# Patient Record
Sex: FEMALE | Race: WHITE | NOT HISPANIC OR LATINO | Employment: UNEMPLOYED | URBAN - NONMETROPOLITAN AREA
[De-identification: names, ages, dates, MRNs, and addresses within clinical notes are randomized per-mention and may not be internally consistent; named-entity substitution may affect disease eponyms.]

---

## 2023-03-22 RX ORDER — ROPINIROLE 1 MG/1
TABLET, FILM COATED ORAL
Qty: 180 TABLET | OUTPATIENT
Start: 2023-03-22

## 2023-03-27 ENCOUNTER — TELEPHONE (OUTPATIENT)
Dept: PRIMARY CARE | Facility: CLINIC | Age: 63
End: 2023-03-27
Payer: COMMERCIAL

## 2023-03-27 NOTE — TELEPHONE ENCOUNTER
Beth called and wanted to know if you would take her mom. Beth isn't happy with her PCP he gave her meds that reacted against each other and she started hallucinating. I told her that I would let you.

## 2023-04-03 PROBLEM — M72.2 BILATERAL PLANTAR FASCIITIS: Status: ACTIVE | Noted: 2023-04-03

## 2023-04-03 PROBLEM — E03.9 HYPOTHYROIDISM: Status: ACTIVE | Noted: 2023-04-03

## 2023-04-03 PROBLEM — I89.0 LYMPHEDEMA: Status: ACTIVE | Noted: 2023-04-03

## 2023-04-03 PROBLEM — R09.81 NASAL CONGESTION: Status: ACTIVE | Noted: 2023-04-03

## 2023-04-03 PROBLEM — M54.12 CERVICAL RADICULOPATHY: Status: ACTIVE | Noted: 2023-04-03

## 2023-04-03 PROBLEM — R47.89 OTHER SPEECH DISTURBANCES: Status: ACTIVE | Noted: 2023-04-03

## 2023-04-03 PROBLEM — R49.9 VOICE DISTURBANCE: Status: ACTIVE | Noted: 2023-04-03

## 2023-04-03 PROBLEM — B37.9 YEAST INFECTION: Status: ACTIVE | Noted: 2023-04-03

## 2023-04-03 PROBLEM — C09.9 TONSIL CANCER (MULTI): Status: ACTIVE | Noted: 2023-04-03

## 2023-04-03 PROBLEM — J34.2 DEVIATED NASAL SEPTUM: Status: ACTIVE | Noted: 2023-04-03

## 2023-04-03 PROBLEM — M19.90 ARTHRITIS: Status: ACTIVE | Noted: 2023-04-03

## 2023-04-03 PROBLEM — R35.0 URINARY FREQUENCY: Status: ACTIVE | Noted: 2023-04-03

## 2023-04-03 PROBLEM — T66.XXXA ADVERSE EFFECT OF RADIATION: Status: ACTIVE | Noted: 2023-04-03

## 2023-04-03 PROBLEM — N39.3 FEMALE GENUINE STRESS INCONTINENCE: Status: ACTIVE | Noted: 2023-04-03

## 2023-04-03 PROBLEM — J18.9 PNEUMONIA: Status: ACTIVE | Noted: 2023-04-03

## 2023-04-03 PROBLEM — R11.2 NAUSEA AND/OR VOMITING: Status: ACTIVE | Noted: 2023-04-03

## 2023-04-03 PROBLEM — H66.90 OTITIS MEDIA, CHRONIC: Status: ACTIVE | Noted: 2023-04-03

## 2023-04-03 PROBLEM — J02.9 SORE THROAT: Status: ACTIVE | Noted: 2023-04-03

## 2023-04-03 PROBLEM — J38.7 LESION OF LARYNX: Status: ACTIVE | Noted: 2023-04-03

## 2023-04-03 PROBLEM — E78.00 PURE HYPERCHOLESTEROLEMIA: Status: ACTIVE | Noted: 2023-04-03

## 2023-04-03 PROBLEM — F17.210 NICOTINE DEPENDENCE, CIGARETTES, UNCOMPLICATED: Status: ACTIVE | Noted: 2023-04-03

## 2023-04-03 PROBLEM — R22.1 LUMP IN NECK: Status: ACTIVE | Noted: 2023-04-03

## 2023-04-03 PROBLEM — R29.818 SUSPECTED SLEEP APNEA: Status: ACTIVE | Noted: 2023-04-03

## 2023-04-03 PROBLEM — R09.82 POSTNASAL DRIP: Status: ACTIVE | Noted: 2023-04-03

## 2023-04-03 PROBLEM — R06.83 SNORES: Status: ACTIVE | Noted: 2023-04-03

## 2023-04-03 PROBLEM — R53.83 FATIGUE: Status: ACTIVE | Noted: 2023-04-03

## 2023-04-03 PROBLEM — E66.01 MORBID OBESITY (MULTI): Status: ACTIVE | Noted: 2023-04-03

## 2023-04-03 PROBLEM — F32.1 CURRENT MODERATE EPISODE OF MAJOR DEPRESSIVE DISORDER WITHOUT PRIOR EPISODE (MULTI): Status: ACTIVE | Noted: 2023-04-03

## 2023-04-03 PROBLEM — B37.0 ORAL THRUSH: Status: ACTIVE | Noted: 2023-04-03

## 2023-04-03 PROBLEM — J32.9 SINUSITIS: Status: ACTIVE | Noted: 2023-04-03

## 2023-04-03 PROBLEM — G25.81 RESTLESS LEGS SYNDROME: Status: ACTIVE | Noted: 2023-04-03

## 2023-04-03 PROBLEM — J30.9 ALLERGIC RHINITIS: Status: ACTIVE | Noted: 2023-04-03

## 2023-04-03 PROBLEM — C77.0 METASTASIS TO HEAD AND NECK LYMPH NODE (MULTI): Status: ACTIVE | Noted: 2023-04-03

## 2023-04-03 PROBLEM — J44.9 COPD (CHRONIC OBSTRUCTIVE PULMONARY DISEASE) (MULTI): Status: ACTIVE | Noted: 2023-04-03

## 2023-04-03 PROBLEM — K11.20 SALIVARY GLAND INFLAMMATION: Status: ACTIVE | Noted: 2023-04-03

## 2023-04-03 PROBLEM — K21.9 GERD (GASTROESOPHAGEAL REFLUX DISEASE): Status: ACTIVE | Noted: 2023-04-03

## 2023-04-03 PROBLEM — M20.10 ACQUIRED HALLUX VALGUS: Status: ACTIVE | Noted: 2023-04-03

## 2023-04-03 PROBLEM — J02.9 PHARYNGITIS: Status: ACTIVE | Noted: 2023-04-03

## 2023-04-03 PROBLEM — M79.2: Status: ACTIVE | Noted: 2023-04-03

## 2023-04-03 RX ORDER — CEPHALEXIN 250 MG/1
1 CAPSULE ORAL EVERY 12 HOURS
COMMUNITY
Start: 2023-02-28 | End: 2023-04-10 | Stop reason: ALTCHOICE

## 2023-04-03 RX ORDER — LORAZEPAM 0.5 MG/1
1 TABLET ORAL EVERY 6 HOURS PRN
COMMUNITY
Start: 2022-06-29 | End: 2023-05-08 | Stop reason: SDUPTHER

## 2023-04-03 RX ORDER — SILVER SULFADIAZINE 10 G/1000G
CREAM TOPICAL
COMMUNITY
Start: 2022-06-29 | End: 2023-05-08 | Stop reason: ALTCHOICE

## 2023-04-03 RX ORDER — NITROFURANTOIN 25; 75 MG/1; MG/1
1 CAPSULE ORAL 2 TIMES DAILY
COMMUNITY
Start: 2023-03-31 | End: 2023-04-10 | Stop reason: ALTCHOICE

## 2023-04-03 RX ORDER — UMECLIDINIUM BROMIDE AND VILANTEROL TRIFENATATE 62.5; 25 UG/1; UG/1
1 POWDER RESPIRATORY (INHALATION) DAILY
COMMUNITY
Start: 2022-08-01 | End: 2023-10-29

## 2023-04-03 RX ORDER — LEVOTHYROXINE SODIUM 50 UG/1
50 TABLET ORAL DAILY
COMMUNITY
Start: 2022-12-31 | End: 2023-04-10 | Stop reason: ALTCHOICE

## 2023-04-03 RX ORDER — FESOTERODINE FUMARATE 8 MG/1
1 TABLET, FILM COATED, EXTENDED RELEASE ORAL DAILY
COMMUNITY
Start: 2022-11-29 | End: 2023-04-10 | Stop reason: ALTCHOICE

## 2023-04-03 RX ORDER — LEVOTHYROXINE SODIUM 75 UG/1
1 TABLET ORAL DAILY
COMMUNITY
Start: 2022-12-05 | End: 2024-03-07

## 2023-04-03 RX ORDER — OMEPRAZOLE 40 MG/1
1 CAPSULE, DELAYED RELEASE ORAL 2 TIMES DAILY
COMMUNITY
Start: 2022-09-26 | End: 2023-04-10 | Stop reason: ALTCHOICE

## 2023-04-03 RX ORDER — DOCUSATE SODIUM 100 MG/1
CAPSULE ORAL 2 TIMES DAILY
COMMUNITY
End: 2023-05-08 | Stop reason: ALTCHOICE

## 2023-04-03 RX ORDER — FLUTICASONE PROPIONATE 50 MCG
2 SPRAY, SUSPENSION (ML) NASAL DAILY
COMMUNITY
Start: 2022-10-18 | End: 2024-01-02 | Stop reason: ALTCHOICE

## 2023-04-03 RX ORDER — PROCHLORPERAZINE MALEATE 10 MG
TABLET ORAL
COMMUNITY
Start: 2022-06-28 | End: 2023-04-10 | Stop reason: ALTCHOICE

## 2023-04-03 RX ORDER — METHYLPREDNISOLONE 4 MG/1
TABLET ORAL
COMMUNITY
Start: 2022-10-18 | End: 2023-04-10 | Stop reason: ALTCHOICE

## 2023-04-03 RX ORDER — SUCRALFATE 1 G/10ML
SUSPENSION ORAL
COMMUNITY
Start: 2022-06-28 | End: 2023-04-10 | Stop reason: ALTCHOICE

## 2023-04-03 RX ORDER — KETOROLAC TROMETHAMINE 10 MG/1
TABLET, FILM COATED ORAL
COMMUNITY
Start: 2022-11-11 | End: 2023-04-10 | Stop reason: ALTCHOICE

## 2023-04-03 RX ORDER — POLYETHYLENE GLYCOL 3350 17 G/17G
17 POWDER, FOR SOLUTION ORAL
COMMUNITY
Start: 2022-11-11 | End: 2023-04-10 | Stop reason: ALTCHOICE

## 2023-04-03 RX ORDER — ROPINIROLE 1 MG/1
2 TABLET, FILM COATED ORAL NIGHTLY
COMMUNITY
Start: 2017-10-25 | End: 2023-04-10 | Stop reason: SDUPTHER

## 2023-04-03 RX ORDER — ADHESIVE BANDAGE
15 BANDAGE TOPICAL
COMMUNITY
Start: 2022-11-11 | End: 2023-04-10 | Stop reason: ALTCHOICE

## 2023-04-04 LAB — THYROTROPIN (MIU/L) IN SER/PLAS BY DETECTION LIMIT <= 0.05 MIU/L: 2.37 MIU/L (ref 0.44–3.98)

## 2023-04-10 ENCOUNTER — OFFICE VISIT (OUTPATIENT)
Dept: PRIMARY CARE | Facility: CLINIC | Age: 63
End: 2023-04-10
Payer: COMMERCIAL

## 2023-04-10 VITALS
SYSTOLIC BLOOD PRESSURE: 124 MMHG | WEIGHT: 172 LBS | OXYGEN SATURATION: 98 % | BODY MASS INDEX: 30.48 KG/M2 | HEIGHT: 63 IN | DIASTOLIC BLOOD PRESSURE: 60 MMHG | HEART RATE: 78 BPM

## 2023-04-10 DIAGNOSIS — F32.1 CURRENT MODERATE EPISODE OF MAJOR DEPRESSIVE DISORDER WITHOUT PRIOR EPISODE (MULTI): Primary | ICD-10-CM

## 2023-04-10 DIAGNOSIS — G25.81 RESTLESS LEGS SYNDROME: ICD-10-CM

## 2023-04-10 DIAGNOSIS — C09.9 TONSIL CANCER (MULTI): ICD-10-CM

## 2023-04-10 PROBLEM — B37.9 YEAST INFECTION: Status: RESOLVED | Noted: 2023-04-03 | Resolved: 2023-04-10

## 2023-04-10 PROBLEM — E66.01 MORBID OBESITY (MULTI): Status: RESOLVED | Noted: 2023-04-03 | Resolved: 2023-04-10

## 2023-04-10 PROBLEM — B37.0 ORAL THRUSH: Status: RESOLVED | Noted: 2023-04-03 | Resolved: 2023-04-10

## 2023-04-10 PROBLEM — J18.9 PNEUMONIA: Status: RESOLVED | Noted: 2023-04-03 | Resolved: 2023-04-10

## 2023-04-10 PROBLEM — R53.83 FATIGUE: Status: RESOLVED | Noted: 2023-04-03 | Resolved: 2023-04-10

## 2023-04-10 PROCEDURE — 99214 OFFICE O/P EST MOD 30 MIN: CPT | Performed by: FAMILY MEDICINE

## 2023-04-10 PROCEDURE — 4004F PT TOBACCO SCREEN RCVD TLK: CPT | Performed by: FAMILY MEDICINE

## 2023-04-10 PROCEDURE — 3008F BODY MASS INDEX DOCD: CPT | Performed by: FAMILY MEDICINE

## 2023-04-10 RX ORDER — OXYCODONE AND ACETAMINOPHEN 5; 325 MG/1; MG/1
1 TABLET ORAL EVERY 4 HOURS PRN
COMMUNITY
Start: 2023-04-01 | End: 2023-04-10 | Stop reason: ALTCHOICE

## 2023-04-10 RX ORDER — GABAPENTIN 300 MG/1
300 CAPSULE ORAL 3 TIMES DAILY
Qty: 90 CAPSULE | Refills: 5 | Status: SHIPPED | OUTPATIENT
Start: 2023-04-10 | End: 2023-09-22 | Stop reason: SDUPTHER

## 2023-04-10 RX ORDER — ROPINIROLE 1 MG/1
2 TABLET, FILM COATED ORAL NIGHTLY
Qty: 60 TABLET | Refills: 3 | Status: SHIPPED | OUTPATIENT
Start: 2023-04-10 | End: 2023-07-06 | Stop reason: SDUPTHER

## 2023-04-10 RX ORDER — FLUOXETINE 10 MG/1
10 CAPSULE ORAL DAILY
Qty: 30 CAPSULE | Refills: 5 | Status: SHIPPED | OUTPATIENT
Start: 2023-04-10 | End: 2023-05-08

## 2023-04-10 NOTE — PROGRESS NOTES
Subjective   Patient ID: Gretchen Sanchez is a 62 y.o. female who presents for Depression (Depression and anxiety ).  HPI  Having anxiety and depression  Currently taking oxycodone and lorazepam  No SI/HI  Poor energy  Some apathy  Does not like talking on the phone  No hopeless, worthless  Cries a bunch  Appetite- poor  Sleep- interrupted due to having to get up to use bathroom 3-4 times  Concentration- not the best    Thought Process- gets distracted and forgets things if do not interrupt others  Berta- No    Takes oxycodone for the throat pain- constant soreness- starts coughing when pain meds starts wearing off  Sees ENT  Sees Oncology    Current Outpatient Medications:     Anoro Ellipta 62.5-25 mcg/actuation blister with device, Inhale 1 puff once daily., Disp: , Rfl:     Colace 100 mg capsule, Take by mouth twice a day., Disp: , Rfl:     FLUoxetine (PROzac) 10 mg capsule, Take 1 capsule (10 mg) by mouth once daily., Disp: 30 capsule, Rfl: 5    fluticasone (Flonase) 50 mcg/actuation nasal spray, Administer 2 sprays into affected nostril(s) once daily., Disp: , Rfl:     gabapentin (Neurontin) 300 mg capsule, Take 1 capsule (300 mg) by mouth in the morning and 1 capsule (300 mg) in the evening and 1 capsule (300 mg) before bedtime., Disp: 90 capsule, Rfl: 5    levothyroxine (Synthroid, Levoxyl) 75 mcg tablet, Take 1 tablet (75 mcg) by mouth once daily., Disp: , Rfl:     LORazepam (Ativan) 0.5 mg tablet, Take 1 tablet (0.5 mg) by mouth every 6 hours if needed., Disp: , Rfl:     rOPINIRole (Requip) 1 mg tablet, Take 2 tablets (2 mg) by mouth once daily at bedtime., Disp: 60 tablet, Rfl: 3    SSD 1 % cream, APPLY TOPICALLY TO THE AFFECTED AREA TWICE DAILY, Disp: , Rfl:    Past Surgical History:   Procedure Laterality Date     SECTION, CLASSIC  2014     Section    CHOLECYSTECTOMY  2014    Cholecystectomy    HYSTERECTOMY  2014    Hysterectomy    MOUTH SURGERY  2014    Oral  "Surgery Tooth Extraction      Past Medical History:   Diagnosis Date    Acute maxillary sinusitis, unspecified 09/25/2018    Acute maxillary sinusitis    Essential (primary) hypertension 12/28/2018    HTN (hypertension), benign    Morbid obesity (CMS/HCC) 04/03/2023    Other conditions influencing health status 07/17/2018    History of cough    Personal history of other diseases of urinary system     History of bladder problems    Pneumonia 04/03/2023    Urinary tract infection, site not specified 07/13/2018    Acute urinary tract infection    Wheezing 06/05/2013    Wheezing     Social History     Tobacco Use    Smoking status: Every Day     Packs/day: 1.50     Types: Cigarettes    Smokeless tobacco: Never   Substance Use Topics    Alcohol use: Never    Drug use: Never      Family History   Problem Relation Name Age of Onset    Emphysema Mother      Hyperlipidemia Sister        Review of Systems    Objective   /60   Pulse 78   Ht 1.6 m (5' 3\")   Wt 78 kg (172 lb)   SpO2 98%   BMI 30.47 kg/m²    Physical Exam  Vitals and nursing note reviewed.   Constitutional:       Appearance: Normal appearance.   HENT:      Head: Normocephalic and atraumatic.      Right Ear: Tympanic membrane, ear canal and external ear normal.      Left Ear: Tympanic membrane, ear canal and external ear normal.      Nose: Nose normal.      Mouth/Throat:      Mouth: Mucous membranes are moist.      Pharynx: Oropharynx is clear. Posterior oropharyngeal erythema present.   Eyes:      Extraocular Movements: Extraocular movements intact.      Conjunctiva/sclera: Conjunctivae normal.      Pupils: Pupils are equal, round, and reactive to light.   Cardiovascular:      Rate and Rhythm: Normal rate and regular rhythm.      Pulses: Normal pulses.      Heart sounds: Normal heart sounds.   Pulmonary:      Effort: Pulmonary effort is normal.      Breath sounds: Normal breath sounds.   Abdominal:      General: Abdomen is flat. Bowel sounds are " normal.      Palpations: Abdomen is soft.   Musculoskeletal:      Cervical back: Normal range of motion and neck supple.   Lymphadenopathy:      Cervical: No cervical adenopathy.   Skin:     Capillary Refill: Capillary refill takes less than 2 seconds.   Neurological:      Mental Status: She is alert.   Psychiatric:         Mood and Affect: Mood is anxious and depressed.         Behavior: Behavior normal.         Assessment/Plan   Problem List Items Addressed This Visit       Current moderate episode of major depressive disorder without prior episode (CMS/HCC) - Primary    Relevant Medications    FLUoxetine (PROzac) 10 mg capsule    Restless legs syndrome    Relevant Medications    rOPINIRole (Requip) 1 mg tablet    Tonsil cancer (CMS/HCC)    Relevant Medications    gabapentin (Neurontin) 300 mg capsule   MDD- start on prozac 10 mg, CV exercise    RLS- controlled- renewed Requip    Tonsil CA- F/U with oncology, Wean off oxycodone (may not be able to go straight off), gabapentin 30 mg tid    Patient understands and agrees with treatment plan    Alton King, DO

## 2023-05-08 ENCOUNTER — OFFICE VISIT (OUTPATIENT)
Dept: PRIMARY CARE | Facility: CLINIC | Age: 63
End: 2023-05-08
Payer: COMMERCIAL

## 2023-05-08 VITALS
DIASTOLIC BLOOD PRESSURE: 72 MMHG | BODY MASS INDEX: 30.12 KG/M2 | SYSTOLIC BLOOD PRESSURE: 126 MMHG | HEART RATE: 74 BPM | WEIGHT: 170 LBS | OXYGEN SATURATION: 97 % | HEIGHT: 63 IN

## 2023-05-08 DIAGNOSIS — F41.1 GENERALIZED ANXIETY DISORDER: ICD-10-CM

## 2023-05-08 DIAGNOSIS — Z79.899 CHRONIC PRESCRIPTION BENZODIAZEPINE USE: ICD-10-CM

## 2023-05-08 DIAGNOSIS — G25.81 RESTLESS LEGS SYNDROME: ICD-10-CM

## 2023-05-08 DIAGNOSIS — C77.0 METASTASIS TO HEAD AND NECK LYMPH NODE (MULTI): ICD-10-CM

## 2023-05-08 DIAGNOSIS — F32.1 CURRENT MODERATE EPISODE OF MAJOR DEPRESSIVE DISORDER WITHOUT PRIOR EPISODE (MULTI): Primary | ICD-10-CM

## 2023-05-08 DIAGNOSIS — J43.2 CENTRILOBULAR EMPHYSEMA (MULTI): ICD-10-CM

## 2023-05-08 PROBLEM — J02.9 PHARYNGITIS: Status: RESOLVED | Noted: 2023-04-03 | Resolved: 2023-05-08

## 2023-05-08 PROBLEM — R11.2 NAUSEA AND/OR VOMITING: Status: RESOLVED | Noted: 2023-04-03 | Resolved: 2023-05-08

## 2023-05-08 PROBLEM — J02.9 SORE THROAT: Status: RESOLVED | Noted: 2023-04-03 | Resolved: 2023-05-08

## 2023-05-08 PROBLEM — J32.9 SINUSITIS: Status: RESOLVED | Noted: 2023-04-03 | Resolved: 2023-05-08

## 2023-05-08 LAB
AMPHETAMINE (PRESENCE) IN URINE BY SCREEN METHOD: NORMAL
BARBITURATES PRESENCE IN URINE BY SCREEN METHOD: NORMAL
BENZODIAZEPINE (PRESENCE) IN URINE BY SCREEN METHOD: NORMAL
CANNABINOIDS IN URINE BY SCREEN METHOD: NORMAL
COCAINE (PRESENCE) IN URINE BY SCREEN METHOD: NORMAL
DRUG SCREEN COMMENT URINE: NORMAL
FENTANYL URINE: NORMAL
METHADONE (PRESENCE) IN URINE BY SCREEN METHOD: NORMAL
OPIATES (PRESENCE) IN URINE BY SCREEN METHOD: NORMAL
OXYCODONE (PRESENCE) IN URINE BY SCREEN METHOD: NORMAL
PHENCYCLIDINE (PRESENCE) IN URINE BY SCREEN METHOD: NORMAL

## 2023-05-08 PROCEDURE — 3008F BODY MASS INDEX DOCD: CPT | Performed by: FAMILY MEDICINE

## 2023-05-08 PROCEDURE — 4004F PT TOBACCO SCREEN RCVD TLK: CPT | Performed by: FAMILY MEDICINE

## 2023-05-08 PROCEDURE — 80307 DRUG TEST PRSMV CHEM ANLYZR: CPT

## 2023-05-08 PROCEDURE — 99214 OFFICE O/P EST MOD 30 MIN: CPT | Performed by: FAMILY MEDICINE

## 2023-05-08 RX ORDER — ALBUTEROL SULFATE 90 UG/1
2 AEROSOL, METERED RESPIRATORY (INHALATION) EVERY 6 HOURS PRN
COMMUNITY
End: 2024-01-02 | Stop reason: ALTCHOICE

## 2023-05-08 RX ORDER — PAROXETINE HYDROCHLORIDE 20 MG/1
20 TABLET, FILM COATED ORAL EVERY MORNING
Qty: 30 TABLET | Refills: 5 | Status: SHIPPED | OUTPATIENT
Start: 2023-05-08 | End: 2023-06-05

## 2023-05-08 RX ORDER — LORAZEPAM 0.5 MG/1
0.5 TABLET ORAL EVERY 6 HOURS PRN
Qty: 60 TABLET | Refills: 0 | Status: SHIPPED | OUTPATIENT
Start: 2023-05-08 | End: 2023-06-05 | Stop reason: SDUPTHER

## 2023-05-08 ASSESSMENT — ANXIETY QUESTIONNAIRES
6. BECOMING EASILY ANNOYED OR IRRITABLE: NOT AT ALL
GAD7 TOTAL SCORE: 6
5. BEING SO RESTLESS THAT IT IS HARD TO SIT STILL: SEVERAL DAYS
1. FEELING NERVOUS, ANXIOUS, OR ON EDGE: SEVERAL DAYS
2. NOT BEING ABLE TO STOP OR CONTROL WORRYING: SEVERAL DAYS
7. FEELING AFRAID AS IF SOMETHING AWFUL MIGHT HAPPEN: SEVERAL DAYS
4. TROUBLE RELAXING: SEVERAL DAYS
3. WORRYING TOO MUCH ABOUT DIFFERENT THINGS: SEVERAL DAYS
IF YOU CHECKED OFF ANY PROBLEMS ON THIS QUESTIONNAIRE, HOW DIFFICULT HAVE THESE PROBLEMS MADE IT FOR YOU TO DO YOUR WORK, TAKE CARE OF THINGS AT HOME, OR GET ALONG WITH OTHER PEOPLE: SOMEWHAT DIFFICULT

## 2023-05-08 NOTE — PROGRESS NOTES
Subjective   Patient ID: Gretchen Sanchez is a 62 y.o. female who presents for Follow-up (Medication follow up, nothing different ).  HPI  Off the oxycodone now  Does feel better since off the oxycodone- not sleeping as much  Still having anxiety and depression    No SI/HI  Energy a little better  Does not have any desire to do anything  Does not want to do things alone- just wants to stay home  No hopeless  Kind of feel worthless  Not crying as much  Appetite- fair- thinks it is improving  Sleep- okay if takes ativan and benadryl  Concentration- not the best still  Thought Process- gets distracted and forgets things  Berta- No    Breathing is doing decent with the Anoro  Seeing Pulmonologist today  Rarely needs the albuterol    RLS good when taking the Requip    OARRS:  Alton King, DO on 5/8/2023 11:46 AM  I have personally reviewed the OARRS report for Melvi Sanchez. I have considered the risks of abuse, dependence, addiction and diversion    Is the patient prescribed a combination of a benzodiazepine and opioid?  No    Last Urine Drug Screen / ordered today: Yes  Recent Results (from the past 43247 hour(s))   Drug Screen, Urine With Reflex to Confirmation    Collection Time: 05/08/23 12:02 PM   Result Value Ref Range    DRUG SCREEN COMMENT URINE SEE BELOW     Amphetamine Screen, Urine PRESUMPTIVE NEGATIVE NEGATIVE    Barbiturate Screen, Urine PRESUMPTIVE NEGATIVE NEGATIVE    BENZODIAZEPINE (PRESENCE) IN URINE BY SCREEN METHOD PRESUMPTIVE NEGATIVE NEGATIVE    Cannabinoid Screen, Urine PRESUMPTIVE NEGATIVE NEGATIVE    Cocaine Screen, Urine PRESUMPTIVE NEGATIVE NEGATIVE    Fentanyl, Ur PRESUMPTIVE NEGATIVE NEGATIVE    Methadone Screen, Urine PRESUMPTIVE NEGATIVE NEGATIVE    Opiate Screen, Urine PRESUMPTIVE NEGATIVE NEGATIVE    Oxycodone Screen, Ur PRESUMPTIVE NEGATIVE NEGATIVE    PCP Screen, Urine PRESUMPTIVE NEGATIVE NEGATIVE     N/A    Controlled Substance Agreement:  Date of the Last Agreement:  2023  Reviewed Controlled Substance Agreement including but not limited to the benefits, risks, and alternatives to treatment with a Controlled Substance medication(s).    Benzodiazepines:  What is the patient's goal of therapy? Better sleep  Is this being achieved with current treatment? yes    BRUNO-7:  Over the last 2 weeks, how often have you been bothered by any of the following problems?  Feeling nervous, anxious, or on edge: 1  Not being able to stop or control worryin  Worrying too much about different things: 1  Trouble relaxin  Being so restless that it is hard to sit still: 1  Becoming easily annoyed or irritable: 0  Feeling afraid as if something awful might happen: 1  BRUNO-7 Total Score: 6        Activities of Daily Living:   Is your overall impression that this patient is benefiting (symptom reduction outweighs side effects) from benzodiazepine therapy? Yes     1. Physical Functioning: Same  2. Family Relationship: Better  3. Social Relationship: Better  4. Mood: Better  5. Sleep Patterns: Better  6. Overall Function: Better      Current Outpatient Medications:     albuterol 90 mcg/actuation inhaler, Inhale 2 puffs every 6 hours if needed for wheezing., Disp: , Rfl:     Anoro Ellipta 62.5-25 mcg/actuation blister with device, Inhale 1 puff once daily., Disp: , Rfl:     fluticasone (Flonase) 50 mcg/actuation nasal spray, Administer 2 sprays into affected nostril(s) once daily., Disp: , Rfl:     gabapentin (Neurontin) 300 mg capsule, Take 1 capsule (300 mg) by mouth in the morning and 1 capsule (300 mg) in the evening and 1 capsule (300 mg) before bedtime., Disp: 90 capsule, Rfl: 5    levothyroxine (Synthroid, Levoxyl) 75 mcg tablet, Take 1 tablet (75 mcg) by mouth once daily., Disp: , Rfl:     LORazepam (Ativan) 0.5 mg tablet, Take 1 tablet (0.5 mg) by mouth every 6 hours if needed for anxiety., Disp: 60 tablet, Rfl: 0    PARoxetine (Paxil) 20 mg tablet, Take 1 tablet (20 mg) by mouth once  "daily in the morning., Disp: 30 tablet, Rfl: 5    rOPINIRole (Requip) 1 mg tablet, Take 2 tablets (2 mg) by mouth once daily at bedtime., Disp: 60 tablet, Rfl: 3   Past Surgical History:   Procedure Laterality Date     SECTION, CLASSIC  2014     Section    CHOLECYSTECTOMY  2014    Cholecystectomy    HYSTERECTOMY  2014    Hysterectomy    MOUTH SURGERY  2014    Oral Surgery Tooth Extraction      Past Medical History:   Diagnosis Date    Acute maxillary sinusitis, unspecified 2018    Acute maxillary sinusitis    Essential (primary) hypertension 2018    HTN (hypertension), benign    Morbid obesity (CMS/HCC) 2023    Nausea and/or vomiting 2023    Other conditions influencing health status 2018    History of cough    Personal history of other diseases of urinary system     History of bladder problems    Pharyngitis 2023    Pneumonia 2023    Sinusitis 2023    Sore throat 2023    Urinary tract infection, site not specified 2018    Acute urinary tract infection    Wheezing 2013    Wheezing     Social History     Tobacco Use    Smoking status: Every Day     Packs/day: 1.50     Types: Cigarettes    Smokeless tobacco: Never   Substance Use Topics    Alcohol use: Never    Drug use: Never      Family History   Problem Relation Name Age of Onset    Emphysema Mother      Hyperlipidemia Sister        Review of Systems    Objective   /72   Pulse 74   Ht 1.6 m (5' 3\")   Wt 77.1 kg (170 lb)   SpO2 97%   BMI 30.11 kg/m²    Physical Exam  Vitals and nursing note reviewed.   Constitutional:       General: She is not in acute distress.     Appearance: Normal appearance.   HENT:      Head: Normocephalic and atraumatic.      Right Ear: Tympanic membrane, ear canal and external ear normal.      Left Ear: Tympanic membrane, ear canal and external ear normal.      Nose: Nose normal.      Mouth/Throat:      Mouth: Mucous membranes " are moist.      Pharynx: Oropharynx is clear.   Eyes:      Extraocular Movements: Extraocular movements intact.      Pupils: Pupils are equal, round, and reactive to light.   Neck:      Vascular: No carotid bruit.   Cardiovascular:      Rate and Rhythm: Normal rate and regular rhythm.      Pulses: Normal pulses.      Heart sounds: Normal heart sounds. No murmur heard.  Pulmonary:      Effort: Pulmonary effort is normal.      Breath sounds: Normal breath sounds.   Abdominal:      Palpations: There is no mass.   Musculoskeletal:      Cervical back: Normal range of motion and neck supple.   Lymphadenopathy:      Cervical: No cervical adenopathy.   Skin:     Capillary Refill: Capillary refill takes less than 2 seconds.   Neurological:      General: No focal deficit present.      Mental Status: She is alert and oriented to person, place, and time.   Psychiatric:         Mood and Affect: Mood is anxious. Affect is flat.         Behavior: Behavior normal.         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

## 2023-05-17 ENCOUNTER — HOSPITAL ENCOUNTER (OUTPATIENT)
Dept: DATA CONVERSION | Facility: HOSPITAL | Age: 63
End: 2023-05-17
Attending: STUDENT IN AN ORGANIZED HEALTH CARE EDUCATION/TRAINING PROGRAM | Admitting: STUDENT IN AN ORGANIZED HEALTH CARE EDUCATION/TRAINING PROGRAM
Payer: COMMERCIAL

## 2023-05-17 DIAGNOSIS — Z90.49 ACQUIRED ABSENCE OF OTHER SPECIFIED PARTS OF DIGESTIVE TRACT: ICD-10-CM

## 2023-05-17 DIAGNOSIS — G25.81 RESTLESS LEGS SYNDROME: ICD-10-CM

## 2023-05-17 DIAGNOSIS — I10 ESSENTIAL (PRIMARY) HYPERTENSION: ICD-10-CM

## 2023-05-17 DIAGNOSIS — M19.90 UNSPECIFIED OSTEOARTHRITIS, UNSPECIFIED SITE: ICD-10-CM

## 2023-05-17 DIAGNOSIS — F32.A DEPRESSION, UNSPECIFIED: ICD-10-CM

## 2023-05-17 DIAGNOSIS — G47.33 OBSTRUCTIVE SLEEP APNEA (ADULT) (PEDIATRIC): ICD-10-CM

## 2023-05-17 DIAGNOSIS — N32.81 OVERACTIVE BLADDER: ICD-10-CM

## 2023-05-17 DIAGNOSIS — J44.9 CHRONIC OBSTRUCTIVE PULMONARY DISEASE, UNSPECIFIED (MULTI): ICD-10-CM

## 2023-05-17 DIAGNOSIS — E78.5 HYPERLIPIDEMIA, UNSPECIFIED: ICD-10-CM

## 2023-05-17 DIAGNOSIS — F17.200 NICOTINE DEPENDENCE, UNSPECIFIED, UNCOMPLICATED: ICD-10-CM

## 2023-05-17 DIAGNOSIS — N39.41 URGE INCONTINENCE: ICD-10-CM

## 2023-05-17 DIAGNOSIS — Z85.818 PERSONAL HISTORY OF MALIGNANT NEOPLASM OF OTHER SITES OF LIP, ORAL CAVITY, AND PHARYNX: ICD-10-CM

## 2023-05-17 DIAGNOSIS — F41.9 ANXIETY DISORDER, UNSPECIFIED: ICD-10-CM

## 2023-05-17 DIAGNOSIS — Z92.21 PERSONAL HISTORY OF ANTINEOPLASTIC CHEMOTHERAPY: ICD-10-CM

## 2023-06-05 ENCOUNTER — OFFICE VISIT (OUTPATIENT)
Dept: PRIMARY CARE | Facility: CLINIC | Age: 63
End: 2023-06-05
Payer: COMMERCIAL

## 2023-06-05 VITALS
DIASTOLIC BLOOD PRESSURE: 76 MMHG | WEIGHT: 167.6 LBS | OXYGEN SATURATION: 97 % | HEART RATE: 63 BPM | SYSTOLIC BLOOD PRESSURE: 128 MMHG | BODY MASS INDEX: 29.69 KG/M2

## 2023-06-05 DIAGNOSIS — J43.2 CENTRILOBULAR EMPHYSEMA (MULTI): ICD-10-CM

## 2023-06-05 DIAGNOSIS — Z12.31 ENCOUNTER FOR SCREENING MAMMOGRAM FOR BREAST CANCER: ICD-10-CM

## 2023-06-05 DIAGNOSIS — Z12.12 SCREENING FOR COLORECTAL CANCER: ICD-10-CM

## 2023-06-05 DIAGNOSIS — F32.1 CURRENT MODERATE EPISODE OF MAJOR DEPRESSIVE DISORDER WITHOUT PRIOR EPISODE (MULTI): Primary | ICD-10-CM

## 2023-06-05 DIAGNOSIS — Z12.11 SCREENING FOR COLORECTAL CANCER: ICD-10-CM

## 2023-06-05 DIAGNOSIS — G25.81 RESTLESS LEGS SYNDROME: ICD-10-CM

## 2023-06-05 DIAGNOSIS — F41.1 GENERALIZED ANXIETY DISORDER: ICD-10-CM

## 2023-06-05 PROCEDURE — 99214 OFFICE O/P EST MOD 30 MIN: CPT | Performed by: FAMILY MEDICINE

## 2023-06-05 PROCEDURE — 3008F BODY MASS INDEX DOCD: CPT | Performed by: FAMILY MEDICINE

## 2023-06-05 PROCEDURE — 4004F PT TOBACCO SCREEN RCVD TLK: CPT | Performed by: FAMILY MEDICINE

## 2023-06-05 RX ORDER — PAROXETINE 30 MG/1
30 TABLET, FILM COATED ORAL EVERY MORNING
Qty: 30 TABLET | Refills: 5 | Status: SHIPPED | OUTPATIENT
Start: 2023-06-05 | End: 2023-12-01 | Stop reason: SDUPTHER

## 2023-06-05 RX ORDER — LORAZEPAM 0.5 MG/1
0.5 TABLET ORAL EVERY 6 HOURS PRN
Qty: 60 TABLET | Refills: 1 | Status: SHIPPED | OUTPATIENT
Start: 2023-06-05 | End: 2023-09-29 | Stop reason: ALTCHOICE

## 2023-06-05 RX ORDER — BUPROPION HYDROCHLORIDE 150 MG/1
TABLET, EXTENDED RELEASE ORAL
COMMUNITY
Start: 2023-05-08 | End: 2024-01-02 | Stop reason: ALTCHOICE

## 2023-06-05 RX ORDER — DOCUSATE SODIUM 100 MG/1
CAPSULE, LIQUID FILLED ORAL
COMMUNITY
Start: 2023-05-17 | End: 2024-01-02 | Stop reason: ALTCHOICE

## 2023-06-05 NOTE — PROGRESS NOTES
Subjective   Patient ID: Gretchen Sanchez is a 63 y.o. female who presents for Follow-up (Follow up on new meds).  HPI    Review of Systems    Objective   Physical Exam    Assessment/Plan   {Assess/PlanSmartLinks:65563}

## 2023-06-05 NOTE — PROGRESS NOTES
Subjective   Patient ID: Gretchen Sanchez is a 63 y.o. female who presents for Follow-up (Follow up on new meds).  HPI  MDD- paxil has slightly improved mood and energy, increase in appetite, sleep has improved, denies SE  BRUNO- ativan at bedtime  COPD- Anoro daily, albuterol prn- doing well- seeing pulmonology  Tonsil CA- Has been off oxycodone for 2 mo.  RLS- doing well with requip    Denies fever, chills, chest pain, SOB, N/V, diarrhea, dizziness, weakness         Review of Systems   All other systems reviewed and are negative.      Objective   /76 (BP Location: Left arm, Patient Position: Sitting, BP Cuff Size: Large adult)   Pulse 63   Wt 76 kg (167 lb 9.6 oz)   SpO2 97%   BMI 29.69 kg/m²     Physical Exam  Constitutional:       Appearance: Normal appearance.   HENT:      Head: Normocephalic and atraumatic.   Eyes:      Extraocular Movements: Extraocular movements intact.      Conjunctiva/sclera: Conjunctivae normal.      Pupils: Pupils are equal, round, and reactive to light.   Cardiovascular:      Rate and Rhythm: Normal rate and regular rhythm.      Pulses: Normal pulses.      Heart sounds: Normal heart sounds.   Pulmonary:      Effort: Pulmonary effort is normal.      Breath sounds: Normal breath sounds.   Musculoskeletal:      Cervical back: Normal range of motion and neck supple.   Skin:     General: Skin is warm and dry.      Capillary Refill: Capillary refill takes less than 2 seconds.   Neurological:      General: No focal deficit present.      Mental Status: She is alert and oriented to person, place, and time.   Psychiatric:         Mood and Affect: Mood is anxious.         Behavior: Behavior normal.         Assessment/Plan   Problem List Items Addressed This Visit       COPD (chronic obstructive pulmonary disease) (CMS/HCC)    Current moderate episode of major depressive disorder without prior episode (CMS/HCC) - Primary    Relevant Medications    PARoxetine (Paxil) 30 mg tablet     Restless legs syndrome     Other Visit Diagnoses       Encounter for screening mammogram for breast cancer        Relevant Orders    BI mammo bilateral screening tomosynthesis    Screening for colorectal cancer        Relevant Orders    Cologuard® colon cancer screening    Generalized anxiety disorder        Relevant Medications    LORazepam (Ativan) 0.5 mg tablet        MDD/BRUNO- increase Paxil to 30 mg, CV exercise    COPD- continue inhalers, stop smoking    RLS- contiue requip, fluids    F/U 1 month

## 2023-06-15 DIAGNOSIS — R92.1 CALCIFICATION OF LEFT BREAST ON MAMMOGRAPHY: Primary | ICD-10-CM

## 2023-06-15 NOTE — PROGRESS NOTES
Subjective   Patient ID: Gretchen Sanchez is a 63 y.o. female who presents for No chief complaint on file..  HPI    Review of Systems    Objective   Physical Exam    Assessment/Plan

## 2023-06-22 LAB — NONINV COLON CA DNA+OCC BLD SCRN STL QL: NORMAL

## 2023-06-30 ENCOUNTER — HOSPITAL ENCOUNTER (OUTPATIENT)
Dept: DATA CONVERSION | Facility: HOSPITAL | Age: 63
End: 2023-06-30
Attending: RADIOLOGY
Payer: COMMERCIAL

## 2023-06-30 DIAGNOSIS — D24.2 BENIGN NEOPLASM OF LEFT BREAST: ICD-10-CM

## 2023-06-30 DIAGNOSIS — R92.1 MAMMOGRAPHIC CALCIFICATION FOUND ON DIAGNOSTIC IMAGING OF BREAST: ICD-10-CM

## 2023-07-06 ENCOUNTER — OFFICE VISIT (OUTPATIENT)
Dept: PRIMARY CARE | Facility: CLINIC | Age: 63
End: 2023-07-06
Payer: COMMERCIAL

## 2023-07-06 VITALS
HEART RATE: 71 BPM | WEIGHT: 166.8 LBS | SYSTOLIC BLOOD PRESSURE: 126 MMHG | DIASTOLIC BLOOD PRESSURE: 76 MMHG | OXYGEN SATURATION: 98 % | BODY MASS INDEX: 29.55 KG/M2

## 2023-07-06 DIAGNOSIS — Z12.12 SCREENING FOR COLORECTAL CANCER: ICD-10-CM

## 2023-07-06 DIAGNOSIS — G25.81 RESTLESS LEGS SYNDROME: ICD-10-CM

## 2023-07-06 DIAGNOSIS — Z12.11 SCREENING FOR COLORECTAL CANCER: ICD-10-CM

## 2023-07-06 DIAGNOSIS — F32.1 CURRENT MODERATE EPISODE OF MAJOR DEPRESSIVE DISORDER WITHOUT PRIOR EPISODE (MULTI): ICD-10-CM

## 2023-07-06 DIAGNOSIS — J43.2 CENTRILOBULAR EMPHYSEMA (MULTI): Primary | ICD-10-CM

## 2023-07-06 LAB
COMPLETE PATHOLOGY REPORT: NORMAL
CONVERTED CLINICAL DIAGNOSIS-HISTORY: NORMAL
CONVERTED FINAL DIAGNOSIS: NORMAL
CONVERTED FINAL REPORT PDF LINK TO COPY AND PASTE: NORMAL
CONVERTED GROSS DESCRIPTION: NORMAL

## 2023-07-06 PROCEDURE — 99214 OFFICE O/P EST MOD 30 MIN: CPT | Performed by: FAMILY MEDICINE

## 2023-07-06 PROCEDURE — 3008F BODY MASS INDEX DOCD: CPT | Performed by: FAMILY MEDICINE

## 2023-07-06 PROCEDURE — 4004F PT TOBACCO SCREEN RCVD TLK: CPT | Performed by: FAMILY MEDICINE

## 2023-07-06 RX ORDER — ROPINIROLE 1 MG/1
2 TABLET, FILM COATED ORAL NIGHTLY
Qty: 180 TABLET | Refills: 3 | Status: SHIPPED | OUTPATIENT
Start: 2023-07-06 | End: 2024-07-05

## 2023-07-06 NOTE — PROGRESS NOTES
Subjective   Patient ID: Gretchen Sanchez is a 63 y.o. female who presents for Follow-up and Med Refill.  HPI  Mood has been good  Keeping busy  Energy level could be better  Appetite is good  No SI/HI  Sleeping- good  Concentration- okay  Anxiety doing well    Waiting on breast biopsy results  Wants to do a colonoscopy now    Breathing is doing well  Doing Anoro every day  Has not needed the Albuterol for some time now    Requip doing well for legs    Current Outpatient Medications:     albuterol 90 mcg/actuation inhaler, Inhale 2 puffs every 6 hours if needed for wheezing., Disp: , Rfl:     Anoro Ellipta 62.5-25 mcg/actuation blister with device, Inhale 1 puff once daily., Disp: , Rfl:     docusate sodium (Colace) 100 mg capsule, , Disp: , Rfl:     gabapentin (Neurontin) 300 mg capsule, Take 1 capsule (300 mg) by mouth in the morning and 1 capsule (300 mg) in the evening and 1 capsule (300 mg) before bedtime., Disp: 90 capsule, Rfl: 5    levothyroxine (Synthroid, Levoxyl) 75 mcg tablet, Take 1 tablet (75 mcg) by mouth once daily., Disp: , Rfl:     PARoxetine (Paxil) 30 mg tablet, Take 1 tablet (30 mg) by mouth once daily in the morning., Disp: 30 tablet, Rfl: 5    buPROPion SR (Wellbutrin SR) 150 mg 12 hr tablet, Take by mouth., Disp: , Rfl:     fluticasone (Flonase) 50 mcg/actuation nasal spray, Administer 2 sprays into affected nostril(s) once daily., Disp: , Rfl:     LORazepam (Ativan) 0.5 mg tablet, Take 1 tablet (0.5 mg) by mouth every 6 hours if needed for anxiety. (Patient not taking: Reported on 2023), Disp: 60 tablet, Rfl: 1    rOPINIRole (Requip) 1 mg tablet, Take 2 tablets (2 mg) by mouth once daily at bedtime., Disp: 180 tablet, Rfl: 3   Past Surgical History:   Procedure Laterality Date     SECTION, CLASSIC  2014     Section    CHOLECYSTECTOMY  2014    Cholecystectomy    HYSTERECTOMY  2014    Hysterectomy    MOUTH SURGERY  2014    Oral Surgery Tooth  Extraction      Past Medical History:   Diagnosis Date    Acute maxillary sinusitis, unspecified 09/25/2018    Acute maxillary sinusitis    Essential (primary) hypertension 12/28/2018    HTN (hypertension), benign    Morbid obesity (CMS/HCC) 04/03/2023    Nausea and/or vomiting 04/03/2023    Other conditions influencing health status 07/17/2018    History of cough    Personal history of other diseases of urinary system     History of bladder problems    Pharyngitis 04/03/2023    Pneumonia 04/03/2023    Sinusitis 04/03/2023    Sore throat 04/03/2023    Urinary tract infection, site not specified 07/13/2018    Acute urinary tract infection    Wheezing 06/05/2013    Wheezing     Social History     Tobacco Use    Smoking status: Every Day     Packs/day: 1.50     Types: Cigarettes    Smokeless tobacco: Never   Substance Use Topics    Alcohol use: Never    Drug use: Never      Family History   Problem Relation Name Age of Onset    Emphysema Mother      Hyperlipidemia Sister        Review of Systems    Objective   /76   Pulse 71   Wt 75.7 kg (166 lb 12.8 oz)   SpO2 98%   BMI 29.55 kg/m²    Physical Exam  Constitutional:       Appearance: Normal appearance.   HENT:      Head: Normocephalic and atraumatic.   Eyes:      Extraocular Movements: Extraocular movements intact.      Conjunctiva/sclera: Conjunctivae normal.      Pupils: Pupils are equal, round, and reactive to light.   Cardiovascular:      Rate and Rhythm: Normal rate and regular rhythm.      Pulses: Normal pulses.      Heart sounds: Normal heart sounds.   Pulmonary:      Effort: Pulmonary effort is normal.      Breath sounds: Wheezing present. No decreased breath sounds, rhonchi or rales.   Musculoskeletal:      Cervical back: Normal range of motion and neck supple.   Skin:     General: Skin is warm and dry.      Capillary Refill: Capillary refill takes less than 2 seconds.   Neurological:      General: No focal deficit present.      Mental Status: She is  alert and oriented to person, place, and time.   Psychiatric:         Mood and Affect: Mood normal.         Behavior: Behavior normal.         Assessment/Plan   Problem List Items Addressed This Visit       COPD (chronic obstructive pulmonary disease) (CMS/[facility]) - Primary    Current moderate episode of major depressive disorder without prior episode (CMS/[facility])    Restless legs syndrome    Relevant Medications    rOPINIRole (Requip) 1 mg tablet    Other Relevant Orders    Colonoscopy   COPD- Anoro, albuterol prn, advised to quit smoking    RLS- requip, fluids    MDD- controlled- continue paroxetine, ativan prn, stay active    Patient understands and agrees with treatment plan    Alton King, DO

## 2023-09-07 VITALS — HEIGHT: 63 IN | WEIGHT: 167.55 LBS | BODY MASS INDEX: 29.69 KG/M2

## 2023-09-11 LAB — URINE CULTURE: ABNORMAL

## 2023-09-20 ENCOUNTER — PATIENT OUTREACH (OUTPATIENT)
Dept: PRIMARY CARE | Facility: CLINIC | Age: 63
End: 2023-09-20
Payer: COMMERCIAL

## 2023-09-20 ENCOUNTER — DOCUMENTATION (OUTPATIENT)
Dept: PRIMARY CARE | Facility: CLINIC | Age: 63
End: 2023-09-20
Payer: COMMERCIAL

## 2023-09-20 DIAGNOSIS — J18.9 COMMUNITY ACQUIRED PNEUMONIA, UNSPECIFIED LATERALITY: ICD-10-CM

## 2023-09-20 PROBLEM — N32.81 OAB (OVERACTIVE BLADDER): Status: ACTIVE | Noted: 2023-09-20

## 2023-09-20 PROBLEM — R10.9 ABDOMINAL PAIN: Status: ACTIVE | Noted: 2023-09-20

## 2023-09-20 PROBLEM — J96.01 ACUTE HYPOXEMIC RESPIRATORY FAILURE (MULTI): Status: ACTIVE | Noted: 2023-09-20

## 2023-09-20 PROBLEM — R49.0 HOARSENESS: Status: ACTIVE | Noted: 2023-09-20

## 2023-09-20 PROBLEM — R92.1 BREAST CALCIFICATION, LEFT: Status: ACTIVE | Noted: 2023-09-20

## 2023-09-20 PROBLEM — D24.2 FIBROADENOMA OF LEFT BREAST: Status: ACTIVE | Noted: 2023-09-20

## 2023-09-20 PROBLEM — F17.200 TOBACCO USE DISORDER: Status: ACTIVE | Noted: 2023-09-20

## 2023-09-20 PROBLEM — F41.9 ANXIETY: Status: ACTIVE | Noted: 2023-09-20

## 2023-09-20 PROBLEM — R22.1 MASS OF RIGHT SIDE OF NECK: Status: ACTIVE | Noted: 2023-09-20

## 2023-09-20 PROBLEM — E87.6 HYPOKALEMIA: Status: ACTIVE | Noted: 2023-09-20

## 2023-09-20 PROBLEM — E83.42 HYPOMAGNESEMIA: Status: ACTIVE | Noted: 2023-09-20

## 2023-09-20 PROBLEM — G89.3 NEOPLASM RELATED PAIN: Status: ACTIVE | Noted: 2023-09-20

## 2023-09-20 PROBLEM — R22.0 LOCALIZED SWELLING, MASS, AND LUMP OF HEAD: Status: ACTIVE | Noted: 2023-09-20

## 2023-09-20 RX ORDER — NITROFURANTOIN 25; 75 MG/1; MG/1
100 CAPSULE ORAL 2 TIMES DAILY
COMMUNITY
Start: 2023-09-09 | End: 2024-01-02 | Stop reason: ALTCHOICE

## 2023-09-20 RX ORDER — POLYETHYLENE GLYCOL 3350 17 G/17G
17 POWDER, FOR SOLUTION ORAL DAILY
COMMUNITY
Start: 2023-09-19 | End: 2024-01-02 | Stop reason: ALTCHOICE

## 2023-09-20 RX ORDER — VIBEGRON 75 MG/1
75 TABLET, FILM COATED ORAL
COMMUNITY
Start: 2023-07-27 | End: 2024-01-02 | Stop reason: ALTCHOICE

## 2023-09-20 RX ORDER — CIPROFLOXACIN 500 MG/1
500 TABLET ORAL 2 TIMES DAILY
COMMUNITY
Start: 2023-09-14 | End: 2024-01-02 | Stop reason: ALTCHOICE

## 2023-09-20 RX ORDER — OXYCODONE HYDROCHLORIDE 5 MG/1
5 TABLET ORAL EVERY 6 HOURS PRN
COMMUNITY
Start: 2023-09-19 | End: 2023-09-29 | Stop reason: ALTCHOICE

## 2023-09-20 RX ORDER — CEFDINIR 300 MG/1
300 CAPSULE ORAL 2 TIMES DAILY
COMMUNITY
Start: 2023-09-19 | End: 2023-09-29 | Stop reason: ALTCHOICE

## 2023-09-20 RX ORDER — IBUPROFEN 200 MG
1 TABLET ORAL EVERY 24 HOURS
COMMUNITY
Start: 2023-09-19 | End: 2023-11-30 | Stop reason: SDUPTHER

## 2023-09-20 RX ORDER — DIPHENHYDRAMINE HCL 25 MG
TABLET ORAL
COMMUNITY
End: 2023-10-24 | Stop reason: ALTCHOICE

## 2023-09-20 RX ORDER — VARENICLINE TARTRATE 1 MG/1
1 TABLET, FILM COATED ORAL DAILY
COMMUNITY
Start: 2023-09-19 | End: 2023-09-29 | Stop reason: SDUPTHER

## 2023-09-20 NOTE — PROGRESS NOTES
Discharge Facility: [facility]  Discharge Diagnosis: CAP  Admission Date: 9/17/23  Discharge Date: 9/19/23    PCP Appointment Date: 9/29/23  Specialist Appointment Date: n/a  Hospital Encounter and Summary: Linked   See discharge assessment below for further details   Engagement  Call Start Time: 1301 (9/20/2023  3:00 PM)    Medications  Medications reviewed with patient/caregiver?: Yes (9/20/2023  3:00 PM)  Is the patient having any side effects they believe may be caused by any medication additions or changes?: No (9/20/2023  3:00 PM)  Does the patient have all medications ordered at discharge?: Yes (9/20/2023  3:00 PM)  Care Management Interventions: No intervention needed (9/20/2023  3:00 PM)  Prescription Comments: New Chantix and Cefdinir (9/20/2023  3:00 PM)  Is the patient taking all medications as directed (includes completed medication regime)?: Yes (9/20/2023  3:00 PM)  Care Management Interventions: Provided patient education (9/20/2023  3:00 PM)  Medication Comments: See medication list (9/20/2023  3:00 PM)    Appointments  Does the patient have a primary care provider?: Yes (9/20/2023  3:00 PM)  Care Management Interventions: Verified appointment date/time/provider (9/20/2023  3:00 PM)  Has the patient kept scheduled appointments due by today?: Not applicable (9/20/2023  3:00 PM)  Care Management Interventions: Advised patient to keep appointment (9/20/2023  3:00 PM)    Self Management  What is the home health agency?: n/a (9/20/2023  3:00 PM)  Has home health visited the patient within 72 hours of discharge?: Not applicable (9/20/2023  3:00 PM)  What Durable Medical Equipment (DME) was ordered?: n/a (9/20/2023  3:00 PM)    Patient Teaching  Does the patient have access to their discharge instructions?: Yes (9/20/2023  3:00 PM)  Care Management Interventions: Reviewed instructions with patient (9/20/2023  3:00 PM)  What is the patient's perception of their health status since discharge?: Same (9/20/2023   3:00 PM)  Is the patient/caregiver able to teach back the hierarchy of who to call/visit for symptoms/problems? PCP, Specialist, Home Health nurse, Urgent Care, ED, 911: Yes (9/20/2023  3:00 PM)    Wrap Up  Wrap Up Additional Comments: Called the patient for initial outreach post discharge from the hospital. Patient states she is home and recovering well, but was under the impression at discharge that home health would be ordered for her to administer her IV antibiotics. Discharge summary makes no mention of home health and lists oral antibiotics and not IV. Patient notified of this and states she received the oral antibiotics and have been taking them, but was sure the nurse told  her she also had IV antibiotics. Patient was given the phone number for ER follow up calls and was instructed to call and inquire about medications and home health services. Patient was instructed to call TCM back if she needs further assistance with this. Patient denied any new or worsening symptoms at this time. Patient denied the need for DME or assistance obtaining transportation. Patient confirmed they had their discharge summary and all medications needed in the home. Patient denied any other questions or concerns regarding their hospitalization. TCM phone number provided with the patient encouraged to call with any needs or questions that may arise to help prevent another hospitalization. Patient verbalized her understanding and stated she had no further questions or concerns at this time, but would call back if needed. PCP follow up already scheduled for 9/29/23. (9/20/2023  3:00 PM)  Call End Time: 1315 (9/20/2023  3:00 PM)

## 2023-09-22 DIAGNOSIS — C09.9 TONSIL CANCER (MULTI): ICD-10-CM

## 2023-09-22 RX ORDER — GABAPENTIN 300 MG/1
300 CAPSULE ORAL 3 TIMES DAILY
Qty: 90 CAPSULE | Refills: 5 | Status: SHIPPED | OUTPATIENT
Start: 2023-09-22 | End: 2024-05-31

## 2023-09-29 ENCOUNTER — OFFICE VISIT (OUTPATIENT)
Dept: PRIMARY CARE | Facility: CLINIC | Age: 63
End: 2023-09-29
Payer: COMMERCIAL

## 2023-09-29 VITALS
WEIGHT: 155.8 LBS | OXYGEN SATURATION: 97 % | HEART RATE: 79 BPM | SYSTOLIC BLOOD PRESSURE: 124 MMHG | BODY MASS INDEX: 27.61 KG/M2 | HEIGHT: 63 IN | DIASTOLIC BLOOD PRESSURE: 68 MMHG

## 2023-09-29 DIAGNOSIS — J43.2 CENTRILOBULAR EMPHYSEMA (MULTI): ICD-10-CM

## 2023-09-29 DIAGNOSIS — J18.9 PNEUMONIA OF LEFT LOWER LOBE DUE TO INFECTIOUS ORGANISM: Primary | ICD-10-CM

## 2023-09-29 DIAGNOSIS — E03.9 ACQUIRED HYPOTHYROIDISM: ICD-10-CM

## 2023-09-29 DIAGNOSIS — J96.01 ACUTE HYPOXEMIC RESPIRATORY FAILURE (MULTI): ICD-10-CM

## 2023-09-29 DIAGNOSIS — F17.210 TOBACCO DEPENDENCE DUE TO CIGARETTES: ICD-10-CM

## 2023-09-29 LAB — THYROTROPIN (MIU/L) IN SER/PLAS BY DETECTION LIMIT <= 0.05 MIU/L: 3.59 MIU/L (ref 0.44–3.98)

## 2023-09-29 PROCEDURE — 99214 OFFICE O/P EST MOD 30 MIN: CPT | Performed by: FAMILY MEDICINE

## 2023-09-29 PROCEDURE — 84443 ASSAY THYROID STIM HORMONE: CPT

## 2023-09-29 PROCEDURE — 36415 COLL VENOUS BLD VENIPUNCTURE: CPT

## 2023-09-29 PROCEDURE — 4004F PT TOBACCO SCREEN RCVD TLK: CPT | Performed by: FAMILY MEDICINE

## 2023-09-29 PROCEDURE — 3008F BODY MASS INDEX DOCD: CPT | Performed by: FAMILY MEDICINE

## 2023-09-29 PROCEDURE — 99406 BEHAV CHNG SMOKING 3-10 MIN: CPT | Performed by: FAMILY MEDICINE

## 2023-09-29 RX ORDER — NICOTINE 7MG/24HR
1 PATCH, TRANSDERMAL 24 HOURS TRANSDERMAL EVERY 24 HOURS
Qty: 28 PATCH | Refills: 0 | Status: SHIPPED | OUTPATIENT
Start: 2023-09-29 | End: 2024-01-02 | Stop reason: WASHOUT

## 2023-09-29 RX ORDER — IBUPROFEN 200 MG
1 TABLET ORAL EVERY 24 HOURS
Qty: 28 PATCH | Refills: 0 | Status: SHIPPED | OUTPATIENT
Start: 2023-09-29 | End: 2023-11-30 | Stop reason: WASHOUT

## 2023-09-29 RX ORDER — VARENICLINE TARTRATE 1 MG/1
1 TABLET, FILM COATED ORAL DAILY
Qty: 30 TABLET | Refills: 5 | Status: SHIPPED | OUTPATIENT
Start: 2023-09-29 | End: 2024-01-02 | Stop reason: WASHOUT

## 2023-09-29 NOTE — PROGRESS NOTES
"Patient: Gretchen Sanchez  : 1960  PCP: Alton King DO  MRN: [id number]  Program: No linked episodes     Gretchen Sanchez is a 63 y.o. female presenting today for follow-up after being discharged from the hospital 10 days ago. The main problem requiring admission was CAP. The discharge summary and/or Transitional Care Management documentation was reviewed. Medication reconciliation was performed as indicated via the \"Ritesh as Reviewed\" timestamp.     Gretchen Sanchez was contacted by Transitional Care Management services two days after her discharge. This encounter and supporting documentation was reviewed.    The complexity of medical decision making for this patient's transitional care is moderate.    Was in for CAP  Has swallow eval today to r/o aspiration    On home oxygen, 3 liters at rest and 6 liters when exert- NC  SOB better  Some NP cough  No CP, palpitations  No fever, chills, ST, issues swallowing  Slight runny nose  No fever, chills, ear pain, HA  No n/v, diarrhea    Physical Exam    Assessment/Plan   Problem List Items Addressed This Visit             ICD-10-CM    COPD (chronic obstructive pulmonary disease) (CMS/[facility]) J44.9    Relevant Orders    Disability Placard    Hypothyroidism E03.9    Relevant Orders    TSH with reflex to Free T4 if abnormal (Completed)    RESOLVED: Acute hypoxemic respiratory failure (CMS/[facility]) J96.01    Pneumonia of left lower lobe due to infectious organism - Primary J18.9     Other Visit Diagnoses         Codes    Tobacco dependence due to cigarettes     F17.210    Relevant Medications    varenicline (Chantix) 1 mg tablet    nicotine (Nicoderm CQ) 14 mg/24 hr patch    nicotine (Nicoderm CQ) 7 mg/24 hr patch        COPD- continue oxygen, inhalers, stop smoking    CAP- finish cefdinir, fluids    Hypothyroidism- follow TSH, continue medicine    Respiratory Failure- resolved- continue oxygen    I spent more than 3 minutes (but less than 10 minutes) counseling patient " about need for smoking/tobacco cessation and how I can support efforts when patient is ready to quit.  Discussed nicotine replacement therapy, Varenicline, Bupropion, hypnosis, support groups, and acupuncture as potential options.  Patient currently has no signs or symptoms of tobacco related disease.        Review of Systems   Constitutional:  Negative for chills, fatigue and fever.   HENT:  Positive for congestion. Negative for ear discharge, ear pain, hearing loss, nosebleeds, sore throat, tinnitus and trouble swallowing.    Eyes:  Negative for pain, redness and visual disturbance.   Respiratory:  Positive for cough and shortness of breath. Negative for chest tightness and wheezing.    Cardiovascular:  Negative for chest pain, palpitations and leg swelling.   Gastrointestinal:  Negative for abdominal pain, blood in stool, constipation, diarrhea, nausea and vomiting.   Endocrine: Negative for cold intolerance, heat intolerance, polydipsia, polyphagia and polyuria.   Genitourinary:  Negative for dysuria, frequency, hematuria and urgency.   Musculoskeletal:  Positive for arthralgias.   Skin:  Negative for color change and rash.   Neurological:  Negative for dizziness, tremors, syncope, weakness, numbness and headaches.   Hematological:  Negative for adenopathy. Does not bruise/bleed easily.   Psychiatric/Behavioral:  Negative for dysphoric mood. The patient is not nervous/anxious.        Family History   Problem Relation Name Age of Onset    Emphysema Mother      COPD Mother      Hyperlipidemia Sister         Engagement  Call Start Time: 1301 (9/20/2023  3:00 PM)    Medications  Medications reviewed with patient/caregiver?: Yes (9/20/2023  3:00 PM)  Is the patient having any side effects they believe may be caused by any medication additions or changes?: No (9/20/2023  3:00 PM)  Does the patient have all medications ordered at discharge?: Yes (9/20/2023  3:00 PM)  Care Management Interventions: No intervention  needed (9/20/2023  3:00 PM)  Prescription Comments: New Chantix and Cefdinir (9/20/2023  3:00 PM)  Is the patient taking all medications as directed (includes completed medication regime)?: Yes (9/20/2023  3:00 PM)  Care Management Interventions: Provided patient education (9/20/2023  3:00 PM)  Medication Comments: See medication list (9/20/2023  3:00 PM)    Appointments  Does the patient have a primary care provider?: Yes (9/20/2023  3:00 PM)  Care Management Interventions: Verified appointment date/time/provider (9/20/2023  3:00 PM)  Has the patient kept scheduled appointments due by today?: Not applicable (9/20/2023  3:00 PM)  Care Management Interventions: Advised patient to keep appointment (9/20/2023  3:00 PM)    Self Management  What is the home health agency?: n/a (9/20/2023  3:00 PM)  Has home health visited the patient within 72 hours of discharge?: Not applicable (9/20/2023  3:00 PM)  What Durable Medical Equipment (DME) was ordered?: n/a (9/20/2023  3:00 PM)    Patient Teaching  Does the patient have access to their discharge instructions?: Yes (9/20/2023  3:00 PM)  Care Management Interventions: Reviewed instructions with patient (9/20/2023  3:00 PM)  What is the patient's perception of their health status since discharge?: Same (9/20/2023  3:00 PM)  Is the patient/caregiver able to teach back the hierarchy of who to call/visit for symptoms/problems? PCP, Specialist, Home Health nurse, Urgent Care, ED, 911: Yes (9/20/2023  3:00 PM)    Wrap Up  Wrap Up Additional Comments: Called the patient for initial outreach post discharge from the hospital. Patient states she is home and recovering well, but was under the impression at discharge that home health would be ordered for her to administer her IV antibiotics. Discharge summary makes no mention of home health and lists oral antibiotics and not IV. Patient notified of this and states she received the oral antibiotics and have been taking them, but was sure  the nurse told  her she also had IV antibiotics. Patient was given the phone number for ER follow up calls and was instructed to call and inquire about medications and home health services. Patient was instructed to call TCM back if she needs further assistance with this. Patient denied any new or worsening symptoms at this time. Patient denied the need for DME or assistance obtaining transportation. Patient confirmed they had their discharge summary and all medications needed in the home. Patient denied any other questions or concerns regarding their hospitalization. TCM phone number provided with the patient encouraged to call with any needs or questions that may arise to help prevent another hospitalization. Patient verbalized her understanding and stated she had no further questions or concerns at this time, but would call back if needed. PCP follow up already scheduled for 9/29/23. (9/20/2023  3:00 PM)  Call End Time: 1315 (9/20/2023  3:00 PM)        No follow-ups on file.

## 2023-09-30 ASSESSMENT — ENCOUNTER SYMPTOMS
WEAKNESS: 0
POLYDIPSIA: 0
DIARRHEA: 0
DYSPHORIC MOOD: 0
EYE PAIN: 0
DIZZINESS: 0
HEADACHES: 0
TROUBLE SWALLOWING: 0
EYE REDNESS: 0
ADENOPATHY: 0
NERVOUS/ANXIOUS: 0
BLOOD IN STOOL: 0
ABDOMINAL PAIN: 0
CONSTIPATION: 0
PALPITATIONS: 0
FATIGUE: 0
NAUSEA: 0
SORE THROAT: 0
VOMITING: 0
FEVER: 0
POLYPHAGIA: 0
SHORTNESS OF BREATH: 1
FREQUENCY: 0
ARTHRALGIAS: 1
HEMATURIA: 0
CHEST TIGHTNESS: 0
BRUISES/BLEEDS EASILY: 0
NUMBNESS: 0
WHEEZING: 0
TREMORS: 0
DYSURIA: 0
COUGH: 1
CHILLS: 0
COLOR CHANGE: 0

## 2023-09-30 NOTE — H&P
History & Physical Reviewed:   I have reviewed the History and Physical dated:  17-May-2023   History and Physical reviewed and relevant findings noted. Patient examined to review pertinent physical  findings.: No significant changes   Home Medications Reviewed: no changes noted   Allergies Reviewed: no changes noted       ERAS (Enhanced Recovery After Surgery):  ·  ERAS Patient: no     Consent:   COVID-19 Consent:  ·  COVID-19 Risk Consent Surgeon has reviewed key risks related to the risk of mirela COVID-19 and if they contract COVID-19 what the risks are.       Electronic Signatures:  Elvin Loera)  (Signed 17-May-2023 12:15)   Authored: History & Physical Reviewed, ERAS, Consent,  Note Completion      Last Updated: 17-May-2023 12:15 by Elvin Loera)

## 2023-09-30 NOTE — H&P
History & Physical Reviewed:   I have reviewed the History and Physical dated:  17-May-2023   History and Physical reviewed and relevant findings noted. Patient examined to review pertinent physical  findings.: No significant changes   Home Medications Reviewed: no changes noted   Allergies Reviewed: no changes noted       ERAS (Enhanced Recovery After Surgery):  ·  ERAS Patient: no     Consent:   COVID-19 Consent:  ·  COVID-19 Risk Consent Surgeon has reviewed key risks related to the risk of mirela COVID-19 and if they contract COVID-19 what the risks are.       Electronic Signatures:  Elvin Loera)  (Signed 17-May-2023 12:13)   Authored: History & Physical Reviewed, ERAS, Consent,  Note Completion      Last Updated: 17-May-2023 12:13 by Elvin Loera)

## 2023-10-02 NOTE — OP NOTE
Post Operative Note:     PreOp Diagnosis: oab   Post-Procedure Diagnosis: same   Procedure: 1. stage 1 and 2 sacral neuromodulation   Surgeon: mouna   Resident/Fellow/Other Assistant: none   Estimated Blood Loss (mL): none   Specimen: no   Findings: no impedences     Operative Report Dictated:  Dictation: not applicable - note contains Operative  Report   Operative Report:    Under satisfactory intravenous sedation, in the prone position, the patient was prepped and draped in the usual sterile manner. Bony landmarks were used to identify  the approximate location of the S3 foramen on the right side. A spinal needle was used to find the correct spot for the S3 nerve after injecting local anesthetic over the site. Once the site was confirmed using sensory and motor response, as well as with  intraoperative fluoroscopy, the  lead was placed into the S3 foramen using the kit designed for this purpose. The lead was then tunneled over to the left side where a small pocket was created.  Taking care to keep the lead contact points dry, the lead  was inserted into internal neurostimulator (internal pulse generator (IPG)) until the point the blue end was confirmed to be seen extending all the way inside the internal pulse generator. The screws were secured in place. The IPG was then inserted into  the tissue pocket in proper orientation. Irrigation was again performed with the Gentamycin solution. The subcutaneous tissue was reapproximated with 2-0 Vicryl. The skin was closed with a 4-0 subcuticular stitch. A simple stitch was placed over the midline  site at the lead insertion. Sponge and instrument counts were correct.   At The patient tolerated the procedure well, and was transferred to the recovery room in excellent condition. In the PACU the neurostimulator was programmed to the hand-held device.        Electronic Signatures:  Elvin Loera)  (Signed 17-May-2023 14:57)   Authored: Post Operative Note, Note  Completion      Last Updated: 17-May-2023 14:57 by Elvin Loera)

## 2023-10-04 ENCOUNTER — PATIENT OUTREACH (OUTPATIENT)
Dept: PRIMARY CARE | Facility: CLINIC | Age: 63
End: 2023-10-04
Payer: COMMERCIAL

## 2023-10-04 DIAGNOSIS — J18.9 PNEUMONIA OF LEFT LOWER LOBE DUE TO INFECTIOUS ORGANISM: ICD-10-CM

## 2023-10-04 NOTE — PROGRESS NOTES
Unable to reach patient for call back after patient's follow up appointment with PCP.   BONITAM with call back number for patient to call if needed   If no voicemail available call attempts x 2 were made to contact the patient to assist with any questions or concerns patient may have.

## 2023-10-06 ENCOUNTER — APPOINTMENT (OUTPATIENT)
Dept: PRIMARY CARE | Facility: CLINIC | Age: 63
End: 2023-10-06
Payer: COMMERCIAL

## 2023-10-20 ENCOUNTER — HOSPITAL ENCOUNTER (OUTPATIENT)
Dept: RADIATION ONCOLOGY | Facility: CLINIC | Age: 63
Setting detail: RADIATION/ONCOLOGY SERIES
Discharge: STILL ADMITTED OR ADMINISTRATIVE | End: 2023-10-20
Payer: COMMERCIAL

## 2023-10-20 VITALS
DIASTOLIC BLOOD PRESSURE: 78 MMHG | TEMPERATURE: 97 F | OXYGEN SATURATION: 98 % | SYSTOLIC BLOOD PRESSURE: 126 MMHG | WEIGHT: 158.2 LBS | HEART RATE: 69 BPM | RESPIRATION RATE: 20 BRPM | BODY MASS INDEX: 28.02 KG/M2

## 2023-10-20 DIAGNOSIS — T66.XXXS ADVERSE EFFECT OF RADIATION, SEQUELA: ICD-10-CM

## 2023-10-20 DIAGNOSIS — J18.9 PNEUMONIA OF LEFT LOWER LOBE DUE TO INFECTIOUS ORGANISM: ICD-10-CM

## 2023-10-20 DIAGNOSIS — C09.9 TONSIL CANCER (MULTI): Primary | ICD-10-CM

## 2023-10-20 SDOH — ECONOMIC STABILITY: FOOD INSECURITY: WITHIN THE PAST 12 MONTHS, YOU WORRIED THAT YOUR FOOD WOULD RUN OUT BEFORE YOU GOT MONEY TO BUY MORE.: NEVER TRUE

## 2023-10-20 SDOH — ECONOMIC STABILITY: FOOD INSECURITY: WITHIN THE PAST 12 MONTHS, THE FOOD YOU BOUGHT JUST DIDN'T LAST AND YOU DIDN'T HAVE MONEY TO GET MORE.: NEVER TRUE

## 2023-10-20 SDOH — ECONOMIC STABILITY: TRANSPORTATION INSECURITY
IN THE PAST 12 MONTHS, HAS THE LACK OF TRANSPORTATION KEPT YOU FROM MEDICAL APPOINTMENTS OR FROM GETTING MEDICATIONS?: NO

## 2023-10-20 SDOH — ECONOMIC STABILITY: TRANSPORTATION INSECURITY
IN THE PAST 12 MONTHS, HAS LACK OF TRANSPORTATION KEPT YOU FROM MEETINGS, WORK, OR FROM GETTING THINGS NEEDED FOR DAILY LIVING?: NO

## 2023-10-20 ASSESSMENT — ENCOUNTER SYMPTOMS
LOSS OF SENSATION IN FEET: 0
OCCASIONAL FEELINGS OF UNSTEADINESS: 0
DEPRESSION: 0

## 2023-10-20 ASSESSMENT — COLUMBIA-SUICIDE SEVERITY RATING SCALE - C-SSRS
6. HAVE YOU EVER DONE ANYTHING, STARTED TO DO ANYTHING, OR PREPARED TO DO ANYTHING TO END YOUR LIFE?: NO
1. IN THE PAST MONTH, HAVE YOU WISHED YOU WERE DEAD OR WISHED YOU COULD GO TO SLEEP AND NOT WAKE UP?: NO
2. HAVE YOU ACTUALLY HAD ANY THOUGHTS OF KILLING YOURSELF?: NO

## 2023-10-20 ASSESSMENT — LIFESTYLE VARIABLES
SKIP TO QUESTIONS 9-10: 1
HOW OFTEN DO YOU HAVE SIX OR MORE DRINKS ON ONE OCCASION: NEVER
AUDIT-C TOTAL SCORE: 0
HOW OFTEN DO YOU HAVE A DRINK CONTAINING ALCOHOL: NEVER
HOW MANY STANDARD DRINKS CONTAINING ALCOHOL DO YOU HAVE ON A TYPICAL DAY: PATIENT DOES NOT DRINK

## 2023-10-20 NOTE — PROGRESS NOTES
Radiation Oncology Follow-Up    Patient Name:  Melvi Sanchez  MRN:  [id number]  :  1960    Referring Provider: No ref. provider found  Primary Care Provider: Alton King DO  Care Team: Patient Care Team:  Alton King DO as PCP - General (Family Medicine)  Alton King DO as PCP - [facility] PCP  Jimmie LEYVA MD as Consulting Physician (Radiation Oncology)  Marlon Patel MD as Surgeon (Pulmonary Disease)    Date of Service: 10/20/2023     SUBJECTIVE  History of Present Illness:   Gretchen Sanchez is a 63 y.o. female who was previously seen at the 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    Her most recent PET/CT on 23 showed a focus of mild uptake (SUV 4.0) at the left posterior floor of mouth, but there was no corresponding mass/lesion seen on the CT neck with contrast. There was no evidence of cervical adenopathy or distant metastases.    She was recently hospitalized  to 23 for pneumonia, suspected to be related to aspiration. She completed antibiotics and has been off supplemental oxygen for a week. She thinks her breathing is close to her baseline now. She did have MBS with swallow evaluation on 23 and she was recommended to continue following with speech therapy.    She followed up with Dr. Brownlee on 23, and he did not note any evidence of recurrent disease on his scope exam.    Today, she complains of chronic dry mouth and decreased taste that has not changed recently. She does endorse coughing sometimes when she eats. She has some swelling around her chin/neck that is stable. She denies loss of appetite, though her weight is down about 10 pounds compared to 6 months ago.     Treatment Rendered:   Head and neck chemoradiation 70 Gy/35 fractions completed  7/7/22      Review of Systems:   Review of Systems   All other systems reviewed and are negative.      Performance Status:   The Karnofsky performance scale today is 90, Able to carry on normal activity; minor signs or symptoms of disease (ECOG equivalent 0).        OBJECTIVE  Vital Signs:  /78 (BP Location: Left arm, Patient Position: Sitting, BP Cuff Size: Adult)   Pulse 69   Temp 36.1 °C (97 °F) (Temporal)   Resp 20   Wt 71.8 kg (158 lb 3.2 oz)   SpO2 98%   BMI 28.02 kg/m²    Physical Exam:  Physical Exam  Constitutional:       General: She is not in acute distress.     Appearance: Normal appearance.   HENT:      Head: Normocephalic and atraumatic.      Mouth/Throat:      Mouth: Mucous membranes are moist.      Pharynx: Oropharynx is clear. No oropharyngeal exudate or posterior oropharyngeal erythema.      Comments: No oropharyngeal/oral lesions or thrush. Tongue mobile. Edentulous.  Eyes:      General: No scleral icterus.     Extraocular Movements: Extraocular movements intact.      Conjunctiva/sclera: Conjunctivae normal.      Pupils: Pupils are equal, round, and reactive to light.   Neck:      Comments: Mild lymphedema around chin  Pulmonary:      Effort: Pulmonary effort is normal. No respiratory distress.   Musculoskeletal:         General: Normal range of motion.      Cervical back: Normal range of motion and neck supple.      Right lower leg: No edema.      Left lower leg: No edema.   Lymphadenopathy:      Cervical: No cervical adenopathy.   Skin:     General: Skin is warm and dry.      Coloration: Skin is not jaundiced.      Findings: No lesion or rash.   Neurological:      General: No focal deficit present.      Mental Status: She is alert and oriented to person, place, and time.      Cranial Nerves: No cranial nerve deficit.      Motor: No weakness.      Gait: Gait normal.   Psychiatric:         Mood and Affect: Mood normal.         Behavior: Behavior normal.              ASSESSMENT:  Melvi Sanchez is a 63 y.o. female with stage III [id number] p16+ left tonsil squamous cell carcinoma s/p definitive chemoradiation to 70 Gy/35 fractions with weekly cisplatin, completed 7/7/22. She has no evidence of recurrent disease on my exam today or on Dr. Brownlee's scope exam last month, and her last PET/CT on 7/14/23 did not show clear evidence of disease.    She was recently hospitalized for pneumonia suspected to be related to aspiration, and has completed her antibiotics and is off supplemental oxygen. Her MBS on 9/29/23 led to a recommendation to continue speech therapy. She otherwise is doing well with good performance status (ECOG 0).    She will see Dr. Brownlee for follow up in 1/2024. I will see her for follow up in about 6 months. I will order referral to speech therapy since she has not been contacted about those appointments yet.    NCCN Guidelines were applicable to guide this patients treatment plan.    Jimmie Morley MD

## 2023-10-24 ENCOUNTER — OFFICE VISIT (OUTPATIENT)
Dept: PULMONOLOGY | Facility: HOSPITAL | Age: 63
End: 2023-10-24
Payer: COMMERCIAL

## 2023-10-24 VITALS
TEMPERATURE: 97 F | WEIGHT: 161 LBS | RESPIRATION RATE: 16 BRPM | DIASTOLIC BLOOD PRESSURE: 81 MMHG | BODY MASS INDEX: 28.53 KG/M2 | HEART RATE: 64 BPM | HEIGHT: 63 IN | OXYGEN SATURATION: 99 % | SYSTOLIC BLOOD PRESSURE: 130 MMHG

## 2023-10-24 DIAGNOSIS — J96.11 CHRONIC RESPIRATORY FAILURE WITH HYPOXIA (MULTI): Primary | ICD-10-CM

## 2023-10-24 DIAGNOSIS — J18.9 PNEUMONIA DUE TO INFECTIOUS ORGANISM, UNSPECIFIED LATERALITY, UNSPECIFIED PART OF LUNG: ICD-10-CM

## 2023-10-24 DIAGNOSIS — C09.9 TONSIL CANCER (MULTI): ICD-10-CM

## 2023-10-24 DIAGNOSIS — F17.210 NICOTINE DEPENDENCE, CIGARETTES, UNCOMPLICATED: ICD-10-CM

## 2023-10-24 DIAGNOSIS — J43.9 PULMONARY EMPHYSEMA, UNSPECIFIED EMPHYSEMA TYPE (MULTI): ICD-10-CM

## 2023-10-24 PROCEDURE — 3008F BODY MASS INDEX DOCD: CPT | Performed by: NURSE PRACTITIONER

## 2023-10-24 PROCEDURE — 99213 OFFICE O/P EST LOW 20 MIN: CPT | Mod: ZK | Performed by: NURSE PRACTITIONER

## 2023-10-24 PROCEDURE — 99213 OFFICE O/P EST LOW 20 MIN: CPT | Performed by: NURSE PRACTITIONER

## 2023-10-24 PROCEDURE — 4004F PT TOBACCO SCREEN RCVD TLK: CPT | Performed by: NURSE PRACTITIONER

## 2023-10-24 ASSESSMENT — PATIENT HEALTH QUESTIONNAIRE - PHQ9
SUM OF ALL RESPONSES TO PHQ9 QUESTIONS 1 AND 2: 0
2. FEELING DOWN, DEPRESSED OR HOPELESS: NOT AT ALL
1. LITTLE INTEREST OR PLEASURE IN DOING THINGS: NOT AT ALL

## 2023-10-24 ASSESSMENT — ENCOUNTER SYMPTOMS
DEPRESSION: 0
WHEEZING: 0
SHORTNESS OF BREATH: 1
FATIGUE: 0
CHILLS: 0
OCCASIONAL FEELINGS OF UNSTEADINESS: 0
FEVER: 0
UNEXPECTED WEIGHT CHANGE: 0
RHINORRHEA: 0
LOSS OF SENSATION IN FEET: 0
COUGH: 0

## 2023-10-24 ASSESSMENT — COLUMBIA-SUICIDE SEVERITY RATING SCALE - C-SSRS
2. HAVE YOU ACTUALLY HAD ANY THOUGHTS OF KILLING YOURSELF?: NO
1. IN THE PAST MONTH, HAVE YOU WISHED YOU WERE DEAD OR WISHED YOU COULD GO TO SLEEP AND NOT WAKE UP?: NO
6. HAVE YOU EVER DONE ANYTHING, STARTED TO DO ANYTHING, OR PREPARED TO DO ANYTHING TO END YOUR LIFE?: NO

## 2023-10-24 NOTE — PROGRESS NOTES
Subjective   Patient ID: Gretchen Sanchez is a 63 y.o. here for follow up COPD.     HPI: She was diagnosed with tonsillar CA due to left neck lump in March 2022. She just finished XRT and chemotherapy on July 8, 22. She is currently on Anoro for COPD. She is currently on Chantix and using nicotine patches but mostly has smoked about 1 ppd and has smoked for several years. She is here for hospital follow up for pneumonia in September. She was discharged home on 3L at rest and 6L on exertion. She was able to stop wearing it last week and is 99% on RA today. She has no other concerns.     Review of Systems   Constitutional:  Negative for chills, fatigue, fever and unexpected weight change.   HENT:  Negative for congestion, postnasal drip and rhinorrhea.    Respiratory:  Positive for shortness of breath. Negative for cough (denies hemoptysis.) and wheezing.    Cardiovascular:  Negative for chest pain and leg swelling.   All other systems reviewed and are negative.      Objective   Physical Exam  Vitals reviewed.   Constitutional:       Appearance: Normal appearance.   HENT:      Head: Normocephalic.   Cardiovascular:      Rate and Rhythm: Normal rate and regular rhythm.   Pulmonary:      Effort: Pulmonary effort is normal.      Breath sounds: Decreased breath sounds present.   Skin:     General: Skin is warm and dry.   Neurological:      Mental Status: She is alert.         Assessment/Plan     1. COPD  2. Tobacco dependence  3. Hypoxia, resolved  4. Fatigue  5. Seasonal allergic rhinitis  6. Tonsillar CA Squamous Cell, s/p XRT and chemotherapy completed July 8, 22.      Plan:     Pt's PFTS show FEV1 81%, RV and TLC normal, DLCO 54%.   -She was discharged home on 2L at rest and 6L on exertion she is 99% on RA today I will reorder 6MWT for her and discontinue oxygen if indicated.  -Continue Anoro.  -continue prn albuterol.   -She is currently on Chantix and nicotine patches and doing well with cravings I encouraged her  to continue with this.   -Follow up with Sleep Specialist for suspected sleep apnea. pt states that she lost weight due to cancer and does not have symptoms of EDS or fatigue or snoring.   -She gets PET scans with oncology.   -She was hospitalized in September for pneumonia I will order a follow up CXR for her to have in December.     Overall we will continue current regimen. I will get CXR for follow up on pneumonia and 6MWT. I will bring her back with me in 6 months. I instructed patient to call sooner if needed.

## 2023-10-24 NOTE — PATIENT INSTRUCTIONS
Continue on Anoro.   Continue albuterol as needed.   Please get oxygen test done.   Please get Chest X-ray done in December for follow up on pneumonia.   Continue to work on quitting smoking.   Call with any questions or concerns.   Follow up with me in 6 months.

## 2023-10-25 DIAGNOSIS — Z12.11 COLON CANCER SCREENING: ICD-10-CM

## 2023-10-25 RX ORDER — POLYETHYLENE GLYCOL 3350, SODIUM SULFATE ANHYDROUS, SODIUM BICARBONATE, SODIUM CHLORIDE, POTASSIUM CHLORIDE 236; 22.74; 6.74; 5.86; 2.97 G/4L; G/4L; G/4L; G/4L; G/4L
4000 POWDER, FOR SOLUTION ORAL ONCE
Qty: 4000 ML | Refills: 0 | Status: SHIPPED | OUTPATIENT
Start: 2023-10-25 | End: 2023-10-25

## 2023-10-26 ENCOUNTER — PATIENT OUTREACH (OUTPATIENT)
Dept: PRIMARY CARE | Facility: CLINIC | Age: 63
End: 2023-10-26
Payer: COMMERCIAL

## 2023-10-26 DIAGNOSIS — J18.9 PNEUMONIA OF LEFT LOWER LOBE DUE TO INFECTIOUS ORGANISM: ICD-10-CM

## 2023-10-27 DIAGNOSIS — J43.9 PULMONARY EMPHYSEMA, UNSPECIFIED EMPHYSEMA TYPE (MULTI): Primary | ICD-10-CM

## 2023-10-29 RX ORDER — UMECLIDINIUM BROMIDE AND VILANTEROL TRIFENATATE 62.5; 25 UG/1; UG/1
1 POWDER RESPIRATORY (INHALATION) DAILY
Qty: 60 EACH | Refills: 1 | Status: SHIPPED | OUTPATIENT
Start: 2023-10-29 | End: 2024-01-02 | Stop reason: SDUPTHER

## 2023-10-31 ENCOUNTER — APPOINTMENT (OUTPATIENT)
Dept: SPEECH THERAPY | Facility: HOSPITAL | Age: 63
End: 2023-10-31
Payer: COMMERCIAL

## 2023-11-06 ENCOUNTER — EVALUATION (OUTPATIENT)
Dept: SPEECH THERAPY | Facility: CLINIC | Age: 63
End: 2023-11-06
Payer: COMMERCIAL

## 2023-11-06 DIAGNOSIS — T66.XXXS ADVERSE EFFECT OF RADIATION, SEQUELA: ICD-10-CM

## 2023-11-06 DIAGNOSIS — J18.9 PNEUMONIA OF LEFT LOWER LOBE DUE TO INFECTIOUS ORGANISM: ICD-10-CM

## 2023-11-06 DIAGNOSIS — C09.9 TONSIL CANCER (MULTI): ICD-10-CM

## 2023-11-06 PROBLEM — R13.19 DYSPHAGIA CAUSING PULMONARY ASPIRATION WITH SWALLOWING: Status: ACTIVE | Noted: 2023-11-06

## 2023-11-06 PROCEDURE — 92610 EVALUATE SWALLOWING FUNCTION: CPT | Mod: GN

## 2023-11-06 PROCEDURE — 92526 ORAL FUNCTION THERAPY: CPT | Mod: GN

## 2023-11-06 NOTE — PROGRESS NOTES
"Speech-Language Pathology    Inpatient Speech-Language Pathology Clinical Swallow Evaluation and Treatment    Patient Name: Gretchen Sanchez  MRN: [id number]  Today's Date: 11/6/2023      Time Calculation  Start Time: 1130  Stop Time: 1200  Time Calculation (min): 30 min      Current Problem:   1. Pneumonia of left lower lobe due to infectious organism  Referral to Speech Therapy      2. Adverse effect of radiation, sequela  Referral to Speech Therapy      3. Tonsil cancer (CMS/HCC)  Referral to Speech Therapy           Modified Barium Swallow completed  9/29/23 with the following results:   \"Mechanics of the swallow summary:  *Oral phase: Good lip closure with no labial escape. Cohesive bolus  during oral hold. Slow but adequate mastication of solids. Brisk  tongue motion for oral transit. Residue coated oral structures after  swallowing. Initiation of pharyngeal swallow was with bolus head in  the pyriform sinuses.  *Pharyngeal phase: Delayed swallow onset and incomplete laryngeal  closure resulted in laryngeal penetration of thin and nectar  thickened liquids before and during the swallow. Trace laryngeal  residue. No aspiration. Chin tuck did not eliminate laryngeal  penetration. Complete soft palate elevation. Incomplete laryngeal  elevation and anterior hyoid excursion. Complete epiglottic  inversion. Incomplete laryngeal vestibular closure. Present and  complete pharyngeal stripping wave, pharyngeal contraction,  pharyngoesophageal segment opening, tongue base retraction and  pharyngeal clearance.  *Esophageal phase: Complete clearance after the study- residue was  present in the esophagus on first view of the patient AP, a minute or  more after the last presentation given in the lateral projection.     SLP impressions with severity rating: Mild to moderate pharyngeal  phase dysphagia with risk for aspiration. Delayed swallow onset and  incomplete laryngeal closure resulted in laryngeal penetration of  thin " "and nectar thickened liquids before and during the swallow.  Further, the patient may be at risk for aspiration due to reflux. No  aspiration was appreciated during this study.\"    Further, the patient underwent treatment for cancer around her tonsil. Subsequent to that, she developed pneumonia and was hospitalized. She was referred for swallowing evaluation to determine whether aspiration contributed as a cause for pneumonia.        Recommendations:     Diet recommendations/feeding strategies: Regular consistency foods  with thin liquids. Upright posture for oral intake. Single sip at a  time. Reflux precautions. Moisten foods/ sip liquids between  swallows. Regular oral care: Before first intake and after each meal/  snack.  Education, safe swallowing training and Respiratory muscle training.     Short term goals:   Pt will report safest swallowing strategies independently. GOAL ACHIEVED by the end of the therapy/ evaluation session.   2.   Pt will demonstrate knowledge of correct stretching and strengthening exercises for maximizing pharyngeal strength and mobility to facilitate safest swallowing.  GOAL ACHIEVED by the end of the therapy/ evaluation session.   3.   Pt will demonstrate comprehension of reflux precautions and recommendation for same. GOAL ACHIEVED by the end of the therapy/ evaluation session today.        Long term goals: Safe oral intake of least restrictive diet level.     Assessment:  The patient demonstrated risk for aspiration on recent MBS with laryngeal penetration of thin and nectar thickened liquids. Additionally, history of radiation to neck may have contributed to the laryngeal penetration by reducing hyoid and laryngeal excursion during the swallow.     The patient admits to history of feeling esophageal fullness when eating at times which may signify delayed esophageal emptying with risk for reflux with aspiration as the result of same.     Instruction in oral pharyngeal strengthening " and stretching provided. The patient was able to report plan to purchase a respiratory muscle  (the Breather). She demonstrated comprehension of technique and dose as well as location on line to pursue purchase. She was able to demonstrate good accurate implementation of pharyngeal stretching exercises independently after instruction.     The patient was independent with teach back of reflux precautions as well after instruction provided.       Plan:     One session of speech therapy services for dysphagia therapy. Session completed at time of evaluation.     Subjective   Current Problem:  Recent  pneumonia, recent MBS with risk for aspiration.     Objective     Baseline Assessment:     Alert, pleasant, oriented x3. Speech and language were within normal limits.     Oral/Motor Assessment:     Within  normal limits.     Outpatient Education:       Education:  Learner patient;    Barriers to Learning none; acuteness of illness barrier; cognitive limitations barrier; communication limitations barrier   Method demonstration; verbal   Education - Topic ST provided patient education regarding role of ST, purpose of assessment, clinical impressions, goals of treatment, and plan of care. Patient verbalized full comprehension, consistent with cognitive status.   Outcome    Verbalized understanding and agreement

## 2023-11-06 NOTE — LETTER
November 6, 2023     Patient: Melvi Sanchez   YOB: 1960   Date of Visit: 11/6/2023       To Whom It May Concern:    It is my medical opinion that Melvi Sanchez {Work release (duty restriction):90295}.    If you have any questions or concerns, please don't hesitate to call.         Sincerely,        Ro Bass, SLP    CC: No Recipients

## 2023-11-06 NOTE — LETTER
November 6, 2023     Patient: Melvi Sanchez   YOB: 1960   Date of Visit: 11/6/2023       To Whom it May Concern:    Melvi Sanchez was seen in my clinic on 11/6/2023. She {Return to school/sport:91740}.    If you have any questions or concerns, please don't hesitate to call.         Sincerely,          Ro Bass, SLP        CC: No Recipients

## 2023-11-06 NOTE — LETTER
November 6, 2023    Ro Bass, CIRILO  [address] [facility] [address]    Patient: Gretchen Sanchez   YOB: 1960   Date of Visit: 11/6/2023       Dear Jimmie LEYVA Md  [address] [address]    The attached plan of care is being sent to you because your patient’s medical reimbursement requires that you certify the plan of care. Your signature is required to allow uninterrupted insurance coverage.      You may indicate your approval by signing below and faxing this form back to us at Dept Fax: [phone].    Please call Dept: [phone] with any questions or concerns.    Thank you for this referral,        CIRILO Perez  [facility] [address] [facility]  [address] [facility] [address]    Payer: Payor: [employer] / Plan: [employer] / Product Type: *No Product type* /                                                                         Date:     Dear CIRILO Perez,     Re: Ms. Melvi Sanchez, MRN:[id number]    I certify that I have reviewed the attached plan of care and it is medically necessary for Ms. Melvi Sanchez (1960) who is under my care.          ______________________________________                    _________________  Provider name and credentials                                           Date and time                                                                                           Plan of Care 11/6/23   Effective from: 11/6/2023  Effective to: 11/13/2023    Plan ID: [id number]            Participants as of Finalize on 11/6/2023    Name Type Comments Contact Info    Jimmie LEYVA MD Consulting Physician  [phone]    CIRILO Perez Speech Language Pathologist  [phone]       Last Plan Note     Author: CIRILO Perez Status: Sign when Signing Visit Last edited: 11/6/2023 11:30 AM       Speech-Language Pathology    Inpatient Speech-Language Pathology Clinical Swallow  "Evaluation and Treatment    Patient Name: Gretchen Sanchez  MRN: [id number]  Today's Date: 11/6/2023      Time Calculation  Start Time: 1130  Stop Time: 1200  Time Calculation (min): 30 min      Current Problem:   1. Pneumonia of left lower lobe due to infectious organism  Referral to Speech Therapy      2. Adverse effect of radiation, sequela  Referral to Speech Therapy      3. Tonsil cancer (CMS/HCC)  Referral to Speech Therapy           Modified Barium Swallow completed  9/29/23 with the following results:   \"Mechanics of the swallow summary:  *Oral phase: Good lip closure with no labial escape. Cohesive bolus  during oral hold. Slow but adequate mastication of solids. Brisk  tongue motion for oral transit. Residue coated oral structures after  swallowing. Initiation of pharyngeal swallow was with bolus head in  the pyriform sinuses.  *Pharyngeal phase: Delayed swallow onset and incomplete laryngeal  closure resulted in laryngeal penetration of thin and nectar  thickened liquids before and during the swallow. Trace laryngeal  residue. No aspiration. Chin tuck did not eliminate laryngeal  penetration. Complete soft palate elevation. Incomplete laryngeal  elevation and anterior hyoid excursion. Complete epiglottic  inversion. Incomplete laryngeal vestibular closure. Present and  complete pharyngeal stripping wave, pharyngeal contraction,  pharyngoesophageal segment opening, tongue base retraction and  pharyngeal clearance.  *Esophageal phase: Complete clearance after the study- residue was  present in the esophagus on first view of the patient AP, a minute or  more after the last presentation given in the lateral projection.     SLP impressions with severity rating: Mild to moderate pharyngeal  phase dysphagia with risk for aspiration. Delayed swallow onset and  incomplete laryngeal closure resulted in laryngeal penetration of  thin and nectar thickened liquids before and during the swallow.  Further, the patient " "may be at risk for aspiration due to reflux. No  aspiration was appreciated during this study.\"    Further, the patient underwent treatment for cancer around her tonsil. Subsequent to that, she developed pneumonia and was hospitalized. She was referred for swallowing evaluation to determine whether aspiration contributed as a cause for pneumonia.        Recommendations:     Diet recommendations/feeding strategies: Regular consistency foods  with thin liquids. Upright posture for oral intake. Single sip at a  time. Reflux precautions. Moisten foods/ sip liquids between  swallows. Regular oral care: Before first intake and after each meal/  snack.  Education, safe swallowing training and Respiratory muscle training.     Short term goals:   Pt will report safest swallowing strategies independently. GOAL ACHIEVED by the end of the therapy/ evaluation session.   2.   Pt will demonstrate knowledge of correct stretching and strengthening exercises for maximizing pharyngeal strength and mobility to facilitate safest swallowing.  GOAL ACHIEVED by the end of the therapy/ evaluation session.   3.   Pt will demonstrate comprehension of reflux precautions and recommendation for same. GOAL ACHIEVED by the end of the therapy/ evaluation session today.        Long term goals: Safe oral intake of least restrictive diet level.     Assessment:  The patient demonstrated risk for aspiration on recent MBS with laryngeal penetration of thin and nectar thickened liquids. Additionally, history of radiation to neck may have contributed to the laryngeal penetration by reducing hyoid and laryngeal excursion during the swallow.     The patient admits to history of feeling esophageal fullness when eating at times which may signify delayed esophageal emptying with risk for reflux with aspiration as the result of same.     Instruction in oral pharyngeal strengthening and stretching provided. The patient was able to report plan to purchase a " respiratory muscle  (the Breather). She demonstrated comprehension of technique and dose as well as location on line to pursue purchase. She was able to demonstrate good accurate implementation of pharyngeal stretching exercises independently after instruction.     The patient was independent with teach back of reflux precautions as well after instruction provided.       Plan:     One session of speech therapy services for dysphagia therapy. Session completed at time of evaluation.     Subjective   Current Problem:  Recent  pneumonia, recent MBS with risk for aspiration.     Objective     Baseline Assessment:     Alert, pleasant, oriented x3. Speech and language were within normal limits.     Oral/Motor Assessment:     Within  normal limits.     Outpatient Education:       Education:  Learner patient;    Barriers to Learning none; acuteness of illness barrier; cognitive limitations barrier; communication limitations barrier   Method demonstration; verbal   Education - Topic ST provided patient education regarding role of ST, purpose of assessment, clinical impressions, goals of treatment, and plan of care. Patient verbalized full comprehension, consistent with cognitive status.   Outcome    Verbalized understanding and agreement                    Current Participants as of 11/6/2023    Name Type Comments Contact Info    Jimmie LEYVA MD Consulting Physician  [phone]    Signature pending    Ro Bass, SLP Speech Language Pathologist  [phone]    Signature pending

## 2023-11-08 ENCOUNTER — APPOINTMENT (OUTPATIENT)
Dept: GASTROENTEROLOGY | Facility: EXTERNAL LOCATION | Age: 63
End: 2023-11-08
Payer: COMMERCIAL

## 2023-11-10 ENCOUNTER — HOSPITAL ENCOUNTER (OUTPATIENT)
Dept: RESPIRATORY THERAPY | Facility: HOSPITAL | Age: 63
Discharge: HOME | End: 2023-11-10
Payer: COMMERCIAL

## 2023-11-10 ENCOUNTER — APPOINTMENT (OUTPATIENT)
Dept: RESPIRATORY THERAPY | Facility: HOSPITAL | Age: 63
End: 2023-11-10
Payer: COMMERCIAL

## 2023-11-10 DIAGNOSIS — J96.11 CHRONIC RESPIRATORY FAILURE WITH HYPOXIA (MULTI): ICD-10-CM

## 2023-11-10 PROCEDURE — 94618 PULMONARY STRESS TESTING: CPT

## 2023-11-10 NOTE — PROGRESS NOTES
Gretchen Sanchez is a 63 y.o. female on day 0 of admission presenting with No Principal Problem: There is no principal problem currently on the Problem List. Please update the Problem List and refresh..    Subjective   ***       Objective     Physical Exam    Last Recorded Vitals  There were no vitals taken for this visit.  Intake/Output last 3 Shifts:  No intake/output data recorded.    Relevant Results  {If you would like to pull in Medications, type .meds     If you would like to pull in Lab results for the last 24 hours, type .[id number]    If you would like to pull in Imaging results, type .imgrslt :99}    {Link to Stroke Scoring tools - Link :99}                       Assessment/Plan   Active Problems:  There are no active Hospital Problems.    ***     {This patient does not have an ACP note on file for this encounter, please fill one out - Advance Care Planning Activity :99}      Martha Marks, RRT

## 2023-11-10 NOTE — PROGRESS NOTES
Home O2 Evaluation:    SpO2 on room air at rest:   98  %    SpO2 on room air with exertion:   96  %    SpO2 on oxygen at rest:     %    SpO2 on oxygen with exertion:     %    SpO2 on 4L/min O2 with exertion:    %    Ambulation distance:   715   ft    Recommended O2 device: RA    Recommended O2 L/min:    Recommended FiO2 %:   No SOB was noted. Pre/Post HR: 88/96      Mike Kennedy RRT

## 2023-11-13 DIAGNOSIS — J96.11 CHRONIC RESPIRATORY FAILURE WITH HYPOXIA (MULTI): Primary | ICD-10-CM

## 2023-11-30 DIAGNOSIS — F17.210 NICOTINE DEPENDENCE, CIGARETTES, UNCOMPLICATED: ICD-10-CM

## 2023-11-30 DIAGNOSIS — J43.9 PULMONARY EMPHYSEMA, UNSPECIFIED EMPHYSEMA TYPE (MULTI): Primary | ICD-10-CM

## 2023-11-30 RX ORDER — IBUPROFEN 200 MG
1 TABLET ORAL EVERY 24 HOURS
Qty: 30 PATCH | Refills: 0 | Status: SHIPPED | OUTPATIENT
Start: 2023-11-30 | End: 2024-01-02 | Stop reason: WASHOUT

## 2023-12-01 DIAGNOSIS — F32.1 CURRENT MODERATE EPISODE OF MAJOR DEPRESSIVE DISORDER WITHOUT PRIOR EPISODE (MULTI): ICD-10-CM

## 2023-12-01 RX ORDER — PAROXETINE 30 MG/1
30 TABLET, FILM COATED ORAL EVERY MORNING
Qty: 90 TABLET | Refills: 1 | Status: SHIPPED | OUTPATIENT
Start: 2023-12-01 | End: 2024-05-29

## 2023-12-11 ENCOUNTER — TELEMEDICINE (OUTPATIENT)
Dept: UROLOGY | Facility: CLINIC | Age: 63
End: 2023-12-11
Payer: COMMERCIAL

## 2023-12-11 DIAGNOSIS — N95.8 GENITOURINARY SYNDROME OF MENOPAUSE: ICD-10-CM

## 2023-12-11 DIAGNOSIS — N32.81 OAB (OVERACTIVE BLADDER): Primary | ICD-10-CM

## 2023-12-11 PROCEDURE — 99214 OFFICE O/P EST MOD 30 MIN: CPT | Performed by: STUDENT IN AN ORGANIZED HEALTH CARE EDUCATION/TRAINING PROGRAM

## 2023-12-11 RX ORDER — ESTRADIOL 0.1 MG/G
CREAM VAGINAL
Qty: 42.5 G | Refills: 12 | Status: SHIPPED | OUTPATIENT
Start: 2023-12-11 | End: 2024-01-02 | Stop reason: WASHOUT

## 2023-12-11 NOTE — PROGRESS NOTES
CHIEF COMPLAINT: Virtual FUV             HISTORY OF PRESENT ILLNESS:  This is a 63 y.o. female,  who presents with a virtual follow up visit. Patient rates her improvement at 85% but she is experiencing some urinary urgency behaviorally. She states she gets urgency when she is at the door or pulling into the driveway. She admits she is not doing the bladder training exercises yet. Patient has questions about a product called “Scream Cream”. Patient is not using any estrogen cream. She had not been sexually active previously but has a new partner now. She is having some difficulty with vasomotor symptoms including sexual arousal, desire, and reaching orgasm too.             HISTORY OF PRESENT ILLNESS:           Past Medical History  She has a past medical history of Acute hypoxemic respiratory failure (CMS/[facility]) (2023), Acute maxillary sinusitis, unspecified (2018), Essential (primary) hypertension (2018), Morbid obesity (CMS/[facility]) (2023), Nausea and/or vomiting (2023), Other conditions influencing health status (2018), Personal history of other diseases of urinary system, Pharyngitis (2023), Pneumonia (2023), Sinusitis (2023), Sore throat (2023), Urinary tract infection, site not specified (2018), and Wheezing (2013).    Surgical History  She has a past surgical history that includes Cholecystectomy (2014); Hysterectomy (2014); Mouth surgery (2014); and  section, classic (2014).     Social History  She reports that she quit smoking about 2 months ago. Her smoking use included cigarettes. She has a 60.00 pack-year smoking history. She has never used smokeless tobacco. She reports that she does not drink alcohol and does not use drugs.    Family History  Family History   Problem Relation Name Age of Onset    Emphysema Mother      COPD Mother      Hyperlipidemia Sister          Allergies  Duloxetine      A  comprehensive 10+ review of systems was negative except for: see hpi                   Assessment:    62 yo with SIVA, stress dominant     SIVA:     -failed oxybutynin, myrbetriq and fesoterodine   -s/p sling 11/11/22 , doing well no RICHELLE   -UDS demostrates DO  -s/p PNE, 50-60% improvement, wants to try botox, and if no improvement, will try stage 1 trial instead of full implant   S/p Botox, 100 units, 3/31/23, not much improvement  SNM working well now, still having residual UUI, may be behavioral     Begin bladder training exercises    GUSM and sexual arousal  Will prescribe estradiol cream  If no improvement, discussed sildenafil   Follow up in 6 - 8 weeks by phone / virtual visit        Elvin Loera MD

## 2023-12-18 ENCOUNTER — APPOINTMENT (OUTPATIENT)
Dept: PULMONOLOGY | Facility: HOSPITAL | Age: 63
End: 2023-12-18
Payer: COMMERCIAL

## 2023-12-28 ENCOUNTER — HOSPITAL ENCOUNTER (OUTPATIENT)
Dept: RADIOLOGY | Facility: HOSPITAL | Age: 63
Discharge: HOME | End: 2023-12-28
Payer: COMMERCIAL

## 2023-12-28 DIAGNOSIS — D24.2 BENIGN NEOPLASM OF LEFT BREAST: ICD-10-CM

## 2023-12-28 PROCEDURE — 77061 BREAST TOMOSYNTHESIS UNI: CPT | Mod: LEFT SIDE

## 2023-12-28 PROCEDURE — 77065 DX MAMMO INCL CAD UNI: CPT | Mod: LEFT SIDE

## 2023-12-28 PROCEDURE — 76982 USE 1ST TARGET LESION: CPT | Mod: LT

## 2023-12-28 PROCEDURE — 76642 ULTRASOUND BREAST LIMITED: CPT | Mod: LEFT SIDE

## 2023-12-28 PROCEDURE — 76642 ULTRASOUND BREAST LIMITED: CPT | Mod: LT

## 2023-12-28 PROCEDURE — 77061 BREAST TOMOSYNTHESIS UNI: CPT | Mod: LT

## 2023-12-29 ENCOUNTER — ANESTHESIA (OUTPATIENT)
Dept: GASTROENTEROLOGY | Facility: HOSPITAL | Age: 63
End: 2023-12-29
Payer: COMMERCIAL

## 2023-12-29 ENCOUNTER — ANESTHESIA EVENT (OUTPATIENT)
Dept: GASTROENTEROLOGY | Facility: HOSPITAL | Age: 63
End: 2023-12-29
Payer: COMMERCIAL

## 2023-12-29 ENCOUNTER — HOSPITAL ENCOUNTER (OUTPATIENT)
Dept: GASTROENTEROLOGY | Facility: HOSPITAL | Age: 63
Discharge: HOME | End: 2023-12-29
Payer: COMMERCIAL

## 2023-12-29 VITALS
HEIGHT: 63 IN | BODY MASS INDEX: 28.35 KG/M2 | HEART RATE: 79 BPM | OXYGEN SATURATION: 98 % | DIASTOLIC BLOOD PRESSURE: 75 MMHG | WEIGHT: 160 LBS | SYSTOLIC BLOOD PRESSURE: 133 MMHG | RESPIRATION RATE: 22 BRPM | TEMPERATURE: 97.2 F

## 2023-12-29 DIAGNOSIS — G25.81 RESTLESS LEGS SYNDROME: ICD-10-CM

## 2023-12-29 DIAGNOSIS — J43.9 PULMONARY EMPHYSEMA, UNSPECIFIED EMPHYSEMA TYPE (MULTI): ICD-10-CM

## 2023-12-29 DIAGNOSIS — Z12.11 SCREENING FOR COLORECTAL CANCER: ICD-10-CM

## 2023-12-29 DIAGNOSIS — K57.90 DIVERTICULOSIS: ICD-10-CM

## 2023-12-29 DIAGNOSIS — Z12.12 SCREENING FOR COLORECTAL CANCER: ICD-10-CM

## 2023-12-29 DIAGNOSIS — K62.1 RECTAL POLYP: ICD-10-CM

## 2023-12-29 DIAGNOSIS — R29.818 SUSPECTED SLEEP APNEA: ICD-10-CM

## 2023-12-29 DIAGNOSIS — F17.210 NICOTINE DEPENDENCE, CIGARETTES, UNCOMPLICATED: ICD-10-CM

## 2023-12-29 DIAGNOSIS — Z12.11 SCREEN FOR COLON CANCER: Primary | ICD-10-CM

## 2023-12-29 PROBLEM — G47.30 SLEEP APNEA: Status: ACTIVE | Noted: 2023-12-29

## 2023-12-29 PROBLEM — D12.5 ADENOMATOUS POLYP OF SIGMOID COLON: Status: ACTIVE | Noted: 2023-12-29

## 2023-12-29 PROCEDURE — A45380 PR COLONOSCOPY,BIOPSY: Performed by: ANESTHESIOLOGY

## 2023-12-29 PROCEDURE — 7100000010 HC PHASE TWO TIME - EACH INCREMENTAL 1 MINUTE

## 2023-12-29 PROCEDURE — 3700000002 HC GENERAL ANESTHESIA TIME - EACH INCREMENTAL 1 MINUTE

## 2023-12-29 PROCEDURE — 7100000009 HC PHASE TWO TIME - INITIAL BASE CHARGE

## 2023-12-29 PROCEDURE — 88305 TISSUE EXAM BY PATHOLOGIST: CPT | Performed by: PATHOLOGY

## 2023-12-29 PROCEDURE — 88305 TISSUE EXAM BY PATHOLOGIST: CPT | Mod: TC,SUR,AHULAB | Performed by: INTERNAL MEDICINE

## 2023-12-29 PROCEDURE — 3700000001 HC GENERAL ANESTHESIA TIME - INITIAL BASE CHARGE

## 2023-12-29 PROCEDURE — 2500000004 HC RX 250 GENERAL PHARMACY W/ HCPCS (ALT 636 FOR OP/ED): Performed by: REGISTERED NURSE

## 2023-12-29 PROCEDURE — A45380 PR COLONOSCOPY,BIOPSY: Performed by: REGISTERED NURSE

## 2023-12-29 PROCEDURE — 45385 COLONOSCOPY W/LESION REMOVAL: CPT | Performed by: INTERNAL MEDICINE

## 2023-12-29 RX ORDER — FLUMAZENIL 0.1 MG/ML
0.2 INJECTION INTRAVENOUS ONCE AS NEEDED
Status: CANCELLED | OUTPATIENT
Start: 2023-12-29

## 2023-12-29 RX ORDER — ONDANSETRON HYDROCHLORIDE 2 MG/ML
4 INJECTION, SOLUTION INTRAVENOUS ONCE AS NEEDED
Status: CANCELLED | OUTPATIENT
Start: 2023-12-29

## 2023-12-29 RX ORDER — MIDAZOLAM HYDROCHLORIDE 1 MG/ML
INJECTION INTRAMUSCULAR; INTRAVENOUS AS NEEDED
Status: DISCONTINUED | OUTPATIENT
Start: 2023-12-29 | End: 2023-12-29

## 2023-12-29 RX ORDER — NALOXONE HYDROCHLORIDE 1 MG/ML
0.2 INJECTION INTRAMUSCULAR; INTRAVENOUS; SUBCUTANEOUS EVERY 5 MIN PRN
Status: CANCELLED | OUTPATIENT
Start: 2023-12-29

## 2023-12-29 RX ORDER — PROPOFOL 10 MG/ML
INJECTION, EMULSION INTRAVENOUS CONTINUOUS PRN
Status: DISCONTINUED | OUTPATIENT
Start: 2023-12-29 | End: 2023-12-29

## 2023-12-29 RX ORDER — PROPOFOL 10 MG/ML
INJECTION, EMULSION INTRAVENOUS AS NEEDED
Status: DISCONTINUED | OUTPATIENT
Start: 2023-12-29 | End: 2023-12-29

## 2023-12-29 RX ORDER — SODIUM CHLORIDE, SODIUM LACTATE, POTASSIUM CHLORIDE, CALCIUM CHLORIDE 600; 310; 30; 20 MG/100ML; MG/100ML; MG/100ML; MG/100ML
20 INJECTION, SOLUTION INTRAVENOUS CONTINUOUS
Status: CANCELLED | OUTPATIENT
Start: 2023-12-29

## 2023-12-29 RX ADMIN — MIDAZOLAM HYDROCHLORIDE 2 MG: 1 INJECTION INTRAMUSCULAR; INTRAVENOUS at 10:25

## 2023-12-29 RX ADMIN — PROPOFOL 50 MG: 10 INJECTION, EMULSION INTRAVENOUS at 10:29

## 2023-12-29 RX ADMIN — SODIUM CHLORIDE, SODIUM LACTATE, POTASSIUM CHLORIDE, AND CALCIUM CHLORIDE: 600; 310; 30; 20 INJECTION, SOLUTION INTRAVENOUS at 10:14

## 2023-12-29 RX ADMIN — PROPOFOL 200 MCG/KG/MIN: 10 INJECTION, EMULSION INTRAVENOUS at 10:29

## 2023-12-29 RX ADMIN — PROPOFOL 30 MG: 10 INJECTION, EMULSION INTRAVENOUS at 10:34

## 2023-12-29 ASSESSMENT — PAIN SCALES - GENERAL
PAINLEVEL_OUTOF10: 0 - NO PAIN
PAIN_LEVEL: 0
PAINLEVEL_OUTOF10: 0 - NO PAIN

## 2023-12-29 ASSESSMENT — COLUMBIA-SUICIDE SEVERITY RATING SCALE - C-SSRS
1. IN THE PAST MONTH, HAVE YOU WISHED YOU WERE DEAD OR WISHED YOU COULD GO TO SLEEP AND NOT WAKE UP?: NO
2. HAVE YOU ACTUALLY HAD ANY THOUGHTS OF KILLING YOURSELF?: NO
6. HAVE YOU EVER DONE ANYTHING, STARTED TO DO ANYTHING, OR PREPARED TO DO ANYTHING TO END YOUR LIFE?: NO

## 2023-12-29 ASSESSMENT — PAIN - FUNCTIONAL ASSESSMENT: PAIN_FUNCTIONAL_ASSESSMENT: 0-10

## 2023-12-29 NOTE — ANESTHESIA PREPROCEDURE EVALUATION
"Patient: Melvi Sanchez \"Gretchen\"    Procedure Information       Date/Time: 12/29/23 1015    Scheduled providers: Ad López MD    Procedure: COLONOSCOPY    Location: [facility]            Relevant Problems   Anesthesia (within normal limits)      Cardiovascular   (+) Pure hypercholesterolemia      Endocrine   (+) Hypothyroidism      GI  Pain on swallowing   (+) GERD (gastroesophageal reflux disease)      Neuro/Psych   (+) Anxiety   (+) Cervical radiculopathy   (+) Current moderate episode of major depressive disorder without prior episode (CMS/HCC)   (+) Inflammation of nerve of lower limb      Pulmonary  Chronic nasal congestion  L tonsillar ca   (+) COPD (chronic obstructive pulmonary disease) (CMS/HCC)   (+) Pneumonia of left lower lobe due to infectious organism (9/23 hosp x 3d)      GI/Hepatic   (+) Tonsil cancer (CMS/HCC)      Infectious Disease   (+) Pneumonia of left lower lobe due to infectious organism (9/23 hosp x 3d)      Other   (+) Arthritis   (+) 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      "

## 2023-12-29 NOTE — ANESTHESIA POSTPROCEDURE EVALUATION
"Patient: Melvi Sanchez \"Gretchen\"    Procedure Summary       Date: 12/29/23 Room / Location: [facility]    Anesthesia Start: 1025 Anesthesia Stop: 1103    Procedure: COLONOSCOPY Diagnosis:       Screening for colorectal cancer      Rectal polyp      Diverticulosis    Scheduled Providers: Ad López MD Responsible Provider: Wilder Castellano MD    Anesthesia Type: MAC ASA Status: 3            Anesthesia Type: MAC    Vitals Value Taken Time   /62 12/29/23 1058   Temp 36.2 °C (97.2 °F) 12/29/23 1058   Pulse 78 12/29/23 1058   Resp 16 12/29/23 1058   SpO2 96 % 12/29/23 1058       Anesthesia Post Evaluation    Patient location during evaluation: bedside  Patient participation: complete - patient cannot participate  Level of consciousness: awake  Pain score: 0  Pain management: adequate  Airway patency: patent  Cardiovascular status: acceptable  Respiratory status: acceptable  Hydration status: acceptable  Postoperative Nausea and Vomiting: none        There were no known notable events for this encounter.    "

## 2023-12-29 NOTE — H&P
History Of Present Illness  Gretchen Sanchez is a 63 y.o. female presenting for first screening colonoscopy.   H/o COPD, throat cancer, recent pneumonia .     Past Medical History  Past Medical History:   Diagnosis Date    Acute hypoxemic respiratory failure (CMS/[facility]) 2023    Acute maxillary sinusitis, unspecified 2018    Acute maxillary sinusitis    Essential (primary) hypertension 2018    HTN (hypertension), benign    Hypothyroidism     Morbid obesity (CMS/[facility]) 2023    Nausea and/or vomiting 2023    Other conditions influencing health status 2018    History of cough    Personal history of other diseases of urinary system     History of bladder problems    Pharyngitis 2023    Pneumonia 2023    Sinusitis 2023    Sore throat 2023    Urinary tract infection, site not specified 2018    Acute urinary tract infection    Wheezing 2013    Wheezing       Surgical History  Past Surgical History:   Procedure Laterality Date    BREAST BIOPSY Left      SECTION, CLASSIC  2014     Section    CHOLECYSTECTOMY  2014    Cholecystectomy    HYSTERECTOMY  2014    Hysterectomy    MOUTH SURGERY  2014    Oral Surgery Tooth Extraction        Social History  She reports that she has been smoking cigarettes. She started smoking about 4 weeks ago. She has a 40.00 pack-year smoking history. She has never used smokeless tobacco. She reports current alcohol use of about 1.0 standard drink of alcohol per week. She reports current drug use. Frequency: 1.00 time per week. Drug: Marijuana.    Family History  Family History   Problem Relation Name Age of Onset    Emphysema Mother      COPD Mother      Hyperlipidemia Sister          Allergies  Duloxetine    Review of Systems     Physical Exam     Last Recorded Vitals  Blood pressure 138/60, pulse 66, temperature 36.4 °C (97.5 °F), temperature source Temporal, resp. rate 14, height 1.6 m (5'  "3\"), weight 72.6 kg (160 lb), SpO2 96 %.    Relevant Results             Assessment/Plan   Active Problems:  There are no active Hospital Problems.      Here for screening colonoscopy - discussed procedure, risks with patient and daughter        I spent 15 minutes in the professional and overall preopertive care of this patient.      Ad López MD    "

## 2024-01-02 ENCOUNTER — PATIENT OUTREACH (OUTPATIENT)
Dept: PRIMARY CARE | Facility: CLINIC | Age: 64
End: 2024-01-02

## 2024-01-02 ENCOUNTER — OFFICE VISIT (OUTPATIENT)
Dept: SURGERY | Facility: CLINIC | Age: 64
End: 2024-01-02
Payer: COMMERCIAL

## 2024-01-02 VITALS
HEIGHT: 63 IN | SYSTOLIC BLOOD PRESSURE: 143 MMHG | BODY MASS INDEX: 29.23 KG/M2 | OXYGEN SATURATION: 93 % | DIASTOLIC BLOOD PRESSURE: 82 MMHG | WEIGHT: 165 LBS | HEART RATE: 64 BPM

## 2024-01-02 DIAGNOSIS — J18.9 COMMUNITY ACQUIRED PNEUMONIA, UNSPECIFIED LATERALITY: ICD-10-CM

## 2024-01-02 DIAGNOSIS — R92.1 BREAST CALCIFICATION, LEFT: ICD-10-CM

## 2024-01-02 DIAGNOSIS — N60.02 BENIGN BREAST CYST IN FEMALE, LEFT: ICD-10-CM

## 2024-01-02 DIAGNOSIS — D24.2 FIBROADENOMA OF LEFT BREAST: Primary | ICD-10-CM

## 2024-01-02 DIAGNOSIS — J43.9 PULMONARY EMPHYSEMA, UNSPECIFIED EMPHYSEMA TYPE (MULTI): ICD-10-CM

## 2024-01-02 PROCEDURE — 99213 OFFICE O/P EST LOW 20 MIN: CPT | Performed by: SURGERY

## 2024-01-02 RX ORDER — UMECLIDINIUM BROMIDE AND VILANTEROL TRIFENATATE 62.5; 25 UG/1; UG/1
1 POWDER RESPIRATORY (INHALATION) DAILY
Qty: 30 EACH | Refills: 11 | Status: SHIPPED | OUTPATIENT
Start: 2024-01-02 | End: 2024-12-27

## 2024-01-02 NOTE — ADDENDUM NOTE
Encounter addended by: Larisa Dale RN on: 1/2/2024 10:39 AM   Actions taken: Flowsheet accepted, Contacts section saved

## 2024-01-02 NOTE — PATIENT INSTRUCTIONS
1.  The previously biopsied fibroadenoma of your left breast is unchanged by mammogram.  However, there is a new area of cluster of cysts at the 12 o'clock position of the left breast.  You will need to have both a mammogram and ultrasound done of your left breast in 6 months.  You will be due for a right screening mammogram at that time.  Therefore, you will be scheduled for a bilateral (both sides) diagnostic mammogram in 6 months.  2.  Follow-up in Dr. De Los Santos's office after this mammogram.  3.  You should do a monthly self breast exam.  If you identify any abnormalities, please call Dr. De Los Santos's office immediately.  [phone]

## 2024-01-02 NOTE — PROGRESS NOTES
"    GENERAL SURGERY OFFICE NOTE    Patient: Melvi Sanchez    Age: 63 y.o.   Gender: female    MRN: [id number]    PCP: Alton King, DO        SUBJECTIVE     Chief Complaint  Follow-up (Patient is here for a 6 month left breast follow up. Patient states that she is not currently having any problems with the left breast.)       HPI  Melvi \"Gretchen returns to the office for a 6-month follow-up of the biopsy-proven left breast fibroadenoma.  In the last 6 months, she has not had any significant problems with her breast.  She denies any palpable masses, pain, skin changes or nipple discharge.  She recently underwent her left diagnostic mammogram and ultrasound which showed resolution of the previously biopsied calcifications (area of the fibroadenoma) and a new area at the 12 o'clock position which was found to be a cluster of cyst by ultrasound.  6-month follow-up was recommended.  She also did have her colonoscopy last week.  She is currently getting over pneumonia with a chronic cough.    Risk factors for breast cancer: 63-year-old white female; menarche at age 15; first live birth at age 28; no previous biopsy; no family history of breast cancer. She does have a paternal uncle who had leukemia. Paternal cousin had brain cancer. She has a personal history of tonsillar cancer. She went through menopause around age 47 but never had hormone replacement therapy. This gives her a 5-year Leilani score of 1.6% and a lifetime risk of 7.9% which overall puts her in a low risk category.     ROS  Review of Systems   Constitutional: no fever and no chills.   Eyes: no loss of vision, no discharge from the eyes, no itching of the eyes and no eye pain.   ENT: neck pain, hoarseness and sore throat, but no hearing loss.   Cardiovascular: no chest pain, no palpitations and no lower extremity edema.   Respiratory: no dyspnea, no dyspnea during exertion and no cough.   Breast: no nipple discharge, no breast mass, no pain in " breast, no erythema, no change in breast skin and no axillary adenopathy.   Gastrointestinal: no abdominal pain, no vomiting, no nausea.   Genitourinary: no dysuria and no hematuria.   Musculoskeletal: no arthralgias, no myalgias and no hernia.   Integumentary: a rash, but no skin lesions.   Neurological: no headache, no dizziness and no numbness.   Psychiatric: no anxiety, no depression and no emotional problems.   Endocrine: no heat or cold intolerance and no increased thirst.   Hematologic/Lymphatic: a tendency for easy bleeding and a tendency for easy bruising.   All other systems have been reviewed and are negative for complaint.     HISTORY     Past Medical History:   Diagnosis Date    Acute hypoxemic respiratory failure (CMS/[facility]) 2023    Acute maxillary sinusitis, unspecified 2018    Acute maxillary sinusitis    Essential (primary) hypertension 2018    HTN (hypertension), benign    Hypothyroidism     Morbid obesity (CMS/[facility]) 2023    Nausea and/or vomiting 2023    Other conditions influencing health status 2018    History of cough    Personal history of other diseases of urinary system     History of bladder problems    Pharyngitis 2023    Pneumonia 2023    Sinusitis 2023    Sore throat 2023    Urinary tract infection, site not specified 2018    Acute urinary tract infection    Wheezing 2013    Wheezing        Past Surgical History:   Procedure Laterality Date    BREAST BIOPSY Left      SECTION, CLASSIC  2014     Section    CHOLECYSTECTOMY  2014    Cholecystectomy    COLONOSCOPY      HYSTERECTOMY  2014    Hysterectomy    MOUTH SURGERY  2014    Oral Surgery Tooth Extraction        Allergies   Allergen Reactions    Duloxetine Hallucinations     Pt states she doesn't think she has an allergy just a bad reaction when mixed with morphine        Social History     Tobacco Use   Smoking Status Every Day     "Packs/day: 1.00    Years: 40.00    Additional pack years: 0.00    Total pack years: 40.00    Types: Cigarettes    Start date: 11/30/2023   Smokeless Tobacco Never        Social History     Substance and Sexual Activity   Alcohol Use Yes    Alcohol/week: 1.0 standard drink of alcohol    Types: 1 Glasses of wine per week        HOME MEDICATIONS  Current Outpatient Medications   Medication Instructions    cefdinir (Omnicef) 300 mg capsule TAKE 1 CAPSULE BY MOUTH TWO TIMES A DAY    estradiol (Estrace) 0.01 % (0.1 mg/gram) vaginal cream Insert pea size amount into vagina every night for 2 weeks, then 2x/week after    gabapentin (NEURONTIN) 300 mg, oral, 3 times daily    levothyroxine (Synthroid, Levoxyl) 75 mcg tablet 1 tablet, oral, Daily    nicotine (Nicoderm CQ) 21 mg/24 hr patch 1 patch, transdermal, Every 24 hours    nicotine (Nicoderm CQ) 7 mg/24 hr patch 1 patch, transdermal, Every 24 hours    oxyCODONE (Roxicodone) 5 mg immediate release tablet TAKE 1 TABLET BY MOUTH EVERY 6 HOURS AS NEEDED FOR PAIN    PARoxetine (PAXIL) 30 mg, oral, Every morning    polyethylene glycol (Glycolax, Miralax) 17 gram/dose powder TAKE 17 GRAMS (1 CAPFUL) BY MOUTH ONCE DAILY AS NEEDED FOR CONSTIPATION    rOPINIRole (REQUIP) 2 mg, oral, Nightly    sennosides-docusate sodium (Farida-Colace) 8.6-50 mg tablet TAKE 2 TABLETS BY MOUTH TWO TIMES A DAY    umeclidinium-vilanteroL (Anoro Ellipta) 62.5-25 mcg/actuation blister with device 1 puff, inhalation, Daily    varenicline (Chantix) 1 mg tablet TAKE 1/2 TABLET BY MOUTH ONCE DAILY FOR 3 DAYS THEN TAKE 1/2 TABLET TWO TIMES A DAY FOR 4 DAYS THEN TAKE 1 TABLET TWO TIMES A DAY THEREAFTER    varenicline (CHANTIX) 1 mg, oral, Daily          OBJECTIVE   Last Recorded Vitals.  Blood pressure 143/82, pulse 64, height 1.6 m (5' 3\"), weight 74.8 kg (165 lb), SpO2 93 %.     PHYSICAL EXAM  Physical Exam   General: Well-developed, well-nourished and in no acute distress.  Head: Normocephalic. " Atraumatic.  Neck/thyroid: Neck is supple. Significant skin thickening and chronic changes to the anterior neck bilaterally. Skin peeling/sloughing noted at the base of the left neck.  Eyes: Pupils equal round and reactive to light. Conjunctiva normal.  ENMT: No masses or deformity of external nose. External ears without masses.  Respiratory/Chest: Normal respiratory effort.  Breast: Moderate sized breasts. Symmetrical. No palpable abnormalities. No expressible nipple discharge. Minimal bruise of the lateral aspect of the left breast. No erythema.  Lymphatics: No palpable lymphadenopathy of the bilateral axillary regions. Palpation of lymph nodes is difficult in the neck and supraclavicular region due to the skin changes from XRT.  Cardiovascular: Regular rate and rhythm.   Abdomen: Soft, nontender, nondistended. No hernias.   Musculoskeletal: Joints and limbs are grossly normal. Normal gait. Normal range of motion of major joints.  Neuro: Oriented to person, place and time. No obvious neurological deficit. Motor strength grossly normal.  Psych: Normal mood and affect.     RESULTS   Labs  No results found for this or any previous visit (from the past 24 hour(s)).    Radiology Resutls  MAMMO LEFT DIAGNOSTIC TOMOSYNTHESIS; BI US BREAST LIMITED LEFT;  12/28/2023 11:10 am; 12/28/2023 11:35 am      ACCESSION NUMBER(S):  [id number]; [id number]      ORDERING CLINICIAN:  MONSTER FLORES      INDICATION:  Signs/Symptoms:6 month follow up; Signs/Symptoms: 6 month follow up  of left fibroadenoma  D24.2: Fibroadenoma of left breast.          COMPARISON:  06/30/2023, 06/27/2023, 06/14/2023 and 08/02/2018      FINDINGS:  2D and tomosynthesis images were reviewed at 1 mm slice thickness.      Density:  There are areas of scattered fibroglandular tissue.      Biopsy marker is present in the posterior upper outer quadrant of the  left breast with no residual calcifications noted in the area.      There is a new mass noted at  "the 12 o'clock location middle depth of  the left breast.      Targeted ultrasound of the left breast demonstrates a cyst cluster at  12 o'clock location 4 cm from the nipple corresponding to the new  mammographic finding. This cyst cluster measures 0.7 x 0.6 x 0.3 cm.  No internal vascularity is detected. Elastography is soft. There is  no adenopathy noted in the axilla.      IMPRESSION:  No residual calcifications at the biopsy site with biopsy marker  noted posterior upper-outer quadrant.      New mass 12 o'clock location correlates with cyst cluster on  ultrasound. Surveillance ultrasound is recommended in 6 months. At  that time the patient will also be due for bilateral mammography.      BI-RADS CATEGORY:      BI-RADS Category:  3 Probably Benign.  Recommendation:  Diagnostic Mammogram and left breast ultrasound in 6  Months. Recommended Date:  6 Months.  Laterality:  Bilateral.      For any future breast imaging appointments, please call [phone]  ([phone]).          MACRO:  None      Signed by: Shobha Gonzalez 12/28/2023 11:56 AM  Dictation workstation:   [id number]      ASSESSMENT / PLAN   Diagnoses and all orders for this visit:  Fibroadenoma of left breast  Breast calcification, left  Benign breast cyst in female, left  Other orders  -     BI mammo bilateral diagnostic tomosynthesis; Future      Plan  June 2023: LEFT; tight cluster of calc in UOQ; stereotactic biopsy showed fibroadenoma; second area of cluster of cysts at the 12 o'clock position    1. The stereotactic biopsy shows a fibroadenoma with associated calcifications. We discussed that this is a benign diagnosis, but does need continued monitoring to make sure that it remains \"stable\".  6-month follow-up mammogram shows resolution of all the previous calcifications (no new concerns about previously biopsied fibroadenoma).  2.  However, on her most recent mammogram, new area of abnormality was identified at the 12 o'clock position.  Ultrasound " suggest that this is a cluster of cysts.  Will plan for a diagnostic left mammogram in 6 months with follow-up ultrasound to demonstrate stability.  Since she will be due for her right-sided screening mammogram at that time, we will schedule this as a bilateral diagnostic mammogram.  3.  Return to the office after her next mammogram.  4.  She was encouraged to do self breast exams, and she will contact the office if she identifies any abnormalities.      Tri De Los Santos MD, FACS  [facility] General Surgery  [address] [place] [address];   [address]  [place]  [address] [phone]

## 2024-01-03 LAB
LABORATORY COMMENT REPORT: NORMAL
PATH REPORT.FINAL DX SPEC: NORMAL
PATH REPORT.GROSS SPEC: NORMAL
PATH REPORT.TOTAL CANCER: NORMAL

## 2024-01-05 DIAGNOSIS — F17.210 TOBACCO DEPENDENCE DUE TO CIGARETTES: ICD-10-CM

## 2024-01-05 RX ORDER — VARENICLINE TARTRATE 1 MG/1
1 TABLET, FILM COATED ORAL DAILY
Qty: 30 TABLET | Refills: 5 | Status: SHIPPED | OUTPATIENT
Start: 2024-01-05 | End: 2024-07-03

## 2024-01-26 ENCOUNTER — OFFICE VISIT (OUTPATIENT)
Dept: OTOLARYNGOLOGY | Facility: CLINIC | Age: 64
End: 2024-01-26
Payer: COMMERCIAL

## 2024-01-26 VITALS — TEMPERATURE: 97.4 F | HEIGHT: 63 IN | WEIGHT: 160.3 LBS | BODY MASS INDEX: 28.4 KG/M2

## 2024-01-26 DIAGNOSIS — R13.12 OROPHARYNGEAL DYSPHAGIA: ICD-10-CM

## 2024-01-26 DIAGNOSIS — J34.89 NASAL SORE: ICD-10-CM

## 2024-01-26 DIAGNOSIS — C09.9 TONSIL CANCER (MULTI): Primary | ICD-10-CM

## 2024-01-26 PROCEDURE — 99214 OFFICE O/P EST MOD 30 MIN: CPT | Performed by: OTOLARYNGOLOGY

## 2024-01-26 PROCEDURE — 31575 DIAGNOSTIC LARYNGOSCOPY: CPT | Performed by: OTOLARYNGOLOGY

## 2024-01-26 RX ORDER — MUPIROCIN 20 MG/G
OINTMENT TOPICAL 2 TIMES DAILY
Qty: 30 G | Refills: 0 | Status: SHIPPED | OUTPATIENT
Start: 2024-01-26 | End: 2024-02-05

## 2024-01-31 PROBLEM — R13.12 OROPHARYNGEAL DYSPHAGIA: Status: ACTIVE | Noted: 2023-11-06

## 2024-01-31 PROBLEM — J34.89 NASAL SORE: Status: ACTIVE | Noted: 2024-01-31

## 2024-01-31 NOTE — PROGRESS NOTES
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 +  Finsihed chemo xrt 10/22        Doing ok    Still with some swallowing issues    Able to take po    Having some left nasal soreness, picked it and it drained    Physical Exam:  CONSTITUTIONAL:  No acute distress  VOICE:  No hoarseness or other abnormality  RESPIRATION:  Breathing comfortably, no stridor  CV:  No clubbing/cyanosis/edema in hands  EYES:  EOM intact, sclera normal  NEURO:  Alert and oriented times 3, Cranial nerves II-XII grossly intact and symmetric bilaterally  HEAD AND FACE:  Symmetric facial features, no masses or lesions, sinuses non-tender to palpation  SALIVARY GLANDS:  Parotid and submandibular glands normal bilaterally  EARS:  Normal external ears, external auditory canals, and TMs to otoscopy, normal hearing to whispered voice.  NOSE:  External nose midline, anterior rhinoscopy is normal with limited visualization to the anterior aspect of the interior turbinates, no bleeding or drainage, left nasal vestibulitis present  ORAL CAVITY/OROPHARYNX/LIPS:  Normal mucous membranes, normal floor of mouth/tongue/OP, no masses or lesions  PHARYNGEAL WALLS:  No masses or lesions  NECK/LYMPH:  XRT changes, no thyroid masses, trachea midline  SKIN:  Neck skin is without scar or injury  PSYCH:  Alert and oriented with appropriate mood and affect        Flexible fiberoptic laryngopharyngoscopy was performed via the nares. After topical anesthesia and decongestant was instilled:    Nasopharynx:Eustachian tube orifice normal, fossa of Rosenmueller clear, mucosa clean, no masses appreciated  Palate: Nasopharyngeal surface of palate clear  Tongue base vallecula clear, lingual surface of epiglottis normal, oropharynx clear  Larynx laryngeal surface of epiglottis, clear, area epiglottic folds, normal, false cords clear, arytenoids, clear, vocal cords. Normal mobility, no lesions, mild reflux changes,  Hypopharynx pyriform sinuses, clear, post cricoid area clear    Scope  was removed, patient tolerated the procedure well        STEWART    Smoking and alcohol cessation/avoidance reviewed    Follow up 4 months    Recent TSH 3.59 followed by primary    Left  nasal vestibulitis---> will use mupirocin

## 2024-02-22 ENCOUNTER — TELEMEDICINE (OUTPATIENT)
Dept: UROLOGY | Facility: CLINIC | Age: 64
End: 2024-02-22
Payer: COMMERCIAL

## 2024-02-22 DIAGNOSIS — F52.8 INHIBITED SEXUAL AROUSAL: ICD-10-CM

## 2024-02-22 DIAGNOSIS — N32.81 OAB (OVERACTIVE BLADDER): Primary | ICD-10-CM

## 2024-02-22 DIAGNOSIS — N95.8 GENITOURINARY SYNDROME OF MENOPAUSE: ICD-10-CM

## 2024-02-22 PROCEDURE — 99214 OFFICE O/P EST MOD 30 MIN: CPT | Performed by: STUDENT IN AN ORGANIZED HEALTH CARE EDUCATION/TRAINING PROGRAM

## 2024-02-22 RX ORDER — SOLIFENACIN SUCCINATE 5 MG/1
5 TABLET, FILM COATED ORAL DAILY
Qty: 30 TABLET | Refills: 11 | Status: SHIPPED | OUTPATIENT
Start: 2024-02-22 | End: 2025-02-21

## 2024-02-22 NOTE — PROGRESS NOTES
HISTORY OF PRESENT ILLNESS:  Gretchen is a 63 year old female who presents for a virtual follow up visit. Patient reports she is at 90 percent improvement. She has leakage when she has urgency. She reports the cream with libido is not helping and finds it inconvenient to use.          Past Medical History  She has a past medical history of Acute hypoxemic respiratory failure (CMS/[facility]) (2023), Acute maxillary sinusitis, unspecified (2018), Essential (primary) hypertension (2018), Hypothyroidism, Morbid obesity (CMS/[facility]) (2023), Nausea and/or vomiting (2023), Other conditions influencing health status (2018), Personal history of other diseases of urinary system, Pharyngitis (2023), Pneumonia (2023), Sinusitis (2023), Sore throat (2023), Urinary tract infection, site not specified (2018), and Wheezing (2013).    Surgical History  She has a past surgical history that includes Cholecystectomy (2014); Hysterectomy (2014); Mouth surgery (2014);  section, classic (2014); Breast biopsy (Left); and Colonoscopy.     Social History  She reports that she has been smoking cigarettes. She started smoking about 2 months ago. She has a 40.00 pack-year smoking history. She has never used smokeless tobacco. She reports current alcohol use of about 1.0 standard drink of alcohol per week. She reports current drug use. Frequency: 1.00 time per week. Drug: Marijuana.    Family History  Family History   Problem Relation Name Age of Onset    Emphysema Mother      COPD Mother      Hyperlipidemia Sister          Allergies  Duloxetine      A comprehensive 10+ review of systems was negative except for: see hpi                    Assessment:  64 yo with SIVA, stress dominant     SIVA:   -failed oxybutynin, myrbetriq and fesoterodine   -s/p sling 22 , doing well no RICHELLE   -UDS demostrates DO    S/p Botox, 100 units, 3/31/23, not much  improvement  Status post sacral neuromodulation 5/17/2023  Over 90% improvement, will add bladder training exercise will also add Vesicare          GUSM and sexual arousal  Continue estradiol for gusm  Will add topical sildenafil for libido      Follow up in 3 months    Elvin Loera MD  Scribe Attestation  By signing my name below, I, Kasia Osorio   attest that this documentation has been prepared under the direction and in the presence of Elvin Loera MD.

## 2024-03-06 DIAGNOSIS — E03.9 HYPOTHYROIDISM, UNSPECIFIED TYPE: ICD-10-CM

## 2024-03-07 RX ORDER — LEVOTHYROXINE SODIUM 75 UG/1
75 TABLET ORAL DAILY
Qty: 30 TABLET | Refills: 6 | Status: SHIPPED | OUTPATIENT
Start: 2024-03-07

## 2024-04-02 ENCOUNTER — HOSPITAL ENCOUNTER (OUTPATIENT)
Dept: RADIATION ONCOLOGY | Facility: CLINIC | Age: 64
Setting detail: RADIATION/ONCOLOGY SERIES
Discharge: HOME | End: 2024-04-02
Payer: COMMERCIAL

## 2024-04-02 VITALS
DIASTOLIC BLOOD PRESSURE: 75 MMHG | WEIGHT: 160 LBS | SYSTOLIC BLOOD PRESSURE: 117 MMHG | OXYGEN SATURATION: 97 % | HEART RATE: 74 BPM | BODY MASS INDEX: 28.34 KG/M2 | RESPIRATION RATE: 18 BRPM | TEMPERATURE: 98.4 F

## 2024-04-02 DIAGNOSIS — C09.9 TONSIL CANCER (MULTI): Primary | ICD-10-CM

## 2024-04-02 DIAGNOSIS — C77.0 METASTASIS TO HEAD AND NECK LYMPH NODE (MULTI): ICD-10-CM

## 2024-04-02 PROCEDURE — 99214 OFFICE O/P EST MOD 30 MIN: CPT | Performed by: STUDENT IN AN ORGANIZED HEALTH CARE EDUCATION/TRAINING PROGRAM

## 2024-04-02 ASSESSMENT — PATIENT HEALTH QUESTIONNAIRE - PHQ9
SUM OF ALL RESPONSES TO PHQ9 QUESTIONS 1 & 2: 0
1. LITTLE INTEREST OR PLEASURE IN DOING THINGS: NOT AT ALL
2. FEELING DOWN, DEPRESSED OR HOPELESS: NOT AT ALL

## 2024-04-02 ASSESSMENT — ENCOUNTER SYMPTOMS
DEPRESSION: 0
LOSS OF SENSATION IN FEET: 0
OCCASIONAL FEELINGS OF UNSTEADINESS: 0

## 2024-04-02 ASSESSMENT — COLUMBIA-SUICIDE SEVERITY RATING SCALE - C-SSRS
2. HAVE YOU ACTUALLY HAD ANY THOUGHTS OF KILLING YOURSELF?: NO
6. HAVE YOU EVER DONE ANYTHING, STARTED TO DO ANYTHING, OR PREPARED TO DO ANYTHING TO END YOUR LIFE?: NO
1. IN THE PAST MONTH, HAVE YOU WISHED YOU WERE DEAD OR WISHED YOU COULD GO TO SLEEP AND NOT WAKE UP?: NO

## 2024-04-02 ASSESSMENT — PAIN SCALES - GENERAL: PAINLEVEL: 0-NO PAIN

## 2024-04-02 NOTE — PROGRESS NOTES
Radiation Oncology Follow-Up    Patient Name:  Melvi Sanchez  MRN:  [id number]  :  1960    Referring Provider: No ref. provider found  Primary Care Provider: Alton King DO  Care Team: Patient Care Team:  Alton King DO as PCP - General (Family Medicine)  Alton King DO as PCP - [facility] PCP  Alton King DO as PCP - [facility] PCP  Jimmie LEYVA MD as Consulting Physician (Radiation Oncology)  Marlon Patel MD as Surgeon (Pulmonary Disease)    Date of Service: 2024     SUBJECTIVE  History of Present Illness:   Gretchen Sanchez is a 63 y.o. female who was previously seen at the [facility] Department of Radiation Oncology for stage III xO1P1G6 p16+ left tonsil squamous cell carcinoma s/p definitive chemoradiation to 70 Gy/35 fractions with weekly cisplatin, completed 22.     Her most recent PET/CT on 23 showed a focus of mild uptake (SUV 4.0) at the left posterior floor of mouth, but there was no corresponding mass/lesion seen on the CT neck with contrast. There was no evidence of cervical adenopathy or distant metastases.    She recently followed up with Dr. Brownlee on 24, and he noted no evidence of disease on scope exam.    Since her last visit, she did follow up with speech therapy and was advised to do exercises. She feels her swallowing is improved as long as she eats slowly and drinks water with meals. She still has stable dry mouth. Her taste is still altered but she feels it is slowly improving. She denies new pain her mouth or throat, ear pain, trouble opening her mouth, or loss of appetite. She does continue to smoke cigarettes. She did quit for a couple months with Chantix, but went back to smoking.    Treatment Rendered:   Head and neck chemoradiation 70 Gy/35 fractions completed 22       Review of Systems:   Review of Systems   All other systems reviewed and are negative.      Performance Status:   The  Karnofsky performance scale today is 90, Able to carry on normal activity; minor signs or symptoms of disease (ECOG equivalent 0).        OBJECTIVE  Vital Signs:  /75 (BP Location: Left arm, Patient Position: Sitting, BP Cuff Size: Large adult long)   Pulse 74   Temp 36.9 °C (98.4 °F) (Temporal)   Resp 18   Wt 72.6 kg (160 lb) Comment: stated  SpO2 97%   BMI 28.34 kg/m²    Physical Exam:  Physical Exam  Constitutional:       General: She is not in acute distress.     Appearance: Normal appearance.   HENT:      Head: Normocephalic and atraumatic.      Mouth/Throat:      Mouth: Mucous membranes are moist.      Pharynx: Oropharynx is clear. No oropharyngeal exudate or posterior oropharyngeal erythema.      Comments: Edentulous, tongue mobile, no trismus. No oral or oropharyngeal lesions appreciated.  Eyes:      General: No scleral icterus.     Extraocular Movements: Extraocular movements intact.      Conjunctiva/sclera: Conjunctivae normal.      Pupils: Pupils are equal, round, and reactive to light.   Neck:      Comments: Fibrosis over neck, left greater than right. Telangiectasias with mild tenderness at left neck and supraclavicular area. No cervical adenopathy.  Pulmonary:      Effort: Pulmonary effort is normal. No respiratory distress.   Musculoskeletal:         General: Normal range of motion.      Cervical back: Normal range of motion. Tenderness present.      Right lower leg: No edema.      Left lower leg: No edema.   Lymphadenopathy:      Cervical: No cervical adenopathy.   Skin:     General: Skin is warm and dry.      Coloration: Skin is not jaundiced.      Findings: No lesion or rash.   Neurological:      General: No focal deficit present.      Mental Status: She is alert and oriented to person, place, and time.      Cranial Nerves: No cranial nerve deficit.      Sensory: No sensory deficit.      Motor: No weakness.      Coordination: Coordination normal.      Gait: Gait normal.   Psychiatric:          Mood and Affect: Mood normal.         Behavior: Behavior normal.             ASSESSMENT:  Melvi Sanchez is a 63 y.o. female with stage III [id number] p16+ left tonsil squamous cell carcinoma s/p definitive chemoradiation to 70 Gy/35 fractions with weekly cisplatin, completed 7/7/22. She has no evidence of disease on exam today or on scope exam with Dr. Brownlee in 1/2024.    She maintains good performance status (ECOG 0) and she is swallowing better after seeing speech therapy.    She will follow up with Dr. Brownlee on 5/31/24. I will order repeat PET/CT and CT neck for 7/2024, and I will see her for follow up in about 6 months. I strongly encouraged her to quit smoking.    NCCN Guidelines were applicable to guide this patients treatment plan.    Jimmie Morley MD

## 2024-04-23 ENCOUNTER — APPOINTMENT (OUTPATIENT)
Dept: PULMONOLOGY | Facility: HOSPITAL | Age: 64
End: 2024-04-23
Payer: COMMERCIAL

## 2024-05-09 ENCOUNTER — TELEMEDICINE (OUTPATIENT)
Dept: UROLOGY | Facility: CLINIC | Age: 64
End: 2024-05-09
Payer: COMMERCIAL

## 2024-05-09 DIAGNOSIS — N32.81 OAB (OVERACTIVE BLADDER): Primary | ICD-10-CM

## 2024-05-09 DIAGNOSIS — R15.9 INCONTINENCE OF FECES, UNSPECIFIED FECAL INCONTINENCE TYPE: ICD-10-CM

## 2024-05-09 PROCEDURE — 99442 PR PHYS/QHP TELEPHONE EVALUATION 11-20 MIN: CPT | Performed by: STUDENT IN AN ORGANIZED HEALTH CARE EDUCATION/TRAINING PROGRAM

## 2024-05-09 NOTE — PROGRESS NOTES
HISTORY OF PRESENT ILLNESS:  Melvi Sanchez is a 64 y.o. female who presents today for a virtual follow up visit. She reports she is doing well. She is taking the medications. She is still at about 90 percent improvement.            Past Medical History  She has a past medical history of Acute hypoxemic respiratory failure ([facility]) (2023), Acute maxillary sinusitis, unspecified (2018), Essential (primary) hypertension (2018), Hypothyroidism, Morbid obesity ([facility]) (2023), Nausea and/or vomiting (2023), Other conditions influencing health status (2018), Personal history of other diseases of urinary system, Pharyngitis (2023), Pneumonia (2023), Sinusitis (2023), Sore throat (2023), Urinary tract infection, site not specified (2018), and Wheezing (2013).    Surgical History  She has a past surgical history that includes Cholecystectomy (2014); Hysterectomy (2014); Mouth surgery (2014);  section, classic (2014); Breast biopsy (Left); and Colonoscopy.     Social History  She reports that she has been smoking cigarettes. She started smoking about 5 months ago. She has a 40 pack-year smoking history. She has never used smokeless tobacco. She reports current alcohol use of about 1.0 standard drink of alcohol per week. She reports current drug use. Frequency: 1.00 time per week. Drug: Marijuana.    Family History  Family History   Problem Relation Name Age of Onset    Emphysema Mother      COPD Mother      Hyperlipidemia Sister          Allergies  Duloxetine      A comprehensive 10+ review of systems was negative except for: see hpi                          PHYSICAL EXAMINATION:  BP Readings from Last 3 Encounters:   24 117/75   24 143/82   23 133/75      Wt Readings from Last 3 Encounters:   24 72.6 kg (160 lb)   24 72.7 kg (160 lb 4.8 oz)   24 74.8 kg (165 lb)      BMI: Estimated  "body mass index is 28.34 kg/m² as calculated from the following:    Height as of 1/26/24: 1.6 m (5' 3\").    Weight as of 4/2/24: 72.6 kg (160 lb).  BSA: Estimated body surface area is 1.8 meters squared as calculated from the following:    Height as of 1/26/24: 1.6 m (5' 3\").    Weight as of 4/2/24: 72.6 kg (160 lb).  HEENT: Normocephalic, atraumatic, PER EOMI, nonicteric, trachea normal, thyroid normal, oropharynx normal.  CARDIAC: regular rate & rhythm, S1 & S2 normal.  No heaves, thrills, gallops or murmurs.  LUNGS: Clear to auscultation, no spinal or CV tenderness.  EXTREMITIES: No evidence of cyanosis, clubbing or edema.               Assessment:  63 yo with SIVA, stress dominant     SIVA:   -failed oxybutynin, myrbetriq and fesoterodine   -s/p sling 11/11/22 , doing well no RICHELLE   -UDS demostrates DO     S/p Botox, 100 units, 3/31/23, not much improvement  Status post sacral neuromodulation 5/17/2023  Over 90% improvement, will add bladder training exercise  No changes with Vesicare, patient to stop taking this       GUSM and sexual arousal  Continue estradiol for gusm  Will add topical sildenafil for libido        Follow up in 1 year       All questions and concerns were answered and addressed.  The patient expressed understanding and agrees with the plan.     Elvin Loera MD    Scribe Attestation  By signing my name below, I, Adripage Jeff, [employer]   attest that this documentation has been prepared under the direction and in the presence of Elvin Loera MD.  "

## 2024-05-31 ENCOUNTER — OFFICE VISIT (OUTPATIENT)
Dept: OTOLARYNGOLOGY | Facility: CLINIC | Age: 64
End: 2024-05-31
Payer: COMMERCIAL

## 2024-05-31 ENCOUNTER — LAB (OUTPATIENT)
Dept: LAB | Facility: LAB | Age: 64
End: 2024-05-31
Payer: COMMERCIAL

## 2024-05-31 VITALS — WEIGHT: 158.6 LBS | HEIGHT: 63 IN | TEMPERATURE: 97.5 F | BODY MASS INDEX: 28.1 KG/M2

## 2024-05-31 DIAGNOSIS — K21.9 GASTROESOPHAGEAL REFLUX DISEASE WITHOUT ESOPHAGITIS: ICD-10-CM

## 2024-05-31 DIAGNOSIS — R22.1 NECK SWELLING: ICD-10-CM

## 2024-05-31 DIAGNOSIS — R91.1 LUNG NODULE: ICD-10-CM

## 2024-05-31 DIAGNOSIS — C09.9 TONSIL CANCER (MULTI): Primary | ICD-10-CM

## 2024-05-31 DIAGNOSIS — C09.9 TONSIL CANCER (MULTI): ICD-10-CM

## 2024-05-31 LAB
CREAT SERPL-MCNC: 0.64 MG/DL (ref 0.5–1.05)
EGFRCR SERPLBLD CKD-EPI 2021: >90 ML/MIN/1.73M*2

## 2024-05-31 PROCEDURE — 31575 DIAGNOSTIC LARYNGOSCOPY: CPT | Performed by: OTOLARYNGOLOGY

## 2024-05-31 PROCEDURE — 99214 OFFICE O/P EST MOD 30 MIN: CPT | Performed by: OTOLARYNGOLOGY

## 2024-05-31 PROCEDURE — 82565 ASSAY OF CREATININE: CPT

## 2024-05-31 PROCEDURE — 36415 COLL VENOUS BLD VENIPUNCTURE: CPT

## 2024-05-31 ASSESSMENT — PATIENT HEALTH QUESTIONNAIRE - PHQ9
1. LITTLE INTEREST OR PLEASURE IN DOING THINGS: NOT AT ALL
SUM OF ALL RESPONSES TO PHQ9 QUESTIONS 1 AND 2: 0
2. FEELING DOWN, DEPRESSED OR HOPELESS: NOT AT ALL

## 2024-05-31 NOTE — PROGRESS NOTES
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 +  Finsihed chemo xrt 10/22          He is here for follow-up visit, left neck is swollen and is tender over the last few days        She continues to smoke about a pack per day    Having some mild dysphagia      No otalgia    Physical Exam:  CONSTITUTIONAL:  No acute distress  VOICE:  No hoarseness or other abnormality  RESPIRATION:  Breathing comfortably, no stridor  CV:  No clubbing/cyanosis/edema in hands  EYES:  EOM intact, sclera normal  NEURO:  Alert and oriented times 3, Cranial nerves II-XII grossly intact and symmetric bilaterally  HEAD AND FACE:  Symmetric facial features, no masses or lesions, sinuses non-tender to palpation  SALIVARY GLANDS:  Parotid and submandibular glands normal bilaterally  EARS:  Normal external ears, external auditory canals, and TMs to otoscopy, normal hearing to whispered voice.  NOSE:  External nose midline, anterior rhinoscopy is normal with limited visualization to the anterior aspect of the interior turbinates, no bleeding or drainage, no lesions  ORAL CAVITY/OROPHARYNX/LIPS:  Normal mucous membranes, normal floor of mouth/tongue/OP, no masses or lesions  PHARYNGEAL WALLS:  No masses or lesions  NECK/LYMPH: Left neck fullness along the sternocleidomastoid muscle within indistinct area in the left superior aspect difficult to know whether this is fibrosis versus underlying swelling  SKIN:  Neck skin is radiotherapy changes  PSYCH:  Alert and oriented with appropriate mood and affect      Flexible fiberoptic laryngopharyngoscopy was performed via the nares. After topical anesthesia and decongestant was instilled:    Nasopharynx:Eustachian tube orifice normal, fossa of Rosenmueller clear, mucosa clean, no masses appreciated  Palate: Nasopharyngeal surface of palate clear  Tongue base vallecula clear, lingual surface of epiglottis normal, oropharynx clear  Larynx laryngeal surface of epiglottis, clear, area epiglottic folds, normal, false  cords clear, arytenoids, clear, vocal cords. Normal mobility, no lesions, mild reflux changes,  Hypopharynx pyriform sinuses, clear, post cricoid area clear    Scope was removed, patient tolerated the procedure well        Imp STEWART  But with left neck swelling and fullness for which we will get her staging scans early    CT neck and chest will be ordered    Smoking cessation reviewed  Neck range of motion stretching exercises have been discussed

## 2024-06-20 ENCOUNTER — HOSPITAL ENCOUNTER (OUTPATIENT)
Dept: RADIOLOGY | Facility: HOSPITAL | Age: 64
Discharge: HOME | End: 2024-06-20
Payer: COMMERCIAL

## 2024-06-20 DIAGNOSIS — R22.1 NECK SWELLING: ICD-10-CM

## 2024-06-20 DIAGNOSIS — C09.9 TONSIL CANCER (MULTI): ICD-10-CM

## 2024-06-20 DIAGNOSIS — R91.1 LUNG NODULE: ICD-10-CM

## 2024-06-20 PROCEDURE — 2550000001 HC RX 255 CONTRASTS: Performed by: OTOLARYNGOLOGY

## 2024-06-20 PROCEDURE — 71260 CT THORAX DX C+: CPT

## 2024-06-20 PROCEDURE — 70491 CT SOFT TISSUE NECK W/DYE: CPT

## 2024-06-25 ENCOUNTER — TELEPHONE (OUTPATIENT)
Dept: OTOLARYNGOLOGY | Facility: CLINIC | Age: 64
End: 2024-06-25
Payer: COMMERCIAL

## 2024-06-25 ENCOUNTER — APPOINTMENT (OUTPATIENT)
Dept: RADIOLOGY | Facility: HOSPITAL | Age: 64
End: 2024-06-25
Payer: COMMERCIAL

## 2024-06-25 DIAGNOSIS — R22.1 MASS OF LEFT SIDE OF NECK: ICD-10-CM

## 2024-06-25 NOTE — TELEPHONE ENCOUNTER
----- Message from Robin Brownlee MD sent at 6/24/2024  6:48 PM EDT -----  Regarding: FW:  Spoke with her, please arrange for IR guided ultrasound, biopsy left neck mass seen on CT, she’s aware you’ll be calling. Thank you.  ----- Message -----  From: Interface, Radiology Results In  Sent: 6/20/2024   1:00 PM EDT  To: Robin Brownlee MD

## 2024-07-02 ENCOUNTER — HOSPITAL ENCOUNTER (OUTPATIENT)
Dept: RADIOLOGY | Facility: HOSPITAL | Age: 64
Discharge: HOME | End: 2024-07-02
Payer: COMMERCIAL

## 2024-07-02 DIAGNOSIS — R92.8 ABNORMAL MAMMOGRAM: ICD-10-CM

## 2024-07-02 PROCEDURE — 76642 ULTRASOUND BREAST LIMITED: CPT | Performed by: RADIOLOGY

## 2024-07-02 PROCEDURE — 77066 DX MAMMO INCL CAD BI: CPT | Performed by: RADIOLOGY

## 2024-07-02 PROCEDURE — 77062 BREAST TOMOSYNTHESIS BI: CPT | Performed by: RADIOLOGY

## 2024-07-02 PROCEDURE — 76642 ULTRASOUND BREAST LIMITED: CPT | Mod: LT

## 2024-07-02 PROCEDURE — 76982 USE 1ST TARGET LESION: CPT | Mod: LT

## 2024-07-02 PROCEDURE — 77062 BREAST TOMOSYNTHESIS BI: CPT

## 2024-07-05 ENCOUNTER — HOSPITAL ENCOUNTER (OUTPATIENT)
Dept: RADIOLOGY | Facility: HOSPITAL | Age: 64
Discharge: HOME | End: 2024-07-05
Payer: COMMERCIAL

## 2024-07-05 DIAGNOSIS — R22.1 MASS OF LEFT SIDE OF NECK: ICD-10-CM

## 2024-07-05 PROCEDURE — 20206 BIOPSY MUSCLE PERQ NEEDLE: CPT

## 2024-07-05 PROCEDURE — 2720000007 HC OR 272 NO HCPCS

## 2024-07-05 NOTE — POST-PROCEDURE NOTE
Interventional Radiology Brief Postprocedure Note    Attending: Bhaskar Herr MD      Assistant: none    Diagnosis: SCC, left tonsil    Description of procedure: left cervical mass biopsy     Anesthesia:  Local    Complications: None    Estimated Blood Loss: none      specimens collected      See detailed result report with images in PACS.    The patient tolerated the procedure well without incident or complication.

## 2024-07-09 ENCOUNTER — APPOINTMENT (OUTPATIENT)
Dept: SURGERY | Facility: CLINIC | Age: 64
End: 2024-07-09
Payer: COMMERCIAL

## 2024-07-09 VITALS
HEART RATE: 65 BPM | WEIGHT: 156.2 LBS | DIASTOLIC BLOOD PRESSURE: 66 MMHG | OXYGEN SATURATION: 93 % | SYSTOLIC BLOOD PRESSURE: 108 MMHG | BODY MASS INDEX: 27.68 KG/M2 | HEIGHT: 63 IN

## 2024-07-09 DIAGNOSIS — D24.2 FIBROADENOMA OF LEFT BREAST: ICD-10-CM

## 2024-07-09 DIAGNOSIS — N60.02 BENIGN BREAST CYST IN FEMALE, LEFT: Primary | ICD-10-CM

## 2024-07-09 DIAGNOSIS — R92.1 BREAST CALCIFICATION, LEFT: ICD-10-CM

## 2024-07-09 PROCEDURE — 99213 OFFICE O/P EST LOW 20 MIN: CPT | Performed by: SURGERY

## 2024-07-09 NOTE — PATIENT INSTRUCTIONS
1.  The previously biopsied fibroadenoma of your left breast is unchanged by mammogram over the last year.  The area at the 12 o'clock position of your left breast is most consistent with a cluster of cyst.  This area has not changed in the last 6 months.  Will continue to monitor these abnormalities with a left diagnostic mammogram in 6 months.  Follow-up in Dr. De Los Santos's office after this mammogram.  2.  Continue doing monthly self breast exams.  If you identify any abnormalities, please call Dr. De Los Santos's office immediately.  [phone]

## 2024-07-09 NOTE — PROGRESS NOTES
"    GENERAL SURGERY OFFICE NOTE    Patient: Melvi Sanchez    Age: 64 y.o.   Gender: female    MRN: [id number]    PCP: Alton King, DO        SUBJECTIVE     Chief Complaint  Follow-up (Patient is here for a 6 month left breast follow up. Patient states that she is not currently having any problems with her left breast.)       HPI  Melvi \"Gretchen returns to the office for a 6-month follow-up of multiple abnormalities of the left breast.  She had a biopsy-proven fibroadenoma associated with breast calcifications.  6 months ago, she was found to have a new area of abnormality at the 12 o'clock position of the left breast consistent with a cluster of calcifications.  Over the last 6 months, she has not had any new breast issues.  She denies any breast pain, palpable masses or nipple discharge.  She had a bilateral screening mammogram which showed both breast to have stable findings.  No new areas of concern.  She does note that she recently underwent an ultrasound-guided biopsy of the soft tissue of the left neck (especially given her history of left tonsillar cancer status posttreatment), but does not know these results yet.  She admits that her weight is down approximately 50 pounds, but she states it is very normal for her to fluctuate between 150 and 160 pounds.    Risk factors for breast cancer: 63-year-old white female; menarche at age 15; first live birth at age 28; no previous biopsy; no family history of breast cancer. She does have a paternal uncle who had leukemia. Paternal cousin had brain cancer. She has a personal history of tonsillar cancer. She went through menopause around age 47 but never had hormone replacement therapy. This gives her a 5-year Leilani score of 1.6% and a lifetime risk of 7.9% which overall puts her in a low risk category.     ROS  Review of Systems   Constitutional: no fever and no chills.   Eyes: no loss of vision, no discharge from the eyes, no itching of the eyes and no " eye pain.   ENT: neck pain, hoarseness and sore throat, but no hearing loss.   Cardiovascular: no chest pain, no palpitations and no lower extremity edema.   Respiratory: no dyspnea, no dyspnea during exertion and no cough.   Breast: no nipple discharge, no breast mass, no pain in breast, no erythema, no change in breast skin and no axillary adenopathy.   Gastrointestinal: no abdominal pain, no vomiting, no nausea.   Genitourinary: no dysuria and no hematuria.   Musculoskeletal: no arthralgias, no myalgias and no hernia.   Integumentary: a rash, but no skin lesions.   Neurological: no headache, no dizziness and no numbness.   Psychiatric: no anxiety, no depression and no emotional problems.   Endocrine: no heat or cold intolerance and no increased thirst.   Hematologic/Lymphatic: a tendency for easy bleeding and a tendency for easy bruising.   All other systems have been reviewed and are negative for complaint.     HISTORY     Past Medical History:   Diagnosis Date    Acute hypoxemic respiratory failure (Multi) 2023    Acute maxillary sinusitis, unspecified 2018    Acute maxillary sinusitis    Essential (primary) hypertension 2018    HTN (hypertension), benign    Hypothyroidism     Morbid obesity (Multi) 2023    Nausea and/or vomiting 2023    Other conditions influencing health status 2018    History of cough    Personal history of other diseases of urinary system     History of bladder problems    Pharyngitis 2023    Pneumonia 2023    Sinusitis 2023    Sore throat 2023    Urinary tract infection, site not specified 2018    Acute urinary tract infection    Wheezing 2013    Wheezing        Past Surgical History:   Procedure Laterality Date    BREAST BIOPSY Left     benign FA     SECTION, CLASSIC  2014     Section    CHOLECYSTECTOMY  2014    Cholecystectomy    COLONOSCOPY      HYSTERECTOMY  2014    Hysterectomy     "MOUTH SURGERY  04/17/2014    Oral Surgery Tooth Extraction        Allergies   Allergen Reactions    Duloxetine Hallucinations     Pt states she doesn't think she has an allergy just a bad reaction when mixed with morphine        Social History     Tobacco Use   Smoking Status Every Day    Current packs/day: 1.00    Average packs/day: 1 pack/day for 40.1 years (40.1 ttl pk-yrs)    Types: Cigarettes    Start date: 11/30/2023   Smokeless Tobacco Never        Social History     Substance and Sexual Activity   Alcohol Use Yes    Alcohol/week: 1.0 standard drink of alcohol    Types: 1 Glasses of wine per week        HOME MEDICATIONS  Current Outpatient Medications   Medication Instructions    levothyroxine (SYNTHROID, LEVOXYL) 75 mcg, oral, Daily    PARoxetine (PAXIL) 30 mg, oral, Every morning    rOPINIRole (REQUIP) 2 mg, oral, Nightly    solifenacin (VESICARE) 5 mg, oral, Daily, Swallow tablet whole; do not crush, chew, or split.    umeclidinium-vilanteroL (Anoro Ellipta) 62.5-25 mcg/actuation blister with device 1 puff, inhalation, Daily    varenicline (CHANTIX) 1 mg, oral, Daily          OBJECTIVE   Last Recorded Vitals.  Blood pressure 108/66, pulse 65, height 1.6 m (5' 3\"), weight 70.9 kg (156 lb 3.2 oz), SpO2 93%.     PHYSICAL EXAM  Physical Exam   General: Well-developed, well-nourished and in no acute distress.  Head: Normocephalic. Atraumatic.  Neck/thyroid: Neck is supple. Significant skin thickening and chronic changes to the anterior neck bilaterally. Skin scarring noted at the base of the left neck.  Eyes: Pupils equal round and reactive to light. Conjunctiva normal.  ENMT: No masses or deformity of external nose. External ears without masses.  Respiratory/Chest: Normal respiratory effort.  Breast: Moderate sized breasts with loose tissue consistent with weight loss. Symmetrical. No palpable abnormalities. No expressible nipple discharge. No erythema.  Lymphatics: No palpable lymphadenopathy of the bilateral " axillary regions. Palpation of lymph nodes is difficult in the neck and supraclavicular region due to the skin changes from XRT.  Cardiovascular: Regular rate and rhythm.   Abdomen: Soft, nontender, nondistended. No hernias.   Musculoskeletal: Joints and limbs are grossly normal. Normal gait. Normal range of motion of major joints.  Neuro: Oriented to person, place and time. No obvious neurological deficit. Motor strength grossly normal.  Psych: Normal mood and affect.     RESULTS   Labs  No results found for this or any previous visit (from the past 24 hour(s)).    Radiology Resutls  MAMMO BILATERAL DIAGNOSTIC TOMOSYNTHESIS; BI US BREAST LIMITED  LEFT;  7/2/2024 10:31 am; 7/2/2024 11:15 am      ACCESSION NUMBER(S):  [id number]; [id number]      ORDERING CLINICIAN:  MONSTER FLORES      INDICATION:  Asymmetry left breast      COMPARISON:  06/14/2023      FINDINGS:  MAMMOGRAPHY: 2D and tomosynthesis images were reviewed at 1 mm slice  thickness.      Density:  There are areas of scattered fibroglandular tissue.      Right breast demonstrates normal fibroglandular tissue and a few  punctate benign calcifications, stable.      Left breast demonstrates a 5 mm asymmetry of the posterior depth and  a 13 mm asymmetry of the anterior depth.      Calcifications lateral aspect left breast best seen on CC projection  are no longer apparent. Biopsy clip is present within this vicinity.          ULTRASOUND: Targeted ultrasound was performed of the left breast by a  registered sonographer with elastography. Comparison made to prior  examination 12/28/2023.      Left breast 12 o'clock position 4 cm from nipple again demonstrates a  horizontally oriented mixed echogenic asymmetry measuring up to 7 mm  in size similar in size and morphology to the prior examination,  probably benign. Elastography soft.      Left axilla demonstrates a normal lymph node          IMPRESSION:  Stable bilateral mammogram. Continued surveillance left  "breast  recommended starting date 06/14/2023      BI-RADS CATEGORY:  BI-RADS Category:  3 Probably Benign.  Recommendation:  Short-term Interval Follow-up Imaging.  Recommended Date:  6 Months.  Laterality:  Left.      ASSESSMENT / PLAN   Diagnoses and all orders for this visit:  Benign breast cyst in female, left  Fibroadenoma of left breast  Breast calcification, left  -     BI mammo left diagnostic tomosynthesis; Future        Plan  June 2023: LEFT; tight cluster of calc in UOQ; stereotactic biopsy showed fibroadenoma; second area of cluster of cysts at the 12 o'clock position    1. The stereotactic biopsy of the left breast calcifications demonstrated a fibroadenoma with associated calcifications. We discussed that this is a benign diagnosis, but does need continued monitoring to make sure that it remains \"stable\".  It has been stable for 1 year.  Plan left diagnostic mammogram in 6 months.  2.  On the mammogram from 6 months ago, new area of abnormality was identified at the 12 o'clock position.  Ultrasound suggest that this is a cluster of cysts.  Most recent mammogram shows this area to be stable.  Continue to monitor for stability.  Plan left diagnostic mammogram in 6 months.  3.  Return to the office after her next mammogram.  4.  She was encouraged to do self breast exams, and she will contact the office if she identifies any abnormalities.  5.  She will follow-up with her other providers regarding the soft tissue biopsy of her left neck (history of left tonsillar carcinoma).      Tri De Los Santos MD, FACS  [facility] General Surgery  [address] [place] [address];   [address]  [place] [id number] [phone]  "

## 2024-07-10 LAB
LAB AP ASR DISCLAIMER: NORMAL
LABORATORY COMMENT REPORT: NORMAL
PATH REPORT.FINAL DX SPEC: NORMAL
PATH REPORT.GROSS SPEC: NORMAL
PATH REPORT.RELEVANT HX SPEC: NORMAL
PATH REPORT.TOTAL CANCER: NORMAL

## 2024-07-11 ENCOUNTER — HOSPITAL ENCOUNTER (OUTPATIENT)
Dept: RADIOLOGY | Facility: CLINIC | Age: 64
Discharge: HOME | End: 2024-07-11
Payer: COMMERCIAL

## 2024-07-11 ENCOUNTER — OFFICE VISIT (OUTPATIENT)
Dept: PRIMARY CARE | Facility: CLINIC | Age: 64
End: 2024-07-11
Payer: COMMERCIAL

## 2024-07-11 VITALS
TEMPERATURE: 98.7 F | DIASTOLIC BLOOD PRESSURE: 75 MMHG | WEIGHT: 157 LBS | SYSTOLIC BLOOD PRESSURE: 127 MMHG | HEART RATE: 72 BPM | BODY MASS INDEX: 27.81 KG/M2 | OXYGEN SATURATION: 98 %

## 2024-07-11 DIAGNOSIS — R05.1 ACUTE COUGH: ICD-10-CM

## 2024-07-11 DIAGNOSIS — R09.1 PLEURISY: Primary | ICD-10-CM

## 2024-07-11 PROCEDURE — 71046 X-RAY EXAM CHEST 2 VIEWS: CPT | Performed by: RADIOLOGY

## 2024-07-11 PROCEDURE — 71046 X-RAY EXAM CHEST 2 VIEWS: CPT

## 2024-07-11 PROCEDURE — 99213 OFFICE O/P EST LOW 20 MIN: CPT

## 2024-07-11 RX ORDER — IBUPROFEN 800 MG/1
800 TABLET ORAL 3 TIMES DAILY PRN
Qty: 21 TABLET | Refills: 0 | Status: SHIPPED | OUTPATIENT
Start: 2024-07-11 | End: 2024-07-18

## 2024-07-11 RX ORDER — PREDNISONE 20 MG/1
40 TABLET ORAL DAILY
Qty: 10 TABLET | Refills: 0 | Status: SHIPPED | OUTPATIENT
Start: 2024-07-11 | End: 2024-07-16

## 2024-07-11 ASSESSMENT — PATIENT HEALTH QUESTIONNAIRE - PHQ9: 1. LITTLE INTEREST OR PLEASURE IN DOING THINGS: NOT AT ALL

## 2024-07-11 NOTE — PROGRESS NOTES
"Subjective   Patient ID: Melvi Sanchez \"Gretchen\" is a 64 y.o. female who presents for Cough (Cough for 3 wks, Rt side of ribs hurt).  HPI  Gretchen presents for a cough  Has a cough all the time   But this cough is more annoying  Had a cold a month ago   No runny nose, sore throat, or sinus pressure  Super dry mouth all the time but from chemo/radiation   Not coughing anyting up   A little SOB just started today  When she breathes it hurts her ribs  No chills, fever  Ribs hurt when she breathes or coughs   No diarrhea   Feels fine otherwise   Did not start huring until she went to bed      Past Surgical History:   Procedure Laterality Date    BREAST BIOPSY Left     benign FA     SECTION, CLASSIC  2014     Section    CHOLECYSTECTOMY  2014    Cholecystectomy    COLONOSCOPY      HYSTERECTOMY  2014    Hysterectomy    MOUTH SURGERY  2014    Oral Surgery Tooth Extraction      Past Medical History:   Diagnosis Date    Acute hypoxemic respiratory failure (Multi) 2023    Acute maxillary sinusitis, unspecified 2018    Acute maxillary sinusitis    Essential (primary) hypertension 2018    HTN (hypertension), benign    Hypothyroidism     Morbid obesity (Multi) 2023    Nausea and/or vomiting 2023    Other conditions influencing health status 2018    History of cough    Personal history of other diseases of urinary system     History of bladder problems    Pharyngitis 2023    Pneumonia 2023    Sinusitis 2023    Sore throat 2023    Urinary tract infection, site not specified 2018    Acute urinary tract infection    Wheezing 2013    Wheezing     Social History     Tobacco Use    Smoking status: Every Day     Current packs/day: 1.00     Average packs/day: 1 pack/day for 40.1 years (40.1 ttl pk-yrs)     Types: Cigarettes     Start date: 2023    Smokeless tobacco: Never   Substance Use Topics    Alcohol use: Yes     " Alcohol/week: 1.0 standard drink of alcohol     Types: 1 Glasses of wine per week    Drug use: Yes     Frequency: 1.0 times per week     Types: Marijuana     Comment: use within past week        Review of Systems  10 point review of systems performed and is negative except as noted in the HPI.      Current Outpatient Medications:     levothyroxine (Synthroid, Levoxyl) 75 mcg tablet, TAKE 1 TABLET BY MOUTH EVERY DAY, Disp: 30 tablet, Rfl: 6    solifenacin (VESIcare) 5 mg tablet, Take 1 tablet (5 mg) by mouth once daily. Swallow tablet whole; do not crush, chew, or split., Disp: 30 tablet, Rfl: 11    umeclidinium-vilanteroL (Anoro Ellipta) 62.5-25 mcg/actuation blister with device, Inhale 1 puff once daily., Disp: 30 each, Rfl: 11    ibuprofen 800 mg tablet, Take 1 tablet (800 mg) by mouth 3 times a day as needed for mild pain (1 - 3) (pain) for up to 7 days., Disp: 21 tablet, Rfl: 0    PARoxetine (Paxil) 30 mg tablet, Take 1 tablet (30 mg) by mouth once daily in the morning., Disp: 90 tablet, Rfl: 1    predniSONE (Deltasone) 20 mg tablet, Take 2 tablets (40 mg) by mouth once daily for 5 days., Disp: 10 tablet, Rfl: 0    rOPINIRole (Requip) 1 mg tablet, Take 2 tablets (2 mg) by mouth once daily at bedtime., Disp: 180 tablet, Rfl: 3    varenicline (Chantix) 1 mg tablet, Take 1 tablet (1 mg) by mouth once daily., Disp: 30 tablet, Rfl: 5     Objective   /75   Pulse 72   Temp 37.1 °C (98.7 °F)   Wt 71.2 kg (157 lb)   SpO2 98%   BMI 27.81 kg/m²     Physical Exam  Constitutional:       General: She is not in acute distress.     Appearance: Normal appearance. She is not ill-appearing.   HENT:      Head: Normocephalic and atraumatic.      Right Ear: Tympanic membrane, ear canal and external ear normal.      Left Ear: Tympanic membrane, ear canal and external ear normal.      Nose: Nose normal.      Mouth/Throat:      Mouth: Mucous membranes are moist.      Pharynx: Oropharynx is clear.   Eyes:       Conjunctiva/sclera: Conjunctivae normal.      Pupils: Pupils are equal, round, and reactive to light.   Cardiovascular:      Rate and Rhythm: Normal rate and regular rhythm.      Heart sounds: Normal heart sounds.   Pulmonary:      Effort: Pulmonary effort is normal.      Breath sounds: Normal breath sounds. No wheezing, rhonchi or rales.   Chest:      Chest wall: Tenderness present.   Abdominal:      General: Bowel sounds are normal.      Palpations: Abdomen is soft.   Musculoskeletal:      Right lower leg: No edema.      Left lower leg: No edema.   Skin:     General: Skin is warm and dry.   Neurological:      Mental Status: She is alert and oriented to person, place, and time.   Psychiatric:         Mood and Affect: Mood normal.         Behavior: Behavior normal.         Assessment/Plan   Problem List Items Addressed This Visit    None  Visit Diagnoses       Pleurisy    -  Primary    Relevant Medications    predniSONE (Deltasone) 20 mg tablet    ibuprofen 800 mg tablet    Acute cough        Relevant Orders    XR chest 2 views (Completed)          Discussed likely pleurisy  Will get chest xray to r/o pneumonia due to cold 3 weeks ago but suspicion is low   Prednisone   Also can take ibuprofen   Discussed following up if sx do not improve   Discussed with Dr. Reece     Discussed at visit any disease processes that were of concern as well as the risks, benefits and instructions on any new medication provided. Patient (and/or caretaker of patient if present) stated all questions were answered, and they voiced understanding of instructions.

## 2024-07-11 NOTE — PATIENT INSTRUCTIONS
Prednisone for 5 days   You will see results on MyChart and we will call with the results   Can do 800mg ibuprofen as needed for pain - up to 3 times a day

## 2024-07-12 ENCOUNTER — APPOINTMENT (OUTPATIENT)
Dept: RADIOLOGY | Facility: HOSPITAL | Age: 64
End: 2024-07-12
Payer: COMMERCIAL

## 2024-07-17 ENCOUNTER — TELEPHONE (OUTPATIENT)
Dept: OTOLARYNGOLOGY | Facility: HOSPITAL | Age: 64
End: 2024-07-17
Payer: COMMERCIAL

## 2024-07-17 DIAGNOSIS — C77.0 METASTASIS TO HEAD AND NECK LYMPH NODE (MULTI): ICD-10-CM

## 2024-07-17 NOTE — TELEPHONE ENCOUNTER
Spoke with patient, informed of the results and the need for PET scan and likely surgical resection reconstruction of the area    Will review her at tumor board once the PET scan is available

## 2024-07-18 ENCOUNTER — HOSPITAL ENCOUNTER (OUTPATIENT)
Dept: RADIOLOGY | Facility: HOSPITAL | Age: 64
Discharge: HOME | End: 2024-07-18
Payer: COMMERCIAL

## 2024-07-18 DIAGNOSIS — C09.9 TONSIL CANCER (MULTI): ICD-10-CM

## 2024-07-18 DIAGNOSIS — C77.0 METASTASIS TO HEAD AND NECK LYMPH NODE (MULTI): ICD-10-CM

## 2024-07-18 PROCEDURE — 3430000001 HC RX 343 DIAGNOSTIC RADIOPHARMACEUTICALS: Performed by: STUDENT IN AN ORGANIZED HEALTH CARE EDUCATION/TRAINING PROGRAM

## 2024-07-18 PROCEDURE — A9552 F18 FDG: HCPCS | Performed by: STUDENT IN AN ORGANIZED HEALTH CARE EDUCATION/TRAINING PROGRAM

## 2024-07-18 PROCEDURE — 78815 PET IMAGE W/CT SKULL-THIGH: CPT | Mod: PS

## 2024-07-18 RX ORDER — FLUDEOXYGLUCOSE F 18 200 MCI/ML
12.8 INJECTION, SOLUTION INTRAVENOUS
Status: COMPLETED | OUTPATIENT
Start: 2024-07-18 | End: 2024-07-18

## 2024-07-23 ENCOUNTER — APPOINTMENT (OUTPATIENT)
Dept: OTOLARYNGOLOGY | Facility: CLINIC | Age: 64
End: 2024-07-23
Payer: COMMERCIAL

## 2024-07-23 VITALS — TEMPERATURE: 97.6 F | WEIGHT: 155.4 LBS | HEIGHT: 63 IN | BODY MASS INDEX: 27.54 KG/M2

## 2024-07-23 DIAGNOSIS — C77.0 METASTASIS TO HEAD AND NECK LYMPH NODE (MULTI): ICD-10-CM

## 2024-07-23 DIAGNOSIS — R13.10 DYSPHAGIA, UNSPECIFIED TYPE: ICD-10-CM

## 2024-07-23 PROCEDURE — 3008F BODY MASS INDEX DOCD: CPT | Performed by: OTOLARYNGOLOGY

## 2024-07-23 PROCEDURE — 99215 OFFICE O/P EST HI 40 MIN: CPT | Performed by: OTOLARYNGOLOGY

## 2024-07-23 ASSESSMENT — PATIENT HEALTH QUESTIONNAIRE - PHQ9
SUM OF ALL RESPONSES TO PHQ9 QUESTIONS 1 AND 2: 0
1. LITTLE INTEREST OR PLEASURE IN DOING THINGS: NOT AT ALL
2. FEELING DOWN, DEPRESSED OR HOPELESS: NOT AT ALL

## 2024-07-24 NOTE — TUMOR BOARD NOTE
Former[facility] HEAD AND NECK TUMOR BOARD NOTE:    Melvi Sanchez Is a 64 y.o. female who was presented by Dr. Brownlee at [facility] Head & Neck Tumor Board on 7/26/24 which included representatives from all Head & Neck disciplines (Medical oncology/Radiation oncology/Otolaryngology/Radiology/Pathology).     History and Physical in Brief:  Ms. Snachez is a 64 y.o. female with a history of stage III [id number] p16+ left tonsil SCC s/p definitive CXRT completed 7/2022. 1-year follow up PET/CT on 7/14/23 showed a focus of mild uptake at left posterior FOM, but no evidence of disease on CT neck at that time. Follow up with Dr. Brownlee on 1/26/24 showed no evidence of disease on scope. Recently, she presented to Dr. Brownlee on 5/31 with several days of swollen, tender left neck and mild dysphagia, with no lesions on scope exam; this prompted further workup.    Continues to smoke 1 pack/day.    Imaging:  CT Neck with Contrast (6/20/24):   IMPRESSION:  1. Compared to the CT neck from 07/14/2023, interval increase in  edema within the aerodigestive tract, most pronounced within the  supraglottic larynx and hypopharynx and there is resulting moderate  narrowing of the supraglottic laryngeal lumen. There is also mild  prevertebral edema. There is no focal fluid collection to suggest an  abscess. Cause of this edema is uncertain, could be infectious in  etiology rather than treatment related given that patient completed  radiation therapy 2 years ago.  2. No striking new mass is seen within the visualized aerodigestive  tract, noting that evaluation is somewhat limited due to presence of  diffuse edema and therefore subtle lesions can not be excluded.  3. Interval enlargement of peripherally enhancing likely necrotic  lymph node in the left level 2 heide station concerning for  progression of disease. Alternatively, enlargement of the lymph node  could reflect an infectious process. Otherwise, no  cervical  lymphadenopathy by size criteria.  4. Edema within the left facial and neck deep and superficial soft  tissues as well as in the left supraclavicular fossa is favored to be  treatment related and was also present on the prior CT. No focal  fluid collection is seen within these regions to suggest an abscess.  Correlate clinically.    Chest CT with Contrast (6/20/24):   IMPRESSION:  1. Emphysema.  2. Inferior lingular dense consolidation and a smaller semi-solid  posterolateral right upper lobe abnormality may be infectious, but  should at least be followed in 3 months to ensure resolution and rule  out a neoplastic etiology.  3. A 1.5 cm right hilar lymph node should also be followed.  4. Coronary artery calcification.    PET/CT Head and Neck (7/18/24):   IMPRESSION:  1. New hypermetabolic activity seen in the left level 2 cervical  lymph node, with central necrosis, consistent with heide metastasis.  2. New FDG avid clustered nodules are seen in the right upper lobe,  which is nonspecific and may be infectious/inflammatory, however  metastasis can not be excluded. Short-term follow-up chest CT is  recommended.  3. Mild FDG uptake is seen throughout the esophagus, likely relating  to esophagitis.  4. There is partial opacification of the left maxillary sinus, with  mild FDG avidity, likely sinusitis.    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  +P40     The [facility] Head and Neck Tumor Board considered available treatment options and made the following staging and recommendations:    Staging and Recommendations:    Site: left Oropharyngeal  Tonsil p16+ SCC  Stage: r cT1 cN1 M0 (locoregional recurrence - will need to investigate lung lesions to rule out distant metastases)  Recommendation:  For neck recurrence: Surgery vs RT vs  chemoRT for neck recurrence   To address lung lesion: follow up chest CT in 3 months, with possible biopsy vs. resolution    Clinical Trial Status:   N/A      National site-specific guidelines were discussed with respect to the case.

## 2024-07-25 PROBLEM — R13.10 DYSPHAGIA: Status: ACTIVE | Noted: 2023-11-06

## 2024-07-25 NOTE — PROGRESS NOTES
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 +  Finsihed chemo xrt 10/22        Patient here for follow-up visit    We have spoken on the phone that the mass in the left neck had positive biopsy for recurrent squamous cell cancer      She continues to smoke    She also had a hypermetabolic area in the right superior lobe at the time of the PET scan she reports that she was feeling ill with bronchitis          We discussed this could either be a second primary, metastatic disease, or resolving inflammatory changes      As far as the left neck discussed surgical intervention which would include a neck dissection likely a modified radical versus radical depending on how adherent the mass is to the local structures    We discussed the potential for resection of the 11th nerve in addition to the 10th and 11th nerve and the implications of speaking eating and swallowing on a temporary or permanent basis    She may require Dobbhoff placement versus PEG tube placement based on how much is impacted    Given the rigidity of the tissues and the changes from radiation flap reconstruction of the region has been discussed with either a myelogenous pectoral flap versus a anterolateral thigh or potentially a serratus or from the subscapular system on the left back    Risk benefits and alternatives success and failure rates and expectations have been reviewed      We will present her at tumor board to discuss management of the lung nodule which could include repeat CT scan in 3 months to assess for resolution versus consideration of biopsy      She needs to coordinate care with her daughter who has a young child and also is undergoing hip surgery in the near future    She will meet with Dr. Rice and understands the concept of team surgery    Also understand the concept of reirradiation potentially immunotherapy and/or chemotherapy based on final pathology but understands that surgical options are standard of care in this situation for  salvage

## 2024-07-26 ENCOUNTER — APPOINTMENT (OUTPATIENT)
Dept: HEMATOLOGY/ONCOLOGY | Facility: HOSPITAL | Age: 64
End: 2024-07-26
Payer: COMMERCIAL

## 2024-08-02 ENCOUNTER — CLINICAL SUPPORT (OUTPATIENT)
Dept: PREADMISSION TESTING | Facility: HOSPITAL | Age: 64
End: 2024-08-02
Payer: COMMERCIAL

## 2024-08-02 NOTE — CPM/PAT NURSE NOTE
"CPM/PAT Nurse Note      Name: Melvi Sanchez (Melvi Sanchez \"Gretchen\")  /Age: 1960/64 y.o.     Melvi Sanchez is scheduled for Creation Free Flap Head/Neck  Dissection Neck - Left  Lymphadenectomy Head/Neck - Left  on 24    **Data Input  Past Medical History:   Diagnosis Date    Acute hypoxemic respiratory failure (Multi) 2023    Acute maxillary sinusitis, unspecified 2018    Acute maxillary sinusitis    Essential (primary) hypertension 2018    HTN (hypertension), benign    Hypothyroidism     Morbid obesity (Multi) 2023    Nausea and/or vomiting 2023    Personal history of other diseases of urinary system     History of bladder problems    Pharyngitis 2023    Pneumonia 2023    Sinusitis 2023    Wheezing 2013    Wheezing       Past Surgical History:   Procedure Laterality Date    BREAST BIOPSY Left     benign FA     SECTION, CLASSIC  2014     Section    CHOLECYSTECTOMY  2014    Cholecystectomy    COLONOSCOPY      HYSTERECTOMY  2014    Hysterectomy    MOUTH SURGERY  2014    Oral Surgery Tooth Extraction       Patient  has no history on file for sexual activity.    Family History   Problem Relation Name Age of Onset    Emphysema Mother      COPD Mother      Hyperlipidemia Sister      Breast cancer Paternal Grandmother         Allergies   Allergen Reactions    Duloxetine Hallucinations     Pt states she doesn't think she has an allergy just a bad reaction when mixed with morphine       Prior to Admission medications    Medication Sig Start Date End Date Taking? Authorizing Provider   levothyroxine (Synthroid, Levoxyl) 75 mcg tablet TAKE 1 TABLET BY MOUTH EVERY DAY 3/7/24   Robin Brownlee MD   PARoxetine (Paxil) 30 mg tablet Take 1 tablet (30 mg) by mouth once daily in the morning. 23  Alton King DO   rOPINIRole (Requip) 1 mg tablet Take 2 tablets (2 mg) by mouth once daily at " bedtime. 7/6/23 7/5/24  Alton King DO   solifenacin (VESIcare) 5 mg tablet Take 1 tablet (5 mg) by mouth once daily. Swallow tablet whole; do not crush, chew, or split. 2/22/24 2/21/25  Elvin Loera MD   umeclidinium-vilanteroL (Anoro Ellipta) 62.5-25 mcg/actuation blister with device Inhale 1 puff once daily. 1/2/24 12/27/24  Antoinette Matthews, APRN-CNP   varenicline (Chantix) 1 mg tablet Take 1 tablet (1 mg) by mouth once daily. 1/5/24 7/3/24  Alton King DO        PAT ROS     DASI Risk Score    No data to display       Caprini DVT Assessment    No data to display       Modified Frailty Index    No data to display       CHADS2 Stroke Risk  Current as of 6 hours ago        N/A 3 to 100%: High Risk   2 to < 3%: Medium Risk   0 to < 2%: Low Risk     Last Change: N/A          This score determines the patient's risk of having a stroke if the patient has atrial fibrillation.        This score is not applicable to this patient. Components are not calculated.          Revised Cardiac Risk Index    No data to display       Apfel Simplified Score    No data to display       Risk Analysis Index Results This Encounter    No data found in the last 1 encounters.     Providers:                                                                                                           PCP: Alton King, 9/29/23 follow-up after being discharged from the hospital 10 days ago. The main problem requiring admission was CAP     Pulmonology: Antoinette Matthews CNP, 10/24 Follow up of pneumonia in September. She was discharged home on 3L at rest and 6L on exertion. She was able to stop wearing it last week and is 99% on RA today. Hx of COPD currently on Anoro     Onc: Jimmie Morley 04/02/24 Follow up, hx of left tonsil squamous cell carcinoma s/p definitive chemoradiation to 70 Gy/35 fractions with weekly cisplatin, completed 7/7/22.   PET/CT on 7/14/23 showed a focus of mild uptake (SUV 4.0) at the left posterior floor of mouth, but  there was no corresponding mass/lesion seen on the CT neck with contrast.     ENT: Robin Brownlee, 24 follow-up visit, mass in the left neck had positive biopsy for recurrent squamous cell cancer. PET/CT on 23 showed a focus of mild uptake (SUV 4.0) at the left posterior floor of mouth, but there was no corresponding mass/lesion seen on the CT neck with contrast.      Urology: Elvin Loera, 24 Follow up of OAB, SIVA, stress dominant. S/p Botox, 100 units, 3/31/23, not much improvement  Status post sacral neuromodulation 2023    GenSx: Tri Cifuentesleesa 24, Follow-up of multiple abnormalities of the left breast.  She had a biopsy-proven fibroadenoma associated with breast calcifications       --TESTING:    - EK22  Sinus rhythm  Baseline wander in lead(s) V6    - PFTs: 11/10/23  Impression   The patient underwent a 6 minute walk.  The patient.  If her 750 feet.  The resting room air saturation is 98%.  At the end of the test, the lowest recorded saturation was 96%.  The patient had no dyspnea.     - CT Chest: 24  IMPRESSION:  1. Emphysema.  2. Inferior lingular dense consolidation and a smaller semi-solid  posterolateral right upper lobe abnormality may be infectious, but  should at least be followed in 3 months to ensure resolution and rule  out a neoplastic etiology.  3. A 1.5 cm right hilar lymph node should also be followed.  4. Coronary artery calcification.      ___________________________________________  **Data input only. No medications, history or providers verified       Heidi Noble LPN  Preadmission Testing

## 2024-08-08 NOTE — H&P (VIEW-ONLY)
History of Present Illness    Melvi Sanchez is a 64 y.o. female who is seen at the request of one of my partners.  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  Personally reviewed the scans that were done including the PET scan and the CT scan.  It does show this lesion in the level 3 on the left side.      Physical Exam    Examination the oral cavity and oropharynx is within normal limits.  There is good mobility of the tongue and palate.  There is good mandibular excursion.  Palpation of the parotid, neck, and thyroid field shows this ill-defined mass in the midportion of the left neck.    Assessment and Plan    Recurrent neck disease from a previously treated head neck cancer.  The patient is scheduled for surgery in the near future.  I discussed with her the details of the surgery and the possible complications especially in relation to the proximity of the cranial nerves.  I answered all their questions.    I will see her at the time of the surgery.

## 2024-08-09 ENCOUNTER — PRE-ADMISSION TESTING (OUTPATIENT)
Dept: PREADMISSION TESTING | Facility: HOSPITAL | Age: 64
End: 2024-08-09
Payer: COMMERCIAL

## 2024-08-09 ENCOUNTER — OFFICE VISIT (OUTPATIENT)
Dept: OTOLARYNGOLOGY | Facility: HOSPITAL | Age: 64
End: 2024-08-09
Payer: COMMERCIAL

## 2024-08-09 ENCOUNTER — ANESTHESIA EVENT (OUTPATIENT)
Dept: OPERATING ROOM | Facility: HOSPITAL | Age: 64
End: 2024-08-09
Payer: COMMERCIAL

## 2024-08-09 VITALS
WEIGHT: 159.3 LBS | OXYGEN SATURATION: 97 % | SYSTOLIC BLOOD PRESSURE: 119 MMHG | HEART RATE: 62 BPM | DIASTOLIC BLOOD PRESSURE: 78 MMHG | HEIGHT: 63 IN | TEMPERATURE: 97.7 F | BODY MASS INDEX: 28.23 KG/M2

## 2024-08-09 VITALS — HEIGHT: 63 IN | WEIGHT: 159 LBS | BODY MASS INDEX: 28.17 KG/M2 | TEMPERATURE: 97.8 F

## 2024-08-09 DIAGNOSIS — D68.9 COAGULATION DEFECT (MULTI): ICD-10-CM

## 2024-08-09 DIAGNOSIS — C09.9 TONSIL CANCER (MULTI): Primary | ICD-10-CM

## 2024-08-09 DIAGNOSIS — Z41.9 ELECTIVE SURGERY: Primary | ICD-10-CM

## 2024-08-09 DIAGNOSIS — C77.0 METASTASIS TO HEAD AND NECK LYMPH NODE (MULTI): ICD-10-CM

## 2024-08-09 LAB
ABO GROUP (TYPE) IN BLOOD: NORMAL
ANION GAP SERPL CALC-SCNC: 14 MMOL/L (ref 10–20)
ANTIBODY SCREEN: NORMAL
APPEARANCE UR: CLEAR
BACTERIA #/AREA URNS AUTO: ABNORMAL /HPF
BILIRUB UR STRIP.AUTO-MCNC: NEGATIVE MG/DL
BUN SERPL-MCNC: 12 MG/DL (ref 6–23)
CALCIUM SERPL-MCNC: 9.3 MG/DL (ref 8.6–10.6)
CHLORIDE SERPL-SCNC: 101 MMOL/L (ref 98–107)
CO2 SERPL-SCNC: 27 MMOL/L (ref 21–32)
COLOR UR: COLORLESS
CREAT SERPL-MCNC: 0.59 MG/DL (ref 0.5–1.05)
EGFRCR SERPLBLD CKD-EPI 2021: >90 ML/MIN/1.73M*2
ERYTHROCYTE [DISTWIDTH] IN BLOOD BY AUTOMATED COUNT: 14.2 % (ref 11.5–14.5)
GLUCOSE SERPL-MCNC: 76 MG/DL (ref 74–99)
GLUCOSE UR STRIP.AUTO-MCNC: NORMAL MG/DL
HCT VFR BLD AUTO: 43.7 % (ref 36–46)
HGB BLD-MCNC: 14.2 G/DL (ref 12–16)
KETONES UR STRIP.AUTO-MCNC: NEGATIVE MG/DL
LEUKOCYTE ESTERASE UR QL STRIP.AUTO: NEGATIVE
MCH RBC QN AUTO: 30.5 PG (ref 26–34)
MCHC RBC AUTO-ENTMCNC: 32.5 G/DL (ref 32–36)
MCV RBC AUTO: 94 FL (ref 80–100)
MUCOUS THREADS #/AREA URNS AUTO: ABNORMAL /LPF
NITRITE UR QL STRIP.AUTO: ABNORMAL
NRBC BLD-RTO: 0 /100 WBCS (ref 0–0)
PH UR STRIP.AUTO: 5 [PH]
PLATELET # BLD AUTO: 254 X10*3/UL (ref 150–450)
POTASSIUM SERPL-SCNC: 4.5 MMOL/L (ref 3.5–5.3)
PROT UR STRIP.AUTO-MCNC: NEGATIVE MG/DL
RBC # BLD AUTO: 4.65 X10*6/UL (ref 4–5.2)
RBC # UR STRIP.AUTO: NEGATIVE /UL
RBC #/AREA URNS AUTO: ABNORMAL /HPF
RH FACTOR (ANTIGEN D): NORMAL
SODIUM SERPL-SCNC: 137 MMOL/L (ref 136–145)
SP GR UR STRIP.AUTO: 1.01
UROBILINOGEN UR STRIP.AUTO-MCNC: NORMAL MG/DL
WBC # BLD AUTO: 6 X10*3/UL (ref 4.4–11.3)
WBC #/AREA URNS AUTO: ABNORMAL /HPF

## 2024-08-09 PROCEDURE — 36415 COLL VENOUS BLD VENIPUNCTURE: CPT

## 2024-08-09 PROCEDURE — 85027 COMPLETE CBC AUTOMATED: CPT

## 2024-08-09 PROCEDURE — 86901 BLOOD TYPING SEROLOGIC RH(D): CPT

## 2024-08-09 PROCEDURE — 99244 OFF/OP CNSLTJ NEW/EST MOD 40: CPT | Performed by: ANESTHESIOLOGY

## 2024-08-09 PROCEDURE — 87186 SC STD MICRODIL/AGAR DIL: CPT

## 2024-08-09 PROCEDURE — 81001 URINALYSIS AUTO W/SCOPE: CPT

## 2024-08-09 PROCEDURE — 99214 OFFICE O/P EST MOD 30 MIN: CPT | Performed by: OTOLARYNGOLOGY

## 2024-08-09 PROCEDURE — 3008F BODY MASS INDEX DOCD: CPT | Performed by: OTOLARYNGOLOGY

## 2024-08-09 PROCEDURE — 87086 URINE CULTURE/COLONY COUNT: CPT

## 2024-08-09 PROCEDURE — 87081 CULTURE SCREEN ONLY: CPT

## 2024-08-09 PROCEDURE — 82374 ASSAY BLOOD CARBON DIOXIDE: CPT

## 2024-08-09 RX ORDER — CHLORHEXIDINE GLUCONATE 40 MG/ML
1 SOLUTION TOPICAL DAILY
Qty: 473 ML | Refills: 0 | Status: SHIPPED | OUTPATIENT
Start: 2024-08-09 | End: 2024-08-14

## 2024-08-09 RX ORDER — CHLORHEXIDINE GLUCONATE ORAL RINSE 1.2 MG/ML
15 SOLUTION DENTAL DAILY
Qty: 473 ML | Refills: 0 | Status: SHIPPED | OUTPATIENT
Start: 2024-08-09 | End: 2024-08-11

## 2024-08-09 ASSESSMENT — ENCOUNTER SYMPTOMS
GASTROINTESTINAL NEGATIVE: 1
CONSTITUTIONAL NEGATIVE: 1
NECK STIFFNESS: 1
EYES NEGATIVE: 1
ENDOCRINE NEGATIVE: 1
COUGH: 1
CARDIOVASCULAR NEGATIVE: 1
NECK MASS: 1
NEUROLOGICAL NEGATIVE: 1
TROUBLE SWALLOWING: 1

## 2024-08-09 ASSESSMENT — DUKE ACTIVITY SCORE INDEX (DASI)
CAN YOU DO HEAVY WORK AROUND THE HOUSE LIKE SCRUBBING FLOORS OR LIFTING AND MOVING HEAVY FURNITURE: NO
CAN YOU TAKE CARE OF YOURSELF (EAT, DRESS, BATHE, OR USE TOILET): YES
CAN YOU WALK A BLOCK OR TWO ON LEVEL GROUND: YES
CAN YOU DO MODERATE WORK AROUND THE HOUSE LIKE VACUUMING, SWEEPING FLOORS OR CARRYING GROCERIES: YES
CAN YOU PARTICIPATE IN STRENOUS SPORTS LIKE SWIMMING, SINGLES TENNIS, FOOTBALL, BASKETBALL, OR SKIING: NO
CAN YOU DO LIGHT WORK AROUND THE HOUSE LIKE DUSTING OR WASHING DISHES: YES
CAN YOU HAVE SEXUAL RELATIONS: YES
CAN YOU DO YARD WORK LIKE RAKING LEAVES, WEEDING OR PUSHING A MOWER: YES
CAN YOU PARTICIPATE IN MODERATE RECREATIONAL ACTIVITIES LIKE GOLF, BOWLING, DANCING, DOUBLES TENNIS OR THROWING A BASEBALL OR FOOTBALL: YES
CAN YOU CLIMB A FLIGHT OF STAIRS OR WALK UP A HILL: YES
CAN YOU RUN A SHORT DISTANCE: NO

## 2024-08-09 ASSESSMENT — PATIENT HEALTH QUESTIONNAIRE - PHQ9
SUM OF ALL RESPONSES TO PHQ9 QUESTIONS 1 & 2: 0
2. FEELING DOWN, DEPRESSED OR HOPELESS: NOT AT ALL
1. LITTLE INTEREST OR PLEASURE IN DOING THINGS: NOT AT ALL

## 2024-08-09 NOTE — PREPROCEDURE INSTRUCTIONS
Thank you for visiting [facility] ([facility]) today for your pre-procedure evaluation, you were seen by Dr. Casimiro Reyes [phone]     This summary includes instructions and information to aid you during your perioperative period.  Please read carefully. If you have any questions about your visit today, please call the number listed above.  If you become ill or have any changes to your health before your surgery, please contact your primary care provider and alert your surgeon.    Preparing for your Surgery       Exercises  Preoperative Deep Breathing Exercises  Why it is important to do deep breathing exercises before my surgery?  Deep breathing exercises strengthen your breathing muscles.  This helps you to recover after your surgery and decreases the chance of breathing complications.  How are the deep breathing exercises done?  Sit straight with your back supported.  Breathe in deeply and slowly through your nose. Your lower rib cage should expand and your abdomen may move forward.  Hold that breath for 3 to 5 seconds.  Breathe out through pursed lips, slowly and completely.  Rest and repeat 10 times every hour while awake.  Rest longer if you become dizzy or lightheaded.       Incentive Spirometer   You were provided with an incentive spirometer in [facility], please follow the below instructions.   You were not provided an incentive spirometer in [facility], please disregard the incentive spirometer instructions  What is an incentive spirometer?  An incentive spirometer is a device used before and after surgery to “exercise” your lungs.  It helps you to take deeper breaths to expand your lungs.  Below is an example of a basic incentive spirometer.  The device you receive may differ slightly but they all function the same.    Why do I need to use an incentive spirometer?  Using your incentive spirometer prepares your lungs for surgery and helps prevent lung problems after surgery.  How do I  use my incentive spirometer?  When you're using your incentive spirometer, make sure to breathe through your mouth. If you breathe through your nose, the incentive spirometer won't work properly. You can hold your nose if you have trouble.  If you feel dizzy at any time, stop and rest. Try again at a later time.  Follow the steps below:  Set up your incentive spirometer, expand the flexible tubing and connect to the outlet.  Sit upright in a chair or bed. Hold the incentive spirometer at eye level.   Put the mouthpiece in your mouth and close your lips tightly around it. Slowly breathe out (exhale) completely.  Breathe in (inhale) slowly through your mouth as deeply as you can. As you take a breath, you will see the piston rise inside the large column. While the piston rises, the indicator should move upwards. It should stay in between the 2 arrows (see Figure).  Try to get the piston as high as you can, while keeping the indicator between the arrows.   If the indicator doesn't stay between the arrows, you're breathing either too fast or too slow.  When you get it as high as you can, hold your breath for 10 seconds, or as long as possible. While you're holding your breath, the piston will slowly fall to the base of the spirometer.  Once the piston reaches the bottom of the spirometer, breathe out slowly through your mouth. Rest for a few seconds.  Repeat 10 times. Try to get the piston to the same level with each breath.  Repeat every hour while awake  You can carefully clean the outside of the mouthpiece with an alcohol wipe or soap and water.      Preoperative Brain Exercises    What are brain exercises?  A brain exercise is any activity that engages your thinking (cognitive) skills.    What types of activities are considered brain exercises?  Jigsaw puzzles, crossword puzzles, word jumble, memory games, word search, and many more.  Many can be found free online or on your phone via a mobile jael.    Why should I  do brain exercises before my surgery?  More recent research has shown brain exercise before surgery can lower the risk of postoperative delirium (confusion) which can be especially important for older adults.  Patients who did brain exercises for 5 to 10 hours the days before surgery, cut their risk of postoperative delirium in half up to 1 week after surgery.    Sit-to-Stand Exercise    What is the sit-to-stand exercise?  The sit-to-stand exercise strengthens the muscles of your lower body and muscles in the center of your body (core muscles for stability) helping to maintain and improve your strength and mobility.  How do I do the sit-to-stand exercise?  The goal is to do this exercise without using your arms or hands.  If this is too difficult, use your arms and hands or a chair with armrests to help slowly push yourself to the standing position and lower yourself back to the sitting position. As the movement becomes easier use your arms and hands less.    Steps to the sit-to-stand exercise  Sit up tall in a sturdy chair, knees bent, feet flat on the floor shoulder-width apart.  Shift your hips/pelvis forward in the chair to correctly position yourself for the next movement.  Lean forward at your hips.  Stand up straight putting equal weight on both feet.  Check to be sure you are properly aligned with the chair, in a slow controlled movement sit back down.  Repeat this exercise 10-15 times.  If needed you can do it fewer times until your strength improves.  Rest for 1 minute.  Do another 10-15 sit-to-stand exercises.  Try to do this in the morning and evening.        Instructions    Preoperative Fasting Guidelines    Why must I stop eating and drinking near surgery time?  With sedation, food or liquid in your stomach can enter your lungs causing serious complications  Food can increase nausea and vomiting  When do I need to stop eating and drinking before my surgery?      Do not eat any food or drink any liquids  after midnight the night before your surgery/procedure.  You may have sips of water to take medications.            Simple things you can do to help prevent blood clots     Blood clots are blockages that can form in the body's veins. When a blood clot forms in your deep veins, it may be called a deep vein thrombosis, or DVT for short. Blood clots can happen in any part of the body where blood flows, but they are most common in the arms and legs. If a piece of a blood clot breaks free and travels to the lungs, it is called a pulmonary embolus (PE). A PE can be a very serious problem.         Being in the hospital or having surgery can raise your chances of getting a blood clot because you may not be well enough to move around as much as you normally do.         Ways you can help prevent blood clots in the hospital       Wearing SCDs  SCDs stands for Sequential Compression Devices.   SCDs are special sleeves that wrap around your legs. They attach to a pump that fills them with air to gently squeeze your legs every few minutes.  This helps return the blood in your legs to your heart.   SCDs should only be taken off when walking or bathing. SCDs may not be comfortable, but they can help save your life.              Pump SCD leg sleeves  Wearing compression stockings - if your doctor orders them. These special snug-fitting stockings gently squeeze your legs to help blood flow.       Walking. Walking helps move the blood in your legs.   If your doctor says it is ok, try walking the halls at least   5 times a day. Ask us to help you get up, so you don't fall.      Taking any blood-thinning medicines your doctor orders.              Ways you can help prevent blood clots at home         Wearing compression stockings - if your doctor orders them.   Walking - to help move the blood in your legs.    Taking any blood-thinning medicines your doctor orders.      Signs of a blood clot or PE    Tell your doctor or nurse right away  if you have any of the problems listed below.         If you are at home, seek medical care right away. Call 911 for chest pain or problems breathing.            Signs of a blood clot (DVT) - such as pain, swelling, redness, or warmth in your arm or legs.  Signs of a pulmonary embolism (PE) - such as chest pain or feeling short of breath      Tobacco and Alcohol;  Do not drink alcohol or smoke within 24 hours of surgery.  It is best to quit smoking for as long as possible before any surgery or procedure.    Quitting Smoking; Recognizing Dangerous Situations:  1-Alcohol use during the first month after quitting, 2-Being around smoke or someone who smokes 3-Times situation routinely smoked  4-Triggers-car, breaks, coffee, when awakening, social events  Coping Skills: 1-Learning new ways to manage stress 2-Exercising 3-Relaxation breathing   4-Change routines 5-Distraction techniques    Websites:  Smoke-Free - offers free text messages and an jael to help you quit. Info includes eating and mood issues that may come with quitting. There is a Live Helpline to talk to an expert. Go to smokefree.gov; Become an Ex-Smoker - the free EX Plan is based on scientific research and useful advice from ex-smokers. It isn't just about quitting smoking.  It's about re-learning life without cigarettes using a 3-step program.  Go to becomeanex.org   Centers for Disease Control offer many suggestions for helping you quit. Includes a Quit Guide and real-life stories. There are sections for specific groups such as LGBT, , different ethnic groups, and pregnant women.  Go to cdc.gov/tobacco/campaign/tips    Other Resources:  [place] Tobacco Quit Line - call 1-800-QUIT-NOW or 1-609.204.2450.  [address] [facility] 2-1-1 - to find local programs and resources. Call 211 or go to 211.org.  [facility] Tobacco Cessation Program - call 182-211-8559.  American Lung Association offers classes for quitting smoking. Some places may charge a fee. For  a list of classes, go to lung.org or call 0-465-LUNGSazneoDr. [facility].     Some things to think about:; The health benefits of quitting smoking can help most of the major parts of your body. There is no safe amount of cigarette smoke. Quitting smoking can add years to your life. When you quit, you'll also protect your loved ones from dangerous secondhand smoke. Make a plan, join a support group, and talk to your physician to assist in quitting smoking.                                                                                                              , Quitting Alcohol; Things to think about: Alcohol use disorder is a health condition that can improve with treatment. Each person is unique. A treatment that is good for one person may not be a good fit for someone else. Simply knowing the different options can be an important first step. Treatment can be inpatient, where you stay at a facility, or outpatient, where you stay at home. Your insurance plan may cover some treatment costs.   Resources:    NIAAA Alcohol Treatment Navigator - from the National Alvin on Alcohol Abuse and  Alcoholism. Offers an online tool to help you find the right treatment for you - near you. Go to alcoholtreatment.niaaa.nih.gov.  [facility] National Hotline  - from the Substance Abuse and Mental Health Services Administration. A free, confidential 24-hour treatment referral and information service for anyone facing mental and/or substance use disorders. Go to findtreatment.gov or call 1-689-413SazneoHELP (2969).    Alcoholics Anonymous (AA) - offers group meetings and a 12-step program to anyone who has a drinking problem. Go to aa.org or call 737-264-1126    [facility] 2-1-1 - to find local programs and resources. Call 211 or go to 211.org    Other people you can talk to about treatment options include your primary care doctor, health insurance plan, local health department, or employee assistance program. You can also ask to talk to a hospital  ".          Other Instructions  Why did I have my nose, under my arms, and groin swabbed? The purpose of the swab is to identify Staphylococcus aureus inside your nose or on your skin.  The swab was sent to the laboratory for culture.  A positive swab/culture for Staphylococcus aureus is called colonization or carriage.   What is Staphylococcus aureus? Staphylococcus aureus, also known as \"staph\", is a germ found on the skin or in the nose of healthy people.  Sometimes Staphylococcus aureus can get into the body and cause an infection.  This can be minor (such as pimples, boils, or other skin problems).  It might also be serious (such as a blood infection, pneumonia, or a surgical site infection). What is Staphylococcus aureus colonization or carriage? Colonization or carriage means that a person has the germ but is not sick from it.  These bacteria can be spread on the hands or when breathing or sneezing. How is Staphylococcus aureus spread? It is most often spread by close contact with a person or item that carries it. What happens if my culture is positive for Staphylococcus aureus? Your doctor/medical team will use this information to guide any antibiotic treatment which may be necessary.  Regardless of the culture results, we will clean the inside of your nose with a betadine swab just before you have your surgery. Will I get an infection if I have Staphylococcus aureus in my nose or on my skin? Anyone can get an infection with Staphylococcus aureus.  However, the best way to reduce your risk of infection is to follow the instructions provided to you for the use of your CHG soap and dental rinse.  , Body Wash; What is a home preoperative antibacterial shower? This shower is a way of cleaning the skin with a germ-killing solution before surgery.  The solution contains chlorhexidine, commonly known as CHG.  CHG is a skin cleanser with germ-killing ability.  Let your doctor know if you are allergic to " chlorhexidine. Why do I need to take a preoperative antibacterial shower? Skin is not sterile.  It is best to try to make your skin as free of germs as possible before surgery.  Proper cleansing with a germ-killing soap before surgery can lower the number of germs on your skin.  This helps to reduce the risk of infection at the surgical site.  Following the instructions listed below will help you prepare your skin for surgery.   How do I use the solution? Steps:  Begin using your CHG soap 5 days before your scheduled surgery on ___________.   First, wash and rinse your hair using the CHG soap. Keep CHG soap away from ear canals and eyes.  Rinse completely, do not condition.  Hair extensions should be removed. , Oral/Dental Rinse: What is oral/dental rinse?  It is a mouthwash. It is a way of cleaning the mouth with a germ-killing solution before your surgery.  The solution contains chlorhexidine, commonly known as CHG. It is used inside the mouth to kill a bacteria known as Staphylococcus aureus.  Let your doctor know if you are allergic to Chlorhexidine. Why do I need to use CHG oral/dental rinse? The CHG oral/dental rinse helps to kill a bacteria in your mouth known as Staphylococcus aureus.  This reduces the risk of infection at the surgical site.  Using your CHG oral/dental rinse STEPS: Use your CHG oral/dental rinse after you brush your teeth the night before (at bedtime) and the morning of your surgery.  Follow all directions on your prescription label.  Use the cap on the container to measure 15 ml.  Swish (gargle if you can) the mouthwash in your mouth for at least 30 seconds, (do not swallow) and spit out.  After you use your CHG rinse, do not rinse your mouth with water, drink or eat.  Please refer to the prescription label for the appropriate time to resume oral intake What side effects might I have using the CHG oral/dental rinse? CHG rinse will stick to plaque on the teeth.  Brush and floss just before  use.  Teeth brushing will help avoid staining of plaque during use.     The Week before Surgery        Seven days before Surgery  Check your [facility] medication instructions  Do the exercises provided to you by [facility]   Arrange for a responsible, adult licensed  to take you home after surgery and stay with you for 24 hours.  You will not be permitted to drive yourself home if you have received any anesthetic/sedation  Six days before surgery  Check your [facility] medication instructions  Do the exercises provided to you by [facility]   Start using Chlorhexidene (CHG) body wash if prescribed  Five days before surgery  Check your [facility] medication instructions  Do the exercises provided to you by [facility]   Continue to use CHG body wash if prescribed  Three days before surgery  Check your [facility] medication instructions  Do the exercises provided to you by [facility]   Continue to use CHG body wash if prescribed  Two days before surgery  Check your [facility] medication instructions  Do the exercises provided to you by [facility]   Continue to use CHG body wash if prescribed    The Day before Surgery       Check your [facility] medication and all other [facility] instructions including when to stop eating and drinking  You will be called with your arrival time for surgery in the late afternoon.  If you do not receive a call please reach out to your surgeon's office.  Do not smoke or drink 24 hours before surgery  Prepare items to bring with you to the hospital  Shower with your chlorhexidine wash if prescribed  Brush your teeth and use your chlorhexidine dental rinse if prescribed    The Day of Surgery       Check your [facility] medication instructions  Ensure you follow the instructions for when to stop eating and drinking  Shower, if prescribed use CHG.  Do not apply any lotions, creams, moisturizers, perfume or deodorant  Brush your teeth and use your CHG dental rinse if prescribed  Wear loose comfortable clothing  Avoid make-up  Remove  jewelry and piercings, consider professional  piercing removal with a plastic spacer if needed  Bring photo ID and Insurance card  Bring an accurate medication list that includes medication dose, frequency and allergies  Bring a copy of your advanced directives (will, health care power of )  Bring any devices and controllers as well as medical devices you have been provided with for surgery (CPAP, slings, braces, etc.)  Dentures, eyeglasses, and contacts will be removed before surgery, please bring cases for contacts or glasses

## 2024-08-09 NOTE — CPM/PAT H&P
"CPM/PAT Evaluation       Name: Melvi Sanchez (Melvi Sanchez \"Gretchen\")  /Age: 1960/64 y.o.     In-Person       64 y.o.  female  scheduled for left neck dissection and flap on  with Dr. Brownlee for  SCC H/N.  PMHX includes COPD, hypothyroidism, bladder incontinence s/p stim placement.  Presents to CPM today for perioperative risk stratification and optimization    Past Medical History:   Diagnosis Date    Acute hypoxemic respiratory failure (Multi) 2023    Breast calcification, left     Dysphagia     Essential (primary) hypertension 2018    HTN (hypertension), benign    Fibroadenoma of left breast     H/O malignant neoplasm of tonsil     s/p XRT and chemotherapy completed .    Hypothyroidism     Morbid obesity (Multi) 2023    Nausea and/or vomiting 2023    OAB (overactive bladder)     Pharyngitis 2023    Pneumonia 2023    Restless legs syndrome     Sinusitis 2023       Past Surgical History:   Procedure Laterality Date    BREAST BIOPSY Left     benign FA     SECTION, LOW TRANSVERSE      CHOLECYSTECTOMY  2014    Cholecystectomy    COLONOSCOPY      HYSTERECTOMY  2014    Hysterectomy    MOUTH SURGERY  2014    Oral Surgery Tooth Extraction       Patient  has no history on file for sexual activity.    Family History   Problem Relation Name Age of Onset    Emphysema Mother      COPD Mother      Hyperlipidemia Sister      Breast cancer Paternal Grandmother         Allergies   Allergen Reactions    Duloxetine Hallucinations     Pt states she doesn't think she has an allergy just a bad reaction when mixed with morphine       Prior to Admission medications    Medication Sig Start Date End Date Taking? Authorizing Provider   levothyroxine (Synthroid, Levoxyl) 75 mcg tablet TAKE 1 TABLET BY MOUTH EVERY DAY 3/7/24  Yes Robin Brownlee MD   umeclidinium-vilanteroL (Anoro Ellipta) 62.5-25 mcg/actuation blister with device Inhale 1 " puff once daily. 1/2/24 12/27/24 Yes Antoinette Matthews, APRN-CNP   PARoxetine (Paxil) 30 mg tablet Take 1 tablet (30 mg) by mouth once daily in the morning. 12/1/23 5/29/24  Alton King DO   rOPINIRole (Requip) 1 mg tablet Take 2 tablets (2 mg) by mouth once daily at bedtime. 7/6/23 7/5/24  Alton King DO   solifenacin (VESIcare) 5 mg tablet Take 1 tablet (5 mg) by mouth once daily. Swallow tablet whole; do not crush, chew, or split.  Patient not taking: Reported on 8/9/2024 2/22/24 2/21/25  Elvin Loera MD   varenicline (Chantix) 1 mg tablet Take 1 tablet (1 mg) by mouth once daily. 1/5/24 7/3/24  Alton King DO        PAT ROS:   Constitutional:   neg    Neuro/Psych:   neg    Eyes:   neg    Ears:   Nose:   Mouth:   Throat:    dysphagia  Neck:    neck stiffness   neck masses  Cardio:   neg    Respiratory:    cough  Endocrine:   neg    GI:   neg    :   neg    Musculoskeletal:   Hematologic:   neg    Skin:  neg        Physical Exam  Vitals and nursing note reviewed.   Constitutional:       Appearance: Normal appearance.   HENT:      Head: Normocephalic and atraumatic.      Mouth/Throat:      Mouth: Mucous membranes are moist.      Pharynx: Oropharynx is clear.   Eyes:      Extraocular Movements: Extraocular movements intact.      Conjunctiva/sclera: Conjunctivae normal.      Pupils: Pupils are equal, round, and reactive to light.   Cardiovascular:      Rate and Rhythm: Normal rate and regular rhythm.      Pulses: Normal pulses.      Heart sounds: Normal heart sounds.   Pulmonary:      Effort: Pulmonary effort is normal.      Breath sounds: Normal breath sounds.   Abdominal:      General: Abdomen is flat. Bowel sounds are normal.      Palpations: Abdomen is soft.   Musculoskeletal:         General: Normal range of motion.      Cervical back: Normal range of motion and neck supple.   Skin:     General: Skin is warm and dry.   Neurological:      General: No focal deficit present.      Mental Status: She  is alert and oriented to person, place, and time.   Psychiatric:         Mood and Affect: Mood normal.         Behavior: Behavior normal.          PAT AIRWAY:   Airway:     Mallampati::  III    TM distance::  >3 FB    Neck ROM::  Limited      Visit Vitals  /78   Pulse 62   Temp 36.5 °C (97.7 °F)       DASI Risk Score    No data to display       Caprini DVT Assessment    No data to display       Modified Frailty Index    No data to display       CHADS2 Stroke Risk  Current as of today        N/A 3 to 100%: High Risk   2 to < 3%: Medium Risk   0 to < 2%: Low Risk     Last Change: N/A          This score determines the patient's risk of having a stroke if the patient has atrial fibrillation.        This score is not applicable to this patient. Components are not calculated.          Revised Cardiac Risk Index    No data to display       Apfel Simplified Score    No data to display       Risk Analysis Index Results This Encounter    No data found in the last 1 encounters.       Stop Bang Score      Flowsheet Row Most Recent Value   Do you snore loudly? 0   Do you often feel tired or fatigued after your sleep? 0   Has anyone ever observed you stop breathing in your sleep? 0   Do you have or are you being treated for high blood pressure? 0   Recent BMI (Calculated) 27.5   Is BMI greater than 35 kg/m2? 0=No   Age older than 50 years old? 1=Yes   Is your neck circumference greater than 17 inches (Male) or 16 inches (Female)? 1   Gender - Male 0=No   STOP-BANG Total Score 2          - EK22  Sinus rhythm  Baseline wander in lead(s) V6     - PFTs: 11/10/23  Impression   The patient underwent a 6 minute walk.  The patient.  If her 750 feet.  The resting room air saturation is 98%.  At the end of the test, the lowest recorded saturation was 96%.  The patient had no dyspnea.      - CT Chest: 24  IMPRESSION:  1. Emphysema.  2. Inferior lingular dense consolidation and a smaller semi-solid  posterolateral right  upper lobe abnormality may be infectious, but  should at least be followed in 3 months to ensure resolution and rule  out a neoplastic etiology.  3. A 1.5 cm right hilar lymph node should also be followed.  4. Coronary artery calcification.    No results found for this or any previous visit (from the past 24 hour(s)).     Assessment & Plan:    64 y.o.  female  scheduled for left neck dissection and flap on 8/26 with Dr. Brownlee for  SCC H/N.  PMHX includes COPD, hypothyroidism, bladder incontinence s/p stim placement.  Presents to [facility] today for perioperative risk stratification and optimization    Neuro:  RLS -  stable, managed with ropinirole    Patient is at increased risk for perioperative CVA secondary to  increased age, operative time > 2.5 hours    HEENT:  H/N SCC s/p chemo/rads    No further preoperative testing/intervention indicated at this time.    Cardiovascular:  No CV diagnosis or significant findings on chart review or clinical presentation and evaluation. No further preoperative testing or intervention is indicated at this time.  METS: 5.7  RCRI: 0 points, 3.9%  risk for postoperative MACE   MASOOD: 0.5% risk for 30 day postoperative MACE  EKG - 11/2022 - NSR    Pulmonary:  COPD - stable, managed with Anoro ellipta inhaler. >35 pack year smoking history. Currently smoking ~1ppd. Discussed smoking cessation, education provided. Encouraged utilizing nicotine replacement products that she already has at home.     Stop Bang score is 2 placing patient at low risk for BAUTISTA  ARISCAT: 26-44 points, 13.3% risk of in-hospital postoperative pulmonary complication  PRODIGY: Moderate risk for opioid induced respiratory depression  Smoking cessation discussed with patient, patient also provided cessation education/hotline handout, [facility] toilet education discussed, patient also provided deep breathing exercises and incentive spirometry educational handout    Renal:  Overactive bladder s/p stimulator placement -  stable. Instructed patient to bring remote to deactivate the stimulator prior to surgery.     No renal diagnosis, however patient is at increase risk for perioperative renal complications secondary to  Age equal to or greater than 56, COPD    Endocrine:  Hypothyroidism - stable, managed with synthroid.     No further testing or intervention is indicated at this time.    Hematologic:  No hematologic diagnosis or significant findings on chart review or clinical presentation and evaluation. No further testing or intervention is indicated at this time.   Caprini Score 6, patient at High risk for perioperative DVT.  Patient provided with VTE education/handout.    Gastrointestinal:   No GI diagnosis or significant findings on chart review or clinical presentation and evaluation.   Eat-10 score 19  Apfel 2    Infectious disease:   No infectious diagnosis or significant findings on chart review or clinical presentation and evaluation.   Prescription provided for CHG body wash and dental rinse. CHG use instructions reviewed and provided to patient.  Staph screen: MRSA    Musculoskeletal:   No diagnosis or significant findings on chart review or clinical presentation and evaluation.     Anesthesia/Airway:  No anesthesia complications      Medication instructions and NPO guidelines reviewed with the patient.  All questions or concerns discussed and addressed.      Patient seen and staffed with Dr. Ornelas

## 2024-08-10 LAB
HOLD SPECIMEN: NORMAL
STAPHYLOCOCCUS SPEC CULT: NORMAL

## 2024-08-11 LAB — BACTERIA UR CULT: ABNORMAL

## 2024-08-12 DIAGNOSIS — N39.0 URINARY TRACT INFECTION WITHOUT HEMATURIA, SITE UNSPECIFIED: ICD-10-CM

## 2024-08-12 RX ORDER — CIPROFLOXACIN 500 MG/1
500 TABLET ORAL 2 TIMES DAILY
Qty: 10 TABLET | Refills: 0 | Status: SHIPPED | OUTPATIENT
Start: 2024-08-12 | End: 2024-08-17

## 2024-08-12 NOTE — PROGRESS NOTES
Spoke to patient.She is aware her urinalysis showed UTI.  Antibiotic submitted as well as order for repeat urinalysis.

## 2024-08-15 ENCOUNTER — HOSPITAL ENCOUNTER (OUTPATIENT)
Dept: RADIOLOGY | Facility: HOSPITAL | Age: 64
Discharge: HOME | End: 2024-08-15
Payer: COMMERCIAL

## 2024-08-15 DIAGNOSIS — R13.10 DYSPHAGIA, UNSPECIFIED TYPE: ICD-10-CM

## 2024-08-15 PROCEDURE — 2500000005 HC RX 250 GENERAL PHARMACY W/O HCPCS: Performed by: OTOLARYNGOLOGY

## 2024-08-15 PROCEDURE — 74230 X-RAY XM SWLNG FUNCJ C+: CPT

## 2024-08-15 PROCEDURE — 92611 MOTION FLUOROSCOPY/SWALLOW: CPT | Mod: GN

## 2024-08-15 NOTE — PROGRESS NOTES
"Speech-Language Pathology    Outpatient Modified Barium Swallow Study    Patient Name: Melvi Sanchez \"Gretchen\"  MRN: [id number]  : 1960  Today's Date: 08/15/24  Time Calculation  Start Time: 1345  Stop Time: 1410  Time Calculation (min): 25 min          Modified Barium Swallow Study completed. Informed verbal consent obtained prior to completion of exam. Trials of thin, nectar/mildly thick liquid, honey/moderately thick liquid, puree, regular solids and barium tablet with thin liquid were given.     SLP: Ann Dalton SLP   Contact info: [employer] secure chat  Fluoroscopy Time: 2:11      Reason for Referral: Concern for aspiration d/t hx of head and neck CA  Patient Hx: COPD, post nasal drip, BAUTISTA, lymphedema, GERD, depression, Tonsil CA, new lesion on L neck and R chest; recent L neck dissection; lymphadenectomy  Respiratory Status: Room air  Current diet: IDDSI 7/regular solids and IDDSI 0/thin liquids     Pain:  Pain Scale: 0-10  Ratin    FINAL SPEECH RECOMMENDATIONS    DIET:   Per the results of today's MBSS, patient to continue baseline diet of regular consistencies and thin liquids following swallow strategies listed below:    STRATEGIES:  - Small, single sips  - Alternate consistencies  - Add moisture to dry foods  - Upright for all PO intake  - Complete oral care frequently throughout the day  - Began daily oropharyngeal exercises regiment      Plan:  Treatment/Interventions: Pharyngeal exercises, Patient/family education, Bolus trials, Compensatory strategy training, Diet tolerance/advancement  SLP Plan: Skilled SLP warranted  SLP Frequency: To be determined by treating SLP in outpatient setting  Duration: To be determined by treating SLP in outpatient setting    Discussed POC: Patient  Discussed Risks/Benefits: Yes  Patient/Caregiver Agreeable: Yes    Short term goals established 08/15/24:   - Pt will complete a series of jaw exercises/stretches independent of cues 3x a day, 7 days a week; to " reduced the effects of radiation on the jaw muscles necessary to consume PO safely and efficiently.   - Pt will complete a series of neck stretches independent of cues, 5 times a day for 7 days a week; to reduce the effects of radiation on the muscles necessary for optimal head position for safe and efficient PO intake.  - Patient  will complete respiratory muscle strength training (RMST) in order to improve cough strength and airway clearance in the event of penetration/aspiration occurrence.       Long term goals 08/15/24:   Patient will tolerate the least restrictive diet without overt difficulty or further pulmonary compromise by time of discharge from OP ST.    Education Provided: Results and recommendations per MBSS, with video review; recommendations and POC at this time. Verbal understanding and agreement given on all accounts.     Treatment Provided Today: N/A    Additional consult suggested: N/A    Repeat study/ dc plan: prn with change in status       Mechanics of the Swallow Summary:  ORAL PHASE:  Lip Closure - No labial escape/anterior loss of bolus   Tongue Control During Bolus Hold - Cohesive bolus between tongue to palatal seal   Bolus prep/mastication - Timely and efficient mastication skills   Bolus transport/lingual motion - Brisk tongue motion for A-P movement of the bolus   Oral residue - Residue collection on oral structure     PHARYNGEAL PHASE:  Initiation of pharyngeal swallow - Bolus head at pit of pyriforms   Soft palate elevation - No bolus between soft palate/pharyngeal wall   Laryngeal elevation - Partial superior movement of thyroid cartilage and/or partial approximation of arytenoids to epiglottic petiole   Anterior hyoid excursion - Partial anterior movement   Epiglottic movement - Partial inversion  Laryngeal vestibule closure - Incomplete - narrow column of air/contrast in laryngeal vestibule   Pharyngeal stripping wave - Complete  Pharyngeal contraction (A/P view) -  Complete  Pharyngoesophageal segment opening - Partial distension/partial duration with partial obstruction of flow of bolus   Tongue base retraction - Narrow column of contrast or air between tongue base and pharyngeal wall   Pharyngeal residue - Collection of residue within or on the pharyngeal structures     ESOPHAGEAL PHASE:  Esophageal clearance - Esophageal retention with retrograde flow below the pharyngoesophageal segment       SLP Impressions with Severity Rating:   Pt presents with mild oropharyngeal dysphagia upon completion of modified barium swallow study this date. Swallowing physiology is detailed above. Impairments most impacting swallowing safety and efficiency include diminished airway protection. Patient demonstrated trace laryngeal penetration of thin liquids with laryngeal residue. No further penetration was observed for any other consistency, and no aspiration was visualized during study. Patient demonstrated mild oral and pharyngeal residue that was reduced with double swallow and liquid rinse.    Strategies attempted- Attempted the following strategies without improvement in swallow safety/efficiency: effortful swallow, chin tuck. Airway protection improved with verbal cue for small, single sips.       OUTCOME MEASURES:  Functional Oral Intake Scale  Functional Oral Intake Scale: Level 7        total oral diet with no restrictions       Eating Assessment Tool (EAT-10)   0=No problem, 1=Mild problem, 2=Mild to moderate problem, 3=Moderate problem, 4=Severe problem    EAT 10  My swallowing problem has caused me to lose weight.: 0  My swallowing problem interferes with my ability to go out for meals.: 2  Swallowing liquids takes extra effort.: 2  Swallowing solids takes extra effort.: 4  Swallowing pills takes extra effort.: 2  Swallowing is painful: 3  The pleasure of eating is affected by my swallowing.: 4  When I swallow food sticks in my throat.: 4  I cough when I eat.: 4  Swallowing is  stressful: 3  EAT-10 TOTAL SCORE:: 28    A total score of 3 or above may indicate difficulty with swallowing safely and/or efficiently      Rosenbek's Penetration Aspiration Scale  Thin Liquids: 3. PENETRATION with LOW ASPIRATION risk - contrast remains above vocal cords, visible residue]   Nectar Thick Liquids: 1. NO ASPIRATION & NO PENETRATION - no aspiration, contrast does not enter airway  Honey Thick Liquids: 1. NO ASPIRATION & NO PENETRATION - no aspiration, contrast does not enter airway  Puree: 1. NO ASPIRATION & NO PENETRATION - no aspiration, contrast does not enter airway  Solids: 1. NO ASPIRATION & NO PENETRATION - no aspiration, contrast does not enter airway

## 2024-08-20 ENCOUNTER — LAB (OUTPATIENT)
Dept: LAB | Facility: LAB | Age: 64
End: 2024-08-20
Payer: COMMERCIAL

## 2024-08-20 DIAGNOSIS — N39.0 URINARY TRACT INFECTION WITHOUT HEMATURIA, SITE UNSPECIFIED: ICD-10-CM

## 2024-08-20 LAB
APPEARANCE UR: CLEAR
BILIRUB UR STRIP.AUTO-MCNC: NEGATIVE MG/DL
COLOR UR: COLORLESS
GLUCOSE UR STRIP.AUTO-MCNC: NORMAL MG/DL
KETONES UR STRIP.AUTO-MCNC: NEGATIVE MG/DL
LEUKOCYTE ESTERASE UR QL STRIP.AUTO: NEGATIVE
NITRITE UR QL STRIP.AUTO: NEGATIVE
PH UR STRIP.AUTO: 5.5 [PH]
PROT UR STRIP.AUTO-MCNC: NEGATIVE MG/DL
RBC # UR STRIP.AUTO: NEGATIVE /UL
SP GR UR STRIP.AUTO: 1.01
UROBILINOGEN UR STRIP.AUTO-MCNC: NORMAL MG/DL

## 2024-08-20 PROCEDURE — 81003 URINALYSIS AUTO W/O SCOPE: CPT

## 2024-08-21 LAB — HOLD SPECIMEN: NORMAL

## 2024-08-22 ENCOUNTER — APPOINTMENT (OUTPATIENT)
Dept: PRIMARY CARE | Facility: CLINIC | Age: 64
End: 2024-08-22
Payer: COMMERCIAL

## 2024-08-22 VITALS
HEART RATE: 70 BPM | WEIGHT: 159 LBS | SYSTOLIC BLOOD PRESSURE: 120 MMHG | OXYGEN SATURATION: 95 % | DIASTOLIC BLOOD PRESSURE: 79 MMHG | BODY MASS INDEX: 28.17 KG/M2

## 2024-08-22 DIAGNOSIS — F17.210 TOBACCO DEPENDENCE DUE TO CIGARETTES: ICD-10-CM

## 2024-08-22 DIAGNOSIS — G25.81 RESTLESS LEGS SYNDROME: ICD-10-CM

## 2024-08-22 DIAGNOSIS — Z01.818 PRE-OP EXAMINATION: Primary | ICD-10-CM

## 2024-08-22 DIAGNOSIS — E03.8 OTHER SPECIFIED HYPOTHYROIDISM: ICD-10-CM

## 2024-08-22 DIAGNOSIS — E78.00 PURE HYPERCHOLESTEROLEMIA: ICD-10-CM

## 2024-08-22 LAB
CHOLEST SERPL-MCNC: 146 MG/DL (ref 0–199)
CHOLESTEROL/HDL RATIO: 3.5
HDLC SERPL-MCNC: 42.1 MG/DL
LDLC SERPL CALC-MCNC: 88 MG/DL
NON HDL CHOLESTEROL: 104 MG/DL (ref 0–149)
T4 FREE SERPL-MCNC: 0.91 NG/DL (ref 0.61–1.12)
TRIGL SERPL-MCNC: 81 MG/DL (ref 0–149)
TSH SERPL-ACNC: 5.22 MIU/L (ref 0.44–3.98)
VLDL: 16 MG/DL (ref 0–40)

## 2024-08-22 PROCEDURE — 80061 LIPID PANEL: CPT

## 2024-08-22 PROCEDURE — 84443 ASSAY THYROID STIM HORMONE: CPT

## 2024-08-22 PROCEDURE — 99242 OFF/OP CONSLTJ NEW/EST SF 20: CPT

## 2024-08-22 PROCEDURE — 84439 ASSAY OF FREE THYROXINE: CPT

## 2024-08-22 RX ORDER — VARENICLINE TARTRATE 1 MG/1
1 TABLET, FILM COATED ORAL DAILY
Qty: 30 TABLET | Refills: 5 | Status: SHIPPED | OUTPATIENT
Start: 2024-08-22 | End: 2025-02-18

## 2024-08-22 RX ORDER — ROPINIROLE 1 MG/1
2 TABLET, FILM COATED ORAL NIGHTLY
Qty: 180 TABLET | Refills: 3 | Status: SHIPPED | OUTPATIENT
Start: 2024-08-22 | End: 2025-08-22

## 2024-08-22 RX ORDER — IBUPROFEN 200 MG
1 TABLET ORAL EVERY 24 HOURS
Qty: 30 PATCH | Refills: 0 | Status: SHIPPED | OUTPATIENT
Start: 2024-08-22 | End: 2024-08-23 | Stop reason: DRUGHIGH

## 2024-08-22 NOTE — PROGRESS NOTES
"Subjective   Patient ID: Melvi Sanchez \"Gretchen\" is a 64 y.o. female who presents for Pre-op Exam (Cancer of Lymph node Lt side of neck surgery Aug 26, Dr Rice (fax to [phone]) and wants to quit smoking).  KERLINE Ogden presents for pre-op exam for lymphadenectomy and dissection of the L side of her neck that she is having done on 8/26/24.    She states that she has had a cold the last week and a half, it is getting better.    She also wants to stop smoking, wants to try anything.  She quit smoking a year ago, was doing medication and the patch, she did have success with this. However, she stopped the medication and patch to do laser therapy for smoking cessation and ended up re-starting smoking 4 days later.    She had pre-admission testing labs done already. She was told she had a UTI and was put on antibiotics for this. She denies symptoms.    Revised Cardiac Index:   How did you do previously with anesthesia? Did fine, no concerns   Can you walk up 2 flights of stairs without having chest pain? Yes  MI hx: NO  CVA hx: NO  CKD/Cr >2.0: NO  CHF: NO  DM using insulin: NO  High risk surgery: NO    Class I Risk: 0 Points - 3.9% 30-day risk of death, MI, or cardiac arrest     Surgical Risk Assessment:   Prior Anesthesia: she had prior anesthesia, no prior adverse reaction.   Lifestyle Factors: rare alcohol use, tobacco use a little over a pack a day has smoked for 49 years, and denies illegal drug use.   Symptoms: no easy bleeding, no easy bruising, no chest pain, cough is improving from the cold, no dyspnea, no edema, no palpitations and no wheezing.     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)    Nasal congestion    Nicotine dependence, cigarettes, uncomplicated    Other speech disturbances    Otitis media, chronic    Postnasal drip    Pure hypercholesterolemia    Restless legs syndrome    Snores    Salivary gland inflammation    Suspected sleep apnea    Tonsil cancer (Multi)    Urinary frequency    Voice disturbance    Abdominal pain    Anxiety    Breast calcification, left    Fibroadenoma of left breast    Hoarseness    Hypokalemia    Hypomagnesemia    Localized swelling, mass, and lump of head    Neoplasm related pain    OAB (overactive bladder)    Tobacco use disorder    Neck swelling    Pneumonia of left lower lobe due to infectious organism    Dysphagia    Sleep apnea    Adenomatous polyp of sigmoid colon    Benign breast cyst in female, left    Nasal sore     Past Surgical History:   Procedure Laterality Date    BREAST BIOPSY Left     benign FA     SECTION, LOW TRANSVERSE      CHOLECYSTECTOMY  2014    Cholecystectomy    COLONOSCOPY      HYSTERECTOMY  2014    Hysterectomy    MOUTH SURGERY  2014    Oral Surgery Tooth Extraction      Past Medical History:   Diagnosis Date    Acute hypoxemic respiratory failure (Multi) 2023    Breast calcification, left     Dysphagia     Essential (primary) hypertension 2018    HTN (hypertension), benign    Fibroadenoma of left breast     H/O malignant neoplasm of tonsil     s/p XRT and chemotherapy completed .    Hypothyroidism     Morbid obesity (Multi) 2023    Nausea and/or vomiting 2023    OAB (overactive bladder)     Pharyngitis 2023    Pneumonia 2023    Restless legs syndrome     Sinusitis 2023     Social History     Tobacco Use    Smoking status:  Every Day     Current packs/day: 1.00     Average packs/day: 1 pack/day for 40.2 years (40.2 ttl pk-yrs)     Types: Cigarettes     Start date: 11/30/2023    Smokeless tobacco: Never   Substance Use Topics    Alcohol use: Yes     Alcohol/week: 1.0 standard drink of alcohol     Types: 1 Glasses of wine per week    Drug use: Yes     Frequency: 1.0 times per week     Types: Marijuana     Comment: use within past week        Review of Systems  10 point review of systems performed and is negative except as noted in the HPI.    Allergies   Allergen Reactions    Duloxetine Hallucinations     Pt states she doesn't think she has an allergy just a bad reaction when mixed with morphine       Current Outpatient Medications:     levothyroxine (Synthroid, Levoxyl) 75 mcg tablet, TAKE 1 TABLET BY MOUTH EVERY DAY, Disp: 30 tablet, Rfl: 6    umeclidinium-vilanteroL (Anoro Ellipta) 62.5-25 mcg/actuation blister with device, Inhale 1 puff once daily., Disp: 30 each, Rfl: 11    latanoprost (Xalatan) 0.005 % ophthalmic solution, Administer 1 drop into the left eye once daily at bedtime., Disp: , Rfl:     melatonin 10 mg tablet, Take 1 tablet (10 mg) by mouth once daily at bedtime., Disp: , Rfl:     nicotine (Nicoderm CQ) 21 mg/24 hr patch, Place 1 patch over 24 hours on the skin once every 24 hours., Disp: 30 patch, Rfl: 0    rOPINIRole (Requip) 1 mg tablet, Take 2 tablets (2 mg) by mouth once daily at bedtime., Disp: 180 tablet, Rfl: 3    varenicline (Chantix) 1 mg tablet, Take 1 tablet (1 mg) by mouth once daily., Disp: 30 tablet, Rfl: 5     Objective   /79   Pulse 70   Wt 72.1 kg (159 lb)   SpO2 95%   BMI 28.17 kg/m²     Physical Exam    Assessment/Plan   Problem List Items Addressed This Visit       Hypothyroidism    Relevant Orders    TSH with reflex to Free T4 if abnormal (Completed)    Thyroxine, Free (Completed)    Pure hypercholesterolemia    Relevant Orders    Lipid Panel (Completed)    Restless legs syndrome     Relevant Medications    rOPINIRole (Requip) 1 mg tablet     Other Visit Diagnoses       Pre-op examination    -  Primary    Tobacco dependence due to cigarettes        Relevant Medications    varenicline (Chantix) 1 mg tablet    nicotine (Nicoderm CQ) 21 mg/24 hr patch          Gretchen is cleared for surgery   Reviewed labs done by pre-admission testing - wnl     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 Patient (and/or caretaker of patient if present) stated all questions were answered, and they voiced understanding of instructions.

## 2024-08-23 ENCOUNTER — TELEPHONE (OUTPATIENT)
Dept: PRIMARY CARE | Facility: CLINIC | Age: 64
End: 2024-08-23
Payer: COMMERCIAL

## 2024-08-23 RX ORDER — LATANOPROST 50 UG/ML
1 SOLUTION/ DROPS OPHTHALMIC NIGHTLY
Status: ON HOLD | COMMUNITY

## 2024-08-23 RX ORDER — IBUPROFEN 200 MG
1 TABLET ORAL EVERY 24 HOURS
Qty: 30 PATCH | Refills: 0 | Status: SHIPPED | OUTPATIENT
Start: 2024-08-23 | End: 2024-09-22

## 2024-08-23 RX ORDER — ACETAMINOPHEN, DIPHENHYDRAMINE HCL, PHENYLEPHRINE HCL 325; 25; 5 MG/1; MG/1; MG/1
10 TABLET ORAL NIGHTLY
Status: ON HOLD | COMMUNITY

## 2024-08-23 NOTE — PROGRESS NOTES
"Pharmacy Medication History Review    Melvi Sanchez \"Gretchen\" is a 64 y.o. 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 Pharmacy called the patient prior to their scheduled procedure and reviewed the patient's [id number] medications for accuracy.    Medications ADDED:  Melatonin 10 mg  Latanoprost   Medications CHANGED:  none  Medications REMOVED:   none    Please review updated prior to admission medication list and comments regarding how patient may be taking medications differently by going to Admission tab --> Admission Orders --> Admit Orders / Review prior to admission medications.     Preferred pharmacy and allergies to be confirmed with patient by nursing the day of procedure.     Sources used to complete the med history include out patient fill history, OARRS, and patient interview who was a great historian along with 8/9/24 pre-admission testing note.      Below are additional concerns with the patient's PTA list.  Patient says she is going to start chantix and nicotine patches soon.     Babatunde Lassiter, [facility]  Please reach out via Secure Chat for questions   "

## 2024-08-26 ENCOUNTER — HOSPITAL ENCOUNTER (INPATIENT)
Facility: HOSPITAL | Age: 64
End: 2024-08-26
Attending: OTOLARYNGOLOGY | Admitting: OTOLARYNGOLOGY
Payer: COMMERCIAL

## 2024-08-26 ENCOUNTER — ANESTHESIA (OUTPATIENT)
Dept: OPERATING ROOM | Facility: HOSPITAL | Age: 64
End: 2024-08-26
Payer: COMMERCIAL

## 2024-08-26 DIAGNOSIS — R52 PAIN: ICD-10-CM

## 2024-08-26 DIAGNOSIS — E83.42 HYPOMAGNESEMIA: ICD-10-CM

## 2024-08-26 DIAGNOSIS — C09.9 TONSIL CANCER (MULTI): ICD-10-CM

## 2024-08-26 DIAGNOSIS — K21.9 GASTROESOPHAGEAL REFLUX DISEASE, UNSPECIFIED WHETHER ESOPHAGITIS PRESENT: ICD-10-CM

## 2024-08-26 DIAGNOSIS — G89.18 POST-OP PAIN: ICD-10-CM

## 2024-08-26 DIAGNOSIS — J44.9 CHRONIC OBSTRUCTIVE PULMONARY DISEASE, UNSPECIFIED COPD TYPE (MULTI): ICD-10-CM

## 2024-08-26 DIAGNOSIS — R26.81 UNSTEADY GAIT: ICD-10-CM

## 2024-08-26 DIAGNOSIS — C77.0 METASTASIS TO HEAD AND NECK LYMPH NODE (MULTI): Primary | ICD-10-CM

## 2024-08-26 DIAGNOSIS — J39.8 INCREASED TRACHEAL SECRETIONS: ICD-10-CM

## 2024-08-26 DIAGNOSIS — L73.9 FOLLICULITIS: ICD-10-CM

## 2024-08-26 DIAGNOSIS — M79.2: ICD-10-CM

## 2024-08-26 DIAGNOSIS — G89.3 CANCER ASSOCIATED PAIN: ICD-10-CM

## 2024-08-26 PROCEDURE — 2500000004 HC RX 250 GENERAL PHARMACY W/ HCPCS (ALT 636 FOR OP/ED)

## 2024-08-26 PROCEDURE — 64856 REPAIR/TRANSPOSE NERVE: CPT | Performed by: OTOLARYNGOLOGY

## 2024-08-26 PROCEDURE — 7100000001 HC RECOVERY ROOM TIME - INITIAL BASE CHARGE: Performed by: OTOLARYNGOLOGY

## 2024-08-26 PROCEDURE — 3600000009 HC OR TIME - EACH INCREMENTAL 1 MINUTE - PROCEDURE LEVEL FOUR: Performed by: OTOLARYNGOLOGY

## 2024-08-26 PROCEDURE — 35701 EXPL N/FLWD SURG NECK ART: CPT | Performed by: OTOLARYNGOLOGY

## 2024-08-26 PROCEDURE — 2500000005 HC RX 250 GENERAL PHARMACY W/O HCPCS: Performed by: ANESTHESIOLOGY

## 2024-08-26 PROCEDURE — 0KR307Z REPLACEMENT OF LEFT NECK MUSCLE WITH AUTOLOGOUS TISSUE SUBSTITUTE, OPEN APPROACH: ICD-10-PCS | Performed by: OTOLARYNGOLOGY

## 2024-08-26 PROCEDURE — 13132 CMPLX RPR F/C/C/M/N/AX/G/H/F: CPT | Performed by: OTOLARYNGOLOGY

## 2024-08-26 PROCEDURE — 1170000001 HC PRIVATE ONCOLOGY ROOM DAILY

## 2024-08-26 PROCEDURE — P9045 ALBUMIN (HUMAN), 5%, 250 ML: HCPCS | Mod: JZ

## 2024-08-26 PROCEDURE — 3600000004 HC OR TIME - INITIAL BASE CHARGE - PROCEDURE LEVEL FOUR: Performed by: OTOLARYNGOLOGY

## 2024-08-26 PROCEDURE — 0KB30ZZ EXCISION OF LEFT NECK MUSCLE, OPEN APPROACH: ICD-10-PCS | Performed by: OTOLARYNGOLOGY

## 2024-08-26 PROCEDURE — 13122 CMPLX RPR S/A/L ADDL 5 CM/>: CPT | Performed by: OTOLARYNGOLOGY

## 2024-08-26 PROCEDURE — 2500000002 HC RX 250 W HCPCS SELF ADMINISTERED DRUGS (ALT 637 FOR MEDICARE OP, ALT 636 FOR OP/ED)

## 2024-08-26 PROCEDURE — 2500000005 HC RX 250 GENERAL PHARMACY W/O HCPCS: Mod: SE | Performed by: OTOLARYNGOLOGY

## 2024-08-26 PROCEDURE — 7100000002 HC RECOVERY ROOM TIME - EACH INCREMENTAL 1 MINUTE: Performed by: OTOLARYNGOLOGY

## 2024-08-26 PROCEDURE — 3700000002 HC GENERAL ANESTHESIA TIME - EACH INCREMENTAL 1 MINUTE: Performed by: OTOLARYNGOLOGY

## 2024-08-26 PROCEDURE — 2780000003 HC OR 278 NO HCPCS: Performed by: OTOLARYNGOLOGY

## 2024-08-26 PROCEDURE — 2500000001 HC RX 250 WO HCPCS SELF ADMINISTERED DRUGS (ALT 637 FOR MEDICARE OP)

## 2024-08-26 PROCEDURE — 15756 FREE MYO/SKIN FLAP MICROVASC: CPT | Performed by: OTOLARYNGOLOGY

## 2024-08-26 PROCEDURE — 2720000007 HC OR 272 NO HCPCS: Performed by: OTOLARYNGOLOGY

## 2024-08-26 PROCEDURE — 13121 CMPLX RPR S/A/L 2.6-7.5 CM: CPT | Performed by: OTOLARYNGOLOGY

## 2024-08-26 PROCEDURE — 2060000001 HC INTERMEDIATE ICU ROOM DAILY

## 2024-08-26 PROCEDURE — 2500000004 HC RX 250 GENERAL PHARMACY W/ HCPCS (ALT 636 FOR OP/ED): Mod: SE | Performed by: OTOLARYNGOLOGY

## 2024-08-26 PROCEDURE — 94640 AIRWAY INHALATION TREATMENT: CPT

## 2024-08-26 PROCEDURE — 2500000004 HC RX 250 GENERAL PHARMACY W/ HCPCS (ALT 636 FOR OP/ED): Performed by: ANESTHESIOLOGY

## 2024-08-26 PROCEDURE — 38724 REMOVAL OF LYMPH NODES NECK: CPT | Performed by: OTOLARYNGOLOGY

## 2024-08-26 PROCEDURE — 82947 ASSAY GLUCOSE BLOOD QUANT: CPT

## 2024-08-26 PROCEDURE — 2500000001 HC RX 250 WO HCPCS SELF ADMINISTERED DRUGS (ALT 637 FOR MEDICARE OP): Mod: SE | Performed by: OTOLARYNGOLOGY

## 2024-08-26 PROCEDURE — 13133 CMPLX RPR F/C/C/M/N/AX/G/H/F: CPT | Performed by: OTOLARYNGOLOGY

## 2024-08-26 PROCEDURE — A4312 CATH W/O BAG 2-WAY SILICONE: HCPCS | Performed by: OTOLARYNGOLOGY

## 2024-08-26 PROCEDURE — 2500000004 HC RX 250 GENERAL PHARMACY W/ HCPCS (ALT 636 FOR OP/ED): Mod: JZ | Performed by: STUDENT IN AN ORGANIZED HEALTH CARE EDUCATION/TRAINING PROGRAM

## 2024-08-26 PROCEDURE — 15756 FREE MYO/SKIN FLAP MICROVASC: CPT | Performed by: STUDENT IN AN ORGANIZED HEALTH CARE EDUCATION/TRAINING PROGRAM

## 2024-08-26 PROCEDURE — 0KBT0ZZ EXCISION OF LEFT LOWER LEG MUSCLE, OPEN APPROACH: ICD-10-PCS | Performed by: OTOLARYNGOLOGY

## 2024-08-26 PROCEDURE — 2500000005 HC RX 250 GENERAL PHARMACY W/O HCPCS

## 2024-08-26 PROCEDURE — 3700000001 HC GENERAL ANESTHESIA TIME - INITIAL BASE CHARGE: Performed by: OTOLARYNGOLOGY

## 2024-08-26 DEVICE — THE GEM MICROVASCULAR ANASTOMOTIC COUPLER DEVICE RINGS ARE MADE OF POLYETHYLENE AND SURGICAL GRADE STAINLESS STEEL PINS. A PROTECTIVE COVER AND JAW ASSEMBLY PROTECT THE RINGS AND ALLOW FOR EASY LOADING ONTO THE ANASTOMOTIC INSTRUMENT. BOTH THE PROTECTIVE COVER AND JAW ASSEMBLY ARE DISPOSABLE. THE GEM MICROVASCULAR ANASTOMOTIC COUPLER DEVICE IS SINGLE-USE AND AVAILABLE IN VARIOUS SIZES
Type: IMPLANTABLE DEVICE | Site: NECK | Status: FUNCTIONAL
Brand: GEM MICROVASCULAR ANASTOMOTIC COUPLER

## 2024-08-26 RX ORDER — HYDROMORPHONE HYDROCHLORIDE 1 MG/ML
0.5 INJECTION, SOLUTION INTRAMUSCULAR; INTRAVENOUS; SUBCUTANEOUS EVERY 5 MIN PRN
Status: DISCONTINUED | OUTPATIENT
Start: 2024-08-26 | End: 2024-08-26 | Stop reason: HOSPADM

## 2024-08-26 RX ORDER — LIDOCAINE HCL/PF 100 MG/5ML
SYRINGE (ML) INTRAVENOUS AS NEEDED
Status: DISCONTINUED | OUTPATIENT
Start: 2024-08-26 | End: 2024-08-26

## 2024-08-26 RX ORDER — DEXTROSE 50 % IN WATER (D50W) INTRAVENOUS SYRINGE
25
Status: ACTIVE | OUTPATIENT
Start: 2024-08-26

## 2024-08-26 RX ORDER — NALOXONE HYDROCHLORIDE 0.4 MG/ML
0.2 INJECTION, SOLUTION INTRAMUSCULAR; INTRAVENOUS; SUBCUTANEOUS AS NEEDED
Status: ACTIVE | OUTPATIENT
Start: 2024-08-26

## 2024-08-26 RX ORDER — ACETAMINOPHEN 160 MG/5ML
1000 SOLUTION ORAL EVERY 8 HOURS
Status: DISPENSED | OUTPATIENT
Start: 2024-08-26

## 2024-08-26 RX ORDER — SODIUM CHLORIDE, SODIUM LACTATE, POTASSIUM CHLORIDE, CALCIUM CHLORIDE 600; 310; 30; 20 MG/100ML; MG/100ML; MG/100ML; MG/100ML
INJECTION, SOLUTION INTRAVENOUS CONTINUOUS PRN
Status: DISCONTINUED | OUTPATIENT
Start: 2024-08-26 | End: 2024-08-26

## 2024-08-26 RX ORDER — BACITRACIN 500 [USP'U]/G
OINTMENT TOPICAL AS NEEDED
Status: DISCONTINUED | OUTPATIENT
Start: 2024-08-26 | End: 2024-08-26 | Stop reason: HOSPADM

## 2024-08-26 RX ORDER — OXYCODONE HCL 5 MG/5 ML
5 SOLUTION, ORAL ORAL EVERY 4 HOURS PRN
Status: DISCONTINUED | OUTPATIENT
Start: 2024-08-26 | End: 2024-08-26

## 2024-08-26 RX ORDER — ENOXAPARIN SODIUM 100 MG/ML
40 INJECTION SUBCUTANEOUS EVERY 24 HOURS
Status: DISPENSED | OUTPATIENT
Start: 2024-08-26

## 2024-08-26 RX ORDER — ASPIRIN 300 MG/1
SUPPOSITORY RECTAL AS NEEDED
Status: DISCONTINUED | OUTPATIENT
Start: 2024-08-26 | End: 2024-08-26 | Stop reason: HOSPADM

## 2024-08-26 RX ORDER — HYDROMORPHONE HCL/0.9% NACL/PF 15 MG/30ML
PATIENT CONTROLLED ANALGESIA SYRINGE INTRAVENOUS CONTINUOUS
Status: DISCONTINUED | OUTPATIENT
Start: 2024-08-26 | End: 2024-08-28

## 2024-08-26 RX ORDER — REMIFENTANIL HYDROCHLORIDE 1 MG/ML
INJECTION, POWDER, LYOPHILIZED, FOR SOLUTION INTRAVENOUS AS NEEDED
Status: DISCONTINUED | OUTPATIENT
Start: 2024-08-26 | End: 2024-08-26

## 2024-08-26 RX ORDER — BACITRACIN ZINC 500 UNIT/G
OINTMENT IN PACKET (EA) TOPICAL 3 TIMES DAILY
Status: COMPLETED | OUTPATIENT
Start: 2024-08-26 | End: 2024-08-28

## 2024-08-26 RX ORDER — ROCURONIUM BROMIDE 10 MG/ML
INJECTION, SOLUTION INTRAVENOUS AS NEEDED
Status: DISCONTINUED | OUTPATIENT
Start: 2024-08-26 | End: 2024-08-26

## 2024-08-26 RX ORDER — ROPINIROLE 2 MG/1
2 TABLET, FILM COATED ORAL NIGHTLY
Status: DISPENSED | OUTPATIENT
Start: 2024-08-26

## 2024-08-26 RX ORDER — PROPOFOL 10 MG/ML
INJECTION, EMULSION INTRAVENOUS AS NEEDED
Status: DISCONTINUED | OUTPATIENT
Start: 2024-08-26 | End: 2024-08-26

## 2024-08-26 RX ORDER — MIDAZOLAM HYDROCHLORIDE 1 MG/ML
INJECTION INTRAMUSCULAR; INTRAVENOUS AS NEEDED
Status: DISCONTINUED | OUTPATIENT
Start: 2024-08-26 | End: 2024-08-26

## 2024-08-26 RX ORDER — DEXTROSE 50 % IN WATER (D50W) INTRAVENOUS SYRINGE
12.5
Status: ACTIVE | OUTPATIENT
Start: 2024-08-26

## 2024-08-26 RX ORDER — AMPICILLIN AND SULBACTAM 2; 1 G/1; G/1
INJECTION, POWDER, FOR SOLUTION INTRAMUSCULAR; INTRAVENOUS AS NEEDED
Status: DISCONTINUED | OUTPATIENT
Start: 2024-08-26 | End: 2024-08-26

## 2024-08-26 RX ORDER — FORMOTEROL FUMARATE DIHYDRATE 20 UG/2ML
20 SOLUTION RESPIRATORY (INHALATION)
Status: DISCONTINUED | OUTPATIENT
Start: 2024-08-26 | End: 2024-08-28

## 2024-08-26 RX ORDER — HYDROMORPHONE HYDROCHLORIDE 1 MG/ML
INJECTION, SOLUTION INTRAMUSCULAR; INTRAVENOUS; SUBCUTANEOUS AS NEEDED
Status: DISCONTINUED | OUTPATIENT
Start: 2024-08-26 | End: 2024-08-26

## 2024-08-26 RX ORDER — SODIUM CHLORIDE, SODIUM LACTATE, POTASSIUM CHLORIDE, CALCIUM CHLORIDE 600; 310; 30; 20 MG/100ML; MG/100ML; MG/100ML; MG/100ML
50 INJECTION, SOLUTION INTRAVENOUS CONTINUOUS
Status: DISCONTINUED | OUTPATIENT
Start: 2024-08-26 | End: 2024-08-26 | Stop reason: HOSPADM

## 2024-08-26 RX ORDER — ALBUMIN HUMAN 50 G/1000ML
SOLUTION INTRAVENOUS AS NEEDED
Status: DISCONTINUED | OUTPATIENT
Start: 2024-08-26 | End: 2024-08-26

## 2024-08-26 RX ORDER — OXYCODONE HYDROCHLORIDE 5 MG/1
5 TABLET ORAL EVERY 4 HOURS PRN
Status: DISCONTINUED | OUTPATIENT
Start: 2024-08-26 | End: 2024-08-26

## 2024-08-26 RX ORDER — SODIUM CHLORIDE 9 MG/ML
50 INJECTION, SOLUTION INTRAVENOUS CONTINUOUS
Status: DISCONTINUED | OUTPATIENT
Start: 2024-08-26 | End: 2024-08-26

## 2024-08-26 RX ORDER — IPRATROPIUM BROMIDE AND ALBUTEROL SULFATE 2.5; .5 MG/3ML; MG/3ML
3 SOLUTION RESPIRATORY (INHALATION)
Status: DISCONTINUED | OUTPATIENT
Start: 2024-08-26 | End: 2024-08-27

## 2024-08-26 RX ORDER — OXYCODONE HYDROCHLORIDE 5 MG/1
10 TABLET ORAL EVERY 4 HOURS PRN
Status: DISCONTINUED | OUTPATIENT
Start: 2024-08-26 | End: 2024-08-26

## 2024-08-26 RX ORDER — HYDROMORPHONE HYDROCHLORIDE 1 MG/ML
0.2 INJECTION, SOLUTION INTRAMUSCULAR; INTRAVENOUS; SUBCUTANEOUS EVERY 5 MIN PRN
Status: DISCONTINUED | OUTPATIENT
Start: 2024-08-26 | End: 2024-08-26 | Stop reason: HOSPADM

## 2024-08-26 RX ORDER — NALOXONE HYDROCHLORIDE 0.4 MG/ML
0.2 INJECTION, SOLUTION INTRAMUSCULAR; INTRAVENOUS; SUBCUTANEOUS EVERY 5 MIN PRN
Status: ACTIVE | OUTPATIENT
Start: 2024-08-26

## 2024-08-26 RX ORDER — SODIUM CHLORIDE 9 MG/ML
70 INJECTION, SOLUTION INTRAVENOUS CONTINUOUS
Status: DISCONTINUED | OUTPATIENT
Start: 2024-08-26 | End: 2024-08-29

## 2024-08-26 RX ORDER — ACETAMINOPHEN 10 MG/ML
1000 INJECTION, SOLUTION INTRAVENOUS ONCE
Status: COMPLETED | OUTPATIENT
Start: 2024-08-26 | End: 2024-08-26

## 2024-08-26 RX ORDER — PHENYLEPHRINE 10 MG/250 ML(40 MCG/ML)IN 0.9 % SOD.CHLORIDE INTRAVENOUS
CONTINUOUS PRN
Status: DISCONTINUED | OUTPATIENT
Start: 2024-08-26 | End: 2024-08-26

## 2024-08-26 RX ORDER — ONDANSETRON HYDROCHLORIDE 2 MG/ML
4 INJECTION, SOLUTION INTRAVENOUS ONCE AS NEEDED
Status: DISCONTINUED | OUTPATIENT
Start: 2024-08-26 | End: 2024-08-26 | Stop reason: HOSPADM

## 2024-08-26 RX ORDER — ACETAMINOPHEN 500 MG
10 TABLET ORAL NIGHTLY
Status: DISPENSED | OUTPATIENT
Start: 2024-08-26

## 2024-08-26 RX ORDER — ACETAMINOPHEN 325 MG/1
975 TABLET ORAL EVERY 8 HOURS
Status: DISPENSED | OUTPATIENT
Start: 2024-08-26

## 2024-08-26 RX ORDER — ONDANSETRON HYDROCHLORIDE 2 MG/ML
4 INJECTION, SOLUTION INTRAVENOUS EVERY 8 HOURS PRN
Status: DISCONTINUED | OUTPATIENT
Start: 2024-08-26 | End: 2024-08-27

## 2024-08-26 RX ORDER — SYRING-NEEDL,DISP,INSUL,0.3 ML 29 G X1/2"
297 SYRINGE, EMPTY DISPOSABLE MISCELLANEOUS ONCE AS NEEDED
Status: DISPENSED | OUTPATIENT
Start: 2024-08-26

## 2024-08-26 RX ORDER — PETROLATUM 420 MG/G
1 OINTMENT TOPICAL 3 TIMES DAILY
Status: DISPENSED | OUTPATIENT
Start: 2024-08-28

## 2024-08-26 RX ORDER — LIDOCAINE HYDROCHLORIDE 10 MG/ML
0.1 INJECTION INFILTRATION; PERINEURAL ONCE
Status: DISCONTINUED | OUTPATIENT
Start: 2024-08-26 | End: 2024-08-26 | Stop reason: HOSPADM

## 2024-08-26 RX ORDER — NORETHINDRONE AND ETHINYL ESTRADIOL 0.5-0.035
KIT ORAL AS NEEDED
Status: DISCONTINUED | OUTPATIENT
Start: 2024-08-26 | End: 2024-08-26

## 2024-08-26 RX ORDER — ESMOLOL HYDROCHLORIDE 10 MG/ML
INJECTION INTRAVENOUS AS NEEDED
Status: DISCONTINUED | OUTPATIENT
Start: 2024-08-26 | End: 2024-08-26

## 2024-08-26 RX ORDER — LIDOCAINE HYDROCHLORIDE 20 MG/ML
INJECTION, SOLUTION INFILTRATION; PERINEURAL AS NEEDED
Status: DISCONTINUED | OUTPATIENT
Start: 2024-08-26 | End: 2024-08-26 | Stop reason: HOSPADM

## 2024-08-26 RX ORDER — ASPIRIN 325 MG
325 TABLET ORAL DAILY
Status: DISPENSED | OUTPATIENT
Start: 2024-08-26 | End: 2024-09-25

## 2024-08-26 RX ORDER — VARENICLINE TARTRATE 1 MG/1
1 TABLET, FILM COATED ORAL DAILY
Status: DISPENSED | OUTPATIENT
Start: 2024-08-26

## 2024-08-26 RX ORDER — PANTOPRAZOLE SODIUM 40 MG/1
40 TABLET, DELAYED RELEASE ORAL
Status: DISPENSED | OUTPATIENT
Start: 2024-08-27

## 2024-08-26 RX ORDER — SODIUM CHLORIDE 0.9 G/100ML
IRRIGANT IRRIGATION AS NEEDED
Status: DISCONTINUED | OUTPATIENT
Start: 2024-08-26 | End: 2024-08-26 | Stop reason: HOSPADM

## 2024-08-26 RX ORDER — PHENYLEPHRINE HCL IN 0.9% NACL 1 MG/10 ML
SYRINGE (ML) INTRAVENOUS AS NEEDED
Status: DISCONTINUED | OUTPATIENT
Start: 2024-08-26 | End: 2024-08-26

## 2024-08-26 RX ORDER — INSULIN LISPRO 100 [IU]/ML
0-5 INJECTION, SOLUTION INTRAVENOUS; SUBCUTANEOUS
Status: DISPENSED | OUTPATIENT
Start: 2024-08-26

## 2024-08-26 RX ORDER — LEVOTHYROXINE SODIUM 75 UG/1
75 TABLET ORAL EVERY MORNING
Status: DISPENSED | OUTPATIENT
Start: 2024-08-27

## 2024-08-26 RX ORDER — POLYETHYLENE GLYCOL 3350 17 G/17G
17 POWDER, FOR SOLUTION ORAL DAILY
Status: DISPENSED | OUTPATIENT
Start: 2024-08-26

## 2024-08-26 RX ORDER — HYDROGEN PEROXIDE 3 %
1 SOLUTION, NON-ORAL MISCELLANEOUS 3 TIMES DAILY
Status: DISPENSED | OUTPATIENT
Start: 2024-08-26

## 2024-08-26 RX ORDER — ONDANSETRON 4 MG/1
4 TABLET, FILM COATED ORAL EVERY 8 HOURS PRN
Status: DISCONTINUED | OUTPATIENT
Start: 2024-08-26 | End: 2024-08-27

## 2024-08-26 SDOH — ECONOMIC STABILITY: INCOME INSECURITY: IN THE LAST 12 MONTHS, WAS THERE A TIME WHEN YOU WERE NOT ABLE TO PAY THE MORTGAGE OR RENT ON TIME?: PATIENT DECLINED

## 2024-08-26 SDOH — HEALTH STABILITY: MENTAL HEALTH: HOW OFTEN DO YOU HAVE 6 OR MORE DRINKS ON ONE OCCASION?: NEVER

## 2024-08-26 SDOH — SOCIAL STABILITY: SOCIAL NETWORK: HOW OFTEN DO YOU GET TOGETHER WITH FRIENDS OR RELATIVES?: THREE TIMES A WEEK

## 2024-08-26 SDOH — SOCIAL STABILITY: SOCIAL NETWORK
DO YOU BELONG TO ANY CLUBS OR ORGANIZATIONS SUCH AS CHURCH GROUPS UNIONS, FRATERNAL OR ATHLETIC GROUPS, OR SCHOOL GROUPS?: NO

## 2024-08-26 SDOH — SOCIAL STABILITY: SOCIAL INSECURITY: WITHIN THE LAST YEAR, HAVE YOU BEEN AFRAID OF YOUR PARTNER OR EX-PARTNER?: NO

## 2024-08-26 SDOH — HEALTH STABILITY: MENTAL HEALTH
STRESS IS WHEN SOMEONE FEELS TENSE, NERVOUS, ANXIOUS, OR CAN'T SLEEP AT NIGHT BECAUSE THEIR MIND IS TROUBLED. HOW STRESSED ARE YOU?: NOT AT ALL

## 2024-08-26 SDOH — ECONOMIC STABILITY: INCOME INSECURITY: HOW HARD IS IT FOR YOU TO PAY FOR THE VERY BASICS LIKE FOOD, HOUSING, MEDICAL CARE, AND HEATING?: PATIENT DECLINED

## 2024-08-26 SDOH — SOCIAL STABILITY: SOCIAL NETWORK: HOW OFTEN DO YOU ATTEND CHURCH OR RELIGIOUS SERVICES?: MORE THAN 4 TIMES PER YEAR

## 2024-08-26 SDOH — ECONOMIC STABILITY: INCOME INSECURITY: IN THE PAST 12 MONTHS, HAS THE ELECTRIC, GAS, OIL, OR WATER COMPANY THREATENED TO SHUT OFF SERVICE IN YOUR HOME?: NO

## 2024-08-26 SDOH — ECONOMIC STABILITY: FOOD INSECURITY: WITHIN THE PAST 12 MONTHS, THE FOOD YOU BOUGHT JUST DIDN'T LAST AND YOU DIDN'T HAVE MONEY TO GET MORE.: NEVER TRUE

## 2024-08-26 SDOH — SOCIAL STABILITY: SOCIAL NETWORK
IN A TYPICAL WEEK, HOW MANY TIMES DO YOU TALK ON THE PHONE WITH FAMILY, FRIENDS, OR NEIGHBORS?: MORE THAN THREE TIMES A WEEK

## 2024-08-26 SDOH — SOCIAL STABILITY: SOCIAL INSECURITY: HAVE YOU HAD THOUGHTS OF HARMING ANYONE ELSE?: NO

## 2024-08-26 SDOH — HEALTH STABILITY: MENTAL HEALTH: HOW MANY STANDARD DRINKS CONTAINING ALCOHOL DO YOU HAVE ON A TYPICAL DAY?: 1 OR 2

## 2024-08-26 SDOH — SOCIAL STABILITY: SOCIAL NETWORK: ARE YOU MARRIED, WIDOWED, DIVORCED, SEPARATED, NEVER MARRIED, OR LIVING WITH A PARTNER?: WIDOWED

## 2024-08-26 SDOH — HEALTH STABILITY: MENTAL HEALTH: HOW OFTEN DO YOU HAVE A DRINK CONTAINING ALCOHOL?: MONTHLY OR LESS

## 2024-08-26 SDOH — SOCIAL STABILITY: SOCIAL NETWORK: HOW OFTEN DO YOU ATTENT MEETINGS OF THE CLUB OR ORGANIZATION YOU BELONG TO?: NEVER

## 2024-08-26 SDOH — SOCIAL STABILITY: SOCIAL INSECURITY
WITHIN THE LAST YEAR, HAVE TO BEEN RAPED OR FORCED TO HAVE ANY KIND OF SEXUAL ACTIVITY BY YOUR PARTNER OR EX-PARTNER?: NO

## 2024-08-26 SDOH — ECONOMIC STABILITY: FOOD INSECURITY: WITHIN THE PAST 12 MONTHS, YOU WORRIED THAT YOUR FOOD WOULD RUN OUT BEFORE YOU GOT MONEY TO BUY MORE.: NEVER TRUE

## 2024-08-26 SDOH — ECONOMIC STABILITY: HOUSING INSECURITY: AT ANY TIME IN THE PAST 12 MONTHS, WERE YOU HOMELESS OR LIVING IN A SHELTER (INCLUDING NOW)?: PATIENT DECLINED

## 2024-08-26 SDOH — SOCIAL STABILITY: SOCIAL INSECURITY: ARE YOU OR HAVE YOU BEEN THREATENED OR ABUSED PHYSICALLY, EMOTIONALLY, OR SEXUALLY BY ANYONE?: NO

## 2024-08-26 SDOH — SOCIAL STABILITY: SOCIAL INSECURITY: ARE THERE ANY APPARENT SIGNS OF INJURIES/BEHAVIORS THAT COULD BE RELATED TO ABUSE/NEGLECT?: NO

## 2024-08-26 SDOH — SOCIAL STABILITY: SOCIAL INSECURITY: WITHIN THE LAST YEAR, HAVE YOU BEEN HUMILIATED OR EMOTIONALLY ABUSED IN OTHER WAYS BY YOUR PARTNER OR EX-PARTNER?: NO

## 2024-08-26 SDOH — SOCIAL STABILITY: SOCIAL INSECURITY: DO YOU FEEL UNSAFE GOING BACK TO THE PLACE WHERE YOU ARE LIVING?: NO

## 2024-08-26 SDOH — SOCIAL STABILITY: SOCIAL INSECURITY
WITHIN THE LAST YEAR, HAVE YOU BEEN KICKED, HIT, SLAPPED, OR OTHERWISE PHYSICALLY HURT BY YOUR PARTNER OR EX-PARTNER?: NO

## 2024-08-26 SDOH — HEALTH STABILITY: PHYSICAL HEALTH: ON AVERAGE, HOW MANY MINUTES DO YOU ENGAGE IN EXERCISE AT THIS LEVEL?: 0 MIN

## 2024-08-26 SDOH — HEALTH STABILITY: PHYSICAL HEALTH: ON AVERAGE, HOW MANY DAYS PER WEEK DO YOU ENGAGE IN MODERATE TO STRENUOUS EXERCISE (LIKE A BRISK WALK)?: 0 DAYS

## 2024-08-26 SDOH — HEALTH STABILITY: MENTAL HEALTH
HOW OFTEN DO YOU NEED TO HAVE SOMEONE HELP YOU WHEN YOU READ INSTRUCTIONS, PAMPHLETS, OR OTHER WRITTEN MATERIAL FROM YOUR DOCTOR OR PHARMACY?: NEVER

## 2024-08-26 SDOH — SOCIAL STABILITY: SOCIAL INSECURITY: WERE YOU ABLE TO COMPLETE ALL THE BEHAVIORAL HEALTH SCREENINGS?: YES

## 2024-08-26 SDOH — ECONOMIC STABILITY: TRANSPORTATION INSECURITY
IN THE PAST 12 MONTHS, HAS LACK OF TRANSPORTATION KEPT YOU FROM MEETINGS, WORK, OR FROM GETTING THINGS NEEDED FOR DAILY LIVING?: PATIENT DECLINED

## 2024-08-26 SDOH — SOCIAL STABILITY: SOCIAL INSECURITY: DO YOU FEEL ANYONE HAS EXPLOITED OR TAKEN ADVANTAGE OF YOU FINANCIALLY OR OF YOUR PERSONAL PROPERTY?: NO

## 2024-08-26 SDOH — ECONOMIC STABILITY: TRANSPORTATION INSECURITY
IN THE PAST 12 MONTHS, HAS THE LACK OF TRANSPORTATION KEPT YOU FROM MEDICAL APPOINTMENTS OR FROM GETTING MEDICATIONS?: PATIENT DECLINED

## 2024-08-26 SDOH — SOCIAL STABILITY: SOCIAL INSECURITY: DOES ANYONE TRY TO KEEP YOU FROM HAVING/CONTACTING OTHER FRIENDS OR DOING THINGS OUTSIDE YOUR HOME?: NO

## 2024-08-26 SDOH — SOCIAL STABILITY: SOCIAL NETWORK: HOW OFTEN DO YOU GET TOGETHER WITH FRIENDS OR RELATIVES?: MORE THAN THREE TIMES A WEEK

## 2024-08-26 SDOH — SOCIAL STABILITY: SOCIAL INSECURITY: HAVE YOU HAD ANY THOUGHTS OF HARMING ANYONE ELSE?: NO

## 2024-08-26 SDOH — HEALTH STABILITY: MENTAL HEALTH: CURRENT SMOKER: 1

## 2024-08-26 SDOH — SOCIAL STABILITY: SOCIAL INSECURITY: HAS ANYONE EVER THREATENED TO HURT YOUR FAMILY OR YOUR PETS?: NO

## 2024-08-26 SDOH — SOCIAL STABILITY: SOCIAL INSECURITY: ABUSE: ADULT

## 2024-08-26 SDOH — SOCIAL STABILITY: SOCIAL NETWORK: IN A TYPICAL WEEK, HOW MANY TIMES DO YOU TALK ON THE PHONE WITH FAMILY, FRIENDS, OR NEIGHBORS?: THREE TIMES A WEEK

## 2024-08-26 ASSESSMENT — COGNITIVE AND FUNCTIONAL STATUS - GENERAL
TURNING FROM BACK TO SIDE WHILE IN FLAT BAD: A LITTLE
PERSONAL GROOMING: A LITTLE
TOILETING: A LITTLE
MOBILITY SCORE: 18
DRESSING REGULAR LOWER BODY CLOTHING: A LITTLE
MOVING TO AND FROM BED TO CHAIR: A LITTLE
MOVING FROM LYING ON BACK TO SITTING ON SIDE OF FLAT BED WITH BEDRAILS: A LITTLE
CLIMB 3 TO 5 STEPS WITH RAILING: A LITTLE
DAILY ACTIVITIY SCORE: 19
STANDING UP FROM CHAIR USING ARMS: A LITTLE
WALKING IN HOSPITAL ROOM: A LITTLE
HELP NEEDED FOR BATHING: A LITTLE
DRESSING REGULAR UPPER BODY CLOTHING: A LITTLE

## 2024-08-26 ASSESSMENT — PATIENT HEALTH QUESTIONNAIRE - PHQ9
1. LITTLE INTEREST OR PLEASURE IN DOING THINGS: NOT AT ALL
SUM OF ALL RESPONSES TO PHQ9 QUESTIONS 1 & 2: 0
2. FEELING DOWN, DEPRESSED OR HOPELESS: NOT AT ALL

## 2024-08-26 ASSESSMENT — COLUMBIA-SUICIDE SEVERITY RATING SCALE - C-SSRS
6. HAVE YOU EVER DONE ANYTHING, STARTED TO DO ANYTHING, OR PREPARED TO DO ANYTHING TO END YOUR LIFE?: NO
2. HAVE YOU ACTUALLY HAD ANY THOUGHTS OF KILLING YOURSELF?: NO
1. IN THE PAST MONTH, HAVE YOU WISHED YOU WERE DEAD OR WISHED YOU COULD GO TO SLEEP AND NOT WAKE UP?: NO
2. HAVE YOU ACTUALLY HAD ANY THOUGHTS OF KILLING YOURSELF?: NO
6. HAVE YOU EVER DONE ANYTHING, STARTED TO DO ANYTHING, OR PREPARED TO DO ANYTHING TO END YOUR LIFE?: NO
1. IN THE PAST MONTH, HAVE YOU WISHED YOU WERE DEAD OR WISHED YOU COULD GO TO SLEEP AND NOT WAKE UP?: NO

## 2024-08-26 ASSESSMENT — LIFESTYLE VARIABLES
AUDIT-C TOTAL SCORE: 1
SKIP TO QUESTIONS 9-10: 1
SKIP TO QUESTIONS 9-10: 1
AUDIT-C TOTAL SCORE: 1
HOW OFTEN DO YOU HAVE A DRINK CONTAINING ALCOHOL: MONTHLY OR LESS
SKIP TO QUESTIONS 9-10: 1
AUDIT-C TOTAL SCORE: 1
AUDIT-C TOTAL SCORE: 1
HOW MANY STANDARD DRINKS CONTAINING ALCOHOL DO YOU HAVE ON A TYPICAL DAY: 1 OR 2
HOW OFTEN DO YOU HAVE 6 OR MORE DRINKS ON ONE OCCASION: NEVER

## 2024-08-26 ASSESSMENT — PAIN - FUNCTIONAL ASSESSMENT
PAIN_FUNCTIONAL_ASSESSMENT: UNABLE TO SELF-REPORT
PAIN_FUNCTIONAL_ASSESSMENT: 0-10
PAIN_FUNCTIONAL_ASSESSMENT: UNABLE TO SELF-REPORT
PAIN_FUNCTIONAL_ASSESSMENT: 0-10
PAIN_FUNCTIONAL_ASSESSMENT: 0-10
PAIN_FUNCTIONAL_ASSESSMENT: UNABLE TO SELF-REPORT
PAIN_FUNCTIONAL_ASSESSMENT: UNABLE TO SELF-REPORT
PAIN_FUNCTIONAL_ASSESSMENT: 0-10

## 2024-08-26 ASSESSMENT — ACTIVITIES OF DAILY LIVING (ADL)
PATIENT'S MEMORY ADEQUATE TO SAFELY COMPLETE DAILY ACTIVITIES?: YES
FEEDING YOURSELF: UNABLE TO ASSESS
ADEQUATE_TO_COMPLETE_ADL: YES
TOILETING: UNABLE TO ASSESS
HEARING - RIGHT EAR: FUNCTIONAL
JUDGMENT_ADEQUATE_SAFELY_COMPLETE_DAILY_ACTIVITIES: YES
HEARING - LEFT EAR: FUNCTIONAL
BATHING: UNABLE TO ASSESS
DRESSING YOURSELF: UNABLE TO ASSESS
LACK_OF_TRANSPORTATION: NO
GROOMING: UNABLE TO ASSESS
WALKS IN HOME: INDEPENDENT

## 2024-08-26 ASSESSMENT — PAIN SCALES - GENERAL
PAINLEVEL_OUTOF10: 10 - WORST POSSIBLE PAIN
PAINLEVEL_OUTOF10: 0 - NO PAIN
PAINLEVEL_OUTOF10: 10 - WORST POSSIBLE PAIN
PAINLEVEL_OUTOF10: 0 - NO PAIN
PAINLEVEL_OUTOF10: 10 - WORST POSSIBLE PAIN
PAINLEVEL_OUTOF10: 10 - WORST POSSIBLE PAIN
PAINLEVEL_OUTOF10: 0 - NO PAIN
PAINLEVEL_OUTOF10: 6
PAINLEVEL_OUTOF10: 0 - NO PAIN
PAINLEVEL_OUTOF10: 10 - WORST POSSIBLE PAIN

## 2024-08-26 NOTE — SIGNIFICANT EVENT
Subjective:  Resting comfortably in bed. Endorsing post op pain, no new concerns at this time.     Objective:  Vitals reviewed in EMR  Gen: NAD, AOx3, resting comfortably in bed  Face/Neck: All incisions c/d/i, neck soft and flat without evidence of hematoma or fluid collection  -Fat window mild ooze  -Internal doppler with strong arterial signal  Oral Cavity: No intra oral incisions   Extremities: left leg warm with intact movement and sensation  Drains: All drains in place and holding suction with serosanguinous drainage (stripped)    Assessment/Plan:  65 yo female with history of T1N3a SCCa of left tonsil s/p CXRT with recurrent left neck disease now s/p left radical ND 2-4 with 11 repair, recon with left ALT    -Continue q4hr flap checks    Zulema Blue MD  ENT [id number]

## 2024-08-26 NOTE — OP NOTE
[facility] - Operative Report  Name: Melvi Sanchez   MRN: [id number]  Date of Procedure: 08/26/24  Location: [facility]    Attending Surgeon: Alex Rice    Resident/Fellow: Kishan Lafleur    Preoperative Diagnosis:  Left neck metastasis    Postoperative Diagnosis:  Same    Operative indications:  This patient is status post chemoradiation therapy for the management of a head neck cancer.  She was found to have a level 2-3 node on the left side which was consistent with squamous cell carcinoma on the biopsy.  She is here today to have this addressed surgically.  She understands the surgery and the possible complications especially in relation to the proximately the cranial nerves X, XI and XII.  She wishes to proceed.    Procedure:  Left modified radical neck dissection (levels 2 through 3), (modifier 22)  Operative procedure:  With the patient under general anesthesia the neck, chest, and left leg were prepped and draped in usual sterile fashion.  A neck incision was outlined from the mastoid tip to the supraclavicular area.  The incision was made and carried through the lying soft tissue through the platysma.  A superior flap was elevated to the posterior belly of the digastric superiorly and the submandibular gland further medially.  Of note is that there was extensive scarring and fibrosis.  The posterior flap was also elevated.  The 11th cranial nerve was identified posterior to the sternocleidomastoid muscle.  Dissection superiorly also identified the 11 cranial nerve as well as the jugular vein.  The 11th cranial nerve was then dissected but unfortunately was found to be severed.  The nerve was dissected in both directions and the nerve preserved.  Working in the lower neck the sternocleidomastoid muscle was divided.  The jugular vein was identified and dissected superiorly.  Further dissection superiorly revealed the 12th cranial nerve.  It was elected to go ahead and  sacrifice the internal jugular vein which was doubly ligated with 2-0 silk and divided.  The internal carotid artery was identified.  The artery was then followed inferiorly.  The 10th cranial nerve was posterior to that area and also preserved.  The prevertebral fascia was identified and dissection was carried posteriorly to the junction between the cervical plexus and the posterior aspect of the sternocleidomastoid muscle.  The specimen was then removed and oriented and sent for final pathology.  The wound was irrigated.  Hemostasis was verified.  It was felt that a 22 modifier should be used because of the significant amount of scarring.  Of note is that the tumor was briefly entered superiorly but further resection superior to that area allowed a negative margin.  It was not felt anything further needed to be done from a resection point of view.  The patient was then transferred to the care of my colleague for reconstruction.  This will be dictated separately.  At the time of the transfer the estimated blood loss was approximately 20 cc.  The patient was in stable condition.  This is dictated on 8/26/2024.    I was present for the entire procedure    Alex Rice MD

## 2024-08-26 NOTE — ANESTHESIA POSTPROCEDURE EVALUATION
"Patient: Melvi Sanchez \"Gretchen\"    Procedure Summary       Date: 08/26/24 Room / Location: [facility] OR 03 / Virtual [facility] OR    Anesthesia Start: 0725 Anesthesia Stop: 1444    Procedures:       Creation Free Flap Head/Neck      Dissection Neck (Left) Diagnosis:       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)      (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) [C77.0])    Surgeons: Robin Brownlee MD; Alex Rice MD Responsible Provider: Melissa Napoles MD    Anesthesia Type: general ASA Status: 3            Anesthesia Type: general    Vitals Value Taken Time   /79 08/26/24 1459   Temp 36.4 08/26/24 1459   Pulse 72 08/26/24 1459   Resp 16 08/26/24 1459   SpO2 95 08/26/24 1459       Anesthesia Post Evaluation    Patient location during evaluation: PACU  Patient participation: complete - patient participated  Level of consciousness: sleepy but conscious  Pain management: inadequate  Airway patency: patent  Cardiovascular status: acceptable  Respiratory status: acceptable  Hydration status: acceptable  Postoperative Nausea and Vomiting: none        No notable events documented.    "

## 2024-08-26 NOTE — ANESTHESIA PROCEDURE NOTES
Airway  Date/Time: 8/26/2024 7:49 AM  Urgency: elective    Airway not difficult    Staffing  Performed: resident   Authorized by: Melissa Napoles MD    Performed by: Leighann Baez MD  Patient location during procedure: OR    Indications and Patient Condition  Indications for airway management: anesthesia  Spontaneous Ventilation: absent  Sedation level: deep  Preoxygenated: yes  Patient position: sniffing  Mask difficulty assessment: 1 - vent by mask  Planned trial extubation    Final Airway Details  Final airway type: endotracheal airway      Successful airway: ETT  Cuffed: yes   Successful intubation technique: video laryngoscopy  Facilitating devices/methods: intubating stylet  Endotracheal tube insertion site: oral  Blade: Jarek  Blade size: #3  ETT size (mm): 6.0  Cormack-Lehane Classification: grade I - full view of glottis  Placement verified by: chest auscultation and capnometry   Measured from: teeth  ETT to teeth (cm): 21  Number of attempts at approach: 1

## 2024-08-26 NOTE — ANESTHESIA PROCEDURE NOTES
Arterial Line:    Date/Time: 8/26/2024 8:05 AM    Staffing  Performed: attending   Authorized by: Melissa Napoles MD    Performed by: Leighann Baez MD    An arterial line was placed. Procedure performed using surface landmarks.in the OR for the following indication(s): continuous blood pressure monitoring.    A 20 gauge (size) (length), Angiocath (type) catheter was placed into the Right radial artery, secured by tapeEvents:  patient tolerated procedure well with no complications.

## 2024-08-26 NOTE — ANESTHESIA PREPROCEDURE EVALUATION
"Patient: Melvi Sanchez \"Gretchen\"    Procedure Information       Date/Time: 08/26/24 0715    Procedures:       Creation Fasciocutaneous Flap Torso      Creation Free Flap Head/Neck      Dissection Neck (Left)      Lymphadenectomy Head/Neck (Left)    Location: [place] OR 03 / Virtual [facility] OR    Surgeons: Robin Brownlee MD; Alex Rice MD            Relevant Problems   Anesthesia (within normal limits)      Pulmonary   (+) COPD (chronic obstructive pulmonary disease) (Multi)   (+) Pneumonia of left lower lobe due to infectious organism      Neuro   (+) Anxiety   (+) Cervical radiculopathy   (+) Current moderate episode of major depressive disorder without prior episode (Multi)   (+) Inflammation of nerve of lower limb      GI   (+) Dysphagia   (+) GERD (gastroesophageal reflux disease)      Liver   (+) Tonsil cancer (Multi)      Endocrine   (+) Hypothyroidism      ID   (+) Pneumonia of left lower lobe due to infectious organism       Clinical information reviewed:   Tobacco  Allergies  Meds   Med Hx  Surg Hx   Fam Hx  Soc Hx        NPO Detail:  NPO/Void Status  Date of Last Liquid: 08/26/24  Time of Last Liquid: 0300  Date of Last Solid: 08/25/24  Time of Last Solid: 2200  Last Intake Type: Clear fluids         Physical Exam    Airway  Mallampati: III  TM distance: >3 FB  Neck ROM: limited     Cardiovascular   Rhythm: regular  Rate: normal     Dental   (+) upper dentures, lower dentures     Pulmonary   Breath sounds clear to auscultation     Abdominal            Anesthesia Plan    History of general anesthesia?: yes  History of complications of general anesthesia?: no    ASA 3     general     The patient is a current smoker.  Patient was previously instructed to abstain from smoking on day of procedure.  Patient did not smoke on day of procedure.    intravenous induction   Postoperative administration of opioids is intended.  Trial extubation is planned.  Anesthetic plan and risks discussed with " patient.  Use of blood products discussed with patient who consented to blood products.    Plan discussed with resident and attending.

## 2024-08-27 LAB
ALBUMIN SERPL BCP-MCNC: 3.6 G/DL (ref 3.4–5)
ANION GAP SERPL CALC-SCNC: 11 MMOL/L (ref 10–20)
BUN SERPL-MCNC: 7 MG/DL (ref 6–23)
CALCIUM SERPL-MCNC: 8.5 MG/DL (ref 8.6–10.6)
CHLORIDE SERPL-SCNC: 104 MMOL/L (ref 98–107)
CO2 SERPL-SCNC: 26 MMOL/L (ref 21–32)
CREAT SERPL-MCNC: 0.4 MG/DL (ref 0.5–1.05)
EGFRCR SERPLBLD CKD-EPI 2021: >90 ML/MIN/1.73M*2
ERYTHROCYTE [DISTWIDTH] IN BLOOD BY AUTOMATED COUNT: 14.5 % (ref 11.5–14.5)
GLUCOSE BLD MANUAL STRIP-MCNC: 100 MG/DL (ref 74–99)
GLUCOSE BLD MANUAL STRIP-MCNC: 106 MG/DL (ref 74–99)
GLUCOSE BLD MANUAL STRIP-MCNC: 108 MG/DL (ref 74–99)
GLUCOSE BLD MANUAL STRIP-MCNC: 116 MG/DL (ref 74–99)
GLUCOSE SERPL-MCNC: 104 MG/DL (ref 74–99)
HCT VFR BLD AUTO: 37.3 % (ref 36–46)
HGB BLD-MCNC: 11.9 G/DL (ref 12–16)
MAGNESIUM SERPL-MCNC: 1.87 MG/DL (ref 1.6–2.4)
MCH RBC QN AUTO: 30.6 PG (ref 26–34)
MCHC RBC AUTO-ENTMCNC: 31.9 G/DL (ref 32–36)
MCV RBC AUTO: 96 FL (ref 80–100)
NRBC BLD-RTO: 0 /100 WBCS (ref 0–0)
PHOSPHATE SERPL-MCNC: 3.6 MG/DL (ref 2.5–4.9)
PLATELET # BLD AUTO: 228 X10*3/UL (ref 150–450)
POTASSIUM SERPL-SCNC: 4.1 MMOL/L (ref 3.5–5.3)
RBC # BLD AUTO: 3.89 X10*6/UL (ref 4–5.2)
SODIUM SERPL-SCNC: 137 MMOL/L (ref 136–145)
WBC # BLD AUTO: 9.3 X10*3/UL (ref 4.4–11.3)

## 2024-08-27 PROCEDURE — 83735 ASSAY OF MAGNESIUM: CPT

## 2024-08-27 PROCEDURE — 2500000004 HC RX 250 GENERAL PHARMACY W/ HCPCS (ALT 636 FOR OP/ED)

## 2024-08-27 PROCEDURE — 2500000001 HC RX 250 WO HCPCS SELF ADMINISTERED DRUGS (ALT 637 FOR MEDICARE OP)

## 2024-08-27 PROCEDURE — 99223 1ST HOSP IP/OBS HIGH 75: CPT | Performed by: NURSE PRACTITIONER

## 2024-08-27 PROCEDURE — 1170000001 HC PRIVATE ONCOLOGY ROOM DAILY

## 2024-08-27 PROCEDURE — 99024 POSTOP FOLLOW-UP VISIT: CPT | Performed by: STUDENT IN AN ORGANIZED HEALTH CARE EDUCATION/TRAINING PROGRAM

## 2024-08-27 PROCEDURE — 2060000001 HC INTERMEDIATE ICU ROOM DAILY

## 2024-08-27 PROCEDURE — 36415 COLL VENOUS BLD VENIPUNCTURE: CPT

## 2024-08-27 PROCEDURE — 2500000002 HC RX 250 W HCPCS SELF ADMINISTERED DRUGS (ALT 637 FOR MEDICARE OP, ALT 636 FOR OP/ED)

## 2024-08-27 PROCEDURE — 82947 ASSAY GLUCOSE BLOOD QUANT: CPT

## 2024-08-27 PROCEDURE — 2500000002 HC RX 250 W HCPCS SELF ADMINISTERED DRUGS (ALT 637 FOR MEDICARE OP, ALT 636 FOR OP/ED): Performed by: STUDENT IN AN ORGANIZED HEALTH CARE EDUCATION/TRAINING PROGRAM

## 2024-08-27 PROCEDURE — 94640 AIRWAY INHALATION TREATMENT: CPT

## 2024-08-27 PROCEDURE — 85027 COMPLETE CBC AUTOMATED: CPT

## 2024-08-27 PROCEDURE — 84100 ASSAY OF PHOSPHORUS: CPT

## 2024-08-27 RX ORDER — ONDANSETRON HYDROCHLORIDE 2 MG/ML
8 INJECTION, SOLUTION INTRAVENOUS EVERY 8 HOURS PRN
Status: DISPENSED | OUTPATIENT
Start: 2024-08-27

## 2024-08-27 RX ORDER — GABAPENTIN 300 MG/1
300 CAPSULE ORAL NIGHTLY
Status: DISCONTINUED | OUTPATIENT
Start: 2024-08-27 | End: 2024-08-29

## 2024-08-27 RX ORDER — ONDANSETRON HYDROCHLORIDE 8 MG/1
8 TABLET, FILM COATED ORAL EVERY 8 HOURS PRN
Status: DISPENSED | OUTPATIENT
Start: 2024-08-27

## 2024-08-27 RX ORDER — PROCHLORPERAZINE MALEATE 10 MG
10 TABLET ORAL EVERY 6 HOURS PRN
Status: ACTIVE | OUTPATIENT
Start: 2024-08-27

## 2024-08-27 RX ORDER — PROCHLORPERAZINE EDISYLATE 5 MG/ML
10 INJECTION INTRAMUSCULAR; INTRAVENOUS EVERY 6 HOURS PRN
Status: ACTIVE | OUTPATIENT
Start: 2024-08-27

## 2024-08-27 RX ORDER — PROCHLORPERAZINE MALEATE 10 MG
10 TABLET ORAL EVERY 6 HOURS PRN
Status: DISPENSED | OUTPATIENT
Start: 2024-08-27

## 2024-08-27 RX ORDER — IPRATROPIUM BROMIDE AND ALBUTEROL SULFATE 2.5; .5 MG/3ML; MG/3ML
3 SOLUTION RESPIRATORY (INHALATION)
Status: DISCONTINUED | OUTPATIENT
Start: 2024-08-27 | End: 2024-08-28

## 2024-08-27 RX ORDER — IPRATROPIUM BROMIDE AND ALBUTEROL SULFATE 2.5; .5 MG/3ML; MG/3ML
3 SOLUTION RESPIRATORY (INHALATION) 2 TIMES DAILY
Status: DISCONTINUED | OUTPATIENT
Start: 2024-08-27 | End: 2024-08-27

## 2024-08-27 RX ORDER — ONDANSETRON HYDROCHLORIDE 2 MG/ML
4 INJECTION, SOLUTION INTRAVENOUS ONCE
Status: COMPLETED | OUTPATIENT
Start: 2024-08-27 | End: 2024-08-27

## 2024-08-27 ASSESSMENT — COGNITIVE AND FUNCTIONAL STATUS - GENERAL
TOILETING: A LITTLE
WALKING IN HOSPITAL ROOM: A LITTLE
TURNING FROM BACK TO SIDE WHILE IN FLAT BAD: A LITTLE
CLIMB 3 TO 5 STEPS WITH RAILING: A LOT
TURNING FROM BACK TO SIDE WHILE IN FLAT BAD: A LITTLE
DRESSING REGULAR LOWER BODY CLOTHING: A LITTLE
HELP NEEDED FOR BATHING: A LITTLE
PERSONAL GROOMING: A LITTLE
DRESSING REGULAR UPPER BODY CLOTHING: A LITTLE
DRESSING REGULAR UPPER BODY CLOTHING: A LITTLE
MOVING TO AND FROM BED TO CHAIR: A LITTLE
MOBILITY SCORE: 17
PERSONAL GROOMING: A LITTLE
DAILY ACTIVITIY SCORE: 19
STANDING UP FROM CHAIR USING ARMS: A LITTLE
STANDING UP FROM CHAIR USING ARMS: A LITTLE
MOVING TO AND FROM BED TO CHAIR: A LITTLE
DAILY ACTIVITIY SCORE: 19
MOVING FROM LYING ON BACK TO SITTING ON SIDE OF FLAT BED WITH BEDRAILS: A LITTLE
CLIMB 3 TO 5 STEPS WITH RAILING: A LITTLE
TOILETING: A LITTLE
WALKING IN HOSPITAL ROOM: A LITTLE
HELP NEEDED FOR BATHING: A LITTLE
DRESSING REGULAR LOWER BODY CLOTHING: A LITTLE
MOBILITY SCORE: 18
MOVING FROM LYING ON BACK TO SITTING ON SIDE OF FLAT BED WITH BEDRAILS: A LITTLE

## 2024-08-27 ASSESSMENT — PAIN SCALES - GENERAL
PAINLEVEL_OUTOF10: 8
PAINLEVEL_OUTOF10: 8
PAINLEVEL_OUTOF10: 9
PAINLEVEL_OUTOF10: 7

## 2024-08-27 ASSESSMENT — PAIN - FUNCTIONAL ASSESSMENT
PAIN_FUNCTIONAL_ASSESSMENT: 0-10

## 2024-08-27 NOTE — CONSULTS
"SUPPORTIVE AND PALLIATIVE ONCOLOGY CONSULT    Inpatient consult to [facility] Adult Supportive Oncology  Consult performed by: Ktahy Moreno, APRN-CNP  Consult ordered by: Robin Brownlee MD        SERVICE DATE: 8/27/2024      PALLIATIVE MEDICINE OUTPATIENT PROVIDER:  None  CURRENT ATTENDING PROVIDER: Robin Brownlee MD     Medical Oncologist: No care team member to display   Radiation Oncologist: Jimmie LEYVA MD  Primary Physician: Alton King  [phone]    REASON FOR CONSULT/CHIEF CONSULT COMPLAINT: pain management    Subjective   HISTORY OF PRESENT ILLNESS: Melvi Sanchez is a 64 y.o. female diagnosed with recurrent oropharyngeal cancer s/p definitive chemoradiation to 70 Gy/35 fractions with weekly cisplatin, completed 7/7/22. PMH significant for HTN, hypothyroidism, morbid obesity, UTIs. Admitted 8/26/2024 for Left modified radical neck dissection. Supportive and Palliative Oncology is consulted for pain management.     Pt endorses above.Reports pain is in L cheek/ jaw and in her L leg at graft sites.  \"Interestingly my neck really doesn't hurt right now.\"  Also reports HA from nicotine withdrawal.  Pt did not realize how often she could use the PCA so has been underutilizing it.    Having nausea and 1 episode of emesis this AM.    Last BM Sun AM.    Pain Assessment:  Onset: 1 Days  Location: L cheek/ jaw and in her L leg at graft sites.  Also reports HA from nicotine withdrawal.  Duration: Constant  Characteristics:   Rating: Severe   Descriptors: aching and throbbing in L cheek/ jaw.  Describes L leg as uncomfortable- unable to walk.   Aggravating: movement    Relieving: Analgesics, Positioning, and Modifying activity   Intolerances:Melvi Sanchez is allergic to duloxetine.   Personal Pain Goal: 4    Interference with Function: Very Much   Coping Strategies: distraction and relaxation   Emotional Response: Psychological distress   Barriers to Pain Management: None    Opioid Use  Past 24 h " opioid use:   dilaudid PCA:  0 basal rate, 20 attempts, 20 demand doses of 0.2 mg received = 4 mg = 64 OME  Total 24h OME use:  64  OARRS/PDMP reviewed - no aberrant behavior noted.    Symptom Assessment:  Pain:very much   Headache: very much   Dizziness:none  Lack of energy: somewhat  Difficulty sleeping: none  Worrying: none  Anxiety: a little  Depression: a little  Pain in mouth/swallowing: somewhat  Dry mouth: none  Taste changes: none  Shortness of breath: a little  Lack of appetite: none   Nausea: very much  Vomiting: a little  Constipation: none  Diarrhea: none  Sore muscles: none  Numbness or tingling in hands/feet/other: somewhat    Information obtained from: chart review, interview of patient, discussion with RN, and discussion with primary team  ______________________________________________________________________     Oncology History    No history exists.       Past Medical History:   Diagnosis Date    Acute hypoxemic respiratory failure (Multi) 2023    Breast calcification, left     Dysphagia     Essential (primary) hypertension 2018    HTN (hypertension), benign    Fibroadenoma of left breast     H/O malignant neoplasm of tonsil     s/p XRT and chemotherapy completed .    Hypothyroidism     Morbid obesity (Multi) 2023    Nausea and/or vomiting 2023    OAB (overactive bladder)     Pharyngitis 2023    Pneumonia 2023    Restless legs syndrome     Sinusitis 2023     Past Surgical History:   Procedure Laterality Date    BREAST BIOPSY Left     benign FA     SECTION, LOW TRANSVERSE      CHOLECYSTECTOMY  2014    Cholecystectomy    COLONOSCOPY      HYSTERECTOMY  2014    Hysterectomy    MOUTH SURGERY  2014    Oral Surgery Tooth Extraction     Family History   Problem Relation Name Age of Onset    Emphysema Mother      COPD Mother      Hyperlipidemia Sister      Breast cancer Paternal Grandmother          SOCIAL HISTORY:  Marital  Status - lives alone with puppy Snickers, Children 2 daughters, 1 son, and Employment retired from [employer]   Social History:  reports that she has been smoking cigarettes. She started smoking about 8 months ago. She has a 40.2 pack-year smoking history. She has never used smokeless tobacco. She reports current alcohol use of about 1.0 standard drink of alcohol per week. She reports current drug use. Frequency: 1.00 time per week. Drug: Marijuana.      REVIEW OF SYSTEMS:  Review of systems negative unless noted in HPI.       Objective       Lab Results   Component Value Date    WBC 6.0 08/09/2024    HGB 14.2 08/09/2024    HCT 43.7 08/09/2024    MCV 94 08/09/2024     08/09/2024      Lab Results   Component Value Date    GLUCOSE 76 08/09/2024    CALCIUM 9.3 08/09/2024     08/09/2024    K 4.5 08/09/2024    CO2 27 08/09/2024     08/09/2024    BUN 12 08/09/2024    CREATININE 0.59 08/09/2024     Lab Results   Component Value Date    ALT 33 09/17/2023    AST 18 09/17/2023    ALKPHOS 135 09/17/2023    BILITOT 0.9 09/17/2023     CrCl cannot be calculated (Patient's most recent lab result is older than the maximum 3 days allowed.).     No results found for this or any previous visit (from the past 4464 hour(s)).  Wt Readings from Last 5 Encounters:   08/26/24 71.8 kg (158 lb 4.6 oz)   08/22/24 72.1 kg (159 lb)   08/09/24 72.1 kg (159 lb)   08/09/24 72.3 kg (159 lb 4.8 oz)   07/23/24 70.5 kg (155 lb 6.4 oz)       Current Outpatient Medications   Medication Instructions    latanoprost (Xalatan) 0.005 % ophthalmic solution 1 drop, Left Eye, Nightly    levothyroxine (SYNTHROID, LEVOXYL) 75 mcg, oral, Daily    melatonin 10 mg, oral, Nightly    nicotine (Nicoderm CQ) 21 mg/24 hr patch 1 patch, transdermal, Every 24 hours    rOPINIRole (REQUIP) 2 mg, oral, Nightly    umeclidinium-vilanteroL (Anoro Ellipta) 62.5-25 mcg/actuation blister with device 1 puff, inhalation, Daily    varenicline  (CHANTIX) 1 mg, oral, Daily     Scheduled medications   acetaminophen, 975 mg, oral, q8h   Or  acetaminophen, 1,000 mg, oral, q8h   Or  acetaminophen, 1,000 mg, oral, q8h  ampicillin-sulbactam, 3 g, intravenous, q6h  aspirin, 325 mg, oral, Daily  bacitracin, , Topical, TID  enoxaparin, 40 mg, subcutaneous, q24h  tiotropium, 2 puff, inhalation, Daily   And  formoterol, 20 mcg, nebulization, q12h  hydrogen peroxide, 1 Application, Topical, TID  insulin lispro, 0-5 Units, subcutaneous, TID  ipratropium-albuteroL, 3 mL, nebulization, q6h  levothyroxine, 75 mcg, oral, q AM  melatonin, 10 mg, oral, Nightly  pantoprazole, 40 mg, oral, Daily before breakfast  polyethylene glycol, 17 g, oral, Daily  rOPINIRole, 2 mg, oral, Nightly  varenicline, 1 mg, oral, Daily  [START ON 8/28/2024] white petrolatum, 1 Application, Topical, TID      Continuous medications  HYDROmorphone,   sodium chloride 0.9%, 70 mL/hr      PRN medications  dextrose, 12.5 g, q15 min PRN  dextrose, 25 g, q15 min PRN  glucagon, 1 mg, q15 min PRN  glucagon, 1 mg, q15 min PRN  magnesium citrate, 297 mL, Once PRN  naloxone, 0.2 mg, q5 min PRN  naloxone, 0.2 mg, PRN  ondansetron, 4 mg, q8h PRN   Or  ondansetron, 4 mg, q8h PRN  oxygen, , Continuous PRN - O2/gases         Allergies:   Allergies   Allergen Reactions    Duloxetine Hallucinations     Pt states she doesn't think she has an allergy just a bad reaction when mixed with morphine                PHYSICAL EXAMINATION:  Vital Signs:   Vital signs reviewed  Vitals:    08/27/24 0818   BP: 124/56   Pulse: 64   Resp: 16   Temp: 35.8 °C (96.5 °F)   SpO2: 92%     Pain Score: 8     Physical Exam  Well-developed  F sitting in bedside chair, NAD  A&O x 3, pleasant and cooperative with interview & exam  Breathing comfortably on 2L O2 via NC  Surgical incision L neck c/d/I  JARRETT drains x 2 in L neck, 2 in LLE  Abd soft, NTND, BS +  No edema in ext      ASSESSMENT/PLAN:  Melvi Sanchez is a 64 y.o. female  diagnosed with recurrent oropharyngeal cancer s/p definitive chemoradiation to 70 Gy/35 fractions with weekly cisplatin, completed 7/7/22. PMH significant for HTN, hypothyroidism, morbid obesity, UTIs. Admitted 8/26/2024 for Left modified radical neck dissection. Supportive and Palliative Oncology is consulted for pain management.     Pain:  L cheek, jaw and LLE pain related to post-op  Pain is: cancer related pain and post-operative  Type: visceral and neuropathic  Pain control: sub-optimally controlled  Home regimen:  Acetaminophen 650mg and Ibuprofen 400mg  Intolerances/previously tried: received oxycodone and gabapentin while undergoing TX the first time  Personalized pain goal: 3  Total OME usage for the past 24 hours:  64  Continue PCA:  Demand dose 0.2mg IV dilaudid q10min.  No basal rate.  Encouraged her to use more frequently.  START 300mg gabapentin at bedtime  Consider nicotine patch for HA.  (Pt has been 1.5 PPD for >40 yrs and is now cold turkey)  Continue ropinirole 2mg at bedtime  Continue to monitor pain scores and administer PRN medications as appropriate  Continue/initiate nonpharmacologic pain management strategies including ice/heat therapy, distraction techniques, deep breathing/relaxation techniques, calming music, and repositioning  Continue to monitor for signs of opioid efficacy (pain scores, improved functionality) and toxicity (pinpoint pupils, excess sedation/drowsiness/confusion, respiratory depression, etc.)    Nausea:  Intermittent nausea with vomiting related to opioids   Home regimen:  Ondansetron  and Prochlorperazine   Suboptimally controlled  INCREASE to 8mg zofran q8h PRN  START 10mg compazine q6h PRN    Constipation  At risk for constipation related to opioids, currently not constipated  Usual bowel pattern: every day  Home regimen: Senna 2 tablets PRN and Miralax 17 gm daily PRN  LBM 2 days ago  Monitor BM frequency, adjust regimen as needed  Goal to have BM without straining  q48-72h  Continue miralax daily     Medical Decision Making/Goals of Care/Advance Care Planning:  Patient's current clinical condition, including diagnosis, prognosis, and management plan, and goals of care were discussed.   Life limiting disease:  recurrent oropharyngeal CA  Family: Supportive   Performance status: Moderate limitations due to pain  Joys/meaning/strength: Family and [place]  Understanding of health: Demonstrates good understanding of disease process, understands plan for pain control, PT/OT when able.  Likely treatment as outpt.  Information:Wants full disclosure  Goals: symptom control and cancer directed therapy  Worries and fears now and future: ongoing symptoms     Advance Directives  Existence of Advance Directives:No - has interest  Decision maker: Surrogate decision maker is 3 children (2 daughters, 1 son)  Code Status: Full code    Introduction to Supportive and Palliative Oncology:  Spoke with pt at bedside  Introduced the role and philosophy of Supportive and Palliative oncology in the evaluation and management of symptoms during cancer treatment  Palliative care was introduced as a service for patients with serious illness to help with symptoms, assist with goals of care conversations, navigate complex decision making, improve quality of life for patients, and provide support both patients and families.  Patient seemed to appreciate the extra layer of support.     Supportive Interventions: Interventions: Music Therapy: referral placed, SPO Spiritual Care: referral placed, Social work referral for: Advance Directives    Disposition:  Please  start the process of having prior authorization with meds to beds deliver medications to patient prior to discharge via [facility] pharmacy. Prescriptions will need to be sent 48-72 hours prior to discharge so that a prior authorization can be completed.     Discharge date: unknown pending acute issues  Will assess if patient needs an appointment  with Outpatient Supportive Oncology       Signature and billing:  Thank you for allowing us to participate in the care of this patient. Recommendations will be communicated back to the consulting service by way of shared electronic medical record or face-to-face.    Medical complexity was high level due to due to complexity of problems, extensive data review, and high risk of management/treatment.    I spent 60 minutes in the care of this patient which included chart review, interviewing patient/family, discussion with primary team, coordination of care, and documentation.      DATA   Diagnostic tests and information reviewed for today's visit:  Conversation with primary team, Most recent labs and imaging results, Medications       Some elements copied from H&P note on 8/9/24, the elements have been updated and all reflect current decision making from today, 8/27/2024.    Plan of Care discussed with: Provider, RN, Patient    Thank you for asking Supportive and Palliative Oncology to assist with care of this patient.  Recommendations will be communicated back to the consulting service by way of shared electronic medical record/secure chat/email or face-to-face.   We will continue to follow.  Please contact us for additional questions or concerns.      SIGNATURE: PIYUSH Herrera-CNP  PAGER/CONTACT:  Contact information:  Supportive and Palliative Oncology  Monday-Friday 8 AM-5 PM  Epic Secure chat or pager [phone].  After hours and weekends:  pager [phone]

## 2024-08-27 NOTE — CARE PLAN
The patient's goals for the shift include safety    The clinical goals for the shift include patient will sit in chair for 1 hour by 1800 on 8/27/2024.    Patient was safe and injury free throughout shift. VS WNL. Patient up to chair this morning for 1 hour.      Problem: Skin  Goal: Decreased wound size/increased tissue granulation at next dressing change  Outcome: Progressing  Flowsheets (Taken 8/27/2024 1630)  Decreased wound size/increased tissue granulation at next dressing change: Promote sleep for wound healing  Goal: Participates in plan/prevention/treatment measures  Outcome: Progressing  Flowsheets (Taken 8/27/2024 1630)  Participates in plan/prevention/treatment measures: Elevate heels  Goal: Prevent/manage excess moisture  Outcome: Progressing  Flowsheets (Taken 8/27/2024 1630)  Prevent/manage excess moisture: Moisturize dry skin  Goal: Prevent/minimize sheer/friction injuries  Outcome: Progressing  Flowsheets (Taken 8/27/2024 1630)  Prevent/minimize sheer/friction injuries: Use pull sheet  Goal: Promote/optimize nutrition  Outcome: Progressing  Flowsheets (Taken 8/27/2024 1630)  Promote/optimize nutrition: Monitor/record intake including meals  Goal: Promote skin healing  Outcome: Progressing  Flowsheets (Taken 8/27/2024 1630)  Promote skin healing: Turn/reposition every 2 hours/use positioning/transfer devices

## 2024-08-27 NOTE — PROGRESS NOTES
Otolaryngology-[facility] Daily Progress Note  --------------------------------------------------------------------------  Subjective:  No acute events overnight. This morning patient stated that she felt sore, and patient was somnolent. A PCA pump was then put in the patient's room, and around approximately 10:00 am, patient had one episode of emesis that was 50cc and bilious in nature, she stated she felt less nauseous after the episode of emesis, but was then changed from her regular diet to a full liquid diet.  Additionally, supportive oncology recommendations were to double the dose of scheduled zofran from 4mg to 8mg q6 pRN, with compazine q6prn as a second line agent. Supportive oncology additionally recommended adding 300 TID of gabapentin at night.    --------------------------------------------------------------------------  Objective:  Vitals reviewed in EMR  Gen: NAD, AOx3,  Face/Neck: All incisions c/d/i, neck soft and flat without evidence of hematoma or fluid collection  -Fat window mild ooze  -Internal doppler with strong arterial signal  -CNXI intact  Oral Cavity: No intra oral incisions   Extremities: left leg warm with intact movement and sensation  Drains: All drains in place and holding suction with serosanguinous drainage (stripped)  --------------------------------------------------------------------------    Scheduled medications  acetaminophen, 975 mg, oral, q8h   Or  acetaminophen, 1,000 mg, oral, q8h   Or  acetaminophen, 1,000 mg, oral, q8h  ampicillin-sulbactam, 3 g, intravenous, q6h  aspirin, 325 mg, oral, Daily  bacitracin, , Topical, TID  enoxaparin, 40 mg, subcutaneous, q24h  tiotropium, 2 puff, inhalation, Daily   And  formoterol, 20 mcg, nebulization, q12h  gabapentin, 300 mg, oral, Nightly  hydrogen peroxide, 1 Application, Topical, TID  insulin lispro, 0-5 Units, subcutaneous, TID  ipratropium-albuteroL, 3 mL, nebulization, q6h  levothyroxine, 75 mcg, oral, q AM  melatonin, 10 mg, oral,  Nightly  pantoprazole, 40 mg, oral, Daily before breakfast  polyethylene glycol, 17 g, oral, Daily  rOPINIRole, 2 mg, oral, Nightly  varenicline, 1 mg, oral, Daily  [START ON 8/28/2024] white petrolatum, 1 Application, Topical, TID      Continuous medications  HYDROmorphone,   sodium chloride 0.9%, 70 mL/hr      PRN medications  PRN medications: dextrose, dextrose, glucagon, glucagon, magnesium citrate, naloxone, naloxone, ondansetron **OR** ondansetron, oxygen, prochlorperazine **OR** prochlorperazine   --------------------------------------------------------------------------  Assessment/ Plan:  65 yo female with history of T1N3a SCCa of left tonsil s/p CXRT with recurrent left neck disease now s/p left radical ND 2-4 with CN 11 repair, recon with left ALT.    - Neuro: tylenol 975 q8, gabapentin 300 at bedtime added per supportive oncology recs, requip 2mg whs for restless leg syndrome   > PCA on board, 0.5mg/mL in NS, lockout interval 10 minutes  - Resp: wean O2 as able (4L -> 2L NC currently), q6 duonebs with RT ordered, spiriva and perforomist inhalers on board SpO2 goal >88% (hx COPD)  - CV: RRR  - GI: bowel regimen  - FEN: Monitor and replete electrolytes as needed, zofran 8mg q6 prn, compazine 10mg q6 prn (second line), mIVF due to patient emesis and poor PO intake (70cc/hr NS)  - : voiding spontaneously  - Heme: 11.9/37/3 H/H.  - ID: On day 2 of 4 of Unasyn for surgical prophylaxis, WBC 9.3, patient is afebrile (also on tylenol).  - Drains: monitor output  - Endo: BG low 100s, on SSI#1  - Embolic PPx: Lovenox 40 daily, SCDs while in bed  - Dispo: Pending PT/OT recs, possibly home with [facility].       Guillermina Muller MD  Otolaryngology- Head & Neck Surgery, PGY-1  ENT pager [phone] ([phone])

## 2024-08-27 NOTE — PROGRESS NOTES
08/27/24 1200   Discharge Planning   Living Arrangements Alone   Support Systems Children   Type of Residence Private residence   Who is requesting discharge planning? Provider   Home or Post Acute Services In home services   Type of Home Care Services Home nursing visits;Home PT;Home OT   Expected Discharge Disposition  Services   Does the patient need discharge transport arranged? No     TCC Note    Plan per Medical/Surgical Team:l s/p CXRT with recurrent left neck disease now s/p left radical ND 2-4 with CN 11 repair, recon with left ALT   Status: inpatient   Payor Source: [facility]  Discharge disposition: home  Expected date of discharge: 9/1  Barriers: none at this time  Preferred home care agency: [facility]    TCC met with patient at bedside to discuss anticipated discharge needs. Demographics and insurance verifed with patient. Patient lives alone with support from daughter. Patient was independent with ADLs prior to admission. Patient is agreeable to [facility] upon discharge if needed. Will continue to follow patient for any discharge needs.   Deneen Crandall RN TCC

## 2024-08-27 NOTE — SIGNIFICANT EVENT
Subjective:  Resting comfortably in bed. Endorsing post op pain, PCA started.      Objective:  Vitals reviewed in EMR  Gen: NAD, AOx3, resting comfortably in bed  Face/Neck: All incisions c/d/i, neck soft and flat without evidence of hematoma or fluid collection  -Fat window mild ooze  -Internal doppler with strong arterial signal  Oral Cavity: No intra oral incisions   Extremities: left leg warm with intact movement and sensation  Drains: All drains in place and holding suction with serosanguinous drainage (stripped)    Assessment/Plan:  65 yo female with history of T1N3a SCCa of left tonsil s/p CXRT with recurrent left neck disease now s/p left radical ND 2-4 with CN 11 repair, recon with left ALT    -Continue q4hr flap checks    Zulema Blue MD  ENT [id number]

## 2024-08-27 NOTE — SIGNIFICANT EVENT
Subjective:  Resting comfortably in bed. Endorsing fatigue.    Objective:  Vitals reviewed in EMR  Gen: NAD, AOx3, resting comfortably in bed  Face/Neck: All incisions c/d/i, neck soft and flat without evidence of hematoma or fluid collection  -Fat window mild ooze  -Internal doppler with strong arterial signal  Oral Cavity: No intra oral incisions   Extremities: left leg warm with intact movement and sensation. CN11 intact.  Drains: All drains in place and holding suction with serosanguinous drainage (stripped)    Assessment/Plan:  63 yo female with history of T1N3a SCCa of left tonsil s/p CXRT with recurrent left neck disease now s/p left radical ND 2-4 with CN 11 repair, recon with left ALT.     -Continue q4hr flap checks    Guillermina Muller MD  ENT [id number]

## 2024-08-27 NOTE — SIGNIFICANT EVENT
Subjective:  Resting comfortably in bed. Endorsing nausea immediately after pressing PCA, received PRN zofran     Objective:  Vitals reviewed in EMR  Gen: NAD, AOx3, resting comfortably in bed  Face/Neck: All incisions c/d/i, neck soft and flat without evidence of hematoma or fluid collection  -Fat window mild ooze  -Internal doppler with strong arterial signal  Oral Cavity: No intra oral incisions   Extremities: left leg warm with intact movement and sensation  Drains: All drains in place and holding suction with serosanguinous drainage (stripped)    Assessment/Plan:  65 yo female with history of T1N3a SCCa of left tonsil s/p CXRT with recurrent left neck disease now s/p left radical ND 2-4 with CN 11 repair, recon with left ALT    -Continue q4hr flap checks    Zulema Blue MD  ENT [id number]

## 2024-08-27 NOTE — OP NOTE
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]     Procedures    Left myogenous ALT free flap - 06284    Neck Exploration for vessels    Neurorrhaphy CN 11    Layered closure neck 20cm    Layered closure thigh 30cm    Surgeons   Panel 1:     * Robin Brownlee - Primary  Panel 2:     * Alex Rice - Primary    Resident/Fellow/Other Assistant:  Surgeons and Role:  Panel 1:     * Kishan Rojas MD - Assisting  Panel 2:     * Wilmer Horton DO - Resident - Assisting    Procedure Summary  Anesthesia: General  ASA: III  Anesthesia Staff: Anesthesiologist: Melissa Napoles MD  CRNA: PIYUSH Kat-CRNA  Anesthesia Resident: Leighann Baez MD  Estimated Blood Loss: 20mL  Intra-op Medications:   Administrations occurring from 0715 to 1430 on 24:   Medication Name Total Dose   sodium chloride 0.9 % irrigation solution 2,000 mL   bacitracin ointment 1 Application   gelatin absorbable (Gelfoam) 100 sponge 1 each   aspirin suppository 300 mg   lidocaine (Xylocaine) 20 mg/mL (2 %) injection 30 mL   heparin 25,000 Units in sodium chloride 0.9 % 250 mL irrigation 250 mL              Anesthesia Record               Intraprocedure I/O Totals          Intake    Remifentanil Drip 0.00 mL    The total shown is the total volume documented since Anesthesia Start was filed.    Phenylephrine Drip 0.00 mL    The total shown is the total volume documented since Anesthesia Start was filed.    lactated Ringer's 2200.00 mL    Total Intake 2200 mL       Output    Urine 525 mL    Est. Blood Loss 25 mL    Total Output 550 mL       Net    Net Volume 1650 mL          Specimen:   ID Type Source Tests Collected by Time   1 : LEFT NECK " DISSECTION LEVELS 2-4 STITCH ON LEVEL 2 Tissue NECK DISSECTION LEFT (SPECIFY LEVELS) SURGICAL PATHOLOGY EXAM Robin Brownlee MD 8/26/2024 0934        Staff:   Circulator: Jo-Ann  Scrub Person: Tasha  Scrub Person: Genoveva  Scrub Person: Ashley  Circulator: Ashley    Drains and/or Catheters:   Closed/Suction Drain 3 Left;Anterior Thigh Bulb 10 Fr. (Active)   Site Description Other (Comment) 08/26/24 2100   Dressing Status Dry 08/26/24 2100   Drainage Appearance Serosanguineous 08/26/24 1730   Status To bulb suction 08/26/24 1730   Output (mL) 10 mL 08/27/24 0505       Closed/Suction Drain 4 Left;Anterior Thigh Bulb 10 Fr. (Active)   Site Description Other (Comment) 08/26/24 2100   Dressing Status Dry 08/26/24 2100   Drainage Appearance Serosanguineous 08/26/24 1730   Status To bulb suction 08/26/24 1730   Output (mL) 20 mL 08/27/24 0505       Closed/Suction Drain 1 Anterior;Left Neck Bulb 10 Fr. (Active)   Site Description Other (Comment) 08/26/24 2100   Dressing Status Dry 08/26/24 2100   Drainage Appearance Serosanguineous 08/26/24 1730   Status To bulb suction 08/26/24 1730   Output (mL) 2 mL 08/27/24 0505       Closed/Suction Drain 2 Left;Anterior Neck Bulb 10 Fr. (Active)   Site Description Other (Comment) 08/26/24 2100   Dressing Status Dry 08/26/24 2100   Drainage Appearance Serosanguineous 08/26/24 1730   Status To bulb suction 08/26/24 1730   Output (mL) 0 mL 08/27/24 0505       Urethral Catheter Straight-tip 14 Fr. (Active)   Site Assessment Skin intact 08/27/24 0240   Collection Container Standard drainage bag 08/26/24 1730   Securement Method Securing device (Describe) 08/26/24 1730   Reason for Continuing Urinary Catheterization surgical procedures: urological/gynecological, pelvic oncology, anal, prolonged surgical procedure 08/26/24 1445   Output (mL) 200 mL 08/27/24 0505       Implants:  Implants       Type Name Action Serial No.      Implant DEVICE, ANASTOMATIC 3.5MM PURPLE - [id number]  Implanted               Findings:   -  Defect of L neck including resected SCM and internal jugular vein posterior and lateral pharynx, buccal mucosa and lateral tongue  - CN 11 repaired using 10-0 nylon  -  Vastus lateralis only ALT harvest from L leg with small plug of subQ fat on perfoator as monitor paddle  -  Flap inset into neck to fill dead space and cover carotid artery, fat brought out to left neck skin  -  Feft facial artery artery and facial vein submental branch used for microvascular anastomosis  - JARRETT x2 neck, doppler on artery    Indications: Gretchen Sanchez is an 64 y.o. 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    The patient was seen in the preoperative area. The risks, benefits, complications, treatment options, non-operative alternatives, expected recovery and outcomes were discussed with the patient. The possibilities of reaction to medication, pulmonary aspiration, injury to surrounding structures, bleeding, recurrent infection, the need for additional procedures, failure to diagnose a condition, and creating a complication requiring transfusion or operation were discussed with the patient. The patient concurred with the proposed plan, giving informed consent.  The site of surgery was properly noted/marked if necessary per policy. The patient has been actively warmed in preoperative area. Preoperative antibiotics have been ordered and given within 1 hours of incision. Venous thrombosis prophylaxis have been ordered including bilateral sequential compression devices    Procedure Details:   Upon entering the case Dr. Rice had completed the ablative portion of the case and neck dissection. I confirmed the size of the defect as described above.     I began by marking out the ASIS and the lateral patella. A line was drawn between these two landmarks to approximate the lateral septum. At the senior living point, a 2.5 cm [place] was created. The doppler was  used to find 1 distinct  just posterior to the line. We planned a curvilinear incision just anterior to this line.      The incision was then made with a 15 blade. The dissection was carried down through the fat to identify the rectus fascia. Subfascially dissection was performed over the rectus femoris until the lateral septum was encountered. The rectus was bluntly elevated away from the lateralis and intermedius to expose the descending branch of the circumflex femoral artery. Then we carefully dissected over the lateralis muscle and identified a single, large septocutaneous . However, as our intention was to harvest muscle only, this was not traced out completely.      The pedicle was first freed from the underlying intermedius fascia. The distal pedicle was identified and clipped. A transverse circumflex branch was also identified and clipped. The TFL was elevated off of the vastus lateralis. The vastus muscle was then harvested from inferior to superior in a wide swath, being sure not to damage the main pedicle. We did identify a number of small perforators during the course of the dissection, 2 of which were adjacent to each other and were traced into the subQ fat, and a 4cm fat plug left pedicled on this with a margin of TFL, to use as a monitor paddle. We did not take skin as the pt had tattoos on the thigh she wished not to be violated. The dissection continued onto and up the main pedicle until the flap was perfused only by the pedicle. The flap was harvested with 3 clips on the artery and veins.      We then examined the neck where there were not suitable vessels exposed during the ablation. We dissected deep to the SMG to identify the facial artery which was traced along its entire length, and preserved. There was a very large facial vein which had been preserved but was too large for coupling and was a poor size match to the ALT veins. We therefore traced this superiorly until  identifying a smaller caliber submental branch which was skeletonized flipped down into the neck .    We then brought in the microscope and performed a neurorrhaphy of CN 11 after trimming down the nerve stumps. This was performed with 10-0 nylon.     The facial artery was isolated and cleaned. Some small venous contributions were found and clipped. The adventitia was cleaned off carefully and the lumen was irrigated with heparin saline. A HDS clamp was applied and the artery was noted to have excellent pulsatile flow. A HDS clamp was applied to the base of the facial vein branch as well, there was good back flow.      The microscope was brought into position. The adventitia over the artery was then carefully cleaned off. The vein was  from the artery to allow for some freedom in positioning. Heparin saline was used to flush the artery and there was good flow through the vein.      The arteries were brought together using a double V3 clamp. 9-0 nylon sutures were used to perform microvascular anastomosis in an interrupted fashion. At this point, the venous coupling was performed. A 3.5  was used, and each vein was carefully pinned to their respective side. The  was then closed and released. After this was complete, the vessel clamps were released. The artery had excellent arterial flow. A strip test was performed on the vein and it showed good flow. An implantable doppler was placed on the artery.     We then inset the flap into the neck defect, suturing the muscle circumferentially to the platysma away from the skin edge.      The neck was copiously irrigated and hemostasis was achieved.  Two separate 10 flat drains were then placed and secured.  Gelfoam was used to help with geometry of the vascular pedicle.  The incision was then closed in layers using 3-0 Vicryl for the deep platysmal layer as well as dermis.  The skin was reapproximated with 4-0 plain gut. The internal Doppler was secured  to the skin.      The leg was prepped for closure. Hemostasis was achieved and 2 10 flat drains were placed. A few tacking sutures were placed and then the closure was performed in a layered fashion using 3-0 vicryl  deep and 5-0 fast for the skin.      This marked the completion of the procedure.  Patient was weaned off sedation and taken to PACU in stable condition     The fellow was involved in the critical parts of the advanced portions of this procedure which include the harvest of the free flap, critical portions working under and using the operating microscope for the microvascular anastomosis, insetting of the flap including for the mouth reconstruction as there was  no qualified resident of suitable training available for these portions of the procedure.  Resident involvement in other portions of the procedure going on simultaneously include closure of the donor site as well as obtaining skin graft for closure.    Complications:  None; patient tolerated the procedure well.    Disposition: PACU - hemodynamically stable.  Condition: stable       Additional Details: None    Attending Attestation: I was present and scrubbed for the key portions of the procedure.    Robin Brownlee  Phone Number: [phone]

## 2024-08-27 NOTE — NURSING NOTE
ENT Note:    RN verbalized all care throughout shift. RN demonstrated incision care to patient's daughter and her sister. RN explained that they will need to  baby shampoo or a mild soap.

## 2024-08-27 NOTE — SIGNIFICANT EVENT
Subjective:  Resting comfortably in bed. No new concerns at this time.     Objective:  Vitals reviewed in EMR  Gen: NAD, AOx3, resting comfortably in bed  Face/Neck: All incisions c/d/i, neck soft and flat without evidence of hematoma or fluid collection  -Fat window mild ooze  -Internal doppler with strong arterial signal  -Mild swelling at surgical site, stable   Oral Cavity: No intra oral incisions   Extremities: left leg warm with intact movement and sensation. CN11 intact.  Drains: All drains in place and holding suction with serosanguinous drainage (stripped)    Assessment/Plan:  65 yo female with history of T1N3a SCCa of left tonsil s/p CXRT with recurrent left neck disease now s/p left radical ND 2-4 with CN 11 repair, recon with left ALT.     -Continue q4hr flap checks    Zulema Blue MD  ENT [id number]

## 2024-08-28 ENCOUNTER — APPOINTMENT (OUTPATIENT)
Dept: RADIOLOGY | Facility: HOSPITAL | Age: 64
End: 2024-08-28
Payer: COMMERCIAL

## 2024-08-28 LAB
ALBUMIN SERPL BCP-MCNC: 3.5 G/DL (ref 3.4–5)
ANION GAP SERPL CALC-SCNC: 10 MMOL/L (ref 10–20)
BASOPHILS # BLD AUTO: 0.02 X10*3/UL (ref 0–0.1)
BASOPHILS NFR BLD AUTO: 0.2 %
BUN SERPL-MCNC: 5 MG/DL (ref 6–23)
CALCIUM SERPL-MCNC: 8.1 MG/DL (ref 8.6–10.6)
CHLORIDE SERPL-SCNC: 101 MMOL/L (ref 98–107)
CO2 SERPL-SCNC: 28 MMOL/L (ref 21–32)
CREAT SERPL-MCNC: 0.35 MG/DL (ref 0.5–1.05)
EGFRCR SERPLBLD CKD-EPI 2021: >90 ML/MIN/1.73M*2
EOSINOPHIL # BLD AUTO: 0.01 X10*3/UL (ref 0–0.7)
EOSINOPHIL NFR BLD AUTO: 0.1 %
ERYTHROCYTE [DISTWIDTH] IN BLOOD BY AUTOMATED COUNT: 14 % (ref 11.5–14.5)
GLUCOSE BLD MANUAL STRIP-MCNC: 101 MG/DL (ref 74–99)
GLUCOSE BLD MANUAL STRIP-MCNC: 153 MG/DL (ref 74–99)
GLUCOSE BLD MANUAL STRIP-MCNC: 190 MG/DL (ref 74–99)
GLUCOSE SERPL-MCNC: 112 MG/DL (ref 74–99)
HCT VFR BLD AUTO: 35.6 % (ref 36–46)
HGB BLD-MCNC: 11.1 G/DL (ref 12–16)
IMM GRANULOCYTES # BLD AUTO: 0.1 X10*3/UL (ref 0–0.7)
IMM GRANULOCYTES NFR BLD AUTO: 0.9 % (ref 0–0.9)
LYMPHOCYTES # BLD AUTO: 0.38 X10*3/UL (ref 1.2–4.8)
LYMPHOCYTES NFR BLD AUTO: 3.5 %
MAGNESIUM SERPL-MCNC: 1.69 MG/DL (ref 1.6–2.4)
MCH RBC QN AUTO: 29.9 PG (ref 26–34)
MCHC RBC AUTO-ENTMCNC: 31.2 G/DL (ref 32–36)
MCV RBC AUTO: 96 FL (ref 80–100)
MONOCYTES # BLD AUTO: 0.79 X10*3/UL (ref 0.1–1)
MONOCYTES NFR BLD AUTO: 7.2 %
NEUTROPHILS # BLD AUTO: 9.64 X10*3/UL (ref 1.2–7.7)
NEUTROPHILS NFR BLD AUTO: 88.1 %
NRBC BLD-RTO: 0 /100 WBCS (ref 0–0)
PHOSPHATE SERPL-MCNC: 2.4 MG/DL (ref 2.5–4.9)
PLATELET # BLD AUTO: 199 X10*3/UL (ref 150–450)
POTASSIUM SERPL-SCNC: 4.1 MMOL/L (ref 3.5–5.3)
RBC # BLD AUTO: 3.71 X10*6/UL (ref 4–5.2)
SODIUM SERPL-SCNC: 135 MMOL/L (ref 136–145)
WBC # BLD AUTO: 10.9 X10*3/UL (ref 4.4–11.3)

## 2024-08-28 PROCEDURE — 2500000002 HC RX 250 W HCPCS SELF ADMINISTERED DRUGS (ALT 637 FOR MEDICARE OP, ALT 636 FOR OP/ED): Performed by: STUDENT IN AN ORGANIZED HEALTH CARE EDUCATION/TRAINING PROGRAM

## 2024-08-28 PROCEDURE — 1170000001 HC PRIVATE ONCOLOGY ROOM DAILY

## 2024-08-28 PROCEDURE — 97161 PT EVAL LOW COMPLEX 20 MIN: CPT | Mod: GP

## 2024-08-28 PROCEDURE — 2500000001 HC RX 250 WO HCPCS SELF ADMINISTERED DRUGS (ALT 637 FOR MEDICARE OP)

## 2024-08-28 PROCEDURE — 2500000005 HC RX 250 GENERAL PHARMACY W/O HCPCS

## 2024-08-28 PROCEDURE — S4991 NICOTINE PATCH NONLEGEND: HCPCS

## 2024-08-28 PROCEDURE — 71045 X-RAY EXAM CHEST 1 VIEW: CPT

## 2024-08-28 PROCEDURE — 36415 COLL VENOUS BLD VENIPUNCTURE: CPT

## 2024-08-28 PROCEDURE — 82947 ASSAY GLUCOSE BLOOD QUANT: CPT

## 2024-08-28 PROCEDURE — 2500000004 HC RX 250 GENERAL PHARMACY W/ HCPCS (ALT 636 FOR OP/ED)

## 2024-08-28 PROCEDURE — 80069 RENAL FUNCTION PANEL: CPT

## 2024-08-28 PROCEDURE — 2500000002 HC RX 250 W HCPCS SELF ADMINISTERED DRUGS (ALT 637 FOR MEDICARE OP, ALT 636 FOR OP/ED)

## 2024-08-28 PROCEDURE — 2060000001 HC INTERMEDIATE ICU ROOM DAILY

## 2024-08-28 PROCEDURE — 83735 ASSAY OF MAGNESIUM: CPT

## 2024-08-28 PROCEDURE — 99233 SBSQ HOSP IP/OBS HIGH 50: CPT | Performed by: NURSE PRACTITIONER

## 2024-08-28 PROCEDURE — 85025 COMPLETE CBC W/AUTO DIFF WBC: CPT

## 2024-08-28 PROCEDURE — 97530 THERAPEUTIC ACTIVITIES: CPT | Mod: GP

## 2024-08-28 PROCEDURE — 71045 X-RAY EXAM CHEST 1 VIEW: CPT | Performed by: RADIOLOGY

## 2024-08-28 PROCEDURE — 99024 POSTOP FOLLOW-UP VISIT: CPT | Performed by: OTOLARYNGOLOGY

## 2024-08-28 RX ORDER — NICOTINE 7MG/24HR
1 PATCH, TRANSDERMAL 24 HOURS TRANSDERMAL DAILY PRN
Status: DISCONTINUED | OUTPATIENT
Start: 2024-08-28 | End: 2024-08-28

## 2024-08-28 RX ORDER — IBUPROFEN 200 MG
1 TABLET ORAL DAILY
Status: DISPENSED | OUTPATIENT
Start: 2024-08-28

## 2024-08-28 RX ORDER — OXYCODONE HCL 5 MG/5 ML
5 SOLUTION, ORAL ORAL EVERY 4 HOURS PRN
Status: DISCONTINUED | OUTPATIENT
Start: 2024-08-28 | End: 2024-08-29

## 2024-08-28 RX ORDER — FLUTICASONE FUROATE AND VILANTEROL 200; 25 UG/1; UG/1
1 POWDER RESPIRATORY (INHALATION) DAILY
Status: DISPENSED | OUTPATIENT
Start: 2024-08-28

## 2024-08-28 RX ORDER — HYDROMORPHONE HYDROCHLORIDE 1 MG/ML
0.2 INJECTION, SOLUTION INTRAMUSCULAR; INTRAVENOUS; SUBCUTANEOUS EVERY 2 HOUR PRN
Status: DISPENSED | OUTPATIENT
Start: 2024-08-28

## 2024-08-28 RX ORDER — OXYCODONE HCL 5 MG/5 ML
10 SOLUTION, ORAL ORAL EVERY 4 HOURS PRN
Status: DISCONTINUED | OUTPATIENT
Start: 2024-08-28 | End: 2024-08-29

## 2024-08-28 RX ORDER — GUAIFENESIN 600 MG/1
600 TABLET, EXTENDED RELEASE ORAL 2 TIMES DAILY
Status: DISPENSED | OUTPATIENT
Start: 2024-08-28

## 2024-08-28 RX ORDER — MAGNESIUM SULFATE HEPTAHYDRATE 40 MG/ML
2 INJECTION, SOLUTION INTRAVENOUS ONCE
Status: COMPLETED | OUTPATIENT
Start: 2024-08-28 | End: 2024-08-28

## 2024-08-28 RX ORDER — IPRATROPIUM BROMIDE AND ALBUTEROL SULFATE 2.5; .5 MG/3ML; MG/3ML
3 SOLUTION RESPIRATORY (INHALATION) 4 TIMES DAILY PRN
Status: ACTIVE | OUTPATIENT
Start: 2024-08-28

## 2024-08-28 ASSESSMENT — COGNITIVE AND FUNCTIONAL STATUS - GENERAL
MOVING TO AND FROM BED TO CHAIR: A LITTLE
TURNING FROM BACK TO SIDE WHILE IN FLAT BAD: A LITTLE
WALKING IN HOSPITAL ROOM: A LOT
STANDING UP FROM CHAIR USING ARMS: A LOT
DRESSING REGULAR UPPER BODY CLOTHING: A LITTLE
DAILY ACTIVITIY SCORE: 19
TURNING FROM BACK TO SIDE WHILE IN FLAT BAD: A LITTLE
MOVING FROM LYING ON BACK TO SITTING ON SIDE OF FLAT BED WITH BEDRAILS: A LITTLE
MOVING FROM LYING ON BACK TO SITTING ON SIDE OF FLAT BED WITH BEDRAILS: A LITTLE
WALKING IN HOSPITAL ROOM: A LITTLE
CLIMB 3 TO 5 STEPS WITH RAILING: TOTAL
MOVING TO AND FROM BED TO CHAIR: A LOT
PERSONAL GROOMING: A LITTLE
TOILETING: A LITTLE
MOBILITY SCORE: 18
STANDING UP FROM CHAIR USING ARMS: A LITTLE
HELP NEEDED FOR BATHING: A LITTLE
DRESSING REGULAR LOWER BODY CLOTHING: A LITTLE
MOBILITY SCORE: 13
CLIMB 3 TO 5 STEPS WITH RAILING: A LITTLE

## 2024-08-28 ASSESSMENT — PAIN SCALES - GENERAL
PAINLEVEL_OUTOF10: 4
PAINLEVEL_OUTOF10: 5 - MODERATE PAIN
PAINLEVEL_OUTOF10: 10 - WORST POSSIBLE PAIN
PAINLEVEL_OUTOF10: 9

## 2024-08-28 ASSESSMENT — PAIN - FUNCTIONAL ASSESSMENT: PAIN_FUNCTIONAL_ASSESSMENT: 0-10

## 2024-08-28 NOTE — SIGNIFICANT EVENT
Subjective:  Resting comfortably in bed. No new concerns at this time.     Objective:  Vitals reviewed in EMR  Gen: NAD, AOx3, resting comfortably in bed  Face/Neck: All incisions c/d/i, neck soft and flat without evidence of hematoma or fluid collection  -Fat window mild ooze  -Internal doppler with strong arterial signal  -Mild swelling at surgical site, stable   Oral Cavity: No intra oral incisions   Extremities: left leg warm with intact movement and sensation.   Drains: All drains in place and holding suction with serosanguinous drainage (stripped)    Assessment/Plan:  63 yo female with history of T1N3a SCCa of left tonsil s/p CXRT with recurrent left neck disease now s/p left radical ND 2-4 with CN 11 repair, recon with left ALT.     -Continue q4hr flap checks    Zulema Blue MD  ENT [id number]

## 2024-08-28 NOTE — SIGNIFICANT EVENT
Subjective:  Resting comfortably in bed. No new concerns at this time.     Objective:  Vitals reviewed in EMR  Gen: NAD, AOx3, resting comfortably in bed  Face/Neck: All incisions c/d/i, neck soft and flat without evidence of hematoma or fluid collection  -Fat window mild ooze  -Internal doppler with strong arterial signal  -Mild swelling at surgical site, stable   Oral Cavity: No intra oral incisions   Extremities: left leg warm with intact movement and sensation.   Drains: All drains in place and holding suction with serosanguinous drainage (stripped)    Assessment/Plan:  63 yo female with history of T1N3a SCCa of left tonsil s/p CXRT with recurrent left neck disease now s/p left radical ND 2-4 with CN 11 repair, recon with left ALT.     -Continue q4hr flap checks    Zulema Blue MD  ENT [id number]

## 2024-08-28 NOTE — SIGNIFICANT EVENT
Subjective:  Resting comfortably in bed. Still with nausea, 1x emesis this AM. Pain 9/10.     Objective:  Vitals reviewed in EMR  Gen: NAD, AOx3, resting comfortably in bed  Face/Neck: All incisions c/d/i, neck soft and flat without evidence of hematoma or fluid collection  -Fat window mild ooze  -Internal doppler with strong arterial signal  -Mild swelling at surgical site, stable   Oral Cavity: No intra oral incisions   Extremities: left leg warm with intact movement and sensation. Dressing removed this AM. Bleeding from a proximal site of thigh incision that soaked through gown, with 4 cm area of firmness centered around bleeding.   Drains: All drains in place and holding suction with serosanguinous drainage (stripped)    Assessment/Plan:  65 yo female with history of T1N3a SCCa of left tonsil s/p CXRT with recurrent left neck disease now s/p left radical ND 2-4 with CN 11 repair, recon with left ALT.     -Continue q6hr flap checks  - monitor thigh bleeding  - monitor L neck swelling    Tanmay Sanders MD  ENT [id number]

## 2024-08-28 NOTE — PROGRESS NOTES
Spiritual Care Visit      attempted to visit, however the patient was sleeping and the  let them continue to rest.  will attempt to follow-up later.    Rev. Alejandro Erazo, Supportive Oncology

## 2024-08-28 NOTE — SIGNIFICANT EVENT
Subjective:  Resting comfortably in bed. Reporting continued sensation of swelling at surgical site, overall stable.     Objective:  Vitals reviewed in EMR  Gen: NAD, AOx3, resting comfortably in bed  Face/Neck: All incisions c/d/i, neck soft and flat without evidence of hematoma or fluid collection  -Fat window mild ooze  -Internal doppler with strong arterial signal  -Mild swelling at surgical site, stable   Oral Cavity: No intra oral incisions   Extremities: left leg warm with intact movement and sensation.   Drains: All drains in place and holding suction with serosanguinous drainage (stripped)    Assessment/Plan:  63 yo female with history of T1N3a SCCa of left tonsil s/p CXRT with recurrent left neck disease now s/p left radical ND 2-4 with CN 11 repair, recon with left ALT.     -Continue q6hr flap checks  - monitor thigh bleeding  - monitor L neck swelling    Zulema Blue MD  ENT [id number]

## 2024-08-28 NOTE — PROGRESS NOTES
"Melvi Sanchez \"Gretchen\" is a 64 y.o. female on day 2 of admission presenting with 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).    SUPPORTIVE AND PALLIATIVE ONCOLOGY INPATIENT FOLLOW-UP      SERVICE DATE: 08/28/24     SUBJECTIVE:  Interval Events:  Pt sitting up in bed.  Daughter and granddaughter at bedside.  Pt reports pain is worse today- pain is in her head mainly.  Also LLE.  She continues to have nausea- 1 episode of vomiting this AM.  Has hiccups.  Also reports she is very congested.    PCA was discontinued prior to my visit.  24 hr total 8.4mg IV dilaudid.    Pain Assessment:  Location: L cheek/ jaw and in her L leg at graft sites.  Also reports HA from nicotine withdrawal.  Duration: Constant  Characteristics:              Rating: Severe              Descriptors: aching and throbbing in L cheek/ jaw.  Describes L leg as uncomfortable- unable to walk.              Aggravating: movement               Relieving: Analgesics, Positioning, and Modifying activity              Intolerances:Melvi Sanchez is allergic to duloxetine.              Personal Pain Goal: 4               Interference with Function: Very Much              Coping Strategies: distraction and relaxation              Emotional Response: Psychological distress              Barriers to Pain Management: None    Opioid Use  Past 24 h prn opioid use:  Received 8.4mg IV dilaudid through PCA.  Total 24h OME use:  135  Symptom Assessment:  Nausea very much   Vomiting somewhat  Shortness of breath a little  Anxiety a little  Depression a little    Information obtained from: chart review, interview of patient, interview of family, discussion with RN, and discussion with primary team  ______________________________________________________________________        OBJECTIVE:    Lab Results   Component Value Date    WBC 10.9 08/28/2024    HGB 11.1 (L) 08/28/2024    HCT 35.6 (L) 08/28/2024    MCV 96 08/28/2024     08/28/2024      Lab " Results   Component Value Date    GLUCOSE 112 (H) 08/28/2024    CALCIUM 8.1 (L) 08/28/2024     (L) 08/28/2024    K 4.1 08/28/2024    CO2 28 08/28/2024     08/28/2024    BUN 5 (L) 08/28/2024    CREATININE 0.35 (L) 08/28/2024     Lab Results   Component Value Date    ALT 33 09/17/2023    AST 18 09/17/2023    ALKPHOS 135 09/17/2023    BILITOT 0.9 09/17/2023     Estimated Creatinine Clearance: 125 mL/min (A) (by C-G formula based on SCr of 0.35 mg/dL (L)).     Scheduled medications  acetaminophen, 975 mg, oral, q8h   Or  acetaminophen, 1,000 mg, oral, q8h   Or  acetaminophen, 1,000 mg, oral, q8h  ampicillin-sulbactam, 3 g, intravenous, q6h  aspirin, 325 mg, oral, Daily  enoxaparin, 40 mg, subcutaneous, q24h  fluticasone furoate-vilanteroL, 1 puff, inhalation, Daily  tiotropium, 2 puff, inhalation, Daily   And  formoterol, 20 mcg, nebulization, q12h  gabapentin, 300 mg, oral, Nightly  guaiFENesin, 600 mg, oral, BID  hydrogen peroxide, 1 Application, Topical, TID  insulin lispro, 0-5 Units, subcutaneous, TID  levothyroxine, 75 mcg, oral, q AM  melatonin, 10 mg, oral, Nightly  nicotine, 1 patch, transdermal, Daily  pantoprazole, 40 mg, oral, Daily before breakfast  polyethylene glycol, 17 g, oral, Daily  rOPINIRole, 2 mg, oral, Nightly  sodium phosphate, 15 mmol, intravenous, Once  varenicline, 1 mg, oral, Daily  white petrolatum, 1 Application, Topical, TID      Continuous medications  sodium chloride 0.9%, 70 mL/hr, Last Rate: 70 mL/hr (08/28/24 0006)      PRN medications  dextrose, 12.5 g, q15 min PRN  dextrose, 25 g, q15 min PRN  glucagon, 1 mg, q15 min PRN  glucagon, 1 mg, q15 min PRN  ipratropium-albuteroL, 3 mL, 4x daily PRN  magnesium citrate, 297 mL, Once PRN  naloxone, 0.2 mg, q5 min PRN  naloxone, 0.2 mg, PRN  ondansetron, 8 mg, q8h PRN   Or  ondansetron, 8 mg, q8h PRN  oxyCODONE, 10 mg, q4h PRN  oxyCODONE, 5 mg, q4h PRN  oxygen, , Continuous PRN - O2/gases  prochlorperazine, 10 mg, q6h PRN    Or  prochlorperazine, 10 mg, q6h PRN  prochlorperazine, 10 mg, q6h PRN   Or  prochlorperazine, 10 mg, q6h PRN      }     PHYSICAL EXAMINATION:    Vital Signs:   Vital signs reviewed  Visit Vitals  /61 (BP Location: Right arm, Patient Position: Lying)   Pulse 59   Temp 36.3 °C (97.3 °F) (Temporal)   Resp 16        0-10 (Numeric) Pain Score: 9       Physical Exam   Well-developed  F sitting up in bed, NAD  A&O x 3, pleasant and cooperative with interview & exam  Breathing comfortably on 2L O2 via NC  Surgical incision L neck c/d/I  JARRETT drains x 2 in L neck, 2 in LLE  Abd soft, NTND, BS +  No edema in ext    ASSESSMENT/PLAN:  Melvi Sanchez is a 64 y.o. female diagnosed with recurrent oropharyngeal cancer s/p definitive chemoradiation to 70 Gy/35 fractions with weekly cisplatin, completed 7/7/22. PMH significant for HTN, hypothyroidism, morbid obesity, UTIs. Admitted 8/26/2024 for Left modified radical neck dissection. Supportive and Palliative Oncology is consulted for pain management.      Pain:  L cheek, jaw and LLE pain related to post-op  Pain is: cancer related pain and post-operative  Type: visceral and neuropathic  Pain control: sub-optimally controlled  Home regimen:  Acetaminophen 650mg and Ibuprofen 400mg  Intolerances/previously tried: received oxycodone and gabapentin while undergoing TX the first time  Personalized pain goal: 3  Total OME usage for the past 24 hours:  64  Continue 5-10mg oxycodone q4h PRN  Continue 300mg gabapentin at bedtime  Consider 0.2mg IV dilaudid q2h PRN  for BT pain only  START 21 mg nicotine patch daily  START mucinex for congestion  Encouraged hydration  Continue ropinirole 2mg at bedtime  Continue to monitor pain scores and administer PRN medications as appropriate  Continue/initiate nonpharmacologic pain management strategies including ice/heat therapy, distraction techniques, deep breathing/relaxation techniques, calming music, and repositioning  Continue  to monitor for signs of opioid efficacy (pain scores, improved functionality) and toxicity (pinpoint pupils, excess sedation/drowsiness/confusion, respiratory depression, etc.)     Nausea:  Intermittent nausea with vomiting related to opioids   Home regimen:  Ondansetron  and Prochlorperazine   Suboptimally controlled  Continue 8mg zofran q8h PRN  Continue 10mg compazine q6h PRN     Constipation  At risk for constipation related to opioids, currently not constipated  Usual bowel pattern: every day  Home regimen: Senna 2 tablets PRN and Miralax 17 gm daily PRN  LBM 2 days ago  Monitor BM frequency, adjust regimen as needed  Goal to have BM without straining q48-72h  Continue miralax daily      Medical Decision Making/Goals of Care/Advance Care Planning:  Patient's current clinical condition, including diagnosis, prognosis, and management plan, and goals of care were discussed.   Life limiting disease:  recurrent oropharyngeal CA  Family: Supportive   Performance status: Moderate limitations due to pain  Joys/meaning/strength: Family and Bertie  Understanding of health: Demonstrates good understanding of disease process, understands plan for pain control, PT/OT when able.  Likely treatment as outpt.  Information:Wants full disclosure  Goals: symptom control and cancer directed therapy  Worries and fears now and future: ongoing symptoms      Advance Directives  Existence of Advance Directives:No - has interest  Decision maker: Surrogate decision maker is 3 children (2 daughters, 1 son)  Code Status: Full code     Supportive Interventions: Interventions: Music Therapy: referral placed, SPO Spiritual Care: referral placed, Social work referral for: Advance Directives     Disposition:  Please  start the process of having prior authorization with meds to beds deliver medications to patient prior to discharge via [facility] pharmacy. Prescriptions will need to be sent 48-72 hours prior to discharge so that a prior  authorization can be completed.      Discharge date: unknown pending acute issues  Will assess if patient needs an appointment with Outpatient Supportive Oncology     Supportive and Palliative Oncology encounter:  Spoke with patient at bedside  Emotional support provided  Coordination of care     Signature and billing:  Thank you for allowing us to participate in the care of this patient. Recommendations will be communicated back to the consulting service by way of shared electronic medical record or face-to-face.    Medical complexity was high level due to due to complexity of problems, extensive data review, and high risk of management/treatment.    I spent 45 minutes in the care of this patient which included chart review, interviewing patient/family, discussion with primary team, coordination of care, and documentation.    Data:   Diagnostic tests and information reviewed for today's visit:  Conversation with primary team, Most recent labs and imaging results, Medications       Some elements copied from my note on 8/28/24, the elements have been updated and all reflect current decision making from today, 08/28/24       Plan of Care discussed with: Provider, RN, Patient    Thank you for asking Supportive and Palliative Oncology to assist with care of this patient.  Recommendations will be communicated back to the consulting service by way of shared electronic medical record/secure chat/email or face-to-face.   We will continue to follow  Please contact us for additional questions or concerns.      SIGNATURE: SHARIFA Herrera   PAGER/CONTACT:  Contact information:  Supportive and Palliative Oncology  Monday-Friday 8 AM-5 PM  Epic Secure chat or pager [phone].  After hours and weekends:  pager [phone]

## 2024-08-28 NOTE — PROGRESS NOTES
Otolaryngology: Head & Neck Progress Note    ID statement: Melvi Sanchez is a 64 year old female who presented for recurrent left neck SCC.     Subjective:  POD 2  No acute events overnight. Pain is 9/10. Still having nausea, 1x emesis this AM, nausea especially with movement, and hasn't been out of bed.     Objective:  Vitals reviewed in EMR  Gen: NAD, AOx3, resting comfortably in bed  Eyes: EOMI, sclera clear, PERRL  Ears: Normal external ears bilaterally  Nose: No rhinorrhea; anterior nares clear  Oral Cavity:   Head: normocephalic, atraumatic  Neck: All incisions c/d/i, neck soft and flat without evidence of hematoma or fluid collection  -Fat window mild ooze  -Internal doppler with strong arterial signal  -CNXI intact  -mild stable swelling of left neck  Resp: 3.5L O2 nasal cannula  Cards: RRR  Gastro: Soft, non-distended,   : munoz catheter in place  MSK: all extremities moving appropriately  - Left leg warm with intact movement and sensation  - Left thigh dressing removed this AM. Bleeding from a proximal site of thigh incision on skin edge that soaked through gown, with 4 cm area of firmness centered around bleeding.   Psych: Appropriate mood and affect  Drains: All drains in place and holding suction with serosanguinous drainage (stripped)    Assessment:  63 yo female with history of T1N3a SCCa of left tonsil s/p CXRT with recurrent left neck disease now s/p left radical ND 2-4 with CN 11 repair, recon with left ALT.     Active Issues:  - COPD (no O2 requirement at home)  - Status post neck dissection, ALT free flap  - nausea w/ vomiting  - post operative pain  - hypothyroidism  - nicotine dependence  - restless leg syndrome      Plan:  -Drains: Monitor output  -Analgesia:  Dilaudid PCA, Scheduled Acetaminophen, zofran, compazine for nausea, appreciate supportive onc recs  -FEN: mIVFs, FLD --> will advance when nausea improves; monitor and replete electrolytes as needed  -Pulm: wean oxygen to room  air, scheduled Breo Ellipta, Spiriva, PRN duonebs, encourage incentive spirometry, mucinex for congestion, c/s perioperative medicine for O2 requirement recommendations  -ID: Unasyn x 4days, stop date 8/30  -Cardiac: Vitals Q4hr   -Endo: synthroid 75 mcg  -GI: Bowel regimen, PPI,  and PRN anti-emetic  -: munoz removed 8/28, follow up trial void  -Steroids/Special: Thigh skin edge bleeding --> resolved w/ silver nitrate. Nicotine patch started 8/28, ropinorole  -Embolic PPx: SQH, SCDs while in bed  -Dispo: PT/OT ordered; Discharge planning for POD 5 vs 6 home with self care vs home care    Patient seen and discussed with attending, Dr. Torrie Sanders MD - PGY1  Otolaryngology - Head & Neck Surgery  [facility]    ENT Head & Neck Surgery Phone: [phone]  ENT Consult pager: [phone]  ENT Peds pager: [phone]  ENT subspecialty team: Luli individual resident who wrote today's note  ENT Outpatient scheduling number: [phone]  Please call [phone] or Page [phone] if Urgent

## 2024-08-28 NOTE — PROGRESS NOTES
Physical Therapy    Physical Therapy Evaluation & Treatment    Patient Name: Gretchen Sanchez  MRN: [id number]  Today's Date: 8/28/2024   Time Calculation  Start Time: 1127  Stop Time: 1210  Time Calculation (min): 43 min    Assessment/Plan   PT Assessment  PT Assessment Results: Decreased strength, Decreased endurance, Decreased mobility, Pain  Rehab Prognosis: Excellent  Barriers to Discharge: Current functional mobility  Evaluation/Treatment Tolerance: Patient tolerated treatment well (Mobility limited by nausea)  Medical Staff Made Aware: Yes  Strengths: Premorbid level of function  End of Session Communication: Bedside nurse  Assessment Comment: Previously independent 64 y.o. female presents s/p left radical ND 2-4 with CN 11 repair, recon with left ALT. Pt reports that she was able to sit in chair yesterday with help of nursing staff. However today, pt unable to transfer 2/2 increased nausea. BP taken and no drop indicated. Based on evaluation, pt is appropriate for Moderate Intenisty Rehab after D/C. However, anticipate pt to progress once she becomes less nauseous with mobility.  End of Session Patient Position: Bed, 3 rail up, Alarm on   IP OR SWING BED PT PLAN  Inpatient or Swing Bed: Inpatient  PT Plan  Treatment/Interventions: Bed mobility, Transfer training, Gait training, Stair training, Strengthening, Therapeutic exercise, Therapeutic activity  PT Plan: Ongoing PT  PT Frequency: 4 times per week  PT Discharge Recommendations: Moderate intensity level of continued care (Potential to progress)  Equipment Recommended upon Discharge:  (TBD)  PT - OK to Discharge: Yes      Subjective     General Visit Information:  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  Family/Caregiver Present: No  Prior to Session Communication: Bedside nurse  Patient  Position Received: Bed, 3 rail up, Alarm on  Preferred Learning Style: auditory, verbal, visual  General Comment: Pt pleasant and agreeable to PT session  Home Living:  Home Living  Type of Home: Apartment  Lives With: Alone  Home Adaptive Equipment: None  Home Layout: One level  Home Access: Level entry  Bathroom Shower/Tub: Tub/shower unit  Bathroom Equipment: Grab bars in shower  Prior Level of Function:  Prior Function Per Pt/Caregiver Report  Level of [place]: Independent with ADLs and functional transfers, Independent with homemaking with ambulation  Ambulatory Assistance: Independent  Precautions:  Precautions  Medical Precautions: Fall precautions  Precautions Comment: HOB > 30 degrees, head and neck in neutral positions    Vital Signs (Past 2hrs)        Date/Time Vitals Session Patient Position Pulse Resp SpO2 BP MAP (mmHg)    08/28/24 1135 --  --  65  16  98 %  137/60  --     08/28/24 1140 During PT  --  --  --  --  151/70  --                        Objective   Pain:  Pain Assessment  Pain Assessment: 0-10  0-10 (Numeric) Pain Score: 9  Pain Type: Surgical pain  Pain Location: Leg  Pain Orientation: Left  Cognition:  Cognition  Orientation Level: Oriented X4    General Assessments:    Activity Tolerance  Endurance: Tolerates 30 min exercise with multiple rests  Early Mobility/Exercise Safety Screen: Proceed with mobilization - No exclusion criteria met    Sensation  Light Touch: No apparent deficits    Coordination  Movements are Fluid and Coordinated: Yes    Postural Control  Postural Control: Within Functional Limits    Static Sitting Balance  Static Sitting-Balance Support: Feet supported  Static Sitting-Level of Assistance: Close supervision  Static Sitting-Comment/Number of Minutes: 15mins    Functional Assessments:    Bed Mobility  Bed Mobility: Yes  Bed Mobility 1  Bed Mobility 1: Supine to sitting  Level of Assistance 1: Close supervision, Minimal verbal cues  Bed Mobility Comments 1: HOB  elevated  Bed Mobility 2  Bed Mobility  2: Sitting to supine  Level of Assistance 2: Close supervision, Minimal verbal cues  Bed Mobility Comments 2: HOB elevated    Transfers  Transfer: No (Pt deferring this session 2/2 feeling nauseous)    Extremity/Trunk Assessments:    RLE   RLE : Within Functional Limits  LLE   LLE : Exceptions to WFL  Strength LLE  LLE Overall Strength: Greater than or equal to 3/5 as evidenced by functional mobility, Due to pain  Treatments:  Treatment for increased time with seated balance at EOB and monitoring vital signs with symptoms.    Outcome Measures:  [facility] Basic Mobility  Turning from your back to your side while in a flat bed without using bedrails: A little  Moving from lying on your back to sitting on the side of a flat bed without using bedrails: A little  Moving to and from bed to chair (including a wheelchair): A lot  Standing up from a chair using your arms (e.g. wheelchair or bedside chair): A lot  To walk in hospital room: A lot  Climbing 3-5 steps with railing: Total  Basic Mobility - Total Score: 13    Encounter Problems       Encounter Problems (Active)       Balance       STG - Maintains dynamic standing balance without upper extremity support (Progressing)       Start:  08/28/24    Expected End:  09/11/24       INTERVENTIONS:  1. Practice standing with minimal support.  2. Educate patient about standing tolerance.  3. Educate patient about independence with gait, transfers, and ADL's.  4. Educate patient about use of assistive device.  5. Educate patient about self-directed care.            Mobility       LTG - Patient will ambulate community distance indep with LRD (Progressing)       Start:  08/28/24    Expected End:  09/11/24               PT Transfers       STG - Transfer from bed to chair indep with LRD (Progressing)       Start:  08/28/24    Expected End:  09/11/24            STG - Patient will perform bed mobility indep (Progressing)       Start:  08/28/24     Expected End:  09/11/24                   Education Documentation  Precautions, taught by Sulema Patel, PT at 8/28/2024  1:31 PM.  Learner: Patient  Readiness: Acceptance  Method: Explanation  Response: Verbalizes Understanding    Body Mechanics, taught by Sulema Patel PT at 8/28/2024  1:31 PM.  Learner: Patient  Readiness: Acceptance  Method: Explanation  Response: Verbalizes Understanding    Mobility Training, taught by Sulema Patel PT at 8/28/2024  1:31 PM.  Learner: Patient  Readiness: Acceptance  Method: Explanation  Response: Verbalizes Understanding    Education Comments  No comments found.

## 2024-08-28 NOTE — HOSPITAL COURSE
64 yr old female with T1N3a SCCa Left Tonsil s/p CXRT with recurrent left neck disease s/p left radical ND 2-4 with CN 11 repair with left ALT reconstruction on 8/26/24 with Dr. Rice and Dr. Brownlee. Please see operative report for full details. Patient tolerated the procedure well and recovered briefly in PACU before being transitioned to regular nursing floor. Post-op course was complicated by episodes of emesis and nausea thought to be due to medications used for pain control. Additionally, due to history of COPD patient was having episodes of low oxygen saturation and was placed on supplemental oxygen, patient's prior pulmonologist and respiratory therapy were engaged which breathing treatments (duonebs) as needed, scheduled breo ellipta and spiriva as well as incentive spirometry.  2L NC was then added for nocturnal oxygen supplementation. Home oxygen was then ordered for night time use and patient encouraged to follow up in clinic for BAUTISTA evaluation.    Diet was advanced as tolerated.  IV medication transitioned to oral as diet advanced. The drains were monitored and once the output was less than 30ml in a 24hr time frame they were removed accordingly. On the day of discharge, the pt was tolerating a diet, pain was controlled on PO pain medication, and they were ambulating and voiding spontaneously. They were discharged  in stable condition with instructions to follow up as outpatient.

## 2024-08-29 LAB
ALBUMIN SERPL BCP-MCNC: 3.3 G/DL (ref 3.4–5)
ANION GAP SERPL CALC-SCNC: 10 MMOL/L (ref 10–20)
BASOPHILS # BLD AUTO: 0.02 X10*3/UL (ref 0–0.1)
BASOPHILS NFR BLD AUTO: 0.2 %
BUN SERPL-MCNC: 5 MG/DL (ref 6–23)
CALCIUM SERPL-MCNC: 8 MG/DL (ref 8.6–10.6)
CHLORIDE SERPL-SCNC: 102 MMOL/L (ref 98–107)
CO2 SERPL-SCNC: 29 MMOL/L (ref 21–32)
CREAT SERPL-MCNC: 0.33 MG/DL (ref 0.5–1.05)
EGFRCR SERPLBLD CKD-EPI 2021: >90 ML/MIN/1.73M*2
EOSINOPHIL # BLD AUTO: 0.02 X10*3/UL (ref 0–0.7)
EOSINOPHIL NFR BLD AUTO: 0.2 %
ERYTHROCYTE [DISTWIDTH] IN BLOOD BY AUTOMATED COUNT: 13.5 % (ref 11.5–14.5)
GLUCOSE BLD MANUAL STRIP-MCNC: 106 MG/DL (ref 74–99)
GLUCOSE BLD MANUAL STRIP-MCNC: 113 MG/DL (ref 74–99)
GLUCOSE BLD MANUAL STRIP-MCNC: 113 MG/DL (ref 74–99)
GLUCOSE BLD MANUAL STRIP-MCNC: 116 MG/DL (ref 74–99)
GLUCOSE SERPL-MCNC: 111 MG/DL (ref 74–99)
HCT VFR BLD AUTO: 31.8 % (ref 36–46)
HGB BLD-MCNC: 10.3 G/DL (ref 12–16)
IMM GRANULOCYTES # BLD AUTO: 0.05 X10*3/UL (ref 0–0.7)
IMM GRANULOCYTES NFR BLD AUTO: 0.6 % (ref 0–0.9)
LYMPHOCYTES # BLD AUTO: 0.31 X10*3/UL (ref 1.2–4.8)
LYMPHOCYTES NFR BLD AUTO: 3.4 %
MAGNESIUM SERPL-MCNC: 1.81 MG/DL (ref 1.6–2.4)
MCH RBC QN AUTO: 29.9 PG (ref 26–34)
MCHC RBC AUTO-ENTMCNC: 32.4 G/DL (ref 32–36)
MCV RBC AUTO: 92 FL (ref 80–100)
MONOCYTES # BLD AUTO: 0.77 X10*3/UL (ref 0.1–1)
MONOCYTES NFR BLD AUTO: 8.5 %
NEUTROPHILS # BLD AUTO: 7.86 X10*3/UL (ref 1.2–7.7)
NEUTROPHILS NFR BLD AUTO: 87.1 %
NRBC BLD-RTO: 0 /100 WBCS (ref 0–0)
PHOSPHATE SERPL-MCNC: 2.1 MG/DL (ref 2.5–4.9)
PLATELET # BLD AUTO: 196 X10*3/UL (ref 150–450)
POTASSIUM SERPL-SCNC: 4 MMOL/L (ref 3.5–5.3)
RBC # BLD AUTO: 3.45 X10*6/UL (ref 4–5.2)
SODIUM SERPL-SCNC: 137 MMOL/L (ref 136–145)
WBC # BLD AUTO: 9 X10*3/UL (ref 4.4–11.3)

## 2024-08-29 PROCEDURE — 85025 COMPLETE CBC W/AUTO DIFF WBC: CPT

## 2024-08-29 PROCEDURE — 2500000001 HC RX 250 WO HCPCS SELF ADMINISTERED DRUGS (ALT 637 FOR MEDICARE OP)

## 2024-08-29 PROCEDURE — 1170000001 HC PRIVATE ONCOLOGY ROOM DAILY

## 2024-08-29 PROCEDURE — 2500000004 HC RX 250 GENERAL PHARMACY W/ HCPCS (ALT 636 FOR OP/ED)

## 2024-08-29 PROCEDURE — S4991 NICOTINE PATCH NONLEGEND: HCPCS

## 2024-08-29 PROCEDURE — 36415 COLL VENOUS BLD VENIPUNCTURE: CPT

## 2024-08-29 PROCEDURE — 99233 SBSQ HOSP IP/OBS HIGH 50: CPT | Performed by: NURSE PRACTITIONER

## 2024-08-29 PROCEDURE — 83735 ASSAY OF MAGNESIUM: CPT

## 2024-08-29 PROCEDURE — 80069 RENAL FUNCTION PANEL: CPT

## 2024-08-29 PROCEDURE — 82947 ASSAY GLUCOSE BLOOD QUANT: CPT

## 2024-08-29 PROCEDURE — 97530 THERAPEUTIC ACTIVITIES: CPT | Mod: GP

## 2024-08-29 PROCEDURE — 97165 OT EVAL LOW COMPLEX 30 MIN: CPT | Mod: GO

## 2024-08-29 PROCEDURE — 2060000001 HC INTERMEDIATE ICU ROOM DAILY

## 2024-08-29 PROCEDURE — 97116 GAIT TRAINING THERAPY: CPT | Mod: GP

## 2024-08-29 PROCEDURE — 99024 POSTOP FOLLOW-UP VISIT: CPT | Performed by: OTOLARYNGOLOGY

## 2024-08-29 PROCEDURE — 2500000002 HC RX 250 W HCPCS SELF ADMINISTERED DRUGS (ALT 637 FOR MEDICARE OP, ALT 636 FOR OP/ED)

## 2024-08-29 RX ORDER — GABAPENTIN 100 MG/1
100 CAPSULE ORAL NIGHTLY
Status: DISPENSED | OUTPATIENT
Start: 2024-08-29

## 2024-08-29 RX ORDER — AMOXICILLIN 250 MG
1 CAPSULE ORAL DAILY
Status: DISPENSED | OUTPATIENT
Start: 2024-08-29

## 2024-08-29 RX ORDER — OXYCODONE HCL 5 MG/5 ML
2.5 SOLUTION, ORAL ORAL EVERY 4 HOURS PRN
Status: DISPENSED | OUTPATIENT
Start: 2024-08-29

## 2024-08-29 RX ORDER — OXYCODONE HCL 5 MG/5 ML
7.5 SOLUTION, ORAL ORAL EVERY 4 HOURS PRN
Status: DISPENSED | OUTPATIENT
Start: 2024-08-29

## 2024-08-29 ASSESSMENT — COGNITIVE AND FUNCTIONAL STATUS - GENERAL
MOVING TO AND FROM BED TO CHAIR: A LITTLE
HELP NEEDED FOR BATHING: A LITTLE
CLIMB 3 TO 5 STEPS WITH RAILING: A LOT
STANDING UP FROM CHAIR USING ARMS: A LITTLE
MOVING FROM LYING ON BACK TO SITTING ON SIDE OF FLAT BED WITH BEDRAILS: A LITTLE
DAILY ACTIVITIY SCORE: 23
WALKING IN HOSPITAL ROOM: A LITTLE
TURNING FROM BACK TO SIDE WHILE IN FLAT BAD: A LITTLE
MOBILITY SCORE: 17

## 2024-08-29 ASSESSMENT — PAIN SCALES - GENERAL
PAINLEVEL_OUTOF10: 6
PAINLEVEL_OUTOF10: 7
PAINLEVEL_OUTOF10: 6
PAINLEVEL_OUTOF10: 8
PAINLEVEL_OUTOF10: 7
PAINLEVEL_OUTOF10: 7
PAINLEVEL_OUTOF10: 3
PAINLEVEL_OUTOF10: 6
PAINLEVEL_OUTOF10: 8

## 2024-08-29 ASSESSMENT — PAIN - FUNCTIONAL ASSESSMENT
PAIN_FUNCTIONAL_ASSESSMENT: 0-10

## 2024-08-29 ASSESSMENT — ACTIVITIES OF DAILY LIVING (ADL)
ADL_ASSISTANCE: INDEPENDENT
BATHING_ASSISTANCE: STAND BY

## 2024-08-29 NOTE — PROGRESS NOTES
Music Therapy Note        Therapy Session  Referral Type: New referral this admission  Visit Type: New visit  Session Start Time: 1440  Session End Time: 1442  Intervention Delivery: In-person  Conflict of Service: Declined treatment  Family Present for Session: None               Treatment/Interventions  Areas of Focus: Coping, Emotional support  Music Therapy Interventions: Assessment    Post-assessment  Total Session Time (min): 2 minutes    Narrative  Assessment Detail: Found pt in bed, eating lunch, no family at bedside. Pt pleasant and smiling, said she is feeling better every hour. Says she doesn't think music would help her much more than watching TV would. No other needs at this time.  Plan: Introduce services and assess goals/ preferences in music therapy.  Follow-up: This MT signing off.    Education Documentation  No documentation found.

## 2024-08-29 NOTE — PROGRESS NOTES
"Occupational Therapy    Evaluation    Patient Name: Melvi Sanchez \"Gretchen\"  MRN: [id number]  Today's Date: 8/29/2024  Room: [address] [place], [place] [address]A  Time Calculation  Start Time: 0856  Stop Time: 0922  Time Calculation (min): 26 min    Assessment  IP OT Assessment  OT Assessment: Pt presents close to baseline function demonstrating IND-SBA for functional mobility, ADLs, balance, and endurance. Pt has good home setup and good family support and is mostly IND using compensatory technqiues and increased time and effort. Pt has no skilled OT needs this time and is safe to return home.  Prognosis: Excellent  Barriers to Discharge: None  Evaluation/Treatment Tolerance: Patient tolerated treatment well  Medical Staff Made Aware: Yes  End of Session Communication: Bedside nurse  End of Session Patient Position: Up in chair, Alarm off, not on at start of session  Plan:  Inpatient Plan  No Skilled OT: Safe to return home  OT Frequency: OT eval only  OT Discharge Recommendations: No further acute OT, No OT needed after discharge  OT Recommended Transfer Status: Assist of 1  OT - OK to Discharge: Yes  OT Assessment  Prognosis: Excellent  Barriers to Discharge: None  Evaluation/Treatment Tolerance: Patient tolerated treatment well  Medical Staff Made Aware: Yes  Strengths: Ability to acquire knowledge, Attitude of self, Capable of completing ADLs semi/independent, Coping skills, Insight into problems, Premorbid level of function, Support of Caregivers  Barriers to Participation: Comorbidities    Subjective   Current Problem:  1. 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 Pleasant and agreeable to OT eval. Pt stated it felt good to be up out of bed.   Precautions:  Medical Precautions: Fall precautions  Precautions Comment: HOB > 30 degrees, head and neck in neutral positions  Vital Signs:  Vital Signs (Past 2hrs)        Date/Time Vitals Session Patient Position Pulse Resp SpO2 BP MAP (mmHg)    08/29/24 1013 --  --  68  16  95 %  147/79  --                   Pain:  Pain Assessment  Pain Assessment: 0-10  0-10 (Numeric) Pain Score: 7  Pain Type: Surgical pain  Pain Location: Leg  Pain Orientation: Left  Pain Interventions: Repositioned, Ambulation/increased activity, Rest, Relaxation technique  Response to Interventions: unchanged  Multiple Pain Sites: Two  Pain 2  Pain Score 2: 7  Pain Type 2: Surgical pain  Pain Location 2: Neck  Pain Interventions 2: Repositioned, Ambulation/increased activity, Relaxation technique, Rest  Response to Interventions 2: Improved minimally with change in position  Lines/Tubes/Drains:  Closed/Suction Drain 3 Left;Anterior Thigh Bulb 10 Fr. (Active)   Number of days: 2       Closed/Suction Drain 4 Left;Anterior Thigh Bulb 10 Fr. (Active)   Number of days: 2       Closed/Suction Drain 1 Anterior;Left Neck Bulb 10 Fr. (Active)   Number of days: 2       Closed/Suction Drain 2 Left;Anterior Neck Bulb 10 Fr. (Active)   Number of days: 2         Objective   Cognition:  Overall Cognitive Status: Within Functional Limits  Orientation Level: Oriented X4  Insight: Within function limits  Impulsive: Within functional limits           Home Living:  Type of Home: Apartment  Lives With: Alone (small dog)  Home Adaptive Equipment: None  Home Layout: One level, Laundry main level, Able to live on main level with bedroom/bathroom  Home Access: Level entry  Bathroom Shower/Tub: Tub/shower unit  Bathroom Toilet: Handicapped height  Bathroom Equipment: Grab bars  in shower, Hand-held shower hose  Home Living Comments: First floor apartement at [employer], son lives next apt building over, daughter [place] on weekends and is able to assist   Prior Function:  Level of [place]: Independent with ADLs and functional transfers, Independent with homemaking with ambulation  Receives Help From:  (Son available to assist in PM PRN- works during the day)  ADL Assistance: Independent  Homemaking Assistance: Independent  Ambulatory Assistance: Independent  Hand Dominance: Right  IADL History:  Homemaking Responsibilities: Yes  Homemaking Comments: Primary homemaker as she lives alone  Current License: Yes  Mode of Transportation: Car  Occupation: Retired  Type of Occupation: construction, management  Leisure and Hobbies: Playing with her dog, bingo, camping  ADL:  Eating Assistance: Independent  Grooming Assistance: Independent (demonstrated ability to wash hands at sink after toileting)  Bathing Assistance: Stand by  Bathing Deficit: Supervision/safety  UE Dressing Assistance: Independent  LE Dressing Assistance: Independent (able to doff and don bilateral socks without difficulty)  Toileting Assistance with Device: Independent (as demonstrated on toilet)  ADL Comments: Above levels are as anticipated based on clinical judgement and assessment  Activity Tolerance:  Endurance: Tolerates 30 min exercise with multiple rests  Balance:  Dynamic Sitting Balance  Dynamic Sitting-Comments: SBA  Dynamic Standing Balance  Dynamic Standing-Comments: SBA  Static Sitting Balance  Static Sitting-Comment/Number of Minutes: IND  Static Standing Balance  Static Standing-Comment/Number of Minutes: SBA  Bed Mobility/Transfers: Bed Mobility  Bed Mobility: Yes  Bed Mobility 1  Bed Mobility 1: Supine to sitting  Level of Assistance 1: Close supervision, Minimal verbal cues  Bed Mobility Comments 1: HOB elevated, VCs for strategy  Functional Mobility  Functional Mobility Performed:  Yes  Functional Mobility 1  Comments 1: Pt ambulated MIN household distance in room from bedside to bathroom back to chair using IV pole and SBA   and Transfers  Transfer: Yes  Transfer 1  Transfer From 1: Bed to, Stand to  Transfer to 1: Stand, Bed  Technique 1: Sit to stand, Stand to sit  Transfer Device 1:  (none)  Transfer Level of Assistance 1: Close supervision  Trials/Comments 1: x2 trials  Transfers 2  Transfer From 2: Stand to, Toilet to  Transfer to 2: Toilet, Stand  Technique 2: Stand to sit, Sit to stand  Transfer Level of Assistance 2: Close supervision, Minimal verbal cues  Trials/Comments 2: VCs for positioning and safety  Transfers 3  Transfer From 3: Stand to  Transfer to 3: Chair with arms  Technique 3: Stand to sit  Transfer Level of Assistance 3: Close supervision, Minimal verbal cues  Trials/Comments 3: VCs for positioning and safety  IADL's:   Homemaking Responsibilities: Yes  Homemaking Comments: Primary homemaker as she lives alone  Current License: Yes  Mode of Transportation: Car  Occupation: Retired  Type of Occupation: construction, management  Leisure and Hobbies: Playing with her dog, bingo, camping  Vision: Vision - Basic Assessment  Current Vision: Wears glasses all the time   and Vision - Complex Assessment  Ocular Range of Motion: Within Functional Limits  Sensation:  Light Touch: No apparent deficits (denies N/T in BUE)  Strength:  Strength Comments: JANET WFL- grossly 4/5  Perception:  Inattention/Neglect: Appears intact  Coordination:  Movements are Fluid and Coordinated: Yes   Hand Function:  Hand Function  Gross Grasp: Functional  Coordination: Functional  Extremities: RUE   RUE : Within Functional Limits, LUE   LUE: Within Functional Limits    Outcome Measures: [facility] Daily Activity  Putting on and taking off regular lower body clothing: None  Bathing (including washing, rinsing, drying): A little  Putting on and taking off regular upper body clothing: None  Toileting, which  includes using toilet, bedpan or urinal: None  Taking care of personal grooming such as brushing teeth: None  Eating Meals: None  Daily Activity - Total Score: 23         ,     OT Adult Other Outcome Measures  4AT: -    Education Documentation  Body Mechanics, taught by Bryan Roy OT at 8/29/2024 11:08 AM.  Learner: Patient  Readiness: Acceptance  Method: Explanation, Demonstration  Response: Verbalizes Understanding, Demonstrated Understanding    Precautions, taught by Bryan Roy OT at 8/29/2024 11:08 AM.  Learner: Patient  Readiness: Acceptance  Method: Explanation, Demonstration  Response: Verbalizes Understanding, Demonstrated Understanding    ADL Training, taught by Bryan Roy OT at 8/29/2024 11:08 AM.  Learner: Patient  Readiness: Acceptance  Method: Explanation, Demonstration  Response: Verbalizes Understanding, Demonstrated Understanding    Education Comments  No comments found.      08/29/24 at 11:10 AM   Bryan Roy OT   Rehab Office: [phone]

## 2024-08-29 NOTE — PROGRESS NOTES
"Melvi Sanchez \"Gretchen\" is a 64 y.o. 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    SUPPORTIVE AND PALLIATIVE ONCOLOGY INPATIENT FOLLOW-UP      SERVICE DATE: 24     SUBJECTIVE:  Interval Events:  Pt reports feeling much better today.  Her congestion has improved as well as her pain.  No N/V or HA today either.  She is tired s/p PT and OT.    She is waiting for breakfast to be delivered.    Pain Assessment:  Location: L cheek/ jaw and in her L leg at graft sites.  Duration: Constant  Characteristics:              Ratin              Descriptors: aching and throbbing in L cheek/ jaw.  Describes L leg as uncomfortable- unable to walk.              Aggravating: movement               Relieving: Analgesics, Positioning, and Modifying activity              Intolerances:Melvi Sanchez is allergic to duloxetine.              Personal Pain Goal: 4               Interference with Function: Very Much              Coping Strategies: distraction and relaxation              Emotional Response: Psychological distress              Barriers to Pain Management: None    Opioid Use  Past 24 h prn opioid use: Oxycodone IR 10 mg PO x 5 doses = 50 mg = 75 OME  Total 24h OME use:  75    Symptom Assessment:  Nausea none  Vomiting none  Constipation somewhat  Anxiety a little    Information obtained from: chart review, interview of patient, and discussion with primary team  ______________________________________________________________________        OBJECTIVE:    Lab Results   Component Value Date    WBC 9.0 2024    HGB 10.3 (L) 2024    HCT 31.8 (L) 2024    MCV 92 2024     2024      Lab Results   Component Value Date    GLUCOSE 111 (H) 2024    CALCIUM 8.0 (L) 2024     2024    K 4.0 2024    CO2 29 2024     2024    BUN 5 (L) 2024    CREATININE 0.33 (L) 2024     Lab Results "   Component Value Date    ALT 33 09/17/2023    AST 18 09/17/2023    ALKPHOS 135 09/17/2023    BILITOT 0.9 09/17/2023     Estimated Creatinine Clearance: 125 mL/min (A) (by C-G formula based on SCr of 0.33 mg/dL (L)).     Scheduled medications  acetaminophen, 975 mg, oral, q8h   Or  acetaminophen, 1,000 mg, oral, q8h   Or  acetaminophen, 1,000 mg, oral, q8h  ampicillin-sulbactam, 3 g, intravenous, q6h  aspirin, 325 mg, oral, Daily  enoxaparin, 40 mg, subcutaneous, q24h  fluticasone furoate-vilanteroL, 1 puff, inhalation, Daily  gabapentin, 300 mg, oral, Nightly  guaiFENesin, 600 mg, oral, BID  hydrogen peroxide, 1 Application, Topical, TID  insulin lispro, 0-5 Units, subcutaneous, TID  levothyroxine, 75 mcg, oral, q AM  melatonin, 10 mg, oral, Nightly  nicotine, 1 patch, transdermal, Daily  pantoprazole, 40 mg, oral, Daily before breakfast  polyethylene glycol, 17 g, oral, Daily  rOPINIRole, 2 mg, oral, Nightly  tiotropium, 2 puff, inhalation, Daily  varenicline, 1 mg, oral, Daily  white petrolatum, 1 Application, Topical, TID      Continuous medications     PRN medications  dextrose, 12.5 g, q15 min PRN  dextrose, 25 g, q15 min PRN  glucagon, 1 mg, q15 min PRN  glucagon, 1 mg, q15 min PRN  HYDROmorphone, 0.2 mg, q2h PRN  ipratropium-albuteroL, 3 mL, 4x daily PRN  magnesium citrate, 297 mL, Once PRN  naloxone, 0.2 mg, q5 min PRN  naloxone, 0.2 mg, PRN  ondansetron, 8 mg, q8h PRN   Or  ondansetron, 8 mg, q8h PRN  oxyCODONE, 10 mg, q4h PRN  oxyCODONE, 5 mg, q4h PRN  oxygen, , Continuous PRN - O2/gases  prochlorperazine, 10 mg, q6h PRN   Or  prochlorperazine, 10 mg, q6h PRN  prochlorperazine, 10 mg, q6h PRN   Or  prochlorperazine, 10 mg, q6h PRN      }     PHYSICAL EXAMINATION:    Vital Signs:   Vital signs reviewed  Visit Vitals  /79 (BP Location: Right arm, Patient Position: Lying)   Pulse 68   Temp 36.8 °C (98.2 °F) (Temporal)   Resp 16        0-10 (Numeric) Pain Score: 6       Physical Exam   Well-developed   F sitting up in bed, NAD  A&O x 3, pleasant and cooperative with interview & exam  Breathing comfortably on 2L O2 via NC  Surgical incision L neck c/d/I  JARRETT drains x 2 in L neck, 2 in LLE  Abd soft, NTND, BS +  No edema in ext    ASSESSMENT/PLAN:  Melvi Sanchez is a 64 y.o. female diagnosed with recurrent oropharyngeal cancer s/p definitive chemoradiation to 70 Gy/35 fractions with weekly cisplatin, completed 7/7/22. PMH significant for HTN, hypothyroidism, morbid obesity, UTIs. Admitted 8/26/2024 for Left modified radical neck dissection. Supportive and Palliative Oncology is consulted for pain management.      Pain: Improved.  HA resolved  L cheek, jaw and LLE pain related to post-op  Pain is: cancer related pain and post-operative  Type: visceral and neuropathic  Pain control: sub-optimally controlled  Home regimen:  Acetaminophen 650mg and Ibuprofen 400mg  Intolerances/previously tried: received oxycodone and gabapentin while undergoing TX the first time  Personalized pain goal: 3  Total OME usage for the past 24 hours:  75  Continue 5-10mg oxycodone q4h PRN  Continue 300mg gabapentin at bedtime  Continue 0.2mg IV dilaudid q2h PRN  for BT pain only  Continue 21 mg nicotine patch daily  Continue mucinex for congestion  Encouraged hydration  Continue ropinirole 2mg at bedtime  Continue to monitor pain scores and administer PRN medications as appropriate  Continue/initiate nonpharmacologic pain management strategies including ice/heat therapy, distraction techniques, deep breathing/relaxation techniques, calming music, and repositioning  Continue to monitor for signs of opioid efficacy (pain scores, improved functionality) and toxicity (pinpoint pupils, excess sedation/drowsiness/confusion, respiratory depression, etc.)     Nausea:  Intermittent nausea with vomiting related to opioids   Home regimen:  Ondansetron  and Prochlorperazine   Suboptimally controlled  Continue 8mg zofran q8h PRN  Continue  10mg compazine q6h PRN     Constipation  At risk for constipation related to opioids, currently not constipated  Usual bowel pattern: every day  Home regimen: Senna 2 tablets PRN and Miralax 17 gm daily PRN  LBM 3 days ago  Monitor BM frequency, adjust regimen as needed  Goal to have BM without straining q48-72h  Continue miralax daily      Medical Decision Making/Goals of Care/Advance Care Planning:  Patient's current clinical condition, including diagnosis, prognosis, and management plan, and goals of care were discussed.   Life limiting disease:  recurrent oropharyngeal CA  Family: Supportive   Performance status: Moderate limitations due to pain  Joys/meaning/strength: Family and [place]  Understanding of health: Demonstrates good understanding of disease process, understands plan for pain control, PT/OT when able.  Likely treatment as outpt.  Information:Wants full disclosure  Goals: symptom control and cancer directed therapy  Worries and fears now and future: ongoing symptoms      Advance Directives  Existence of Advance Directives:No - has interest  Decision maker: Surrogate decision maker is 3 children (2 daughters, 1 son)  Code Status: Full code     Supportive Interventions: Interventions: Music Therapy: referral placed, SPO Spiritual Care: referral placed, Social work referral for: Advance Directives     Disposition:  Please  start the process of having prior authorization with meds to beds deliver medications to patient prior to discharge via [facility] pharmacy. Prescriptions will need to be sent 48-72 hours prior to discharge so that a prior authorization can be completed.      Discharge date: unknown pending acute issues  Will assess if patient needs an appointment with Outpatient Supportive Oncology    Supportive and Palliative Oncology encounter:  Spoke with patient at bedside  Emotional support provided  Coordination of care     Signature and billing:  Thank you for allowing us to participate in  the care of this patient. Recommendations will be communicated back to the consulting service by way of shared electronic medical record or face-to-face.    Medical complexity was high level due to due to complexity of problems, extensive data review, and high risk of management/treatment.    I spent 45 minutes in the care of this patient which included chart review, interviewing patient/family, discussion with primary team, coordination of care, and documentation.    Data:   Diagnostic tests and information reviewed for today's visit:  Conversation with primary team, Most recent labs and imaging results, Medications       Some elements copied from my note on 8/28/24, the elements have been updated and all reflect current decision making from today, 08/29/24       Plan of Care discussed with: Provider and Patient    Thank you for asking Supportive and Palliative Oncology to assist with care of this patient.  Recommendations will be communicated back to the consulting service by way of shared electronic medical record/secure chat/email or face-to-face.   We will continue to follow  Please contact us for additional questions or concerns.      SIGNATURE: SHARIFA Herrera   PAGER/CONTACT:  Contact information:  Supportive and Palliative Oncology  Monday-Friday 8 AM-5 PM  Epic Secure chat or pager [phone].  After hours and weekends:  pager [phone]

## 2024-08-29 NOTE — SIGNIFICANT EVENT
Subjective:  Resting comfortably in bed. No new concerns at this time.     Objective:  Vitals reviewed in EMR  Gen: NAD, AOx3, resting comfortably in bed  Face/Neck: All incisions c/d/i, neck soft and flat without evidence of hematoma or fluid collection  -Fat window mild ooze  -Internal doppler with strong arterial signal  -Mild swelling at surgical site, stable   Oral Cavity: No intra oral incisions   Extremities: left leg warm with intact movement and sensation. 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     -Continue q6hr flap checks  - monitor thigh bleeding  - monitor L neck swelling    Zulema Blue MD  ENT [id number]

## 2024-08-29 NOTE — PROGRESS NOTES
08/27/24 1200   Discharge Planning   Living Arrangements Alone   Support Systems Children   Type of Residence Private residence   Who is requesting discharge planning? Provider   Home or Post Acute Services In home services   Type of Home Care Services Home nursing visits;Home PT;Home OT   Expected Discharge Disposition  Services   Does the patient need discharge transport arranged? No     TCC Note    Plan per Medical/Surgical Team:l s/p CXRT with recurrent left neck disease now s/p left radical ND 2-4 with CN 11 repair, recon with left ALT   Status: inpatient   Payor Source: [facility]  Discharge disposition: home  Expected date of discharge: 9/1  Barriers: none at this time  Preferred home care agency: [facility]    TCC met with patient at bedside to discuss anticipated discharge needs. Demographics and insurance verifed with patient. Patient lives alone with support from daughter. Patient was independent with ADLs prior to admission. Patient is agreeable to [facility] upon discharge if needed. Will continue to follow patient for any discharge needs.   Deneen JUAREZ    8/23/24- TCC received updates from patients care team. Patient will discharge home 8/31 vs 9/1 with [facility] PT OT. TCC sent Rx for FWW to [facility]. TCC met with patient at bedside to keep her updated on discharge planning. TCC will continue to follow patient for discharge needs.  Deneen JUAREZ

## 2024-08-29 NOTE — SIGNIFICANT EVENT
Subjective:  Resting comfortably in bed. No new concerns at this time. Now on RA for several hours satting 100%. Has been out of bed 2x today.    Objective:  Vitals reviewed in EMR  Gen: NAD, AOx3, resting comfortably in bed  Face/Neck: All incisions c/d/i, neck soft and flat without evidence of hematoma or fluid collection  -Fat window crusted over, applied petroleum jelly  -Internal doppler with strong arterial signal  -Mild swelling at surgical site, stable   Oral Cavity: No intra oral incisions   Extremities: left leg warm with intact movement and sensation. 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     -Continue q6hr flap checks  - encourage keeping fat window moist with vaseline or xeroform  - monitor L neck swelling    Tanmay Sanders MD  ENT [id number]

## 2024-08-29 NOTE — PROGRESS NOTES
"Physical Therapy    Physical Therapy Treatment    Patient Name: Melvi Sanchez \"Gretchen\"  MRN: [id number]  Today's Date: 8/29/2024  Time Calculation  Start Time: 1019  Stop Time: 1046  Time Calculation (min): 27 min         Assessment/Plan   PT Assessment  PT Assessment Results: Decreased strength, Decreased endurance, Decreased mobility, Pain  Rehab Prognosis: Excellent  Barriers to Discharge: Medical acuity  Evaluation/Treatment Tolerance: Patient tolerated treatment well (Mobility limited by nausea)  Medical Staff Made Aware: Yes  Strengths: Premorbid level of function  End of Session Communication: Bedside nurse  Assessment Comment: Patient able to demonstrate improved function this session with ambulation into hallway. Pt's nausea from yesterday has improved significantly. Discussed with pt that with improved mobility this session PT is now comfortable changing rec to Low Intensity Rehab.  End of Session Patient Position: Bed, 3 rail up, Alarm on     PT Plan  Treatment/Interventions: Bed mobility, Transfer training, Gait training, Stair training, Strengthening, Therapeutic exercise, Therapeutic activity  PT Plan: Ongoing PT  PT Frequency: 4 times per week  PT Discharge Recommendations: Low intensity level of continued care  Equipment Recommended upon Discharge: Wheeled walker  PT - OK to Discharge: Yes      General Visit Information:   PT  Visit  PT Received On: 08/29/24  Response to Previous Treatment: Patient with no complaints from previous session.  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  Family/Caregiver Present: No  Prior to Session Communication: Bedside nurse  Patient Position Received: Bed, 3 rail up, Alarm off, not on at start of session  Preferred Learning Style: auditory, verbal, visual  General Comment: Pt pleasant and agreeable to " PT session    Subjective   Precautions:  Precautions  Medical Precautions: Fall precautions  Precautions Comment: HOB > 30 degrees, head and neck in neutral positions    Vital Signs (Past 2hrs)        Date/Time Vitals Session Patient Position Pulse Resp SpO2 BP MAP (mmHg)    08/29/24 1013 --  --  68  16  95 %  147/79  --                         Objective   Pain:  Pain Assessment  Pain Assessment: 0-10  0-10 (Numeric) Pain Score: 6  Pain Type: Surgical pain  Pain Location: Leg  Pain Orientation: Left  Pain Interventions: Ambulation/increased activity  Pain 2  Pain Score 2: 6  Pain Type 2: Surgical pain  Pain Location 2: Neck  Pain Interventions 2: Rest  Cognition:  Cognition  Orientation Level: Oriented X4  Coordination:  Movements are Fluid and Coordinated: Yes  Postural Control:  Postural Control  Postural Control: Within Functional Limits  Static Sitting Balance  Static Sitting-Balance Support: Feet supported  Static Sitting-Level of Assistance: Close supervision  Static Sitting-Comment/Number of Minutes: 5 mins  Static Standing Balance  Static Standing-Balance Support: Bilateral upper extremity supported  Static Standing-Level of Assistance: Close supervision  Static Standing-Comment/Number of Minutes: 10 mins  Extremity/Trunk Assessments:    RLE   RLE : Within Functional Limits  LLE   LLE : Exceptions to WFL  Strength LLE  LLE Overall Strength: Greater than or equal to 3/5 as evidenced by functional mobility, Due to pain  Activity Tolerance:  Activity Tolerance  Endurance: Tolerates 30+ min exercise without fatigue  Early Mobility/Exercise Safety Screen: Proceed with mobilization - No exclusion criteria met  Treatments:    Therapeutic Activity  Therapeutic Activity Performed: Yes  Therapeutic Activity 1: Seated balance at EOB  Therapeutic Activity 2: Static and dynamic standing balance    Bed Mobility  Bed Mobility: Yes  Bed Mobility 1  Bed Mobility 1: Supine to sitting  Level of Assistance 1: Close supervision,  Minimal verbal cues  Bed Mobility Comments 1: HOB elevated  Bed Mobility 2  Bed Mobility  2: Sitting to supine  Level of Assistance 2: Close supervision, Minimal verbal cues  Bed Mobility Comments 2: HOB elevated    Ambulation/Gait Training  Ambulation/Gait Training Performed: Yes  Ambulation/Gait Training 1  Surface 1: Level tile  Device 1: Rolling walker  Assistance 1: Contact guard, Minimal verbal cues  Quality of Gait 1: Antalgic, Decreased step length  Comments/Distance (ft) 1: 30ft  Transfers  Transfer: Yes  Transfer 1  Transfer From 1: Bed to  Transfer to 1: Stand  Technique 1: Sit to stand  Transfer Device 1: Walker  Transfer Level of Assistance 1: Contact guard, Minimal verbal cues  Transfers 2  Transfer From 2: Stand to  Transfer to 2: Bed  Technique 2: Stand to sit  Transfer Device 2: Walker  Transfer Level of Assistance 2: Contact guard, Minimal verbal cues    Outcome Measures:  [facility] Basic Mobility  Turning from your back to your side while in a flat bed without using bedrails: A little  Moving from lying on your back to sitting on the side of a flat bed without using bedrails: A little  Moving to and from bed to chair (including a wheelchair): A little  Standing up from a chair using your arms (e.g. wheelchair or bedside chair): A little  To walk in hospital room: A little  Climbing 3-5 steps with railing: A lot  Basic Mobility - Total Score: 17    Education Documentation  Precautions, taught by Sulema Patel PT at 8/29/2024 11:06 AM.  Learner: Patient  Readiness: Acceptance  Method: Explanation  Response: Verbalizes Understanding    Body Mechanics, taught by Sulema Patel PT at 8/29/2024 11:06 AM.  Learner: Patient  Readiness: Acceptance  Method: Explanation  Response: Verbalizes Understanding    Mobility Training, taught by Sulema Patel PT at 8/29/2024 11:06 AM.  Learner: Patient  Readiness: Acceptance  Method: Explanation  Response: Verbalizes Understanding    Education Comments  No  comments found.      Encounter Problems       Encounter Problems (Active)       Balance       STG - Maintains dynamic standing balance without upper extremity support (Progressing)       Start:  08/28/24    Expected End:  09/11/24       INTERVENTIONS:  1. Practice standing with minimal support.  2. Educate patient about standing tolerance.  3. Educate patient about independence with gait, transfers, and ADL's.  4. Educate patient about use of assistive device.  5. Educate patient about self-directed care.            Mobility       LTG - Patient will ambulate community distance indep with LRD (Progressing)       Start:  08/28/24    Expected End:  09/11/24               PT Transfers       STG - Transfer from bed to chair indep with LRD (Progressing)       Start:  08/28/24    Expected End:  09/11/24            STG - Patient will perform bed mobility indep (Progressing)       Start:  08/28/24    Expected End:  09/11/24

## 2024-08-29 NOTE — PROGRESS NOTES
Spiritual Care Visit    Clinical Encounter Type  Visited With: Patient  Routine Visit: Introduction  Surgical Visit: Post-op  Referral From: Nurse      introduced self and spiritual care services to patient, explaining services available and checking in to see how patient is doing today.    Patient spoke about her strong nadya support system and her Holiness and loved ones who are praying for her. She spoke about her immense gratitude that her tongue was working after surgery and how nervous she was before surgery about it. We spoke about her 3 children and 6 grandkids and they support for her.     Patient was grateful for her [employer] visit. She has a dog of her own and she misses her very much.     Patient expressed no other needs at this time. Please reach out with any needs/concerns.     Rev. Alejandro Erazo, Supportive Oncology

## 2024-08-29 NOTE — PROGRESS NOTES
Music Therapy Note      Therapy Session  Referral Type: New referral this admission  Visit Type: New visit  Session Start Time: 1410  Intervention Delivery: In-person  Conflict of Service: Working with other staff               Treatment/Interventions            Narrative  Assessment Detail: Found pt working with other staff at time of attempted visit.  Plan: Introduce services and assess goals/ preferences in music therapy.  Follow-up: Will continue to follow.    Education Documentation  No documentation found.

## 2024-08-29 NOTE — PROGRESS NOTES
Otolaryngology: Head & Neck Progress Note    ID statement: Melvi Sanchez is a 64 year old female who presented for recurrent left neck SCC.     Subjective:  POD 3  No acute events overnight. Pain and nausea improved, still having intermittent nausea, PO intake yesterday 1000+ mLs.     Objective:  Vitals reviewed in EMR  Gen: NAD, AOx3, resting comfortably in bed  Eyes: EOMI, sclera clear, PERRL  Ears: Normal external ears bilaterally  Nose: No rhinorrhea; anterior nares clear  Oral Cavity:   Head: normocephalic, atraumatic  Neck: All incisions c/d/i, neck soft and flat without evidence of hematoma or fluid collection  -Fat window mild ooze  -Internal doppler with strong arterial signal  -CNXI intact  -mild swelling of left neck, slightly worse this AM  Resp: 3.5L O2 nasal cannula  Cards: RRR  Gastro: Soft, non-distended,   : voiding  MSK: all extremities moving appropriately  - Left leg warm with intact movement and sensation  - Left thigh incision well approximated, CDI, no bleeding. 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     Active Issues:  - COPD (no O2 requirement at home)  - Status post neck dissection, ALT free flap  - nausea w/ vomiting  - post operative pain  - hypothyroidism  - nicotine dependence  - restless leg syndrome  - acute blood loss anemia  - hypomagnesia  - hypophosphatemia    Plan:  -Drains: Monitor output  -Analgesia:  Dilaudid PCA, Scheduled Acetaminophen, zofran, compazine for nausea, appreciate supportive onc recs  -FEN: HLIV, adv to regular diet; monitor and replete electrolytes as needed  -Pulm: wean oxygen to room air, scheduled Breo Ellipta, Spiriva, PRN duonebs, encourage incentive  spirometry, mucinex for congestion, c/s perioperative medicine for O2 requirement recommendations  -ID: Unasyn x 4days, stop date 8/30  -Cardiac: Vitals Q4hr   -Endo: synthroid 75 mcg  -GI: Bowel regimen, PPI,  and PRN anti-emetic, +pericolace  -: voiding  -Steroids/Special: Thigh skin edge bleeding 8/28 --> resolved w/ silver nitrate. Nicotine patch started 8/28, ropinorole  -Embolic PPx: SQH, SCDs while in bed  -Dispo: PT/OT ordered; Discharge planning for POD 5 vs 6 home with self care vs home care    Patient seen and discussed with attending, Dr. Luu.      Tanmay Sanders MD - PGY1  Otolaryngology - Head & Neck Surgery  [facility]    ENT Head & Neck Surgery Phone: [phone]  ENT Consult pager: [phone]  ENT Peds pager: [phone]  ENT subspecialty team: Luli individual resident who wrote today's note  ENT Outpatient scheduling number: [phone]  Please call [phone] or [phone] if Urgent

## 2024-08-30 LAB
ALBUMIN SERPL BCP-MCNC: 3.1 G/DL (ref 3.4–5)
ANION GAP SERPL CALC-SCNC: 10 MMOL/L (ref 10–20)
BASOPHILS # BLD AUTO: 0.04 X10*3/UL (ref 0–0.1)
BASOPHILS NFR BLD AUTO: 0.7 %
BUN SERPL-MCNC: 5 MG/DL (ref 6–23)
CALCIUM SERPL-MCNC: 8.3 MG/DL (ref 8.6–10.6)
CHLORIDE SERPL-SCNC: 102 MMOL/L (ref 98–107)
CO2 SERPL-SCNC: 30 MMOL/L (ref 21–32)
CREAT SERPL-MCNC: 0.42 MG/DL (ref 0.5–1.05)
EGFRCR SERPLBLD CKD-EPI 2021: >90 ML/MIN/1.73M*2
EOSINOPHIL # BLD AUTO: 0.09 X10*3/UL (ref 0–0.7)
EOSINOPHIL NFR BLD AUTO: 1.6 %
ERYTHROCYTE [DISTWIDTH] IN BLOOD BY AUTOMATED COUNT: 13.4 % (ref 11.5–14.5)
GLUCOSE BLD MANUAL STRIP-MCNC: 112 MG/DL (ref 74–99)
GLUCOSE SERPL-MCNC: 94 MG/DL (ref 74–99)
HCT VFR BLD AUTO: 32.4 % (ref 36–46)
HGB BLD-MCNC: 10.5 G/DL (ref 12–16)
IMM GRANULOCYTES # BLD AUTO: 0.02 X10*3/UL (ref 0–0.7)
IMM GRANULOCYTES NFR BLD AUTO: 0.3 % (ref 0–0.9)
LYMPHOCYTES # BLD AUTO: 0.5 X10*3/UL (ref 1.2–4.8)
LYMPHOCYTES NFR BLD AUTO: 8.6 %
MAGNESIUM SERPL-MCNC: 1.61 MG/DL (ref 1.6–2.4)
MCH RBC QN AUTO: 30.3 PG (ref 26–34)
MCHC RBC AUTO-ENTMCNC: 32.4 G/DL (ref 32–36)
MCV RBC AUTO: 94 FL (ref 80–100)
MONOCYTES # BLD AUTO: 0.71 X10*3/UL (ref 0.1–1)
MONOCYTES NFR BLD AUTO: 12.3 %
NEUTROPHILS # BLD AUTO: 4.43 X10*3/UL (ref 1.2–7.7)
NEUTROPHILS NFR BLD AUTO: 76.5 %
NRBC BLD-RTO: 0 /100 WBCS (ref 0–0)
PHOSPHATE SERPL-MCNC: 2 MG/DL (ref 2.5–4.9)
PLATELET # BLD AUTO: 208 X10*3/UL (ref 150–450)
POTASSIUM SERPL-SCNC: 3.6 MMOL/L (ref 3.5–5.3)
RBC # BLD AUTO: 3.46 X10*6/UL (ref 4–5.2)
SODIUM SERPL-SCNC: 138 MMOL/L (ref 136–145)
WBC # BLD AUTO: 5.8 X10*3/UL (ref 4.4–11.3)

## 2024-08-30 PROCEDURE — 2500000001 HC RX 250 WO HCPCS SELF ADMINISTERED DRUGS (ALT 637 FOR MEDICARE OP)

## 2024-08-30 PROCEDURE — 2500000004 HC RX 250 GENERAL PHARMACY W/ HCPCS (ALT 636 FOR OP/ED)

## 2024-08-30 PROCEDURE — 99211 OFF/OP EST MAY X REQ PHY/QHP: CPT | Performed by: OTOLARYNGOLOGY

## 2024-08-30 PROCEDURE — 36415 COLL VENOUS BLD VENIPUNCTURE: CPT

## 2024-08-30 PROCEDURE — 85025 COMPLETE CBC W/AUTO DIFF WBC: CPT

## 2024-08-30 PROCEDURE — 82947 ASSAY GLUCOSE BLOOD QUANT: CPT

## 2024-08-30 PROCEDURE — 80069 RENAL FUNCTION PANEL: CPT

## 2024-08-30 PROCEDURE — 97110 THERAPEUTIC EXERCISES: CPT | Mod: GP

## 2024-08-30 PROCEDURE — 97116 GAIT TRAINING THERAPY: CPT | Mod: GP

## 2024-08-30 PROCEDURE — 1170000001 HC PRIVATE ONCOLOGY ROOM DAILY

## 2024-08-30 PROCEDURE — 2500000002 HC RX 250 W HCPCS SELF ADMINISTERED DRUGS (ALT 637 FOR MEDICARE OP, ALT 636 FOR OP/ED)

## 2024-08-30 PROCEDURE — 2500000005 HC RX 250 GENERAL PHARMACY W/O HCPCS

## 2024-08-30 PROCEDURE — 83735 ASSAY OF MAGNESIUM: CPT

## 2024-08-30 PROCEDURE — 94640 AIRWAY INHALATION TREATMENT: CPT

## 2024-08-30 PROCEDURE — S4991 NICOTINE PATCH NONLEGEND: HCPCS

## 2024-08-30 PROCEDURE — 99255 IP/OBS CONSLTJ NEW/EST HI 80: CPT | Performed by: ANESTHESIOLOGY

## 2024-08-30 RX ORDER — ACETAMINOPHEN 325 MG/1
975 TABLET ORAL EVERY 8 HOURS
Qty: 45 TABLET | Refills: 0 | Status: SHIPPED | OUTPATIENT
Start: 2024-08-30 | End: 2024-09-06

## 2024-08-30 RX ORDER — ASPIRIN 325 MG
325 TABLET ORAL DAILY
Qty: 26 TABLET | Refills: 0 | Status: SHIPPED | OUTPATIENT
Start: 2024-08-31 | End: 2024-09-27

## 2024-08-30 RX ORDER — PETROLATUM 420 MG/G
1 OINTMENT TOPICAL 3 TIMES DAILY
Qty: 200 G | Refills: 0 | Status: SHIPPED | OUTPATIENT
Start: 2024-08-30

## 2024-08-30 RX ORDER — NALOXONE HYDROCHLORIDE 4 MG/.1ML
4 SPRAY NASAL AS NEEDED
Qty: 2 EACH | Refills: 11 | Status: SHIPPED | OUTPATIENT
Start: 2024-08-30

## 2024-08-30 RX ORDER — PANTOPRAZOLE SODIUM 40 MG/1
40 TABLET, DELAYED RELEASE ORAL
Qty: 14 TABLET | Refills: 0 | Status: SHIPPED | OUTPATIENT
Start: 2024-08-31 | End: 2024-09-15

## 2024-08-30 RX ORDER — POLYETHYLENE GLYCOL 3350 17 G/17G
17 POWDER, FOR SOLUTION ORAL DAILY
Qty: 7 PACKET | Refills: 0 | Status: SHIPPED | OUTPATIENT
Start: 2024-08-31 | End: 2024-09-08

## 2024-08-30 RX ORDER — BISACODYL 10 MG/1
10 SUPPOSITORY RECTAL ONCE
OUTPATIENT
Start: 2024-08-30

## 2024-08-30 RX ORDER — OXYCODONE HYDROCHLORIDE 5 MG/1
5 TABLET ORAL EVERY 6 HOURS PRN
Qty: 12 TABLET | Refills: 0 | Status: SHIPPED | OUTPATIENT
Start: 2024-08-30 | End: 2024-09-01 | Stop reason: HOSPADM

## 2024-08-30 RX ORDER — MAGNESIUM SULFATE HEPTAHYDRATE 40 MG/ML
4 INJECTION, SOLUTION INTRAVENOUS ONCE
Status: COMPLETED | OUTPATIENT
Start: 2024-08-30 | End: 2024-08-30

## 2024-08-30 RX ORDER — ONDANSETRON HYDROCHLORIDE 8 MG/1
8 TABLET, FILM COATED ORAL EVERY 8 HOURS PRN
Qty: 20 TABLET | Refills: 0 | Status: SHIPPED | OUTPATIENT
Start: 2024-08-30 | End: 2024-09-08

## 2024-08-30 ASSESSMENT — COGNITIVE AND FUNCTIONAL STATUS - GENERAL
HELP NEEDED FOR BATHING: A LITTLE
DRESSING REGULAR UPPER BODY CLOTHING: A LITTLE
CLIMB 3 TO 5 STEPS WITH RAILING: A LOT
MOBILITY SCORE: 17
MOBILITY SCORE: 19
WALKING IN HOSPITAL ROOM: A LITTLE
STANDING UP FROM CHAIR USING ARMS: A LITTLE
TOILETING: A LITTLE
DRESSING REGULAR LOWER BODY CLOTHING: A LITTLE
HELP NEEDED FOR BATHING: A LITTLE
MOVING TO AND FROM BED TO CHAIR: A LITTLE
STANDING UP FROM CHAIR USING ARMS: A LITTLE
WALKING IN HOSPITAL ROOM: A LITTLE
TURNING FROM BACK TO SIDE WHILE IN FLAT BAD: A LITTLE
TOILETING: A LITTLE
CLIMB 3 TO 5 STEPS WITH RAILING: A LOT
WALKING IN HOSPITAL ROOM: A LITTLE
STANDING UP FROM CHAIR USING ARMS: A LITTLE
CLIMB 3 TO 5 STEPS WITH RAILING: A LOT
MOVING TO AND FROM BED TO CHAIR: A LITTLE
DRESSING REGULAR LOWER BODY CLOTHING: A LITTLE
MOBILITY SCORE: 19
DRESSING REGULAR UPPER BODY CLOTHING: A LITTLE
DAILY ACTIVITIY SCORE: 20
MOVING TO AND FROM BED TO CHAIR: A LITTLE
MOVING FROM LYING ON BACK TO SITTING ON SIDE OF FLAT BED WITH BEDRAILS: A LITTLE
DAILY ACTIVITIY SCORE: 20

## 2024-08-30 ASSESSMENT — ENCOUNTER SYMPTOMS
EYES NEGATIVE: 1
COUGH: 1
MUSCULOSKELETAL NEGATIVE: 1
FACIAL SWELLING: 1
VOICE CHANGE: 1
SHORTNESS OF BREATH: 1
CARDIOVASCULAR NEGATIVE: 1
SORE THROAT: 1
ENDOCRINE NEGATIVE: 1
GASTROINTESTINAL NEGATIVE: 1
ACTIVITY CHANGE: 1
APPETITE CHANGE: 1
TROUBLE SWALLOWING: 1

## 2024-08-30 ASSESSMENT — PAIN - FUNCTIONAL ASSESSMENT
PAIN_FUNCTIONAL_ASSESSMENT: 0-10

## 2024-08-30 ASSESSMENT — PAIN SCALES - GENERAL
PAINLEVEL_OUTOF10: 0 - NO PAIN
PAINLEVEL_OUTOF10: 4
PAINLEVEL_OUTOF10: 0 - NO PAIN
PAINLEVEL_OUTOF10: 9
PAINLEVEL_OUTOF10: 6
PAINLEVEL_OUTOF10: 8
PAINLEVEL_OUTOF10: 8

## 2024-08-30 NOTE — PROGRESS NOTES
Otolaryngology: Head & Neck Progress Note    ID statement: Melvi Sanchez is a 64 year old female who presented for recurrent left neck SCC.     Subjective:  POD 3  No acute events overnight. Pain and nausea improved,     Objective:  Vitals reviewed in EMR  Gen: NAD, AOx3, resting comfortably in bed  Eyes: EOMI, sclera clear, PERRL  Ears: Normal external ears bilaterally  Nose: No rhinorrhea; anterior nares clear  Oral Cavity:   Head: normocephalic, atraumatic  Neck: All incisions c/d/i, neck soft and flat without evidence of hematoma or fluid collection  -Fat window mild ooze  -Internal doppler with strong arterial signal  -CNXI intact  -mild swelling of left neck, slightly worse this AM  Resp: 3.5L O2 nasal cannula  Cards: RRR  Gastro: Soft, non-distended,   : voiding  MSK: all extremities moving appropriately  - Left leg warm with intact movement and sensation  - Left thigh incision well approximated, CDI, no bleeding. 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     Active Issues:  - COPD (no O2 requirement at home)  - Status post neck dissection, ALT free flap  - nausea w/ vomiting  - post operative pain  - hypothyroidism  - nicotine dependence  - restless leg syndrome  - acute blood loss anemia  - hypomagnesia  - hypophosphatemia    Plan:  -Drains: Monitor output  -Analgesia:  Dilaudid PCA, Scheduled Acetaminophen, zofran, compazine for nausea, decreased gabapentin and oxycodone doses with patient stating her pain is well controlled.   -FEN: HLIV, adv to regular diet; monitor and replete electrolytes as needed  -Pulm: wean oxygen to room air, scheduled Breo Ellipta, Spiriva, PRN duonebs, encourage incentive  spirometry, mucinex for congestion, c/s perioperative medicine for O2 requirement recommendations, Per Dr. Mckeon: decrease sedating analgesics, ambulate as much as possible, aggressive IS, HOB > 40 degrees, if unable to be weaned from O2 for nocturnal desats may need to be discharged on nocturnal O2. 2L NC while sleeping  -ID: Unasyn x 4days, stop date 8/30  -Cardiac: Vitals Q4hr   -Endo: synthroid 75 mcg  -GI: Bowel regimen (had bowel movement after suppository), PPI,  and PRN anti-emetic, +pericolace  -: voiding  -Steroids/Special: Thigh skin edge bleeding 8/28 --> resolved w/ silver nitrate. Nicotine patch started 8/28, ropinorole  -Embolic PPx: SQH, SCDs while in bed  -Dispo: PT/OT ordered recommend low intensity PT/no further needs OT; Discharge planning for POD 5 vs 6 home with self care vs home care    Patient discussed with attendings and seen with Dr. Rice.       Guillermina Muller MD - PGY1  Otolaryngology - Head & Neck Surgery  [facility]    ENT Head & Neck Surgery Phone: [phone]  ENT Consult pager: [phone]  ENT Peds pager: [phone]  ENT subspecialty team: Luli individual resident who wrote today's note  ENT Outpatient scheduling number: [phone]  Please call [phone] or [phone] if Urgent

## 2024-08-30 NOTE — SIGNIFICANT EVENT
Subjective:  Resting comfortably in bed. No new concerns at this time.     Objective:  Vitals reviewed in EMR  Gen: NAD, AOx3, resting comfortably in bed  Face/Neck: All incisions c/d/i, neck soft and flat without evidence of hematoma or fluid collection  -Fat window with xeroform covering   -Internal doppler with strong arterial signal  -Mild swelling at surgical site, stable   Oral Cavity: No intra oral incisions   Extremities: left leg warm with intact movement and sensation. 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     -Continue q12 flap checks    Pablo Gannon MD  ENT [phone]

## 2024-08-30 NOTE — PROGRESS NOTES
"Physical Therapy    Physical Therapy Treatment    Patient Name: Melvi Sanchez \"Gretchen\"  MRN: [id number]  Today's Date: 8/30/2024  Time Calculation  Start Time: 1015  Stop Time: 1040  Time Calculation (min): 25 min    Assessment/Plan   PT Assessment  Evaluation/Treatment Tolerance: Patient tolerated treatment well  Medical Staff Made Aware: Yes  End of Session Communication: Bedside nurse  Assessment Comment: Pt with improved upright tolerance and ambulation to date. Pt was bale to ambulate 175 ft x 2 with WW and CGA. Pt steady and with 0 LOB. Patient continues to benefit from skilled physical therapy to maximize functional mobility and safety. Pt remains appropriate for low intensity therapy when medically appropriate for DC.  End of Session Patient Position: Bed, 3 rail up, Alarm off, not on at start of session     PT Plan  Treatment/Interventions: Bed mobility, Transfer training, Gait training, Stair training, Strengthening, Therapeutic exercise, Therapeutic activity  PT Plan: Ongoing PT  PT Frequency: 4 times per week  PT Discharge Recommendations: Low intensity level of continued care  Equipment Recommended upon Discharge: Wheeled walker  PT - OK to Discharge: Yes  Rn cleared prior to session    General Visit Information:   PT  Visit  PT Received On: 08/30/24  Response to Previous Treatment: Patient with no complaints from previous session.  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  Family/Caregiver Present: No  Prior to Session Communication: Bedside nurse  Patient Position Received: Up in room (with RN from bathroom)  Preferred Learning Style: auditory, verbal, visual  General Comment: Pt pleasant and agreeable to PT session    Subjective   Precautions:  Precautions  Medical Precautions: Fall precautions  Precautions Comment: HOB > 30 degrees, " head and neck in neutral positions    Objective   Pain:  Pain Assessment  Pain Assessment: 0-10 (pt did not state)  0-10 (Numeric) Pain Score: 0 - No pain  Cognition:  Cognition  Overall Cognitive Status: Within Functional Limits  Arousal/Alertness: Appropriate responses to stimuli  Orientation Level: Oriented X4  Following Commands: Follows multistep commands consistently  Coordination:  Movements are Fluid and Coordinated: Yes  Postural Control:  Postural Control  Postural Control: Within Functional Limits    Activity Tolerance:  Activity Tolerance  Endurance: Tolerates 30+ min exercise without fatigue  Treatments:  Therapeutic Exercise  Therapeutic Exercise Performed: Yes  Therapeutic Exercise Activity 1: seated active LAQ, AP, and marches 2 x 10 B; LLE LAQ through half ROM 2/2 tightness/pain    Therapeutic Activity  Therapeutic Activity Performed: Yes  Therapeutic Activity 1: static/dynamic standing with no AD x 3 min in room with close supervision    Bed Mobility  Bed Mobility: Yes  Bed Mobility 1  Bed Mobility 1: Sitting to supine  Level of Assistance 1: Close supervision, Minimal verbal cues  Bed Mobility Comments 1: HOB elevated; min vc for sequencing    Ambulation/Gait Training  Ambulation/Gait Training Performed: Yes  Ambulation/Gait Training 1  Surface 1: Level tile  Device 1: Rolling walker  Assistance 1: Contact guard, Minimal verbal cues  Quality of Gait 1: Decreased step length, Antalgic, Inconsistent stride length (decreased foot clearance)  Comments/Distance (ft) 1: 175 x 2 with extended seated rest break; min vc for safety  Transfers  Transfer: Yes  Transfer 1  Transfer From 1: Sit to, Stand to  Transfer to 1: Stand, Sit  Technique 1: Sit to stand, Stand to sit  Transfer Device 1: Walker  Transfer Level of Assistance 1: Contact guard, Minimal verbal cues  Trials/Comments 1: 2 trials; min vc for hand placement    Outcome Measures:  [facility] Basic Mobility  Turning from your back to your side while in a  flat bed without using bedrails: None  Moving from lying on your back to sitting on the side of a flat bed without using bedrails: None  Moving to and from bed to chair (including a wheelchair): A little  Standing up from a chair using your arms (e.g. wheelchair or bedside chair): A little  To walk in hospital room: A little  Climbing 3-5 steps with railing: A lot  Basic Mobility - Total Score: 19    Education Documentation  Precautions, taught by Linda Eisenberg PT at 8/30/2024 11:12 AM.  Learner: Patient  Readiness: Acceptance  Method: Demonstration, Explanation  Response: Verbalizes Understanding, Needs Reinforcement    Body Mechanics, taught by Linda Eisenberg PT at 8/30/2024 11:12 AM.  Learner: Patient  Readiness: Acceptance  Method: Demonstration, Explanation  Response: Verbalizes Understanding, Needs Reinforcement    Mobility Training, taught by Linda Eisenberg PT at 8/30/2024 11:12 AM.  Learner: Patient  Readiness: Acceptance  Method: Demonstration, Explanation  Response: Verbalizes Understanding, Needs Reinforcement    Education Comments  No comments found.      Encounter Problems       Encounter Problems (Active)       Balance       STG - Maintains dynamic standing balance without upper extremity support (Progressing)       Start:  08/28/24    Expected End:  09/11/24       INTERVENTIONS:  1. Practice standing with minimal support.  2. Educate patient about standing tolerance.  3. Educate patient about independence with gait, transfers, and ADL's.  4. Educate patient about use of assistive device.  5. Educate patient about self-directed care.            Mobility       LTG - Patient will ambulate community distance indep with LRD (Progressing)       Start:  08/28/24    Expected End:  09/11/24               PT Transfers       STG - Transfer from bed to chair indep with LRD (Progressing)       Start:  08/28/24    Expected End:  09/11/24            STG - Patient will perform bed mobility indep (Progressing)        Start:  08/28/24    Expected End:  09/11/24

## 2024-08-30 NOTE — CONSULTS
Consults    Reason For Consult  Nocturnal desats    History Of Present Illness  Gretchen Sanchez is a 64 y.o. female presenting for Left neck dissection and flap recon     She was diagnosed with tonsillar CA due to left neck lump in 2022. She finished XRT and chemotherapy on . She is currently on Anoro for COPD. She is currently on Chantix as inpatient  and using nicotine patches but has  a 70pkyr smoking history In 2023 had pneumonia  She was discharged home on 3L at rest and 6L on exertion.     Subsequent Pulm clinic follow up was successfully weaned off O2 when she did well on the 6 min Walk test.    She is now postop from major Head and Neck cancer surgery and had been unable to wean o2 initially but managed to keep stable O2 of > 92% on RA during the day time but continued to have nocturnal desats.    Then patient's  family in the room mentions she snores loudly daily at night during Sleep and daughter has observed her having apneic spells. Pt had Sleep study 20yrs ago  which was Neg and pt mentions she had significant wt loss     Past Medical History  She has a past medical history of Acute hypoxemic respiratory failure (Multi) (2023), Breast calcification, left, Dysphagia, Essential (primary) hypertension (2018), Fibroadenoma of left breast, H/O malignant neoplasm of tonsil, Hypothyroidism, Morbid obesity (Multi) (2023), Nausea and/or vomiting (2023), OAB (overactive bladder), Pharyngitis (2023), Pneumonia (2023), Restless legs syndrome, and Sinusitis (2023).    Surgical History  She has a past surgical history that includes Cholecystectomy (2014); Hysterectomy (2014); Mouth surgery (2014); Breast biopsy (Left); Colonoscopy; and  section, low transverse.     Social History  She reports that she has been smoking cigarettes. She started smoking about 9 months ago. She has a 40.2 pack-year smoking history. She has never  used smokeless tobacco. She reports current alcohol use of about 1.0 standard drink of alcohol per week. She reports current drug use. Frequency: 1.00 time per week. Drug: Marijuana.    Family History  Family History   Problem Relation Name Age of Onset    Emphysema Mother      COPD Mother      Hyperlipidemia Sister      Breast cancer Paternal Grandmother          Allergies  Duloxetine    Review of Systems  Review of Systems   Constitutional:  Positive for activity change and appetite change.   HENT:  Positive for facial swelling, sore throat, trouble swallowing and voice change.    Eyes: Negative.    Respiratory:  Positive for cough and shortness of breath.    Cardiovascular: Negative.    Gastrointestinal: Negative.    Endocrine: Negative.    Genitourinary: Negative.    Musculoskeletal: Negative.          Physical Exam         Last Recorded Vitals  /82 (BP Location: Left arm, Patient Position: Lying)   Pulse 67   Temp 36.9 °C (98.4 °F) (Temporal)   Resp 16   Wt 71.8 kg (158 lb 4.6 oz)   SpO2 97%     Relevant Results  Results for orders placed or performed during the hospital encounter of 08/26/24 (from the past 96 hour(s))   POCT GLUCOSE   Result Value Ref Range    POCT Glucose 116 (H) 74 - 99 mg/dL   Renal Function Panel   Result Value Ref Range    Glucose 104 (H) 74 - 99 mg/dL    Sodium 137 136 - 145 mmol/L    Potassium 4.1 3.5 - 5.3 mmol/L    Chloride 104 98 - 107 mmol/L    Bicarbonate 26 21 - 32 mmol/L    Anion Gap 11 10 - 20 mmol/L    Urea Nitrogen 7 6 - 23 mg/dL    Creatinine 0.40 (L) 0.50 - 1.05 mg/dL    eGFR >90 >60 mL/min/1.73m*2    Calcium 8.5 (L) 8.6 - 10.6 mg/dL    Phosphorus 3.6 2.5 - 4.9 mg/dL    Albumin 3.6 3.4 - 5.0 g/dL   CBC   Result Value Ref Range    WBC 9.3 4.4 - 11.3 x10*3/uL    nRBC 0.0 0.0 - 0.0 /100 WBCs    RBC 3.89 (L) 4.00 - 5.20 x10*6/uL    Hemoglobin 11.9 (L) 12.0 - 16.0 g/dL    Hematocrit 37.3 36.0 - 46.0 %    MCV 96 80 - 100 fL    MCH 30.6 26.0 - 34.0 pg    MCHC 31.9 (L)  32.0 - 36.0 g/dL    RDW 14.5 11.5 - 14.5 %    Platelets 228 150 - 450 x10*3/uL   Magnesium   Result Value Ref Range    Magnesium 1.87 1.60 - 2.40 mg/dL   POCT GLUCOSE   Result Value Ref Range    POCT Glucose 108 (H) 74 - 99 mg/dL   POCT GLUCOSE   Result Value Ref Range    POCT Glucose 106 (H) 74 - 99 mg/dL   POCT GLUCOSE   Result Value Ref Range    POCT Glucose 100 (H) 74 - 99 mg/dL   CBC and Auto Differential   Result Value Ref Range    WBC 10.9 4.4 - 11.3 x10*3/uL    nRBC 0.0 0.0 - 0.0 /100 WBCs    RBC 3.71 (L) 4.00 - 5.20 x10*6/uL    Hemoglobin 11.1 (L) 12.0 - 16.0 g/dL    Hematocrit 35.6 (L) 36.0 - 46.0 %    MCV 96 80 - 100 fL    MCH 29.9 26.0 - 34.0 pg    MCHC 31.2 (L) 32.0 - 36.0 g/dL    RDW 14.0 11.5 - 14.5 %    Platelets 199 150 - 450 x10*3/uL    Neutrophils % 88.1 40.0 - 80.0 %    Immature Granulocytes %, Automated 0.9 0.0 - 0.9 %    Lymphocytes % 3.5 13.0 - 44.0 %    Monocytes % 7.2 2.0 - 10.0 %    Eosinophils % 0.1 0.0 - 6.0 %    Basophils % 0.2 0.0 - 2.0 %    Neutrophils Absolute 9.64 (H) 1.20 - 7.70 x10*3/uL    Immature Granulocytes Absolute, Automated 0.10 0.00 - 0.70 x10*3/uL    Lymphocytes Absolute 0.38 (L) 1.20 - 4.80 x10*3/uL    Monocytes Absolute 0.79 0.10 - 1.00 x10*3/uL    Eosinophils Absolute 0.01 0.00 - 0.70 x10*3/uL    Basophils Absolute 0.02 0.00 - 0.10 x10*3/uL   Renal function panel   Result Value Ref Range    Glucose 112 (H) 74 - 99 mg/dL    Sodium 135 (L) 136 - 145 mmol/L    Potassium 4.1 3.5 - 5.3 mmol/L    Chloride 101 98 - 107 mmol/L    Bicarbonate 28 21 - 32 mmol/L    Anion Gap 10 10 - 20 mmol/L    Urea Nitrogen 5 (L) 6 - 23 mg/dL    Creatinine 0.35 (L) 0.50 - 1.05 mg/dL    eGFR >90 >60 mL/min/1.73m*2    Calcium 8.1 (L) 8.6 - 10.6 mg/dL    Phosphorus 2.4 (L) 2.5 - 4.9 mg/dL    Albumin 3.5 3.4 - 5.0 g/dL   Magnesium   Result Value Ref Range    Magnesium 1.69 1.60 - 2.40 mg/dL   POCT GLUCOSE   Result Value Ref Range    POCT Glucose 101 (H) 74 - 99 mg/dL   POCT GLUCOSE   Result Value  Ref Range    POCT Glucose 153 (H) 74 - 99 mg/dL   POCT GLUCOSE   Result Value Ref Range    POCT Glucose 190 (H) 74 - 99 mg/dL   POCT GLUCOSE   Result Value Ref Range    POCT Glucose 113 (H) 74 - 99 mg/dL   CBC and Auto Differential   Result Value Ref Range    WBC 9.0 4.4 - 11.3 x10*3/uL    nRBC 0.0 0.0 - 0.0 /100 WBCs    RBC 3.45 (L) 4.00 - 5.20 x10*6/uL    Hemoglobin 10.3 (L) 12.0 - 16.0 g/dL    Hematocrit 31.8 (L) 36.0 - 46.0 %    MCV 92 80 - 100 fL    MCH 29.9 26.0 - 34.0 pg    MCHC 32.4 32.0 - 36.0 g/dL    RDW 13.5 11.5 - 14.5 %    Platelets 196 150 - 450 x10*3/uL    Neutrophils % 87.1 40.0 - 80.0 %    Immature Granulocytes %, Automated 0.6 0.0 - 0.9 %    Lymphocytes % 3.4 13.0 - 44.0 %    Monocytes % 8.5 2.0 - 10.0 %    Eosinophils % 0.2 0.0 - 6.0 %    Basophils % 0.2 0.0 - 2.0 %    Neutrophils Absolute 7.86 (H) 1.20 - 7.70 x10*3/uL    Immature Granulocytes Absolute, Automated 0.05 0.00 - 0.70 x10*3/uL    Lymphocytes Absolute 0.31 (L) 1.20 - 4.80 x10*3/uL    Monocytes Absolute 0.77 0.10 - 1.00 x10*3/uL    Eosinophils Absolute 0.02 0.00 - 0.70 x10*3/uL    Basophils Absolute 0.02 0.00 - 0.10 x10*3/uL   Renal function panel   Result Value Ref Range    Glucose 111 (H) 74 - 99 mg/dL    Sodium 137 136 - 145 mmol/L    Potassium 4.0 3.5 - 5.3 mmol/L    Chloride 102 98 - 107 mmol/L    Bicarbonate 29 21 - 32 mmol/L    Anion Gap 10 10 - 20 mmol/L    Urea Nitrogen 5 (L) 6 - 23 mg/dL    Creatinine 0.33 (L) 0.50 - 1.05 mg/dL    eGFR >90 >60 mL/min/1.73m*2    Calcium 8.0 (L) 8.6 - 10.6 mg/dL    Phosphorus 2.1 (L) 2.5 - 4.9 mg/dL    Albumin 3.3 (L) 3.4 - 5.0 g/dL   Magnesium   Result Value Ref Range    Magnesium 1.81 1.60 - 2.40 mg/dL   POCT GLUCOSE   Result Value Ref Range    POCT Glucose 113 (H) 74 - 99 mg/dL   POCT GLUCOSE   Result Value Ref Range    POCT Glucose 116 (H) 74 - 99 mg/dL   POCT GLUCOSE   Result Value Ref Range    POCT Glucose 106 (H) 74 - 99 mg/dL   CBC and Auto Differential   Result Value Ref Range    WBC 5.8  4.4 - 11.3 x10*3/uL    nRBC 0.0 0.0 - 0.0 /100 WBCs    RBC 3.46 (L) 4.00 - 5.20 x10*6/uL    Hemoglobin 10.5 (L) 12.0 - 16.0 g/dL    Hematocrit 32.4 (L) 36.0 - 46.0 %    MCV 94 80 - 100 fL    MCH 30.3 26.0 - 34.0 pg    MCHC 32.4 32.0 - 36.0 g/dL    RDW 13.4 11.5 - 14.5 %    Platelets 208 150 - 450 x10*3/uL    Neutrophils % 76.5 40.0 - 80.0 %    Immature Granulocytes %, Automated 0.3 0.0 - 0.9 %    Lymphocytes % 8.6 13.0 - 44.0 %    Monocytes % 12.3 2.0 - 10.0 %    Eosinophils % 1.6 0.0 - 6.0 %    Basophils % 0.7 0.0 - 2.0 %    Neutrophils Absolute 4.43 1.20 - 7.70 x10*3/uL    Immature Granulocytes Absolute, Automated 0.02 0.00 - 0.70 x10*3/uL    Lymphocytes Absolute 0.50 (L) 1.20 - 4.80 x10*3/uL    Monocytes Absolute 0.71 0.10 - 1.00 x10*3/uL    Eosinophils Absolute 0.09 0.00 - 0.70 x10*3/uL    Basophils Absolute 0.04 0.00 - 0.10 x10*3/uL   Magnesium   Result Value Ref Range    Magnesium 1.61 1.60 - 2.40 mg/dL   Renal function panel   Result Value Ref Range    Glucose 94 74 - 99 mg/dL    Sodium 138 136 - 145 mmol/L    Potassium 3.6 3.5 - 5.3 mmol/L    Chloride 102 98 - 107 mmol/L    Bicarbonate 30 21 - 32 mmol/L    Anion Gap 10 10 - 20 mmol/L    Urea Nitrogen 5 (L) 6 - 23 mg/dL    Creatinine 0.42 (L) 0.50 - 1.05 mg/dL    eGFR >90 >60 mL/min/1.73m*2    Calcium 8.3 (L) 8.6 - 10.6 mg/dL    Phosphorus 2.0 (L) 2.5 - 4.9 mg/dL    Albumin 3.1 (L) 3.4 - 5.0 g/dL   POCT GLUCOSE   Result Value Ref Range    POCT Glucose 112 (H) 74 - 99 mg/dL         Assessment/Plan     64yr old lady with 70pkyr smoking history now with postop Acute Resp failure and inability to wean from supplemental O2    Pulm issues     Mod probability of BAUTISTA based on STOPBANG screening score of  of 4/8 (1+0+1+1+0+1+0+0)   Serum Bicarb of 27- 30 for past 18 months increase the predictive power of Likely BAUTISTA  In addition neck mass preop and postop surgical neck edema likely adds to this  CXR postop with equalization of diaphragm height domes suggestive of  possible left phrenic dysfunction related retrocardiac atelectasis  Pt also on Gabapentin 300mg at bedtime + Oxycodone 10mg q 4-6hrs  70pkyr h/o smoking on Steroid Inhaler for COPD  H/o Hypoxic resp failure during inpt admission for Pneumonia last yr    Based on above issues  Would suggest use non sedating analgesics as much as possible  Ambulate and aggressive Inc spirometry  Would keep head end up at 40 degrees at all times to avoid sleep related desats to < 92%  Is still desats, place supplemental O2 to keep Sats at 92% and above  Stop smoking permanently    If unable to wean from O2 for  nocturnal desats may need to be discharged on nocturnal O2.  Diagnosis- COPD and BAUTISTA  O2- 2 LNC whenever sleeping  Keep head up a 30-40 at home if possbile during naps  OP Sleep clinic follow up AFTER ENT feels all Upper airways are resolved and pt has minimal concerns for anatomical causes of upper airway Obstruction    Discussed with pt and ENT Team        Fish Mckeon MD

## 2024-08-30 NOTE — SIGNIFICANT EVENT
Subjective:  Resting comfortably in bed. No new concerns at this time.     Objective:  Vitals reviewed in EMR  Gen: NAD, AOx3, resting comfortably in bed  Face/Neck: All incisions c/d/i, neck soft and flat without evidence of hematoma or fluid collection  -Fat window with xeroform covering   -Internal doppler with strong arterial signal  -Mild swelling at surgical site, stable   Oral Cavity: No intra oral incisions   Extremities: left leg warm with intact movement and sensation. 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     -Continue q8hr flap checks  - encourage keeping fat window moist with vaseline or xeroform  - monitor L neck swelling    Zulema Blue MD  ENT [id number]

## 2024-08-31 LAB
ALBUMIN SERPL BCP-MCNC: 3.2 G/DL (ref 3.4–5)
ANION GAP SERPL CALC-SCNC: 10 MMOL/L (ref 10–20)
BASOPHILS # BLD AUTO: 0.04 X10*3/UL (ref 0–0.1)
BASOPHILS NFR BLD AUTO: 0.7 %
BUN SERPL-MCNC: 6 MG/DL (ref 6–23)
CALCIUM SERPL-MCNC: 8.9 MG/DL (ref 8.6–10.6)
CHLORIDE SERPL-SCNC: 104 MMOL/L (ref 98–107)
CO2 SERPL-SCNC: 32 MMOL/L (ref 21–32)
CREAT SERPL-MCNC: 0.51 MG/DL (ref 0.5–1.05)
EGFRCR SERPLBLD CKD-EPI 2021: >90 ML/MIN/1.73M*2
EOSINOPHIL # BLD AUTO: 0.13 X10*3/UL (ref 0–0.7)
EOSINOPHIL NFR BLD AUTO: 2.2 %
ERYTHROCYTE [DISTWIDTH] IN BLOOD BY AUTOMATED COUNT: 13.5 % (ref 11.5–14.5)
GLUCOSE BLD MANUAL STRIP-MCNC: 115 MG/DL (ref 74–99)
GLUCOSE BLD MANUAL STRIP-MCNC: 116 MG/DL (ref 74–99)
GLUCOSE SERPL-MCNC: 78 MG/DL (ref 74–99)
HCT VFR BLD AUTO: 32.4 % (ref 36–46)
HGB BLD-MCNC: 10.5 G/DL (ref 12–16)
IMM GRANULOCYTES # BLD AUTO: 0.03 X10*3/UL (ref 0–0.7)
IMM GRANULOCYTES NFR BLD AUTO: 0.5 % (ref 0–0.9)
LYMPHOCYTES # BLD AUTO: 0.51 X10*3/UL (ref 1.2–4.8)
LYMPHOCYTES NFR BLD AUTO: 8.8 %
MAGNESIUM SERPL-MCNC: 1.99 MG/DL (ref 1.6–2.4)
MCH RBC QN AUTO: 30.3 PG (ref 26–34)
MCHC RBC AUTO-ENTMCNC: 32.4 G/DL (ref 32–36)
MCV RBC AUTO: 93 FL (ref 80–100)
MONOCYTES # BLD AUTO: 0.64 X10*3/UL (ref 0.1–1)
MONOCYTES NFR BLD AUTO: 11.1 %
NEUTROPHILS # BLD AUTO: 4.44 X10*3/UL (ref 1.2–7.7)
NEUTROPHILS NFR BLD AUTO: 76.7 %
NRBC BLD-RTO: 0 /100 WBCS (ref 0–0)
PHOSPHATE SERPL-MCNC: 3.4 MG/DL (ref 2.5–4.9)
PLATELET # BLD AUTO: 234 X10*3/UL (ref 150–450)
POTASSIUM SERPL-SCNC: 4.4 MMOL/L (ref 3.5–5.3)
RBC # BLD AUTO: 3.47 X10*6/UL (ref 4–5.2)
SODIUM SERPL-SCNC: 142 MMOL/L (ref 136–145)
WBC # BLD AUTO: 5.8 X10*3/UL (ref 4.4–11.3)

## 2024-08-31 PROCEDURE — S4991 NICOTINE PATCH NONLEGEND: HCPCS

## 2024-08-31 PROCEDURE — 2500000004 HC RX 250 GENERAL PHARMACY W/ HCPCS (ALT 636 FOR OP/ED)

## 2024-08-31 PROCEDURE — RXMED WILLOW AMBULATORY MEDICATION CHARGE

## 2024-08-31 PROCEDURE — 2500000001 HC RX 250 WO HCPCS SELF ADMINISTERED DRUGS (ALT 637 FOR MEDICARE OP)

## 2024-08-31 PROCEDURE — 83735 ASSAY OF MAGNESIUM: CPT

## 2024-08-31 PROCEDURE — 2500000002 HC RX 250 W HCPCS SELF ADMINISTERED DRUGS (ALT 637 FOR MEDICARE OP, ALT 636 FOR OP/ED)

## 2024-08-31 PROCEDURE — 94640 AIRWAY INHALATION TREATMENT: CPT

## 2024-08-31 PROCEDURE — 85025 COMPLETE CBC W/AUTO DIFF WBC: CPT

## 2024-08-31 PROCEDURE — 84100 ASSAY OF PHOSPHORUS: CPT

## 2024-08-31 PROCEDURE — 99211 OFF/OP EST MAY X REQ PHY/QHP: CPT | Performed by: OTOLARYNGOLOGY

## 2024-08-31 PROCEDURE — 1170000001 HC PRIVATE ONCOLOGY ROOM DAILY

## 2024-08-31 PROCEDURE — 82947 ASSAY GLUCOSE BLOOD QUANT: CPT

## 2024-08-31 ASSESSMENT — COGNITIVE AND FUNCTIONAL STATUS - GENERAL
DAILY ACTIVITIY SCORE: 20
STANDING UP FROM CHAIR USING ARMS: A LITTLE
DRESSING REGULAR UPPER BODY CLOTHING: A LITTLE
MOVING TO AND FROM BED TO CHAIR: A LITTLE
MOBILITY SCORE: 19
CLIMB 3 TO 5 STEPS WITH RAILING: A LOT
TOILETING: A LITTLE
DRESSING REGULAR LOWER BODY CLOTHING: A LITTLE
HELP NEEDED FOR BATHING: A LITTLE
WALKING IN HOSPITAL ROOM: A LITTLE

## 2024-08-31 ASSESSMENT — PAIN SCALES - GENERAL
PAINLEVEL_OUTOF10: 6
PAINLEVEL_OUTOF10: 8
PAINLEVEL_OUTOF10: 8
PAINLEVEL_OUTOF10: 10 - WORST POSSIBLE PAIN
PAINLEVEL_OUTOF10: 6
PAINLEVEL_OUTOF10: 10 - WORST POSSIBLE PAIN

## 2024-08-31 ASSESSMENT — PAIN - FUNCTIONAL ASSESSMENT: PAIN_FUNCTIONAL_ASSESSMENT: 0-10

## 2024-08-31 NOTE — PROGRESS NOTES
Otolaryngology: Head & Neck Progress Note    ID statement: Melvi Sanchez is a 64 year old female who presented for recurrent left neck SCC.     Subjective:  POD 5  No acute events overnight. Supplemental O2 overnight.    Objective:  Vitals reviewed in EMR  Gen: NAD, AOx3, resting comfortably in bed  Eyes: EOMI, sclera clear, PERRL  Ears: Normal external ears bilaterally  Nose: No rhinorrhea; anterior nares clear  Oral Cavity:   Head: normocephalic, atraumatic  Neck: All incisions c/d/i, neck soft and flat without evidence of hematoma or fluid collection  -Fat window mild ooze  -Internal doppler with strong arterial signal  -CNXI intact  -mild swelling of left neck, stable  Resp: 2L O2 nasal cannula  Cards: RRR  Gastro: Soft, non-distended,   : voiding  MSK: all extremities moving appropriately  - Left leg warm with intact movement and sensation  - Left thigh incision well approximated, CDI, no bleeding. 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     Active Issues:  - COPD (no O2 requirement at home)  - Status post neck dissection, ALT free flap  - nausea w/ vomiting  - post operative pain  - hypothyroidism  - nicotine dependence  - restless leg syndrome  - acute blood loss anemia  - hypomagnesia  - hypophosphatemia    Plan:  -Drains: Monitor output  -Analgesia:  Dilaudid PCA, Scheduled Acetaminophen, zofran, compazine for nausea, decreased gabapentin and oxycodone doses with patient stating her pain is well controlled.   -FEN: HLIV, adv to regular diet; monitor and replete electrolytes as needed    -Pulm: wean oxygen to room air, scheduled Breo Ellipta, Spiriva, PRN duonebs, encourage incentive spirometry, mucinex for  congestion, c/s perioperative medicine for O2 requirement recommendations,   - Per Dr. Mckeon: intermittent desats (worse while sleeping) are likely related to a combination of BAUTISTA + COPD + Narcotics + pos surgical changes. Recommend to decrease sedating analgesics, ambulate as much as possible, aggressive IS, HOB > 40 degrees, OK to be discharged on nocturnal O2. 2L NC while sleeping. Arrange for sleep clinic outpatient for BAUTISTA workup. Appreciate recs    -ID: Unasyn x 4days, stop date 8/30  -Cardiac: Vitals Q4hr   -Endo: synthroid 75 mcg  -GI: Bowel regimen (had bowel movement after suppository), PPI,  and PRN anti-emetic, +pericolace  -: voiding  -Steroids/Special: Thigh skin edge bleeding 8/28 --> resolved w/ silver nitrate. Nicotine patch started 8/28, ropinorole  -Embolic PPx: SQH, SCDs while in bed  -Dispo: PT/OT ordered recommend low intensity PT/no further needs OT; Discharge planning for POD 5 vs 6 home with self care vs home care    Patient seen and discussed with Dr. Rice.      Tanmay Sanders MD - PGY1  Otolaryngology - Head & Neck Surgery  [facility]    ENT Consult pager: [phone]  ENT Peds pager: [phone]  ENT Head & Neck Surgery Phone: [phone]  ENT subspecialty team: Luli individual resident who wrote today's note  ENT Outpatient scheduling number: [phone]  Please Page [phone] or call [phone] if Urgent

## 2024-09-01 ENCOUNTER — PHARMACY VISIT (OUTPATIENT)
Dept: PHARMACY | Facility: CLINIC | Age: 64
End: 2024-09-01
Payer: MEDICAID

## 2024-09-01 ENCOUNTER — HOME HEALTH ADMISSION (OUTPATIENT)
Dept: HOME HEALTH SERVICES | Facility: HOME HEALTH | Age: 64
End: 2024-09-01
Payer: COMMERCIAL

## 2024-09-01 VITALS
OXYGEN SATURATION: 100 % | BODY MASS INDEX: 28.05 KG/M2 | WEIGHT: 158.29 LBS | DIASTOLIC BLOOD PRESSURE: 57 MMHG | HEART RATE: 66 BPM | TEMPERATURE: 97.6 F | RESPIRATION RATE: 18 BRPM | SYSTOLIC BLOOD PRESSURE: 124 MMHG | HEIGHT: 63 IN

## 2024-09-01 LAB
ALBUMIN SERPL BCP-MCNC: 3.1 G/DL (ref 3.4–5)
ANION GAP SERPL CALC-SCNC: 10 MMOL/L (ref 10–20)
BASOPHILS # BLD AUTO: 0.05 X10*3/UL (ref 0–0.1)
BASOPHILS NFR BLD AUTO: 1 %
BUN SERPL-MCNC: 12 MG/DL (ref 6–23)
CALCIUM SERPL-MCNC: 8.8 MG/DL (ref 8.6–10.6)
CHLORIDE SERPL-SCNC: 100 MMOL/L (ref 98–107)
CO2 SERPL-SCNC: 32 MMOL/L (ref 21–32)
CREAT SERPL-MCNC: 0.57 MG/DL (ref 0.5–1.05)
EGFRCR SERPLBLD CKD-EPI 2021: >90 ML/MIN/1.73M*2
EOSINOPHIL # BLD AUTO: 0.1 X10*3/UL (ref 0–0.7)
EOSINOPHIL NFR BLD AUTO: 1.9 %
ERYTHROCYTE [DISTWIDTH] IN BLOOD BY AUTOMATED COUNT: 13.8 % (ref 11.5–14.5)
GLUCOSE BLD MANUAL STRIP-MCNC: 106 MG/DL (ref 74–99)
GLUCOSE BLD MANUAL STRIP-MCNC: 134 MG/DL (ref 74–99)
GLUCOSE BLD MANUAL STRIP-MCNC: 83 MG/DL (ref 74–99)
GLUCOSE SERPL-MCNC: 72 MG/DL (ref 74–99)
HCT VFR BLD AUTO: 31.5 % (ref 36–46)
HGB BLD-MCNC: 10 G/DL (ref 12–16)
IMM GRANULOCYTES # BLD AUTO: 0.03 X10*3/UL (ref 0–0.7)
IMM GRANULOCYTES NFR BLD AUTO: 0.6 % (ref 0–0.9)
LYMPHOCYTES # BLD AUTO: 0.67 X10*3/UL (ref 1.2–4.8)
LYMPHOCYTES NFR BLD AUTO: 12.8 %
MAGNESIUM SERPL-MCNC: 1.72 MG/DL (ref 1.6–2.4)
MCH RBC QN AUTO: 30.1 PG (ref 26–34)
MCHC RBC AUTO-ENTMCNC: 31.7 G/DL (ref 32–36)
MCV RBC AUTO: 95 FL (ref 80–100)
MONOCYTES # BLD AUTO: 0.73 X10*3/UL (ref 0.1–1)
MONOCYTES NFR BLD AUTO: 13.9 %
NEUTROPHILS # BLD AUTO: 3.66 X10*3/UL (ref 1.2–7.7)
NEUTROPHILS NFR BLD AUTO: 69.8 %
NRBC BLD-RTO: 0 /100 WBCS (ref 0–0)
PHOSPHATE SERPL-MCNC: 4.4 MG/DL (ref 2.5–4.9)
PLATELET # BLD AUTO: 243 X10*3/UL (ref 150–450)
POTASSIUM SERPL-SCNC: 4 MMOL/L (ref 3.5–5.3)
RBC # BLD AUTO: 3.32 X10*6/UL (ref 4–5.2)
SODIUM SERPL-SCNC: 138 MMOL/L (ref 136–145)
WBC # BLD AUTO: 5.2 X10*3/UL (ref 4.4–11.3)

## 2024-09-01 PROCEDURE — RXMED WILLOW AMBULATORY MEDICATION CHARGE

## 2024-09-01 PROCEDURE — 94640 AIRWAY INHALATION TREATMENT: CPT

## 2024-09-01 PROCEDURE — 85025 COMPLETE CBC W/AUTO DIFF WBC: CPT

## 2024-09-01 PROCEDURE — 82947 ASSAY GLUCOSE BLOOD QUANT: CPT

## 2024-09-01 PROCEDURE — 2500000001 HC RX 250 WO HCPCS SELF ADMINISTERED DRUGS (ALT 637 FOR MEDICARE OP)

## 2024-09-01 PROCEDURE — 2500000004 HC RX 250 GENERAL PHARMACY W/ HCPCS (ALT 636 FOR OP/ED): Performed by: STUDENT IN AN ORGANIZED HEALTH CARE EDUCATION/TRAINING PROGRAM

## 2024-09-01 PROCEDURE — 83735 ASSAY OF MAGNESIUM: CPT

## 2024-09-01 PROCEDURE — 1170000001 HC PRIVATE ONCOLOGY ROOM DAILY

## 2024-09-01 PROCEDURE — 2500000004 HC RX 250 GENERAL PHARMACY W/ HCPCS (ALT 636 FOR OP/ED)

## 2024-09-01 PROCEDURE — 2500000002 HC RX 250 W HCPCS SELF ADMINISTERED DRUGS (ALT 637 FOR MEDICARE OP, ALT 636 FOR OP/ED)

## 2024-09-01 PROCEDURE — 80069 RENAL FUNCTION PANEL: CPT

## 2024-09-01 PROCEDURE — 36415 COLL VENOUS BLD VENIPUNCTURE: CPT

## 2024-09-01 PROCEDURE — S4991 NICOTINE PATCH NONLEGEND: HCPCS

## 2024-09-01 PROCEDURE — 99024 POSTOP FOLLOW-UP VISIT: CPT | Performed by: OTOLARYNGOLOGY

## 2024-09-01 RX ORDER — MUPIROCIN 20 MG/G
OINTMENT TOPICAL 3 TIMES DAILY
Qty: 30 G | Refills: 0 | Status: SHIPPED | OUTPATIENT
Start: 2024-09-01 | End: 2024-09-15

## 2024-09-01 RX ORDER — OXYCODONE HCL 5 MG/5 ML
7.5 SOLUTION, ORAL ORAL EVERY 4 HOURS PRN
Qty: 90 ML | Refills: 0 | Status: SHIPPED | OUTPATIENT
Start: 2024-09-01 | End: 2024-09-08

## 2024-09-01 RX ORDER — GUAIFENESIN 600 MG/1
600 TABLET, EXTENDED RELEASE ORAL 2 TIMES DAILY
Qty: 60 TABLET | Refills: 0 | Status: SHIPPED | OUTPATIENT
Start: 2024-09-01 | End: 2024-10-01

## 2024-09-01 RX ORDER — GABAPENTIN 100 MG/1
100 CAPSULE ORAL NIGHTLY
Qty: 30 CAPSULE | Refills: 0 | Status: SHIPPED | OUTPATIENT
Start: 2024-09-01 | End: 2024-10-01

## 2024-09-01 RX ORDER — MUPIROCIN 20 MG/G
OINTMENT TOPICAL 3 TIMES DAILY
Status: DISPENSED | OUTPATIENT
Start: 2024-09-01

## 2024-09-01 RX ORDER — MAGNESIUM SULFATE HEPTAHYDRATE 40 MG/ML
2 INJECTION, SOLUTION INTRAVENOUS ONCE
Status: COMPLETED | OUTPATIENT
Start: 2024-09-01 | End: 2024-09-01

## 2024-09-01 ASSESSMENT — PAIN SCALES - GENERAL
PAINLEVEL_OUTOF10: 9
PAINLEVEL_OUTOF10: 10 - WORST POSSIBLE PAIN
PAINLEVEL_OUTOF10: 8
PAINLEVEL_OUTOF10: 8
PAINLEVEL_OUTOF10: 9
PAINLEVEL_OUTOF10: 9
PAINLEVEL_OUTOF10: 8
PAINLEVEL_OUTOF10: 8

## 2024-09-01 ASSESSMENT — ACTIVITIES OF DAILY LIVING (ADL)
JUDGMENT_ADEQUATE_SAFELY_COMPLETE_DAILY_ACTIVITIES: YES
ASSISTIVE_DEVICE: WALKER
FEEDING YOURSELF: INDEPENDENT
TOILETING: NEEDS ASSISTANCE
HEARING - RIGHT EAR: FUNCTIONAL
PATIENT'S MEMORY ADEQUATE TO SAFELY COMPLETE DAILY ACTIVITIES?: YES
GROOMING: NEEDS ASSISTANCE
DRESSING YOURSELF: NEEDS ASSISTANCE
ADEQUATE_TO_COMPLETE_ADL: YES
BATHING: NEEDS ASSISTANCE
HEARING - LEFT EAR: FUNCTIONAL

## 2024-09-01 ASSESSMENT — PAIN - FUNCTIONAL ASSESSMENT
PAIN_FUNCTIONAL_ASSESSMENT: 0-10
PAIN_FUNCTIONAL_ASSESSMENT: UNABLE TO SELF-REPORT
PAIN_FUNCTIONAL_ASSESSMENT: 0-10
PAIN_FUNCTIONAL_ASSESSMENT: UNABLE TO SELF-REPORT

## 2024-09-01 ASSESSMENT — COGNITIVE AND FUNCTIONAL STATUS - GENERAL
DRESSING REGULAR UPPER BODY CLOTHING: A LITTLE
DAILY ACTIVITIY SCORE: 20
TOILETING: A LITTLE
DRESSING REGULAR LOWER BODY CLOTHING: A LITTLE
WALKING IN HOSPITAL ROOM: A LITTLE
MOBILITY SCORE: 21
TOILETING: A LITTLE
DRESSING REGULAR LOWER BODY CLOTHING: A LITTLE
DRESSING REGULAR UPPER BODY CLOTHING: A LITTLE
DAILY ACTIVITIY SCORE: 20
CLIMB 3 TO 5 STEPS WITH RAILING: A LOT
PATIENT BASELINE BEDBOUND: NO
HELP NEEDED FOR BATHING: A LITTLE
CLIMB 3 TO 5 STEPS WITH RAILING: A LOT
MOBILITY SCORE: 21
HELP NEEDED FOR BATHING: A LITTLE
WALKING IN HOSPITAL ROOM: A LITTLE

## 2024-09-01 NOTE — PROGRESS NOTES
Otolaryngology: Head & Neck Progress Note    ID statement: Melvi Sanchez is a 64 year old female who presented for recurrent left neck SCC.     Subjective:  Postop day 6.  ENT team called for sacral decubitus ulcer for which wound care was consulted, patient also has left axillary erythema which appears to be folliculitis.    Objective:  Vitals reviewed in EMR  Gen: NAD, AOx3, resting comfortably in bed  Eyes: EOMI, sclera clear, PERRL  Ears: Normal external ears bilaterally  Nose: No rhinorrhea; anterior nares clear  Oral Cavity:   Head: normocephalic, atraumatic  Neck: All incisions c/d/i, neck soft and flat without evidence of hematoma or fluid collection  -Fat window mild ooze  -Internal doppler with strong arterial signal  -CNXI intact  -mild swelling of left neck, stable  Resp: 2L O2 nasal cannula  Cards: RRR  Gastro: Soft, non-distended,   : voiding  MSK: all extremities moving appropriately  - Left leg warm with intact movement and sensation  - Left thigh incision well approximated, CDI, no bleeding. Soft with no evidence of hematoma  -Left armpit with mild erythema suggestive of folliculitis  Psych: Appropriate mood and affect  Drains: All drains in place and holding suction with serosanguinous drainage (stripped)    Assessment:  65 yo female with history of T1N3a SCCa of left tonsil s/p CXRT with recurrent left neck disease now s/p left radical ND 2-4 with CN 11 repair, recon with left ALT.     Active Issues:  - COPD (no O2 requirement at home)  - Status post neck dissection, ALT free flap  - nausea w/ vomiting  - post operative pain  - hypothyroidism  - nicotine dependence  - restless leg syndrome  - acute blood loss anemia  - hypomagnesia  - hypophosphatemia    Plan:  -Drains: Monitor output  -Analgesia:  Dilaudid PCA, Scheduled Acetaminophen, zofran, compazine for nausea, decreased gabapentin and oxycodone doses with patient stating her pain is well controlled.   -FEN: HLIV, adv to regular diet;  monitor and replete electrolytes as needed    -Pulm: wean oxygen to room air, scheduled Breo Ellipta, Spiriva, PRN duonebs, encourage incentive spirometry, mucinex for congestion, c/s perioperative medicine for O2 requirement recommendations,   - Per Dr. Mckeon: intermittent desats (worse while sleeping) are likely related to a combination of BAUTISTA + COPD + Narcotics + pos surgical changes. Recommend to decrease sedating analgesics, ambulate as much as possible, aggressive IS, HOB > 40 degrees, OK to be discharged on nocturnal O2. 2L NC while sleeping. Arrange for sleep clinic outpatient for BAUTISTA workup. Appreciate recs    -ID: Unasyn x 4days, stop date 8/30  -Cardiac: Vitals Q4hr   -Endo: synthroid 75 mcg  -GI: Bowel regimen (had bowel movement after suppository), PPI,  and PRN anti-emetic, +pericolace  -: voiding  -Steroids/Special: Thigh skin edge bleeding 8/28 --> resolved w/ silver nitrate. Nicotine patch started 8/28, ropinorole  -Embolic PPx: SQH, SCDs while in bed; appreciate wound care recs.   -Topical mupirocin for left armpit folliculitis  -Dispo: PT/OT ordered recommend low intensity PT/no further needs OT; Discharge planning for POD 5 vs 6 home with self care vs home care    Patient seen and discussed with Dr. Rice.      Pablo Gannon MD - PGY2  Otolaryngology - Head & Neck Surgery  [facility]    ENT Consult pager: [phone]  ENT Peds pager: [phone]  ENT Head & Neck Surgery Phone: [phone]  ENT subspecialty team: Luli individual resident who wrote today's note  ENT Outpatient scheduling number: [phone]  Please Page if Urgent

## 2024-09-01 NOTE — DISCHARGE INSTRUCTIONS
Drain Instructions    JARRETT   You have a bulb drain in place that you will be sent home with. To empty the drain, open cap and the top and tip into cup to empty. Squeeze drain then replace the cap.   Please empty the drain and record its output  daily and bring these numbers to your follow up appointment.  This drain is sutured into place. Please keep it dry and change the dressing sponge around the drain daily or it get soiled. Once output from the drain is less than 30ml in a 24hr time frame, call Dr. Brownlee's office to set up an appointment to have your drain removed. Please see instructions below.    How to care for your Eliud-Sutton drain:    When you leave the hospital, care for your Eliud-Sutton drain by:    Milking (stripping) your tubing to help move clots.    Emptying your drain 2 times a day. Do this once in the morning and once in the evening. Write down the amount of drainage on your Eliud-Sutton drainage log at the end of this resource. If you have more than 1 drain, measure and write down the drainage of each one separately. Do not add them together.    Caring for your insertion site.    Checking for problems.    Follow these instructions to empty your Eliud-Sutton drain:    Unplug the stopper on top of the bulb. This will make the bulb expand. Do not touch the inside of the stopper or the inner area of the opening on the bulb.     Turn the bulb upside down and gently squeeze it. Pour the drainage into the measuring container (see Figure 2).      Turn the bulb right side up. Squeeze the bulb until your fingers feel the palm of your hand. All the air should come out of the bulb.    Keep squeezing the bulb while you re-plug the stopper. Check to see that the bulb stays fully compressed to ensure a constant gentle suction. The stopper must be closed for the drain to work.    Attach the drainage bulb to your surgical bra or wrap, if you’re wearing one. Use the plastic loop or Velcro® straps at the  bottom. Do not let the drain dangle. It may be helpful to hold your drain in a cali pack or belt bag.    Check the amount and color of drainage in the measuring container. The first couple of days after surgery, the fluid may be a dark red color. This is normal. As you continue to heal, it may look pink or pale yellow.    Write down the amount (in mL) and color of your drainage on your Eliud-Sutton drainage log.    Flush the drainage down the toilet and rinse the measuring container with water.    At the end of each day, add the total amount of drainage you had for the day. Write the amount in the last column of the drainage log. If you have more than 1 drain, measure and record each one separately. Do not add them together.    Please keep a log of your drainage output and bring it to your follow-up visit with your surgeon.

## 2024-09-01 NOTE — CARE PLAN
The patient's goals for the shift include safety    The clinical goals for the shift include Patient's pain will be controlled and rate pain 3 out of 10 or less throughout shift        Problem: Skin  Goal: Decreased wound size/increased tissue granulation at next dressing change  Outcome: Progressing  Flowsheets (Taken 9/1/2024 1526)  Decreased wound size/increased tissue granulation at next dressing change: Promote sleep for wound healing  Goal: Participates in plan/prevention/treatment measures  Outcome: Progressing  Flowsheets (Taken 9/1/2024 1526)  Participates in plan/prevention/treatment measures:   Elevate heels   Increase activity/out of bed for meals  Goal: Prevent/manage excess moisture  Outcome: Progressing  Flowsheets (Taken 9/1/2024 1526)  Prevent/manage excess moisture: Monitor for/manage infection if present  Goal: Prevent/minimize sheer/friction injuries  Outcome: Progressing  Flowsheets (Taken 9/1/2024 1526)  Prevent/minimize sheer/friction injuries:   Use pull sheet   HOB 30 degrees or less  Goal: Promote/optimize nutrition  Outcome: Progressing  Flowsheets (Taken 9/1/2024 1526)  Promote/optimize nutrition:   Monitor/record intake including meals   Offer water/supplements/favorite foods  Goal: Promote skin healing  Outcome: Progressing  Flowsheets (Taken 9/1/2024 1526)  Promote skin healing: Assess skin/pad under line(s)/device(s)

## 2024-09-01 NOTE — CARE PLAN
The patient's goals for the shift include safety    The clinical goals for the shift include pain management

## 2024-09-01 NOTE — PROGRESS NOTES
09/01/24 1535   Discharge Planning   Living Arrangements Alone   Support Systems Children;Family members   Type of Residence Private residence     Patient will require home oxygen for discharge planning.  Patient requires bedside testing.  We need saturations for patient at rest on room air, patient ambulating on room air-if patient desats will be to be placed on oxygen and those results are required.  If patient just requires oxygen for sleep due to desaturating will need over night pulse ox testing.

## 2024-09-01 NOTE — NURSING NOTE
BEDSIDE WALKING PULSE OX    Patient was resting, sitting at the edge of the bed on room air and had a oxygen saturation of 95-96% and did not require oxygen. Patient ambulated along RN with walker on room air and maintained oxygen saturation of 93-96% and did not require oxygen. Day shift RN passed this information to night shift RN and notified that patient needs oxygen test while sleeping to see if she requires oxygen when sleeping. Oralia Farias, RN

## 2024-09-02 LAB
ALBUMIN SERPL BCP-MCNC: 3.1 G/DL (ref 3.4–5)
ANION GAP SERPL CALC-SCNC: 10 MMOL/L (ref 10–20)
BASOPHILS # BLD AUTO: 0.03 X10*3/UL (ref 0–0.1)
BASOPHILS NFR BLD AUTO: 0.5 %
BUN SERPL-MCNC: 13 MG/DL (ref 6–23)
CALCIUM SERPL-MCNC: 9 MG/DL (ref 8.6–10.6)
CHLORIDE SERPL-SCNC: 100 MMOL/L (ref 98–107)
CO2 SERPL-SCNC: 31 MMOL/L (ref 21–32)
CREAT SERPL-MCNC: 0.52 MG/DL (ref 0.5–1.05)
EGFRCR SERPLBLD CKD-EPI 2021: >90 ML/MIN/1.73M*2
EOSINOPHIL # BLD AUTO: 0.17 X10*3/UL (ref 0–0.7)
EOSINOPHIL NFR BLD AUTO: 3 %
ERYTHROCYTE [DISTWIDTH] IN BLOOD BY AUTOMATED COUNT: 13.6 % (ref 11.5–14.5)
GLUCOSE BLD MANUAL STRIP-MCNC: 83 MG/DL (ref 74–99)
GLUCOSE BLD MANUAL STRIP-MCNC: 98 MG/DL (ref 74–99)
GLUCOSE SERPL-MCNC: 92 MG/DL (ref 74–99)
HCT VFR BLD AUTO: 31 % (ref 36–46)
HGB BLD-MCNC: 10.1 G/DL (ref 12–16)
IMM GRANULOCYTES # BLD AUTO: 0.03 X10*3/UL (ref 0–0.7)
IMM GRANULOCYTES NFR BLD AUTO: 0.5 % (ref 0–0.9)
LYMPHOCYTES # BLD AUTO: 0.79 X10*3/UL (ref 1.2–4.8)
LYMPHOCYTES NFR BLD AUTO: 13.8 %
MAGNESIUM SERPL-MCNC: 1.84 MG/DL (ref 1.6–2.4)
MCH RBC QN AUTO: 30.3 PG (ref 26–34)
MCHC RBC AUTO-ENTMCNC: 32.6 G/DL (ref 32–36)
MCV RBC AUTO: 93 FL (ref 80–100)
MONOCYTES # BLD AUTO: 0.81 X10*3/UL (ref 0.1–1)
MONOCYTES NFR BLD AUTO: 14.2 %
NEUTROPHILS # BLD AUTO: 3.88 X10*3/UL (ref 1.2–7.7)
NEUTROPHILS NFR BLD AUTO: 68 %
NRBC BLD-RTO: 0 /100 WBCS (ref 0–0)
PHOSPHATE SERPL-MCNC: 4.2 MG/DL (ref 2.5–4.9)
PLATELET # BLD AUTO: 253 X10*3/UL (ref 150–450)
POTASSIUM SERPL-SCNC: 4.8 MMOL/L (ref 3.5–5.3)
RBC # BLD AUTO: 3.33 X10*6/UL (ref 4–5.2)
SODIUM SERPL-SCNC: 136 MMOL/L (ref 136–145)
WBC # BLD AUTO: 5.7 X10*3/UL (ref 4.4–11.3)

## 2024-09-02 PROCEDURE — 94762 N-INVAS EAR/PLS OXIMTRY CONT: CPT

## 2024-09-02 PROCEDURE — 85025 COMPLETE CBC W/AUTO DIFF WBC: CPT

## 2024-09-02 PROCEDURE — 2500000004 HC RX 250 GENERAL PHARMACY W/ HCPCS (ALT 636 FOR OP/ED)

## 2024-09-02 PROCEDURE — 2500000001 HC RX 250 WO HCPCS SELF ADMINISTERED DRUGS (ALT 637 FOR MEDICARE OP)

## 2024-09-02 PROCEDURE — 94640 AIRWAY INHALATION TREATMENT: CPT

## 2024-09-02 PROCEDURE — 1170000001 HC PRIVATE ONCOLOGY ROOM DAILY

## 2024-09-02 PROCEDURE — S4991 NICOTINE PATCH NONLEGEND: HCPCS

## 2024-09-02 PROCEDURE — 83735 ASSAY OF MAGNESIUM: CPT

## 2024-09-02 PROCEDURE — 82947 ASSAY GLUCOSE BLOOD QUANT: CPT

## 2024-09-02 PROCEDURE — 80069 RENAL FUNCTION PANEL: CPT

## 2024-09-02 PROCEDURE — 99211 OFF/OP EST MAY X REQ PHY/QHP: CPT | Performed by: OTOLARYNGOLOGY

## 2024-09-02 PROCEDURE — 2500000002 HC RX 250 W HCPCS SELF ADMINISTERED DRUGS (ALT 637 FOR MEDICARE OP, ALT 636 FOR OP/ED)

## 2024-09-02 PROCEDURE — 36415 COLL VENOUS BLD VENIPUNCTURE: CPT

## 2024-09-02 RX ORDER — MAGNESIUM SULFATE HEPTAHYDRATE 40 MG/ML
2 INJECTION, SOLUTION INTRAVENOUS ONCE
Status: COMPLETED | OUTPATIENT
Start: 2024-09-02 | End: 2024-09-02

## 2024-09-02 ASSESSMENT — PAIN SCALES - GENERAL
PAINLEVEL_OUTOF10: 8
PAINLEVEL_OUTOF10: 6
PAINLEVEL_OUTOF10: 7
PAINLEVEL_OUTOF10: 7
PAINLEVEL_OUTOF10: 8

## 2024-09-02 NOTE — PROGRESS NOTES
Otolaryngology: Head & Neck Progress Note    ID statement: Melvi Sanchez is a 64 year old female who presented for recurrent left neck SCC.     Subjective:  POD 7  No acute events overnight. Supplemental O2 overnight, nocturnal oxgygen study  unable to be performed due to machine availability, will be performed this evening.     Objective:  Vitals reviewed in EMR  Gen: NAD, AOx3, resting comfortably in bed  Eyes: EOMI, sclera clear, PERRL  Ears: Normal external ears bilaterally  Nose: No rhinorrhea; anterior nares clear  Oral Cavity:   Head: normocephalic, atraumatic  Neck: All incisions c/d/i, neck soft and flat without evidence of hematoma or fluid collection  -Fat window mild ooze with dark crusting   -Internal doppler with strong arterial signal  -CNXI intact  -mild swelling of left neck, stable  Resp: 2L O2 nasal cannula  Cards: RRR  Gastro: Soft, non-distended,   : voiding  MSK: all extremities moving appropriately  - Left leg warm with intact movement and sensation  - Left thigh incision well approximated, CDI, no bleeding. 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     Active Issues:  - COPD (no O2 requirement at home)  - Status post neck dissection, ALT free flap  - nausea w/ vomiting  - post operative pain  - hypothyroidism  - nicotine dependence  - restless leg syndrome  - acute blood loss anemia  - hypomagnesia  - hypophosphatemia  - Chronic Respiratory Failure  - Left Axillary folliculitis    Plan:  -Drains: Monitor output  -Analgesia:  Scheduled Acetaminophen, zofran, compazine for nausea, decreased gabapentin and oxycodone doses with patient stating her pain is well controlled.   -FEN: HLIV, adv to  regular diet; monitor and replete electrolytes as needed  -Pulm: wean oxygen to room air, scheduled Breo Ellipta, Spiriva, PRN duonebs, encourage incentive spirometry, mucinex for congestion, c/s perioperative medicine for O2 requirement recommendations,   - Per Dr. Mckeon: intermittent desats (worse while sleeping) are likely related to a combination of BAUTISTA + COPD + Narcotics + pos surgical changes. Recommend to decrease sedating analgesics, ambulate as much as possible, aggressive IS, HOB > 40 degrees, OK to be discharged on nocturnal O2. 2L NC while sleeping. Arrange for sleep clinic outpatient for BAUTISTA workup. Appreciate recs    -ID: Unasyn x 4days, stop date 8/30 [ completed ]   -Cardiac: Vitals Q4hr   -Endo: synthroid 75 mcg  -GI: Bowel regimen (had bowel movement after suppository, pericolace, miralax), PPI,  and PRN anti-emetic  -: voiding  -Steroids/Special: Thigh skin edge bleeding 8/28 --> resolved w/ silver nitrate. No evidence of bleeding since. Nicotine patch started 8/28, ropinorole  -Embolic PPx: SQH, SCDs while in bed  -Dispo: PT/OT ordered recommend low intensity PT/no further needs OT; Plan for discharge 9/3 pending patient able to have nocturnal oxygen delivered to home    Patient seen and discussed with Dr. Gandara.      Guillermina Muller MD - PGY1  Otolaryngology - Head & Neck Surgery  [facility]    ENT Consult pager: [phone]  ENT Peds pager: [phone]  ENT Head & Neck Surgery Phone: [phone]  ENT subspecialty team: Luli individual resident who wrote today's note  ENT Outpatient scheduling number: [phone]  Please Page [phone] or call [phone] if Urgent    I saw and evaluated the patient. I personally obtained the key and critical portions of the history and physical exam or was physically present for key and critical portions performed by the resident/fellow. I reviewed the resident/fellow's documentation and discussed the patient with the resident/fellow. I agree with the  resident/fellow's medical decision making as documented in the note.  - O2 saturation will be performed overnight, delayed due to limited # of equipment available for use  - Discharge hopefully tomorrow    Miarnda Gandara MD

## 2024-09-02 NOTE — CONSULTS
Wound Care Consult     Visit Date: 9/2/2024      Patient Name: Gretchen Sanchez         MRN: [id number]           YOB: 1960     Reason for Consult: sacrum     Wound History: Pt with chronic but very minor MASD at midline sacrum    Wound Assessment:  Wound 08/26/24 Incision Throat Left (Active)   Site Assessment Clean;Dry;Intact 09/01/24 2300   Farida-Wound Assessment Intact 09/01/24 2300   Margins Attached edges 09/01/24 2300   Closure Sutures 09/01/24 2300   Sutures/Staple Line Approximated 09/01/24 0840   Drainage Description None 09/01/24 2300   Drainage Amount None 09/01/24 2300   Dressing Open to air 09/01/24 2300       Wound 08/26/24 Incision Leg Left;Upper;Anterior (Active)   Site Assessment Clean;Dry;Intact 09/01/24 2300   Farida-Wound Assessment Intact 09/01/24 2300   Margins Attached edges 09/01/24 2300   Closure Sutures 09/01/24 2300   Sutures/Staple Line Approximated 09/01/24 2300   Drainage Description None 09/01/24 2300   Drainage Amount None 09/01/24 2300   Dressing Open to air 09/01/24 2300   Dressing Changed New 08/26/24 1041   Dressing Status Dry 08/27/24 2012       Wound 09/02/24 Moisture Associated Skin Damage Sacrum Mid (Active)   Wound Image   09/02/24 1556   Wound Length (cm) 3.5 cm 09/02/24 1556   Wound Width (cm) 0.5 cm 09/02/24 1556   Wound Surface Area (cm^2) 1.75 cm^2 09/02/24 1556   Dressing Hydrophilic 09/02/24 1556   Dressing Changed New 09/02/24 1556   Dressing Status Clean;Dry 09/02/24 1556     Wound Team Summary Assessment: Pt resting in bed. A&O. Continent and able to turn self. States that she gets recurrent skin issues at midline sacrum, especially when she takes a bath. 3.5cm x ~0.5cm area of mildly macerated skin noted. No drainage or true wound. Periwound purple and denuded. Area cleaned and treated with Triad hydrophilic barrier cream. Pt encouraged to lay on her side to offload and promote air flow.      Wound Team Recs: Keep pt clean and dry. Encourage q2 hour  turning/side-lying to offload and promote air flow to sacral skin. No Mepilex please. Instead, apply Triad hydrophilic barrier cream instead BID and PRN.     Provider, please review.      Octavia Padilla RN, CWON  9/2/2024  3:57 PM

## 2024-09-02 NOTE — CARE PLAN
The patient's goals for the shift include safety    The clinical goals for the shift include Patient's pain will be controlled uring this shift

## 2024-09-02 NOTE — CARE PLAN
The patient's goals for the shift include safety    The clinical goals for the shift include Patient's pain will be controlled and rate pain 3 out of 10 or less throughout shift         No

## 2024-09-03 ENCOUNTER — DOCUMENTATION (OUTPATIENT)
Dept: HOME HEALTH SERVICES | Facility: HOME HEALTH | Age: 64
End: 2024-09-03
Payer: COMMERCIAL

## 2024-09-03 ENCOUNTER — PHARMACY VISIT (OUTPATIENT)
Dept: PHARMACY | Facility: CLINIC | Age: 64
End: 2024-09-03
Payer: MEDICAID

## 2024-09-03 VITALS
HEIGHT: 63 IN | RESPIRATION RATE: 16 BRPM | TEMPERATURE: 96.8 F | WEIGHT: 158.29 LBS | DIASTOLIC BLOOD PRESSURE: 60 MMHG | OXYGEN SATURATION: 98 % | BODY MASS INDEX: 28.05 KG/M2 | HEART RATE: 66 BPM | SYSTOLIC BLOOD PRESSURE: 132 MMHG

## 2024-09-03 LAB
ALBUMIN SERPL BCP-MCNC: 3.6 G/DL (ref 3.4–5)
ANION GAP SERPL CALC-SCNC: 12 MMOL/L (ref 10–20)
BASOPHILS # BLD AUTO: 0.04 X10*3/UL (ref 0–0.1)
BASOPHILS NFR BLD AUTO: 0.7 %
BUN SERPL-MCNC: 12 MG/DL (ref 6–23)
CALCIUM SERPL-MCNC: 9.1 MG/DL (ref 8.6–10.6)
CHLORIDE SERPL-SCNC: 99 MMOL/L (ref 98–107)
CO2 SERPL-SCNC: 31 MMOL/L (ref 21–32)
CREAT SERPL-MCNC: 0.53 MG/DL (ref 0.5–1.05)
EGFRCR SERPLBLD CKD-EPI 2021: >90 ML/MIN/1.73M*2
EOSINOPHIL # BLD AUTO: 0.18 X10*3/UL (ref 0–0.7)
EOSINOPHIL NFR BLD AUTO: 3.1 %
ERYTHROCYTE [DISTWIDTH] IN BLOOD BY AUTOMATED COUNT: 14.1 % (ref 11.5–14.5)
GLUCOSE BLD MANUAL STRIP-MCNC: 109 MG/DL (ref 74–99)
GLUCOSE SERPL-MCNC: 86 MG/DL (ref 74–99)
HCT VFR BLD AUTO: 32.9 % (ref 36–46)
HGB BLD-MCNC: 10.7 G/DL (ref 12–16)
IMM GRANULOCYTES # BLD AUTO: 0.03 X10*3/UL (ref 0–0.7)
IMM GRANULOCYTES NFR BLD AUTO: 0.5 % (ref 0–0.9)
LYMPHOCYTES # BLD AUTO: 0.52 X10*3/UL (ref 1.2–4.8)
LYMPHOCYTES NFR BLD AUTO: 9.1 %
MAGNESIUM SERPL-MCNC: 1.82 MG/DL (ref 1.6–2.4)
MCH RBC QN AUTO: 30.4 PG (ref 26–34)
MCHC RBC AUTO-ENTMCNC: 32.5 G/DL (ref 32–36)
MCV RBC AUTO: 94 FL (ref 80–100)
MONOCYTES # BLD AUTO: 0.68 X10*3/UL (ref 0.1–1)
MONOCYTES NFR BLD AUTO: 11.8 %
NEUTROPHILS # BLD AUTO: 4.29 X10*3/UL (ref 1.2–7.7)
NEUTROPHILS NFR BLD AUTO: 74.8 %
NRBC BLD-RTO: 0 /100 WBCS (ref 0–0)
PHOSPHATE SERPL-MCNC: 4.6 MG/DL (ref 2.5–4.9)
PLATELET # BLD AUTO: 280 X10*3/UL (ref 150–450)
POTASSIUM SERPL-SCNC: 4.9 MMOL/L (ref 3.5–5.3)
RBC # BLD AUTO: 3.52 X10*6/UL (ref 4–5.2)
SODIUM SERPL-SCNC: 137 MMOL/L (ref 136–145)
WBC # BLD AUTO: 5.7 X10*3/UL (ref 4.4–11.3)

## 2024-09-03 PROCEDURE — 94640 AIRWAY INHALATION TREATMENT: CPT

## 2024-09-03 PROCEDURE — 2500000004 HC RX 250 GENERAL PHARMACY W/ HCPCS (ALT 636 FOR OP/ED)

## 2024-09-03 PROCEDURE — 36415 COLL VENOUS BLD VENIPUNCTURE: CPT

## 2024-09-03 PROCEDURE — 97116 GAIT TRAINING THERAPY: CPT | Mod: GP,CQ

## 2024-09-03 PROCEDURE — 2500000001 HC RX 250 WO HCPCS SELF ADMINISTERED DRUGS (ALT 637 FOR MEDICARE OP)

## 2024-09-03 PROCEDURE — 85025 COMPLETE CBC W/AUTO DIFF WBC: CPT

## 2024-09-03 PROCEDURE — 83735 ASSAY OF MAGNESIUM: CPT

## 2024-09-03 PROCEDURE — 99239 HOSP IP/OBS DSCHRG MGMT >30: CPT | Performed by: NURSE PRACTITIONER

## 2024-09-03 PROCEDURE — 80069 RENAL FUNCTION PANEL: CPT

## 2024-09-03 PROCEDURE — 82947 ASSAY GLUCOSE BLOOD QUANT: CPT

## 2024-09-03 PROCEDURE — S4991 NICOTINE PATCH NONLEGEND: HCPCS

## 2024-09-03 PROCEDURE — 2500000002 HC RX 250 W HCPCS SELF ADMINISTERED DRUGS (ALT 637 FOR MEDICARE OP, ALT 636 FOR OP/ED)

## 2024-09-03 ASSESSMENT — COGNITIVE AND FUNCTIONAL STATUS - GENERAL
MOVING TO AND FROM BED TO CHAIR: A LITTLE
WALKING IN HOSPITAL ROOM: A LITTLE
STANDING UP FROM CHAIR USING ARMS: A LITTLE
DAILY ACTIVITIY SCORE: 21
WALKING IN HOSPITAL ROOM: A LITTLE
CLIMB 3 TO 5 STEPS WITH RAILING: A LITTLE
HELP NEEDED FOR BATHING: A LITTLE
TURNING FROM BACK TO SIDE WHILE IN FLAT BAD: A LITTLE
DRESSING REGULAR UPPER BODY CLOTHING: A LITTLE
DRESSING REGULAR LOWER BODY CLOTHING: A LITTLE
MOBILITY SCORE: 22
MOBILITY SCORE: 18
MOVING FROM LYING ON BACK TO SITTING ON SIDE OF FLAT BED WITH BEDRAILS: A LITTLE
CLIMB 3 TO 5 STEPS WITH RAILING: A LITTLE

## 2024-09-03 ASSESSMENT — PAIN SCALES - GENERAL
PAINLEVEL_OUTOF10: 6
PAINLEVEL_OUTOF10: 8
PAINLEVEL_OUTOF10: 10 - WORST POSSIBLE PAIN
PAINLEVEL_OUTOF10: 8

## 2024-09-03 ASSESSMENT — PAIN - FUNCTIONAL ASSESSMENT
PAIN_FUNCTIONAL_ASSESSMENT: 0-10

## 2024-09-03 NOTE — SIGNIFICANT EVENT
Overnight pulse ox started at 23:06. Patient on room air with SpO2 of 98%. RN notified and will initiate O2 if desat occurs.

## 2024-09-03 NOTE — DISCHARGE SUMMARY
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 Rice and Dr. Brownlee. Please see operative report for full details. Patient tolerated the procedure well and recovered briefly in PACU before being transitioned to regular nursing floor. Post-op course was complicated by episodes of emesis and nausea thought to be due to medications used for pain control. Additionally, due to history of COPD patient was having episodes of low oxygen saturation and was placed on supplemental oxygen, patient's prior pulmonologist and respiratory therapy were engaged which breathing treatments (duonebs) as needed, scheduled breo ellipta and spiriva as well as incentive spirometry.  2L NC was then added for nocturnal oxygen supplementation. Home oxygen was then ordered for night time use and patient encouraged to follow up in clinic for BAUTISTA evaluation.    Diet was advanced as tolerated.  IV medication transitioned to oral as diet advanced. The drains were monitored and once the output was less than 30ml in a 24hr time frame they were removed accordingly. On the day of discharge, the pt was tolerating a diet, pain was controlled on PO pain medication, and they were ambulating and voiding spontaneously. They were discharged  in stable condition with instructions to follow up as outpatient.     Pertinent Physical Exam At Time of Discharge  Physical Exam  Vitals reviewed in EMR  Gen: NAD, AOx3, resting comfortably in bed  Eyes: EOMI, sclera clear, PERRL  Ears: Normal external ears bilaterally  Nose: No rhinorrhea; anterior nares clear  Oral  Cavity:   Head: normocephalic, atraumatic  Neck: All incisions c/d/i, neck soft and flat without evidence of hematoma or fluid collection  -Fat window mild ooze with dark crusting   -CNXI intact  -mild swelling of left neck, stable  Resp: 2L O2 nasal cannula  Cards: RRR  Gastro: Soft, non-distended,   : voiding  MSK: all extremities moving appropriately  - Left leg warm with intact movement and sensation  - Left thigh incision well approximated, CDI, no bleeding. Soft with no evidence of hematoma  Psych: Appropriate mood and affect  Drains: All drains in place and holding suction with serosanguinous drainage (stripped)       Home Medications     Medication List      START taking these medications     acetaminophen 325 mg tablet; Commonly known as: Tylenol; Take 3 tablets   (975 mg) by mouth every 8 hours for 5 days.   aspirin 325 mg tablet; Take 1 tablet (325 mg) by mouth once daily for 26   doses.   gabapentin 100 mg capsule; Commonly known as: Neurontin; Take 1 capsule   (100 mg) by mouth once daily at bedtime.   Mucus Relief  mg 12 hr tablet; Generic drug: guaiFENesin; Take 1   tablet (600 mg) by mouth 2 times a day. Do not crush, chew, or split.   mupirocin 2 % ointment; Commonly known as: Bactroban; Apply topically 3   times a day for 14 days.   naloxone 4 mg/0.1 mL nasal spray; Commonly known as: Narcan; Administer   1 spray (4 mg) into affected nostril(s) if needed for opioid reversal or   respiratory depression. May repeat every 2-3 minutes if needed,   alternating nostrils, until medical assistance becomes available.   ondansetron 8 mg tablet; Commonly known as: Zofran; Take 1 tablet (8 mg)   by mouth every 8 hours if needed for nausea or vomiting for up to 7 days.   oxyCODONE 5 mg/5 mL solution; Commonly known as: Roxicodone; Take 7.5 mL   (7.5 mg) by mouth every 4 hours if needed for severe pain (7 - 10) for up   to 7 days.   pantoprazole 40 mg EC tablet; Commonly known as: ProtoNix; Take 1 tablet    (40 mg) by mouth once daily in the morning. Take before meals for 14 days.   Do not crush, chew, or split.   polyethylene glycol 17 gram packet; Commonly known as: Glycolax,   Miralax; Take 17 g (1 packet) dissolved in liquid by mouth once daily for   7 days.   white petrolatum 41 % ointment ointment; Commonly known as: Aquaphor;   Apply 1 Application topically 3 times a day.     CONTINUE taking these medications     Anoro Ellipta 62.5-25 mcg/actuation blister with device; Generic drug:   umeclidinium-vilanteroL; Inhale 1 puff once daily.   latanoprost 0.005 % ophthalmic solution; Commonly known as: Xalatan   levothyroxine 75 mcg tablet; Commonly known as: Synthroid, Levoxyl; TAKE   1 TABLET BY MOUTH EVERY DAY   melatonin 10 mg tablet   nicotine 21 mg/24 hr patch; Commonly known as: Nicoderm CQ; Place 1   patch over 24 hours on the skin once every 24 hours.   rOPINIRole 1 mg tablet; Commonly known as: Requip; Take 2 tablets (2 mg)   by mouth once daily at bedtime.   varenicline 1 mg tablet; Commonly known as: Chantix; Take 1 tablet (1   mg) by mouth once daily.       Outpatient Follow-Up  Future Appointments   Date Time Provider Department Center   9/6/2024  1:15 PM Robin Brownlee MD [id number] [facility]   9/10/2024 10:40 AM PIYUSH Arias-CNP [id number] [facility]   10/2/2024  9:00 AM PIYUSH Son-CNP [id number] [facility]   10/2/2024 10:00 AM Jimmie LEYVA MD [id number] [facility]   1/7/2025 10:00 AM POR MAMMO 1 [facility]   1/10/2025 11:00 AM Tri De Los Santos MD [id number] [facility]   5/8/2025  1:20 PM Elvin Loera MD [id number] [facility]     Total time spent today was 50 minutes, all of which was spent coordinating patients discharge.      Erin Sethi APRN, CNP  Certified Family Nurse Practitioner  Nurse Practitioner III  Department of Otolaryngology: Head & Neck Surgery  Personal Pager [phone]  ENT Team  Head and Neck Phone: [phone]

## 2024-09-03 NOTE — HH CARE COORDINATION
Home Care received a Referral for Nursing, Physical Therapy, and Occupational Therapy. We have processed the referral for a Start of Care on 9/4-9/5.     If you have any questions or concerns, please feel free to contact us at [phone]. Follow the prompts, enter your five digit zip code, and you will be directed to your care team on EAST 3.

## 2024-09-03 NOTE — PROGRESS NOTES
"Physical Therapy    Physical Therapy Treatment    Patient Name: Melvi Sanchez \"Allie"  MRN: [id number]  Today's Date: 9/3/2024  Time Calculation  Start Time: 1045  Stop Time: 1053  Time Calculation (min): 8 min         Assessment/Plan   PT Assessment  PT Assessment Results: Decreased endurance, Impaired balance  Rehab Prognosis: Excellent  Evaluation/Treatment Tolerance: Patient tolerated treatment well  Medical Staff Made Aware: Yes  Strengths: Ability to acquire knowledge, Attitude of self, Support of Caregivers  Barriers to Participation: Comorbidities  End of Session Communication: Bedside nurse  Assessment Comment: PAtient remains on track for safe d/c home with LOW intensity PT  End of Session Patient Position:  (seated EOB)     PT Plan  Treatment/Interventions: Bed mobility, Transfer training, Gait training, Stair training, Strengthening, Therapeutic exercise, Therapeutic activity  PT Plan: Ongoing PT  PT Frequency: 4 times per week  PT Discharge Recommendations: Low intensity level of continued care  Equipment Recommended upon Discharge: Wheeled walker  PT - OK to Discharge: Yes      General Visit Information:   PT  Visit  PT Received On: 09/03/24  Response to Previous Treatment: Patient with no complaints from previous session.  General  Family/Caregiver Present: Yes  Caregiver Feedback: Daughter in room and supportive  Prior to Session Communication: Bedside nurse  Patient Position Received: Up in room  Preferred Learning Style: verbal  General Comment: Pt pleasant and agreeable to PT session    Subjective   Precautions:  Precautions  Medical Precautions: Fall precautions  Precautions Comment: HOB > 30 degrees, head and neck in neutral positions    Vital Signs (Past 2hrs)                 Objective   Pain:  Pain Assessment  Pain Assessment: 0-10  0-10 (Numeric) Pain Score:  (did not provide numerical rating of pain when asked and did not appear to limit " session.)  Cognition:  Cognition  Arousal/Alertness: Appropriate responses to stimuli  Orientation Level: Oriented X4  Coordination:     Postural Control:  Static Standing Balance  Static Standing-Balance Support: No upper extremity supported  Static Standing-Level of Assistance:  (close sup with eyes open and CGA with eyes closed)  Static Standing-Comment/Number of Minutes: Tolerated static stance with narrow BETSEY eyes open/closed for :10 sec ea trial  Dynamic Standing Balance  Dynamic Standing-Balance Support: No upper extremity supported  Dynamic Standing-Level of Assistance: Close supervision  Dynamic Standing-Balance: Turning    Treatments:       Ambulation/Gait Training  Ambulation/Gait Training Performed: Yes  Ambulation/Gait Training 1  Surface 1: Level tile  Device 1: No device  Assistance 1: Close supervision  Quality of Gait 1: Decreased step length, Diminished heel strike  Comments/Distance (ft) 1: 150 ft x 2, 15 ft x 2  Transfers  Transfer: Yes  Transfer 1  Transfer From 1: Stand to  Transfer to 1: Sit  Technique 1: Stand to sit  Transfer Level of Assistance 1: Close supervision    Stairs  Stairs: Yes  Stairs  Rails 1: Right  Curb Step 1: No  Device 1: No device  Assistance 1: Close supervision  Comment/Number of Steps 1: 1 step    Outcome Measures:  [facility] Basic Mobility  Turning from your back to your side while in a flat bed without using bedrails: A little  Moving from lying on your back to sitting on the side of a flat bed without using bedrails: A little  Moving to and from bed to chair (including a wheelchair): A little  Standing up from a chair using your arms (e.g. wheelchair or bedside chair): A little  To walk in hospital room: A little  Climbing 3-5 steps with railing: A little  Basic Mobility - Total Score: 18    Education Documentation  No documentation found.  Education Comments  No comments found.        OP EDUCATION:       Encounter Problems       Encounter Problems (Active)       Balance        STG - Maintains dynamic standing balance without upper extremity support (Progressing)       Start:  08/28/24    Expected End:  09/11/24       INTERVENTIONS:  1. Practice standing with minimal support.  2. Educate patient about standing tolerance.  3. Educate patient about independence with gait, transfers, and ADL's.  4. Educate patient about use of assistive device.  5. Educate patient about self-directed care.            Mobility       LTG - Patient will ambulate community distance indep with LRD (Progressing)       Start:  08/28/24    Expected End:  09/11/24               PT Transfers       STG - Transfer from bed to chair indep with LRD (Progressing)       Start:  08/28/24    Expected End:  09/11/24            STG - Patient will perform bed mobility indep (Progressing)       Start:  08/28/24    Expected End:  09/11/24

## 2024-09-03 NOTE — PROGRESS NOTES
09/03/24 1400   Discharge Planning   Living Arrangements Alone   Support Systems Children   Type of Residence Private residence   Who is requesting discharge planning? Provider   Home or Post Acute Services In home services   Type of Home Care Services Home nursing visits;Home PT;Home OT   Expected Discharge Disposition  Services   Does the patient need discharge transport arranged? No     Pt is ready for discharge today.  Discussed her home going plans with her and her daughter.  She will have [facility] for a RN, PT and OT.  She has a wheeled walker in her room to take home with her from [employer].  She is requiring nocturnal 02 which is also being supplied from [facility].  The pt was provided with their contact number and knows to call to have the concentrating device delivered as soon as she arrives home.  The pt lives alone and is requesting to have a meal delivery service.   SW was made aware.  Ashley Mcgowan RN, TCC

## 2024-09-03 NOTE — NURSING NOTE
Reviewed discharge information with patient and daughter. Meds to beds returned prescriptions at bedside. Educated patient and family on wound/incision care, JARRETT drain care, and new medications. Patient successfully demonstrated wound care and emptying JARRETT drain. Answered patient's questions. IV was removed successfully. All belongings were returned to the patient. Wound care supplies provided. Patient will be discharged to home with home care.    Silvana Campa RN

## 2024-09-06 ENCOUNTER — APPOINTMENT (OUTPATIENT)
Dept: OTOLARYNGOLOGY | Facility: CLINIC | Age: 64
End: 2024-09-06
Payer: COMMERCIAL

## 2024-09-06 VITALS — BODY MASS INDEX: 28.35 KG/M2 | WEIGHT: 160 LBS | HEIGHT: 63 IN

## 2024-09-06 DIAGNOSIS — F17.210 TOBACCO DEPENDENCE DUE TO CIGARETTES: Primary | ICD-10-CM

## 2024-09-06 DIAGNOSIS — G89.18 POST-OP PAIN: ICD-10-CM

## 2024-09-06 DIAGNOSIS — C44.40 MALIGNANT NEOPLASM OF SKIN OF SCALP AND NECK: ICD-10-CM

## 2024-09-06 DIAGNOSIS — C09.9 TONSIL CANCER (MULTI): Primary | ICD-10-CM

## 2024-09-06 PROCEDURE — 3008F BODY MASS INDEX DOCD: CPT | Performed by: OTOLARYNGOLOGY

## 2024-09-06 PROCEDURE — 99024 POSTOP FOLLOW-UP VISIT: CPT | Performed by: OTOLARYNGOLOGY

## 2024-09-06 RX ORDER — OXYCODONE HCL 5 MG/5 ML
7.5 SOLUTION, ORAL ORAL EVERY 4 HOURS PRN
Qty: 315 ML | Refills: 0 | Status: SHIPPED | OUTPATIENT
Start: 2024-09-06 | End: 2024-09-13

## 2024-09-06 RX ORDER — BUPROPION HYDROCHLORIDE 150 MG/1
150 TABLET ORAL EVERY MORNING
Qty: 30 TABLET | Refills: 5 | Status: SHIPPED | OUTPATIENT
Start: 2024-09-06 | End: 2025-03-05

## 2024-09-06 ASSESSMENT — ENCOUNTER SYMPTOMS
SORE THROAT: 0
TROUBLE SWALLOWING: 0
CHILLS: 0
VOICE CHANGE: 0
FACIAL SWELLING: 1
APPETITE CHANGE: 0
FATIGUE: 0
DIAPHORESIS: 0
FEVER: 0

## 2024-09-06 ASSESSMENT — PATIENT HEALTH QUESTIONNAIRE - PHQ9
2. FEELING DOWN, DEPRESSED OR HOPELESS: NOT AT ALL
SUM OF ALL RESPONSES TO PHQ9 QUESTIONS 1 AND 2: 0
1. LITTLE INTEREST OR PLEASURE IN DOING THINGS: NOT AT ALL

## 2024-09-06 NOTE — PROGRESS NOTES
Chief Complaint   Patient presents with    Post-op Visit     HPI  Melvi Sanchez is a 64 y.o. female referred to me today by No ref. provider found for postoperative evaluation of T1N3a SCCa of left tonsil s/p CXRT with recurrent left neck disease now s/p left radical ND 2-4 with CN 11 repair, recon with left ALT. Patient has brought her daily drain record (L thigh drain still present) and reports no problems eating or drinking. She endorses that she has been consistently using oxygen at night time.   Patient wanted to discuss her long term prognosis, and any need for further chemotherapy and radiation.     Social History  She reports that she has been smoking cigarettes. She started smoking about 9 months ago. She has a 40.3 pack-year smoking history. She has never used smokeless tobacco. She reports current alcohol use of about 1.0 standard drink of alcohol per week. She reports current drug use. Frequency: 1.00 time per week. Drug: Marijuana.    PMH  She has a past medical history of Acute hypoxemic respiratory failure (Multi) (2023), Breast calcification, left, Dysphagia, Essential (primary) hypertension (2018), Fibroadenoma of left breast, H/O malignant neoplasm of tonsil, Hypothyroidism, Morbid obesity (Multi) (2023), Nausea and/or vomiting (2023), OAB (overactive bladder), Pharyngitis (2023), Pneumonia (2023), Restless legs syndrome, and Sinusitis (2023).     PSH  She has a past surgical history that includes Cholecystectomy (2014); Hysterectomy (2014); Mouth surgery (2014); Breast biopsy (Left); Colonoscopy; and  section, low transverse.    ROS  Review of Systems   Constitutional:  Negative for appetite change, chills, diaphoresis, fatigue and fever.   HENT:  Positive for facial swelling. Negative for congestion, ear discharge, ear pain, hearing loss, mouth sores, nosebleeds, postnasal drip, sore throat, trouble swallowing and voice  change.         PE  hysical Exam  Vitals reviewed in EMR  Gen: NAD, AOx3, resting comfortably in bed  Eyes: EOMI, sclera clear, PERRL  Ears: Normal external ears bilaterally  Nose: No rhinorrhea; anterior nares clear  Oral Cavity:   Head: normocephalic, atraumatic  Neck: All incisions c/d/i, neck soft and flat without evidence of hematoma or fluid collection  -Fat window mild ooze with dark crusting, bright red blood on skin prick  -CNXI intact  -mild swelling of left neck, stable  Resp: Room air during clinic visit  Cards: RRR  Gastro: Soft, non-distended,   : voiding  MSK: all extremities moving appropriately  - Left leg warm with intact movement and sensation  - Left thigh incision well approximated except for 0.5 cm area of dehiscence in the proximal third of the donor site. Another area of dehiscence is seen immediately proximal, and has evidence of silver nitrate. At the drain site more distal and closer to the knee, there is a 0.5cm nonhealing wound with crusting. , CDI, no bleeding. Soft with no evidence of hematoma  Psych: Appropriate mood and affect  Drains: Thigh drain in place and holding suction with serosanguinous drainage (stripped)       Daily Drain Record:   9/3: 60cc evening  9/4: 80cc, 40cc, 60cc  9/5 60cc, 30cc, 35 ml Total: 125 ml  9/6 60cc    ASSESSMENT AND PLAN  Problem List Items Addressed This Visit    None     -Refilled oxycodone prescription as patient endorses pain  -Removed thigh drain and educated patient on swelling precautions  -Wound separations will heal over time, will monitor at next office visit.   -RTC 2 months    Guillermina Muller MD  Otolaryngology- Head & Neck Surgery, PGY-1  ENT pager [phone] ([phone])        The above resident note has been reviewed and confirmed.  Patient was seen and examined with the resident today.  Documentation has been reviewed.

## 2024-09-07 ENCOUNTER — HOME CARE VISIT (OUTPATIENT)
Dept: HOME HEALTH SERVICES | Facility: HOME HEALTH | Age: 64
End: 2024-09-07
Payer: COMMERCIAL

## 2024-09-07 VITALS
RESPIRATION RATE: 16 BRPM | HEIGHT: 63 IN | OXYGEN SATURATION: 95 % | BODY MASS INDEX: 27.29 KG/M2 | HEART RATE: 66 BPM | SYSTOLIC BLOOD PRESSURE: 104 MMHG | DIASTOLIC BLOOD PRESSURE: 62 MMHG | WEIGHT: 154 LBS | TEMPERATURE: 97.7 F

## 2024-09-07 PROCEDURE — G0299 HHS/HOSPICE OF RN EA 15 MIN: HCPCS

## 2024-09-07 ASSESSMENT — ENCOUNTER SYMPTOMS
PERSON REPORTING PAIN: PATIENT
PAIN SEVERITY GOAL: 0/10
HIGHEST PAIN SEVERITY IN PAST 24 HOURS: 7/10
PAIN LOCATION - PAIN QUALITY: ACHY
PAIN: 1
COUGH: 1
PAIN LOCATION: NECK
PAIN LOCATION - PAIN SEVERITY: 7/10
PAIN LOCATION - PAIN FREQUENCY: CONSTANT
APPETITE LEVEL: FAIR
LOWEST PAIN SEVERITY IN PAST 24 HOURS: 4/10
SUBJECTIVE PAIN PROGRESSION: GRADUALLY IMPROVING

## 2024-09-07 ASSESSMENT — ACTIVITIES OF DAILY LIVING (ADL)
ENTERING_EXITING_HOME: NEEDS ASSISTANCE
OASIS_M1830: 04

## 2024-09-09 ENCOUNTER — HOME CARE VISIT (OUTPATIENT)
Dept: HOME HEALTH SERVICES | Facility: HOME HEALTH | Age: 64
End: 2024-09-09
Payer: COMMERCIAL

## 2024-09-09 VITALS — TEMPERATURE: 98.3 F | OXYGEN SATURATION: 99 % | HEART RATE: 78 BPM

## 2024-09-09 PROCEDURE — G0152 HHCP-SERV OF OT,EA 15 MIN: HCPCS

## 2024-09-09 ASSESSMENT — ACTIVITIES OF DAILY LIVING (ADL)
FEEDING_WITHIN_DEFINED_LIMITS: 1
GROOMING_WITHIN_DEFINED_LIMITS: 1
DRESSING_LB_CURRENT_FUNCTION: INDEPENDENT
TOILETING: INDEPENDENT
WASHING_LB_CURRENT_FUNCTION: SUPERVISION
TOILETING: 1
WASHING_UPB_CURRENT_FUNCTION: SUPERVISION
DRESSING_UB_CURRENT_FUNCTION: INDEPENDENT

## 2024-09-09 ASSESSMENT — ENCOUNTER SYMPTOMS
PAIN LOCATION - PAIN QUALITY: ACHING
PAIN LOCATION - PAIN DURATION: DAILY
SUBJECTIVE PAIN PROGRESSION: GRADUALLY IMPROVING
PAIN LOCATION: NECK
LOWEST PAIN SEVERITY IN PAST 24 HOURS: 2/10
PAIN: 1
PAIN LOCATION: LEFT LEG
HIGHEST PAIN SEVERITY IN PAST 24 HOURS: 10/10
PERSON REPORTING PAIN: PATIENT
PAIN LOCATION - PAIN FREQUENCY: CONSTANT
PAIN LOCATION - PAIN FREQUENCY: CONSTANT
PAIN LOCATION - PAIN SEVERITY: 3/10
PAIN LOCATION - PAIN SEVERITY: 8/10
PAIN LOCATION - RELIEVING FACTORS: MEDS
PAIN LOCATION - PAIN DURATION: DAILY
PAIN LOCATION - RELIEVING FACTORS: MEDS
PAIN LOCATION - PAIN QUALITY: ACHING

## 2024-09-10 ENCOUNTER — OFFICE VISIT (OUTPATIENT)
Dept: PULMONOLOGY | Facility: HOSPITAL | Age: 64
End: 2024-09-10
Payer: COMMERCIAL

## 2024-09-10 VITALS
BODY MASS INDEX: 29.04 KG/M2 | HEIGHT: 62 IN | TEMPERATURE: 97.8 F | HEART RATE: 76 BPM | SYSTOLIC BLOOD PRESSURE: 130 MMHG | WEIGHT: 157.8 LBS | OXYGEN SATURATION: 94 % | RESPIRATION RATE: 16 BRPM | DIASTOLIC BLOOD PRESSURE: 78 MMHG

## 2024-09-10 DIAGNOSIS — G47.33 OSA (OBSTRUCTIVE SLEEP APNEA): ICD-10-CM

## 2024-09-10 DIAGNOSIS — C09.9 TONSIL CANCER (MULTI): ICD-10-CM

## 2024-09-10 DIAGNOSIS — J96.11 CHRONIC RESPIRATORY FAILURE WITH HYPOXIA (MULTI): ICD-10-CM

## 2024-09-10 DIAGNOSIS — R91.1 LUNG NODULE: ICD-10-CM

## 2024-09-10 DIAGNOSIS — F17.210 NICOTINE DEPENDENCE, CIGARETTES, UNCOMPLICATED: ICD-10-CM

## 2024-09-10 DIAGNOSIS — J43.9 PULMONARY EMPHYSEMA, UNSPECIFIED EMPHYSEMA TYPE (MULTI): Primary | ICD-10-CM

## 2024-09-10 DIAGNOSIS — R93.89 ABNORMAL CT OF THE CHEST: ICD-10-CM

## 2024-09-10 PROCEDURE — 3008F BODY MASS INDEX DOCD: CPT | Performed by: NURSE PRACTITIONER

## 2024-09-10 PROCEDURE — 99214 OFFICE O/P EST MOD 30 MIN: CPT | Performed by: NURSE PRACTITIONER

## 2024-09-10 RX ORDER — UMECLIDINIUM BROMIDE AND VILANTEROL TRIFENATATE 62.5; 25 UG/1; UG/1
1 POWDER RESPIRATORY (INHALATION) DAILY
Qty: 30 EACH | Refills: 11 | Status: SHIPPED | OUTPATIENT
Start: 2024-09-10 | End: 2025-09-05

## 2024-09-10 RX ORDER — ALBUTEROL SULFATE 90 UG/1
2 INHALANT RESPIRATORY (INHALATION) EVERY 4 HOURS PRN
Qty: 18 G | Refills: 11 | Status: SHIPPED | OUTPATIENT
Start: 2024-09-10

## 2024-09-10 ASSESSMENT — ENCOUNTER SYMPTOMS
COUGH: 0
WHEEZING: 0
LOSS OF SENSATION IN FEET: 0
RHINORRHEA: 0
FATIGUE: 0
OCCASIONAL FEELINGS OF UNSTEADINESS: 0
UNEXPECTED WEIGHT CHANGE: 0
DEPRESSION: 0
SHORTNESS OF BREATH: 1
CHILLS: 0
FEVER: 0

## 2024-09-10 ASSESSMENT — PATIENT HEALTH QUESTIONNAIRE - PHQ9
1. LITTLE INTEREST OR PLEASURE IN DOING THINGS: NOT AT ALL
2. FEELING DOWN, DEPRESSED OR HOPELESS: NOT AT ALL
SUM OF ALL RESPONSES TO PHQ9 QUESTIONS 1 AND 2: 0

## 2024-09-10 ASSESSMENT — COLUMBIA-SUICIDE SEVERITY RATING SCALE - C-SSRS
1. IN THE PAST MONTH, HAVE YOU WISHED YOU WERE DEAD OR WISHED YOU COULD GO TO SLEEP AND NOT WAKE UP?: NO
6. HAVE YOU EVER DONE ANYTHING, STARTED TO DO ANYTHING, OR PREPARED TO DO ANYTHING TO END YOUR LIFE?: NO
2. HAVE YOU ACTUALLY HAD ANY THOUGHTS OF KILLING YOURSELF?: NO

## 2024-09-10 NOTE — PATIENT INSTRUCTIONS
Continue on Anoro.   Continue albuterol as needed.   Please get CT chest done in mid October.  I ordered the sleep study for you when you are able to get this done.   Call with any questions or concerns.   Follow up with me in 6 months.

## 2024-09-10 NOTE — PROGRESS NOTES
"Subjective   Patient ID: Gretchen Sanchez is a 64 y.o. here for follow up COPD.     HPI: She was diagnosed with tonsillar CA due to left neck lump in March 2022. She just finished XRT and chemotherapy on July 8, 22. She is currently on Anoro for COPD. She is currently on Chantix and using nicotine patches but mostly has smoked about 1 ppd and has smoked for several years. She is here for hospital follow up for pneumonia in September. She was discharged home on 3L at rest and 6L on exertion. She was able to stop wearing it last week and is 99% on RA today. She has no other concerns.     Today she is here for follow up after not being seen since October 2023. She was able to get off of daily oxygen in November 2023 after her episode of pneumonia. She was found to have recent recurrent left neck disease and s/p repair and reconstruction on 8/26/24 and she is following closely with ENT. She was sent home on 2L of oxygen at . She needs in lab sleep study. She is doing well on Anoro and albuterol prn. She is trying to quit smoking, she has quit 2 weeks ago but admits to taking puffs \"here and there\". She has no other concerns.    Review of Systems   Constitutional:  Negative for chills, fatigue, fever and unexpected weight change.   HENT:  Negative for congestion, postnasal drip and rhinorrhea.    Respiratory:  Positive for shortness of breath. Negative for cough (denies hemoptysis.) and wheezing.    Cardiovascular:  Negative for chest pain and leg swelling.   All other systems reviewed and are negative.      Objective   Physical Exam  Vitals reviewed.   Constitutional:       Appearance: Normal appearance.   HENT:      Head: Normocephalic.   Cardiovascular:      Rate and Rhythm: Normal rate and regular rhythm.   Pulmonary:      Effort: Pulmonary effort is normal.      Breath sounds: Decreased breath sounds present.   Skin:     General: Skin is warm and dry.   Neurological:      Mental Status: She is alert. "       Assessment/Plan     1. COPD  2. Tobacco dependence  3. Suspect BAUTISTA  4. Seasonal allergic rhinitis  5. Tonsillar CA Squamous Cell, recurrence  6. Abnormal CT chest     Plan:     Pt's PFTS show FEV1 81%, RV and TLC normal, DLCO 54%.   -She was able to come off of daytime supplemental oxygen November 2023, she was started on 2L at HS during hospitalization. I ordered an in lab sleep study for her.   -Continue Anoro.  -continue prn albuterol.   -Follow up with Sleep Specialist for suspected sleep apnea. pt states that she lost weight due to cancer and does not have symptoms of EDS or fatigue or snoring.   -She had CT chest in June 2024 that showed an area of clustered nodules in the RLL, this was hyperactive on PET scan in July but could be infectious, I recommend getting follow up CT chest in 3 months from PET. I ordered this for mid October.   -She is following closely with ENT.    Overall I will get CT chest for close follow up next month, I also ordered in lab sleep study for her. I will bring her back with me in 6 months, or sooner based on CT chest. I instructed patient to call sooner if needed.      Total time:  30 min.

## 2024-09-11 ENCOUNTER — HOME CARE VISIT (OUTPATIENT)
Dept: HOME HEALTH SERVICES | Facility: HOME HEALTH | Age: 64
End: 2024-09-11
Payer: COMMERCIAL

## 2024-09-11 VITALS — HEART RATE: 64 BPM | RESPIRATION RATE: 16 BRPM

## 2024-09-11 PROCEDURE — G0151 HHCP-SERV OF PT,EA 15 MIN: HCPCS

## 2024-09-11 SDOH — HEALTH STABILITY: PHYSICAL HEALTH
EXERCISE COMMENTS: SUPINE QUAD AND GLUT SETS, ANKLE PUMPS, HEEL SLIDES X 10 REPS, SLR LEFT X 5 REPS  SITITNG LEFT LAQ 10 REPS, ANKLE PUMPS X 10 REPS

## 2024-09-11 SDOH — HEALTH STABILITY: PHYSICAL HEALTH: EXERCISE TYPE: LE, SEE COMMENTS BELOW

## 2024-09-11 ASSESSMENT — GAIT ASSESSMENTS
INITIATION OF GAIT IMMEDIATELY AFTER GO: 1 - NO HESITANCY
WALKING STANCE: 1 - HEELS ALMOST TOUCHING WHILE WALKING
BALANCE AND GAIT SCORE: 25
TRUNK SCORE: 2
GAIT SCORE: 12
PATH SCORE: 2
TRUNK: 2 - NO SWAY, NO FLEXION, NO USE OF ARMS, NO WALKING AID
STEP CONTINUITY: 1 - STEPS APPEAR CONTINUOUS
PATH: 2 - STRAIGHT WITHOUT WALKING AID
STEP SYMMETRY: 1 - RIGHT AND LEFT STEP LENGTH APPEAR EQUAL

## 2024-09-11 ASSESSMENT — BALANCE ASSESSMENTS
BALANCE SCORE: 13
SITTING BALANCE: 1 - STEADY, SAFE
ARISES: 1 - ABLE, USES ARMS TO HELP
NUDGED SCORE: 2
TURNING 360 DEGREES STEPS: 1 - CONTINUOUS STEPS
ARISING SCORE: 1
SITTING DOWN: 1 - USES ARMS OR NOT SMOOTH MOTION
NUDGED: 2 - STEADY
EYES CLOSED AT MAXIMUM POSITION NUDGED: 1 - STEADY
ATTEMPTS TO ARISE: 2 - ABLE TO RISE, ONE ATTEMPT
IMMEDIATE STANDING BALANCE FIRST 5 SECONDS: 2 - STEADY WITHOUT WALKER OR OTHER SUPPORT
STANDING BALANCE: 1 - STEADY BUT WIDE STANCE AND USES CANE OR OTHER SUPPORT

## 2024-09-11 ASSESSMENT — ENCOUNTER SYMPTOMS
PAIN LOCATION - RELIEVING FACTORS: REST, MEDS
MUSCLE WEAKNESS: 1
PAIN SEVERITY GOAL: 0/10
PAIN LOCATION - PAIN FREQUENCY: INTERMITTENT
PAIN LOCATION: LEFT LEG
PAIN LOCATION - PAIN QUALITY: SORE
LOWEST PAIN SEVERITY IN PAST 24 HOURS: 3/10
PAIN LOCATION: NECK
PERSON REPORTING PAIN: PATIENT
PAIN LOCATION - EXACERBATING FACTORS: MVT
HIGHEST PAIN SEVERITY IN PAST 24 HOURS: 7/10
PAIN LOCATION - PAIN DURATION: VARIES
PAIN LOCATION - PAIN SEVERITY: 7/10
PAIN LOCATION - RELIEVING FACTORS: REST, MEDS
PAIN LOCATION - PAIN FREQUENCY: INTERMITTENT
PAIN LOCATION - PAIN DURATION: VARIES
PAIN LOCATION - EXACERBATING FACTORS: MVT
SUBJECTIVE PAIN PROGRESSION: WAXING AND WANING
PAIN LOCATION - PAIN SEVERITY: 5/10
PAIN: 1
PAIN LOCATION - PAIN QUALITY: SORE

## 2024-09-11 ASSESSMENT — ACTIVITIES OF DAILY LIVING (ADL)
AMBULATION ASSISTANCE: 1
AMBULATION_DISTANCE/DURATION_TOLERATED: 100
AMBULATION ASSISTANCE ON FLAT SURFACES: 1
AMBULATION ASSISTANCE: INDEPENDENT

## 2024-09-12 LAB
LAB AP ASR DISCLAIMER: NORMAL
LABORATORY COMMENT REPORT: NORMAL
PATH REPORT.FINAL DX SPEC: NORMAL
PATH REPORT.GROSS SPEC: NORMAL
PATH REPORT.RELEVANT HX SPEC: NORMAL
PATH REPORT.TOTAL CANCER: NORMAL
RESIDENT REVIEW: NORMAL

## 2024-09-13 ENCOUNTER — APPOINTMENT (OUTPATIENT)
Dept: OTOLARYNGOLOGY | Facility: CLINIC | Age: 64
End: 2024-09-13
Payer: COMMERCIAL

## 2024-09-13 ENCOUNTER — HOME CARE VISIT (OUTPATIENT)
Dept: HOME HEALTH SERVICES | Facility: HOME HEALTH | Age: 64
End: 2024-09-13
Payer: COMMERCIAL

## 2024-09-13 VITALS — OXYGEN SATURATION: 98 % | HEART RATE: 70 BPM | TEMPERATURE: 97.8 F

## 2024-09-13 PROCEDURE — G0152 HHCP-SERV OF OT,EA 15 MIN: HCPCS

## 2024-09-13 ASSESSMENT — ACTIVITIES OF DAILY LIVING (ADL)
BATHING ASSESSED: 1
BATHING_CURRENT_FUNCTION: SUPERVISION

## 2024-09-13 ASSESSMENT — ENCOUNTER SYMPTOMS
LOWEST PAIN SEVERITY IN PAST 24 HOURS: 4/10
HIGHEST PAIN SEVERITY IN PAST 24 HOURS: 10/10
PAIN LOCATION - RELIEVING FACTORS: MEDS, REST
PAIN LOCATION: LEFT LEG
PAIN LOCATION - PAIN DURATION: DAILY
PAIN LOCATION - PAIN FREQUENCY: CONSTANT
PAIN LOCATION - PAIN SEVERITY: 6/10
PERSON REPORTING PAIN: PATIENT
PAIN: 1
PAIN LOCATION - PAIN QUALITY: ACHING

## 2024-09-14 ENCOUNTER — HOME CARE VISIT (OUTPATIENT)
Dept: HOME HEALTH SERVICES | Facility: HOME HEALTH | Age: 64
End: 2024-09-14
Payer: COMMERCIAL

## 2024-09-15 ENCOUNTER — HOME CARE VISIT (OUTPATIENT)
Dept: HOME HEALTH SERVICES | Facility: HOME HEALTH | Age: 64
End: 2024-09-15
Payer: COMMERCIAL

## 2024-09-15 VITALS — TEMPERATURE: 98.6 F | HEART RATE: 68 BPM | RESPIRATION RATE: 16 BRPM

## 2024-09-15 PROCEDURE — G0151 HHCP-SERV OF PT,EA 15 MIN: HCPCS

## 2024-09-15 SDOH — HEALTH STABILITY: PHYSICAL HEALTH: EXERCISE TYPE: LE STRENGTH AND BALANCE

## 2024-09-15 SDOH — HEALTH STABILITY: PHYSICAL HEALTH
EXERCISE COMMENTS: SUPINE QUAD AND GLUT SETS, HEEL SLIDES X 15 REPS, LEFT SLR X 7 REPS  SITTING LEFT LAQ  X 10 REPS  STANDING HEEL RAISES, HAMSTRING CURLS, MINI SQUATS X 10 REPS

## 2024-09-15 ASSESSMENT — GAIT ASSESSMENTS
TRUNK: 1 - NO SWAY BUT FLEXION OF KNEES OR BACK OR SPREADS ARMS WHILE WALKING
STEP SYMMETRY: 1 - RIGHT AND LEFT STEP LENGTH APPEAR EQUAL
WALKING STANCE: 0 - HEELS APART
PATH SCORE: 1
BALANCE AND GAIT SCORE: 22
PATH: 1 - MILD/MODERATE DEVIATION OR USES WALKING AID
STEP CONTINUITY: 1 - STEPS APPEAR CONTINUOUS
TRUNK SCORE: 1
GAIT SCORE: 9
INITIATION OF GAIT IMMEDIATELY AFTER GO: 1 - NO HESITANCY

## 2024-09-15 ASSESSMENT — ENCOUNTER SYMPTOMS
PAIN LOCATION - PAIN SEVERITY: 8/10
LOWEST PAIN SEVERITY IN PAST 24 HOURS: 4/10
SUBJECTIVE PAIN PROGRESSION: WAXING AND WANING
PAIN LOCATION: LEFT LEG
PAIN LOCATION - EXACERBATING FACTORS: MVT
HIGHEST PAIN SEVERITY IN PAST 24 HOURS: 10/10
PAIN SEVERITY GOAL: 0/10
PAIN LOCATION - RELIEVING FACTORS: REST, MEDS
PAIN LOCATION - RELIEVING FACTORS: REST, MEDS
PAIN LOCATION - PAIN FREQUENCY: FREQUENT
PERSON REPORTING PAIN: PATIENT
MUSCLE WEAKNESS: 1
PAIN LOCATION - PAIN QUALITY: SORE
PAIN LOCATION - PAIN QUALITY: SORE
PAIN LOCATION - PAIN FREQUENCY: FREQUENT
PAIN LOCATION - PAIN SEVERITY: 10/10
PAIN LOCATION - EXACERBATING FACTORS: MVT
PAIN LOCATION - PAIN DURATION: CONSTANT
PAIN: 1
PAIN LOCATION - PAIN DURATION: CONSTANT
PAIN LOCATION: NECK

## 2024-09-15 ASSESSMENT — BALANCE ASSESSMENTS
NUDGED SCORE: 2
NUDGED: 2 - STEADY
STANDING BALANCE: 1 - STEADY BUT WIDE STANCE AND USES CANE OR OTHER SUPPORT
SITTING DOWN: 1 - USES ARMS OR NOT SMOOTH MOTION
TURNING 360 DEGREES STEPS: 1 - CONTINUOUS STEPS
EYES CLOSED AT MAXIMUM POSITION NUDGED: 1 - STEADY
IMMEDIATE STANDING BALANCE FIRST 5 SECONDS: 2 - STEADY WITHOUT WALKER OR OTHER SUPPORT
ARISES: 1 - ABLE, USES ARMS TO HELP
BALANCE SCORE: 13
ATTEMPTS TO ARISE: 2 - ABLE TO RISE, ONE ATTEMPT
SITTING BALANCE: 1 - STEADY, SAFE
ARISING SCORE: 1

## 2024-09-15 ASSESSMENT — ACTIVITIES OF DAILY LIVING (ADL)
AMBULATION ASSISTANCE: 1
AMBULATION_DISTANCE/DURATION_TOLERATED: 200 FEET
AMBULATION ASSISTANCE ON FLAT SURFACES: 1

## 2024-09-16 DIAGNOSIS — G89.18 POST-OP PAIN: ICD-10-CM

## 2024-09-16 DIAGNOSIS — C44.40 MALIGNANT NEOPLASM OF SKIN OF SCALP AND NECK: ICD-10-CM

## 2024-09-16 RX ORDER — OXYCODONE HCL 5 MG/5 ML
7.5 SOLUTION, ORAL ORAL EVERY 4 HOURS PRN
Qty: 315 ML | Refills: 0 | Status: SHIPPED | OUTPATIENT
Start: 2024-09-16 | End: 2024-09-17

## 2024-09-16 NOTE — TELEPHONE ENCOUNTER
Patient called requesting refill on her pain meds. She also states she has leg swelling.  I will have her send me a picture to share with Dr. Brownlee

## 2024-09-16 NOTE — TELEPHONE ENCOUNTER
After further discussion with patient, I added her to clinic tomorrow for evaluation as it sounds like she may have a seroma that needs drained.

## 2024-09-17 ENCOUNTER — OFFICE VISIT (OUTPATIENT)
Dept: OTOLARYNGOLOGY | Facility: CLINIC | Age: 64
End: 2024-09-17
Payer: COMMERCIAL

## 2024-09-17 VITALS — BODY MASS INDEX: 29.63 KG/M2 | TEMPERATURE: 97.3 F | WEIGHT: 161 LBS | HEIGHT: 62 IN

## 2024-09-17 DIAGNOSIS — T79.2XXA: Primary | ICD-10-CM

## 2024-09-17 DIAGNOSIS — C77.0 METASTASIS TO HEAD AND NECK LYMPH NODE (MULTI): ICD-10-CM

## 2024-09-17 PROCEDURE — 3008F BODY MASS INDEX DOCD: CPT | Performed by: OTOLARYNGOLOGY

## 2024-09-17 PROCEDURE — 10160 PNXR ASPIR ABSC HMTMA BULLA: CPT | Performed by: OTOLARYNGOLOGY

## 2024-09-17 PROCEDURE — 99024 POSTOP FOLLOW-UP VISIT: CPT | Performed by: OTOLARYNGOLOGY

## 2024-09-17 RX ORDER — OXYCODONE HYDROCHLORIDE 5 MG/1
7.5 TABLET ORAL EVERY 4 HOURS PRN
Qty: 63 TABLET | Refills: 0 | Status: SHIPPED | OUTPATIENT
Start: 2024-09-17 | End: 2024-09-24

## 2024-09-17 NOTE — PROGRESS NOTES
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         He continues to have pain managed with oxycodone      We talked about the final pathology today showing infiltrative disease and the recommendation for reirradiation with chemo has been discussed with her      We will see whether this can be complex at [place] versus needing to come to the main campus      We will present her at tumor board    Most notably she has developed swelling of the left thigh        PE    Left neck is healing nicely    No fluid collection      Left thigh is with obvious seroma      Procedure note:  Puncture aspiration of left thigh seroma    The area was initially cleansed and subsequently greater than 400 cc of serosanguineous fluid was aspirated with restoration of normal contour.  Patient tolerated procedure well the area was dressed            A/p      Patient will be referred to medical and radiation oncology for consideration of reirradiation with chemo      Also will obtain follow-up chest CT given the irregularity noted on the preoperative scan this is being arranged through her pulmonary physician      Additionally she will wear an Ace wrap on her left thigh secondary to the seroma and I will see her back in a few weeks to likely reaspirate    Pain refill has been given

## 2024-09-18 ENCOUNTER — HOME CARE VISIT (OUTPATIENT)
Dept: HOME HEALTH SERVICES | Facility: HOME HEALTH | Age: 64
End: 2024-09-18
Payer: COMMERCIAL

## 2024-09-18 PROCEDURE — G0158 HHC OT ASSISTANT EA 15: HCPCS | Mod: CO

## 2024-09-18 ASSESSMENT — ENCOUNTER SYMPTOMS
PERSON REPORTING PAIN: PATIENT
DENIES PAIN: 1

## 2024-09-19 ENCOUNTER — TELEPHONE (OUTPATIENT)
Dept: RADIATION ONCOLOGY | Facility: HOSPITAL | Age: 64
End: 2024-09-19
Payer: COMMERCIAL

## 2024-09-19 NOTE — TUMOR BOARD NOTE
[facility] HEAD AND NECK TUMOR BOARD NOTE:    Melvi Sanchez Is a 64 y.o. female who was presented by Dr. Brownlee at [place] [facility] Tumor Board on 9/20/2024 which included representatives from all Head & Neck disciplines (Medical oncology/Radiation oncology/Otolaryngology/Radiology/Pathology).     History and Physical in Brief:  Ms. Sanchez is a 64 y.o. female with a history of stage III [id number] p16+ left tonsil SCC s/p definitive CXRT completed 7/2022 at an OSH. She then represented to Dr. Brownlee with tender left neck LAD. No lesions were detected on scope exam at that time.     Continued to smoke one pack per day.     Imaging:  CT Neck with Contrast (6/20/24):   IMPRESSION:  1. Compared to the CT neck from 07/14/2023, interval increase in  edema within the aerodigestive tract, most pronounced within the  supraglottic larynx and hypopharynx and there is resulting moderate  narrowing of the supraglottic laryngeal lumen. There is also mild  prevertebral edema. There is no focal fluid collection to suggest an  abscess. Cause of this edema is uncertain, could be infectious in  etiology rather than treatment related given that patient completed  radiation therapy 2 years ago.  2. No striking new mass is seen within the visualized aerodigestive  tract, noting that evaluation is somewhat limited due to presence of  diffuse edema and therefore subtle lesions can not be excluded.  3. Interval enlargement of peripherally enhancing likely necrotic  lymph node in the left level 2 heide station concerning for  progression of disease. Alternatively, enlargement of the lymph node  could reflect an infectious process. Otherwise, no cervical  lymphadenopathy by size criteria.  4. Edema within the left facial and neck deep and superficial soft  tissues as well as in the left supraclavicular fossa is favored to be  treatment related and was also present on the prior CT. No focal  fluid collection is  seen within these regions to suggest an abscess.  Correlate clinically.     Chest CT with Contrast (6/20/24):   IMPRESSION:  1. Emphysema.  2. Inferior lingular dense consolidation and a smaller semi-solid  posterolateral right upper lobe abnormality may be infectious, but  should at least be followed in 3 months to ensure resolution and rule  out a neoplastic etiology.  3. A 1.5 cm right hilar lymph node should also be followed.  4. Coronary artery calcification.     PET/CT Head and Neck (7/18/24):   IMPRESSION:  1. New hypermetabolic activity seen in the left level 2 cervical  lymph node, with central necrosis, consistent with heide metastasis.  2. New FDG avid clustered nodules are seen in the right upper lobe,  which is nonspecific and may be infectious/inflammatory, however  metastasis can not be excluded. Short-term follow-up chest CT is  recommended.  3. Mild FDG uptake is seen throughout the esophagus, likely relating  to esophagitis.  4. There is partial opacification of the left maxillary sinus, with  mild FDG avidity, likely sinusitis.    Procedures to date:   7/5/24: Left neck mass, core biopsy:  - Metastatic squamous cell carcinoma involving fibrous tissue with extensive necrosis.  +P40    8/26/2024: s/p left radical ND 2-4 with CN 11 repair, recon with left ALT    Pertinent Pathology:  FINAL DIAGNOSIS   A. Left neck, level II-IV, neck dissection:  -- Poorly differentiated , nonkeratinizing squamous cell carcinoma with abundant necrosis,  involving soft tissue extensively, encompassing levels II and III  (2.2 cm).  - Fibrosis and foreign body granulomatous reaction, levels III  and IV    P16 pos        The [facility] and Neck Tumor Board considered available treatment options and made the following staging and recommendations:    Staging and Recommendations:    Site: left Oropharyngeal  SCC  Stage: [id number]  Recommendation: re-irradiation with addition of chemotherapy; CT Chest to evaluate  pulmonary nodules    Clinical Trial Status:   N/A      National site-specific guidelines were discussed with respect to the case.

## 2024-09-20 ENCOUNTER — PATIENT OUTREACH (OUTPATIENT)
Dept: HEMATOLOGY/ONCOLOGY | Facility: HOSPITAL | Age: 64
End: 2024-09-20

## 2024-09-20 ENCOUNTER — TELEPHONE (OUTPATIENT)
Dept: HEMATOLOGY/ONCOLOGY | Facility: HOSPITAL | Age: 64
End: 2024-09-20

## 2024-09-20 ENCOUNTER — HOME CARE VISIT (OUTPATIENT)
Dept: HOME HEALTH SERVICES | Facility: HOME HEALTH | Age: 64
End: 2024-09-20
Payer: COMMERCIAL

## 2024-09-20 ENCOUNTER — APPOINTMENT (OUTPATIENT)
Dept: OTOLARYNGOLOGY | Facility: CLINIC | Age: 64
End: 2024-09-20
Payer: COMMERCIAL

## 2024-09-20 ENCOUNTER — SOCIAL WORK (OUTPATIENT)
Dept: CASE MANAGEMENT | Facility: HOSPITAL | Age: 64
End: 2024-09-20

## 2024-09-20 ENCOUNTER — HOSPITAL ENCOUNTER (OUTPATIENT)
Dept: RADIATION ONCOLOGY | Facility: HOSPITAL | Age: 64
Setting detail: RADIATION/ONCOLOGY SERIES
Discharge: HOME | End: 2024-09-20
Payer: COMMERCIAL

## 2024-09-20 ENCOUNTER — TUMOR BOARD CONFERENCE (OUTPATIENT)
Dept: HEMATOLOGY/ONCOLOGY | Facility: HOSPITAL | Age: 64
End: 2024-09-20
Payer: COMMERCIAL

## 2024-09-20 VITALS
DIASTOLIC BLOOD PRESSURE: 76 MMHG | BODY MASS INDEX: 29.85 KG/M2 | WEIGHT: 163.2 LBS | SYSTOLIC BLOOD PRESSURE: 127 MMHG | HEART RATE: 69 BPM | TEMPERATURE: 98.8 F | OXYGEN SATURATION: 96 % | RESPIRATION RATE: 18 BRPM

## 2024-09-20 DIAGNOSIS — C77.0 METASTASIS TO HEAD AND NECK LYMPH NODE (MULTI): ICD-10-CM

## 2024-09-20 PROCEDURE — 99215 OFFICE O/P EST HI 40 MIN: CPT | Performed by: RADIOLOGY

## 2024-09-20 RX ORDER — ROSUVASTATIN CALCIUM 40 MG/1
40 TABLET, COATED ORAL DAILY
Qty: 30 TABLET | Refills: 1 | Status: SHIPPED | OUTPATIENT
Start: 2024-09-20 | End: 2024-11-19

## 2024-09-20 SDOH — ECONOMIC STABILITY: FOOD INSECURITY: WITHIN THE PAST 12 MONTHS, YOU WORRIED THAT YOUR FOOD WOULD RUN OUT BEFORE YOU GOT MONEY TO BUY MORE.: NEVER TRUE

## 2024-09-20 SDOH — ECONOMIC STABILITY: INCOME INSECURITY: IN THE LAST 12 MONTHS, WAS THERE A TIME WHEN YOU WERE NOT ABLE TO PAY THE MORTGAGE OR RENT ON TIME?: NO

## 2024-09-20 SDOH — ECONOMIC STABILITY: FOOD INSECURITY: WITHIN THE PAST 12 MONTHS, THE FOOD YOU BOUGHT JUST DIDN'T LAST AND YOU DIDN'T HAVE MONEY TO GET MORE.: NEVER TRUE

## 2024-09-20 SDOH — HEALTH STABILITY: PHYSICAL HEALTH: ON AVERAGE, HOW MANY MINUTES DO YOU ENGAGE IN EXERCISE AT THIS LEVEL?: 0 MIN

## 2024-09-20 SDOH — HEALTH STABILITY: PHYSICAL HEALTH: ON AVERAGE, HOW MANY DAYS PER WEEK DO YOU ENGAGE IN MODERATE TO STRENUOUS EXERCISE (LIKE A BRISK WALK)?: 0 DAYS

## 2024-09-20 ASSESSMENT — LIFESTYLE VARIABLES
HOW OFTEN DO YOU HAVE SIX OR MORE DRINKS ON ONE OCCASION: NEVER
HOW MANY STANDARD DRINKS CONTAINING ALCOHOL DO YOU HAVE ON A TYPICAL DAY: PATIENT DOES NOT DRINK

## 2024-09-20 ASSESSMENT — ENCOUNTER SYMPTOMS
ARTHRALGIAS: 1
SHORTNESS OF BREATH: 1
CARDIOVASCULAR NEGATIVE: 1
LOSS OF SENSATION IN FEET: 0
TROUBLE SWALLOWING: 1
VOICE CHANGE: 1
DEPRESSION: 0
PSYCHIATRIC NEGATIVE: 1
GASTROINTESTINAL NEGATIVE: 1
CONSTITUTIONAL NEGATIVE: 1
OCCASIONAL FEELINGS OF UNSTEADINESS: 0
EYE PROBLEMS: 1
HEMATOLOGIC/LYMPHATIC NEGATIVE: 1
ENDOCRINE NEGATIVE: 1

## 2024-09-20 ASSESSMENT — SOCIAL DETERMINANTS OF HEALTH (SDOH)
WITHIN THE LAST YEAR, HAVE TO BEEN RAPED OR FORCED TO HAVE ANY KIND OF SEXUAL ACTIVITY BY YOUR PARTNER OR EX-PARTNER?: NO
IN THE PAST 12 MONTHS, HAS THE ELECTRIC, GAS, OIL, OR WATER COMPANY THREATENED TO SHUT OFF SERVICE IN YOUR HOME?: NO
WITHIN THE LAST YEAR, HAVE YOU BEEN HUMILIATED OR EMOTIONALLY ABUSED IN OTHER WAYS BY YOUR PARTNER OR EX-PARTNER?: NO
HOW HARD IS IT FOR YOU TO PAY FOR THE VERY BASICS LIKE FOOD, HOUSING, MEDICAL CARE, AND HEATING?: NOT HARD AT ALL
WITHIN THE LAST YEAR, HAVE YOU BEEN KICKED, HIT, SLAPPED, OR OTHERWISE PHYSICALLY HURT BY YOUR PARTNER OR EX-PARTNER?: NO
HOW HARD IS IT FOR YOU TO PAY FOR THE VERY BASICS LIKE FOOD, HOUSING, MEDICAL CARE, AND HEATING?: NOT HARD AT ALL
WITHIN THE LAST YEAR, HAVE YOU BEEN AFRAID OF YOUR PARTNER OR EX-PARTNER?: NO

## 2024-09-20 ASSESSMENT — PATIENT HEALTH QUESTIONNAIRE - PHQ9
2. FEELING DOWN, DEPRESSED OR HOPELESS: NOT AT ALL
1. LITTLE INTEREST OR PLEASURE IN DOING THINGS: NOT AT ALL
1. LITTLE INTEREST OR PLEASURE IN DOING THINGS: NOT AT ALL
2. FEELING DOWN, DEPRESSED OR HOPELESS: NOT AT ALL
SUM OF ALL RESPONSES TO PHQ9 QUESTIONS 1 AND 2: 0
SUM OF ALL RESPONSES TO PHQ9 QUESTIONS 1 & 2: 0

## 2024-09-20 NOTE — PROGRESS NOTES
Radiation Oncology Nursing Note    Prior Radiotherapy:  Yes, describe: July 2022 end  No radiation treatments to show. (Treatments may have been administered in another system.)     Current Systemic Treatment:  Yes, describe: last dose July 2022     Presence of Pacemaker or ICD:  No    History of Autoimmune or Connective Tissue Disorders:  No    Pain: The patient's current pain level was assessed.  They report currently having a pain of 8 out of 10.  They feel their pain is under controled with the use of pain medications.    Review of Systems:  Review of Systems   Constitutional: Negative.    HENT:   Positive for trouble swallowing and voice change.         Trouble swallowing since RT/Chemo in 2022  Deeper voice, hoarness  Dry mouth since 2022       Eyes:  Positive for eye problems.        Glasses at baseline     Respiratory:  Positive for shortness of breath.         Slight COPD, 02 at night     Cardiovascular: Negative.    Gastrointestinal: Negative.    Endocrine: Negative.    Genitourinary: Negative.     Musculoskeletal:  Positive for arthralgias and gait problem.        Both feet     Skin:         Healing incisions from the 8-26-24 surgery     Neurological:  Positive for gait problem.        Long distance needs assistance device     Hematological: Negative.    Psychiatric/Behavioral: Negative.

## 2024-09-20 NOTE — TELEPHONE ENCOUNTER
Daughter Beth confirmed appointment to see Dr. Hurst on Oct 1st. Patient is currently at another appointment will call later to collect social determinants of health answers.

## 2024-09-20 NOTE — PROGRESS NOTES
Radiation Oncology Outpatient Consult    Patient Name:  Melvi Sanchez  MRN:  [id number]  :  1960    Referring Provider: Robin Brownlee MD  Primary Care Provider: Alton King DO  Care Team: Patient Care Team:  Alton King DO as PCP - General (Family Medicine)  Alton King DO as PCP - [facility] PCP  Alton King DO as PCP - [facility] Medicaid PCP  Jimmie LEYVA MD as Consulting Physician (Radiation Oncology)  Marlon Patel MD as Surgeon (Pulmonary Disease)  Tiffanie Whitfield RN as Nurse Navigator (Hematology and Oncology)    Date of Service: 2024     SUBJECTIVE  History of Present Illness:    Gretchen Sanchez is a 63 y.o. female who was previously seen at the [facility] Department of Radiation Oncology for stage III [id number] p16+ left tonsil squamous cell carcinoma s/p definitive chemoradiation to 70 Gy/35 fractions with weekly cisplatin, completed 22, most recently with recurrence/persistence of cancer in her L neck s/p resection with soft tissue infiltration/positive margins.      She had surveillance PET/CT after her initial definitive CRT, which had demonstrated complete response.   A PET/CT on 23 showed a focus of mild uptake (SUV 4.0) at the left posterior floor of mouth, but there was no corresponding mass/lesion seen on the CT neck with contrast. There was no evidence of cervical adenopathy or distant metastases.     She saw Dr. Brownlee on 24, and had noticed L neck swelling and tenderness over several days. On exam, she was found to have Left neck fullness along the sternocleidomastoid muscle within indistinct area in the left superior aspect difficult to know whether this is fibrosis versus underlying swelling     CT neck on 24 revealed:  1. Compared to the CT neck from 2023, interval increase in  edema within the aerodigestive tract, most pronounced within the  supraglottic larynx and hypopharynx and there is  resulting moderate  narrowing of the supraglottic laryngeal lumen. There is also mild  prevertebral edema. There is no focal fluid collection to suggest an  abscess. Cause of this edema is uncertain, could be infectious in  etiology rather than treatment related given that patient completed  radiation therapy 2 years ago.  2. No striking new mass is seen within the visualized aerodigestive  tract, noting that evaluation is somewhat limited due to presence of  diffuse edema and therefore subtle lesions can not be excluded.  3. Interval enlargement of peripherally enhancing likely necrotic  lymph node in the left level 2 heide station concerning for  progression of disease. Alternatively, enlargement of the lymph node  could reflect an infectious process. Otherwise, no cervical  lymphadenopathy by size criteria.  4. Edema within the left facial and neck deep and superficial soft  tissues as well as in the left supraclavicular fossa is favored to be  treatment related and was also present on the prior CT. No focal  fluid collection is seen within these regions to suggest an abscess.  Correlate clinically.    PET/CT on 7/18/24 revealed:  IMPRESSION:  1. New hypermetabolic activity seen in the left level 2 cervical  lymph node, with central necrosis, consistent with heide metastasis.  2. New FDG avid clustered nodules are seen in the right upper lobe,  which is nonspecific and may be infectious/inflammatory, however  metastasis can not be excluded. Short-term follow-up chest CT is  recommended.  3. Mild FDG uptake is seen throughout the esophagus, likely relating  to esophagitis.  4. There is partial opacification of the left maxillary sinus, with  mild FDG avidity, likely sinusitis.    On 8/26/24, she underwent a L modified radical neck dissection of levels 2-3 with an ALT flap reconstruction.  Pathology revealed:  A. Left neck, level II-IV, neck dissection:  -- Poorly differentiated , nonkeratinizing squamous cell  carcinoma with abundant necrosis,  involving soft tissue extensively, encompassing levels II and III  (2.2 cm).  - Fibrosis and foreign body granulomatous reaction, levels III  and IV.  Note:  Microscopic examination of H&E sections reveals poorly differentiated squamous cell carcinoma involving extensively soft tissue with minimal lymphoid tissue present and abundant necrosis and fibrosis.  Carcinoma reaches soft tissue inked margins focally.  Vein margin is negative.  History of metastatic squamous cell carcinoma of left tonsil, status post chemo therapy and radiation therapy is noted for this patient.    P16 by immunohistochemistry:  Positive    She has been doing well since surgery. Not having significant pain. She has a hx of smoking 100 pack years. She smoked during the previous course of RT and is trying to quit. Using Chantix, Wellbutrin, nicotine patches. Down to 2-3 cigarettes daily. She reports that she lost about 100 pounds going through HN cancer treatment in 2022. Recently weight has been stable since surgery.     Has ongoing dysphagia since CRT. Modified barium swallow on 8/15/24 revealed:  SLP Impressions with Severity Rating:  Pt presents with mild oropharyngeal dysphagia upon completion of  modified barium swallow study this date. Swallowing physiology is  detailed above. Impairments most impacting swallowing safety and  efficiency include diminished airway protection. Patient demonstrated  trace laryngeal penetration of thin liquids with laryngeal residue.  No further penetration was observed for any other consistency, and no  aspiration was visualized during study. Patient demonstrated mild  oral and pharyngeal residue that was reduced with double swallow and  liquid rinse.  Strategies attempted- Attempted the following strategies without  improvement in swallow safety/efficiency: effortful swallow, chin  tuck. Airway protection improved with verbal cue for small, single  sips.    EORTC HN43 Qol  reveal:  Very much jaw pain since surgery (specifically trismus and difficulty chewing), problems swallowing solids (needs to follow with sips of liquids), dry mouth, problems eating, problems with shoulder/neck pain/stiffness, weight loss.        Prior Radiotherapy:  No radiation treatments to show. (Treatments may have been administered in another system.)       Past Medical History:    Past Medical History:   Diagnosis Date    Acute hypoxemic respiratory failure (Multi) 2023    Breast calcification, left     Dysphagia     Essential (primary) hypertension 2018    HTN (hypertension), benign    Fibroadenoma of left breast     H/O malignant neoplasm of tonsil     s/p XRT and chemotherapy completed .    Hypothyroidism     Morbid obesity (Multi) 2023    Nausea and/or vomiting 2023    OAB (overactive bladder)     Pharyngitis 2023    Pneumonia 2023    Restless legs syndrome     Sinusitis 2023        Past Surgical History:    Past Surgical History:   Procedure Laterality Date    BREAST BIOPSY Left     benign FA     SECTION, LOW TRANSVERSE      CHOLECYSTECTOMY  2014    Cholecystectomy    COLONOSCOPY      HYSTERECTOMY  2014    Hysterectomy    MOUTH SURGERY  2014    Oral Surgery Tooth Extraction        Family History:  Cancer-related family history includes Breast cancer in her paternal grandmother.    Social History:    Social History     Tobacco Use    Smoking status: Every Day     Current packs/day: 1.00     Average packs/day: 1 pack/day for 40.3 years (40.3 ttl pk-yrs)     Types: Cigarettes     Start date: 2023    Smokeless tobacco: Never   Substance Use Topics    Alcohol use: Yes     Alcohol/week: 1.0 standard drink of alcohol     Types: 1 Glasses of wine per week    Drug use: Not Currently     Frequency: 1.0 times per week     Types: Marijuana     Comment: use within past week       Allergies:    Allergies   Allergen Reactions     Duloxetine Hallucinations     Pt states she doesn't think she has an allergy just a bad reaction when mixed with morphine        Medications:    Current Outpatient Medications:     albuterol 90 mcg/actuation inhaler, Inhale 2 puffs every 4 hours if needed for shortness of breath., Disp: 18 g, Rfl: 11    aspirin 325 mg tablet, Take 1 tablet (325 mg) by mouth once daily for 26 doses., Disp: 26 tablet, Rfl: 0    buPROPion XL (Wellbutrin XL) 150 mg 24 hr tablet, Take 1 tablet (150 mg) by mouth once daily in the morning., Disp: 30 tablet, Rfl: 5    gabapentin (Neurontin) 100 mg capsule, Take 1 capsule (100 mg) by mouth once daily at bedtime., Disp: 30 capsule, Rfl: 0    guaiFENesin (Mucinex) 600 mg 12 hr tablet, Take 1 tablet (600 mg) by mouth 2 times a day. Do not crush, chew, or split. (Patient not taking: Reported on 9/17/2024), Disp: 60 tablet, Rfl: 0    latanoprost (Xalatan) 0.005 % ophthalmic solution, Administer 1 drop into the left eye once daily at bedtime., Disp: , Rfl:     levothyroxine (Synthroid, Levoxyl) 75 mcg tablet, TAKE 1 TABLET BY MOUTH EVERY DAY, Disp: 30 tablet, Rfl: 6    melatonin 10 mg tablet, Take 1 tablet (10 mg) by mouth once daily at bedtime., Disp: , Rfl:     naloxone (Narcan) 4 mg/0.1 mL nasal spray, Administer 1 spray (4 mg) into affected nostril(s) if needed for opioid reversal or respiratory depression. May repeat every 2-3 minutes if needed, alternating nostrils, until medical assistance becomes available., Disp: 2 each, Rfl: 11    nicotine (Nicoderm CQ) 21 mg/24 hr patch, Place 1 patch over 24 hours on the skin once every 24 hours., Disp: 30 patch, Rfl: 0    oxyCODONE (Roxicodone) 5 mg immediate release tablet, Take 1.5 tablets (7.5 mg) by mouth every 4 hours if needed for severe pain (7 - 10) for up to 7 days., Disp: 63 tablet, Rfl: 0    oxygen (O2) gas therapy, Inhale 4 L/min continuously. Indications: o2, Disp: , Rfl:     pantoprazole (ProtoNix) 40 mg EC tablet, Take 1 tablet (40 mg)  by mouth once daily in the morning. Take before meals for 14 days. Do not crush, chew, or split., Disp: 14 tablet, Rfl: 0    rOPINIRole (Requip) 1 mg tablet, Take 2 tablets (2 mg) by mouth once daily at bedtime., Disp: 180 tablet, Rfl: 3    umeclidinium-vilanteroL (Anoro Ellipta) 62.5-25 mcg/actuation blister with device, Inhale 1 puff once daily., Disp: 30 each, Rfl: 11    varenicline (Chantix) 1 mg tablet, Take 1 tablet (1 mg) by mouth once daily., Disp: 30 tablet, Rfl: 5    white petrolatum (Aquaphor) 41 % ointment ointment, Apply 1 Application topically 3 times a day., Disp: 200 g, Rfl: 0      Review of Systems:  Review of Systems - Oncology    Performance Status:  The Karnofsky performance scale today is 90, Able to carry on normal activity; minor signs or symptoms of disease (ECOG equivalent 0).        OBJECTIVE  /76   Pulse 69   Temp 37.1 °C (98.8 °F) (Skin)   Resp 18   Wt 74 kg (163 lb 3.2 oz)   SpO2 96%   BMI 29.85 kg/m²    Physical Exam   HEENT PEERL, edentulous, no oral cavity lesions, fibrosis in L neck surrounding scar, area healing with granulation tissue but no drainage  Heart RRR  Lungs CTAB  Abd soft, NT, ND  Ext no CCE, donor site in L thigh without signs of infection  Psych no abnl  Neuro grossly intact  Skin no lesions visualized or palpated       ASSESSMENT:   Melvi Sanchez is a 64 y.o. female with recurrent L neck heide disease from p16+ oropharynx cancer s/p resection with diffuse JOLANTA.    I discussed with the patient that I recommend she undergo postoperative radiation with chemotherapy to decrease risk of a local regional recurrence.  SHe is at relatively  high risk for recurrence given that she had extracapsular extension of her left neck mass.  I discussed with her that radiation has the potential to decrease risk of a local regional  recurrence by 50 to 60%.      Overall, the benefit of postop CRT after surgical salvage for recurrent H&N cancer is that DFS is 60% at 2  years, compared to 20% with watchful waiting (Mirlande, JCO, 2008).      I discussed with the patient my recommendations that she undergo postoperative radiation with proton beam therapy.  We discussed that proton beam radiation has the potential to decrease dose to normal tissues in order to decrease side effects.  This is  particularly important for her given she previously had radiation to the head and neck which included oropharynx and bilateral neck regions. SHe is at high risk for developing dyphagia and possible feeding tube dependence with reirradiation. We also discussed the risk of severe post RT complications can be 20-30% and include soft tissue necrosis, osteoradionecrosis, fibrosis, and a risk of carotid blowout.       Proton beam radiation will decrease radiation dose to her larynx and pharyngeal constrictor muscles, as well as oral cavity doses, which will improve her chance for long-term larynx function including swallowing  and vocal quality and decrease risk of osteoradionecrosis as well.        PLAN:    -I sent a rx for Rosuvastatin 40mg to potentially decrease chance for carotid stenosis/blowout and soft tissue fibrosis in the setting of reRT  -Will refer to SLP  -Aim to start chemoradiation prior to the 6-week rosa postoperatively       NCCN Guidelines were applicable to guide this patients treatment plan.   Chetna Lowry MD

## 2024-09-23 NOTE — PROGRESS NOTES
PSYCHOSOCIAL ASSESSMENT     Demographic Information  Melvi Sanchez  1960  [id number]  Preferred Name: Gretchen  Pronouns: She/her/hers  Assessment Type:  Psychosocial   Date of assessment: 09/23/24  Provider(s): Dr. King, Dr. Lowry  Person(s) present during assessment: Aruna Godinez & infant daughter, Keisha, LSW  Primary language: English  Interpretive services used: None                  Living Environment/Support Systems  Partner Status:   Children: Beth, Hanna, and Jean Claude  Support systems: Children, sister Bobbi, dog.  Primary caregiver: Self  Current Living Situation: House Rent  Resides with: Self, puppy Snickers  Concerns with Housing Environment: None  Comments:     Safety  Patient safe at home? Yes  History of Domestic Violence: No     Functional Status  Functional status: Independent  Patient currently ambulates: Walks around well, but appreciated support such as walking with her granddaughters nohemier.   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 No   Applicable [place]: No   Comments:      Advance Directives  Advanced Directives: Provided blank copy per requested   Health Care Agent Status:Not Activated  Health Care Agent, When applicable:   Comments:    Legal Involvement  Relevant current or previous legal concerns: None     [place] or Spiritual Identity  Comments: None     Mental Health  Active  "SI/HI: No  Mental Health Diagnosis, if applicable: None  Mood: Okay  Concerns relating to substance use (including alcohol/tobacco): No   Patient identified coping skills: Family, sister, her dog  Learning Preferences: Reading, having references   Cognitive Comments: None  Relevant Providers: None  Comments:       Assessment  Potential Barriers to Care:  Transportation  Patient Strengths:  Healthy Coping Skills, Motivated for Treatment, Self-Advocate, and Strong Support System    Plan  Referrals: N/A  Applications: N/A  Other: N/A    Narrative: Gretchen is a pleasant 65 y/o who met with ONEIL during initial rad onc appointment. 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 She has three children, Beth, Hanna, and Jean Claude. She also is very close with her sister Bobbi. She resides with herself and her dog, Sylvie in a home that she rents. She is independent, siting that she has had some stability issues since she had surgery on her leg, she often holds onto something when walking such as her granddaughters stroller. She denies any food insecurities, justice involvement, or domestic violence. [facility], she requested a blank copy to review, ONEIL provided copy and will re-engage at next visit. She has no identified Islam or spiritual beliefs and denies ever having thoughts of suicide or harming herself or others. She describes her mood as \"okay\" but does admit that she has been through a lot. She is currently smoking but has reduced her smoking significantly and feels confident that she can do it on her own. She prefers to have new information written down to reference and share with others. She shared [place] [facility] had interest in [place]. [facility]IL sent over [facility] referral, will continue to follow.   "

## 2024-09-24 ENCOUNTER — APPOINTMENT (OUTPATIENT)
Dept: OTOLARYNGOLOGY | Facility: CLINIC | Age: 64
End: 2024-09-24
Payer: COMMERCIAL

## 2024-09-24 VITALS — WEIGHT: 164 LBS | BODY MASS INDEX: 30.18 KG/M2 | HEIGHT: 62 IN | TEMPERATURE: 97.6 F

## 2024-09-24 DIAGNOSIS — T79.2XXD TRAUMATIC SEROMA OF LEFT THIGH, SUBSEQUENT ENCOUNTER: ICD-10-CM

## 2024-09-24 DIAGNOSIS — C09.9 TONSIL CANCER (MULTI): Primary | ICD-10-CM

## 2024-09-24 PROCEDURE — 3008F BODY MASS INDEX DOCD: CPT | Performed by: OTOLARYNGOLOGY

## 2024-09-24 PROCEDURE — 99024 POSTOP FOLLOW-UP VISIT: CPT | Performed by: OTOLARYNGOLOGY

## 2024-09-24 NOTE — PROGRESS NOTES
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       Here for follow-up visit    The left leg has filled up again      She is scheduled to see medical and radiation oncology and has had her mask created      Overall is feeling better        Continues to smoke      Pain is better controlled          Left dry but 120 cc aspirated dressing applied            A/p stable postoperative course    Ace dressing recommended but patient reports difficult to keep on therefore she will call me if this recollects and we will drain it nonurgently otherwise we will see her again in a month's time

## 2024-09-25 ENCOUNTER — HOME CARE VISIT (OUTPATIENT)
Dept: HOME HEALTH SERVICES | Facility: HOME HEALTH | Age: 64
End: 2024-09-25
Payer: COMMERCIAL

## 2024-09-25 VITALS — HEART RATE: 70 BPM | RESPIRATION RATE: 16 BRPM | TEMPERATURE: 98.4 F

## 2024-09-25 DIAGNOSIS — C77.0 METASTASIS TO HEAD AND NECK LYMPH NODE (MULTI): Primary | ICD-10-CM

## 2024-09-25 DIAGNOSIS — C09.9 SQUAMOUS CELL CARCINOMA OF LEFT TONSIL (MULTI): ICD-10-CM

## 2024-09-25 PROCEDURE — G0151 HHCP-SERV OF PT,EA 15 MIN: HCPCS

## 2024-09-25 PROCEDURE — G0158 HHC OT ASSISTANT EA 15: HCPCS | Mod: CO

## 2024-09-25 SDOH — HEALTH STABILITY: PHYSICAL HEALTH
EXERCISE COMMENTS: SUPINE QUAD AND GLUT SETS, ANKLE PUMPS  SLR LEFT X 15, RIGHT X 30  HIP ABD, KNEE TO CHEST X15 REPS  STANDING HEEL RAISES, MINI SQUATS,  HIP ABD, SLR, HIP EXT, HAMSTRING CURLS X 15 REPS

## 2024-09-25 SDOH — HEALTH STABILITY: PHYSICAL HEALTH: EXERCISE TYPE: SEE COMMENTS

## 2024-09-25 ASSESSMENT — ENCOUNTER SYMPTOMS
PAIN LOCATION: LEFT SHOULDER
PAIN LOCATION: NECK
LOWEST PAIN SEVERITY IN PAST 24 HOURS: 3/10
PAIN: 1
PAIN SEVERITY GOAL: 0/10
PERSON REPORTING PAIN: PATIENT
PAIN LOCATION - RELIEVING FACTORS: REST, MEDS
PAIN: 1
HIGHEST PAIN SEVERITY IN PAST 24 HOURS: 7/10
PAIN LOCATION: LEFT LEG
PAIN LOCATION - PAIN SEVERITY: 5/10
PAIN LOCATION - PAIN QUALITY: SORE
PAIN LOCATION - PAIN SEVERITY: 4/10
PAIN LOCATION: NECK
PAIN LOCATION - PAIN DURATION: VARIES
PAIN LOCATION - PAIN SEVERITY: 4/10
PAIN LOCATION - PAIN QUALITY: SORE
PERSON REPORTING PAIN: PATIENT
PAIN LOCATION: LEFT LEG
PAIN LOCATION - PAIN SEVERITY: 5/10
PAIN LOCATION - PAIN FREQUENCY: INTERMITTENT
PAIN LOCATION - EXACERBATING FACTORS: MVT
PAIN LOCATION - PAIN SEVERITY: 4/10
PAIN LOCATION - PAIN DURATION: VARIES
MUSCLE WEAKNESS: 1
PAIN LOCATION - PAIN FREQUENCY: INTERMITTENT
PAIN LOCATION - RELIEVING FACTORS: REST, MEDS
SUBJECTIVE PAIN PROGRESSION: GRADUALLY IMPROVING
PAIN LOCATION - EXACERBATING FACTORS: MVT

## 2024-09-25 ASSESSMENT — BALANCE ASSESSMENTS
SITTING BALANCE: 1 - STEADY, SAFE
NUDGED SCORE: 2
ARISES: 1 - ABLE, USES ARMS TO HELP
IMMEDIATE STANDING BALANCE FIRST 5 SECONDS: 2 - STEADY WITHOUT WALKER OR OTHER SUPPORT
NUDGED: 2 - STEADY
TURNING 360 DEGREES STEPS: 1 - CONTINUOUS STEPS
ATTEMPTS TO ARISE: 2 - ABLE TO RISE, ONE ATTEMPT
BALANCE SCORE: 14
STANDING BALANCE: 2 - NARROW STANCE WITHOUT SUPPORT
EYES CLOSED AT MAXIMUM POSITION NUDGED: 1 - STEADY
SITTING DOWN: 1 - USES ARMS OR NOT SMOOTH MOTION
ARISING SCORE: 1

## 2024-09-25 ASSESSMENT — GAIT ASSESSMENTS
PATH: 2 - STRAIGHT WITHOUT WALKING AID
TRUNK: 2 - NO SWAY, NO FLEXION, NO USE OF ARMS, NO WALKING AID
WALKING STANCE: 1 - HEELS ALMOST TOUCHING WHILE WALKING
BALANCE AND GAIT SCORE: 26
PATH SCORE: 2
STEP SYMMETRY: 1 - RIGHT AND LEFT STEP LENGTH APPEAR EQUAL
STEP CONTINUITY: 1 - STEPS APPEAR CONTINUOUS
INITIATION OF GAIT IMMEDIATELY AFTER GO: 1 - NO HESITANCY
GAIT SCORE: 12
TRUNK SCORE: 2

## 2024-09-25 ASSESSMENT — ACTIVITIES OF DAILY LIVING (ADL)
AMBULATION ASSISTANCE: 1
AMBULATION ASSISTANCE ON FLAT SURFACES: 1
AMBULATION ASSISTANCE: INDEPENDENT
AMBULATION_DISTANCE/DURATION_TOLERATED: 200

## 2024-09-25 NOTE — CASE COMMUNICATION
DISCHARGE SUMMARY: Pt doing very well s.p surgery. Pt to start chemo and radiation. Pt indep with HEP and amb without a device    DISCIPLINE: PT  DATE OF DISCIPLINE DISCHARGE:  9.25.2024  REASON FOR DISCHARGE: Goals achieved  COORDINATION NOTE:  DC summary  EVALUATION OF GOALS:  All achieved  SUMMARY OF CARE PROVIDED: Pt educated on fall prevention and safety, wound care, pain control, HEP for strength and balance, gait progression  DIS CHARGE INSTRUCTIONS GIVEN: Continue HEP, continue with progressing gait distance  SERVICES REMAINING: RN, OT  NOMNC OBTAINED: No

## 2024-09-26 ENCOUNTER — PATIENT OUTREACH (OUTPATIENT)
Dept: HEMATOLOGY/ONCOLOGY | Facility: HOSPITAL | Age: 64
End: 2024-09-26

## 2024-09-26 ENCOUNTER — HOSPITAL ENCOUNTER (OUTPATIENT)
Dept: RADIATION ONCOLOGY | Facility: HOSPITAL | Age: 64
Setting detail: RADIATION/ONCOLOGY SERIES
Discharge: HOME | End: 2024-09-26
Payer: COMMERCIAL

## 2024-09-26 ENCOUNTER — HOSPITAL ENCOUNTER (OUTPATIENT)
Dept: RADIOLOGY | Facility: EXTERNAL LOCATION | Age: 64
Discharge: HOME | End: 2024-09-26

## 2024-09-26 ENCOUNTER — OFFICE VISIT (OUTPATIENT)
Dept: HEMATOLOGY/ONCOLOGY | Facility: HOSPITAL | Age: 64
End: 2024-09-26
Payer: COMMERCIAL

## 2024-09-26 VITALS
OXYGEN SATURATION: 98 % | DIASTOLIC BLOOD PRESSURE: 67 MMHG | SYSTOLIC BLOOD PRESSURE: 133 MMHG | BODY MASS INDEX: 28.93 KG/M2 | HEART RATE: 57 BPM | WEIGHT: 157.19 LBS | RESPIRATION RATE: 20 BRPM | TEMPERATURE: 97.3 F | HEIGHT: 62 IN

## 2024-09-26 DIAGNOSIS — C77.0 METASTASIS TO HEAD AND NECK LYMPH NODE (MULTI): ICD-10-CM

## 2024-09-26 DIAGNOSIS — C09.9 TONSIL CANCER (MULTI): Primary | ICD-10-CM

## 2024-09-26 DIAGNOSIS — C09.9 MALIGNANT NEOPLASM OF TONSIL: ICD-10-CM

## 2024-09-26 DIAGNOSIS — C09.9 MALIGNANT NEOPLASM OF TONSIL: Primary | ICD-10-CM

## 2024-09-26 PROCEDURE — 3008F BODY MASS INDEX DOCD: CPT | Performed by: STUDENT IN AN ORGANIZED HEALTH CARE EDUCATION/TRAINING PROGRAM

## 2024-09-26 PROCEDURE — 99215 OFFICE O/P EST HI 40 MIN: CPT | Performed by: STUDENT IN AN ORGANIZED HEALTH CARE EDUCATION/TRAINING PROGRAM

## 2024-09-26 PROCEDURE — 99205 OFFICE O/P NEW HI 60 MIN: CPT | Performed by: STUDENT IN AN ORGANIZED HEALTH CARE EDUCATION/TRAINING PROGRAM

## 2024-09-26 RX ORDER — DIPHENHYDRAMINE HYDROCHLORIDE 50 MG/ML
50 INJECTION INTRAMUSCULAR; INTRAVENOUS AS NEEDED
OUTPATIENT
Start: 2024-10-29

## 2024-09-26 RX ORDER — PROCHLORPERAZINE EDISYLATE 5 MG/ML
10 INJECTION INTRAMUSCULAR; INTRAVENOUS EVERY 6 HOURS PRN
OUTPATIENT
Start: 2024-10-15

## 2024-09-26 RX ORDER — ALBUTEROL SULFATE 0.83 MG/ML
3 SOLUTION RESPIRATORY (INHALATION) AS NEEDED
OUTPATIENT
Start: 2024-10-29

## 2024-09-26 RX ORDER — PROCHLORPERAZINE EDISYLATE 5 MG/ML
10 INJECTION INTRAMUSCULAR; INTRAVENOUS EVERY 6 HOURS PRN
OUTPATIENT
Start: 2024-11-12

## 2024-09-26 RX ORDER — DIPHENHYDRAMINE HYDROCHLORIDE 50 MG/ML
50 INJECTION INTRAMUSCULAR; INTRAVENOUS AS NEEDED
OUTPATIENT
Start: 2024-10-08

## 2024-09-26 RX ORDER — PALONOSETRON 0.05 MG/ML
0.25 INJECTION, SOLUTION INTRAVENOUS ONCE
OUTPATIENT
Start: 2024-11-05

## 2024-09-26 RX ORDER — PALONOSETRON 0.05 MG/ML
0.25 INJECTION, SOLUTION INTRAVENOUS ONCE
OUTPATIENT
Start: 2024-10-15

## 2024-09-26 RX ORDER — ALBUTEROL SULFATE 0.83 MG/ML
3 SOLUTION RESPIRATORY (INHALATION) AS NEEDED
OUTPATIENT
Start: 2024-10-08

## 2024-09-26 RX ORDER — DEXAMETHASONE 4 MG/1
8 TABLET ORAL DAILY
Qty: 42 TABLET | Refills: 0 | Status: SHIPPED | OUTPATIENT
Start: 2024-09-26

## 2024-09-26 RX ORDER — EPINEPHRINE 0.3 MG/.3ML
0.3 INJECTION SUBCUTANEOUS EVERY 5 MIN PRN
OUTPATIENT
Start: 2024-10-15

## 2024-09-26 RX ORDER — FAMOTIDINE 10 MG/ML
20 INJECTION INTRAVENOUS ONCE AS NEEDED
OUTPATIENT
Start: 2024-10-15

## 2024-09-26 RX ORDER — FAMOTIDINE 10 MG/ML
20 INJECTION INTRAVENOUS ONCE AS NEEDED
OUTPATIENT
Start: 2024-10-08

## 2024-09-26 RX ORDER — ALBUTEROL SULFATE 0.83 MG/ML
3 SOLUTION RESPIRATORY (INHALATION) AS NEEDED
OUTPATIENT
Start: 2024-11-05

## 2024-09-26 RX ORDER — FAMOTIDINE 10 MG/ML
20 INJECTION INTRAVENOUS ONCE AS NEEDED
OUTPATIENT
Start: 2024-11-12

## 2024-09-26 RX ORDER — PROCHLORPERAZINE MALEATE 10 MG
10 TABLET ORAL EVERY 6 HOURS PRN
OUTPATIENT
Start: 2024-11-12

## 2024-09-26 RX ORDER — PALONOSETRON 0.05 MG/ML
0.25 INJECTION, SOLUTION INTRAVENOUS ONCE
OUTPATIENT
Start: 2024-10-08

## 2024-09-26 RX ORDER — PROCHLORPERAZINE MALEATE 10 MG
10 TABLET ORAL EVERY 6 HOURS PRN
OUTPATIENT
Start: 2024-11-19

## 2024-09-26 RX ORDER — PROCHLORPERAZINE MALEATE 10 MG
10 TABLET ORAL EVERY 6 HOURS PRN
OUTPATIENT
Start: 2024-10-22

## 2024-09-26 RX ORDER — FAMOTIDINE 10 MG/ML
20 INJECTION INTRAVENOUS ONCE AS NEEDED
OUTPATIENT
Start: 2024-10-22

## 2024-09-26 RX ORDER — DIPHENHYDRAMINE HYDROCHLORIDE 50 MG/ML
50 INJECTION INTRAMUSCULAR; INTRAVENOUS AS NEEDED
OUTPATIENT
Start: 2024-11-19

## 2024-09-26 RX ORDER — PROCHLORPERAZINE EDISYLATE 5 MG/ML
10 INJECTION INTRAMUSCULAR; INTRAVENOUS EVERY 6 HOURS PRN
OUTPATIENT
Start: 2024-11-05

## 2024-09-26 RX ORDER — ALBUTEROL SULFATE 0.83 MG/ML
3 SOLUTION RESPIRATORY (INHALATION) AS NEEDED
OUTPATIENT
Start: 2024-10-22

## 2024-09-26 RX ORDER — PALONOSETRON 0.05 MG/ML
0.25 INJECTION, SOLUTION INTRAVENOUS ONCE
OUTPATIENT
Start: 2024-10-22

## 2024-09-26 RX ORDER — ALBUTEROL SULFATE 0.83 MG/ML
3 SOLUTION RESPIRATORY (INHALATION) AS NEEDED
OUTPATIENT
Start: 2024-11-19

## 2024-09-26 RX ORDER — DIPHENHYDRAMINE HYDROCHLORIDE 50 MG/ML
50 INJECTION INTRAMUSCULAR; INTRAVENOUS AS NEEDED
OUTPATIENT
Start: 2024-10-22

## 2024-09-26 RX ORDER — EPINEPHRINE 0.3 MG/.3ML
0.3 INJECTION SUBCUTANEOUS EVERY 5 MIN PRN
OUTPATIENT
Start: 2024-10-22

## 2024-09-26 RX ORDER — PROCHLORPERAZINE MALEATE 10 MG
10 TABLET ORAL EVERY 6 HOURS PRN
Qty: 30 TABLET | Refills: 5 | Status: SHIPPED | OUTPATIENT
Start: 2024-09-26

## 2024-09-26 RX ORDER — DIPHENHYDRAMINE HYDROCHLORIDE 50 MG/ML
50 INJECTION INTRAMUSCULAR; INTRAVENOUS AS NEEDED
OUTPATIENT
Start: 2024-10-15

## 2024-09-26 RX ORDER — ALBUTEROL SULFATE 0.83 MG/ML
3 SOLUTION RESPIRATORY (INHALATION) AS NEEDED
OUTPATIENT
Start: 2024-10-15

## 2024-09-26 RX ORDER — EPINEPHRINE 0.3 MG/.3ML
0.3 INJECTION SUBCUTANEOUS EVERY 5 MIN PRN
OUTPATIENT
Start: 2024-11-05

## 2024-09-26 RX ORDER — DIPHENHYDRAMINE HYDROCHLORIDE 50 MG/ML
50 INJECTION INTRAMUSCULAR; INTRAVENOUS AS NEEDED
OUTPATIENT
Start: 2024-11-12

## 2024-09-26 RX ORDER — FAMOTIDINE 10 MG/ML
20 INJECTION INTRAVENOUS ONCE AS NEEDED
OUTPATIENT
Start: 2024-11-05

## 2024-09-26 RX ORDER — PALONOSETRON 0.05 MG/ML
0.25 INJECTION, SOLUTION INTRAVENOUS ONCE
OUTPATIENT
Start: 2024-11-19

## 2024-09-26 RX ORDER — PROCHLORPERAZINE EDISYLATE 5 MG/ML
10 INJECTION INTRAMUSCULAR; INTRAVENOUS EVERY 6 HOURS PRN
OUTPATIENT
Start: 2024-10-29

## 2024-09-26 RX ORDER — EPINEPHRINE 0.3 MG/.3ML
0.3 INJECTION SUBCUTANEOUS EVERY 5 MIN PRN
OUTPATIENT
Start: 2024-11-12

## 2024-09-26 RX ORDER — DIPHENHYDRAMINE HYDROCHLORIDE 50 MG/ML
50 INJECTION INTRAMUSCULAR; INTRAVENOUS AS NEEDED
OUTPATIENT
Start: 2024-11-05

## 2024-09-26 RX ORDER — EPINEPHRINE 0.3 MG/.3ML
0.3 INJECTION SUBCUTANEOUS EVERY 5 MIN PRN
OUTPATIENT
Start: 2024-11-19

## 2024-09-26 RX ORDER — PROCHLORPERAZINE MALEATE 10 MG
10 TABLET ORAL EVERY 6 HOURS PRN
OUTPATIENT
Start: 2024-10-29

## 2024-09-26 RX ORDER — PROCHLORPERAZINE EDISYLATE 5 MG/ML
10 INJECTION INTRAMUSCULAR; INTRAVENOUS EVERY 6 HOURS PRN
OUTPATIENT
Start: 2024-10-22

## 2024-09-26 RX ORDER — ALBUTEROL SULFATE 0.83 MG/ML
3 SOLUTION RESPIRATORY (INHALATION) AS NEEDED
OUTPATIENT
Start: 2024-11-12

## 2024-09-26 RX ORDER — FAMOTIDINE 10 MG/ML
20 INJECTION INTRAVENOUS ONCE AS NEEDED
OUTPATIENT
Start: 2024-10-29

## 2024-09-26 RX ORDER — PROCHLORPERAZINE MALEATE 10 MG
10 TABLET ORAL EVERY 6 HOURS PRN
OUTPATIENT
Start: 2024-10-15

## 2024-09-26 RX ORDER — PROCHLORPERAZINE EDISYLATE 5 MG/ML
10 INJECTION INTRAMUSCULAR; INTRAVENOUS EVERY 6 HOURS PRN
OUTPATIENT
Start: 2024-11-19

## 2024-09-26 RX ORDER — FAMOTIDINE 10 MG/ML
20 INJECTION INTRAVENOUS ONCE AS NEEDED
OUTPATIENT
Start: 2024-11-19

## 2024-09-26 RX ORDER — EPINEPHRINE 0.3 MG/.3ML
0.3 INJECTION SUBCUTANEOUS EVERY 5 MIN PRN
OUTPATIENT
Start: 2024-10-29

## 2024-09-26 RX ORDER — PALONOSETRON 0.05 MG/ML
0.25 INJECTION, SOLUTION INTRAVENOUS ONCE
OUTPATIENT
Start: 2024-11-12

## 2024-09-26 RX ORDER — PALONOSETRON 0.05 MG/ML
0.25 INJECTION, SOLUTION INTRAVENOUS ONCE
OUTPATIENT
Start: 2024-10-29

## 2024-09-26 RX ORDER — EPINEPHRINE 0.3 MG/.3ML
0.3 INJECTION SUBCUTANEOUS EVERY 5 MIN PRN
OUTPATIENT
Start: 2024-10-08

## 2024-09-26 RX ORDER — PROCHLORPERAZINE EDISYLATE 5 MG/ML
10 INJECTION INTRAMUSCULAR; INTRAVENOUS EVERY 6 HOURS PRN
OUTPATIENT
Start: 2024-10-08

## 2024-09-26 RX ORDER — PROCHLORPERAZINE MALEATE 10 MG
10 TABLET ORAL EVERY 6 HOURS PRN
OUTPATIENT
Start: 2024-10-08

## 2024-09-26 RX ORDER — ONDANSETRON HYDROCHLORIDE 8 MG/1
8 TABLET, FILM COATED ORAL EVERY 8 HOURS PRN
Qty: 30 TABLET | Refills: 5 | Status: SHIPPED | OUTPATIENT
Start: 2024-09-26

## 2024-09-26 RX ORDER — OLANZAPINE 5 MG/1
5 TABLET ORAL NIGHTLY
Qty: 30 TABLET | Refills: 1 | Status: SHIPPED | OUTPATIENT
Start: 2024-09-26

## 2024-09-26 RX ORDER — PROCHLORPERAZINE MALEATE 10 MG
10 TABLET ORAL EVERY 6 HOURS PRN
OUTPATIENT
Start: 2024-11-05

## 2024-09-26 ASSESSMENT — PAIN SCALES - GENERAL: PAINLEVEL: 8

## 2024-09-26 NOTE — PROGRESS NOTES
"    Patient ID: Melvi Sanchez \"Allie" is a 64 y.o. 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 Melissa Ball:  Dr. Lowry     PRIOR THERAPIES  07/07/2022: Completion of chemoradiation with weekly cisplatin with Dr. Razo  08/26/2024: L radical neck dissection 2-4 with CN11 repair     CURRENT THERAPY  Consented for weekly cisplatin (40 mg/m2) to be given concurrently with proton reirradiation     CURRENT ONCOLOGICAL PROBLEMS  Post operative pain     HISTORY OF PRESENT ILLNESS  Ms. 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    Patient initially treated by Dr. Razo in 2022. She first noticed a lump in the left side of her neck > 2 years ago. She was found that have enlarged level 2, 3, 4 nodes, but no obvious oropharyngeal lesion 03/2022 by Dr. Torers.  CT neck was obtained demonstrating  L neck mass, 3.2 x 5.9 x 8.4 cm and a 1.5 x 1.6 right paratracheal node -   FNA biopsy of left neck mass showed squamous cell carcinoma. On 4/21/2022 she underwent triple endoscopy and DL showed lesion of left tonsil; biopsy showed invasive squamous cell carcinoma, p16 positive. She was referred to radiation oncology and saw Dr. Crook and completed 70 cGy/35f CCRT with STEWART seen at 1 year. She has been followed by Dr. Torres in surveillance and was noted to have left neck LINA. CT neck was obtained on 6/20 demonstrating increase in edema, enlargement of level 2 node. L neck core biopsy on 7/5 demonstrated:     FINAL DIAGNOSIS   Left neck mass, core biopsy:  - Metastatic squamous cell carcinoma involving fibrous tissue with extensive necrosis.  See note.   "   Note: By immunostaining, the tumor cells are positive for p40, supportive of the above diagnosis.     She proceeded with L neck dissection which revealed:    A. Left neck, level II-IV, neck dissection:  -- Poorly differentiated , nonkeratinizing squamous cell carcinoma with abundant necrosis,  involving soft tissue extensively, encompassing levels II and III  (2.2 cm).  - Fibrosis and foreign body granulomatous reaction, levels III  and IV.     Note:  Microscopic examination of H&E sections reveals poorly differentiated squamous cell carcinoma involving extensively soft tissue with minimal lymphoid tissue present and abundant necrosis and fibrosis.  Carcinoma reaches soft tissue inked margins focally.  Vein margin is negative.  History of metastatic squamous cell carcinoma of left tonsil, status post chemo therapy and radiation therapy is noted for this patient.     P16 by immunohistochemistry:  Positive     She was discussed at  and seen by Dr. Lowry with plans for post operative reirradiation with proton beam therapy and presents for medical oncology consultation to discuss addition of radiosensitizing systemic therapy.    PAST MEDICAL HISTORY  COPD, hypothyroidism, RLS    SURGICAL HISTORY  Cholecystectomy, hysterectomy     SOCIAL HISTORY  Lives in [place] by self. Son is next building. 3 kids, 6 grandkids.  Retired  at [employer].  Smoked 1-2 PPD, now down to 4 a day since diagnosis.  Occasional EtOH, no illicits. [place], enjoys bingo.  Has a mini [id number]     FAMILY HISTORY  Cousin with brain cancer    CURRENT MEDS REVIEWED       ALLERGIES REVIEWED        SUBJECTIVE: Patient doing ok today. She tolerated cisplatin relatively well at last occurrence. She completed 5 weeks without difficulty however she noted severe nausea and difficulty with PO the last two weeks that was prolonged after completion of her therapy. She never had any symptoms of tinnitus, peripheral  neuropathy.    A 13 point review of systems was performed, with significant findings documented above in subjective history.    OBJECTIVE:  Vitals:    09/26/24 1316   BP: 133/67   Pulse: 57   Resp: 20   Temp: 36.3 °C (97.3 °F)   SpO2: 98%      Body surface area is 1.77 meters squared.     Wt Readings from Last 5 Encounters:   09/24/24 74.4 kg (164 lb)   09/20/24 74 kg (163 lb 3.2 oz)   09/17/24 73 kg (161 lb)   09/10/24 71.6 kg (157 lb 12.8 oz)   09/07/24 69.9 kg (154 lb)       ECOGSCORE: 1- Restricted in physically strenuous activity.  Carries out light duty.  Gen: A&O, NAD  Head: Normocephalic, atraumatic  Eyes: no scleral icterus  ENT: mucous membranes moist, L neck scar well healed  Resp: Lungs CTAB  Cardiac: Normal rate, regular rhythm, no murmurs appreciated  Abdomen: Soft, nondistended, nontender, +BS  Neuro: CNII-XII grossly intact  Psych: appropriate mood & affect  Skin: warm, dry, no apparent rashes      Diagnostic Results   Results:  Labs:  Lab Results   Component Value Date    WBC 5.7 09/03/2024    HGB 10.7 (L) 09/03/2024    HCT 32.9 (L) 09/03/2024    MCV 94 09/03/2024     09/03/2024      Lab Results   Component Value Date    NEUTROABS 4.29 09/03/2024        Lab Results   Component Value Date    GLUCOSE 86 09/03/2024    CALCIUM 9.1 09/03/2024     09/03/2024    K 4.9 09/03/2024    CO2 31 09/03/2024    CL 99 09/03/2024    BUN 12 09/03/2024    CREATININE 0.53 09/03/2024    MG 1.82 09/03/2024     Lab Results   Component Value Date    ALT 33 09/17/2023    AST 18 09/17/2023    ALKPHOS 135 09/17/2023    BILITOT 0.9 09/17/2023      Lab Results   Component Value Date    TSH 5.22 (H) 08/22/2024    FREET4 0.91 08/22/2024     PET 7/18/24:    IMPRESSION:  1. New hypermetabolic activity seen in the left level 2 cervical  lymph node, with central necrosis, consistent with heide metastasis.  2. New FDG avid clustered nodules are seen in the right upper lobe,  which is nonspecific and may be  infectious/inflammatory, however  metastasis can not be excluded. Short-term follow-up chest CT is  recommended.  3. Mild FDG uptake is seen throughout the esophagus, likely relating  to esophagitis.  4. There is partial opacification of the left maxillary sinus, with  mild FDG avidity, likely sinusitis.    CT Neck 6/20/24    IMPRESSION:  1. Compared to the CT neck from 07/14/2023, interval increase in  edema within the aerodigestive tract, most pronounced within the  supraglottic larynx and hypopharynx and there is resulting moderate  narrowing of the supraglottic laryngeal lumen. There is also mild  prevertebral edema. There is no focal fluid collection to suggest an  abscess. Cause of this edema is uncertain, could be infectious in  etiology rather than treatment related given that patient completed  radiation therapy 2 years ago.  2. No striking new mass is seen within the visualized aerodigestive  tract, noting that evaluation is somewhat limited due to presence of  diffuse edema and therefore subtle lesions can not be excluded.  3. Interval enlargement of peripherally enhancing likely necrotic  lymph node in the left level 2 heide station concerning for  progression of disease. Alternatively, enlargement of the lymph node  could reflect an infectious process. Otherwise, no cervical  lymphadenopathy by size criteria.  4. Edema within the left facial and neck deep and superficial soft  tissues as well as in the left supraclavicular fossa is favored to be  treatment related and was also present on the prior CT. No focal  fluid collection is seen within these regions to suggest an abscess.  Correlate clinically.      Assessment/Plan     No matching staging information was found for the patient.  Ms. 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    We discussed the treatment  paradigm in recurrence and salvage surgery - given her pathology showing JOLANTA it is reasonable to proceed with concurrent reirradiation with Proton. We consented today and all questions were answered. We will proceed upon CT simulation.    #Recurrent stage III (cT1c cN3a M0) SCC of L tonsil   -s/p L radical neck dissection 2-4 with CN11 repair showing diffuse JOLANTA  -Consented for cisplatin. Anticipate weekly (40 mg/m2) to commence upon CT simulation  -Premeds sent to local pharmacy, patient educated on antiemetic regimen  -Will follow weekly while on treatment    #RUL nodules  -Being followed with CT scan in next 3 months    Alejandro Hurst MD  Thoracic + [facility]   [address] [place] [id number]  Phone: [phone]

## 2024-09-27 ENCOUNTER — HOME CARE VISIT (OUTPATIENT)
Dept: HOME HEALTH SERVICES | Facility: HOME HEALTH | Age: 64
End: 2024-09-27
Payer: COMMERCIAL

## 2024-09-27 VITALS — HEART RATE: 67 BPM | TEMPERATURE: 98.4 F | OXYGEN SATURATION: 97 %

## 2024-09-27 PROCEDURE — G0152 HHCP-SERV OF OT,EA 15 MIN: HCPCS

## 2024-09-27 ASSESSMENT — ENCOUNTER SYMPTOMS
PAIN: 1
PAIN LOCATION - PAIN QUALITY: BURNING
PAIN LOCATION - PAIN FREQUENCY: CONSTANT
HIGHEST PAIN SEVERITY IN PAST 24 HOURS: 7/10
PAIN LOCATION: NECK
LOWEST PAIN SEVERITY IN PAST 24 HOURS: 4/10
PAIN LOCATION - RELIEVING FACTORS: MEDS
PAIN LOCATION - PAIN SEVERITY: 7/10
PAIN LOCATION - PAIN DURATION: DAILY
PERSON REPORTING PAIN: PATIENT

## 2024-09-27 ASSESSMENT — ACTIVITIES OF DAILY LIVING (ADL)
DRESSING_UB_CURRENT_FUNCTION: INDEPENDENT
AMBULATION ASSISTANCE: 1
PHYSICAL TRANSFERS ASSESSED: 1
CURRENT_FUNCTION: INDEPENDENT
AMBULATION ASSISTANCE: INDEPENDENT

## 2024-09-28 ENCOUNTER — HOME CARE VISIT (OUTPATIENT)
Dept: HOME HEALTH SERVICES | Facility: HOME HEALTH | Age: 64
End: 2024-09-28
Payer: COMMERCIAL

## 2024-09-28 PROCEDURE — G0299 HHS/HOSPICE OF RN EA 15 MIN: HCPCS

## 2024-09-29 VITALS
SYSTOLIC BLOOD PRESSURE: 118 MMHG | OXYGEN SATURATION: 96 % | HEART RATE: 72 BPM | RESPIRATION RATE: 16 BRPM | TEMPERATURE: 98.1 F | DIASTOLIC BLOOD PRESSURE: 84 MMHG

## 2024-09-29 SDOH — HEALTH STABILITY: MENTAL HEALTH: SMOKING IN HOME: 0

## 2024-09-29 SDOH — ECONOMIC STABILITY: GENERAL

## 2024-09-29 SDOH — ECONOMIC STABILITY: HOUSING INSECURITY: EVIDENCE OF SMOKING MATERIAL: 0

## 2024-09-29 ASSESSMENT — ENCOUNTER SYMPTOMS
PAIN LOCATION - PAIN SEVERITY: 4/10
PAIN LOCATION - PAIN QUALITY: SHARP
STOOL FREQUENCY: DAILY
PAIN LOCATION: LEFT LEG
PAIN LOCATION - PAIN SEVERITY: 6/10
MUSCLE WEAKNESS: 1
HIGHEST PAIN SEVERITY IN PAST 24 HOURS: 6/10
PAIN SEVERITY GOAL: 0/10
PAIN LOCATION - PAIN FREQUENCY: INTERMITTENT
PAIN LOCATION - PAIN QUALITY: SHARP
PAIN LOCATION - RELIEVING FACTORS: REST, MEDICATION
LAST BOWEL MOVEMENT: 67110
SUBJECTIVE PAIN PROGRESSION: UNCHANGED
PAIN LOCATION - RELIEVING FACTORS: REST, MEDICATION
LOWEST PAIN SEVERITY IN PAST 24 HOURS: 4/10
PAIN LOCATION - EXACERBATING FACTORS: ACTIVITY
FATIGUE: 1
PAIN LOCATION - PAIN FREQUENCY: CONSTANT
FATIGUES EASILY: 1
PAIN LOCATION: NECK
PAIN LOCATION - PAIN DURATION: INTERMITTENT

## 2024-09-29 ASSESSMENT — ACTIVITIES OF DAILY LIVING (ADL)
MONEY MANAGEMENT (EXPENSES/BILLS): INDEPENDENT
AMBULATION ASSISTANCE: STAND BY ASSIST
CURRENT_FUNCTION: STAND BY ASSIST

## 2024-09-29 ASSESSMENT — PAIN SCALES - PAIN ASSESSMENT IN ADVANCED DEMENTIA (PAINAD)
TOTALSCORE: 0
CONSOLABILITY: 0 - NO NEED TO CONSOLE.
BODYLANGUAGE: 0
NEGVOCALIZATION: 0 - NONE.
BODYLANGUAGE: 0 - RELAXED.
NEGVOCALIZATION: 0
CONSOLABILITY: 0
FACIALEXPRESSION: 0 - SMILING OR INEXPRESSIVE.
FACIALEXPRESSION: 0
BREATHING: 0

## 2024-09-29 NOTE — HOME HEALTH
With this SN visit, patient presented alert and oriented x 3 and was pleasant and cooperative. Patient sat at dining room table for assessment. Patient c/o cold sx of nasal congestion, cough. Assessed patient  noting moist non productive cough. LS with wheezes noted on end expiration. Temp 98.1F. SPO2 96% on RA. Instructed patient uless she develops a temp, these sx can be considered due to a virus but if sx continue past 7-10 days, follow up with PCP is needed. Patient verbalized understanding.

## 2024-10-01 ENCOUNTER — APPOINTMENT (OUTPATIENT)
Dept: HEMATOLOGY/ONCOLOGY | Facility: HOSPITAL | Age: 64
End: 2024-10-01
Payer: COMMERCIAL

## 2024-10-01 NOTE — PROGRESS NOTES
"    Patient ID: Melvi Sanchez \"Allie" is a 64 y.o. 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 Robin Gabrielc:  Dr. Chetna Lowry  [facility]: Dr. Alejandro Hurst   PCPr: Alton King,     PRIOR THERAPIES  07/07/2022: Completion of chemoradiation with weekly cisplatin with Dr. Razo  08/26/2024: L radical neck dissection 2-4 with CN11 repair     CURRENT THERAPY  10/8/24: Weekly cisplatin (40 mg/m2) to be given concurrently with proton reirradiation     CURRENT ONCOLOGICAL PROBLEMS  Post operative pain     HISTORY OF PRESENT ILLNESS  Ms. 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    Patient initially treated by Dr. Razo in 2022. She first noticed a lump in the left side of her neck > 2 years ago. She was found that have enlarged level 2, 3, 4 nodes, but no obvious oropharyngeal lesion 03/2022 by Dr. Torres.  CT neck was obtained demonstrating  L neck mass, 3.2 x 5.9 x 8.4 cm and a 1.5 x 1.6 right paratracheal node -   FNA biopsy of left neck mass showed squamous cell carcinoma. On 4/21/2022 she underwent triple endoscopy and DL showed lesion of left tonsil; biopsy showed invasive squamous cell carcinoma, p16 positive. She was referred to radiation oncology and saw Dr. Crook and completed 70 cGy/35f CCRT with STEWART seen at 1 year. She has been followed by Dr. Torres in surveillance and was noted to have left neck LINA. CT neck was obtained on 6/20/24 demonstrating increase in edema, enlargement of level 2 node. L neck core biopsy on 7/5/24 demonstrated:     FINAL DIAGNOSIS   Left neck mass, core biopsy:  - Metastatic squamous cell carcinoma involving fibrous tissue with extensive " necrosis.  See note.   Note: By immunostaining, the tumor cells are positive for p40, supportive of the above diagnosis.     She proceeded with L neck dissection which revealed:    A. Left neck, level II-IV, neck dissection:  -- Poorly differentiated , nonkeratinizing squamous cell carcinoma with abundant necrosis,  involving soft tissue extensively, encompassing levels II and III  (2.2 cm).  - Fibrosis and foreign body granulomatous reaction, levels III  and IV.     Note:  Microscopic examination of H&E sections reveals poorly differentiated squamous cell carcinoma involving extensively soft tissue with minimal lymphoid tissue present and abundant necrosis and fibrosis.  Carcinoma reaches soft tissue inked margins focally.  Vein margin is negative.  History of metastatic squamous cell carcinoma of left tonsil, status post chemo therapy and radiation therapy is noted for this patient.     P16 by immunohistochemistry:  Positive     She was discussed at  and seen by Dr. Lowry with plans for post operative reirradiation with proton beam therapy and presents for medical oncology consultation to discuss addition of radiosensitizing systemic therapy.    PAST MEDICAL HISTORY  COPD, hypothyroidism, RLS    SURGICAL HISTORY  Cholecystectomy, hysterectomy     SOCIAL HISTORY  Lives in [place] by self. Son is next building. 3 kids, 6 grandkids.  Retired  at [employer].  Smoked 1-2 PPD, now down to 4 a day since diagnosis.  Occasional EtOH, no illicits. [place], enjoys bingo.  Has a mini [employer]     FAMILY HISTORY  Cousin with brain cancer    CURRENT MEDS REVIEWED       ALLERGIES REVIEWED     Chief Complaint:    Subjective   Chief complaint: left neck pain     HPI  Melviarlin Sanchez is a 64 y.o. year old female patient with Recurrent stage III ([id number]) p16+ SCC of L oropharynx who started chemoRT w/ weekly Cisplatin on 10/8/24.    Interval History:  Patient presents for C1  "Cisplatin.    Patient reports no change from 2 weeks ago.   Her left neck wound is healing but still has scabbing at the incision site. No more bleeding/wheeping.   Has chronic dysphagia but tolerating regular diet with mild discomfort on swallowing.   Left ear feels full occasionally, denies hearing changes.     A 13 point review of systems was performed, with significant findings documented above in subjective history.    Review of Systems   Constitutional:  Negative for chills and fever.   HENT:   Positive for sore throat and trouble swallowing. Negative for hearing loss, lump/mass, mouth sores, nosebleeds and tinnitus.    Respiratory:  Negative for cough and shortness of breath.    Cardiovascular:  Negative for chest pain and leg swelling.   Gastrointestinal:  Negative for abdominal pain, constipation, diarrhea, nausea and vomiting.   Musculoskeletal:  Negative for arthralgias.   Skin:  Negative for rash.   Neurological:  Negative for headaches, light-headedness and numbness.       Objective   /64 (BP Location: Left arm, Patient Position: Sitting)   Pulse 63   Temp 36.8 °C (98.2 °F) (Temporal)   Resp 17   Ht (S) 1.578 m (5' 2.13\")   Wt 73.8 kg (162 lb 12.8 oz)   SpO2 98%   BMI 29.66 kg/m²   Daily Weight  10/08/24 : 73.8 kg (162 lb 12.8 oz)  10/02/24 : 72.5 kg (159 lb 14.4 oz)  09/26/24 : (S) 71.3 kg (157 lb 3 oz)  09/24/24 : 74.4 kg (164 lb)  09/20/24 : 74 kg (163 lb 3.2 oz)  09/17/24 : 73 kg (161 lb)  09/10/24 : 71.6 kg (157 lb 12.8 oz)    Physical Exam  Vitals reviewed.   Constitutional:       Appearance: Normal appearance. She is well-developed.   HENT:      Head: Normocephalic and atraumatic.      Right Ear: External ear normal. No tenderness.      Left Ear: External ear normal. No tenderness.      Nose: Nose normal.      Mouth/Throat:      Lips: Pink.      Mouth: Mucous membranes are moist. No injury or oral lesions.      Tongue: No lesions.   Eyes:      General: Lids are normal.      " Extraocular Movements: Extraocular movements intact.      Conjunctiva/sclera: Conjunctivae normal.      Pupils: Pupils are equal, round, and reactive to light.   Neck:      Thyroid: No thyroid mass.      Comments: L neck scar well healed. Small area of scabbing    Cardiovascular:      Rate and Rhythm: Normal rate and regular rhythm.      Pulses: Normal pulses.      Heart sounds: No murmur heard.     No gallop.   Pulmonary:      Effort: Pulmonary effort is normal. No respiratory distress.      Breath sounds: Normal breath sounds and air entry. No stridor. No wheezing.   Abdominal:      General: Abdomen is flat. Bowel sounds are normal. There is no distension or abdominal bruit.      Palpations: Abdomen is soft. There is no mass.      Tenderness: There is no abdominal tenderness.   Musculoskeletal:         General: Normal range of motion.      Cervical back: Normal range of motion and neck supple. No signs of trauma. Normal range of motion.      Right lower leg: No edema.      Left lower leg: No edema.   Lymphadenopathy:      Cervical: No cervical adenopathy.      Upper Body:      Right upper body: No axillary adenopathy.      Left upper body: No axillary adenopathy.   Skin:     General: Skin is warm and dry.      Comments: No new skin lesions   Neurological:      General: No focal deficit present.      Mental Status: She is alert and oriented to person, place, and time. Mental status is at baseline.      Gait: Gait is intact.   Psychiatric:         Attention and Perception: Attention normal.         Mood and Affect: Mood and affect normal.         Behavior: Behavior is cooperative.       ECOGSCORE: 1- Restricted in physically strenuous activity.  Carries out light duty.    Diagnostic Results   LABS  Below labs were reviewed.  Results from last 7 days   Lab Units 10/08/24  0937   WBC AUTO x10*3/uL 7.8   HEMOGLOBIN g/dL 12.0   HEMATOCRIT % 36.5   PLATELETS AUTO x10*3/uL 215   NEUTROS ABS x10*3/uL 5.97   LYMPHS ABS AUTO  x10*3/uL 0.70*   MONOS ABS AUTO x10*3/uL 0.92   EOS ABS AUTO x10*3/uL 0.16   NEUTROS PCT AUTO % 76.4   LYMPHS PCT AUTO % 9.0   MONOS PCT AUTO % 11.8   EOS PCT AUTO % 2.0      Results from last 7 days   Lab Units 10/08/24  0937   GLUCOSE mg/dL 68*   SODIUM mmol/L 139   POTASSIUM mmol/L 3.9   CHLORIDE mmol/L 104   CO2 mmol/L 30   BUN mg/dL 10   CREATININE mg/dL 0.63   EGFR mL/min/1.73m*2 >90   CALCIUM mg/dL 8.7   MAGNESIUM mg/dL 1.63   ALBUMIN g/dL 3.8   PROTEIN TOTAL g/dL 6.2*   BILIRUBIN TOTAL mg/dL 0.5   ALK PHOS U/L 58   ALT U/L 17   AST U/L 14     Results from last 7 days   Lab Units 10/08/24  0937   TSH mIU/L 4.45*   FREE T4 ng/dL 1.17                    IMAGES  PET 7/18/24:  IMPRESSION:  1. New hypermetabolic activity seen in the left level 2 cervical lymph node, with central necrosis, consistent with heide metastasis.  2. New FDG avid clustered nodules are seen in the right upper lobe, which is nonspecific and may be infectious/inflammatory, however metastasis can not be excluded. Short-term follow-up chest CT is recommended.  3. Mild FDG uptake is seen throughout the esophagus, likely relating to esophagitis.  4. There is partial opacification of the left maxillary sinus, with mild FDG avidity, likely sinusitis.    CT Neck 6/20/24  IMPRESSION:  1. Compared to the CT neck from 07/14/2023, interval increase in edema within the aerodigestive tract, most pronounced within the supraglottic larynx and hypopharynx and there is resulting moderate narrowing of the supraglottic laryngeal lumen. There is also mild  prevertebral edema. There is no focal fluid collection to suggest an abscess. Cause of this edema is uncertain, could be infectious in etiology rather than treatment related given that patient completed radiation therapy 2 years ago.  2. No striking new mass is seen within the visualized aerodigestive tract, noting that evaluation is somewhat limited due to presence of diffuse edema and therefore subtle  lesions can not be excluded.  3. Interval enlargement of peripherally enhancing likely necrotic lymph node in the left level 2 heide station concerning for progression of disease. Alternatively, enlargement of the lymph node could reflect an infectious process. Otherwise, no cervical lymphadenopathy by size criteria.  4. Edema within the left facial and neck deep and superficial soft tissues as well as in the left supraclavicular fossa is favored to be treatment related and was also present on the prior CT. No focal fluid collection is seen within these regions to suggest an abscess. Correlate clinically.      Assessment/Plan     Melvi Sanchez is a 64 y.o. year old female patient with PMH of stage III (cT1c cN3a M0) SCC of L tonsil, COPD, hypothyroidism and RLS, started treatment with CCRT w/ Cisplatin and proton radiation on 10/8/24 for recurrence to L oropharynx with diffuse JOLANTA.    We discussed the treatment paradigm in recurrence and salvage surgery - given her pathology showing JOLANTA it is reasonable to proceed with concurrent reirradiation with Proton. We consented today and all questions were answered. We will proceed upon CT simulation.    #Recurrent stage III (cT1c cN3a M0) SCC of L tonsil   - 8/26/24: S/p L radical neck dissection 2-4 with CN11 repair showing diffuse JOLANTA   - 10/8/24 Started weekly Cisplatin (40 mg/m2) with proton radiation. Proton machine is down today so first dose RT will be on IMRT on 10/9 then resume PORT. Stable xerostomia, dysphagia, has some weight gain.     # RUL nodules  - Being followed with CT scan in next 3 months    PLAN  -- Proceed to C1 Cisplatin  -- IV hydration every Friday

## 2024-10-02 ENCOUNTER — SOCIAL WORK (OUTPATIENT)
Dept: HEMATOLOGY/ONCOLOGY | Facility: HOSPITAL | Age: 64
End: 2024-10-02

## 2024-10-02 ENCOUNTER — OFFICE VISIT (OUTPATIENT)
Dept: PALLIATIVE MEDICINE | Facility: CLINIC | Age: 64
End: 2024-10-02
Payer: COMMERCIAL

## 2024-10-02 ENCOUNTER — APPOINTMENT (OUTPATIENT)
Dept: RADIATION ONCOLOGY | Facility: CLINIC | Age: 64
End: 2024-10-02
Payer: COMMERCIAL

## 2024-10-02 VITALS
WEIGHT: 159.9 LBS | SYSTOLIC BLOOD PRESSURE: 125 MMHG | RESPIRATION RATE: 18 BRPM | DIASTOLIC BLOOD PRESSURE: 75 MMHG | TEMPERATURE: 95.2 F | BODY MASS INDEX: 29.28 KG/M2 | HEART RATE: 66 BPM

## 2024-10-02 DIAGNOSIS — K59.00 CONSTIPATION, UNSPECIFIED CONSTIPATION TYPE: ICD-10-CM

## 2024-10-02 DIAGNOSIS — J39.8 INCREASED TRACHEAL SECRETIONS: ICD-10-CM

## 2024-10-02 DIAGNOSIS — G89.18 POST-OP PAIN: ICD-10-CM

## 2024-10-02 DIAGNOSIS — G89.3 CANCER ASSOCIATED PAIN: ICD-10-CM

## 2024-10-02 DIAGNOSIS — R19.7 DIARRHEA, UNSPECIFIED TYPE: ICD-10-CM

## 2024-10-02 DIAGNOSIS — G47.00 INSOMNIA, UNSPECIFIED TYPE: Primary | ICD-10-CM

## 2024-10-02 DIAGNOSIS — F17.210 TOBACCO DEPENDENCE DUE TO CIGARETTES: ICD-10-CM

## 2024-10-02 PROCEDURE — 99215 OFFICE O/P EST HI 40 MIN: CPT | Performed by: NURSE PRACTITIONER

## 2024-10-02 RX ORDER — GUAIFENESIN 600 MG/1
600 TABLET, EXTENDED RELEASE ORAL 2 TIMES DAILY
Qty: 60 TABLET | Refills: 0 | Status: SHIPPED | OUTPATIENT
Start: 2024-10-02 | End: 2024-11-01

## 2024-10-02 RX ORDER — LOPERAMIDE HCL 2 MG
TABLET ORAL
Qty: 30 TABLET | Refills: 2 | Status: SHIPPED | OUTPATIENT
Start: 2024-10-02

## 2024-10-02 RX ORDER — BISACODYL 5 MG
5-10 TABLET, DELAYED RELEASE (ENTERIC COATED) ORAL DAILY PRN
Qty: 30 TABLET | Refills: 3 | Status: SHIPPED | OUTPATIENT
Start: 2024-10-02 | End: 2024-12-01

## 2024-10-02 RX ORDER — GABAPENTIN 300 MG/1
300 CAPSULE ORAL NIGHTLY
Qty: 30 CAPSULE | Refills: 3 | Status: SHIPPED | OUTPATIENT
Start: 2024-10-02 | End: 2025-01-30

## 2024-10-02 RX ORDER — IBUPROFEN 200 MG
1 TABLET ORAL EVERY 24 HOURS
Qty: 30 PATCH | Refills: 0 | Status: SHIPPED | OUTPATIENT
Start: 2024-10-02 | End: 2024-11-01

## 2024-10-02 RX ORDER — POLYETHYLENE GLYCOL 3350 17 G/17G
17 POWDER, FOR SOLUTION ORAL DAILY
Qty: 30 PACKET | Refills: 3 | Status: SHIPPED | OUTPATIENT
Start: 2024-10-02 | End: 2025-01-30

## 2024-10-02 RX ORDER — ACETAMINOPHEN, DIPHENHYDRAMINE HCL, PHENYLEPHRINE HCL 325; 25; 5 MG/1; MG/1; MG/1
1 TABLET ORAL NIGHTLY
Qty: 30 TABLET | Refills: 3 | Status: SHIPPED | OUTPATIENT
Start: 2024-10-02

## 2024-10-02 RX ORDER — ACETAMINOPHEN 500 MG
500 TABLET ORAL 3 TIMES DAILY PRN
Qty: 90 TABLET | Refills: 0 | Status: SHIPPED | OUTPATIENT
Start: 2024-10-02 | End: 2024-11-01

## 2024-10-02 RX ORDER — OXYCODONE AND ACETAMINOPHEN 7.5; 325 MG/1; MG/1
1 TABLET ORAL EVERY 6 HOURS PRN
Qty: 120 TABLET | Refills: 0 | Status: SHIPPED | OUTPATIENT
Start: 2024-10-02 | End: 2024-11-01

## 2024-10-02 ASSESSMENT — PAIN SCALES - GENERAL: PAINLEVEL: 0-NO PAIN

## 2024-10-02 NOTE — PROGRESS NOTES
SUPPORTIVE AND PALLIATIVE ONCOLOGY CONSULT - OUTPATIENT FOLLOW UP      SERVICE DATE: 10/2/2024    Referred by:  PIYUSH Lee-CNP  Medical Oncologist: Alejandro Hurst MD   Radiation Oncologist: MD Chetna Gutierrez MD  Primary Physician: Alton King  [phone]    REASON FOR CONSULT/CHIEF CONSULT COMPLAINT: Introduction to Supportive and Palliative Oncology Services    Subjective   HISTORY OF PRESENT ILLNESS: Melvi Sanchez is a 64 y.o. 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 Treatment plan is concurrent reirradiation with Proton / cisplatin.     Additional PMHx  COPD, hypothyroidism and RLS     Symptom Assessment:    Pain:somewhat    Left lateral thigh  from knee to hip - painful to touch - hard to describe - deep ache - some numbness from mid thigh to knee     Left neck  - post op - stinging - localized - some numbness or neck/ear/cheek     Numbness or tingling in hands/feet/other:  left second toe is numb after surgery  Headache: a little  Lack of appetite: none  Weight loss: none  Pain in mouth/swallowing: none   Dry mouth: somewhat - Xylomet PRN   Taste changes: a little  Nausea/early satiety: none  Vomiting: none  Constipation: none  Diarrhea: none  Lack of energy: a little  Difficulty sleeping: none - melatonin at bedtime   Anxiety: a little  Depression: a little  Shortness of breath: a little  Other: mild dysphagia     Information obtained from: interview of patient  ______________________________________________________________________     Oncology History   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)   4/3/2023 Initial Diagnosis    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)     10/8/2024 -  Chemotherapy    CISplatin with Concurrent Radiation (Weekly), 7 Week Cycle     Tonsil cancer (Multi)   4/3/2023 Initial Diagnosis    Tonsil cancer (Multi)     10/8/2024  -  Chemotherapy    CISplatin with Concurrent Radiation (Weekly), 7 Week Cycle         Past Medical History:   Diagnosis Date    Acute hypoxemic respiratory failure (Multi) 2023    Breast calcification, left     Dysphagia     Essential (primary) hypertension 2018    HTN (hypertension), benign    Fibroadenoma of left breast     H/O malignant neoplasm of tonsil     s/p XRT and chemotherapy completed .    Hypothyroidism     Morbid obesity (Multi) 2023    Nausea and/or vomiting 2023    OAB (overactive bladder)     Pharyngitis 2023    Pneumonia 2023    Restless legs syndrome     Sinusitis 2023     Past Surgical History:   Procedure Laterality Date    BREAST BIOPSY Left     benign FA     SECTION, LOW TRANSVERSE      CHOLECYSTECTOMY  2014    Cholecystectomy    COLONOSCOPY      HYSTERECTOMY  2014    Hysterectomy    MOUTH SURGERY  2014    Oral Surgery Tooth Extraction     Family History   Problem Relation Name Age of Onset    Emphysema Mother      COPD Mother      Hyperlipidemia Sister      Breast cancer Paternal Grandmother          SOCIAL HISTORY  . 3 children.   Social History:  reports that she has been smoking cigarettes. She started smoking about 10 months ago. She has a 40.3 pack-year smoking history. She has never used smokeless tobacco. She reports that she does not currently use alcohol after a past usage of about 1.0 standard drink of alcohol per week. She reports that she does not currently use drugs after having used the following drugs: Marijuana. Frequency: 1.00 time per week.    REVIEW OF SYSTEMS  Review of systems negative unless noted in HPI.       Objective     Current Outpatient Medications   Medication Instructions    albuterol 90 mcg/actuation inhaler 2 puffs, inhalation, Every 4 hours PRN    buPROPion XL (WELLBUTRIN XL) 150 mg, oral, Every morning    dexAMETHasone (DECADRON) 8 mg, oral, Daily, For 3 days per week  starting the day after treatment.    gabapentin (NEURONTIN) 100 mg, oral, Nightly    latanoprost (Xalatan) 0.005 % ophthalmic solution 1 drop, Left Eye, Nightly    levothyroxine (SYNTHROID, LEVOXYL) 75 mcg, oral, Daily    melatonin 10 mg tablet 1 tablet, oral, Nightly    Mucus Relief  mg, oral, 2 times daily, Do not crush, chew, or split.    naloxone (NARCAN) 4 mg, nasal, As needed, May repeat every 2-3 minutes if needed, alternating nostrils, until medical assistance becomes available.    nicotine (Nicoderm CQ) 21 mg/24 hr patch 1 patch, transdermal, Every 24 hours    OLANZapine (ZYPREXA) 5 mg, oral, Nightly    ondansetron (ZOFRAN) 8 mg, oral, Every 8 hours PRN    oxygen (O2) 4 L/min, inhalation, Continuous    pantoprazole (PROTONIX) 40 mg, oral, Daily before breakfast, Do not crush, chew, or split.    prochlorperazine (COMPAZINE) 10 mg, oral, Every 6 hours PRN    rOPINIRole (REQUIP) 2 mg, oral, Nightly    rosuvastatin (CRESTOR) 40 mg, oral, Daily    umeclidinium-vilanteroL (Anoro Ellipta) 62.5-25 mcg/actuation blister with device 1 puff, inhalation, Daily    varenicline (CHANTIX) 1 mg, oral, Daily    white petrolatum (Aquaphor) 41 % ointment ointment 1 Application, Topical, 3 times daily       Allergies:   Allergies   Allergen Reactions    Duloxetine Hallucinations     Pt states she doesn't think she has an allergy just a bad reaction when mixed with morphine             Creatinine   Date Value Ref Range Status   09/03/2024 0.53 0.50 - 1.05 mg/dL Final     AST   Date Value Ref Range Status   09/17/2023 18 9 - 39 U/L Final     ALT (SGPT)   Date Value Ref Range Status   09/17/2023 33 7 - 45 U/L Final     Comment:      Patients treated with Sulfasalazine may generate    falsely decreased results for ALT.       Hemoglobin   Date Value Ref Range Status   09/03/2024 10.7 (L) 12.0 - 16.0 g/dL Final     Platelets   Date Value Ref Range Status   09/03/2024 280 150 - 450 x10*3/uL Final       PHYSICAL  "EXAMINATION  Vital Signs:       9/20/2024    12:51 PM 9/20/2024    12:52 PM 9/24/2024    12:22 PM 9/25/2024    11:25 AM 9/26/2024     1:16 PM 9/27/2024    11:38 AM 9/28/2024     2:34 PM   Vitals   Systolic 131 127   133  118   Diastolic 72 76   67  84   Heart Rate 71 69  70 57 67 72   Temp 37.1 °C (98.8 °F)  36.4 °C (97.6 °F) 36.9 °C (98.4 °F) 36.3 °C (97.3 °F) 36.9 °C (98.4 °F) 36.7 °C (98.1 °F)   Resp 18 18  16 20  16   Height (in)   1.575 m (5' 2\")  1.574 m (5' 1.97\")      Weight (lb) 163.2  164  157.19      BMI 29.85 kg/m2  30 kg/m2  28.78 kg/m2     BSA (m2) 1.8 m2  1.8 m2  1.77 m2     Visit Report   Report  Report         Significant value        Physical Exam  HENT:      Head: Normocephalic and atraumatic.      Comments: Left neck surgical changes noted     Mouth/Throat:      Mouth: Mucous membranes are dry.      Pharynx: Oropharynx is clear.   Eyes:      Extraocular Movements: Extraocular movements intact.      Conjunctiva/sclera: Conjunctivae normal.   Pulmonary:      Effort: Pulmonary effort is normal.   Abdominal:      General: Abdomen is flat.      Palpations: Abdomen is soft.   Musculoskeletal:      Comments: Left thigh with slight edema  Surgical scar noted   Skin:     General: Skin is warm and dry.   Neurological:      General: No focal deficit present.      Mental Status: She is alert.   Psychiatric:         Mood and Affect: Mood normal.         Thought Content: Thought content normal.         ASSESSMENT/PLAN    Pain  Pain is: cancer related pain  Type: somatic and neuropathic  -stop oxycodone   -start percocet 7.5/325mg q6 PRN   -may use additional tylenol 500mg TID (discussed max tylenol dosing per day is 3g including percocet)   -increase gabapentin to 300mg at bedtime     Opioid Use  Medication Management:   - OARRS report reviewed with no aberrant behavior; consistent with  prescriptions/records and patient history  - MED 30.  Overdose Risk Score 260.   This has been discussed with patient.   - " We will continue to closely monitor the patient for signs of prescription misuse including UDS, OARRS review and subjective reports at each visit.  - No concurrent benzodiazepine use   - I am a provider who either is or has consulted and collaborated with a provider certified in Hospice and Palliative Medicine and have conducted a face-face visit and examination for this patient.  - Routine Urine Drug Screen complete at next in person visit.   - Controlled Substance Agreement complete at next in person visit.   - Specifically discussed that controlled substance prescriptions will only be provided by our group as outlined in the completed agreement  - Prescribed naloxone: Confirmed already Rx'ed  - Red Flags: none     Tobacco dependence   -down to 3 cigs per day   -continue nicotine patch 21mg   -continue chantix  -continue wellbutrin 150mg daily     Xerostomia   -Xylomet PRN     Nausea   At risk for nausea with vomiting related to chemotherapy and opioids   -Continue ondansetron 8mg q8 PRN   -Continue prochlorperazine 10mg q6 PRN  -Continue dexamethasone 8mg daily for 3 days starting the day after treatment   -Continue zyprexa 5mg at bedtime     Chemo induced diarrhea, at risk   -Start loperamide 2 mg - Take 2 capsules (4 mg) by mouth with the first episode of diarrhea and 1 capsule (2 mg) by mouth with any additional episodes. Maximum 8 capsules (16 mg) per day     Constipation   At risk for constipation related to opioids  -may use miralax 17g daily   -may use dulcolax 5-10mg at bedtime PRN if no bm in 2 days     Cough  -refill mucinex  -notify oncoloigst  for signs/sx of infection (discussed)    Altered Mood  Acute anxiety and depression related to health concerns   -on Wellbutrin per above     Chronic insomnia   -continue melatonin 10mg at bedtime PRN   -increase in gabapentin may help      Introduction to Supportive and Palliative Oncology:  Spoke with patient and sister  Introduced the role and philosophy of  Supportive and Palliative oncology in the evaluation and management of symptoms during cancer treatment    Medical Decision Making/Goals of Care/Advance Care Planning:  Patient's current clinical condition, including diagnosis, and management plan, and goals of care were discussed.   Goals: symptom control and cancer directed therapy    Advance Directives  Existence of Advance Directives:No - has interest - she has paperwork   Decision maker: She would like to appointment Daughter, Beth, as HCPOA - otherwise 2/3 children     Next Follow-Up Visit:  Return to clinic in 1 month     Signature and billing  Thank you for allowing us to participate in the care of this patient. Recommendations will be communicated back to the consulting service by way of shared electronic medical record or face-to-face.    Medical complexity was moderate level due to due to complexity of problems, extensive data review, and high risk of management/treatment.  Time was spent on the following: Prep Time, Time Directly with Patient/Family/Caregiver, Documentation Time. Total time spent: 45 min      DATA   Diagnostic tests and information reviewed for today's visit:  Most recent labs and imaging results, Medications       Some elements copied from NA note on NA, the elements have been updated and all reflect current decision making from today, 10/2/2024.      Plan of Care discussed with: Patient, Family/Significant Other: sister, and RN      SIGNATURE: Antoinette Soliman, PIYUSH-CNP    Contact information:  Supportive and Palliative Oncology  Monday-Friday 8 AM-5 PM  Phone:  [phone], press option #5, then option #1.   Or Epic Secure Chat

## 2024-10-02 NOTE — PROGRESS NOTES
Covering ONEIL received call from [facility] stating that patient's anticipated check in is 10/6/24, but [facility] has yet to receive a caregiver exemption form.  This SW reached out to CUATE Ugalde RN to inquire about which provider would be available to complete the form so SW can submit to [facility].  Awaiting response.    UPDATE 1446: SW received callback from [facility] who states they are unable to accommodate patient as being caregiver exempt, due to significant reported weakness.  Covering ONEIL left voicemail for patient requesting callback to discuss the need for caregiver to accompany her if she moves forward with [facility] stay. Awaiting callback.

## 2024-10-03 ENCOUNTER — SOCIAL WORK (OUTPATIENT)
Dept: HEMATOLOGY/ONCOLOGY | Facility: HOSPITAL | Age: 64
End: 2024-10-03
Payer: COMMERCIAL

## 2024-10-03 ENCOUNTER — APPOINTMENT (OUTPATIENT)
Dept: RADIATION ONCOLOGY | Facility: HOSPITAL | Age: 64
End: 2024-10-03
Payer: COMMERCIAL

## 2024-10-03 PROCEDURE — 77300 RADIATION THERAPY DOSE PLAN: CPT | Performed by: RADIOLOGY

## 2024-10-03 PROCEDURE — 77295 3-D RADIOTHERAPY PLAN: CPT | Performed by: RADIOLOGY

## 2024-10-03 NOTE — PROGRESS NOTES
Ashok RIGGS was able to reach patient on this day to learn of her plans for lodging throughout the course of treatment.  Patient states that she has outstanding questions re: timing of her treatment plan (E.g., start date). ONEIL reassured patient that someone from treatment team would reach out to patient to clarify.  Patient was informed that [facility] and treatment team collectively agree that it is unsafe for patient to stay at [facility] without a caregiver. Patient understands and is agreeable to securing a schedule with rotating caregivers to stay with her to provide 24/7 support.  She states that a rotating schedule between her daughter, her 2 sons, and her sister will be doable. ONEIL recommended that patient create a schedule of rotating caregivers. Patient agreeable to this.     UPDATE: 1500  Ashok RIGGS spoke with  staff and they confirmed that a rotating caregiver schedule would be acceptable.  Current dates for reservation are for 10/6/24-10/22/24.

## 2024-10-04 ENCOUNTER — HOME CARE VISIT (OUTPATIENT)
Dept: HOME HEALTH SERVICES | Facility: HOME HEALTH | Age: 64
End: 2024-10-04
Payer: COMMERCIAL

## 2024-10-04 VITALS
SYSTOLIC BLOOD PRESSURE: 94 MMHG | HEART RATE: 78 BPM | DIASTOLIC BLOOD PRESSURE: 50 MMHG | TEMPERATURE: 98.1 F | RESPIRATION RATE: 16 BRPM | OXYGEN SATURATION: 98 %

## 2024-10-04 PROCEDURE — G0299 HHS/HOSPICE OF RN EA 15 MIN: HCPCS

## 2024-10-04 ASSESSMENT — ENCOUNTER SYMPTOMS
PERSON REPORTING PAIN: PATIENT
PAIN LOCATION - PAIN FREQUENCY: CONSTANT
SUBJECTIVE PAIN PROGRESSION: UNCHANGED
PAIN LOCATION - PAIN QUALITY: ACHY
HIGHEST PAIN SEVERITY IN PAST 24 HOURS: 6/10
PAIN SEVERITY GOAL: 0/10
PAIN: 1
PAIN LOCATION: NECK
LOWEST PAIN SEVERITY IN PAST 24 HOURS: 4/10
PAIN LOCATION - PAIN SEVERITY: 6/10

## 2024-10-04 ASSESSMENT — ACTIVITIES OF DAILY LIVING (ADL)
HOME_HEALTH_OASIS: 00
OASIS_M1830: 01

## 2024-10-08 ENCOUNTER — INFUSION (OUTPATIENT)
Dept: HEMATOLOGY/ONCOLOGY | Facility: HOSPITAL | Age: 64
End: 2024-10-08
Payer: COMMERCIAL

## 2024-10-08 ENCOUNTER — APPOINTMENT (OUTPATIENT)
Dept: RADIATION ONCOLOGY | Facility: HOSPITAL | Age: 64
End: 2024-10-08
Payer: COMMERCIAL

## 2024-10-08 ENCOUNTER — LAB (OUTPATIENT)
Dept: LAB | Facility: HOSPITAL | Age: 64
End: 2024-10-08
Payer: COMMERCIAL

## 2024-10-08 ENCOUNTER — OFFICE VISIT (OUTPATIENT)
Dept: HEMATOLOGY/ONCOLOGY | Facility: HOSPITAL | Age: 64
End: 2024-10-08
Payer: COMMERCIAL

## 2024-10-08 ENCOUNTER — SOCIAL WORK (OUTPATIENT)
Dept: CASE MANAGEMENT | Facility: HOSPITAL | Age: 64
End: 2024-10-08
Payer: COMMERCIAL

## 2024-10-08 VITALS
BODY MASS INDEX: 29.96 KG/M2 | SYSTOLIC BLOOD PRESSURE: 105 MMHG | TEMPERATURE: 98.2 F | DIASTOLIC BLOOD PRESSURE: 64 MMHG | HEIGHT: 62 IN | HEART RATE: 63 BPM | OXYGEN SATURATION: 98 % | RESPIRATION RATE: 17 BRPM | WEIGHT: 162.8 LBS

## 2024-10-08 DIAGNOSIS — C09.9 TONSIL CANCER (MULTI): ICD-10-CM

## 2024-10-08 DIAGNOSIS — R91.8 LUNG NODULES: ICD-10-CM

## 2024-10-08 DIAGNOSIS — Z51.11 ENCOUNTER FOR ANTINEOPLASTIC CHEMOTHERAPY: ICD-10-CM

## 2024-10-08 DIAGNOSIS — K11.7 XEROSTOMIA DUE TO RADIOTHERAPY: ICD-10-CM

## 2024-10-08 DIAGNOSIS — C09.9 TONSIL CANCER (MULTI): Primary | ICD-10-CM
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
## 2024-10-08 DIAGNOSIS — C77.0 METASTASIS TO HEAD AND NECK LYMPH NODE (MULTI): ICD-10-CM

## 2024-10-08 LAB
ALBUMIN SERPL BCP-MCNC: 3.8 G/DL (ref 3.4–5)
ALP SERPL-CCNC: 58 U/L (ref 33–136)
ALT SERPL W P-5'-P-CCNC: 17 U/L (ref 7–45)
ANION GAP SERPL CALC-SCNC: 9 MMOL/L (ref 10–20)
AST SERPL W P-5'-P-CCNC: 14 U/L (ref 9–39)
BASOPHILS # BLD AUTO: 0.03 X10*3/UL (ref 0–0.1)
BASOPHILS NFR BLD AUTO: 0.4 %
BILIRUB SERPL-MCNC: 0.5 MG/DL (ref 0–1.2)
BUN SERPL-MCNC: 10 MG/DL (ref 6–23)
CALCIUM SERPL-MCNC: 8.7 MG/DL (ref 8.6–10.3)
CHLORIDE SERPL-SCNC: 104 MMOL/L (ref 98–107)
CO2 SERPL-SCNC: 30 MMOL/L (ref 21–32)
CREAT SERPL-MCNC: 0.63 MG/DL (ref 0.5–1.05)
EGFRCR SERPLBLD CKD-EPI 2021: >90 ML/MIN/1.73M*2
EOSINOPHIL # BLD AUTO: 0.16 X10*3/UL (ref 0–0.7)
EOSINOPHIL NFR BLD AUTO: 2 %
ERYTHROCYTE [DISTWIDTH] IN BLOOD BY AUTOMATED COUNT: 13.6 % (ref 11.5–14.5)
GLUCOSE SERPL-MCNC: 68 MG/DL (ref 74–99)
HBV CORE AB SER QL: NONREACTIVE
HBV SURFACE AB SER-ACNC: <3.1 MIU/ML
HBV SURFACE AG SERPL QL IA: NONREACTIVE
HCT VFR BLD AUTO: 36.5 % (ref 36–46)
HGB BLD-MCNC: 12 G/DL (ref 12–16)
IMM GRANULOCYTES # BLD AUTO: 0.03 X10*3/UL (ref 0–0.7)
IMM GRANULOCYTES NFR BLD AUTO: 0.4 % (ref 0–0.9)
LYMPHOCYTES # BLD AUTO: 0.7 X10*3/UL (ref 1.2–4.8)
LYMPHOCYTES NFR BLD AUTO: 9 %
MAGNESIUM SERPL-MCNC: 1.63 MG/DL (ref 1.6–2.4)
MCH RBC QN AUTO: 31.2 PG (ref 26–34)
MCHC RBC AUTO-ENTMCNC: 32.9 G/DL (ref 32–36)
MCV RBC AUTO: 95 FL (ref 80–100)
MONOCYTES # BLD AUTO: 0.92 X10*3/UL (ref 0.1–1)
MONOCYTES NFR BLD AUTO: 11.8 %
NEUTROPHILS # BLD AUTO: 5.97 X10*3/UL (ref 1.2–7.7)
NEUTROPHILS NFR BLD AUTO: 76.4 %
NRBC BLD-RTO: 0 /100 WBCS (ref 0–0)
PLATELET # BLD AUTO: 215 X10*3/UL (ref 150–450)
POTASSIUM SERPL-SCNC: 3.9 MMOL/L (ref 3.5–5.3)
PROT SERPL-MCNC: 6.2 G/DL (ref 6.4–8.2)
RBC # BLD AUTO: 3.85 X10*6/UL (ref 4–5.2)
SODIUM SERPL-SCNC: 139 MMOL/L (ref 136–145)
T4 FREE SERPL-MCNC: 1.17 NG/DL (ref 0.78–1.48)
TSH SERPL-ACNC: 4.45 MIU/L (ref 0.44–3.98)
WBC # BLD AUTO: 7.8 X10*3/UL (ref 4.4–11.3)

## 2024-10-08 PROCEDURE — 87340 HEPATITIS B SURFACE AG IA: CPT

## 2024-10-08 PROCEDURE — 84075 ASSAY ALKALINE PHOSPHATASE: CPT

## 2024-10-08 PROCEDURE — 96375 TX/PRO/DX INJ NEW DRUG ADDON: CPT | Mod: INF

## 2024-10-08 PROCEDURE — 83735 ASSAY OF MAGNESIUM: CPT

## 2024-10-08 PROCEDURE — 86704 HEP B CORE ANTIBODY TOTAL: CPT

## 2024-10-08 PROCEDURE — 3008F BODY MASS INDEX DOCD: CPT | Performed by: STUDENT IN AN ORGANIZED HEALTH CARE EDUCATION/TRAINING PROGRAM

## 2024-10-08 PROCEDURE — 96413 CHEMO IV INFUSION 1 HR: CPT

## 2024-10-08 PROCEDURE — 84443 ASSAY THYROID STIM HORMONE: CPT

## 2024-10-08 PROCEDURE — 99215 OFFICE O/P EST HI 40 MIN: CPT | Performed by: STUDENT IN AN ORGANIZED HEALTH CARE EDUCATION/TRAINING PROGRAM

## 2024-10-08 PROCEDURE — 86706 HEP B SURFACE ANTIBODY: CPT

## 2024-10-08 PROCEDURE — 96368 THER/DIAG CONCURRENT INF: CPT

## 2024-10-08 PROCEDURE — 2500000004 HC RX 250 GENERAL PHARMACY W/ HCPCS (ALT 636 FOR OP/ED): Performed by: STUDENT IN AN ORGANIZED HEALTH CARE EDUCATION/TRAINING PROGRAM

## 2024-10-08 PROCEDURE — 36415 COLL VENOUS BLD VENIPUNCTURE: CPT

## 2024-10-08 PROCEDURE — 84439 ASSAY OF FREE THYROXINE: CPT

## 2024-10-08 PROCEDURE — 96367 TX/PROPH/DG ADDL SEQ IV INF: CPT

## 2024-10-08 PROCEDURE — 85025 COMPLETE CBC W/AUTO DIFF WBC: CPT

## 2024-10-08 RX ORDER — PALONOSETRON 0.05 MG/ML
0.25 INJECTION, SOLUTION INTRAVENOUS ONCE
Status: COMPLETED | OUTPATIENT
Start: 2024-10-08 | End: 2024-10-08

## 2024-10-08 RX ORDER — DIPHENHYDRAMINE HYDROCHLORIDE 50 MG/ML
50 INJECTION INTRAMUSCULAR; INTRAVENOUS AS NEEDED
Status: DISCONTINUED | OUTPATIENT
Start: 2024-10-08 | End: 2024-10-08 | Stop reason: HOSPADM

## 2024-10-08 RX ORDER — FAMOTIDINE 10 MG/ML
20 INJECTION INTRAVENOUS ONCE AS NEEDED
Status: DISCONTINUED | OUTPATIENT
Start: 2024-10-08 | End: 2024-10-08 | Stop reason: HOSPADM

## 2024-10-08 RX ORDER — PROCHLORPERAZINE EDISYLATE 5 MG/ML
10 INJECTION INTRAMUSCULAR; INTRAVENOUS EVERY 6 HOURS PRN
Status: DISCONTINUED | OUTPATIENT
Start: 2024-10-08 | End: 2024-10-08 | Stop reason: HOSPADM

## 2024-10-08 RX ORDER — HEPARIN 100 UNIT/ML
500 SYRINGE INTRAVENOUS AS NEEDED
OUTPATIENT
Start: 2024-10-08

## 2024-10-08 RX ORDER — HEPARIN SODIUM,PORCINE/PF 10 UNIT/ML
50 SYRINGE (ML) INTRAVENOUS AS NEEDED
OUTPATIENT
Start: 2024-10-08

## 2024-10-08 RX ORDER — PROCHLORPERAZINE MALEATE 10 MG
10 TABLET ORAL EVERY 6 HOURS PRN
Status: DISCONTINUED | OUTPATIENT
Start: 2024-10-08 | End: 2024-10-08 | Stop reason: HOSPADM

## 2024-10-08 RX ORDER — HEPARIN SODIUM,PORCINE/PF 10 UNIT/ML
50 SYRINGE (ML) INTRAVENOUS AS NEEDED
Status: CANCELLED | OUTPATIENT
Start: 2024-10-08

## 2024-10-08 RX ORDER — ALBUTEROL SULFATE 0.83 MG/ML
3 SOLUTION RESPIRATORY (INHALATION) AS NEEDED
Status: DISCONTINUED | OUTPATIENT
Start: 2024-10-08 | End: 2024-10-08 | Stop reason: HOSPADM

## 2024-10-08 RX ORDER — HEPARIN 100 UNIT/ML
500 SYRINGE INTRAVENOUS AS NEEDED
Status: CANCELLED | OUTPATIENT
Start: 2024-10-08

## 2024-10-08 RX ORDER — EPINEPHRINE 0.3 MG/.3ML
0.3 INJECTION SUBCUTANEOUS EVERY 5 MIN PRN
Status: DISCONTINUED | OUTPATIENT
Start: 2024-10-08 | End: 2024-10-08 | Stop reason: HOSPADM

## 2024-10-08 ASSESSMENT — PAIN SCALES - GENERAL: PAINLEVEL: 5

## 2024-10-08 NOTE — PROGRESS NOTES
ONEIL met with Gretchen at [facility] and discussed upcoming appointments. She shared that the 8:30 am appointment for RT on 10/9 is too early and she does not have anyone that can transport her to [place]. ONEIL offered Roundtrip, explained benefits and how trips are contracted through [employer] and [employer], Gretchen shared apprehension and nervousness. SW provided validation and reassured her that SW can see vehicle location at all times. Gretchen expressed she would like to find a later appointment before resorting to Roundtrip. ONEIL contacted Dr. Lowry who shared that there is another patient that can swap appointments with Gretchen. ONEIL informed Gretchen of change, RT 10/9 @ 11:30. Gretchen also asked questions about 10/18 appointments, there are four appointments at three different locations. SW contact [facility] Christian to move ENT appt, new appt now 10/10 @ 3:15 after RT. Gretchen agreeable to new plan. She asked about parking vouchers, shared that ONEIL is unaware of any passes, but will inquire to SW team to gather additional resources. Will continue to follow.

## 2024-10-08 NOTE — PROGRESS NOTES
Patient states she has pain left side of neck from her surgery, 5/6. She takes her oxy and also lasha only at night. She states its hard to swallow, she has to drink every time she eats, has frequent dry mouth.

## 2024-10-08 NOTE — SIGNIFICANT EVENT

## 2024-10-09 ENCOUNTER — APPOINTMENT (OUTPATIENT)
Dept: RADIATION ONCOLOGY | Facility: HOSPITAL | Age: 64
End: 2024-10-09
Payer: COMMERCIAL

## 2024-10-09 ENCOUNTER — HOSPITAL ENCOUNTER (OUTPATIENT)
Dept: RADIATION ONCOLOGY | Facility: HOSPITAL | Age: 64
Setting detail: RADIATION/ONCOLOGY SERIES
Discharge: HOME | End: 2024-10-09
Payer: COMMERCIAL

## 2024-10-09 DIAGNOSIS — Z51.0 ENCOUNTER FOR ANTINEOPLASTIC RADIATION THERAPY: ICD-10-CM

## 2024-10-09 DIAGNOSIS — C77.0 SECONDARY AND UNSPECIFIED MALIGNANT NEOPLASM OF LYMPH NODES OF HEAD, FACE AND NECK: ICD-10-CM

## 2024-10-09 LAB
RAD ONC MSQ ACTUAL FRACTIONS DELIVERED: 1
RAD ONC MSQ ACTUAL SESSION DELIVERED DOSE: 200 CGRAY
RAD ONC MSQ ACTUAL TOTAL DOSE: 200 CGRAY
RAD ONC MSQ ELAPSED DAYS: 0
RAD ONC MSQ LAST DATE: NORMAL
RAD ONC MSQ PRESCRIBED FRACTIONAL DOSE: 200 CGRAY
RAD ONC MSQ PRESCRIBED NUMBER OF FRACTIONS: 1
RAD ONC MSQ PRESCRIBED TECHNIQUE: NORMAL
RAD ONC MSQ PRESCRIBED TOTAL DOSE: 200 CGRAY
RAD ONC MSQ START DATE: NORMAL
RAD ONC MSQ TREATMENT COURSE NUMBER: 2
RAD ONC MSQ TREATMENT SITE: NORMAL

## 2024-10-09 PROCEDURE — 77336 RADIATION PHYSICS CONSULT: CPT | Mod: 59 | Performed by: RADIOLOGY

## 2024-10-09 PROCEDURE — 77280 THER RAD SIMULAJ FIELD SMPL: CPT | Performed by: STUDENT IN AN ORGANIZED HEALTH CARE EDUCATION/TRAINING PROGRAM

## 2024-10-09 PROCEDURE — 77386 HC INTENSITY-MODULATED RADIATION THERAPY (IMRT), COMPLEX: CPT | Performed by: RADIOLOGY

## 2024-10-09 ASSESSMENT — ENCOUNTER SYMPTOMS
HEADACHES: 0
FEVER: 0
COUGH: 0
SORE THROAT: 1
LIGHT-HEADEDNESS: 0
ABDOMINAL PAIN: 0
LEG SWELLING: 0
NUMBNESS: 0
NAUSEA: 0
NAUSEA: 0
CONSTIPATION: 0
COUGH: 0
LIGHT-HEADEDNESS: 0
HEADACHES: 0
ARTHRALGIAS: 0
TROUBLE SWALLOWING: 1
CHILLS: 0
VOMITING: 0
LEG SWELLING: 0
FEVER: 0
SORE THROAT: 1
VOMITING: 0
DIARRHEA: 0
CONSTIPATION: 0
ARTHRALGIAS: 0
SHORTNESS OF BREATH: 0
TROUBLE SWALLOWING: 1
CHILLS: 0
NUMBNESS: 0
ABDOMINAL PAIN: 0
DIARRHEA: 0
SHORTNESS OF BREATH: 0

## 2024-10-09 NOTE — PROGRESS NOTES
"    Patient ID: Melvi Sanchez \"Allie" is a 64 y.o. 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 Robin Gabrielc:  Dr. Chetna Lowry  [facility]   Supp Onc: Antoinette Soliman   PCPr: Alton King,     PRIOR THERAPIES  07/07/2022: Completion of chemoradiation with weekly cisplatin with Dr. Razo  08/26/2024: L radical neck dissection 2-4 with CN11 repair     CURRENT THERAPY  10/8/24: Weekly cisplatin (40 mg/m2) to be given concurrently with proton reirradiation     CURRENT ONCOLOGICAL PROBLEMS  Post operative pain     HISTORY OF PRESENT ILLNESS  Ms. 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    Patient initially treated by Dr. Razo in 2022. She first noticed a lump in the left side of her neck > 2 years ago. She was found that have enlarged level 2, 3, 4 nodes, but no obvious oropharyngeal lesion 03/2022 by Dr. Torres.  CT neck was obtained demonstrating  L neck mass, 3.2 x 5.9 x 8.4 cm and a 1.5 x 1.6 right paratracheal node -   FNA biopsy of left neck mass showed squamous cell carcinoma. On 4/21/2022 she underwent triple endoscopy and DL showed lesion of left tonsil; biopsy showed invasive squamous cell carcinoma, p16 positive. She was referred to radiation oncology and saw Dr. Crook and completed 70 cGy/35f CCRT with STEWRAT seen at 1 year. She has been followed by Dr. Torres in surveillance and was noted to have left neck LINA. CT neck was obtained on 6/20/24 demonstrating increase in edema, enlargement of level 2 node. L neck core biopsy on 7/5/24 demonstrated:     FINAL DIAGNOSIS   Left neck mass, core biopsy:  - Metastatic squamous cell carcinoma involving " fibrous tissue with extensive necrosis.  See note.   Note: By immunostaining, the tumor cells are positive for p40, supportive of the above diagnosis.     She proceeded with L neck dissection which revealed:    A. Left neck, level II-IV, neck dissection:  -- Poorly differentiated , nonkeratinizing squamous cell carcinoma with abundant necrosis,  involving soft tissue extensively, encompassing levels II and III  (2.2 cm).  - Fibrosis and foreign body granulomatous reaction, levels III  and IV.     Note:  Microscopic examination of H&E sections reveals poorly differentiated squamous cell carcinoma involving extensively soft tissue with minimal lymphoid tissue present and abundant necrosis and fibrosis.  Carcinoma reaches soft tissue inked margins focally.  Vein margin is negative.  History of metastatic squamous cell carcinoma of left tonsil, status post chemo therapy and radiation therapy is noted for this patient.     P16 by immunohistochemistry:  Positive     She was discussed at  and seen by Dr. Lowry with plans for post operative reirradiation with proton beam therapy and presents for medical oncology consultation to discuss addition of radiosensitizing systemic therapy.    PAST MEDICAL HISTORY  COPD, hypothyroidism, RLS    SURGICAL HISTORY  Cholecystectomy, hysterectomy     SOCIAL HISTORY  Lives in [place] by self. Son is next building. 3 kids, 6 grandkids.  Retired  at [employer].  Smoked 1-2 PPD, now down to 4 a day since diagnosis.  Occasional EtOH, no illicits. [place], enjoys bingo.  Has a mini [id number]     FAMILY HISTORY  Cousin with brain cancer    CURRENT MEDS REVIEWED       ALLERGIES REVIEWED     Chief Complaint:    Subjective   Chief complaint: left neck pain     HPI  Melvi Sanchez is a 64 y.o. year old female patient with Recurrent stage III ([id number]) p16+ SCC of L oropharynx who started chemoRT w/ weekly Cisplatin on 10/8/24.    Interval History:  Patient  presents for C2 Cisplatin.    Patient tolerated treatment well last week.   Still has pain and numbness in the left neck which is unchanged from her baseline. Left neck incision is healing well. Left thigh incision still numb but fluid is filling up less frequently. Last saw Dr. Brownlee on 10/10 and left thigh was drained.   She has chronic dysphagia from prior chemoRT, this is unchanged and she continues to tolerate a regular diet. Appetite is good,   Left ear feels full occasionally, denies hearing changes.     A 13 point review of systems was performed, with significant findings documented above in subjective history.    Review of Systems   Constitutional:  Negative for chills and fever.   HENT:   Positive for sore throat and trouble swallowing. Negative for hearing loss, lump/mass, mouth sores, nosebleeds and tinnitus.    Respiratory:  Negative for cough and shortness of breath.    Cardiovascular:  Negative for chest pain and leg swelling.   Gastrointestinal:  Negative for abdominal pain, constipation, diarrhea, nausea and vomiting.   Musculoskeletal:  Negative for arthralgias.   Skin:  Negative for rash.   Neurological:  Negative for headaches, light-headedness and numbness.       Objective   /50   Pulse 62   Temp 36.2 °C (97.2 °F) (Core)   Resp 20   Wt 72.9 kg (160 lb 11.2 oz)   SpO2 96%   BMI 29.39 kg/m²   Daily Weight  10/15/24 : 72.9 kg (160 lb 11.2 oz)  10/14/24 : 74.4 kg (164 lb 1.6 oz)  10/11/24 : 75.3 kg (166 lb 0.1 oz)  10/10/24 : 76.7 kg (169 lb)  10/08/24 : 73.8 kg (162 lb 12.8 oz)  10/02/24 : 72.5 kg (159 lb 14.4 oz)  09/26/24 : (S) 71.3 kg (157 lb 3 oz)    Physical Exam  Vitals reviewed.   Constitutional:       Appearance: Normal appearance. She is well-developed.   HENT:      Head: Normocephalic and atraumatic.      Right Ear: External ear normal. No tenderness.      Left Ear: External ear normal. No tenderness.      Nose: Nose normal.      Mouth/Throat:      Lips: Pink.      Mouth:  Mucous membranes are moist. No injury or oral lesions.      Tongue: No lesions.   Eyes:      General: Lids are normal.      Extraocular Movements: Extraocular movements intact.      Conjunctiva/sclera: Conjunctivae normal.      Pupils: Pupils are equal, round, and reactive to light.   Neck:      Thyroid: No thyroid mass.      Comments: L neck scar well healed. Small area of scabbing    Cardiovascular:      Rate and Rhythm: Normal rate and regular rhythm.      Pulses: Normal pulses.      Heart sounds: No murmur heard.     No gallop.   Pulmonary:      Effort: Pulmonary effort is normal. No respiratory distress.      Breath sounds: Normal breath sounds and air entry. No stridor. No wheezing.   Abdominal:      General: Abdomen is flat. Bowel sounds are normal. There is no distension or abdominal bruit.      Palpations: Abdomen is soft. There is no mass.      Tenderness: There is no abdominal tenderness.   Musculoskeletal:         General: Normal range of motion.      Cervical back: Normal range of motion and neck supple. No signs of trauma. Normal range of motion.      Right lower leg: No edema.      Left lower leg: No edema.   Lymphadenopathy:      Cervical: No cervical adenopathy.      Upper Body:      Right upper body: No axillary adenopathy.      Left upper body: No axillary adenopathy.   Skin:     General: Skin is warm and dry.      Comments: No new skin lesions   Neurological:      General: No focal deficit present.      Mental Status: She is alert and oriented to person, place, and time. Mental status is at baseline.      Gait: Gait is intact.   Psychiatric:         Attention and Perception: Attention normal.         Mood and Affect: Mood and affect normal.         Behavior: Behavior is cooperative.       ECOGSCORE: 1- Restricted in physically strenuous activity.  Carries out light duty.    Diagnostic Results   LABS  Below labs were reviewed.  Results from last 7 days   Lab Units 10/15/24  0916   WBC AUTO  x10*3/uL 11.6*   HEMOGLOBIN g/dL 11.6*   HEMATOCRIT % 35.2*   PLATELETS AUTO x10*3/uL 229   NEUTROS ABS x10*3/uL 9.48*   LYMPHS ABS AUTO x10*3/uL 0.77*   MONOS ABS AUTO x10*3/uL 1.06*   EOS ABS AUTO x10*3/uL 0.22   NEUTROS PCT AUTO % 81.8   LYMPHS PCT AUTO % 6.6   MONOS PCT AUTO % 9.1   EOS PCT AUTO % 1.9      Results from last 7 days   Lab Units 10/15/24  0916   GLUCOSE mg/dL 94   SODIUM mmol/L 138   POTASSIUM mmol/L 4.4   CHLORIDE mmol/L 101   CO2 mmol/L 29   BUN mg/dL 12   CREATININE mg/dL 0.71   EGFR mL/min/1.73m*2 >90   CALCIUM mg/dL 9.0   MAGNESIUM mg/dL 1.66   ALBUMIN g/dL 3.9   PROTEIN TOTAL g/dL 6.2*   BILIRUBIN TOTAL mg/dL 0.6   ALK PHOS U/L 58   ALT U/L 21   AST U/L 14                        IMAGES  7/18/24 PET   IMPRESSION:  1. New hypermetabolic activity seen in the left level 2 cervical lymph node, with central necrosis, consistent with heide metastasis.  2. New FDG avid clustered nodules are seen in the right upper lobe, which is nonspecific and may be infectious/inflammatory, however metastasis can not be excluded. Short-term follow-up chest CT is recommended.  3. Mild FDG uptake is seen throughout the esophagus, likely relating to esophagitis.  4. There is partial opacification of the left maxillary sinus, with mild FDG avidity, likely sinusitis.    CT Neck 6/20/24  IMPRESSION:  1. Compared to the CT neck from 07/14/2023, interval increase in edema within the aerodigestive tract, most pronounced within the supraglottic larynx and hypopharynx and there is resulting moderate narrowing of the supraglottic laryngeal lumen. There is also mild  prevertebral edema. There is no focal fluid collection to suggest an abscess. Cause of this edema is uncertain, could be infectious in etiology rather than treatment related given that patient completed radiation therapy 2 years ago.  2. No striking new mass is seen within the visualized aerodigestive tract, noting that evaluation is somewhat limited due to  presence of diffuse edema and therefore subtle lesions can not be excluded.  3. Interval enlargement of peripherally enhancing likely necrotic lymph node in the left level 2 heide station concerning for progression of disease. Alternatively, enlargement of the lymph node could reflect an infectious process. Otherwise, no cervical lymphadenopathy by size criteria.  4. Edema within the left facial and neck deep and superficial soft tissues as well as in the left supraclavicular fossa is favored to be treatment related and was also present on the prior CT. No focal fluid collection is seen within these regions to suggest an abscess. Correlate clinically.      Assessment/Plan     Melvi Sanchez is a 64 y.o. year old female patient with PMH of stage III (cT1c cN3a M0) SCC of L tonsil, COPD, hypothyroidism and RLS, started treatment with CCRT w/ Cisplatin and proton radiation on 10/8/24 for recurrence to L oropharynx with diffuse JOLANTA.    We discussed the treatment paradigm in recurrence and salvage surgery - given her pathology showing JOLANTA it is reasonable to proceed with concurrent reirradiation with Proton. We consented today and all questions were answered. We will proceed upon CT simulation.    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 Proton machine is down today so first dose RT will be on IMRT on 10/9 then resume PORT.    - 10/15/24: Tolerated C1 well w/o n/v/d/c. Unchanged mild chronic dysphagia and xerostomia from prior RT. Left neck pain controlled w/ current pain regimen. Left thigh incision is healing well, fluid accumulation is slowed down.     # RUL nodules  - Being followed with CT scan in next 3 months    PLAN  -- Proceed to C2 Cisplatin  -- IV hydration every Friday  -- Follow up w/ Supp Onc as scheduled

## 2024-10-10 ENCOUNTER — OFFICE VISIT (OUTPATIENT)
Dept: OTOLARYNGOLOGY | Facility: HOSPITAL | Age: 64
End: 2024-10-10
Payer: COMMERCIAL

## 2024-10-10 ENCOUNTER — APPOINTMENT (OUTPATIENT)
Dept: RADIATION ONCOLOGY | Facility: HOSPITAL | Age: 64
End: 2024-10-10
Payer: COMMERCIAL

## 2024-10-10 ENCOUNTER — HOSPITAL ENCOUNTER (OUTPATIENT)
Dept: RADIATION ONCOLOGY | Facility: HOSPITAL | Age: 64
Setting detail: RADIATION/ONCOLOGY SERIES
Discharge: HOME | End: 2024-10-10
Payer: COMMERCIAL

## 2024-10-10 VITALS — BODY MASS INDEX: 31.1 KG/M2 | TEMPERATURE: 97 F | HEIGHT: 62 IN | WEIGHT: 169 LBS

## 2024-10-10 DIAGNOSIS — Z51.0 ENCOUNTER FOR ANTINEOPLASTIC RADIATION THERAPY: ICD-10-CM

## 2024-10-10 DIAGNOSIS — C09.9 TONSIL CANCER (MULTI): Primary | ICD-10-CM

## 2024-10-10 DIAGNOSIS — C77.0 SECONDARY AND UNSPECIFIED MALIGNANT NEOPLASM OF LYMPH NODES OF HEAD, FACE AND NECK: ICD-10-CM

## 2024-10-10 LAB
RAD ONC MSQ ACTUAL FRACTIONS DELIVERED: 2
RAD ONC MSQ ACTUAL SESSION DELIVERED DOSE: 200 CGRAY
RAD ONC MSQ ACTUAL TOTAL DOSE: 400 CGRAY
RAD ONC MSQ ELAPSED DAYS: 1
RAD ONC MSQ LAST DATE: NORMAL
RAD ONC MSQ PRESCRIBED FRACTIONAL DOSE: 200 CGRAY
RAD ONC MSQ PRESCRIBED NUMBER OF FRACTIONS: 2
RAD ONC MSQ PRESCRIBED TECHNIQUE: NORMAL
RAD ONC MSQ PRESCRIBED TOTAL DOSE: 400 CGRAY
RAD ONC MSQ PRESCRIPTION PATTERN COMMENT: NORMAL
RAD ONC MSQ START DATE: NORMAL
RAD ONC MSQ TREATMENT COURSE NUMBER: 2
RAD ONC MSQ TREATMENT SITE: NORMAL

## 2024-10-10 PROCEDURE — 77386 HC INTENSITY-MODULATED RADIATION THERAPY (IMRT), COMPLEX: CPT | Performed by: RADIOLOGY

## 2024-10-10 PROCEDURE — 99211 OFF/OP EST MAY X REQ PHY/QHP: CPT | Performed by: OTOLARYNGOLOGY

## 2024-10-10 PROCEDURE — 3008F BODY MASS INDEX DOCD: CPT | Performed by: OTOLARYNGOLOGY

## 2024-10-10 RX ORDER — BISACODYL 5 MG/1
TABLET, COATED ORAL
COMMUNITY
Start: 2024-10-02

## 2024-10-10 ASSESSMENT — PATIENT HEALTH QUESTIONNAIRE - PHQ9
2. FEELING DOWN, DEPRESSED OR HOPELESS: NOT AT ALL
SUM OF ALL RESPONSES TO PHQ9 QUESTIONS 1 & 2: 0
1. LITTLE INTEREST OR PLEASURE IN DOING THINGS: NOT AT ALL

## 2024-10-11 ENCOUNTER — INFUSION (OUTPATIENT)
Dept: HEMATOLOGY/ONCOLOGY | Facility: HOSPITAL | Age: 64
End: 2024-10-11
Payer: COMMERCIAL

## 2024-10-11 ENCOUNTER — HOSPITAL ENCOUNTER (OUTPATIENT)
Dept: RADIATION ONCOLOGY | Facility: HOSPITAL | Age: 64
Setting detail: RADIATION/ONCOLOGY SERIES
Discharge: HOME | End: 2024-10-11
Payer: COMMERCIAL

## 2024-10-11 ENCOUNTER — APPOINTMENT (OUTPATIENT)
Dept: RADIATION ONCOLOGY | Facility: HOSPITAL | Age: 64
End: 2024-10-11
Payer: COMMERCIAL

## 2024-10-11 VITALS
SYSTOLIC BLOOD PRESSURE: 165 MMHG | WEIGHT: 166.01 LBS | RESPIRATION RATE: 18 BRPM | HEART RATE: 59 BPM | BODY MASS INDEX: 30.36 KG/M2 | DIASTOLIC BLOOD PRESSURE: 71 MMHG | TEMPERATURE: 98.4 F | OXYGEN SATURATION: 99 %

## 2024-10-11 DIAGNOSIS — C77.0 SECONDARY AND UNSPECIFIED MALIGNANT NEOPLASM OF LYMPH NODES OF HEAD, FACE AND NECK: ICD-10-CM

## 2024-10-11 DIAGNOSIS — C09.9 TONSIL CANCER (MULTI): ICD-10-CM

## 2024-10-11 DIAGNOSIS — Z51.0 ENCOUNTER FOR ANTINEOPLASTIC RADIATION THERAPY: ICD-10-CM

## 2024-10-11 DIAGNOSIS — C77.0 METASTASIS TO HEAD AND NECK LYMPH NODE (MULTI): ICD-10-CM

## 2024-10-11 LAB
RAD ONC MSQ ACTUAL FRACTIONS DELIVERED: 3
RAD ONC MSQ ACTUAL SESSION DELIVERED DOSE: 200 CGRAY
RAD ONC MSQ ACTUAL TOTAL DOSE: 600 CGRAY
RAD ONC MSQ ELAPSED DAYS: 2
RAD ONC MSQ LAST DATE: NORMAL
RAD ONC MSQ PRESCRIBED FRACTIONAL DOSE: 200 CGRAY
RAD ONC MSQ PRESCRIBED NUMBER OF FRACTIONS: 3
RAD ONC MSQ PRESCRIBED TECHNIQUE: NORMAL
RAD ONC MSQ PRESCRIBED TOTAL DOSE: 600 CGRAY
RAD ONC MSQ PRESCRIPTION PATTERN COMMENT: NORMAL
RAD ONC MSQ START DATE: NORMAL
RAD ONC MSQ TREATMENT COURSE NUMBER: 2
RAD ONC MSQ TREATMENT SITE: NORMAL

## 2024-10-11 PROCEDURE — 77386 HC INTENSITY-MODULATED RADIATION THERAPY (IMRT), COMPLEX: CPT | Performed by: RADIOLOGY

## 2024-10-11 PROCEDURE — 96360 HYDRATION IV INFUSION INIT: CPT | Mod: INF

## 2024-10-11 PROCEDURE — 2500000004 HC RX 250 GENERAL PHARMACY W/ HCPCS (ALT 636 FOR OP/ED): Performed by: STUDENT IN AN ORGANIZED HEALTH CARE EDUCATION/TRAINING PROGRAM

## 2024-10-11 ASSESSMENT — PAIN SCALES - GENERAL: PAINLEVEL: 5

## 2024-10-11 NOTE — PROGRESS NOTES
Pt here for IV hydration. PIV placed and removed. Pt tolerated infusion without issue and was discharged home in stable condition.

## 2024-10-11 NOTE — PROGRESS NOTES
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              Undergoing chemo re-irradiation      Tolerating it      Some fluid has recollected        80 ml aspirated left leg    Neck ok        Continue XRT/chemo    Needs repeat ct chest    Follow up 2 weeks

## 2024-10-14 ENCOUNTER — HOSPITAL ENCOUNTER (OUTPATIENT)
Dept: RADIATION ONCOLOGY | Facility: HOSPITAL | Age: 64
Setting detail: RADIATION/ONCOLOGY SERIES
Discharge: HOME | End: 2024-10-14
Payer: COMMERCIAL

## 2024-10-14 ENCOUNTER — RADIATION ONCOLOGY OTV (OUTPATIENT)
Dept: RADIATION ONCOLOGY | Facility: HOSPITAL | Age: 64
End: 2024-10-14
Payer: COMMERCIAL

## 2024-10-14 VITALS
HEART RATE: 59 BPM | OXYGEN SATURATION: 95 % | SYSTOLIC BLOOD PRESSURE: 127 MMHG | TEMPERATURE: 97.5 F | WEIGHT: 164.1 LBS | BODY MASS INDEX: 30.01 KG/M2 | RESPIRATION RATE: 20 BRPM | DIASTOLIC BLOOD PRESSURE: 84 MMHG

## 2024-10-14 DIAGNOSIS — C77.0 SECONDARY AND UNSPECIFIED MALIGNANT NEOPLASM OF LYMPH NODES OF HEAD, FACE AND NECK: ICD-10-CM

## 2024-10-14 DIAGNOSIS — Z51.0 ENCOUNTER FOR ANTINEOPLASTIC RADIATION THERAPY: ICD-10-CM

## 2024-10-14 LAB
RAD ONC MSQ ACTUAL FRACTIONS DELIVERED: 1
RAD ONC MSQ ACTUAL SESSION BIOLOGICAL DOSE: 200 CCGE
RAD ONC MSQ ACTUAL SESSION DELIVERED DOSE: 182 CGRAY
RAD ONC MSQ ACTUAL TOTAL BIOLOGICAL DOSE: 200 CCGE
RAD ONC MSQ ACTUAL TOTAL DOSE: 182 CGRAY
RAD ONC MSQ ELAPSED DAYS: 0
RAD ONC MSQ LAST DATE: NORMAL
RAD ONC MSQ PRESCRIBED BIOLOGICAL FRACTIONAL DOSE: 200 CCGE
RAD ONC MSQ PRESCRIBED BIOLOGICAL TOTAL DOSE: 5400 CCGE
RAD ONC MSQ PRESCRIBED FRACTIONAL DOSE: 182 CGRAY
RAD ONC MSQ PRESCRIBED NUMBER OF FRACTIONS: 27
RAD ONC MSQ PRESCRIBED TECHNIQUE: NORMAL
RAD ONC MSQ PRESCRIBED TOTAL DOSE: 4909 CGRAY
RAD ONC MSQ PRESCRIPTION PATTERN COMMENT: NORMAL
RAD ONC MSQ START DATE: NORMAL
RAD ONC MSQ TREATMENT COURSE NUMBER: 2
RAD ONC MSQ TREATMENT SITE: NORMAL

## 2024-10-14 PROCEDURE — 77523 PROTON TRMT INTERMEDIATE: CPT | Performed by: INTERNAL MEDICINE

## 2024-10-14 PROCEDURE — 77321 SPECIAL TELETX PORT PLAN: CPT | Performed by: INTERNAL MEDICINE

## 2024-10-14 PROCEDURE — 77334 RADIATION TREATMENT AID(S): CPT | Performed by: INTERNAL MEDICINE

## 2024-10-14 PROCEDURE — 77280 THER RAD SIMULAJ FIELD SMPL: CPT | Performed by: INTERNAL MEDICINE

## 2024-10-14 ASSESSMENT — PAIN SCALES - GENERAL: PAINLEVEL: 2

## 2024-10-14 NOTE — PROGRESS NOTES
Radiation Oncology On Treatment Visit    Patient Name:  Melvi Sanchez  MRN:  [id number]  :  1960    Referring Provider: No ref. provider found  Primary Care Provider: Alton King DO  Care Team: Patient Care Team:  Alton iKng DO as PCP - General (Family Medicine)  Alton King DO as PCP - [facility]  Alton King DO as PCP - [facility] Medicaid PCP  Jimmie LEYVA MD as Consulting Physician (Radiation Oncology)  Marlon Patel MD as Surgeon (Pulmonary Disease)  Tiffanie Whitfield RN as Nurse Navigator (Hematology and Oncology)  Alejandro Hurst MD as Consulting Physician (Hematology and Oncology)  Chetna Lowry MD as Radiation Oncologist (Radiation Oncology)    Date of Service: 10/14/2024     Diagnosis:   Specialty Problems          Radiation Oncology Problems    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)        Tonsil cancer (Multi)         Treatment Summary:  Proton Beam: Left Head and neck    Treatment Period Technique Fraction Dose Fractions Total Dose   Course 2 10/9/2024-10/14/2024  (days elapsed: 5)         VMAT L neck 10/9/2024-10/11/2024 VMAT 200 / 200 cGy 3 / 3 600 / 600 cGy         L neck 10/14/2024-10/14/2024 3-Field 182 / 182 cGy  182 / 4,909 cGy     SUBJECTIVE:   Patient started chemoradiation with cisplatin last week.  Our proton unit was down last week, so she was treated with a conformal intensity modulated radiation plan.  Our proton unit is back up and she is undergoing proton beam radiation this week.  She continues to be bothered by pain in her left neck ever since surgery.  She is currently seeing supportive oncology for pain management regarding this.  She is not having any nausea.  She has cut back on smoking to 1 to 2 cigarettes/day.  She is tolerating oral diet with no difficulty swallowing.     OBJECTIVE:   Vital Signs:  /84   Pulse 59   Temp 36.4 °C (97.5 °F)   Resp 20   Wt 74.4 kg (164 lb 1.6 oz)   SpO2 95%   BMI 30.01 kg/m²      Other Pertinent Findings:   No thrush, no mucositis or skin erythema.  Labs from 10/8/2024 within normal limits       Assessment / Plan:  The patient is tolerating radiation therapy as anticipated.  Continue per current treatment plan.       Reviewed with patient their Symptom Management Form for Head and Neck Cancer:    FEN: patient's weight is stable. Aim for >2000 calories and >60grams of protein daily. Will see a dietician for more specific recommendations.  All diet is oral and no feeding tube.  Will meet with SLP.    Nausea: Reviewed use of Dexamethasone and Olanzapine after chemo each week to prevent nausea and Compazine/Zofran PRN for breakthrough nausea    Pain control: Patient reporting ongoing pain in left neck.  Currently taking Percocet and gabapentin under the direction of supportive oncology.  Try to avoid Ibuprofen as can cause gastritis/renal insufficiency, which can also be exacerbated by ongoing chemo.    Oral Care: Patient will continue fluoride trays per dentist. Patient is using Glutamine 10g TID on days of RT to promote ongoing healing of mucositis. No need currently for Guaifenesin for thick saliva or Xylimelt for dry mouth    Skin Care: Continue Aquaphor within the RT field. Reviewed how to apply and to avoid within 3 hours prior to RT.     Diarrhea/Constipation: Reviewed use of Immodium for diarrhea. Reviewed use of Miralax or Senna if develops constipation.    Smoking cessation: Patient has significantly cut back and is only smoking 1 to 2 cigarettes daily.  Counseled that it is ideal to stop smoking completely during radiation.  Patient is currently using nicotine patches, Wellbutrin, and Chantix.  She is trying to quit smoking completely.

## 2024-10-15 ENCOUNTER — INFUSION (OUTPATIENT)
Dept: HEMATOLOGY/ONCOLOGY | Facility: HOSPITAL | Age: 64
End: 2024-10-15
Payer: COMMERCIAL

## 2024-10-15 ENCOUNTER — OFFICE VISIT (OUTPATIENT)
Dept: HEMATOLOGY/ONCOLOGY | Facility: HOSPITAL | Age: 64
End: 2024-10-15
Payer: COMMERCIAL

## 2024-10-15 ENCOUNTER — LAB (OUTPATIENT)
Dept: LAB | Facility: HOSPITAL | Age: 64
End: 2024-10-15
Payer: COMMERCIAL

## 2024-10-15 ENCOUNTER — HOSPITAL ENCOUNTER (OUTPATIENT)
Dept: RADIATION ONCOLOGY | Facility: HOSPITAL | Age: 64
Setting detail: RADIATION/ONCOLOGY SERIES
Discharge: HOME | End: 2024-10-15
Payer: COMMERCIAL

## 2024-10-15 VITALS
OXYGEN SATURATION: 96 % | RESPIRATION RATE: 20 BRPM | HEART RATE: 62 BPM | DIASTOLIC BLOOD PRESSURE: 50 MMHG | BODY MASS INDEX: 29.39 KG/M2 | TEMPERATURE: 97.2 F | SYSTOLIC BLOOD PRESSURE: 115 MMHG | WEIGHT: 160.7 LBS

## 2024-10-15 DIAGNOSIS — C09.9 TONSIL CANCER (MULTI): ICD-10-CM
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
## 2024-10-15 DIAGNOSIS — C77.0 METASTASIS TO HEAD AND NECK LYMPH NODE (MULTI): Primary | ICD-10-CM

## 2024-10-15 DIAGNOSIS — Z51.11 ENCOUNTER FOR ANTINEOPLASTIC CHEMOTHERAPY: ICD-10-CM

## 2024-10-15 DIAGNOSIS — C77.0 METASTASIS TO HEAD AND NECK LYMPH NODE (MULTI): ICD-10-CM

## 2024-10-15 DIAGNOSIS — K11.7 XEROSTOMIA DUE TO RADIOTHERAPY: ICD-10-CM

## 2024-10-15 DIAGNOSIS — Z51.0 ENCOUNTER FOR ANTINEOPLASTIC RADIATION THERAPY: ICD-10-CM

## 2024-10-15 DIAGNOSIS — M54.2 NECK PAIN: ICD-10-CM

## 2024-10-15 DIAGNOSIS — C77.0 SECONDARY AND UNSPECIFIED MALIGNANT NEOPLASM OF LYMPH NODES OF HEAD, FACE AND NECK: ICD-10-CM

## 2024-10-15 DIAGNOSIS — R13.10 DYSPHAGIA, UNSPECIFIED TYPE: ICD-10-CM

## 2024-10-15 LAB
ALBUMIN SERPL BCP-MCNC: 3.9 G/DL (ref 3.4–5)
ALP SERPL-CCNC: 58 U/L (ref 33–136)
ALT SERPL W P-5'-P-CCNC: 21 U/L (ref 7–45)
ANION GAP SERPL CALC-SCNC: 12 MMOL/L (ref 10–20)
AST SERPL W P-5'-P-CCNC: 14 U/L (ref 9–39)
BASOPHILS # BLD AUTO: 0.03 X10*3/UL (ref 0–0.1)
BASOPHILS NFR BLD AUTO: 0.3 %
BILIRUB SERPL-MCNC: 0.6 MG/DL (ref 0–1.2)
BUN SERPL-MCNC: 12 MG/DL (ref 6–23)
CALCIUM SERPL-MCNC: 9 MG/DL (ref 8.6–10.3)
CHLORIDE SERPL-SCNC: 101 MMOL/L (ref 98–107)
CO2 SERPL-SCNC: 29 MMOL/L (ref 21–32)
CREAT SERPL-MCNC: 0.71 MG/DL (ref 0.5–1.05)
EGFRCR SERPLBLD CKD-EPI 2021: >90 ML/MIN/1.73M*2
EOSINOPHIL # BLD AUTO: 0.22 X10*3/UL (ref 0–0.7)
EOSINOPHIL NFR BLD AUTO: 1.9 %
ERYTHROCYTE [DISTWIDTH] IN BLOOD BY AUTOMATED COUNT: 13.3 % (ref 11.5–14.5)
GLUCOSE SERPL-MCNC: 94 MG/DL (ref 74–99)
HCT VFR BLD AUTO: 35.2 % (ref 36–46)
HGB BLD-MCNC: 11.6 G/DL (ref 12–16)
IMM GRANULOCYTES # BLD AUTO: 0.04 X10*3/UL (ref 0–0.7)
IMM GRANULOCYTES NFR BLD AUTO: 0.3 % (ref 0–0.9)
LYMPHOCYTES # BLD AUTO: 0.77 X10*3/UL (ref 1.2–4.8)
LYMPHOCYTES NFR BLD AUTO: 6.6 %
MAGNESIUM SERPL-MCNC: 1.66 MG/DL (ref 1.6–2.4)
MCH RBC QN AUTO: 30.8 PG (ref 26–34)
MCHC RBC AUTO-ENTMCNC: 33 G/DL (ref 32–36)
MCV RBC AUTO: 93 FL (ref 80–100)
MONOCYTES # BLD AUTO: 1.06 X10*3/UL (ref 0.1–1)
MONOCYTES NFR BLD AUTO: 9.1 %
NEUTROPHILS # BLD AUTO: 9.48 X10*3/UL (ref 1.2–7.7)
NEUTROPHILS NFR BLD AUTO: 81.8 %
NRBC BLD-RTO: 0 /100 WBCS (ref 0–0)
PLATELET # BLD AUTO: 229 X10*3/UL (ref 150–450)
POTASSIUM SERPL-SCNC: 4.4 MMOL/L (ref 3.5–5.3)
PROT SERPL-MCNC: 6.2 G/DL (ref 6.4–8.2)
RAD ONC MSQ ACTUAL FRACTIONS DELIVERED: 2
RAD ONC MSQ ACTUAL SESSION BIOLOGICAL DOSE: 200 CCGE
RAD ONC MSQ ACTUAL SESSION DELIVERED DOSE: 182 CGRAY
RAD ONC MSQ ACTUAL TOTAL BIOLOGICAL DOSE: 400 CCGE
RAD ONC MSQ ACTUAL TOTAL DOSE: 364 CGRAY
RAD ONC MSQ ELAPSED DAYS: 1
RAD ONC MSQ LAST DATE: NORMAL
RAD ONC MSQ PRESCRIBED BIOLOGICAL FRACTIONAL DOSE: 200 CCGE
RAD ONC MSQ PRESCRIBED BIOLOGICAL TOTAL DOSE: 5400 CCGE
RAD ONC MSQ PRESCRIBED FRACTIONAL DOSE: 182 CGRAY
RAD ONC MSQ PRESCRIBED NUMBER OF FRACTIONS: 27
RAD ONC MSQ PRESCRIBED TECHNIQUE: NORMAL
RAD ONC MSQ PRESCRIBED TOTAL DOSE: 4909 CGRAY
RAD ONC MSQ PRESCRIPTION PATTERN COMMENT: NORMAL
RAD ONC MSQ START DATE: NORMAL
RAD ONC MSQ TREATMENT COURSE NUMBER: 2
RAD ONC MSQ TREATMENT SITE: NORMAL
RBC # BLD AUTO: 3.77 X10*6/UL (ref 4–5.2)
SODIUM SERPL-SCNC: 138 MMOL/L (ref 136–145)
WBC # BLD AUTO: 11.6 X10*3/UL (ref 4.4–11.3)

## 2024-10-15 PROCEDURE — 99215 OFFICE O/P EST HI 40 MIN: CPT | Performed by: STUDENT IN AN ORGANIZED HEALTH CARE EDUCATION/TRAINING PROGRAM

## 2024-10-15 PROCEDURE — 96367 TX/PROPH/DG ADDL SEQ IV INF: CPT

## 2024-10-15 PROCEDURE — 77387 GUIDANCE FOR RADJ TX DLVR: CPT | Performed by: RADIOLOGY

## 2024-10-15 PROCEDURE — 83735 ASSAY OF MAGNESIUM: CPT

## 2024-10-15 PROCEDURE — 36415 COLL VENOUS BLD VENIPUNCTURE: CPT

## 2024-10-15 PROCEDURE — 96375 TX/PRO/DX INJ NEW DRUG ADDON: CPT | Mod: INF

## 2024-10-15 PROCEDURE — 84075 ASSAY ALKALINE PHOSPHATASE: CPT

## 2024-10-15 PROCEDURE — 2500000004 HC RX 250 GENERAL PHARMACY W/ HCPCS (ALT 636 FOR OP/ED): Performed by: STUDENT IN AN ORGANIZED HEALTH CARE EDUCATION/TRAINING PROGRAM

## 2024-10-15 PROCEDURE — 96361 HYDRATE IV INFUSION ADD-ON: CPT | Mod: INF

## 2024-10-15 PROCEDURE — 96413 CHEMO IV INFUSION 1 HR: CPT

## 2024-10-15 PROCEDURE — 77523 PROTON TRMT INTERMEDIATE: CPT | Performed by: RADIOLOGY

## 2024-10-15 PROCEDURE — 85025 COMPLETE CBC W/AUTO DIFF WBC: CPT

## 2024-10-15 RX ORDER — DIPHENHYDRAMINE HYDROCHLORIDE 50 MG/ML
50 INJECTION INTRAMUSCULAR; INTRAVENOUS AS NEEDED
Status: DISCONTINUED | OUTPATIENT
Start: 2024-10-15 | End: 2024-10-15 | Stop reason: HOSPADM

## 2024-10-15 RX ORDER — EPINEPHRINE 0.3 MG/.3ML
0.3 INJECTION SUBCUTANEOUS EVERY 5 MIN PRN
Status: DISCONTINUED | OUTPATIENT
Start: 2024-10-15 | End: 2024-10-15 | Stop reason: HOSPADM

## 2024-10-15 RX ORDER — FAMOTIDINE 10 MG/ML
20 INJECTION INTRAVENOUS ONCE AS NEEDED
Status: DISCONTINUED | OUTPATIENT
Start: 2024-10-15 | End: 2024-10-15 | Stop reason: HOSPADM

## 2024-10-15 RX ORDER — HEPARIN 100 UNIT/ML
500 SYRINGE INTRAVENOUS AS NEEDED
Status: DISCONTINUED | OUTPATIENT
Start: 2024-10-15 | End: 2024-10-15 | Stop reason: HOSPADM

## 2024-10-15 RX ORDER — PROCHLORPERAZINE EDISYLATE 5 MG/ML
10 INJECTION INTRAMUSCULAR; INTRAVENOUS EVERY 6 HOURS PRN
Status: DISCONTINUED | OUTPATIENT
Start: 2024-10-15 | End: 2024-10-15 | Stop reason: HOSPADM

## 2024-10-15 RX ORDER — HEPARIN SODIUM,PORCINE/PF 10 UNIT/ML
50 SYRINGE (ML) INTRAVENOUS AS NEEDED
Status: DISCONTINUED | OUTPATIENT
Start: 2024-10-15 | End: 2024-10-15 | Stop reason: HOSPADM

## 2024-10-15 RX ORDER — ALBUTEROL SULFATE 0.83 MG/ML
3 SOLUTION RESPIRATORY (INHALATION) AS NEEDED
Status: DISCONTINUED | OUTPATIENT
Start: 2024-10-15 | End: 2024-10-15 | Stop reason: HOSPADM

## 2024-10-15 RX ORDER — HEPARIN SODIUM,PORCINE/PF 10 UNIT/ML
50 SYRINGE (ML) INTRAVENOUS AS NEEDED
Status: CANCELLED | OUTPATIENT
Start: 2024-10-15

## 2024-10-15 RX ORDER — PALONOSETRON 0.05 MG/ML
0.25 INJECTION, SOLUTION INTRAVENOUS ONCE
Status: COMPLETED | OUTPATIENT
Start: 2024-10-15 | End: 2024-10-15

## 2024-10-15 RX ORDER — HEPARIN 100 UNIT/ML
500 SYRINGE INTRAVENOUS AS NEEDED
Status: CANCELLED | OUTPATIENT
Start: 2024-10-15

## 2024-10-15 RX ORDER — PROCHLORPERAZINE MALEATE 10 MG
10 TABLET ORAL EVERY 6 HOURS PRN
Status: DISCONTINUED | OUTPATIENT
Start: 2024-10-15 | End: 2024-10-15 | Stop reason: HOSPADM

## 2024-10-15 ASSESSMENT — PAIN SCALES - GENERAL: PAINLEVEL: 4

## 2024-10-15 NOTE — SIGNIFICANT EVENT

## 2024-10-15 NOTE — PROGRESS NOTES
Pt here for fuv pre treatment with Ro DAVIS. Pt had 1st dose Cisplatin last week. No complaints today. Denies nausea, vomiting and diarrhea. Med list reviewed. Pt states her appetite is good. Ro DAVIS aware.

## 2024-10-16 ENCOUNTER — HOSPITAL ENCOUNTER (OUTPATIENT)
Dept: RADIATION ONCOLOGY | Facility: HOSPITAL | Age: 64
Setting detail: RADIATION/ONCOLOGY SERIES
Discharge: HOME | End: 2024-10-16
Payer: COMMERCIAL

## 2024-10-16 DIAGNOSIS — C77.0 SECONDARY AND UNSPECIFIED MALIGNANT NEOPLASM OF LYMPH NODES OF HEAD, FACE AND NECK: ICD-10-CM

## 2024-10-16 DIAGNOSIS — Z51.0 ENCOUNTER FOR ANTINEOPLASTIC RADIATION THERAPY: ICD-10-CM

## 2024-10-16 LAB
RAD ONC MSQ ACTUAL FRACTIONS DELIVERED: 3
RAD ONC MSQ ACTUAL SESSION BIOLOGICAL DOSE: 200 CCGE
RAD ONC MSQ ACTUAL SESSION DELIVERED DOSE: 182 CGRAY
RAD ONC MSQ ACTUAL TOTAL BIOLOGICAL DOSE: 601 CCGE
RAD ONC MSQ ACTUAL TOTAL DOSE: 546 CGRAY
RAD ONC MSQ ELAPSED DAYS: 2
RAD ONC MSQ LAST DATE: NORMAL
RAD ONC MSQ PRESCRIBED BIOLOGICAL FRACTIONAL DOSE: 200 CCGE
RAD ONC MSQ PRESCRIBED BIOLOGICAL TOTAL DOSE: 5400 CCGE
RAD ONC MSQ PRESCRIBED FRACTIONAL DOSE: 182 CGRAY
RAD ONC MSQ PRESCRIBED NUMBER OF FRACTIONS: 27
RAD ONC MSQ PRESCRIBED TECHNIQUE: NORMAL
RAD ONC MSQ PRESCRIBED TOTAL DOSE: 4909 CGRAY
RAD ONC MSQ PRESCRIPTION PATTERN COMMENT: NORMAL
RAD ONC MSQ START DATE: NORMAL
RAD ONC MSQ TREATMENT COURSE NUMBER: 2
RAD ONC MSQ TREATMENT SITE: NORMAL

## 2024-10-16 PROCEDURE — 77523 PROTON TRMT INTERMEDIATE: CPT | Performed by: RADIOLOGY

## 2024-10-16 PROCEDURE — 77387 GUIDANCE FOR RADJ TX DLVR: CPT | Performed by: RADIOLOGY

## 2024-10-16 ASSESSMENT — ENCOUNTER SYMPTOMS
CHILLS: 0
TROUBLE SWALLOWING: 1
NUMBNESS: 0
VOMITING: 0
COUGH: 0
FEVER: 0
ARTHRALGIAS: 0
LIGHT-HEADEDNESS: 0
SHORTNESS OF BREATH: 0
LEG SWELLING: 0
DIARRHEA: 0
HEADACHES: 0
SORE THROAT: 1
NAUSEA: 0
CONSTIPATION: 0
ABDOMINAL PAIN: 0

## 2024-10-16 NOTE — PROGRESS NOTES
"    Patient ID: Melvi Sanchez \"Allie" is a 64 y.o. 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 Robin Gabrielc:  Dr. Chetna Lowry  [facility]   Supp Onc: Antoinette Soliman   PCP: Alton King,     PRIOR THERAPIES  07/07/2022: Completion of chemoradiation with weekly cisplatin with Dr. Razo  08/26/2024: L radical neck dissection 2-4 with CN11 repair     CURRENT THERAPY  10/8/24: Weekly cisplatin (40 mg/m2) to be given concurrently with proton reirradiation     SITES OF DISEASE    CURRENT ONCOLOGICAL PROBLEMS  Post operative pain     HISTORY OF PRESENT ILLNESS  Ms. 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    Patient initially treated by Dr. Razo in 2022. She first noticed a lump in the left side of her neck > 2 years ago. She was found that have enlarged level 2, 3, 4 nodes, but no obvious oropharyngeal lesion 03/2022 by Dr. Torres.  CT neck was obtained demonstrating  L neck mass, 3.2 x 5.9 x 8.4 cm and a 1.5 x 1.6 right paratracheal node -   FNA biopsy of left neck mass showed squamous cell carcinoma. On 4/21/2022 she underwent triple endoscopy and DL showed lesion of left tonsil; biopsy showed invasive squamous cell carcinoma, p16 positive. She was referred to radiation oncology and saw Dr. Crook and completed 70 cGy/35f CCRT with STEWART seen at 1 year. She has been followed by Dr. Torres in surveillance and was noted to have left neck LINA. CT neck was obtained on 6/20/24 demonstrating increase in edema, enlargement of level 2 node. L neck core biopsy on 7/5/24 demonstrated:     FINAL DIAGNOSIS   Left neck mass, core biopsy:  - Metastatic squamous cell " carcinoma involving fibrous tissue with extensive necrosis.  See note.   Note: By immunostaining, the tumor cells are positive for p40, supportive of the above diagnosis.     She proceeded with L neck dissection which revealed:    A. Left neck, level II-IV, neck dissection:  -- Poorly differentiated , nonkeratinizing squamous cell carcinoma with abundant necrosis,  involving soft tissue extensively, encompassing levels II and III  (2.2 cm).  - Fibrosis and foreign body granulomatous reaction, levels III  and IV.     Note:  Microscopic examination of H&E sections reveals poorly differentiated squamous cell carcinoma involving extensively soft tissue with minimal lymphoid tissue present and abundant necrosis and fibrosis.  Carcinoma reaches soft tissue inked margins focally.  Vein margin is negative.  History of metastatic squamous cell carcinoma of left tonsil, status post chemo therapy and radiation therapy is noted for this patient.     P16 by immunohistochemistry:  Positive     She was discussed at  and seen by Dr. Lowry with plans for post operative reirradiation with proton beam therapy and presents for medical oncology consultation to discuss addition of radiosensitizing systemic therapy.    PAST MEDICAL HISTORY  COPD, hypothyroidism, RLS    SURGICAL HISTORY  Cholecystectomy, hysterectomy     SOCIAL HISTORY  Lives in [place] by self. Son is next building. 3 kids, 6 grandkids.  Retired  at [employer].  Smoked 1-2 PPD, now down to 4 a day since diagnosis.  Occasional EtOH, no illicits. [place], [place].  Has a mini dauchson [employer]     FAMILY HISTORY  Cousin with brain cancer    CURRENT MEDS REVIEWED    ALLERGIES REVIEWED       Subjective   Chief complaint: throat pain    HPI  Melvi Sanchez is a 64 y.o. year old female patient with Recurrent stage III ([id number]) p16+ SCC of L oropharynx who started chemoRT w/ weekly Cisplatin on 10/8/24.    Interval History:  Patient presents  for C3 Cisplatin.    Patient reports acute onset of increased throat pain over the weekend. Suspect thrush. Due to increased odynophagia, she has not been tolerating regular foods PO in the last few days and has lost weight.   She also has increased swelling in the left jaw and submental region that looks like worsening lymphedema.   She endorse transient tinnitus but no subjective hearing loss.   Her baseline WARREN is a little worse. She does use Anoro Ellipta inhailer daily and has Albuterol PRN for COPD.   Left thigh incision still numb but fluid is filling up much less frequently. Last saw Dr. Brownlee on 10/10 and left thigh was drained.   Denies n/v/d/c.     A 13 point review of systems was performed, with significant findings documented above in subjective history.    Review of Systems   Constitutional:  Negative for chills and fever.   HENT:   Positive for mouth sores, sore throat and trouble swallowing. Negative for hearing loss, lump/mass, nosebleeds and tinnitus.    Respiratory:  Negative for cough and shortness of breath.    Cardiovascular:  Negative for chest pain and leg swelling.   Gastrointestinal:  Negative for abdominal pain, constipation, diarrhea, nausea and vomiting.   Musculoskeletal:  Negative for arthralgias.   Skin:  Negative for rash.   Neurological:  Negative for headaches, light-headedness and numbness.     Objective   /50 (BP Location: Left arm, Patient Position: Sitting, BP Cuff Size: Adult)   Pulse 75   Temp 37.1 °C (98.8 °F) (Temporal)   Resp 20   Wt 71.9 kg (158 lb 8.2 oz)   SpO2 96%   BMI 28.99 kg/m²   Daily Weight  10/22/24 : 71.9 kg (158 lb 8.2 oz)  10/18/24 : 73.9 kg (162 lb 14.7 oz)  10/17/24 : 75.6 kg (166 lb 11.2 oz)  10/15/24 : 72.9 kg (160 lb 11.2 oz)  10/14/24 : 74.4 kg (164 lb 1.6 oz)  10/11/24 : 75.3 kg (166 lb 0.1 oz)  10/10/24 : 76.7 kg (169 lb)    Physical Exam  Vitals reviewed.   Constitutional:       Appearance: Normal appearance. She is well-developed.    HENT:      Head: Normocephalic and atraumatic.      Right Ear: External ear normal. No tenderness.      Left Ear: External ear normal. No tenderness.      Nose: Nose normal.      Mouth/Throat:      Lips: Pink.      Mouth: Mucous membranes are moist. No injury or oral lesions.      Tongue: No lesions.      Comments: White plagues on b/l buccal surface and soft palate  Eyes:      General: Lids are normal.      Extraocular Movements: Extraocular movements intact.      Conjunctiva/sclera: Conjunctivae normal.      Pupils: Pupils are equal, round, and reactive to light.   Neck:      Thyroid: No thyroid mass.      Comments: L neck scar well healed. Small area of scabbing  Moderate lymphedema in left jaw and submental region    Cardiovascular:      Rate and Rhythm: Normal rate and regular rhythm.      Pulses: Normal pulses.      Heart sounds: No murmur heard.     No gallop.   Pulmonary:      Effort: Pulmonary effort is normal. No respiratory distress.      Breath sounds: Normal breath sounds and air entry. No stridor. No wheezing.   Abdominal:      General: Abdomen is flat. Bowel sounds are normal. There is no distension or abdominal bruit.      Palpations: Abdomen is soft. There is no mass.      Tenderness: There is no abdominal tenderness.   Musculoskeletal:         General: Normal range of motion.      Cervical back: Normal range of motion and neck supple. No signs of trauma. Normal range of motion.      Right lower leg: No edema.      Left lower leg: No edema.   Lymphadenopathy:      Cervical: No cervical adenopathy.      Upper Body:      Right upper body: No axillary adenopathy.      Left upper body: No axillary adenopathy.   Skin:     General: Skin is warm and dry.      Comments: No new skin lesions   Neurological:      General: No focal deficit present.      Mental Status: She is alert and oriented to person, place, and time. Mental status is at baseline.      Gait: Gait is intact.   Psychiatric:         Attention  and Perception: Attention normal.         Mood and Affect: Mood and affect normal.         Behavior: Behavior is cooperative.     ECOGSCORE: 1- Restricted in physically strenuous activity.  Carries out light duty.    Diagnostic Results   LABS  Below labs were reviewed.  Results from last 7 days   Lab Units 10/22/24  0819 10/15/24  0916   WBC AUTO x10*3/uL 12.7* 11.6*   HEMOGLOBIN g/dL 11.1* 11.6*   HEMATOCRIT % 34.1* 35.2*   PLATELETS AUTO x10*3/uL 160 229   NEUTROS ABS x10*3/uL 10.67* 9.48*   LYMPHS ABS AUTO x10*3/uL 0.72* 0.77*   MONOS ABS AUTO x10*3/uL 1.01* 1.06*   EOS ABS AUTO x10*3/uL 0.20 0.22   NEUTROS PCT AUTO % 84.1 81.8   LYMPHS PCT AUTO % 5.7 6.6   MONOS PCT AUTO % 8.0 9.1   EOS PCT AUTO % 1.6 1.9      Results from last 7 days   Lab Units 10/22/24  0819 10/15/24  0916   GLUCOSE mg/dL 98 94   SODIUM mmol/L 138 138   POTASSIUM mmol/L 4.2 4.4   CHLORIDE mmol/L 103 101   CO2 mmol/L 30 29   BUN mg/dL 15 12   CREATININE mg/dL 0.66 0.71   EGFR mL/min/1.73m*2 >90 >90   CALCIUM mg/dL 9.2 9.0   MAGNESIUM mg/dL 1.67 1.66   ALBUMIN g/dL 3.9 3.9   PROTEIN TOTAL g/dL 6.4 6.2*   BILIRUBIN TOTAL mg/dL 0.6 0.6   ALK PHOS U/L 58 58   ALT U/L 19 21   AST U/L 12 14                        IMAGES  7/18/24 PET   1. New hypermetabolic activity seen in the left level 2 cervical lymph node, with central necrosis, consistent with heide metastasis.  2. New FDG avid clustered nodules are seen in the right upper lobe, which is nonspecific and may be infectious/inflammatory, however metastasis can not be excluded. Short-term follow-up chest CT is recommended.  3. Mild FDG uptake is seen throughout the esophagus, likely relating to esophagitis.  4. There is partial opacification of the left maxillary sinus, with mild FDG avidity, likely sinusitis.    CT Neck 6/20/24  IMPRESSION:  1. Compared to the CT neck from 07/14/2023, interval increase in edema within the aerodigestive tract, most pronounced within the supraglottic larynx and  hypopharynx and there is resulting moderate narrowing of the supraglottic laryngeal lumen. There is also mild  prevertebral edema. There is no focal fluid collection to suggest an abscess. Cause of this edema is uncertain, could be infectious in etiology rather than treatment related given that patient completed radiation therapy 2 years ago.  2. No striking new mass is seen within the visualized aerodigestive tract, noting that evaluation is somewhat limited due to presence of diffuse edema and therefore subtle lesions can not be excluded.  3. Interval enlargement of peripherally enhancing likely necrotic lymph node in the left level 2 heide station concerning for progression of disease. Alternatively, enlargement of the lymph node could reflect an infectious process. Otherwise, no cervical lymphadenopathy by size criteria.  4. Edema within the left facial and neck deep and superficial soft tissues as well as in the left supraclavicular fossa is favored to be treatment related and was also present on the prior CT. No focal fluid collection is seen within these regions to suggest an abscess. Correlate clinically.      Assessment/Plan     Melviarlin Sanchez is a 64 y.o. year old female patient with PMH of stage III (cT1c cN3a M0) SCC of L tonsil, COPD, hypothyroidism and RLS, started treatment with CCRT w/ Cisplatin and proton radiation on 10/8/24 for recurrence to L oropharynx with diffuse JOLANTA.    We discussed the treatment paradigm in recurrence and salvage surgery - given her pathology showing JOLANTA it is reasonable to proceed with concurrent reirradiation with Proton. We consented today and all questions were answered. We will proceed upon CT simulation.    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 Proton machine is down today so first dose RT will  be on IMRT on 10/9 then resume PORT.    - 10/15/24: C2. Unchanged mild chronic dysphagia and xerostomia from prior RT. Left neck pain controlled w/ current pain regimen.  - 10/22/24: C3. Increased odynophagia in the last 3-4 days, like due to thrush. PO intake decreased and has resulting weight loss. Also with increased lymphedema in the left jaw and submental region. Left thigh incision still numb, fluid accumulation is slowed down. She's smoking about 1-2 cigarettes a day,     # Oral Candidiasis  - 10/22: white plagues evident in oral cavity. Will start patient on Nystatin swish and swallow.     # RUL nodules  - Being followed with CT scan in next 3 months    PLAN  -- Proceed to C3 Cisplatin  -- IV hydration every Friday  -- START Nystatin 500,000 U 4x per day for thrush  -- Continue Percocet 7.5 Q4hr PRN for odynophagia  -- Follow up w/ Supp Onc as scheduled

## 2024-10-17 ENCOUNTER — HOSPITAL ENCOUNTER (OUTPATIENT)
Dept: RADIATION ONCOLOGY | Facility: HOSPITAL | Age: 64
Setting detail: RADIATION/ONCOLOGY SERIES
Discharge: HOME | End: 2024-10-17
Payer: COMMERCIAL

## 2024-10-17 ENCOUNTER — RADIATION ONCOLOGY OTV (OUTPATIENT)
Dept: RADIATION ONCOLOGY | Facility: HOSPITAL | Age: 64
End: 2024-10-17
Payer: COMMERCIAL

## 2024-10-17 VITALS
OXYGEN SATURATION: 97 % | TEMPERATURE: 95.9 F | BODY MASS INDEX: 30.49 KG/M2 | DIASTOLIC BLOOD PRESSURE: 89 MMHG | HEART RATE: 63 BPM | WEIGHT: 166.7 LBS | SYSTOLIC BLOOD PRESSURE: 158 MMHG | RESPIRATION RATE: 20 BRPM

## 2024-10-17 DIAGNOSIS — Z51.0 ENCOUNTER FOR ANTINEOPLASTIC RADIATION THERAPY: ICD-10-CM

## 2024-10-17 DIAGNOSIS — C10.9 OROPHARYNGEAL CANCER (MULTI): Primary | ICD-10-CM

## 2024-10-17 DIAGNOSIS — C77.0 SECONDARY AND UNSPECIFIED MALIGNANT NEOPLASM OF LYMPH NODES OF HEAD, FACE AND NECK: ICD-10-CM

## 2024-10-17 LAB
RAD ONC MSQ ACTUAL FRACTIONS DELIVERED: 4
RAD ONC MSQ ACTUAL SESSION BIOLOGICAL DOSE: 200 CCGE
RAD ONC MSQ ACTUAL SESSION DELIVERED DOSE: 182 CGRAY
RAD ONC MSQ ACTUAL TOTAL BIOLOGICAL DOSE: 801 CCGE
RAD ONC MSQ ACTUAL TOTAL DOSE: 728 CGRAY
RAD ONC MSQ ELAPSED DAYS: 3
RAD ONC MSQ LAST DATE: NORMAL
RAD ONC MSQ PRESCRIBED BIOLOGICAL FRACTIONAL DOSE: 200 CCGE
RAD ONC MSQ PRESCRIBED BIOLOGICAL TOTAL DOSE: 5400 CCGE
RAD ONC MSQ PRESCRIBED FRACTIONAL DOSE: 182 CGRAY
RAD ONC MSQ PRESCRIBED NUMBER OF FRACTIONS: 27
RAD ONC MSQ PRESCRIBED TECHNIQUE: NORMAL
RAD ONC MSQ PRESCRIBED TOTAL DOSE: 4909 CGRAY
RAD ONC MSQ PRESCRIPTION PATTERN COMMENT: NORMAL
RAD ONC MSQ START DATE: NORMAL
RAD ONC MSQ TREATMENT COURSE NUMBER: 2
RAD ONC MSQ TREATMENT SITE: NORMAL

## 2024-10-17 PROCEDURE — 77523 PROTON TRMT INTERMEDIATE: CPT | Performed by: RADIOLOGY

## 2024-10-17 PROCEDURE — 77387 GUIDANCE FOR RADJ TX DLVR: CPT | Performed by: RADIOLOGY

## 2024-10-17 RX ORDER — OMEPRAZOLE 40 MG/1
40 CAPSULE, DELAYED RELEASE ORAL
Qty: 30 CAPSULE | Refills: 1 | Status: SHIPPED | OUTPATIENT
Start: 2024-10-17 | End: 2024-12-16

## 2024-10-17 RX ORDER — BACLOFEN 5 MG/1
5 TABLET ORAL 3 TIMES DAILY
Qty: 90 TABLET | Refills: 1 | Status: SHIPPED | OUTPATIENT
Start: 2024-10-17

## 2024-10-17 ASSESSMENT — PAIN SCALES - GENERAL: PAINLEVEL_OUTOF10: 0-NO PAIN

## 2024-10-17 NOTE — PROGRESS NOTES
Radiation Oncology On Treatment Visit    Patient Name:  Melvi Sanchez  MRN:  [id number]  :  1960    Referring Provider: No ref. provider found  Primary Care Provider: Alton King DO  Care Team: Patient Care Team:  Alton King DO as PCP - General (Family Medicine)  Alton King DO as PCP - [facility] PCP  Alton King DO as PCP - [facility] Medicaid PCP  Jimmie LEYVA MD as Consulting Physician (Radiation Oncology)  Marlon Patel MD as Surgeon (Pulmonary Disease)  Tiffanie Whitfield RN as Nurse Navigator (Hematology and Oncology)  Alejandro Hurst MD as Consulting Physician (Hematology and Oncology)  Chetna Lowry MD as Radiation Oncologist (Radiation Oncology)    Date of Service: 10/17/2024     Diagnosis:   Specialty Problems          Radiation Oncology Problems    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)        Tonsil cancer (Multi)         Treatment Summary:  Proton Beam: Left Head and neck    Treatment Period Technique Fraction Dose Fractions Total Dose   Course 2 10/9/2024-10/17/2024  (days elapsed: 8)         VMAT L neck 10/9/2024-10/11/2024 VMAT 200 / 200 cGy 3 / 3 600 / 600 cGy         L neck 10/14/2024-10/17/2024 3-Field 182 / 182 cGy  728 / 4,909 cGy     SUBJECTIVE:   She continues to be bothered by pain in her left neck ever since surgery.  She is currently seeing supportive oncology for pain management regarding this.  She is not having any nausea.  She has cut back on smoking to 1 to 2 cigarettes/day.  She is tolerating oral diet with no difficulty swallowing. Having hiccups that are intermittent and heartburn.       OBJECTIVE:   Vital Signs:  /89   Pulse 63   Temp 35.5 °C (95.9 °F)   Resp 20   Wt 75.6 kg (166 lb 11.2 oz)   SpO2 97%   BMI 30.49 kg/m²     Other Pertinent Findings:   No thrush, no mucositis or skin erythema.   Toxicity Assessment          10/14/2024    13:25   Toxicity Assessment   Treatment  and neck   Anorexia Grade 0    Anxiety Grade 0   Dehydration Grade 0   Depression Grade 0   Dermatitis Radiation Grade 0   Diarrhea Grade 0   Fatigue Grade 0   Nausea Grade 0   Pain Grade 0   Tumor Pain Grade 0   Vomiting Grade 0   Hearing Impaired Grade 0   Blurred Vision Grade 0   Dry Eye Grade 0   Eye Pain Grade 0   Dry Mouth Grade 2   Ear Pain Grade 0   Oral Pain Grade 0   Edema Face Grade 0   Malaise Grade 0   Neck Edema Grade 1   Head Soft Tissue Necrosis Grade 0   Neck Soft Tissue Necrosis Grade 0   Facial Nerve Disorder Grade 0   Trigeminal Nerve Disorder Grade 0   Aspiration Grade 0   Hoarseness Grade 0   Voice Alteration Grade 0        Assessment / Plan:  The patient is tolerating radiation therapy as anticipated.  Continue per current treatment plan.        Reviewed with patient their Symptom Management Form for Head and Neck Cancer:     FEN: patient's weight is stable. Aim for >2000 calories and >60grams of protein daily. Will see a dietician for more specific recommendations.  All diet is oral and no feeding tube.  Will meet with SLP.     Nausea: Reviewed use of Dexamethasone and Olanzapine after chemo each week to prevent nausea and Compazine/Zofran PRN for breakthrough nausea     Pain control: Patient reporting ongoing pain in left neck.  Currently taking Percocet and gabapentin under the direction of supportive oncology.  Try to avoid Ibuprofen as can cause gastritis/renal insufficiency, which can also be exacerbated by ongoing chemo. Ok to take Percocet every 4 hours instead of every 6 hours.     Hiccups and Heartburn: Likely from Dexamethasone. Gave rx for Baclofen 5 TID and Omeprazole 40 mg daily.      Oral Care: Patient will continue fluoride trays per dentist. Patient is using Glutamine 10g TID on days of RT to promote ongoing healing of mucositis. No need currently for Guaifenesin for thick saliva or Xylimelt for dry mouth     Skin Care: Continue Aquaphor within the RT field. Reviewed how to apply and to avoid within 3  hours prior to RT.      Diarrhea/Constipation: Reviewed use of Immodium for diarrhea. Reviewed use of Miralax or Senna if develops constipation.     Smoking cessation: Patient has significantly cut back and is only smoking 1 to 2 cigarettes daily.  Counseled that it is ideal to stop smoking completely during radiation.  Patient is currently using nicotine patches, Wellbutrin, and Chantix.  She is trying to quit smoking completely.

## 2024-10-18 ENCOUNTER — HOSPITAL ENCOUNTER (OUTPATIENT)
Dept: RADIOLOGY | Facility: HOSPITAL | Age: 64
Discharge: HOME | End: 2024-10-18
Payer: COMMERCIAL

## 2024-10-18 ENCOUNTER — APPOINTMENT (OUTPATIENT)
Dept: RADIOLOGY | Facility: HOSPITAL | Age: 64
End: 2024-10-18
Payer: COMMERCIAL

## 2024-10-18 ENCOUNTER — HOSPITAL ENCOUNTER (OUTPATIENT)
Dept: RADIATION ONCOLOGY | Facility: HOSPITAL | Age: 64
Setting detail: RADIATION/ONCOLOGY SERIES
Discharge: HOME | End: 2024-10-18
Payer: COMMERCIAL

## 2024-10-18 ENCOUNTER — INFUSION (OUTPATIENT)
Dept: HEMATOLOGY/ONCOLOGY | Facility: HOSPITAL | Age: 64
End: 2024-10-18
Payer: COMMERCIAL

## 2024-10-18 ENCOUNTER — APPOINTMENT (OUTPATIENT)
Dept: OTOLARYNGOLOGY | Facility: CLINIC | Age: 64
End: 2024-10-18
Payer: COMMERCIAL

## 2024-10-18 VITALS
RESPIRATION RATE: 18 BRPM | OXYGEN SATURATION: 100 % | BODY MASS INDEX: 29.8 KG/M2 | TEMPERATURE: 96.3 F | SYSTOLIC BLOOD PRESSURE: 160 MMHG | HEART RATE: 57 BPM | WEIGHT: 162.92 LBS | DIASTOLIC BLOOD PRESSURE: 62 MMHG

## 2024-10-18 DIAGNOSIS — Z51.0 ENCOUNTER FOR ANTINEOPLASTIC RADIATION THERAPY: ICD-10-CM

## 2024-10-18 DIAGNOSIS — C77.0 SECONDARY AND UNSPECIFIED MALIGNANT NEOPLASM OF LYMPH NODES OF HEAD, FACE AND NECK: ICD-10-CM

## 2024-10-18 DIAGNOSIS — R93.89 ABNORMAL CT OF THE CHEST: ICD-10-CM

## 2024-10-18 DIAGNOSIS — C77.0 METASTASIS TO HEAD AND NECK LYMPH NODE (MULTI): ICD-10-CM

## 2024-10-18 DIAGNOSIS — R91.1 LUNG NODULE: ICD-10-CM

## 2024-10-18 DIAGNOSIS — C09.9 TONSIL CANCER (MULTI): ICD-10-CM

## 2024-10-18 LAB
RAD ONC MSQ ACTUAL FRACTIONS DELIVERED: 5
RAD ONC MSQ ACTUAL SESSION BIOLOGICAL DOSE: 200 CCGE
RAD ONC MSQ ACTUAL SESSION DELIVERED DOSE: 182 CGRAY
RAD ONC MSQ ACTUAL TOTAL BIOLOGICAL DOSE: 1001 CCGE
RAD ONC MSQ ACTUAL TOTAL DOSE: 910 CGRAY
RAD ONC MSQ ELAPSED DAYS: 4
RAD ONC MSQ LAST DATE: NORMAL
RAD ONC MSQ PRESCRIBED BIOLOGICAL FRACTIONAL DOSE: 200 CCGE
RAD ONC MSQ PRESCRIBED BIOLOGICAL TOTAL DOSE: 5400 CCGE
RAD ONC MSQ PRESCRIBED FRACTIONAL DOSE: 182 CGRAY
RAD ONC MSQ PRESCRIBED NUMBER OF FRACTIONS: 27
RAD ONC MSQ PRESCRIBED TECHNIQUE: NORMAL
RAD ONC MSQ PRESCRIBED TOTAL DOSE: 4909 CGRAY
RAD ONC MSQ PRESCRIPTION PATTERN COMMENT: NORMAL
RAD ONC MSQ START DATE: NORMAL
RAD ONC MSQ TREATMENT COURSE NUMBER: 2
RAD ONC MSQ TREATMENT SITE: NORMAL

## 2024-10-18 PROCEDURE — 2500000004 HC RX 250 GENERAL PHARMACY W/ HCPCS (ALT 636 FOR OP/ED): Performed by: STUDENT IN AN ORGANIZED HEALTH CARE EDUCATION/TRAINING PROGRAM

## 2024-10-18 PROCEDURE — 77387 GUIDANCE FOR RADJ TX DLVR: CPT | Performed by: RADIOLOGY

## 2024-10-18 PROCEDURE — 77336 RADIATION PHYSICS CONSULT: CPT | Performed by: RADIOLOGY

## 2024-10-18 PROCEDURE — 77523 PROTON TRMT INTERMEDIATE: CPT | Performed by: RADIOLOGY

## 2024-10-18 PROCEDURE — 71250 CT THORAX DX C-: CPT

## 2024-10-18 PROCEDURE — 96360 HYDRATION IV INFUSION INIT: CPT | Mod: INF

## 2024-10-18 RX ORDER — HEPARIN SODIUM,PORCINE/PF 10 UNIT/ML
50 SYRINGE (ML) INTRAVENOUS AS NEEDED
OUTPATIENT
Start: 2024-10-18

## 2024-10-18 RX ORDER — HEPARIN 100 UNIT/ML
500 SYRINGE INTRAVENOUS AS NEEDED
OUTPATIENT
Start: 2024-10-18

## 2024-10-18 NOTE — PROGRESS NOTES
Pt arrived ambulatory to infusion for treatment of IVF.  Denies any new or worsening symptoms. Tolerated infusion without issue. Discharged in stable condition.

## 2024-10-21 ENCOUNTER — HOSPITAL ENCOUNTER (OUTPATIENT)
Dept: RADIATION ONCOLOGY | Facility: HOSPITAL | Age: 64
Setting detail: RADIATION/ONCOLOGY SERIES
Discharge: HOME | End: 2024-10-21
Payer: COMMERCIAL

## 2024-10-21 ENCOUNTER — TELEPHONE (OUTPATIENT)
Dept: HEMATOLOGY/ONCOLOGY | Facility: CLINIC | Age: 64
End: 2024-10-21
Payer: COMMERCIAL

## 2024-10-21 DIAGNOSIS — G89.3 CANCER ASSOCIATED PAIN: ICD-10-CM

## 2024-10-21 DIAGNOSIS — F32.1 CURRENT MODERATE EPISODE OF MAJOR DEPRESSIVE DISORDER WITHOUT PRIOR EPISODE (MULTI): Primary | ICD-10-CM

## 2024-10-21 DIAGNOSIS — C77.0 SECONDARY AND UNSPECIFIED MALIGNANT NEOPLASM OF LYMPH NODES OF HEAD, FACE AND NECK: ICD-10-CM

## 2024-10-21 DIAGNOSIS — Z51.0 ENCOUNTER FOR ANTINEOPLASTIC RADIATION THERAPY: ICD-10-CM

## 2024-10-21 LAB
RAD ONC MSQ ACTUAL FRACTIONS DELIVERED: 6
RAD ONC MSQ ACTUAL SESSION BIOLOGICAL DOSE: 200 CCGE
RAD ONC MSQ ACTUAL SESSION DELIVERED DOSE: 182 CGRAY
RAD ONC MSQ ACTUAL TOTAL BIOLOGICAL DOSE: 1201 CCGE
RAD ONC MSQ ACTUAL TOTAL DOSE: 1092 CGRAY
RAD ONC MSQ ELAPSED DAYS: 7
RAD ONC MSQ LAST DATE: NORMAL
RAD ONC MSQ PRESCRIBED BIOLOGICAL FRACTIONAL DOSE: 200 CCGE
RAD ONC MSQ PRESCRIBED BIOLOGICAL TOTAL DOSE: 5400 CCGE
RAD ONC MSQ PRESCRIBED FRACTIONAL DOSE: 182 CGRAY
RAD ONC MSQ PRESCRIBED NUMBER OF FRACTIONS: 27
RAD ONC MSQ PRESCRIBED TECHNIQUE: NORMAL
RAD ONC MSQ PRESCRIBED TOTAL DOSE: 4909 CGRAY
RAD ONC MSQ PRESCRIPTION PATTERN COMMENT: NORMAL
RAD ONC MSQ START DATE: NORMAL
RAD ONC MSQ TREATMENT COURSE NUMBER: 2
RAD ONC MSQ TREATMENT SITE: NORMAL

## 2024-10-21 PROCEDURE — 77387 GUIDANCE FOR RADJ TX DLVR: CPT | Performed by: STUDENT IN AN ORGANIZED HEALTH CARE EDUCATION/TRAINING PROGRAM

## 2024-10-21 PROCEDURE — 77523 PROTON TRMT INTERMEDIATE: CPT | Performed by: RADIOLOGY

## 2024-10-21 RX ORDER — DEXAMETHASONE 6 MG/1
12 TABLET ORAL ONCE
OUTPATIENT
Start: 2024-11-05

## 2024-10-21 RX ORDER — OXYCODONE AND ACETAMINOPHEN 7.5; 325 MG/1; MG/1
1 TABLET ORAL EVERY 4 HOURS PRN
COMMUNITY

## 2024-10-21 RX ORDER — DEXAMETHASONE 6 MG/1
12 TABLET ORAL ONCE
OUTPATIENT
Start: 2024-11-12

## 2024-10-21 RX ORDER — DEXAMETHASONE 6 MG/1
12 TABLET ORAL ONCE
OUTPATIENT
Start: 2024-10-29

## 2024-10-21 RX ORDER — BUPROPION HYDROCHLORIDE 300 MG/1
300 TABLET ORAL EVERY MORNING
Qty: 30 TABLET | Refills: 5 | Status: SHIPPED | OUTPATIENT
Start: 2024-10-21 | End: 2025-04-19

## 2024-10-21 NOTE — TELEPHONE ENCOUNTER
Antoinette Soliman NP with supportive oncology contacted and aware.  Patient may increase percocet 7.5-325 mg to every 4 hours from 6 hours.  Patient will call office when running low on supply.  Patient instructed to speak with oR DAVIS tomorrow 10/22 at appointment regarding thrush.

## 2024-10-22 ENCOUNTER — LAB (OUTPATIENT)
Dept: LAB | Facility: HOSPITAL | Age: 64
End: 2024-10-22
Payer: COMMERCIAL

## 2024-10-22 ENCOUNTER — HOSPITAL ENCOUNTER (OUTPATIENT)
Dept: RADIATION ONCOLOGY | Facility: HOSPITAL | Age: 64
Setting detail: RADIATION/ONCOLOGY SERIES
Discharge: HOME | End: 2024-10-22
Payer: COMMERCIAL

## 2024-10-22 ENCOUNTER — APPOINTMENT (OUTPATIENT)
Dept: SLEEP MEDICINE | Facility: CLINIC | Age: 64
End: 2024-10-22
Payer: COMMERCIAL

## 2024-10-22 ENCOUNTER — INFUSION (OUTPATIENT)
Dept: HEMATOLOGY/ONCOLOGY | Facility: HOSPITAL | Age: 64
End: 2024-10-22
Payer: COMMERCIAL

## 2024-10-22 ENCOUNTER — OFFICE VISIT (OUTPATIENT)
Dept: HEMATOLOGY/ONCOLOGY | Facility: HOSPITAL | Age: 64
End: 2024-10-22
Payer: COMMERCIAL

## 2024-10-22 ENCOUNTER — NUTRITION (OUTPATIENT)
Dept: HEMATOLOGY/ONCOLOGY | Facility: HOSPITAL | Age: 64
End: 2024-10-22

## 2024-10-22 VITALS
TEMPERATURE: 98.8 F | RESPIRATION RATE: 20 BRPM | OXYGEN SATURATION: 96 % | HEART RATE: 75 BPM | SYSTOLIC BLOOD PRESSURE: 116 MMHG | DIASTOLIC BLOOD PRESSURE: 50 MMHG | WEIGHT: 158.51 LBS | BODY MASS INDEX: 28.99 KG/M2

## 2024-10-22 DIAGNOSIS — K11.7 XEROSTOMIA DUE TO RADIOTHERAPY: ICD-10-CM

## 2024-10-22 DIAGNOSIS — C77.0 METASTASIS TO HEAD AND NECK LYMPH NODE (MULTI): ICD-10-CM

## 2024-10-22 DIAGNOSIS — B37.0 ORAL CANDIDIASIS: ICD-10-CM

## 2024-10-22 DIAGNOSIS — R13.10 DYSPHAGIA, UNSPECIFIED TYPE: ICD-10-CM

## 2024-10-22 DIAGNOSIS — C09.9 TONSIL CANCER (MULTI): ICD-10-CM

## 2024-10-22 DIAGNOSIS — I89.0 LYMPHEDEMA DUE TO RADIATION: ICD-10-CM

## 2024-10-22 DIAGNOSIS — C09.9 TONSIL CANCER (MULTI): Primary | ICD-10-CM

## 2024-10-22 DIAGNOSIS — C77.0 SECONDARY AND UNSPECIFIED MALIGNANT NEOPLASM OF LYMPH NODES OF HEAD, FACE AND NECK: ICD-10-CM

## 2024-10-22 DIAGNOSIS — Z51.0 ENCOUNTER FOR ANTINEOPLASTIC RADIATION THERAPY: ICD-10-CM
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
## 2024-10-22 DIAGNOSIS — Z51.11 ENCOUNTER FOR ANTINEOPLASTIC CHEMOTHERAPY: ICD-10-CM

## 2024-10-22 LAB
ALBUMIN SERPL BCP-MCNC: 3.9 G/DL (ref 3.4–5)
ALP SERPL-CCNC: 58 U/L (ref 33–136)
ALT SERPL W P-5'-P-CCNC: 19 U/L (ref 7–45)
ANION GAP SERPL CALC-SCNC: 9 MMOL/L (ref 10–20)
AST SERPL W P-5'-P-CCNC: 12 U/L (ref 9–39)
BASOPHILS # BLD AUTO: 0.04 X10*3/UL (ref 0–0.1)
BASOPHILS NFR BLD AUTO: 0.3 %
BILIRUB SERPL-MCNC: 0.6 MG/DL (ref 0–1.2)
BUN SERPL-MCNC: 15 MG/DL (ref 6–23)
CALCIUM SERPL-MCNC: 9.2 MG/DL (ref 8.6–10.3)
CHLORIDE SERPL-SCNC: 103 MMOL/L (ref 98–107)
CO2 SERPL-SCNC: 30 MMOL/L (ref 21–32)
CREAT SERPL-MCNC: 0.66 MG/DL (ref 0.5–1.05)
EGFRCR SERPLBLD CKD-EPI 2021: >90 ML/MIN/1.73M*2
EOSINOPHIL # BLD AUTO: 0.2 X10*3/UL (ref 0–0.7)
EOSINOPHIL NFR BLD AUTO: 1.6 %
ERYTHROCYTE [DISTWIDTH] IN BLOOD BY AUTOMATED COUNT: 13.2 % (ref 11.5–14.5)
GLUCOSE SERPL-MCNC: 98 MG/DL (ref 74–99)
HCT VFR BLD AUTO: 34.1 % (ref 36–46)
HGB BLD-MCNC: 11.1 G/DL (ref 12–16)
IMM GRANULOCYTES # BLD AUTO: 0.04 X10*3/UL (ref 0–0.7)
IMM GRANULOCYTES NFR BLD AUTO: 0.3 % (ref 0–0.9)
LYMPHOCYTES # BLD AUTO: 0.72 X10*3/UL (ref 1.2–4.8)
LYMPHOCYTES NFR BLD AUTO: 5.7 %
MAGNESIUM SERPL-MCNC: 1.67 MG/DL (ref 1.6–2.4)
MCH RBC QN AUTO: 30.6 PG (ref 26–34)
MCHC RBC AUTO-ENTMCNC: 32.6 G/DL (ref 32–36)
MCV RBC AUTO: 94 FL (ref 80–100)
MONOCYTES # BLD AUTO: 1.01 X10*3/UL (ref 0.1–1)
MONOCYTES NFR BLD AUTO: 8 %
NEUTROPHILS # BLD AUTO: 10.67 X10*3/UL (ref 1.2–7.7)
NEUTROPHILS NFR BLD AUTO: 84.1 %
NRBC BLD-RTO: 0 /100 WBCS (ref 0–0)
PLATELET # BLD AUTO: 160 X10*3/UL (ref 150–450)
POTASSIUM SERPL-SCNC: 4.2 MMOL/L (ref 3.5–5.3)
PROT SERPL-MCNC: 6.4 G/DL (ref 6.4–8.2)
RAD ONC MSQ ACTUAL FRACTIONS DELIVERED: 7
RAD ONC MSQ ACTUAL SESSION BIOLOGICAL DOSE: 200 CCGE
RAD ONC MSQ ACTUAL SESSION DELIVERED DOSE: 182 CGRAY
RAD ONC MSQ ACTUAL TOTAL BIOLOGICAL DOSE: 1401 CCGE
RAD ONC MSQ ACTUAL TOTAL DOSE: 1274 CGRAY
RAD ONC MSQ ELAPSED DAYS: 8
RAD ONC MSQ LAST DATE: NORMAL
RAD ONC MSQ PRESCRIBED BIOLOGICAL FRACTIONAL DOSE: 200 CCGE
RAD ONC MSQ PRESCRIBED BIOLOGICAL TOTAL DOSE: 5400 CCGE
RAD ONC MSQ PRESCRIBED FRACTIONAL DOSE: 182 CGRAY
RAD ONC MSQ PRESCRIBED NUMBER OF FRACTIONS: 27
RAD ONC MSQ PRESCRIBED TECHNIQUE: NORMAL
RAD ONC MSQ PRESCRIBED TOTAL DOSE: 4909 CGRAY
RAD ONC MSQ PRESCRIPTION PATTERN COMMENT: NORMAL
RAD ONC MSQ START DATE: NORMAL
RAD ONC MSQ TREATMENT COURSE NUMBER: 2
RAD ONC MSQ TREATMENT SITE: NORMAL
RBC # BLD AUTO: 3.63 X10*6/UL (ref 4–5.2)
SODIUM SERPL-SCNC: 138 MMOL/L (ref 136–145)
WBC # BLD AUTO: 12.7 X10*3/UL (ref 4.4–11.3)

## 2024-10-22 PROCEDURE — 36415 COLL VENOUS BLD VENIPUNCTURE: CPT

## 2024-10-22 PROCEDURE — 85025 COMPLETE CBC W/AUTO DIFF WBC: CPT

## 2024-10-22 PROCEDURE — 2500000004 HC RX 250 GENERAL PHARMACY W/ HCPCS (ALT 636 FOR OP/ED): Performed by: STUDENT IN AN ORGANIZED HEALTH CARE EDUCATION/TRAINING PROGRAM

## 2024-10-22 PROCEDURE — 99215 OFFICE O/P EST HI 40 MIN: CPT | Performed by: STUDENT IN AN ORGANIZED HEALTH CARE EDUCATION/TRAINING PROGRAM

## 2024-10-22 PROCEDURE — 96375 TX/PRO/DX INJ NEW DRUG ADDON: CPT | Mod: INF

## 2024-10-22 PROCEDURE — 77387 GUIDANCE FOR RADJ TX DLVR: CPT | Performed by: RADIOLOGY

## 2024-10-22 PROCEDURE — 96367 TX/PROPH/DG ADDL SEQ IV INF: CPT

## 2024-10-22 PROCEDURE — 96361 HYDRATE IV INFUSION ADD-ON: CPT | Mod: INF

## 2024-10-22 PROCEDURE — 96413 CHEMO IV INFUSION 1 HR: CPT

## 2024-10-22 PROCEDURE — 84075 ASSAY ALKALINE PHOSPHATASE: CPT

## 2024-10-22 PROCEDURE — 83735 ASSAY OF MAGNESIUM: CPT

## 2024-10-22 PROCEDURE — 77523 PROTON TRMT INTERMEDIATE: CPT | Performed by: RADIOLOGY

## 2024-10-22 PROCEDURE — 99215 OFFICE O/P EST HI 40 MIN: CPT | Mod: 25 | Performed by: STUDENT IN AN ORGANIZED HEALTH CARE EDUCATION/TRAINING PROGRAM

## 2024-10-22 RX ORDER — PROCHLORPERAZINE EDISYLATE 5 MG/ML
10 INJECTION INTRAMUSCULAR; INTRAVENOUS EVERY 6 HOURS PRN
Status: DISCONTINUED | OUTPATIENT
Start: 2024-10-22 | End: 2024-10-22 | Stop reason: HOSPADM

## 2024-10-22 RX ORDER — PALONOSETRON 0.05 MG/ML
0.25 INJECTION, SOLUTION INTRAVENOUS ONCE
Status: COMPLETED | OUTPATIENT
Start: 2024-10-22 | End: 2024-10-22

## 2024-10-22 RX ORDER — PROCHLORPERAZINE MALEATE 10 MG
10 TABLET ORAL EVERY 6 HOURS PRN
Status: DISCONTINUED | OUTPATIENT
Start: 2024-10-22 | End: 2024-10-22 | Stop reason: HOSPADM

## 2024-10-22 RX ORDER — HEPARIN SODIUM,PORCINE/PF 10 UNIT/ML
50 SYRINGE (ML) INTRAVENOUS AS NEEDED
Status: CANCELLED | OUTPATIENT
Start: 2024-10-22

## 2024-10-22 RX ORDER — EPINEPHRINE 0.3 MG/.3ML
0.3 INJECTION SUBCUTANEOUS EVERY 5 MIN PRN
Status: DISCONTINUED | OUTPATIENT
Start: 2024-10-22 | End: 2024-10-22 | Stop reason: HOSPADM

## 2024-10-22 RX ORDER — DIPHENHYDRAMINE HYDROCHLORIDE 50 MG/ML
50 INJECTION INTRAMUSCULAR; INTRAVENOUS AS NEEDED
Status: DISCONTINUED | OUTPATIENT
Start: 2024-10-22 | End: 2024-10-22 | Stop reason: HOSPADM

## 2024-10-22 RX ORDER — FAMOTIDINE 10 MG/ML
20 INJECTION INTRAVENOUS ONCE AS NEEDED
Status: DISCONTINUED | OUTPATIENT
Start: 2024-10-22 | End: 2024-10-22 | Stop reason: HOSPADM

## 2024-10-22 RX ORDER — DEXAMETHASONE 6 MG/1
12 TABLET ORAL ONCE
Status: COMPLETED | OUTPATIENT
Start: 2024-10-22 | End: 2024-10-22

## 2024-10-22 RX ORDER — NYSTATIN 100000 [USP'U]/ML
500000 SUSPENSION ORAL 4 TIMES DAILY
Qty: 280 ML | Refills: 0 | Status: SHIPPED | OUTPATIENT
Start: 2024-10-22 | End: 2024-11-05

## 2024-10-22 RX ORDER — HEPARIN 100 UNIT/ML
500 SYRINGE INTRAVENOUS AS NEEDED
Status: CANCELLED | OUTPATIENT
Start: 2024-10-22

## 2024-10-22 RX ORDER — ALBUTEROL SULFATE 0.83 MG/ML
3 SOLUTION RESPIRATORY (INHALATION) AS NEEDED
Status: DISCONTINUED | OUTPATIENT
Start: 2024-10-22 | End: 2024-10-22 | Stop reason: HOSPADM

## 2024-10-22 ASSESSMENT — PAIN SCALES - GENERAL: PAINLEVEL_OUTOF10: 6

## 2024-10-22 NOTE — PROGRESS NOTES
Spoke to patient in clinic and she reports increased pain in her throat and mouth. Patient contacted palliative care and Percocet was added 7.5 mg every 4 hours. Patient is noting she can not swallow as easily now,her lips are cracked in the corners and her tongue was white for one day. Patient educated that the white tongue is a sign of thrush. Fatigue has increased but not limiting ADLs. No new neuropathy or hearing changes. Ro DAVIS informed of patient assessment/condition.

## 2024-10-22 NOTE — PROGRESS NOTES
Pt tolerated today's infusions without difficulty. pt aware of upcoming appt schedule and will call with any questions and/or concerns.

## 2024-10-22 NOTE — PATIENT INSTRUCTIONS
You have thrush in the mouth that we will be treating with Nystatin swishing solution (4 times a day, hold in mouth of several minutes then swallow). This medication is sent to your pharmacy.   Please disinfect your dentures, use a new tooth brush, thoroughly clean mouth pieces of any inhalers, and clean anything you use for your mouth to prevent re-infection.

## 2024-10-23 ENCOUNTER — HOSPITAL ENCOUNTER (OUTPATIENT)
Dept: RADIATION ONCOLOGY | Facility: HOSPITAL | Age: 64
Setting detail: RADIATION/ONCOLOGY SERIES
Discharge: HOME | End: 2024-10-23
Payer: COMMERCIAL

## 2024-10-23 ENCOUNTER — TELEPHONE (OUTPATIENT)
Dept: PULMONOLOGY | Facility: HOSPITAL | Age: 64
End: 2024-10-23
Payer: COMMERCIAL

## 2024-10-23 DIAGNOSIS — Z51.0 ENCOUNTER FOR ANTINEOPLASTIC RADIATION THERAPY: ICD-10-CM

## 2024-10-23 DIAGNOSIS — C77.0 SECONDARY AND UNSPECIFIED MALIGNANT NEOPLASM OF LYMPH NODES OF HEAD, FACE AND NECK: ICD-10-CM

## 2024-10-23 DIAGNOSIS — I25.10 CORONARY ARTERY CALCIFICATION: Primary | ICD-10-CM

## 2024-10-23 LAB
RAD ONC MSQ ACTUAL FRACTIONS DELIVERED: 8
RAD ONC MSQ ACTUAL SESSION BIOLOGICAL DOSE: 200 CCGE
RAD ONC MSQ ACTUAL SESSION DELIVERED DOSE: 182 CGRAY
RAD ONC MSQ ACTUAL TOTAL BIOLOGICAL DOSE: 1602 CCGE
RAD ONC MSQ ACTUAL TOTAL DOSE: 1456 CGRAY
RAD ONC MSQ ELAPSED DAYS: 9
RAD ONC MSQ LAST DATE: NORMAL
RAD ONC MSQ PRESCRIBED BIOLOGICAL FRACTIONAL DOSE: 200 CCGE
RAD ONC MSQ PRESCRIBED BIOLOGICAL TOTAL DOSE: 5400 CCGE
RAD ONC MSQ PRESCRIBED FRACTIONAL DOSE: 182 CGRAY
RAD ONC MSQ PRESCRIBED NUMBER OF FRACTIONS: 27
RAD ONC MSQ PRESCRIBED TECHNIQUE: NORMAL
RAD ONC MSQ PRESCRIBED TOTAL DOSE: 4909 CGRAY
RAD ONC MSQ PRESCRIPTION PATTERN COMMENT: NORMAL
RAD ONC MSQ START DATE: NORMAL
RAD ONC MSQ TREATMENT COURSE NUMBER: 2
RAD ONC MSQ TREATMENT SITE: NORMAL

## 2024-10-23 PROCEDURE — 77387 GUIDANCE FOR RADJ TX DLVR: CPT | Performed by: RADIOLOGY

## 2024-10-23 PROCEDURE — 77523 PROTON TRMT INTERMEDIATE: CPT | Performed by: RADIOLOGY

## 2024-10-23 NOTE — TELEPHONE ENCOUNTER
Patient acknowledged understanding. All questions answered at this time.     ----- Message from Antoinette Matthews sent at 10/23/2024 12:59 PM EDT -----  done  ----- Message -----  From: Melissa Cisse RN  Sent: 10/23/2024  10:02 AM EDT  To: SHARIFA Arias    Patient is agreeable to this can you please place the referral.  ----- Message -----  From: SHARIFA Arias  Sent: 10/23/2024   9:40 AM EDT  To: Melissa Cisse RN    Please call the patient and let her know the prior area of concern in the RUL has resolved, all other findings are stable. We will repeat another CT chest in 6 months, I will order it at her follow up in March. She also has some severe coronary artery calcifications, I do not see that she has had a cardiac workup in the past? I can refer her to cardiology for further workup if she is agreeable.

## 2024-10-24 ENCOUNTER — RADIATION ONCOLOGY OTV (OUTPATIENT)
Dept: RADIATION ONCOLOGY | Facility: HOSPITAL | Age: 64
End: 2024-10-24
Payer: COMMERCIAL

## 2024-10-24 ENCOUNTER — HOSPITAL ENCOUNTER (OUTPATIENT)
Dept: RADIATION ONCOLOGY | Facility: HOSPITAL | Age: 64
Setting detail: RADIATION/ONCOLOGY SERIES
Discharge: HOME | End: 2024-10-24
Payer: COMMERCIAL

## 2024-10-24 VITALS
RESPIRATION RATE: 20 BRPM | SYSTOLIC BLOOD PRESSURE: 172 MMHG | TEMPERATURE: 96.8 F | OXYGEN SATURATION: 91 % | HEART RATE: 66 BPM | WEIGHT: 162.25 LBS | BODY MASS INDEX: 29.68 KG/M2 | DIASTOLIC BLOOD PRESSURE: 91 MMHG

## 2024-10-24 DIAGNOSIS — C77.0 SECONDARY AND UNSPECIFIED MALIGNANT NEOPLASM OF LYMPH NODES OF HEAD, FACE AND NECK: ICD-10-CM

## 2024-10-24 DIAGNOSIS — Z51.0 ENCOUNTER FOR ANTINEOPLASTIC RADIATION THERAPY: ICD-10-CM

## 2024-10-24 DIAGNOSIS — C10.9 OROPHARYNGEAL CANCER (MULTI): Primary | ICD-10-CM

## 2024-10-24 LAB
RAD ONC MSQ ACTUAL FRACTIONS DELIVERED: 9
RAD ONC MSQ ACTUAL SESSION BIOLOGICAL DOSE: 200 CCGE
RAD ONC MSQ ACTUAL SESSION DELIVERED DOSE: 182 CGRAY
RAD ONC MSQ ACTUAL TOTAL BIOLOGICAL DOSE: 1802 CCGE
RAD ONC MSQ ACTUAL TOTAL DOSE: 1638 CGRAY
RAD ONC MSQ ELAPSED DAYS: 10
RAD ONC MSQ LAST DATE: NORMAL
RAD ONC MSQ PRESCRIBED BIOLOGICAL FRACTIONAL DOSE: 200 CCGE
RAD ONC MSQ PRESCRIBED BIOLOGICAL TOTAL DOSE: 5400 CCGE
RAD ONC MSQ PRESCRIBED FRACTIONAL DOSE: 182 CGRAY
RAD ONC MSQ PRESCRIBED NUMBER OF FRACTIONS: 27
RAD ONC MSQ PRESCRIBED TECHNIQUE: NORMAL
RAD ONC MSQ PRESCRIBED TOTAL DOSE: 4909 CGRAY
RAD ONC MSQ PRESCRIPTION PATTERN COMMENT: NORMAL
RAD ONC MSQ START DATE: NORMAL
RAD ONC MSQ TREATMENT COURSE NUMBER: 2
RAD ONC MSQ TREATMENT SITE: NORMAL

## 2024-10-24 PROCEDURE — 77523 PROTON TRMT INTERMEDIATE: CPT | Performed by: RADIOLOGY

## 2024-10-24 PROCEDURE — 77387 GUIDANCE FOR RADJ TX DLVR: CPT | Performed by: RADIOLOGY

## 2024-10-24 RX ORDER — GUAIFENESIN 600 MG/1
1200 TABLET, EXTENDED RELEASE ORAL 2 TIMES DAILY
Qty: 120 TABLET | Refills: 11 | Status: SHIPPED | OUTPATIENT
Start: 2024-10-24 | End: 2025-10-24

## 2024-10-24 RX ORDER — GABAPENTIN 300 MG/1
300 CAPSULE ORAL 3 TIMES DAILY
Qty: 90 CAPSULE | Refills: 1 | Status: SHIPPED | OUTPATIENT
Start: 2024-10-24 | End: 2025-10-24

## 2024-10-24 ASSESSMENT — PAIN SCALES - GENERAL: PAINLEVEL_OUTOF10: 4

## 2024-10-24 NOTE — PROGRESS NOTES
Radiation Oncology On Treatment Visit    Patient Name:  Melvi Sanchez  MRN:  [id number]  :  1960    Referring Provider: No ref. provider found  Primary Care Provider: Alton King DO  Care Team: Patient Care Team:  Alton King DO as PCP - General (Family Medicine)  [facility]  Alton King DO as PCP - [facility] Medicaid PCP  Jimmie LEYVA MD as Consulting Physician (Radiation Oncology)  Marlon Patel MD as Surgeon (Pulmonary Disease)  Tiffanie Whitfield RN as Nurse Navigator (Hematology and Oncology)  Alejandro Hurst MD as Consulting Physician (Hematology and Oncology)  Chetna Lowry MD as Radiation Oncologist (Radiation Oncology)  Danny Rodriguez PA-C as Physician Assistant (Hematology and Oncology)    Date of Service: 10/24/2024     Diagnosis:   Specialty Problems          Radiation Oncology Problems    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)        Tonsil cancer (Multi)         Treatment Summary:  Proton Beam: Left Head and neck    Treatment Period Technique Fraction Dose Fractions Total Dose   Course 2 10/9/2024-10/24/2024  (days elapsed: 15)         VMAT L neck 10/9/2024-10/11/2024 VMAT 200 / 200 cGy 3 / 3 600 / 600 cGy         L neck 10/14/2024-10/24/2024 3-Field 182 / 182 cGy  1638 / 4,909 cGy     SUBJECTIVE:   She continues to be bothered by pain in her left neck ever since surgery.  She is currently seeing supportive oncology for pain management regarding this.  She is not having any nausea.  She stopped smoking yesterday.  She is tolerating oral diet with no difficulty swallowing. Hiccups and heartburn are better.       OBJECTIVE:   Vital Signs:  BP (!) 172/91   Pulse 66   Temp 36 °C (96.8 °F)   Resp 20   Wt 73.6 kg (162 lb 4 oz)   SpO2 91%   BMI 29.68 kg/m²     Other Pertinent Findings:   No thrush, no mucositis or skin erythema.   Toxicity Assessment          10/14/2024    13:25 10/17/2024    13:42   Toxicity Assessment    Treatment  and neck Head and neck   Anorexia Grade 0 Grade 0   Anxiety Grade 0 Grade 0   Dehydration Grade 0 Grade 0   Depression Grade 0 Grade 0   Dermatitis Radiation Grade 0 Grade 0   Diarrhea Grade 0 Grade 0   Fatigue Grade 0 Grade 1   Nausea Grade 0 Grade 0   Pain Grade 0    Tumor Pain Grade 0    Vomiting Grade 0 Grade 0   Hearing Impaired Grade 0    Blurred Vision Grade 0    Dry Eye Grade 0    Eye Pain Grade 0    Dry Mouth Grade 2    Ear Pain Grade 0 Grade 0   Watering Eyes  Grade 0   Oral Pain Grade 0 Grade 0   Edema Face Grade 0 Grade 0   Malaise Grade 0    Neck Edema Grade 1 Grade 0   Head Soft Tissue Necrosis Grade 0    Neck Soft Tissue Necrosis Grade 0    Facial Nerve Disorder Grade 0    Trigeminal Nerve Disorder Grade 0 Grade 0   Aspiration Grade 0 Grade 0   Hoarseness Grade 0 Grade 0   Stridor  Grade 0   Voice Alteration Grade 0         FEN: patient's weight is stable. Aim for >2000 calories and >60grams of protein daily. Will see a dietician for more specific recommendations.  All diet is oral and no feeding tube.  Will meet with SLP.     Nausea: Reviewed use of Dexamethasone and Olanzapine after chemo each week to prevent nausea and Compazine/Zofran PRN for breakthrough nausea     Pain control: Patient reporting ongoing pain in left neck.  Currently taking Percocet and gabapentin under the direction of supportive oncology.  Try to avoid Ibuprofen as can cause gastritis/renal insufficiency, which can also be exacerbated by ongoing chemo. Ok to take Percocet every 4 hours instead of every 6 hours. Gave rx for Neurontin 300 TID to use as an adjunct for pain control.      Hiccups and Heartburn: Likely from Dexamethasone. Gave rx for Baclofen 5 TID and Omeprazole 40 mg daily.      Oral Care: Patient will continue fluoride trays per dentist. Patient is using Glutamine 10g TID on days of RT to promote ongoing healing of mucositis. No need currently for Guaifenesin for thick saliva or Xylimelt for  dry mouth     Skin Care: Continue Aquaphor within the RT field. Reviewed how to apply and to avoid within 3 hours prior to RT.      Diarrhea/Constipation: Reviewed use of Immodium for diarrhea. Reviewed use of Miralax or Senna if develops constipation.     Smoking cessation: Patient is currently using nicotine patches, Wellbutrin, and Chantix.  She has now quit smoking and intends to stay quit.

## 2024-10-25 ENCOUNTER — INFUSION (OUTPATIENT)
Dept: HEMATOLOGY/ONCOLOGY | Facility: HOSPITAL | Age: 64
End: 2024-10-25
Payer: COMMERCIAL

## 2024-10-25 ENCOUNTER — TELEPHONE (OUTPATIENT)
Dept: PRIMARY CARE | Facility: CLINIC | Age: 64
End: 2024-10-25

## 2024-10-25 ENCOUNTER — HOSPITAL ENCOUNTER (OUTPATIENT)
Dept: RADIATION ONCOLOGY | Facility: HOSPITAL | Age: 64
Setting detail: RADIATION/ONCOLOGY SERIES
Discharge: HOME | End: 2024-10-25
Payer: COMMERCIAL

## 2024-10-25 VITALS
OXYGEN SATURATION: 100 % | HEART RATE: 58 BPM | BODY MASS INDEX: 30 KG/M2 | DIASTOLIC BLOOD PRESSURE: 69 MMHG | RESPIRATION RATE: 18 BRPM | WEIGHT: 164 LBS | SYSTOLIC BLOOD PRESSURE: 147 MMHG | TEMPERATURE: 96.8 F

## 2024-10-25 DIAGNOSIS — Z51.0 ENCOUNTER FOR ANTINEOPLASTIC RADIATION THERAPY: ICD-10-CM

## 2024-10-25 DIAGNOSIS — C77.0 METASTASIS TO HEAD AND NECK LYMPH NODE (MULTI): ICD-10-CM

## 2024-10-25 DIAGNOSIS — C77.0 SECONDARY AND UNSPECIFIED MALIGNANT NEOPLASM OF LYMPH NODES OF HEAD, FACE AND NECK: ICD-10-CM

## 2024-10-25 DIAGNOSIS — C09.9 TONSIL CANCER (MULTI): ICD-10-CM

## 2024-10-25 LAB
RAD ONC MSQ ACTUAL FRACTIONS DELIVERED: 10
RAD ONC MSQ ACTUAL SESSION BIOLOGICAL DOSE: 200 CCGE
RAD ONC MSQ ACTUAL SESSION DELIVERED DOSE: 182 CGRAY
RAD ONC MSQ ACTUAL TOTAL BIOLOGICAL DOSE: 2002 CCGE
RAD ONC MSQ ACTUAL TOTAL DOSE: 1820 CGRAY
RAD ONC MSQ ELAPSED DAYS: 11
RAD ONC MSQ LAST DATE: NORMAL
RAD ONC MSQ PRESCRIBED BIOLOGICAL FRACTIONAL DOSE: 200 CCGE
RAD ONC MSQ PRESCRIBED BIOLOGICAL TOTAL DOSE: 5400 CCGE
RAD ONC MSQ PRESCRIBED FRACTIONAL DOSE: 182 CGRAY
RAD ONC MSQ PRESCRIBED NUMBER OF FRACTIONS: 27
RAD ONC MSQ PRESCRIBED TECHNIQUE: NORMAL
RAD ONC MSQ PRESCRIBED TOTAL DOSE: 4909 CGRAY
RAD ONC MSQ PRESCRIPTION PATTERN COMMENT: NORMAL
RAD ONC MSQ START DATE: NORMAL
RAD ONC MSQ TREATMENT COURSE NUMBER: 2
RAD ONC MSQ TREATMENT SITE: NORMAL

## 2024-10-25 PROCEDURE — 96360 HYDRATION IV INFUSION INIT: CPT | Mod: INF

## 2024-10-25 PROCEDURE — 77523 PROTON TRMT INTERMEDIATE: CPT | Performed by: RADIOLOGY

## 2024-10-25 PROCEDURE — 77336 RADIATION PHYSICS CONSULT: CPT | Performed by: RADIOLOGY

## 2024-10-25 PROCEDURE — 2500000004 HC RX 250 GENERAL PHARMACY W/ HCPCS (ALT 636 FOR OP/ED): Performed by: STUDENT IN AN ORGANIZED HEALTH CARE EDUCATION/TRAINING PROGRAM

## 2024-10-25 PROCEDURE — 77387 GUIDANCE FOR RADJ TX DLVR: CPT | Performed by: RADIOLOGY

## 2024-10-25 RX ORDER — HEPARIN SODIUM,PORCINE/PF 10 UNIT/ML
50 SYRINGE (ML) INTRAVENOUS AS NEEDED
OUTPATIENT
Start: 2024-10-25

## 2024-10-25 RX ORDER — HEPARIN 100 UNIT/ML
500 SYRINGE INTRAVENOUS AS NEEDED
OUTPATIENT
Start: 2024-10-25

## 2024-10-25 NOTE — PROGRESS NOTES
Patient presents to infusion appointment in stable condition. 1L IVF given without incident. Discharged in stable condition.

## 2024-10-26 DIAGNOSIS — E03.8 OTHER SPECIFIED HYPOTHYROIDISM: Primary | ICD-10-CM

## 2024-10-28 ENCOUNTER — HOSPITAL ENCOUNTER (OUTPATIENT)
Dept: RADIATION ONCOLOGY | Facility: HOSPITAL | Age: 64
Setting detail: RADIATION/ONCOLOGY SERIES
Discharge: HOME | End: 2024-10-28
Payer: COMMERCIAL

## 2024-10-28 ENCOUNTER — TELEPHONE (OUTPATIENT)
Dept: ADMISSION | Facility: HOSPITAL | Age: 64
End: 2024-10-28

## 2024-10-28 DIAGNOSIS — C77.0 SECONDARY AND UNSPECIFIED MALIGNANT NEOPLASM OF LYMPH NODES OF HEAD, FACE AND NECK: ICD-10-CM

## 2024-10-28 DIAGNOSIS — G89.3 CANCER ASSOCIATED PAIN: ICD-10-CM

## 2024-10-28 DIAGNOSIS — Z51.0 ENCOUNTER FOR ANTINEOPLASTIC RADIATION THERAPY: ICD-10-CM

## 2024-10-28 LAB
RAD ONC MSQ ACTUAL FRACTIONS DELIVERED: 11
RAD ONC MSQ ACTUAL SESSION BIOLOGICAL DOSE: 200 CCGE
RAD ONC MSQ ACTUAL SESSION DELIVERED DOSE: 182 CGRAY
RAD ONC MSQ ACTUAL TOTAL BIOLOGICAL DOSE: 2202 CCGE
RAD ONC MSQ ACTUAL TOTAL DOSE: 2002 CGRAY
RAD ONC MSQ ELAPSED DAYS: 14
RAD ONC MSQ LAST DATE: NORMAL
RAD ONC MSQ PRESCRIBED BIOLOGICAL FRACTIONAL DOSE: 200 CCGE
RAD ONC MSQ PRESCRIBED BIOLOGICAL TOTAL DOSE: 5400 CCGE
RAD ONC MSQ PRESCRIBED FRACTIONAL DOSE: 182 CGRAY
RAD ONC MSQ PRESCRIBED NUMBER OF FRACTIONS: 27
RAD ONC MSQ PRESCRIBED TECHNIQUE: NORMAL
RAD ONC MSQ PRESCRIBED TOTAL DOSE: 4909 CGRAY
RAD ONC MSQ PRESCRIPTION PATTERN COMMENT: NORMAL
RAD ONC MSQ START DATE: NORMAL
RAD ONC MSQ TREATMENT COURSE NUMBER: 2
RAD ONC MSQ TREATMENT SITE: NORMAL

## 2024-10-28 PROCEDURE — 77387 GUIDANCE FOR RADJ TX DLVR: CPT | Performed by: RADIOLOGY

## 2024-10-28 PROCEDURE — 77523 PROTON TRMT INTERMEDIATE: CPT | Performed by: RADIOLOGY

## 2024-10-28 RX ORDER — ACETAMINOPHEN 500 MG
500 TABLET ORAL 3 TIMES DAILY PRN
Qty: 90 TABLET | Refills: 0 | Status: CANCELLED | OUTPATIENT
Start: 2024-10-28 | End: 2024-11-27

## 2024-10-29 ENCOUNTER — LAB (OUTPATIENT)
Dept: LAB | Facility: HOSPITAL | Age: 64
End: 2024-10-29
Payer: COMMERCIAL

## 2024-10-29 ENCOUNTER — INFUSION (OUTPATIENT)
Dept: HEMATOLOGY/ONCOLOGY | Facility: HOSPITAL | Age: 64
End: 2024-10-29
Payer: COMMERCIAL

## 2024-10-29 ENCOUNTER — HOSPITAL ENCOUNTER (OUTPATIENT)
Dept: RADIATION ONCOLOGY | Facility: HOSPITAL | Age: 64
Setting detail: RADIATION/ONCOLOGY SERIES
Discharge: HOME | End: 2024-10-29
Payer: COMMERCIAL

## 2024-10-29 ENCOUNTER — OFFICE VISIT (OUTPATIENT)
Dept: HEMATOLOGY/ONCOLOGY | Facility: HOSPITAL | Age: 64
End: 2024-10-29
Payer: COMMERCIAL

## 2024-10-29 ENCOUNTER — TELEPHONE (OUTPATIENT)
Dept: HEMATOLOGY/ONCOLOGY | Facility: CLINIC | Age: 64
End: 2024-10-29

## 2024-10-29 VITALS
TEMPERATURE: 97.3 F | DIASTOLIC BLOOD PRESSURE: 50 MMHG | RESPIRATION RATE: 18 BRPM | HEART RATE: 59 BPM | OXYGEN SATURATION: 96 % | BODY MASS INDEX: 29.84 KG/M2 | WEIGHT: 163.14 LBS | SYSTOLIC BLOOD PRESSURE: 138 MMHG

## 2024-10-29 VITALS — OXYGEN SATURATION: 99 % | HEART RATE: 59 BPM | DIASTOLIC BLOOD PRESSURE: 72 MMHG | SYSTOLIC BLOOD PRESSURE: 161 MMHG

## 2024-10-29 DIAGNOSIS — C77.0 SECONDARY AND UNSPECIFIED MALIGNANT NEOPLASM OF LYMPH NODES OF HEAD, FACE AND NECK: ICD-10-CM

## 2024-10-29 DIAGNOSIS — C77.0 METASTASIS TO HEAD AND NECK LYMPH NODE (MULTI): ICD-10-CM

## 2024-10-29 DIAGNOSIS — C09.9 TONSIL CANCER (MULTI): ICD-10-CM

## 2024-10-29 DIAGNOSIS — G89.3 CANCER RELATED PAIN: Primary | ICD-10-CM

## 2024-10-29 DIAGNOSIS — G89.3 CANCER ASSOCIATED PAIN: ICD-10-CM

## 2024-10-29 DIAGNOSIS — E03.8 OTHER SPECIFIED HYPOTHYROIDISM: ICD-10-CM

## 2024-10-29 DIAGNOSIS — Z51.0 ENCOUNTER FOR ANTINEOPLASTIC RADIATION THERAPY: ICD-10-CM

## 2024-10-29 LAB
ALBUMIN SERPL BCP-MCNC: 3.5 G/DL (ref 3.4–5)
ALP SERPL-CCNC: 52 U/L (ref 33–136)
ALT SERPL W P-5'-P-CCNC: 18 U/L (ref 7–45)
ANION GAP SERPL CALC-SCNC: 9 MMOL/L (ref 10–20)
AST SERPL W P-5'-P-CCNC: 12 U/L (ref 9–39)
BASOPHILS # BLD AUTO: 0.01 X10*3/UL (ref 0–0.1)
BASOPHILS NFR BLD AUTO: 0.1 %
BILIRUB SERPL-MCNC: 0.4 MG/DL (ref 0–1.2)
BUN SERPL-MCNC: 15 MG/DL (ref 6–23)
CALCIUM SERPL-MCNC: 8.3 MG/DL (ref 8.6–10.3)
CHLORIDE SERPL-SCNC: 100 MMOL/L (ref 98–107)
CO2 SERPL-SCNC: 32 MMOL/L (ref 21–32)
CREAT SERPL-MCNC: 0.6 MG/DL (ref 0.5–1.05)
EGFRCR SERPLBLD CKD-EPI 2021: >90 ML/MIN/1.73M*2
EOSINOPHIL # BLD AUTO: 0.1 X10*3/UL (ref 0–0.7)
EOSINOPHIL NFR BLD AUTO: 1.4 %
ERYTHROCYTE [DISTWIDTH] IN BLOOD BY AUTOMATED COUNT: 13.3 % (ref 11.5–14.5)
GLUCOSE SERPL-MCNC: 110 MG/DL (ref 74–99)
HCT VFR BLD AUTO: 30.5 % (ref 36–46)
HGB BLD-MCNC: 10 G/DL (ref 12–16)
IMM GRANULOCYTES # BLD AUTO: 0.03 X10*3/UL (ref 0–0.7)
IMM GRANULOCYTES NFR BLD AUTO: 0.4 % (ref 0–0.9)
LYMPHOCYTES # BLD AUTO: 0.63 X10*3/UL (ref 1.2–4.8)
LYMPHOCYTES NFR BLD AUTO: 8.7 %
MAGNESIUM SERPL-MCNC: 1.68 MG/DL (ref 1.6–2.4)
MCH RBC QN AUTO: 31.1 PG (ref 26–34)
MCHC RBC AUTO-ENTMCNC: 32.8 G/DL (ref 32–36)
MCV RBC AUTO: 95 FL (ref 80–100)
MONOCYTES # BLD AUTO: 0.46 X10*3/UL (ref 0.1–1)
MONOCYTES NFR BLD AUTO: 6.3 %
NEUTROPHILS # BLD AUTO: 6.02 X10*3/UL (ref 1.2–7.7)
NEUTROPHILS NFR BLD AUTO: 83.1 %
NRBC BLD-RTO: 0 /100 WBCS (ref 0–0)
PLATELET # BLD AUTO: 150 X10*3/UL (ref 150–450)
POTASSIUM SERPL-SCNC: 4.5 MMOL/L (ref 3.5–5.3)
PROT SERPL-MCNC: 5.7 G/DL (ref 6.4–8.2)
RAD ONC MSQ ACTUAL FRACTIONS DELIVERED: 12
RAD ONC MSQ ACTUAL SESSION BIOLOGICAL DOSE: 200 CCGE
RAD ONC MSQ ACTUAL SESSION DELIVERED DOSE: 182 CGRAY
RAD ONC MSQ ACTUAL TOTAL BIOLOGICAL DOSE: 2402 CCGE
RAD ONC MSQ ACTUAL TOTAL DOSE: 2184 CGRAY
RAD ONC MSQ ELAPSED DAYS: 15
RAD ONC MSQ LAST DATE: NORMAL
RAD ONC MSQ PRESCRIBED BIOLOGICAL FRACTIONAL DOSE: 200 CCGE
RAD ONC MSQ PRESCRIBED BIOLOGICAL TOTAL DOSE: 5400 CCGE
RAD ONC MSQ PRESCRIBED FRACTIONAL DOSE: 182 CGRAY
RAD ONC MSQ PRESCRIBED NUMBER OF FRACTIONS: 27
RAD ONC MSQ PRESCRIBED TECHNIQUE: NORMAL
RAD ONC MSQ PRESCRIBED TOTAL DOSE: 4909 CGRAY
RAD ONC MSQ PRESCRIPTION PATTERN COMMENT: NORMAL
RAD ONC MSQ START DATE: NORMAL
RAD ONC MSQ TREATMENT COURSE NUMBER: 2
RAD ONC MSQ TREATMENT SITE: NORMAL
RBC # BLD AUTO: 3.22 X10*6/UL (ref 4–5.2)
SODIUM SERPL-SCNC: 136 MMOL/L (ref 136–145)
T4 FREE SERPL-MCNC: 1.35 NG/DL (ref 0.78–1.48)
TSH SERPL-ACNC: 4.89 MIU/L (ref 0.44–3.98)
WBC # BLD AUTO: 7.3 X10*3/UL (ref 4.4–11.3)

## 2024-10-29 PROCEDURE — 80053 COMPREHEN METABOLIC PANEL: CPT

## 2024-10-29 PROCEDURE — 2500000004 HC RX 250 GENERAL PHARMACY W/ HCPCS (ALT 636 FOR OP/ED): Performed by: STUDENT IN AN ORGANIZED HEALTH CARE EDUCATION/TRAINING PROGRAM

## 2024-10-29 PROCEDURE — 85025 COMPLETE CBC W/AUTO DIFF WBC: CPT

## 2024-10-29 PROCEDURE — 96367 TX/PROPH/DG ADDL SEQ IV INF: CPT

## 2024-10-29 PROCEDURE — 83735 ASSAY OF MAGNESIUM: CPT

## 2024-10-29 PROCEDURE — 36415 COLL VENOUS BLD VENIPUNCTURE: CPT

## 2024-10-29 PROCEDURE — 77523 PROTON TRMT INTERMEDIATE: CPT | Performed by: RADIOLOGY

## 2024-10-29 PROCEDURE — 96361 HYDRATE IV INFUSION ADD-ON: CPT | Mod: INF

## 2024-10-29 PROCEDURE — 82306 VITAMIN D 25 HYDROXY: CPT

## 2024-10-29 PROCEDURE — 99215 OFFICE O/P EST HI 40 MIN: CPT | Performed by: STUDENT IN AN ORGANIZED HEALTH CARE EDUCATION/TRAINING PROGRAM

## 2024-10-29 PROCEDURE — 84439 ASSAY OF FREE THYROXINE: CPT

## 2024-10-29 PROCEDURE — 96375 TX/PRO/DX INJ NEW DRUG ADDON: CPT | Mod: INF

## 2024-10-29 PROCEDURE — 84443 ASSAY THYROID STIM HORMONE: CPT

## 2024-10-29 PROCEDURE — 96413 CHEMO IV INFUSION 1 HR: CPT

## 2024-10-29 PROCEDURE — 99215 OFFICE O/P EST HI 40 MIN: CPT | Mod: 25 | Performed by: STUDENT IN AN ORGANIZED HEALTH CARE EDUCATION/TRAINING PROGRAM

## 2024-10-29 PROCEDURE — 77387 GUIDANCE FOR RADJ TX DLVR: CPT | Performed by: RADIOLOGY

## 2024-10-29 RX ORDER — PROCHLORPERAZINE MALEATE 10 MG
10 TABLET ORAL EVERY 6 HOURS PRN
Status: DISCONTINUED | OUTPATIENT
Start: 2024-10-29 | End: 2024-10-29 | Stop reason: HOSPADM

## 2024-10-29 RX ORDER — PALONOSETRON 0.05 MG/ML
0.25 INJECTION, SOLUTION INTRAVENOUS ONCE
Status: COMPLETED | OUTPATIENT
Start: 2024-10-29 | End: 2024-10-29

## 2024-10-29 RX ORDER — ALBUTEROL SULFATE 0.83 MG/ML
3 SOLUTION RESPIRATORY (INHALATION) AS NEEDED
Status: DISCONTINUED | OUTPATIENT
Start: 2024-10-29 | End: 2024-10-29 | Stop reason: HOSPADM

## 2024-10-29 RX ORDER — PROCHLORPERAZINE EDISYLATE 5 MG/ML
10 INJECTION INTRAMUSCULAR; INTRAVENOUS EVERY 6 HOURS PRN
Status: DISCONTINUED | OUTPATIENT
Start: 2024-10-29 | End: 2024-10-29 | Stop reason: HOSPADM

## 2024-10-29 RX ORDER — EPINEPHRINE 0.3 MG/.3ML
0.3 INJECTION SUBCUTANEOUS EVERY 5 MIN PRN
Status: DISCONTINUED | OUTPATIENT
Start: 2024-10-29 | End: 2024-10-29 | Stop reason: HOSPADM

## 2024-10-29 RX ORDER — FAMOTIDINE 10 MG/ML
20 INJECTION INTRAVENOUS ONCE AS NEEDED
Status: DISCONTINUED | OUTPATIENT
Start: 2024-10-29 | End: 2024-10-29 | Stop reason: HOSPADM

## 2024-10-29 RX ORDER — ACETAMINOPHEN 500 MG
500 TABLET ORAL 3 TIMES DAILY PRN
Qty: 90 TABLET | Refills: 0 | Status: SHIPPED | OUTPATIENT
Start: 2024-10-29 | End: 2024-11-28

## 2024-10-29 RX ORDER — DIPHENHYDRAMINE HYDROCHLORIDE 50 MG/ML
50 INJECTION INTRAMUSCULAR; INTRAVENOUS AS NEEDED
Status: DISCONTINUED | OUTPATIENT
Start: 2024-10-29 | End: 2024-10-29 | Stop reason: HOSPADM

## 2024-10-29 RX ORDER — DEXAMETHASONE 6 MG/1
12 TABLET ORAL ONCE
Status: COMPLETED | OUTPATIENT
Start: 2024-10-29 | End: 2024-10-29

## 2024-10-29 RX ORDER — OXYCODONE AND ACETAMINOPHEN 7.5; 325 MG/1; MG/1
1 TABLET ORAL EVERY 4 HOURS PRN
Qty: 180 TABLET | Refills: 0 | Status: SHIPPED | OUTPATIENT
Start: 2024-10-29 | End: 2024-11-28

## 2024-10-29 ASSESSMENT — PAIN SCALES - GENERAL: PAINLEVEL_OUTOF10: 3

## 2024-10-30 ENCOUNTER — APPOINTMENT (OUTPATIENT)
Dept: SPEECH THERAPY | Facility: HOSPITAL | Age: 64
End: 2024-10-30
Payer: COMMERCIAL

## 2024-10-30 ENCOUNTER — HOSPITAL ENCOUNTER (OUTPATIENT)
Dept: RADIATION ONCOLOGY | Facility: HOSPITAL | Age: 64
Setting detail: RADIATION/ONCOLOGY SERIES
Discharge: HOME | End: 2024-10-30
Payer: COMMERCIAL

## 2024-10-30 ENCOUNTER — DOCUMENTATION (OUTPATIENT)
Dept: SPEECH THERAPY | Facility: HOSPITAL | Age: 64
End: 2024-10-30
Payer: COMMERCIAL

## 2024-10-30 DIAGNOSIS — Z51.0 ENCOUNTER FOR ANTINEOPLASTIC RADIATION THERAPY: ICD-10-CM

## 2024-10-30 DIAGNOSIS — C77.0 SECONDARY AND UNSPECIFIED MALIGNANT NEOPLASM OF LYMPH NODES OF HEAD, FACE AND NECK: ICD-10-CM

## 2024-10-30 LAB
RAD ONC MSQ ACTUAL FRACTIONS DELIVERED: 13
RAD ONC MSQ ACTUAL SESSION BIOLOGICAL DOSE: 200 CCGE
RAD ONC MSQ ACTUAL SESSION DELIVERED DOSE: 182 CGRAY
RAD ONC MSQ ACTUAL TOTAL BIOLOGICAL DOSE: 2603 CCGE
RAD ONC MSQ ACTUAL TOTAL DOSE: 2366 CGRAY
RAD ONC MSQ ELAPSED DAYS: 16
RAD ONC MSQ LAST DATE: NORMAL
RAD ONC MSQ PRESCRIBED BIOLOGICAL FRACTIONAL DOSE: 200 CCGE
RAD ONC MSQ PRESCRIBED BIOLOGICAL TOTAL DOSE: 5400 CCGE
RAD ONC MSQ PRESCRIBED FRACTIONAL DOSE: 182 CGRAY
RAD ONC MSQ PRESCRIBED NUMBER OF FRACTIONS: 27
RAD ONC MSQ PRESCRIBED TECHNIQUE: NORMAL
RAD ONC MSQ PRESCRIBED TOTAL DOSE: 4909 CGRAY
RAD ONC MSQ PRESCRIPTION PATTERN COMMENT: NORMAL
RAD ONC MSQ START DATE: NORMAL
RAD ONC MSQ TREATMENT COURSE NUMBER: 2
RAD ONC MSQ TREATMENT SITE: NORMAL

## 2024-10-30 PROCEDURE — 77387 GUIDANCE FOR RADJ TX DLVR: CPT | Performed by: RADIOLOGY

## 2024-10-30 PROCEDURE — 77523 PROTON TRMT INTERMEDIATE: CPT | Performed by: RADIOLOGY

## 2024-10-31 ENCOUNTER — HOSPITAL ENCOUNTER (OUTPATIENT)
Dept: RADIATION ONCOLOGY | Facility: HOSPITAL | Age: 64
Setting detail: RADIATION/ONCOLOGY SERIES
Discharge: HOME | End: 2024-10-31
Payer: COMMERCIAL

## 2024-10-31 DIAGNOSIS — Z51.0 ENCOUNTER FOR ANTINEOPLASTIC RADIATION THERAPY: ICD-10-CM

## 2024-10-31 DIAGNOSIS — C77.0 SECONDARY AND UNSPECIFIED MALIGNANT NEOPLASM OF LYMPH NODES OF HEAD, FACE AND NECK: ICD-10-CM

## 2024-10-31 DIAGNOSIS — E03.8 OTHER SPECIFIED HYPOTHYROIDISM: Primary | ICD-10-CM

## 2024-10-31 LAB
RAD ONC MSQ ACTUAL FRACTIONS DELIVERED: 14
RAD ONC MSQ ACTUAL SESSION BIOLOGICAL DOSE: 200 CCGE
RAD ONC MSQ ACTUAL SESSION DELIVERED DOSE: 182 CGRAY
RAD ONC MSQ ACTUAL TOTAL BIOLOGICAL DOSE: 2803 CCGE
RAD ONC MSQ ACTUAL TOTAL DOSE: 2548 CGRAY
RAD ONC MSQ ELAPSED DAYS: 17
RAD ONC MSQ LAST DATE: NORMAL
RAD ONC MSQ PRESCRIBED BIOLOGICAL FRACTIONAL DOSE: 200 CCGE
RAD ONC MSQ PRESCRIBED BIOLOGICAL TOTAL DOSE: 5400 CCGE
RAD ONC MSQ PRESCRIBED FRACTIONAL DOSE: 182 CGRAY
RAD ONC MSQ PRESCRIBED NUMBER OF FRACTIONS: 27
RAD ONC MSQ PRESCRIBED TECHNIQUE: NORMAL
RAD ONC MSQ PRESCRIBED TOTAL DOSE: 4909 CGRAY
RAD ONC MSQ PRESCRIPTION PATTERN COMMENT: NORMAL
RAD ONC MSQ START DATE: NORMAL
RAD ONC MSQ TREATMENT COURSE NUMBER: 2
RAD ONC MSQ TREATMENT SITE: NORMAL

## 2024-10-31 PROCEDURE — 77523 PROTON TRMT INTERMEDIATE: CPT | Performed by: RADIOLOGY

## 2024-10-31 PROCEDURE — 77387 GUIDANCE FOR RADJ TX DLVR: CPT | Performed by: RADIOLOGY

## 2024-10-31 RX ORDER — LEVOTHYROXINE SODIUM 100 UG/1
100 TABLET ORAL DAILY
Qty: 90 TABLET | Refills: 1 | Status: SHIPPED | OUTPATIENT
Start: 2024-10-31 | End: 2025-04-29

## 2024-11-01 ENCOUNTER — HOSPITAL ENCOUNTER (OUTPATIENT)
Dept: RADIATION ONCOLOGY | Facility: HOSPITAL | Age: 64
Setting detail: RADIATION/ONCOLOGY SERIES
Discharge: HOME | End: 2024-11-01
Payer: COMMERCIAL

## 2024-11-01 ENCOUNTER — INFUSION (OUTPATIENT)
Dept: HEMATOLOGY/ONCOLOGY | Facility: HOSPITAL | Age: 64
End: 2024-11-01
Payer: COMMERCIAL

## 2024-11-01 ENCOUNTER — APPOINTMENT (OUTPATIENT)
Dept: PRIMARY CARE | Facility: CLINIC | Age: 64
End: 2024-11-01
Payer: COMMERCIAL

## 2024-11-01 VITALS
SYSTOLIC BLOOD PRESSURE: 154 MMHG | WEIGHT: 171 LBS | HEART RATE: 47 BPM | OXYGEN SATURATION: 87 % | DIASTOLIC BLOOD PRESSURE: 80 MMHG | BODY MASS INDEX: 31.28 KG/M2

## 2024-11-01 VITALS
DIASTOLIC BLOOD PRESSURE: 57 MMHG | TEMPERATURE: 99 F | RESPIRATION RATE: 18 BRPM | SYSTOLIC BLOOD PRESSURE: 145 MMHG | WEIGHT: 168.87 LBS | BODY MASS INDEX: 30.89 KG/M2 | OXYGEN SATURATION: 95 % | HEART RATE: 69 BPM

## 2024-11-01 DIAGNOSIS — C09.9 TONSIL CANCER (MULTI): ICD-10-CM

## 2024-11-01 DIAGNOSIS — F17.210 TOBACCO DEPENDENCE DUE TO CIGARETTES: ICD-10-CM

## 2024-11-01 DIAGNOSIS — F32.A DEPRESSION, UNSPECIFIED DEPRESSION TYPE: ICD-10-CM

## 2024-11-01 DIAGNOSIS — C77.0 SECONDARY AND UNSPECIFIED MALIGNANT NEOPLASM OF LYMPH NODES OF HEAD, FACE AND NECK: ICD-10-CM

## 2024-11-01 DIAGNOSIS — R26.89 BALANCE PROBLEM: ICD-10-CM

## 2024-11-01 DIAGNOSIS — C77.0 METASTASIS TO HEAD AND NECK LYMPH NODE (MULTI): ICD-10-CM

## 2024-11-01 DIAGNOSIS — Z51.0 ENCOUNTER FOR ANTINEOPLASTIC RADIATION THERAPY: ICD-10-CM

## 2024-11-01 DIAGNOSIS — R53.83 OTHER FATIGUE: Primary | ICD-10-CM

## 2024-11-01 DIAGNOSIS — I10 HYPERTENSION, UNSPECIFIED TYPE: ICD-10-CM

## 2024-11-01 PROBLEM — Z00.00 ROUTINE GENERAL MEDICAL EXAMINATION AT A HEALTH CARE FACILITY: Status: ACTIVE | Noted: 2024-11-01

## 2024-11-01 LAB
25(OH)D3 SERPL-MCNC: 53 NG/ML (ref 30–100)
RAD ONC MSQ ACTUAL FRACTIONS DELIVERED: 15
RAD ONC MSQ ACTUAL SESSION BIOLOGICAL DOSE: 200 CCGE
RAD ONC MSQ ACTUAL SESSION DELIVERED DOSE: 182 CGRAY
RAD ONC MSQ ACTUAL TOTAL BIOLOGICAL DOSE: 3003 CCGE
RAD ONC MSQ ACTUAL TOTAL DOSE: 2730 CGRAY
RAD ONC MSQ ELAPSED DAYS: 18
RAD ONC MSQ LAST DATE: NORMAL
RAD ONC MSQ PRESCRIBED BIOLOGICAL FRACTIONAL DOSE: 200 CCGE
RAD ONC MSQ PRESCRIBED BIOLOGICAL TOTAL DOSE: 5400 CCGE
RAD ONC MSQ PRESCRIBED FRACTIONAL DOSE: 182 CGRAY
RAD ONC MSQ PRESCRIBED NUMBER OF FRACTIONS: 27
RAD ONC MSQ PRESCRIBED TECHNIQUE: NORMAL
RAD ONC MSQ PRESCRIBED TOTAL DOSE: 4909 CGRAY
RAD ONC MSQ PRESCRIPTION PATTERN COMMENT: NORMAL
RAD ONC MSQ START DATE: NORMAL
RAD ONC MSQ TREATMENT COURSE NUMBER: 2
RAD ONC MSQ TREATMENT SITE: NORMAL

## 2024-11-01 PROCEDURE — 77336 RADIATION PHYSICS CONSULT: CPT | Performed by: RADIOLOGY

## 2024-11-01 PROCEDURE — 96360 HYDRATION IV INFUSION INIT: CPT | Mod: INF

## 2024-11-01 PROCEDURE — 77387 GUIDANCE FOR RADJ TX DLVR: CPT | Performed by: RADIOLOGY

## 2024-11-01 PROCEDURE — 77523 PROTON TRMT INTERMEDIATE: CPT | Performed by: RADIOLOGY

## 2024-11-01 PROCEDURE — 2500000004 HC RX 250 GENERAL PHARMACY W/ HCPCS (ALT 636 FOR OP/ED): Performed by: STUDENT IN AN ORGANIZED HEALTH CARE EDUCATION/TRAINING PROGRAM

## 2024-11-01 RX ORDER — LOSARTAN POTASSIUM 25 MG/1
25 TABLET ORAL DAILY
Qty: 30 TABLET | Refills: 11 | Status: SHIPPED | OUTPATIENT
Start: 2024-11-01 | End: 2025-11-01

## 2024-11-01 RX ORDER — SERTRALINE HYDROCHLORIDE 25 MG/1
25 TABLET, FILM COATED ORAL DAILY
Qty: 30 TABLET | Refills: 1 | Status: SHIPPED | OUTPATIENT
Start: 2024-11-01 | End: 2024-12-31

## 2024-11-01 RX ORDER — IBUPROFEN 200 MG
1 TABLET ORAL EVERY 24 HOURS
Qty: 30 PATCH | Refills: 0 | Status: SHIPPED | OUTPATIENT
Start: 2024-11-01 | End: 2024-12-01

## 2024-11-01 RX ORDER — HEPARIN 100 UNIT/ML
500 SYRINGE INTRAVENOUS AS NEEDED
OUTPATIENT
Start: 2024-11-01

## 2024-11-01 RX ORDER — CALCIUM CARBONATE 160(400)MG
1 TABLET,CHEWABLE ORAL AS NEEDED
Qty: 1 EACH | Refills: 0 | Status: SHIPPED | OUTPATIENT
Start: 2024-11-01 | End: 2024-11-01

## 2024-11-01 RX ORDER — CALCIUM CARBONATE 160(400)MG
1 TABLET,CHEWABLE ORAL AS NEEDED
Qty: 1 EACH | Refills: 0 | Status: SHIPPED | OUTPATIENT
Start: 2024-11-01

## 2024-11-01 RX ORDER — HEPARIN SODIUM,PORCINE/PF 10 UNIT/ML
50 SYRINGE (ML) INTRAVENOUS AS NEEDED
OUTPATIENT
Start: 2024-11-01

## 2024-11-01 ASSESSMENT — PAIN SCALES - GENERAL: PAINLEVEL_OUTOF10: 0-NO PAIN

## 2024-11-04 ENCOUNTER — HOSPITAL ENCOUNTER (OUTPATIENT)
Dept: RADIATION ONCOLOGY | Facility: HOSPITAL | Age: 64
Setting detail: RADIATION/ONCOLOGY SERIES
Discharge: HOME | End: 2024-11-04
Payer: COMMERCIAL

## 2024-11-04 ENCOUNTER — RADIATION ONCOLOGY OTV (OUTPATIENT)
Dept: RADIATION ONCOLOGY | Facility: HOSPITAL | Age: 64
End: 2024-11-04
Payer: COMMERCIAL

## 2024-11-04 VITALS
RESPIRATION RATE: 20 BRPM | WEIGHT: 165.4 LBS | TEMPERATURE: 96.6 F | HEART RATE: 75 BPM | DIASTOLIC BLOOD PRESSURE: 70 MMHG | OXYGEN SATURATION: 95 % | BODY MASS INDEX: 30.25 KG/M2 | SYSTOLIC BLOOD PRESSURE: 122 MMHG

## 2024-11-04 DIAGNOSIS — Z51.0 ENCOUNTER FOR ANTINEOPLASTIC RADIATION THERAPY: ICD-10-CM

## 2024-11-04 DIAGNOSIS — C77.0 SECONDARY AND UNSPECIFIED MALIGNANT NEOPLASM OF LYMPH NODES OF HEAD, FACE AND NECK: ICD-10-CM

## 2024-11-04 LAB
RAD ONC MSQ ACTUAL FRACTIONS DELIVERED: 16
RAD ONC MSQ ACTUAL SESSION BIOLOGICAL DOSE: 200 CCGE
RAD ONC MSQ ACTUAL SESSION DELIVERED DOSE: 182 CGRAY
RAD ONC MSQ ACTUAL TOTAL BIOLOGICAL DOSE: 3203 CCGE
RAD ONC MSQ ACTUAL TOTAL DOSE: 2912 CGRAY
RAD ONC MSQ ELAPSED DAYS: 21
RAD ONC MSQ LAST DATE: NORMAL
RAD ONC MSQ PRESCRIBED BIOLOGICAL FRACTIONAL DOSE: 200 CCGE
RAD ONC MSQ PRESCRIBED BIOLOGICAL TOTAL DOSE: 5400 CCGE
RAD ONC MSQ PRESCRIBED FRACTIONAL DOSE: 182 CGRAY
RAD ONC MSQ PRESCRIBED NUMBER OF FRACTIONS: 27
RAD ONC MSQ PRESCRIBED TECHNIQUE: NORMAL
RAD ONC MSQ PRESCRIBED TOTAL DOSE: 4909 CGRAY
RAD ONC MSQ PRESCRIPTION PATTERN COMMENT: NORMAL
RAD ONC MSQ START DATE: NORMAL
RAD ONC MSQ TREATMENT COURSE NUMBER: 2
RAD ONC MSQ TREATMENT SITE: NORMAL

## 2024-11-04 PROCEDURE — 77387 GUIDANCE FOR RADJ TX DLVR: CPT | Performed by: RADIOLOGY

## 2024-11-04 PROCEDURE — 77523 PROTON TRMT INTERMEDIATE: CPT | Performed by: RADIOLOGY

## 2024-11-04 ASSESSMENT — ENCOUNTER SYMPTOMS
ARTHRALGIAS: 0
SHORTNESS OF BREATH: 0
COUGH: 0
HEADACHES: 0
FEVER: 0
DIARRHEA: 0
NUMBNESS: 0
LIGHT-HEADEDNESS: 0
CHILLS: 0
VOMITING: 0
SORE THROAT: 1
NAUSEA: 0
CONSTIPATION: 0
TROUBLE SWALLOWING: 1
LEG SWELLING: 0
ABDOMINAL PAIN: 0

## 2024-11-04 ASSESSMENT — PAIN SCALES - GENERAL: PAINLEVEL_OUTOF10: 2

## 2024-11-04 NOTE — PROGRESS NOTES
Radiation Oncology On Treatment Visit    Patient Name:  Melvi Sanchez  MRN:  [id number]  :  1960    Referring Provider: No ref. provider found  Primary Care Provider: Alton King DO  Care Team: Patient Care Team:  Alton King DO as PCP - General (Family Medicine)  Alton King DO as PCP - [facility] PCP  Alton King DO as PCP - [facility] Medicaid PCP  Jimmie LEYVA MD as Consulting Physician (Radiation Oncology)  Marlon Patel MD as Surgeon (Pulmonary Disease)  Tiffanie Whitfield RN as Nurse Navigator (Hematology and Oncology)  Alejandro Hurst MD as Consulting Physician (Hematology and Oncology)  Chetna Lowry MD as Radiation Oncologist (Radiation Oncology)  Danny Rodriguez PA-C as Physician Assistant (Hematology and Oncology)    Date of Service: 2024     Diagnosis:   Specialty Problems          Radiation Oncology Problems    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)        Tonsil cancer (Multi)         Treatment Summary:  Proton Beam: Left Head and neck    Treatment Period Technique Fraction Dose Fractions Total Dose   Course 2 10/9/2024-2024  (days elapsed: 26)         VMAT L neck 10/9/2024-10/11/2024 VMAT 200 / 200 cGy 3 / 3 600 / 600 cGy         L neck 10/14/2024-2024 3-Field 182 / 182 cGy  2912 / 4,909 cGy     SUBJECTIVE:   She continues to be bothered by pain in her left neck ever since surgery.  She is currently seeing supportive oncology for pain management regarding this.  She is not having any nausea.  She stopped smoking yesterday.  She is tolerating oral diet with no difficulty swallowing. Hiccups and heartburn are better.       OBJECTIVE:   Vital Signs:  /70   Pulse 75   Temp 35.9 °C (96.6 °F)   Resp 20   Wt 75 kg (165 lb 6.4 oz)   SpO2 95%   BMI 30.25 kg/m²     Other Pertinent Findings:   No thrush, no mucositis or skin erythema.   Toxicity Assessment          10/14/2024    13:25 10/17/2024    13:42 10/24/2024    12:27  11/4/2024    10:22   Toxicity Assessment   Treatment  and neck Head and neck Head and neck Head and neck   Anorexia Grade 0 Grade 0 Grade 0 Grade 0   Anxiety Grade 0 Grade 0 Grade 0 Grade 0   Dehydration Grade 0 Grade 0 Grade 0 Grade 0   Depression Grade 0 Grade 0 Grade 0 Grade 0   Dermatitis Radiation Grade 0 Grade 0 Grade 1 Grade 1   Diarrhea Grade 0 Grade 0 Grade 0 Grade 0   Fatigue Grade 0 Grade 1 Grade 1 Grade 2   Nausea Grade 0 Grade 0 Grade 0 Grade 0   Pain Grade 0  Grade 2 Grade 1   Tumor Pain Grade 0  Grade 0 Grade 0   Vomiting Grade 0 Grade 0 Grade 0 Grade 0   Hearing Impaired Grade 0      Blurred Vision Grade 0      Dry Eye Grade 0      Eye Pain Grade 0      Dry Mouth Grade 2      Ear Pain Grade 0 Grade 0 Grade 0 Grade 0   Tinnitus    Grade 0   Watering Eyes  Grade 0 Grade 0 Grade 0   Oral Pain Grade 0 Grade 0 Grade 2       started on nystatin Grade 1       upper part of back of throat   Edema Face Grade 0 Grade 0 Grade 0 Grade 0   Malaise Grade 0   Grade 0   Neck Edema Grade 1 Grade 0 Grade 1 Grade 1   Mucosal Infection   Grade 0    Otitis Media    Grade 0   Sinusitis    Grade 0   Skin Infection    Grade 0   Head Soft Tissue Necrosis Grade 0  Grade 0 Grade 0   Neck Soft Tissue Necrosis Grade 0   Grade 0   Facial Nerve Disorder Grade 0      Stroke   Grade 0    Trigeminal Nerve Disorder Grade 0 Grade 0     Aspiration Grade 0 Grade 0 Grade 0 Grade 0   Hoarseness Grade 0 Grade 0 Grade 1 Grade 0   Stridor  Grade 0 Grade 0 Grade 0   Voice Alteration Grade 0  Grade 0 Grade 0        Assessment / Plan:  The patient is tolerating radiation therapy as anticipated.  Continue per current treatment plan.       Postop CRT: Getting weekly cisplatin and RT. Will further discuss with Dr. Hurst whether to do additional cycles of cisplatin or stop given that she has now completed a cumulative dose of 200 mg/m², which is associated with improved outcomes.       FEN: patient's weight is stable. Aim for >2000  calories and >60grams of protein daily. Will see a dietician for more specific recommendations.  All diet is oral and no feeding tube.  Will meet with SLP.     Nausea: Reviewed use of Dexamethasone and Olanzapine after chemo each week to prevent nausea and Compazine/Zofran PRN for breakthrough nausea     Pain control: Patient reporting ongoing pain in left neck.  Currently taking Percocet and gabapentin under the direction of supportive oncology.  Try to avoid Ibuprofen as can cause gastritis/renal insufficiency, which can also be exacerbated by ongoing chemo. Ok to take Percocet every 4 hours instead of every 6 hours. Gave rx for Neurontin 300 TID to use as an adjunct for pain control.      Hiccups and Heartburn: Likely from Dexamethasone. Gave rx for Baclofen 5 TID and Omeprazole 40 mg daily.      Oral Care: Patient will continue fluoride trays per dentist. Patient is using Glutamine 10g TID on days of RT to promote ongoing healing of mucositis. No need currently for Guaifenesin for thick saliva or Xylimelt for dry mouth     Skin Care: Continue Aquaphor within the RT field. Reviewed how to apply and to avoid within 3 hours prior to RT.      Diarrhea/Constipation: Reviewed use of Immodium for diarrhea. Reviewed use of Miralax or Senna if develops constipation.     Smoking cessation: Patient is currently using nicotine patches, Wellbutrin, and Chantix.  She is currently smoking 1-2 cigarettes daily

## 2024-11-04 NOTE — PROGRESS NOTES
"    Patient ID: Melvi Sanchez \"Allie" is a 64 y.o. 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 Robin Gabrielc:  Dr. Chetna Lowry  [facility]   Supp Onc: Antoinette Soliman   PCP: Alton King,     PRIOR THERAPIES  07/07/2022: Completion of chemoradiation with weekly cisplatin with Dr. Razo  08/26/2024: L radical neck dissection 2-4 with CN11 repair     CURRENT THERAPY  10/8/24: Weekly cisplatin (40 mg/m2) to be given concurrently with proton reirradiation     SITES OF DISEASE    CURRENT ONCOLOGICAL PROBLEMS  Post operative pain     HISTORY OF PRESENT ILLNESS  Ms. 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    Patient initially treated by Dr. Razo in 2022. She first noticed a lump in the left side of her neck > 2 years ago. She was found that have enlarged level 2, 3, 4 nodes, but no obvious oropharyngeal lesion 03/2022 by Dr. Torres.  CT neck was obtained demonstrating  L neck mass, 3.2 x 5.9 x 8.4 cm and a 1.5 x 1.6 right paratracheal node -   FNA biopsy of left neck mass showed squamous cell carcinoma. On 4/21/2022 she underwent triple endoscopy and DL showed lesion of left tonsil; biopsy showed invasive squamous cell carcinoma, p16 positive. She was referred to radiation oncology and saw Dr. Crook and completed 70 cGy/35f CCRT with STEWART seen at 1 year. She has been followed by Dr. Torres in surveillance and was noted to have left neck LINA. CT neck was obtained on 6/20/24 demonstrating increase in edema, enlargement of level 2 node. L neck core biopsy on 7/5/24 demonstrated:     FINAL DIAGNOSIS   Left neck mass, core biopsy:  - Metastatic squamous cell " carcinoma involving fibrous tissue with extensive necrosis.  See note.   Note: By immunostaining, the tumor cells are positive for p40, supportive of the above diagnosis.     She proceeded with L neck dissection which revealed:    A. Left neck, level II-IV, neck dissection:  -- Poorly differentiated , nonkeratinizing squamous cell carcinoma with abundant necrosis,  involving soft tissue extensively, encompassing levels II and III  (2.2 cm).  - Fibrosis and foreign body granulomatous reaction, levels III  and IV.     Note:  Microscopic examination of H&E sections reveals poorly differentiated squamous cell carcinoma involving extensively soft tissue with minimal lymphoid tissue present and abundant necrosis and fibrosis.  Carcinoma reaches soft tissue inked margins focally.  Vein margin is negative.  History of metastatic squamous cell carcinoma of left tonsil, status post chemo therapy and radiation therapy is noted for this patient.     P16 by immunohistochemistry:  Positive     She was discussed at  and seen by Dr. Lowry with plans for post operative reirradiation with proton beam therapy and presents for medical oncology consultation to discuss addition of radiosensitizing systemic therapy.    PAST MEDICAL HISTORY  COPD, hypothyroidism, RLS    SURGICAL HISTORY  Cholecystectomy, hysterectomy     SOCIAL HISTORY  Lives in [place] by self. Son is next building. 3 kids, 6 grandkids.  Retired  at [employer].  Smoked 1-2 PPD, now down to 4 a day since diagnosis.  Occasional EtOH, no illicits. [place], [place].  Has a mini da[employer]     FAMILY HISTORY  Cousin with brain cancer    CURRENT MEDS REVIEWED    ALLERGIES REVIEWED       Subjective   Chief complaint: throat pain    HPI  Melvi Sanchez is a 64 y.o. year old female patient with Recurrent stage III ([id number]) p16+ SCC of L oropharynx who started chemoRT w/ weekly Cisplatin on 10/8/24.    Interval History:  Patient presents  "for C5 Cisplatin.    Reports her appetite remains very good, she's eating everything.  However she's feeling more tired and weaker this week. Had to use a wheelchair to get to Proton radiation.   She also reports more forgetfulness recently and feeling like she's in a \"brain fog.\" She's frustrated by this.  Her thrush is resolved, but still has a left sided sore throat. Feels her Percocet is not lasting as long as she needs.  Has transient tinnitus in left ear, left ear feels muffled at times, no subjective hearing loss from baseline.   Mild swelling in left jaw/submental region, likely lymphedema.   Bowels are moving daily, soft, 2x per day.   She continue to smoke about 3 or less cigarettes a day. She's trying hard to cut down further.     She recalls completing 5 weeks of cisplatin without difficulty last time however really fell \"off a erin\" for the last two weeks with severe nausea that prolonged for months after completion.     Review of Systems   Constitutional:  Negative for chills and fever.   HENT:   Positive for mouth sores, sore throat and trouble swallowing. Negative for hearing loss, lump/mass, nosebleeds and tinnitus.    Respiratory:  Negative for cough and shortness of breath.    Cardiovascular:  Negative for chest pain and leg swelling.   Gastrointestinal:  Negative for abdominal pain, constipation, diarrhea, nausea and vomiting.   Musculoskeletal:  Negative for arthralgias.   Skin:  Negative for rash.   Neurological:  Negative for headaches, light-headedness and numbness.     Objective   /65 (BP Location: Right arm, Patient Position: Sitting, BP Cuff Size: Adult)   Pulse 65   Temp 36.3 °C (97.3 °F) (Temporal)   Resp 20   Wt 74.5 kg (164 lb 3.9 oz)   SpO2 98%   BMI 30.04 kg/m²   Daily Weight  11/05/24 : 74.5 kg (164 lb 3.9 oz)  11/04/24 : 75 kg (165 lb 6.4 oz)  11/01/24 : 77.6 kg (171 lb)  11/01/24 : 76.6 kg (168 lb 14 oz)  10/29/24 : 74 kg (163 lb 2.3 oz)  10/25/24 : 74.4 kg (164 " lb)  10/24/24 : 73.6 kg (162 lb 4 oz)    Physical Exam  Vitals reviewed.   Constitutional:       Appearance: Normal appearance. She is well-developed.   HENT:      Head: Normocephalic and atraumatic.      Right Ear: External ear normal. No tenderness.      Left Ear: External ear normal. No tenderness.      Nose: Nose normal.      Mouth/Throat:      Lips: Pink.      Mouth: Mucous membranes are moist. No injury or oral lesions.      Tongue: No lesions.      Comments: White plagues on b/l buccal surface and soft palate  Eyes:      General: Lids are normal.      Extraocular Movements: Extraocular movements intact.      Conjunctiva/sclera: Conjunctivae normal.      Pupils: Pupils are equal, round, and reactive to light.   Neck:      Thyroid: No thyroid mass.      Comments: L neck scar well healed. Small area of scabbing  Moderate lymphedema in left jaw and submental region    Cardiovascular:      Rate and Rhythm: Normal rate and regular rhythm.      Pulses: Normal pulses.      Heart sounds: No murmur heard.     No gallop.   Pulmonary:      Effort: Pulmonary effort is normal. No respiratory distress.      Breath sounds: Normal breath sounds and air entry. No stridor. No wheezing.   Abdominal:      General: Abdomen is flat. Bowel sounds are normal. There is no distension or abdominal bruit.      Palpations: Abdomen is soft. There is no mass.      Tenderness: There is no abdominal tenderness.   Musculoskeletal:         General: Normal range of motion.      Cervical back: Normal range of motion and neck supple. No signs of trauma. Normal range of motion.      Right lower leg: No edema.      Left lower leg: No edema.   Lymphadenopathy:      Cervical: No cervical adenopathy.      Upper Body:      Right upper body: No axillary adenopathy.      Left upper body: No axillary adenopathy.   Skin:     General: Skin is warm and dry.      Comments: No new skin lesions   Neurological:      General: No focal deficit present.      Mental  Status: She is alert and oriented to person, place, and time. Mental status is at baseline.      Gait: Gait is intact.   Psychiatric:         Attention and Perception: Attention normal.         Mood and Affect: Mood and affect normal.         Behavior: Behavior is cooperative.     ECOGSCORE: 1- Restricted in physically strenuous activity.  Carries out light duty.    Diagnostic Results   LABS  Below labs were reviewed.  Results from last 7 days   Lab Units 11/05/24  0843   WBC AUTO x10*3/uL 5.6   HEMOGLOBIN g/dL 10.7*   HEMATOCRIT % 32.7*   PLATELETS AUTO x10*3/uL 147*   NEUTROS ABS x10*3/uL 4.12   LYMPHS ABS AUTO x10*3/uL 0.70*   MONOS ABS AUTO x10*3/uL 0.70   EOS ABS AUTO x10*3/uL 0.09   NEUTROS PCT AUTO % 73.2   LYMPHS PCT AUTO % 12.4   MONOS PCT AUTO % 12.4   EOS PCT AUTO % 1.6      Results from last 7 days   Lab Units 11/05/24  0843   GLUCOSE mg/dL 85   SODIUM mmol/L 135*   POTASSIUM mmol/L 5.0   CHLORIDE mmol/L 99   CO2 mmol/L 31   BUN mg/dL 26*   CREATININE mg/dL 0.70   EGFR mL/min/1.73m*2 >90   CALCIUM mg/dL 9.1   MAGNESIUM mg/dL 1.73   ALBUMIN g/dL 3.8   PROTEIN TOTAL g/dL 6.2*   BILIRUBIN TOTAL mg/dL 0.4   ALK PHOS U/L 52   ALT U/L 15   AST U/L 12                        IMAGES  7/18/24 PET   1. New hypermetabolic activity seen in the left level 2 cervical lymph node, with central necrosis, consistent with heide metastasis.  2. New FDG avid clustered nodules are seen in the right upper lobe, which is nonspecific and may be infectious/inflammatory, however metastasis can not be excluded. Short-term follow-up chest CT is recommended.  3. Mild FDG uptake is seen throughout the esophagus, likely relating to esophagitis.  4. There is partial opacification of the left maxillary sinus, with mild FDG avidity, likely sinusitis.    CT Neck 6/20/24  IMPRESSION:  1. Compared to the CT neck from 07/14/2023, interval increase in edema within the aerodigestive tract, most pronounced within the supraglottic larynx and  hypopharynx and there is resulting moderate narrowing of the supraglottic laryngeal lumen. There is also mild  prevertebral edema. There is no focal fluid collection to suggest an abscess. Cause of this edema is uncertain, could be infectious in etiology rather than treatment related given that patient completed radiation therapy 2 years ago.  2. No striking new mass is seen within the visualized aerodigestive tract, noting that evaluation is somewhat limited due to presence of diffuse edema and therefore subtle lesions can not be excluded.  3. Interval enlargement of peripherally enhancing likely necrotic lymph node in the left level 2 heide station concerning for progression of disease. Alternatively, enlargement of the lymph node could reflect an infectious process. Otherwise, no cervical lymphadenopathy by size criteria.  4. Edema within the left facial and neck deep and superficial soft tissues as well as in the left supraclavicular fossa is favored to be treatment related and was also present on the prior CT. No focal fluid collection is seen within these regions to suggest an abscess. Correlate clinically.      Assessment/Plan     Melviarlin Sanchez is a 64 y.o. year old female patient with PMH of stage III (cT1c cN3a M0) SCC of L tonsil, COPD, hypothyroidism and RLS, started treatment with CCRT w/ Cisplatin and proton radiation on 10/8/24 for recurrence to L oropharynx with diffuse JOLANTA.    We discussed the treatment paradigm in recurrence and salvage surgery - given her pathology showing JOLANTA it is reasonable to proceed with concurrent reirradiation with Proton. We consented today and all questions were answered. We will proceed upon CT simulation.    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 Proton machine is down today so first dose RT will  be on IMRT on 10/9 then resume PORT.    - 10/15/24: C2. Unchanged mild chronic dysphagia and xerostomia from prior RT. Left neck pain controlled w/ current pain regimen.  - 10/22/24: C3. Increased odynophagia in the last 3-4 days, like due to thrush. PO intake decreased and has resulting weight loss. Also with increased lymphedema in the left jaw and submental region. Left thigh incision still numb, fluid accumulation is slowed down. She's smoking about 1-2 cigarettes a day,   - 10/29/24: C4. Discussed goals of at least 200 mg/m2, will continue to evaluate. Thrush improved.   - 11/5/24: C5. Patient with more forgetfulness and weakness this week. Appetite remain good. No n/v. Stable mild lymphedema in left jaw/neck. Her left sided odynophagia is a little worse so will reach out to Supp Onc on increasing pain medications.     # Oral Candidiasis: resolved  - 10/22: white plagues evident in oral cavity. Will start patient on Nystatin swish and swallow.   - 11/5: Thrush resolved, completed 2 weeks of Nystatin    # RUL nodules  - Being followed with CT scan in next 3 months    PLAN  -- Proceed to C5 Cisplatin, likely last dose  -- IV hydration every Friday  -- Follow up w/ Supp Onc today regarding increased odynophagia

## 2024-11-05 ENCOUNTER — OFFICE VISIT (OUTPATIENT)
Dept: HEMATOLOGY/ONCOLOGY | Facility: HOSPITAL | Age: 64
End: 2024-11-05
Payer: COMMERCIAL

## 2024-11-05 ENCOUNTER — OFFICE VISIT (OUTPATIENT)
Dept: PALLIATIVE MEDICINE | Facility: HOSPITAL | Age: 64
End: 2024-11-05
Payer: COMMERCIAL

## 2024-11-05 ENCOUNTER — NUTRITION (OUTPATIENT)
Dept: HEMATOLOGY/ONCOLOGY | Facility: HOSPITAL | Age: 64
End: 2024-11-05

## 2024-11-05 ENCOUNTER — TELEPHONE (OUTPATIENT)
Dept: PALLIATIVE MEDICINE | Facility: HOSPITAL | Age: 64
End: 2024-11-05

## 2024-11-05 ENCOUNTER — HOSPITAL ENCOUNTER (OUTPATIENT)
Dept: RADIATION ONCOLOGY | Facility: HOSPITAL | Age: 64
Setting detail: RADIATION/ONCOLOGY SERIES
Discharge: HOME | End: 2024-11-05
Payer: COMMERCIAL

## 2024-11-05 ENCOUNTER — INFUSION (OUTPATIENT)
Dept: HEMATOLOGY/ONCOLOGY | Facility: HOSPITAL | Age: 64
End: 2024-11-05
Payer: COMMERCIAL

## 2024-11-05 ENCOUNTER — LAB (OUTPATIENT)
Dept: LAB | Facility: HOSPITAL | Age: 64
End: 2024-11-05
Payer: COMMERCIAL

## 2024-11-05 VITALS
RESPIRATION RATE: 20 BRPM | SYSTOLIC BLOOD PRESSURE: 134 MMHG | HEART RATE: 64 BPM | OXYGEN SATURATION: 94 % | DIASTOLIC BLOOD PRESSURE: 65 MMHG | TEMPERATURE: 99 F

## 2024-11-05 VITALS
TEMPERATURE: 97.3 F | OXYGEN SATURATION: 98 % | BODY MASS INDEX: 30.04 KG/M2 | HEART RATE: 65 BPM | SYSTOLIC BLOOD PRESSURE: 135 MMHG | WEIGHT: 164.24 LBS | DIASTOLIC BLOOD PRESSURE: 65 MMHG | RESPIRATION RATE: 20 BRPM

## 2024-11-05 VITALS — WEIGHT: 164.24 LBS | HEIGHT: 62 IN | BODY MASS INDEX: 30.22 KG/M2

## 2024-11-05 DIAGNOSIS — M54.2 NECK PAIN: ICD-10-CM

## 2024-11-05 DIAGNOSIS — G89.3 CANCER ASSOCIATED PAIN: Primary | ICD-10-CM

## 2024-11-05 DIAGNOSIS — C77.0 METASTASIS TO HEAD AND NECK LYMPH NODE (MULTI): ICD-10-CM

## 2024-11-05 DIAGNOSIS — Z79.891 ENCOUNTER FOR MONITORING OPIOID MAINTENANCE THERAPY: ICD-10-CM

## 2024-11-05 DIAGNOSIS — K59.03 CONSTIPATION DUE TO PAIN MEDICATION THERAPY: ICD-10-CM

## 2024-11-05 DIAGNOSIS — C77.0 SECONDARY AND UNSPECIFIED MALIGNANT NEOPLASM OF LYMPH NODES OF HEAD, FACE AND NECK: ICD-10-CM

## 2024-11-05 DIAGNOSIS — R13.10 DYSPHAGIA, UNSPECIFIED TYPE: ICD-10-CM

## 2024-11-05 DIAGNOSIS — T45.1X5A CHEMOTHERAPY INDUCED NAUSEA AND VOMITING: ICD-10-CM

## 2024-11-05 DIAGNOSIS — C09.9 TONSIL CANCER (MULTI): ICD-10-CM

## 2024-11-05 DIAGNOSIS — R13.10 ODYNOPHAGIA: ICD-10-CM

## 2024-11-05 DIAGNOSIS — Z51.11 ENCOUNTER FOR ANTINEOPLASTIC CHEMOTHERAPY: ICD-10-CM

## 2024-11-05 DIAGNOSIS — B37.0 ORAL CANDIDIASIS: ICD-10-CM

## 2024-11-05 DIAGNOSIS — C77.0 METASTASIS TO HEAD AND NECK LYMPH NODE (MULTI): Primary | ICD-10-CM

## 2024-11-05 DIAGNOSIS — R11.2 CHEMOTHERAPY INDUCED NAUSEA AND VOMITING: ICD-10-CM

## 2024-11-05 DIAGNOSIS — I89.0 LYMPHEDEMA DUE TO RADIATION: ICD-10-CM

## 2024-11-05 DIAGNOSIS — Z51.0 ENCOUNTER FOR ANTINEOPLASTIC RADIATION THERAPY: ICD-10-CM

## 2024-11-05 DIAGNOSIS — Z51.81 ENCOUNTER FOR MONITORING OPIOID MAINTENANCE THERAPY: ICD-10-CM

## 2024-11-05 LAB
ALBUMIN SERPL BCP-MCNC: 3.8 G/DL (ref 3.4–5)
ALP SERPL-CCNC: 52 U/L (ref 33–136)
ALT SERPL W P-5'-P-CCNC: 15 U/L (ref 7–45)
AMPHETAMINES UR QL SCN: ABNORMAL
ANION GAP SERPL CALC-SCNC: 10 MMOL/L (ref 10–20)
AST SERPL W P-5'-P-CCNC: 12 U/L (ref 9–39)
BARBITURATES UR QL SCN: ABNORMAL
BASOPHILS # BLD AUTO: 0.01 X10*3/UL (ref 0–0.1)
BASOPHILS NFR BLD AUTO: 0.2 %
BENZODIAZ UR QL SCN: ABNORMAL
BILIRUB SERPL-MCNC: 0.4 MG/DL (ref 0–1.2)
BUN SERPL-MCNC: 26 MG/DL (ref 6–23)
BZE UR QL SCN: ABNORMAL
CALCIUM SERPL-MCNC: 9.1 MG/DL (ref 8.6–10.3)
CANNABINOIDS UR QL SCN: ABNORMAL
CHLORIDE SERPL-SCNC: 99 MMOL/L (ref 98–107)
CO2 SERPL-SCNC: 31 MMOL/L (ref 21–32)
CREAT SERPL-MCNC: 0.7 MG/DL (ref 0.5–1.05)
EGFRCR SERPLBLD CKD-EPI 2021: >90 ML/MIN/1.73M*2
EOSINOPHIL # BLD AUTO: 0.09 X10*3/UL (ref 0–0.7)
EOSINOPHIL NFR BLD AUTO: 1.6 %
ERYTHROCYTE [DISTWIDTH] IN BLOOD BY AUTOMATED COUNT: 14.5 % (ref 11.5–14.5)
FENTANYL+NORFENTANYL UR QL SCN: ABNORMAL
GLUCOSE SERPL-MCNC: 85 MG/DL (ref 74–99)
HCT VFR BLD AUTO: 32.7 % (ref 36–46)
HGB BLD-MCNC: 10.7 G/DL (ref 12–16)
IMM GRANULOCYTES # BLD AUTO: 0.01 X10*3/UL (ref 0–0.7)
IMM GRANULOCYTES NFR BLD AUTO: 0.2 % (ref 0–0.9)
LYMPHOCYTES # BLD AUTO: 0.7 X10*3/UL (ref 1.2–4.8)
LYMPHOCYTES NFR BLD AUTO: 12.4 %
MAGNESIUM SERPL-MCNC: 1.73 MG/DL (ref 1.6–2.4)
MCH RBC QN AUTO: 31.3 PG (ref 26–34)
MCHC RBC AUTO-ENTMCNC: 32.7 G/DL (ref 32–36)
MCV RBC AUTO: 96 FL (ref 80–100)
METHADONE UR QL SCN: ABNORMAL
MONOCYTES # BLD AUTO: 0.7 X10*3/UL (ref 0.1–1)
MONOCYTES NFR BLD AUTO: 12.4 %
NEUTROPHILS # BLD AUTO: 4.12 X10*3/UL (ref 1.2–7.7)
NEUTROPHILS NFR BLD AUTO: 73.2 %
NRBC BLD-RTO: 0 /100 WBCS (ref 0–0)
OPIATES UR QL SCN: ABNORMAL
OXYCODONE+OXYMORPHONE UR QL SCN: ABNORMAL
PCP UR QL SCN: ABNORMAL
PLATELET # BLD AUTO: 147 X10*3/UL (ref 150–450)
POTASSIUM SERPL-SCNC: 5 MMOL/L (ref 3.5–5.3)
PROT SERPL-MCNC: 6.2 G/DL (ref 6.4–8.2)
RAD ONC MSQ ACTUAL FRACTIONS DELIVERED: 17
RAD ONC MSQ ACTUAL SESSION BIOLOGICAL DOSE: 200 CCGE
RAD ONC MSQ ACTUAL SESSION DELIVERED DOSE: 182 CGRAY
RAD ONC MSQ ACTUAL TOTAL BIOLOGICAL DOSE: 3403 CCGE
RAD ONC MSQ ACTUAL TOTAL DOSE: 3094 CGRAY
RAD ONC MSQ ELAPSED DAYS: 22
RAD ONC MSQ LAST DATE: NORMAL
RAD ONC MSQ PRESCRIBED BIOLOGICAL FRACTIONAL DOSE: 200 CCGE
RAD ONC MSQ PRESCRIBED BIOLOGICAL TOTAL DOSE: 5400 CCGE
RAD ONC MSQ PRESCRIBED FRACTIONAL DOSE: 182 CGRAY
RAD ONC MSQ PRESCRIBED NUMBER OF FRACTIONS: 27
RAD ONC MSQ PRESCRIBED TECHNIQUE: NORMAL
RAD ONC MSQ PRESCRIBED TOTAL DOSE: 4909 CGRAY
RAD ONC MSQ PRESCRIPTION PATTERN COMMENT: NORMAL
RAD ONC MSQ START DATE: NORMAL
RAD ONC MSQ TREATMENT COURSE NUMBER: 2
RAD ONC MSQ TREATMENT SITE: NORMAL
RBC # BLD AUTO: 3.42 X10*6/UL (ref 4–5.2)
SODIUM SERPL-SCNC: 135 MMOL/L (ref 136–145)
WBC # BLD AUTO: 5.6 X10*3/UL (ref 4.4–11.3)

## 2024-11-05 PROCEDURE — 99214 OFFICE O/P EST MOD 30 MIN: CPT | Performed by: NURSE PRACTITIONER

## 2024-11-05 PROCEDURE — 96375 TX/PRO/DX INJ NEW DRUG ADDON: CPT | Mod: INF

## 2024-11-05 PROCEDURE — 80307 DRUG TEST PRSMV CHEM ANLYZR: CPT | Performed by: NURSE PRACTITIONER

## 2024-11-05 PROCEDURE — 99215 OFFICE O/P EST HI 40 MIN: CPT | Mod: 25,27 | Performed by: STUDENT IN AN ORGANIZED HEALTH CARE EDUCATION/TRAINING PROGRAM

## 2024-11-05 PROCEDURE — 96367 TX/PROPH/DG ADDL SEQ IV INF: CPT

## 2024-11-05 PROCEDURE — 99215 OFFICE O/P EST HI 40 MIN: CPT | Performed by: STUDENT IN AN ORGANIZED HEALTH CARE EDUCATION/TRAINING PROGRAM

## 2024-11-05 PROCEDURE — 99214 OFFICE O/P EST MOD 30 MIN: CPT | Mod: 25 | Performed by: NURSE PRACTITIONER

## 2024-11-05 PROCEDURE — 36415 COLL VENOUS BLD VENIPUNCTURE: CPT

## 2024-11-05 PROCEDURE — 2500000004 HC RX 250 GENERAL PHARMACY W/ HCPCS (ALT 636 FOR OP/ED): Performed by: STUDENT IN AN ORGANIZED HEALTH CARE EDUCATION/TRAINING PROGRAM

## 2024-11-05 PROCEDURE — 77523 PROTON TRMT INTERMEDIATE: CPT | Performed by: RADIOLOGY

## 2024-11-05 PROCEDURE — 85025 COMPLETE CBC W/AUTO DIFF WBC: CPT

## 2024-11-05 PROCEDURE — 96413 CHEMO IV INFUSION 1 HR: CPT

## 2024-11-05 PROCEDURE — 96361 HYDRATE IV INFUSION ADD-ON: CPT | Mod: INF

## 2024-11-05 PROCEDURE — 77387 GUIDANCE FOR RADJ TX DLVR: CPT | Performed by: RADIOLOGY

## 2024-11-05 PROCEDURE — 80053 COMPREHEN METABOLIC PANEL: CPT

## 2024-11-05 PROCEDURE — 83735 ASSAY OF MAGNESIUM: CPT

## 2024-11-05 RX ORDER — DEXAMETHASONE 6 MG/1
12 TABLET ORAL ONCE
Status: COMPLETED | OUTPATIENT
Start: 2024-11-05 | End: 2024-11-05

## 2024-11-05 RX ORDER — PALONOSETRON 0.05 MG/ML
0.25 INJECTION, SOLUTION INTRAVENOUS ONCE
Status: COMPLETED | OUTPATIENT
Start: 2024-11-05 | End: 2024-11-05

## 2024-11-05 RX ORDER — EPINEPHRINE 0.3 MG/.3ML
0.3 INJECTION SUBCUTANEOUS EVERY 5 MIN PRN
Status: DISCONTINUED | OUTPATIENT
Start: 2024-11-05 | End: 2024-11-05 | Stop reason: HOSPADM

## 2024-11-05 RX ORDER — OXYCODONE HYDROCHLORIDE 15 MG/1
15 TABLET, FILM COATED, EXTENDED RELEASE ORAL EVERY 12 HOURS
Qty: 60 TABLET | Refills: 0 | Status: SHIPPED | OUTPATIENT
Start: 2024-11-05 | End: 2024-11-07 | Stop reason: WASHOUT

## 2024-11-05 RX ORDER — PROCHLORPERAZINE MALEATE 10 MG
10 TABLET ORAL EVERY 6 HOURS PRN
Status: DISCONTINUED | OUTPATIENT
Start: 2024-11-05 | End: 2024-11-05 | Stop reason: HOSPADM

## 2024-11-05 RX ORDER — ALBUTEROL SULFATE 0.83 MG/ML
3 SOLUTION RESPIRATORY (INHALATION) AS NEEDED
Status: DISCONTINUED | OUTPATIENT
Start: 2024-11-05 | End: 2024-11-05 | Stop reason: HOSPADM

## 2024-11-05 RX ORDER — FAMOTIDINE 10 MG/ML
20 INJECTION INTRAVENOUS ONCE AS NEEDED
Status: DISCONTINUED | OUTPATIENT
Start: 2024-11-05 | End: 2024-11-05 | Stop reason: HOSPADM

## 2024-11-05 RX ORDER — PROCHLORPERAZINE EDISYLATE 5 MG/ML
10 INJECTION INTRAMUSCULAR; INTRAVENOUS EVERY 6 HOURS PRN
Status: DISCONTINUED | OUTPATIENT
Start: 2024-11-05 | End: 2024-11-05 | Stop reason: HOSPADM

## 2024-11-05 RX ORDER — DIPHENHYDRAMINE HYDROCHLORIDE 50 MG/ML
50 INJECTION INTRAMUSCULAR; INTRAVENOUS AS NEEDED
Status: DISCONTINUED | OUTPATIENT
Start: 2024-11-05 | End: 2024-11-05 | Stop reason: HOSPADM

## 2024-11-05 ASSESSMENT — PAIN SCALES - GENERAL: PAINLEVEL_OUTOF10: 3

## 2024-11-05 NOTE — PATIENT INSTRUCTIONS
May increase oxycodone/acetaminophen 7.5/325mg (percocet) to every 3 hours as needed  Please call us at  Option 5 Option 1 or MyChart message me 3-5 days before you run out for another refill   Oxycontin 15mg twice daily (extended release oxycodone) is being sent to your pharmacy   You can take both but hopefully will need less percocet when the Oxycontin is started

## 2024-11-05 NOTE — PROGRESS NOTES
Patient seen in clinic today before cycle 5 Cisplatin. Patient with her son. Patient reporting that she has a new onset of foggy bran and forgetfulness. Patient has no new signs of infection, signs of UTI or new neurological changes. Patient's pain is controled and thrush has resolved. Patient would like to know if this cycle is her last cycle.     Report provided to Michael PHAM

## 2024-11-05 NOTE — PROGRESS NOTES
SUPPORTIVE AND PALLIATIVE ONCOLOGY CONSULT - OUTPATIENT FOLLOW UP      SERVICE DATE: 11/5/2024    Referred by:  PIYUSH Lee-CNP  Medical Oncologist: Alejandro Hurst MD   Radiation Oncologist: MD Chetna Gutierrez MD  Primary Physician: Alton King  [phone]    REASON FOR CONSULT/CHIEF CONSULT COMPLAINT: Introduction to Supportive and Palliative Oncology Services    Subjective   HISTORY OF PRESENT ILLNESS: Melvi Sanchez is a 64 y.o. 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 Treatment plan is concurrent reirradiation with Proton / cisplatin.     Additional PMHx  COPD, hypothyroidism and RLS     11/5/24: Having increased throat pain. Gabapentin up to 300mg TID per rad onc. Started on zoloft 25mg per pcp but she hasn't started first dose yet.     Symptom Assessment:    Pain:somewhat    Left lateral thigh  from knee to hip - painful to touch - hard to describe - deep ache - some numbness from mid thigh to knee     Left jaw/neck - stinging - localized - some numbness or neck/ear/cheek     Numbness or tingling in hands/feet/other:  left second toe is numb after surgery  Headache: a little  Lack of appetite: none  Weight loss: none  Pain in mouth/swallowing: none   Dry mouth: somewhat - Xylomet PRN   Taste changes: a little  Nausea/early satiety: none  Vomiting: none  Constipation: none  Diarrhea: none  Lack of energy: a little  Difficulty sleeping: none - melatonin at bedtime   Anxiety: a little  Depression: a little  Shortness of breath: a little  Other: mild dysphagia     Information obtained from: interview of patient  ______________________________________________________________________     Oncology History   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)   4/3/2023 Initial Diagnosis    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)     10/8/2024 -  Chemotherapy     CISplatin with Concurrent Radiation (Weekly), 7 Week Cycle     Tonsil cancer (Multi)   4/3/2023 Initial Diagnosis    Tonsil cancer (Multi)     10/8/2024 -  Chemotherapy    CISplatin with Concurrent Radiation (Weekly), 7 Week Cycle         Past Medical History:   Diagnosis Date    Acute hypoxemic respiratory failure (Multi) 2023    Breast calcification, left     Dysphagia     Essential (primary) hypertension 2018    HTN (hypertension), benign    Fibroadenoma of left breast     H/O malignant neoplasm of tonsil     s/p XRT and chemotherapy completed .    Hypothyroidism     Morbid obesity (Multi) 2023    Nausea and/or vomiting 2023    OAB (overactive bladder)     Pharyngitis 2023    Pneumonia 2023    Restless legs syndrome     Sinusitis 2023     Past Surgical History:   Procedure Laterality Date    BREAST BIOPSY Left     benign FA     SECTION, LOW TRANSVERSE      CHOLECYSTECTOMY  2014    Cholecystectomy    COLONOSCOPY      HYSTERECTOMY  2014    Hysterectomy    MOUTH SURGERY  2014    Oral Surgery Tooth Extraction     Family History   Problem Relation Name Age of Onset    Emphysema Mother      COPD Mother      Hyperlipidemia Sister      Breast cancer Paternal Grandmother          SOCIAL HISTORY  . 3 children.   Social History:  reports that she has been smoking cigarettes. She started smoking about 11 months ago. She has a 40.4 pack-year smoking history. She has never used smokeless tobacco. She reports that she does not currently use alcohol after a past usage of about 1.0 standard drink of alcohol per week. She reports that she does not currently use drugs after having used the following drugs: Marijuana. Frequency: 1.00 time per week.    REVIEW OF SYSTEMS  Review of systems negative unless noted in HPI.       Objective     Current Outpatient Medications   Medication Instructions    acetaminophen (TYLENOL EXTRA STRENGTH) 500 mg, oral, 3  times daily PRN    albuterol 90 mcg/actuation inhaler 2 puffs, inhalation, Every 4 hours PRN    baclofen (LIORESAL) 5 mg, oral, 3 times daily    bisacodyl (DULCOLAX (BISACODYL)) 5-10 mg, oral, Daily PRN, Do not crush, chew, or split.    buPROPion XL (WELLBUTRIN XL) 300 mg, oral, Every morning, Do not crush, chew, or split.    dexAMETHasone (DECADRON) 8 mg, oral, Daily, For 3 days per week starting the day after treatment.    gabapentin (NEURONTIN) 300 mg, oral, Nightly    gabapentin (NEURONTIN) 300 mg, oral, 3 times daily    guaiFENesin (MUCINEX) 1,200 mg, oral, 2 times daily, Do not crush, chew, or split.    Laxative, bisacodyl, 5 mg tablet     levothyroxine (SYNTHROID, LEVOXYL) 100 mcg, oral, Daily, Take on an empty stomach at the same time each day, either 30 to 60 minutes prior to breakfast    loperamide (Imodium A-D) 2 mg tablet Take 2 capsules (4 mg) by mouth with the first episode of diarrhea and 1 capsule (2 mg) by mouth with any additional episodes. Maximum 8 capsules (16 mg) per day    losartan (COZAAR) 25 mg, oral, Daily    melatonin 10 mg, oral, Nightly    naloxone (NARCAN) 4 mg, nasal, As needed, May repeat every 2-3 minutes if needed, alternating nostrils, until medical assistance becomes available.    nicotine (Nicoderm CQ) 21 mg/24 hr patch 1 patch, transdermal, Every 24 hours    nystatin (MYCOSTATIN) 500,000 Units, oral, 4 times daily, Swish in mouth and hold for a few minutes, then swallow.    OLANZapine (ZYPREXA) 5 mg, oral, Nightly    omeprazole (PRILOSEC) 40 mg, oral, Daily before breakfast, Do not crush or chew.    ondansetron (ZOFRAN) 8 mg, oral, Every 8 hours PRN    oxyCODONE-acetaminophen (Percocet) 7.5-325 mg tablet 1 tablet, oral, Every 4 hours PRN    oxygen (O2) 4 L/min, Continuous    polyethylene glycol (GLYCOLAX, MIRALAX) 17 g, oral, Daily    prochlorperazine (COMPAZINE) 10 mg, oral, Every 6 hours PRN    rOPINIRole (REQUIP) 2 mg, oral, Nightly    rosuvastatin (CRESTOR) 40 mg, oral,  "Daily    sertraline (ZOLOFT) 25 mg, oral, Daily    umeclidinium-vilanteroL (Anoro Ellipta) 62.5-25 mcg/actuation blister with device 1 puff, inhalation, Daily    varenicline (CHANTIX) 1 mg, oral, Daily    walker (Ultra-Light Rollator) misc 1 each, miscellaneous, As needed    white petrolatum (Aquaphor) 41 % ointment ointment 1 Application, Topical, 3 times daily       Allergies:   Allergies   Allergen Reactions    Duloxetine Hallucinations     Pt states she doesn't think she has an allergy just a bad reaction when mixed with morphine .             Creatinine   Date Value Ref Range Status   11/05/2024 0.70 0.50 - 1.05 mg/dL Final     AST   Date Value Ref Range Status   11/05/2024 12 9 - 39 U/L Final     ALT   Date Value Ref Range Status   11/05/2024 15 7 - 45 U/L Final     Comment:     Patients treated with Sulfasalazine may generate falsely decreased results for ALT.     Hemoglobin   Date Value Ref Range Status   11/05/2024 10.7 (L) 12.0 - 16.0 g/dL Final     Platelets   Date Value Ref Range Status   11/05/2024 147 (L) 150 - 450 x10*3/uL Final       PHYSICAL EXAMINATION  Vital Signs:       10/29/2024     8:39 AM 10/29/2024     2:37 PM 11/1/2024    10:45 AM 11/1/2024     3:04 PM 11/4/2024    10:19 AM 11/5/2024     9:49 AM 11/5/2024    11:00 AM   Vitals   Systolic 138 161 145 154 122 135    Diastolic 50 72 57 80 70 65    Heart Rate 59 59 69 47 75 65    Temp 36.3 °C (97.3 °F)  37.2 °C (99 °F)  35.9 °C (96.6 °F) 36.3 °C (97.3 °F)    Resp 18  18  20 20    Height (in)       1.575 m (5' 2.01\")   Weight (lb) 163.14  168.87 171 165.4 164.24 164.24   BMI 29.84 kg/m2  30.89 kg/m2 31.28 kg/m2 30.25 kg/m2 30.04 kg/m2 30.03 kg/m2   BSA (m2) 1.8 m2  1.83 m2 1.84 m2 1.81 m2 1.81 m2 1.81 m2   Visit Report Report Report Report Report  Report Report        Physical Exam  HENT:      Head: Normocephalic and atraumatic.      Comments: Left neck surgical changes noted     Mouth/Throat:      Mouth: Mucous membranes are dry.      Pharynx: " Oropharynx is clear.   Eyes:      Extraocular Movements: Extraocular movements intact.      Conjunctiva/sclera: Conjunctivae normal.   Pulmonary:      Effort: Pulmonary effort is normal.   Abdominal:      General: Abdomen is flat.      Palpations: Abdomen is soft.   Musculoskeletal:      Comments: Left thigh with slight edema  Surgical scar noted   Skin:     General: Skin is warm and dry.   Neurological:      General: No focal deficit present.      Mental Status: She is alert.   Psychiatric:         Mood and Affect: Mood normal.         Thought Content: Thought content normal.         ASSESSMENT/PLAN    Pain  Pain is: cancer related pain  Type: somatic and neuropathic  -increase percocet to 7.5/325mg q3 PRN   -start oxycontin 15mg twice daily (intolerant to morphine - hallucinations)  -continue  gabapentin 300mg TID    Opioid Use  Medication Management:   - OARRS report reviewed with no aberrant behavior; consistent with  prescriptions/records and patient history  - MED 68.  Overdose Risk Score 2680.   This has been discussed with patient.   - We will continue to closely monitor the patient for signs of prescription misuse including UDS, OARRS review and subjective reports at each visit.  - No concurrent benzodiazepine use   - I am a provider who either is or has consulted and collaborated with a provider certified in Hospice and Palliative Medicine and have conducted a face-face visit and examination for this patient.  - Routine Urine Drug Screen completed 11/5/24 and pending   - Controlled Substance Agreement completed 11/5/24  - Specifically discussed that controlled substance prescriptions will only be provided by our group as outlined in the completed agreement  - Prescribed naloxone: Confirmed already Rx'ed  - Red Flags: none     Tobacco dependence   -down to 3 cigs per day   -continue nicotine patch 21mg   -continue chantix  -continue wellbutrin 150mg daily     Xerostomia   -Xylomet PRN     Nausea   At risk  for nausea with vomiting related to chemotherapy and opioids   -Continue ondansetron 8mg q8 PRN   -Continue prochlorperazine 10mg q6 PRN  -Continue dexamethasone 8mg daily for 3 days starting the day after treatment   -Continue zyprexa 5mg at bedtime     Chemo induced diarrhea, at risk   -continue loperamide 2 mg - Take 2 capsules (4 mg) by mouth with the first episode of diarrhea and 1 capsule (2 mg) by mouth with any additional episodes. Maximum 8 capsules (16 mg) per day     Constipation   At risk for constipation related to opioids  -may use miralax 17g daily   -may use dulcolax 5-10mg at bedtime PRN if no bm in 2 days     Altered Mood  Acute anxiety and depression related to health concerns   -on Wellbutrin per above   -to start zoloft 25mg daily per pcp    Chronic insomnia   -continue melatonin 10mg at bedtime PRN       Medical Decision Making/Goals of Care/Advance Care Planning:  Patient's current clinical condition, including diagnosis, and management plan, and goals of care were discussed.   Goals: symptom control and cancer directed therapy    Advance Directives  Existence of Advance Directives:No - has interest - she has paperwork   Decision maker: She would like to appointment Daughter, Beth, as HCPOA - otherwise 2/3 children     Next Follow-Up Visit:  Return to clinic in 1 month     Signature and billing  Thank you for allowing us to participate in the care of this patient. Recommendations will be communicated back to the consulting service by way of shared electronic medical record or face-to-face.    Medical complexity was moderate level due to due to complexity of problems, extensive data review, and high risk of management/treatment.  Time was spent on the following: Prep Time, Time Directly with Patient/Family/Caregiver, Documentation Time. Total time spent: 30 min      DATA   Diagnostic tests and information reviewed for today's visit:  Most recent labs and imaging results, Medications        Some elements copied from Supportive Oncology note on 10/2/24, the elements have been updated and all reflect current decision making from today, 11/5/2024.      Plan of Care discussed with: Patient, Family/Significant Other: sister, and RN      SIGNATURE: PIYUSH Son-CNP    Contact information:  Supportive and Palliative Oncology  Monday-Friday 8 AM-5 PM  Phone:  [phone], press option #5, then option #1.   Or Epic Secure Chat

## 2024-11-05 NOTE — PROGRESS NOTES
"NUTRITION Follow-Up NOTE    Nutrition Assessment     Reason for Visit:  Melvi Sanchez \"Gretchen\" is a 64 y.o. female who presents for scc orophayrnx dx 4/2022 with recurrence 7/2024.   TX L radical neck dissection with CN11 repair 8/26/24  Weekly cisplatin concurrent with proton reirradiation    11/5- Week 5 of treatment - pt states this is most probably last chemotherapy, will receive IVF on Tuesdays and has 2 weeks of radiation to complete  Pt and son Jean Claude  seen in infusion  Forgetful  She does not remember meeting dietitian previously - RDN note from 10/22    Lab Results   Component Value Date/Time    GLUCOSE 85 11/05/2024 0843     (L) 11/05/2024 0843    K 5.0 11/05/2024 0843    CL 99 11/05/2024 0843    CO2 31 11/05/2024 0843    ANIONGAP 10 11/05/2024 0843    BUN 26 (H) 11/05/2024 0843    CREATININE 0.70 11/05/2024 0843    EGFR >90 11/05/2024 0843    CALCIUM 9.1 11/05/2024 0843    ALBUMIN 3.8 11/05/2024 0843    ALKPHOS 52 11/05/2024 0843    PROT 6.2 (L) 11/05/2024 0843    AST 12 11/05/2024 0843    BILITOT 0.4 11/05/2024 0843    ALT 15 11/05/2024 0843     Lab Results   Component Value Date/Time    VITD25 53 10/29/2024 0827       Anthropometrics:  Anthropometrics  Height: 157.5 cm (5' 2.01\")  Weight: 74.5 kg (164 lb 3.9 oz)  BMI (Calculated): 30.03  IBW/kg (Dietitian Calculated): 50 kg  Percent of IBW: 149 %  Adjusted Body Weight (kg): 56.1 kg  Weight Change  Weight History / % Weight Change: no change        Wt Readings from Last 10 Encounters:   11/05/24 74.5 kg (164 lb 3.9 oz)   11/05/24 74.5 kg (164 lb 3.9 oz)   11/04/24 75 kg (165 lb 6.4 oz)   11/01/24 77.6 kg (171 lb)   11/01/24 76.6 kg (168 lb 14 oz)   10/29/24 74 kg (163 lb 2.3 oz)   10/25/24 74.4 kg (164 lb)   10/24/24 73.6 kg (162 lb 4 oz)   10/22/24 71.9 kg (158 lb 8.2 oz)   10/18/24 73.9 kg (162 lb 14.7 oz)      Denies any weight loss over past 2 years  Food And Nutrient Intake:  Food and Nutrient History  Energy Intake: Good > 75 % (normal - " "more than normal, eating well)  Fluid Intake: good - diet Dr Pepper, iced tea, Gatorlyte, Prime, Liquid IV, no plain water - doesn't like plain water  Food Allergy: no food allergies  GI Symptoms: none (heartburn few weeks ago but improved on medication)  Oral Problems:  (from tx 2 years ago, needs water with every bite of food \"extreme dry mouth\" worse now than normal. mild pain with swallowing, not stopping her from eating. s/p trush - resolved. Uses Xlimelts often. + s/s rinses)  Dentition: lower denture/partial, upper denture/partial     Food Intake  Amount of Food: 2 meals/day generally  Meal 1: doesn't eat first think in am. egg sandwich of some sort, fruit  Meal 3: salad, chicken  Snacks: not too many, pretzles, cheese, PNB crackers    Food Preparation  Cooking: Patient, Family  Grocery Shopping: Patient, Family (alot of help, children, siblings)  Dining Out:  (with treatment and having to drive they eat out sometimes)                                       Food Supplement Intake  Oral Nutrition Supplements:  (none. prior treatment tried and vomited)           Nutrition Focused Physical Exam Findings:      Subcutaneous Fat Loss  Orbital Fat Pads: Well nourished (slightly bulging fat pads)  Buccal Fat Pads: Well nourished (full, rounded cheeks)  Triceps: Defer  Ribs: Defer    Muscle Wasting  Temporalis: Well nourished (well-defined muscle)  Pectoralis (Clavicular Region): Well nourished (clavicle not visible)  Deltoid/Trapezius: Well nourished (rounded appearance at arm, shoulder, neck)  Interosseous: Well nourished (muscle bulges)  Trapezius/Infraspinatus/Supraspinatus (Scapular Region): Well nourished (bones not prominent, muscle taut)  Quadriceps: Defer  Gastrocnemius: Defer    Edema  Edema:  (left side of face from surgery)    Physical Findings (Nutrition Deficiency/Toxicity)  Hair:  (falling out)  Eyes:  (squinting some- been to eye dr recently)  Nails:  (painted)  Skin: Negative    Energy " "Needs  Calculated Energy Needs Using Equations  Height: 157.5 cm (5' 2.01\")  Estimated Energy Needs  Total Energy Estimated Needs (kCal): 1575 kCal  Total Estimated Energy Need per Day (kCal/kg): 28 kCal/kg  Estimated Fluid Needs  Total Fluid Estimated Needs (mL): 1700 mL  Total Fluid Estimated Needs (mL/kg): 30 mL/kg  Estimated Protein Needs  Total Protein Estimated Needs (g): 67 g  Total Protein Estimated Needs (g/kg): 1.2 g/kg        Nutrition Diagnosis   Malnutrition Diagnosis  Patient has Malnutrition Diagnosis: No    Nutrition Diagnosis  Patient has Nutrition Diagnosis: Yes  Diagnosis Status (1): New  Nutrition Diagnosis 1: Increased energy expenditure  Related to (1): increased metabolic demand, pathophysiology of disease process  As Evidenced by (1): chemoradiation treatment    Nutrition Interventions/Recommendations   Nutrition Prescription  Individualized Nutrition Prescription Provided for : General Healthy Diet - adequate calories and protein to maintain weight through treatment    Food and Nutrition Delivery  Food and Nutrition Delivery  Meals & Snacks: Energy-modified diet, General Healthful Diet, Modify Composition of Meals/Snacks, Protein-modified diet  Goals: incorporate soft high calorie high protein foods modified texture as needed, moist foods as needed. Include consistent meals and snacks. Avoid dry harsh  foods and acidic foods/beverages. Include adequate fluid intake  Goals: discussed drinking chocolate milk since she willnot take an ONS - she is in agreement    Nutrition Education  Nutrition Education  Nutrition Education Content: Content related nutrition education  Goals: Eating Hints Book reviewed and provided    Coordination of Care       11/5- encouraged pt to maintain adequate intake. Include snacks, she notes throat is beginning to get sore. Explained she should work to increase intake now in preparation for potentially decreased intake over next few weeks. She is worried about gaining " weight.     Nutrition Monitoring and Evaluation   Food/Nutrient Related History Monitoring  Monitoring and Evaluation Plan: Energy intake, Meal/snack pattern, Protein intake, Fluid intake, Amount of food  Energy Intake: Estimated energy intake  Criteria: Meet >75% estimated energy needs- food recall  Fluid Intake: Estimated fluid intake  Criteria: maintain hydration  Amount of Food: Medical food intake  Criteria: ONS as recommeded  Meal/Snack Pattern: Estimated meal and snack pattern  Criteria: 4-6 meals/snacks daily  Estimated protein intake: Estimated protein intake  Criteria: meet >75% estimated protein needs - food recall  Body Composition/Growth/Weight History  Monitoring and Evaluation Plan: Weight  Weight: Measured weight  Criteria: maintain weight          Time Spent  Prep time on day of patient encounter: 10 minutes  Time spent directly with patient, family or caregiver: 25 minutes  Additional Time Spent on Patient Care Activities: 0 minutes  Documentation Time: 30 minutes  Other Time Spent: 0 minutes  Total: 65 minutes        Following as needed

## 2024-11-05 NOTE — PROGRESS NOTES
Pt reports bilateral itchy palms after completion of Cisplatin infusion. Palms are slightly red and dry. Denies additional symptoms. VSS. Dr. Hurst notified, no new orders, continue to monitor. Symptoms self-resolved during post-hydration infusion.

## 2024-11-05 NOTE — PROGRESS NOTES
[facility] Infusion Nursing Note  11/05/24    Melvi Sanchez is a 64 y.o. year old female patient presenting to outpatient infusion for cycle 1 week 5 of the following regimen:    Treatment Plans       Name Type Plan Dates Plan Provider         Active    CISplatin with Concurrent Radiation (Weekly), 7 Week Cycle Oncology Treatment 9/26/2024 - Present Alejandro Hurst MD                  Since the last visit, she reports doing well. Overall, she states that energy level is energy level is good. Appetite has been unchanged and good. she reports no complaints.     Line type: PIV  Line removed/maintained prior to discharge: removed    Administrations This Visit       CISplatin (Platinol) 71 mg in sodium chloride 0.9% 618 mL IV       Admin Date  11/05/2024 Action  New Bag Dose  71 mg Rate  618 mL/hr Route  intravenous Documented By  Elizabeth Lau RN              dexAMETHasone (Decadron) tablet 12 mg       Admin Date  11/05/2024 Action  Given Dose  12 mg Route  oral Documented By  Elizabeth Lau RN              fosaprepitant (Emend) 150 mg in sodium chloride 0.9% 150 mL IV       Admin Date  11/05/2024 Action  New Bag Dose  150 mg Rate  300 mL/hr Route  intravenous Documented By  Elizabeth Lau RN              palonosetron (Aloxi) injection 250 mcg       Admin Date  11/05/2024 Action  Given Dose  250 mcg Route  intravenous Documented By  Elizabeth Lau RN              sodium chloride 0.9 % bolus 1,000 mL       Admin Date  11/05/2024 Action  New Bag Dose  1,000 mL Rate  999 mL/hr Route  intravenous Documented By  Elizabeth Lau RN              sodium chloride 0.9% 1,000 mL with potassium chloride 20 mEq, magnesium sulfate 1 g infusion       Admin Date  11/05/2024 Action  New Bag Dose  999 mL/hr Rate  999 mL/hr Route  intravenous Documented By  Elizabeth Lau RN                  Hypersensitivity reaction noted: No  Patient tolerated treatment well. She c/o itchy palms post infusion that self-resolved during post-hydration infusion.  Discharged home in stable condition.    Follow-up Plan: 11/8    RJ ORLANDO RN

## 2024-11-06 ENCOUNTER — HOSPITAL ENCOUNTER (OUTPATIENT)
Dept: RADIATION ONCOLOGY | Facility: HOSPITAL | Age: 64
Setting detail: RADIATION/ONCOLOGY SERIES
Discharge: HOME | End: 2024-11-06
Payer: COMMERCIAL

## 2024-11-06 DIAGNOSIS — Z51.0 ENCOUNTER FOR ANTINEOPLASTIC RADIATION THERAPY: ICD-10-CM

## 2024-11-06 DIAGNOSIS — C77.0 SECONDARY AND UNSPECIFIED MALIGNANT NEOPLASM OF LYMPH NODES OF HEAD, FACE AND NECK: ICD-10-CM

## 2024-11-06 LAB
RAD ONC MSQ ACTUAL FRACTIONS DELIVERED: 18
RAD ONC MSQ ACTUAL SESSION BIOLOGICAL DOSE: 200 CCGE
RAD ONC MSQ ACTUAL SESSION DELIVERED DOSE: 182 CGRAY
RAD ONC MSQ ACTUAL TOTAL BIOLOGICAL DOSE: 3604 CCGE
RAD ONC MSQ ACTUAL TOTAL DOSE: 3276 CGRAY
RAD ONC MSQ ELAPSED DAYS: 23
RAD ONC MSQ LAST DATE: NORMAL
RAD ONC MSQ PRESCRIBED BIOLOGICAL FRACTIONAL DOSE: 200 CCGE
RAD ONC MSQ PRESCRIBED BIOLOGICAL TOTAL DOSE: 5400 CCGE
RAD ONC MSQ PRESCRIBED FRACTIONAL DOSE: 182 CGRAY
RAD ONC MSQ PRESCRIBED NUMBER OF FRACTIONS: 27
RAD ONC MSQ PRESCRIBED TECHNIQUE: NORMAL
RAD ONC MSQ PRESCRIBED TOTAL DOSE: 4909 CGRAY
RAD ONC MSQ PRESCRIPTION PATTERN COMMENT: NORMAL
RAD ONC MSQ START DATE: NORMAL
RAD ONC MSQ TREATMENT COURSE NUMBER: 2
RAD ONC MSQ TREATMENT SITE: NORMAL

## 2024-11-06 PROCEDURE — 77523 PROTON TRMT INTERMEDIATE: CPT | Performed by: RADIOLOGY

## 2024-11-06 PROCEDURE — 77387 GUIDANCE FOR RADJ TX DLVR: CPT | Performed by: RADIOLOGY

## 2024-11-07 ENCOUNTER — TELEPHONE (OUTPATIENT)
Dept: HEMATOLOGY/ONCOLOGY | Facility: CLINIC | Age: 64
End: 2024-11-07

## 2024-11-07 ENCOUNTER — TELEPHONE (OUTPATIENT)
Dept: PALLIATIVE MEDICINE | Facility: HOSPITAL | Age: 64
End: 2024-11-07
Payer: COMMERCIAL

## 2024-11-07 ENCOUNTER — HOSPITAL ENCOUNTER (OUTPATIENT)
Dept: RADIATION ONCOLOGY | Facility: HOSPITAL | Age: 64
Setting detail: RADIATION/ONCOLOGY SERIES
Discharge: HOME | End: 2024-11-07
Payer: COMMERCIAL

## 2024-11-07 ENCOUNTER — TELEPHONE (OUTPATIENT)
Dept: HEMATOLOGY/ONCOLOGY | Facility: CLINIC | Age: 64
End: 2024-11-07
Payer: COMMERCIAL

## 2024-11-07 DIAGNOSIS — Z51.0 ENCOUNTER FOR ANTINEOPLASTIC RADIATION THERAPY: ICD-10-CM

## 2024-11-07 DIAGNOSIS — G89.3 CANCER ASSOCIATED PAIN: Primary | ICD-10-CM

## 2024-11-07 DIAGNOSIS — C77.0 SECONDARY AND UNSPECIFIED MALIGNANT NEOPLASM OF LYMPH NODES OF HEAD, FACE AND NECK: ICD-10-CM

## 2024-11-07 LAB
RAD ONC MSQ ACTUAL FRACTIONS DELIVERED: 19
RAD ONC MSQ ACTUAL SESSION BIOLOGICAL DOSE: 200 CCGE
RAD ONC MSQ ACTUAL SESSION DELIVERED DOSE: 182 CGRAY
RAD ONC MSQ ACTUAL TOTAL BIOLOGICAL DOSE: 3804 CCGE
RAD ONC MSQ ACTUAL TOTAL DOSE: 3458 CGRAY
RAD ONC MSQ ELAPSED DAYS: 24
RAD ONC MSQ LAST DATE: NORMAL
RAD ONC MSQ PRESCRIBED BIOLOGICAL FRACTIONAL DOSE: 200 CCGE
RAD ONC MSQ PRESCRIBED BIOLOGICAL TOTAL DOSE: 5400 CCGE
RAD ONC MSQ PRESCRIBED FRACTIONAL DOSE: 182 CGRAY
RAD ONC MSQ PRESCRIBED NUMBER OF FRACTIONS: 27
RAD ONC MSQ PRESCRIBED TECHNIQUE: NORMAL
RAD ONC MSQ PRESCRIBED TOTAL DOSE: 4909 CGRAY
RAD ONC MSQ PRESCRIPTION PATTERN COMMENT: NORMAL
RAD ONC MSQ START DATE: NORMAL
RAD ONC MSQ TREATMENT COURSE NUMBER: 2
RAD ONC MSQ TREATMENT SITE: NORMAL

## 2024-11-07 PROCEDURE — 77387 GUIDANCE FOR RADJ TX DLVR: CPT | Performed by: RADIOLOGY

## 2024-11-07 PROCEDURE — 77523 PROTON TRMT INTERMEDIATE: CPT | Performed by: RADIOLOGY

## 2024-11-07 RX ORDER — MORPHINE SULFATE 15 MG/1
15 TABLET, FILM COATED, EXTENDED RELEASE ORAL 2 TIMES DAILY
Qty: 60 TABLET | Refills: 0 | Status: SHIPPED | OUTPATIENT
Start: 2024-11-07 | End: 2024-11-12 | Stop reason: SDUPTHER

## 2024-11-07 ASSESSMENT — ENCOUNTER SYMPTOMS
NUMBNESS: 0
NAUSEA: 0
LEG SWELLING: 0
DIARRHEA: 0
ARTHRALGIAS: 0
COUGH: 0
CONSTIPATION: 0
SORE THROAT: 1
TROUBLE SWALLOWING: 1
CHILLS: 0
ABDOMINAL PAIN: 0
FEVER: 0
SHORTNESS OF BREATH: 0
VOMITING: 0
LIGHT-HEADEDNESS: 0
HEADACHES: 0

## 2024-11-07 NOTE — TELEPHONE ENCOUNTER
Per [facility] , PA is needed for MS CONTIN. Request sent to [facility]. Update ot Gretchen who verbalized understanding. She desires to wait for PA and  fill entire script under insurance coverage rather than pay cash for a partial script ( medication has to be ordered-  only  18 tablets in stock )

## 2024-11-07 NOTE — TELEPHONE ENCOUNTER
Per [facility], insurance has denied coverage of the OxyContin. Email with details forwarded to Provider Jourdan who would like Gretchen to reconsider MS CONTIN. Recall that Gretchen reported hallucinations when taking with Duloxetine. She is no longer taking Duloxetine and agrees to retry MS CONTIN . Script pended to Provider Jourdan.

## 2024-11-07 NOTE — PROGRESS NOTES
"    Patient ID: Melvi Sanchez \"Allie" is a 64 y.o. 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 Robin Gabrielc:  Dr. Chetna Lowry  [facility]   Supp Onc: Antoinette Soliman   PCP: Alton King,     PRIOR THERAPIES  07/07/2022: Completion of chemoradiation with weekly cisplatin with Dr. Razo  08/26/2024: L radical neck dissection 2-4 with CN11 repair     CURRENT THERAPY  10/8/24: Weekly cisplatin (40 mg/m2) to be given concurrently with proton reirradiation     SITES OF DISEASE    CURRENT ONCOLOGICAL PROBLEMS  Post operative pain     HISTORY OF PRESENT ILLNESS  Ms. 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    Patient initially treated by Dr. Razo in 2022. She first noticed a lump in the left side of her neck > 2 years ago. She was found that have enlarged level 2, 3, 4 nodes, but no obvious oropharyngeal lesion 03/2022 by Dr. Torres.  CT neck was obtained demonstrating  L neck mass, 3.2 x 5.9 x 8.4 cm and a 1.5 x 1.6 right paratracheal node -   FNA biopsy of left neck mass showed squamous cell carcinoma. On 4/21/2022 she underwent triple endoscopy and DL showed lesion of left tonsil; biopsy showed invasive squamous cell carcinoma, p16 positive. She was referred to radiation oncology and saw Dr. Crook and completed 70 cGy/35f CCRT with STEWRAT seen at 1 year. She has been followed by Dr. Torres in surveillance and was noted to have left neck LINA. CT neck was obtained on 6/20/24 demonstrating increase in edema, enlargement of level 2 node. L neck core biopsy on 7/5/24 demonstrated:     FINAL DIAGNOSIS   Left neck mass, core biopsy:  - Metastatic squamous cell " carcinoma involving fibrous tissue with extensive necrosis.  See note.   Note: By immunostaining, the tumor cells are positive for p40, supportive of the above diagnosis.     She proceeded with L neck dissection which revealed:    A. Left neck, level II-IV, neck dissection:  -- Poorly differentiated , nonkeratinizing squamous cell carcinoma with abundant necrosis,  involving soft tissue extensively, encompassing levels II and III  (2.2 cm).  - Fibrosis and foreign body granulomatous reaction, levels III  and IV.     Note:  Microscopic examination of H&E sections reveals poorly differentiated squamous cell carcinoma involving extensively soft tissue with minimal lymphoid tissue present and abundant necrosis and fibrosis.  Carcinoma reaches soft tissue inked margins focally.  Vein margin is negative.  History of metastatic squamous cell carcinoma of left tonsil, status post chemo therapy and radiation therapy is noted for this patient.     P16 by immunohistochemistry:  Positive     She was discussed at  and seen by Dr. Lowry with plans for post operative reirradiation with proton beam therapy and presents for medical oncology consultation to discuss addition of radiosensitizing systemic therapy.    PAST MEDICAL HISTORY  COPD, hypothyroidism, RLS    SURGICAL HISTORY  Cholecystectomy, hysterectomy     SOCIAL HISTORY  Lives in [place] by self. Son is next building. 3 kids, 6 grandkids.  Retired  at [employer].  Smoked 1-2 PPD, now down to 4 a day since diagnosis.  Occasional EtOH, no illicits. [place], [place].  Has a mini [employer]     FAMILY HISTORY  Cousin with brain cancer    CURRENT MEDS REVIEWED    ALLERGIES REVIEWED       Subjective   Chief complaint: throat pain    HPI  Melvi Sanchez is a 64 y.o. year old female patient with Recurrent stage III ([id number]) p16+ SCC of L oropharynx who started chemoRT w/ weekly Cisplatin on 10/8/24.    Interval History:  Patient presents  for C6 Cisplatin. Will hold this cycle for toxicities, she has received total 200mg/m2 of Cis.    Patient is still fatigued this week.   Patient reports left sided throat pain has worsened. Has fluctuating swelling in the left jaw. Her PO intake has decreased due to throat pain and she has lost weight.   Skin of neck neck is red and a bit tender from the radiation. She will try to apply Mepilex to this area.   She has had trouble sleeping at night. Goes to bed around 8pm and wakes up around 1am and cannot go back to sleep.   Has transient tinnitus in left ear, left ear feels muffled at times, no subjective hearing loss from baseline.   Bowels are moving daily, soft, 2x per day.   She continue to smoke about 3 or less cigarettes a day. She's trying hard to cut down further.      Review of Systems   Constitutional:  Negative for chills and fever.   HENT:   Positive for mouth sores, sore throat and trouble swallowing. Negative for hearing loss, lump/mass, nosebleeds and tinnitus.    Respiratory:  Negative for cough and shortness of breath.    Cardiovascular:  Negative for chest pain and leg swelling.   Gastrointestinal:  Negative for abdominal pain, constipation, diarrhea, nausea and vomiting.   Musculoskeletal:  Negative for arthralgias.   Skin:  Negative for rash.   Neurological:  Negative for headaches, light-headedness and numbness.     Objective   /63   Pulse 65   Temp 37 °C (98.6 °F) (Core)   Resp 20   Wt 73.7 kg (162 lb 7.7 oz)   BMI 28.78 kg/m²   Daily Weight  11/12/24 : 73.7 kg (162 lb 7.7 oz)  11/11/24 : 75.2 kg (165 lb 12.8 oz)  11/08/24 : 74.8 kg (165 lb)  11/08/24 : 77.2 kg (170 lb 3.1 oz)  11/05/24 : 74.5 kg (164 lb 3.9 oz)  11/05/24 : 74.5 kg (164 lb 3.9 oz)  11/04/24 : 75 kg (165 lb 6.4 oz)    Physical Exam  Vitals reviewed.   Constitutional:       Appearance: Normal appearance. She is well-developed.   HENT:      Head: Normocephalic and atraumatic.      Right Ear: External ear normal. No  tenderness.      Left Ear: External ear normal. No tenderness.      Nose: Nose normal.      Mouth/Throat:      Lips: Pink.      Mouth: Mucous membranes are moist. No injury or oral lesions.      Tongue: No lesions.      Comments: White plagues on b/l buccal surface and soft palate  Eyes:      General: Lids are normal.      Extraocular Movements: Extraocular movements intact.      Conjunctiva/sclera: Conjunctivae normal.      Pupils: Pupils are equal, round, and reactive to light.   Neck:      Thyroid: No thyroid mass.      Comments: L neck scar well healed. Small area of scabbing  Moderate lymphedema in left jaw and submental region    Cardiovascular:      Rate and Rhythm: Normal rate and regular rhythm.      Pulses: Normal pulses.      Heart sounds: No murmur heard.     No gallop.   Pulmonary:      Effort: Pulmonary effort is normal. No respiratory distress.      Breath sounds: Normal breath sounds and air entry. No stridor. No wheezing.   Abdominal:      General: Abdomen is flat. Bowel sounds are normal. There is no distension or abdominal bruit.      Palpations: Abdomen is soft. There is no mass.      Tenderness: There is no abdominal tenderness.   Musculoskeletal:         General: Normal range of motion.      Cervical back: Normal range of motion and neck supple. No signs of trauma. Normal range of motion.      Right lower leg: No edema.      Left lower leg: No edema.   Lymphadenopathy:      Cervical: No cervical adenopathy.      Upper Body:      Right upper body: No axillary adenopathy.      Left upper body: No axillary adenopathy.   Skin:     General: Skin is warm and dry.      Comments: No new skin lesions   Neurological:      General: No focal deficit present.      Mental Status: She is alert and oriented to person, place, and time. Mental status is at baseline.      Gait: Gait is intact.   Psychiatric:         Attention and Perception: Attention normal.         Mood and Affect: Mood and affect normal.          Behavior: Behavior is cooperative.     ECOGSCORE: 1- Restricted in physically strenuous activity.  Carries out light duty.    Diagnostic Results   LABS  Below labs were reviewed.  Results from last 7 days   Lab Units 11/12/24  0851   WBC AUTO x10*3/uL 7.5   HEMOGLOBIN g/dL 10.7*   HEMATOCRIT % 32.4*   PLATELETS AUTO x10*3/uL 136*   NEUTROS ABS x10*3/uL 5.90   LYMPHS ABS AUTO x10*3/uL 0.69*   MONOS ABS AUTO x10*3/uL 0.75   EOS ABS AUTO x10*3/uL 0.08   NEUTROS PCT AUTO % 79.1   LYMPHS PCT AUTO % 9.2   MONOS PCT AUTO % 10.1   EOS PCT AUTO % 1.1      Results from last 7 days   Lab Units 11/12/24  0851   GLUCOSE mg/dL 101*   SODIUM mmol/L 135*   POTASSIUM mmol/L 4.0   CHLORIDE mmol/L 99   CO2 mmol/L 30   BUN mg/dL 23   CREATININE mg/dL 0.66   EGFR mL/min/1.73m*2 >90   CALCIUM mg/dL 8.8   MAGNESIUM mg/dL 1.57*   ALBUMIN g/dL 3.8   PROTEIN TOTAL g/dL 6.2*   BILIRUBIN TOTAL mg/dL 0.5   ALK PHOS U/L 57   ALT U/L 16   AST U/L 11                        IMAGES  7/18/24 PET   1. New hypermetabolic activity seen in the left level 2 cervical lymph node, with central necrosis, consistent with heide metastasis.  2. New FDG avid clustered nodules are seen in the right upper lobe, which is nonspecific and may be infectious/inflammatory, however metastasis can not be excluded. Short-term follow-up chest CT is recommended.  3. Mild FDG uptake is seen throughout the esophagus, likely relating to esophagitis.  4. There is partial opacification of the left maxillary sinus, with mild FDG avidity, likely sinusitis.    CT Neck 6/20/24  IMPRESSION:  1. Compared to the CT neck from 07/14/2023, interval increase in edema within the aerodigestive tract, most pronounced within the supraglottic larynx and hypopharynx and there is resulting moderate narrowing of the supraglottic laryngeal lumen. There is also mild  prevertebral edema. There is no focal fluid collection to suggest an abscess. Cause of this edema is uncertain, could be infectious in  etiology rather than treatment related given that patient completed radiation therapy 2 years ago.  2. No striking new mass is seen within the visualized aerodigestive tract, noting that evaluation is somewhat limited due to presence of diffuse edema and therefore subtle lesions can not be excluded.  3. Interval enlargement of peripherally enhancing likely necrotic lymph node in the left level 2 heide station concerning for progression of disease. Alternatively, enlargement of the lymph node could reflect an infectious process. Otherwise, no cervical lymphadenopathy by size criteria.  4. Edema within the left facial and neck deep and superficial soft tissues as well as in the left supraclavicular fossa is favored to be treatment related and was also present on the prior CT. No focal fluid collection is seen within these regions to suggest an abscess. Correlate clinically.      Assessment/Plan     Melvi Sanchez is a 64 y.o. year old female patient with PMH of stage III (cT1c cN3a M0) SCC of L tonsil, COPD, hypothyroidism and RLS, started treatment with CCRT w/ Cisplatin and proton radiation on 10/8/24 for recurrence to L oropharynx with diffuse JOLANTA.    We discussed the treatment paradigm in recurrence and salvage surgery - given her pathology showing JOLANTA it is reasonable to proceed with concurrent reirradiation with Proton. We consented today and all questions were answered. We will proceed upon CT simulation.    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 Proton machine is down today so first dose RT will be on IMRT on 10/9 then resume PORT.    - 10/15/24: C2. Unchanged mild chronic dysphagia and xerostomia from prior RT. Left neck pain controlled w/ current pain regimen.  - 10/22/24: C3. Increased odynophagia in the last 3-4 days, like due to  thrush. PO intake decreased and has resulting weight loss. Also with increased lymphedema in the left jaw and submental region. Left thigh incision still numb, fluid accumulation is slowed down. She's smoking about 1-2 cigarettes a day,   - 10/29/24: C4. Discussed goals of at least 200 mg/m2, will continue to evaluate. Thrush improved.   - 11/5/24: C5. Patient with more forgetfulness and weakness this week. Appetite remain good. No n/v. Stable mild lymphedema in left jaw/neck. Her left sided odynophagia is a little worse so will reach out to Supp Onc on increasing pain medications.   - 11/12/24: Patient completed 200mg/m2 of Cisplatin and we will not proceed with Cycle 6. She has expected SE from chemoRT w/ fatigue, odynophagia from mucositis, left jaw pain with flucuating lymphedema, skin changed from radiation.     # Sleep Disturbance  - Trouble sleeping, will continue Olanzapine 5mg nightly rather than just D1-4.     # Oral Candidiasis: resolved  - 10/22: white plagues evident in oral cavity. Will start patient on Nystatin swish and swallow.   - 11/5: Thrush resolved, completed 2 weeks of Nystatin    # RUL nodules  - Being followed with CT scan in next 3 months    PLAN  -- Only IV hydration today. Hold Cisplatin  -- Continue IV hydration every Friday  -- Follow up w/ Supp Onc regarding increased odynophagia. Patient should start MS Contin 15mg BID for better baseline pain control. Will continue Percocet 7.5mg Q3-4hr PRN  -- START BMX solution PRN for mucositis pain  -- Olanzapine 5mg QHS for insomnia

## 2024-11-07 NOTE — TELEPHONE ENCOUNTER
Spoke with Gretchen and reviewed that PA was submitted on Tuesday 11/5/24  and that we have not yet heard back from her insurance company.  Gretchen verbalized  understanding. Email  sent to [facility] for update.

## 2024-11-07 NOTE — TELEPHONE ENCOUNTER
Spoke with pharmacist to clarify if anything other then PA needed. Reviewed that request for PA was submitted on 11/5/24. He states that nothing further is needed at this time.

## 2024-11-08 ENCOUNTER — INFUSION (OUTPATIENT)
Dept: HEMATOLOGY/ONCOLOGY | Facility: HOSPITAL | Age: 64
End: 2024-11-08
Payer: COMMERCIAL

## 2024-11-08 ENCOUNTER — APPOINTMENT (OUTPATIENT)
Dept: OTOLARYNGOLOGY | Facility: CLINIC | Age: 64
End: 2024-11-08
Payer: COMMERCIAL

## 2024-11-08 ENCOUNTER — TELEPHONE (OUTPATIENT)
Dept: PALLIATIVE MEDICINE | Facility: HOSPITAL | Age: 64
End: 2024-11-08

## 2024-11-08 ENCOUNTER — HOSPITAL ENCOUNTER (OUTPATIENT)
Dept: RADIATION ONCOLOGY | Facility: HOSPITAL | Age: 64
Setting detail: RADIATION/ONCOLOGY SERIES
Discharge: HOME | End: 2024-11-08
Payer: COMMERCIAL

## 2024-11-08 VITALS
DIASTOLIC BLOOD PRESSURE: 62 MMHG | BODY MASS INDEX: 31.12 KG/M2 | TEMPERATURE: 96.8 F | WEIGHT: 170.19 LBS | HEART RATE: 73 BPM | OXYGEN SATURATION: 98 % | RESPIRATION RATE: 18 BRPM | SYSTOLIC BLOOD PRESSURE: 133 MMHG

## 2024-11-08 VITALS — HEIGHT: 63 IN | BODY MASS INDEX: 29.23 KG/M2 | WEIGHT: 165 LBS

## 2024-11-08 DIAGNOSIS — C09.9 TONSIL CANCER (MULTI): Primary | ICD-10-CM

## 2024-11-08 DIAGNOSIS — C09.9 TONSIL CANCER (MULTI): ICD-10-CM

## 2024-11-08 DIAGNOSIS — C77.0 SECONDARY AND UNSPECIFIED MALIGNANT NEOPLASM OF LYMPH NODES OF HEAD, FACE AND NECK: ICD-10-CM

## 2024-11-08 DIAGNOSIS — C77.0 METASTASIS TO HEAD AND NECK LYMPH NODE (MULTI): ICD-10-CM

## 2024-11-08 DIAGNOSIS — Z51.0 ENCOUNTER FOR ANTINEOPLASTIC RADIATION THERAPY: ICD-10-CM

## 2024-11-08 LAB
RAD ONC MSQ ACTUAL FRACTIONS DELIVERED: 20
RAD ONC MSQ ACTUAL SESSION BIOLOGICAL DOSE: 200 CCGE
RAD ONC MSQ ACTUAL SESSION DELIVERED DOSE: 182 CGRAY
RAD ONC MSQ ACTUAL TOTAL BIOLOGICAL DOSE: 4004 CCGE
RAD ONC MSQ ACTUAL TOTAL DOSE: 3640 CGRAY
RAD ONC MSQ ELAPSED DAYS: 25
RAD ONC MSQ LAST DATE: NORMAL
RAD ONC MSQ PRESCRIBED BIOLOGICAL FRACTIONAL DOSE: 200 CCGE
RAD ONC MSQ PRESCRIBED BIOLOGICAL TOTAL DOSE: 5400 CCGE
RAD ONC MSQ PRESCRIBED FRACTIONAL DOSE: 182 CGRAY
RAD ONC MSQ PRESCRIBED NUMBER OF FRACTIONS: 27
RAD ONC MSQ PRESCRIBED TECHNIQUE: NORMAL
RAD ONC MSQ PRESCRIBED TOTAL DOSE: 4909 CGRAY
RAD ONC MSQ PRESCRIPTION PATTERN COMMENT: NORMAL
RAD ONC MSQ START DATE: NORMAL
RAD ONC MSQ TREATMENT COURSE NUMBER: 2
RAD ONC MSQ TREATMENT SITE: NORMAL

## 2024-11-08 PROCEDURE — 3008F BODY MASS INDEX DOCD: CPT | Performed by: OTOLARYNGOLOGY

## 2024-11-08 PROCEDURE — 77387 GUIDANCE FOR RADJ TX DLVR: CPT | Performed by: RADIOLOGY

## 2024-11-08 PROCEDURE — 96360 HYDRATION IV INFUSION INIT: CPT | Mod: INF

## 2024-11-08 PROCEDURE — 99024 POSTOP FOLLOW-UP VISIT: CPT | Performed by: OTOLARYNGOLOGY

## 2024-11-08 PROCEDURE — 77523 PROTON TRMT INTERMEDIATE: CPT | Performed by: RADIOLOGY

## 2024-11-08 PROCEDURE — 77336 RADIATION PHYSICS CONSULT: CPT | Performed by: RADIOLOGY

## 2024-11-08 NOTE — PROGRESS NOTES
T1N3a SCCa of left tonsil s/p CXRT with recurrent left neck disease now s/p left radical ND 2-4, recon with left ALT 8/26/24                      Undergoing proton re irradiation    Has had 5 chemo            PE:    Left neck and throat ok       Continues to smoke less than 5 per day        She Wants to continue her chemo      Follow up with me 3 months

## 2024-11-08 NOTE — TELEPHONE ENCOUNTER
Called patient to inform the MS Contin was not covered by the insurance. Explained if she uses good rx it would cost $14.03. She said ok and she has enough medication to get through the weekend. Called the pharmacy and they are ordering for Monday 11/11 delivery and will place on [facility]. Patient updated.

## 2024-11-11 ENCOUNTER — HOSPITAL ENCOUNTER (OUTPATIENT)
Dept: RADIATION ONCOLOGY | Facility: HOSPITAL | Age: 64
Setting detail: RADIATION/ONCOLOGY SERIES
Discharge: HOME | End: 2024-11-11
Payer: COMMERCIAL

## 2024-11-11 ENCOUNTER — RADIATION ONCOLOGY OTV (OUTPATIENT)
Dept: RADIATION ONCOLOGY | Facility: HOSPITAL | Age: 64
End: 2024-11-11
Payer: COMMERCIAL

## 2024-11-11 VITALS
WEIGHT: 165.8 LBS | DIASTOLIC BLOOD PRESSURE: 77 MMHG | OXYGEN SATURATION: 96 % | TEMPERATURE: 96.8 F | BODY MASS INDEX: 29.37 KG/M2 | RESPIRATION RATE: 18 BRPM | SYSTOLIC BLOOD PRESSURE: 158 MMHG | HEART RATE: 77 BPM

## 2024-11-11 DIAGNOSIS — C10.9 OROPHARYNGEAL CANCER (MULTI): Primary | ICD-10-CM

## 2024-11-11 DIAGNOSIS — Z51.0 ENCOUNTER FOR ANTINEOPLASTIC RADIATION THERAPY: ICD-10-CM

## 2024-11-11 DIAGNOSIS — C77.0 SECONDARY AND UNSPECIFIED MALIGNANT NEOPLASM OF LYMPH NODES OF HEAD, FACE AND NECK: ICD-10-CM

## 2024-11-11 LAB
RAD ONC MSQ ACTUAL FRACTIONS DELIVERED: 21
RAD ONC MSQ ACTUAL SESSION BIOLOGICAL DOSE: 200 CCGE
RAD ONC MSQ ACTUAL SESSION DELIVERED DOSE: 182 CGRAY
RAD ONC MSQ ACTUAL TOTAL BIOLOGICAL DOSE: 4204 CCGE
RAD ONC MSQ ACTUAL TOTAL DOSE: 3822 CGRAY
RAD ONC MSQ ELAPSED DAYS: 28
RAD ONC MSQ LAST DATE: NORMAL
RAD ONC MSQ PRESCRIBED BIOLOGICAL FRACTIONAL DOSE: 200 CCGE
RAD ONC MSQ PRESCRIBED BIOLOGICAL TOTAL DOSE: 5400 CCGE
RAD ONC MSQ PRESCRIBED FRACTIONAL DOSE: 182 CGRAY
RAD ONC MSQ PRESCRIBED NUMBER OF FRACTIONS: 27
RAD ONC MSQ PRESCRIBED TECHNIQUE: NORMAL
RAD ONC MSQ PRESCRIBED TOTAL DOSE: 4909 CGRAY
RAD ONC MSQ PRESCRIPTION PATTERN COMMENT: NORMAL
RAD ONC MSQ START DATE: NORMAL
RAD ONC MSQ TREATMENT COURSE NUMBER: 2
RAD ONC MSQ TREATMENT SITE: NORMAL

## 2024-11-11 PROCEDURE — 77387 GUIDANCE FOR RADJ TX DLVR: CPT | Performed by: RADIOLOGY

## 2024-11-11 PROCEDURE — 77523 PROTON TRMT INTERMEDIATE: CPT | Performed by: RADIOLOGY

## 2024-11-11 RX ORDER — BENZONATATE 100 MG/1
100 CAPSULE ORAL EVERY 6 HOURS PRN
Qty: 90 CAPSULE | Refills: 1 | Status: SHIPPED | OUTPATIENT
Start: 2024-11-11 | End: 2025-01-10

## 2024-11-11 ASSESSMENT — PAIN SCALES - GENERAL: PAINLEVEL_OUTOF10: 5

## 2024-11-11 NOTE — PROGRESS NOTES
Radiation Oncology On Treatment Visit    Patient Name:  Melvi Sanchez  MRN:  [id number]  :  1960    Referring Provider: No ref. provider found  Primary Care Provider: Alton King DO  Care Team: Patient Care Team:  Alton King DO as PCP - General (Family Medicine)  [facility] King [facility]CP - [facility]  Alton King DO as PCP - [facility] Medicaid PCP  Jimmie LEYVA MD as Consulting Physician (Radiation Oncology)  Marlon Patel MD as Surgeon (Pulmonary Disease)  Tiffanie Whitfield RN as Nurse Navigator (Hematology and Oncology)  Alejandro Hurst MD as Consulting Physician (Hematology and Oncology)  Chetna Lowry MD as Radiation Oncologist (Radiation Oncology)  Danny Rodriguez PA-C as Physician Assistant (Hematology and Oncology)    Date of Service: 2024     Diagnosis:   Specialty Problems          Radiation Oncology Problems    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)        Tonsil cancer (Multi)         Treatment Summary:  Proton Beam: Left Head and neck    Treatment Period Technique Fraction Dose Fractions Total Dose   Course 2 10/9/2024-2024  (days elapsed: 33)         VMAT L neck 10/9/2024-10/11/2024 VMAT 200 / 200 cGy 3 / 3 600 / 600 cGy         L neck 10/14/2024-2024 3-Field 182 / 182 cGy  3822 / 4,909 cGy     SUBJECTIVE: Pt is in good spirits.pt notes some additional swelling of=r the feeling of swelling left neck.   Has more coughing and congestion. Pain is stable and controlled on current regimen.      OBJECTIVE:   Vital Signs:  /77   Pulse 77   Temp 36 °C (96.8 °F)   Resp 18   Wt 75.2 kg (165 lb 12.8 oz)   SpO2 96%   BMI 29.37 kg/m²     Other Pertinent Findings:   No mucosits on transoral exam. Hypermigmentation and dry desquamation in Right neck with small area of moist desquamation.  Toxicity Assessment          10/14/2024    13:25 10/17/2024    13:42 10/24/2024    12:27 2024    10:22 2024    14:14   Toxicity  Assessment   Adverse Events Reviewed (WDL)     Yes (Within Defined Limits)   Treatment  and neck Head and neck Head and neck Head and neck Head and neck   Anorexia Grade 0 Grade 0 Grade 0 Grade 0 Grade 0   Anxiety Grade 0 Grade 0 Grade 0 Grade 0 Grade 0   Dehydration Grade 0 Grade 0 Grade 0 Grade 0 Grade 0   Depression Grade 0 Grade 0 Grade 0 Grade 0 Grade 0   Dermatitis Radiation Grade 0 Grade 0 Grade 1 Grade 1 Grade 1   Diarrhea Grade 0 Grade 0 Grade 0 Grade 0 Grade 0   Fatigue Grade 0 Grade 1 Grade 1 Grade 2 Grade 2   Nausea Grade 0 Grade 0 Grade 0 Grade 0 Grade 0   Pain Grade 0  Grade 2 Grade 1 Grade 0   Tumor Pain Grade 0  Grade 0 Grade 0    Vomiting Grade 0 Grade 0 Grade 0 Grade 0    Hearing Impaired Grade 0       Blurred Vision Grade 0       Dry Eye Grade 0       Eye Pain Grade 0       Dry Mouth Grade 2       Febrile Neutropenia     Grade 0   Ear Pain Grade 0 Grade 0 Grade 0 Grade 0 Grade 0   Tinnitus    Grade 0    Watering Eyes  Grade 0 Grade 0 Grade 0 Grade 0   Oral Pain Grade 0 Grade 0 Grade 2       started on nystatin Grade 1       upper part of back of throat Grade 0   Edema Face Grade 0 Grade 0 Grade 0 Grade 0 Grade 0   Malaise Grade 0   Grade 0    Neck Edema Grade 1 Grade 0 Grade 1 Grade 1 Grade 1       mild   Mucosal Infection   Grade 0     Otitis Media    Grade 0    Sinusitis    Grade 0    Skin Infection    Grade 0    Head Soft Tissue Necrosis Grade 0  Grade 0 Grade 0 Grade 0   Neck Soft Tissue Necrosis Grade 0   Grade 0 Grade 0   Osteonecrosis of Jaw     Grade 0   Trismus     Grade 0   Facial Nerve Disorder Grade 0       Stroke   Grade 0     Trigeminal Nerve Disorder Grade 0 Grade 0      Aspiration Grade 0 Grade 0 Grade 0 Grade 0 Grade 0   Hoarseness Grade 0 Grade 0 Grade 1 Grade 0 Grade 0   Stridor  Grade 0 Grade 0 Grade 0 Grade 0   Tracheal Mucositis     Grade 0   Voice Alteration Grade 0  Grade 0 Grade 0 Grade 0   Hot Flashes     Grade 0        Assessment / Plan:  The patient is  tolerating radiation therapy as anticipated.  Continue per current treatment plan.      Sent for CXR given more coughing and was WNL. Sent a rx for tessalon Perles. Counseled re smoking cessation again.

## 2024-11-12 ENCOUNTER — OFFICE VISIT (OUTPATIENT)
Dept: HEMATOLOGY/ONCOLOGY | Facility: HOSPITAL | Age: 64
End: 2024-11-12
Payer: COMMERCIAL

## 2024-11-12 ENCOUNTER — INFUSION (OUTPATIENT)
Dept: HEMATOLOGY/ONCOLOGY | Facility: HOSPITAL | Age: 64
End: 2024-11-12
Payer: COMMERCIAL

## 2024-11-12 ENCOUNTER — LAB (OUTPATIENT)
Dept: LAB | Facility: HOSPITAL | Age: 64
End: 2024-11-12
Payer: COMMERCIAL

## 2024-11-12 ENCOUNTER — HOSPITAL ENCOUNTER (OUTPATIENT)
Dept: RADIOLOGY | Facility: HOSPITAL | Age: 64
Discharge: HOME | End: 2024-11-12
Payer: COMMERCIAL

## 2024-11-12 ENCOUNTER — TELEPHONE (OUTPATIENT)
Dept: ADMISSION | Facility: HOSPITAL | Age: 64
End: 2024-11-12

## 2024-11-12 ENCOUNTER — NUTRITION (OUTPATIENT)
Dept: HEMATOLOGY/ONCOLOGY | Facility: HOSPITAL | Age: 64
End: 2024-11-12

## 2024-11-12 ENCOUNTER — HOSPITAL ENCOUNTER (OUTPATIENT)
Dept: RADIATION ONCOLOGY | Facility: HOSPITAL | Age: 64
Setting detail: RADIATION/ONCOLOGY SERIES
Discharge: HOME | End: 2024-11-12
Payer: COMMERCIAL

## 2024-11-12 VITALS
RESPIRATION RATE: 20 BRPM | WEIGHT: 162.48 LBS | TEMPERATURE: 98.6 F | BODY MASS INDEX: 28.78 KG/M2 | SYSTOLIC BLOOD PRESSURE: 116 MMHG | DIASTOLIC BLOOD PRESSURE: 63 MMHG | HEART RATE: 65 BPM

## 2024-11-12 VITALS — BODY MASS INDEX: 28.78 KG/M2 | WEIGHT: 162.48 LBS

## 2024-11-12 DIAGNOSIS — M54.2 NECK PAIN: ICD-10-CM

## 2024-11-12 DIAGNOSIS — C77.0 METASTASIS TO HEAD AND NECK LYMPH NODE (MULTI): ICD-10-CM

## 2024-11-12 DIAGNOSIS — C09.9 TONSIL CANCER (MULTI): ICD-10-CM

## 2024-11-12 DIAGNOSIS — Z51.0 ENCOUNTER FOR ANTINEOPLASTIC RADIATION THERAPY: ICD-10-CM

## 2024-11-12 DIAGNOSIS — G89.3 CANCER ASSOCIATED PAIN: ICD-10-CM

## 2024-11-12 DIAGNOSIS — R13.10 DYSPHAGIA, UNSPECIFIED TYPE: ICD-10-CM

## 2024-11-12 DIAGNOSIS — C10.9 OROPHARYNGEAL CANCER (MULTI): ICD-10-CM
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
## 2024-11-12 DIAGNOSIS — K11.7 XEROSTOMIA DUE TO RADIOTHERAPY: ICD-10-CM

## 2024-11-12 DIAGNOSIS — K12.33 MUCOSITIS DUE TO RADIATION THERAPY: ICD-10-CM

## 2024-11-12 DIAGNOSIS — C77.0 METASTASIS TO HEAD AND NECK LYMPH NODE (MULTI): Primary | ICD-10-CM

## 2024-11-12 DIAGNOSIS — C77.0 SECONDARY AND UNSPECIFIED MALIGNANT NEOPLASM OF LYMPH NODES OF HEAD, FACE AND NECK: ICD-10-CM

## 2024-11-12 DIAGNOSIS — Z51.11 ENCOUNTER FOR ANTINEOPLASTIC CHEMOTHERAPY: ICD-10-CM

## 2024-11-12 DIAGNOSIS — I89.0 LYMPHEDEMA DUE TO RADIATION: ICD-10-CM

## 2024-11-12 DIAGNOSIS — R13.10 ODYNOPHAGIA: ICD-10-CM

## 2024-11-12 DIAGNOSIS — G47.09 OTHER INSOMNIA: ICD-10-CM

## 2024-11-12 LAB
6MAM UR CFM-MCNC: <25 NG/ML
ALBUMIN SERPL BCP-MCNC: 3.8 G/DL (ref 3.4–5)
ALP SERPL-CCNC: 57 U/L (ref 33–136)
ALT SERPL W P-5'-P-CCNC: 16 U/L (ref 7–45)
ANION GAP SERPL CALC-SCNC: 10 MMOL/L (ref 10–20)
AST SERPL W P-5'-P-CCNC: 11 U/L (ref 9–39)
BASOPHILS # BLD AUTO: 0.01 X10*3/UL (ref 0–0.1)
BASOPHILS NFR BLD AUTO: 0.1 %
BILIRUB SERPL-MCNC: 0.5 MG/DL (ref 0–1.2)
BUN SERPL-MCNC: 23 MG/DL (ref 6–23)
CALCIUM SERPL-MCNC: 8.8 MG/DL (ref 8.6–10.3)
CHLORIDE SERPL-SCNC: 99 MMOL/L (ref 98–107)
CO2 SERPL-SCNC: 30 MMOL/L (ref 21–32)
CODEINE UR CFM-MCNC: <50 NG/ML
CREAT SERPL-MCNC: 0.66 MG/DL (ref 0.5–1.05)
EGFRCR SERPLBLD CKD-EPI 2021: >90 ML/MIN/1.73M*2
EOSINOPHIL # BLD AUTO: 0.08 X10*3/UL (ref 0–0.7)
EOSINOPHIL NFR BLD AUTO: 1.1 %
ERYTHROCYTE [DISTWIDTH] IN BLOOD BY AUTOMATED COUNT: 15.1 % (ref 11.5–14.5)
GLUCOSE SERPL-MCNC: 101 MG/DL (ref 74–99)
HCT VFR BLD AUTO: 32.4 % (ref 36–46)
HGB BLD-MCNC: 10.7 G/DL (ref 12–16)
HYDROCODONE CTO UR CFM-MCNC: <25 NG/ML
HYDROMORPHONE UR CFM-MCNC: <25 NG/ML
IMM GRANULOCYTES # BLD AUTO: 0.03 X10*3/UL (ref 0–0.7)
IMM GRANULOCYTES NFR BLD AUTO: 0.4 % (ref 0–0.9)
LYMPHOCYTES # BLD AUTO: 0.69 X10*3/UL (ref 1.2–4.8)
LYMPHOCYTES NFR BLD AUTO: 9.2 %
MAGNESIUM SERPL-MCNC: 1.57 MG/DL (ref 1.6–2.4)
MCH RBC QN AUTO: 31.7 PG (ref 26–34)
MCHC RBC AUTO-ENTMCNC: 33 G/DL (ref 32–36)
MCV RBC AUTO: 96 FL (ref 80–100)
MONOCYTES # BLD AUTO: 0.75 X10*3/UL (ref 0.1–1)
MONOCYTES NFR BLD AUTO: 10.1 %
MORPHINE UR CFM-MCNC: <50 NG/ML
NEUTROPHILS # BLD AUTO: 5.9 X10*3/UL (ref 1.2–7.7)
NEUTROPHILS NFR BLD AUTO: 79.1 %
NORHYDROCODONE UR CFM-MCNC: <25 NG/ML
NOROXYCODONE UR CFM-MCNC: >1000 NG/ML
NRBC BLD-RTO: 0 /100 WBCS (ref 0–0)
OXYCODONE UR CFM-MCNC: 933 NG/ML
OXYMORPHONE UR CFM-MCNC: 192 NG/ML
PLATELET # BLD AUTO: 136 X10*3/UL (ref 150–450)
POTASSIUM SERPL-SCNC: 4 MMOL/L (ref 3.5–5.3)
PROT SERPL-MCNC: 6.2 G/DL (ref 6.4–8.2)
RAD ONC MSQ ACTUAL FRACTIONS DELIVERED: 22
RAD ONC MSQ ACTUAL SESSION BIOLOGICAL DOSE: 200 CCGE
RAD ONC MSQ ACTUAL SESSION DELIVERED DOSE: 182 CGRAY
RAD ONC MSQ ACTUAL TOTAL BIOLOGICAL DOSE: 4404 CCGE
RAD ONC MSQ ACTUAL TOTAL DOSE: 4004 CGRAY
RAD ONC MSQ ELAPSED DAYS: 29
RAD ONC MSQ LAST DATE: NORMAL
RAD ONC MSQ PRESCRIBED BIOLOGICAL FRACTIONAL DOSE: 200 CCGE
RAD ONC MSQ PRESCRIBED BIOLOGICAL TOTAL DOSE: 5400 CCGE
RAD ONC MSQ PRESCRIBED FRACTIONAL DOSE: 182 CGRAY
RAD ONC MSQ PRESCRIBED NUMBER OF FRACTIONS: 27
RAD ONC MSQ PRESCRIBED TECHNIQUE: NORMAL
RAD ONC MSQ PRESCRIBED TOTAL DOSE: 4909 CGRAY
RAD ONC MSQ PRESCRIPTION PATTERN COMMENT: NORMAL
RAD ONC MSQ START DATE: NORMAL
RAD ONC MSQ TREATMENT COURSE NUMBER: 2
RAD ONC MSQ TREATMENT SITE: NORMAL
RBC # BLD AUTO: 3.38 X10*6/UL (ref 4–5.2)
SODIUM SERPL-SCNC: 135 MMOL/L (ref 136–145)
WBC # BLD AUTO: 7.5 X10*3/UL (ref 4.4–11.3)

## 2024-11-12 PROCEDURE — 80053 COMPREHEN METABOLIC PANEL: CPT

## 2024-11-12 PROCEDURE — 71046 X-RAY EXAM CHEST 2 VIEWS: CPT | Performed by: RADIOLOGY

## 2024-11-12 PROCEDURE — 99215 OFFICE O/P EST HI 40 MIN: CPT | Performed by: STUDENT IN AN ORGANIZED HEALTH CARE EDUCATION/TRAINING PROGRAM

## 2024-11-12 PROCEDURE — 36415 COLL VENOUS BLD VENIPUNCTURE: CPT

## 2024-11-12 PROCEDURE — 96365 THER/PROPH/DIAG IV INF INIT: CPT | Mod: INF

## 2024-11-12 PROCEDURE — 77523 PROTON TRMT INTERMEDIATE: CPT | Performed by: RADIOLOGY

## 2024-11-12 PROCEDURE — 85025 COMPLETE CBC W/AUTO DIFF WBC: CPT

## 2024-11-12 PROCEDURE — 2500000004 HC RX 250 GENERAL PHARMACY W/ HCPCS (ALT 636 FOR OP/ED): Mod: SE | Performed by: STUDENT IN AN ORGANIZED HEALTH CARE EDUCATION/TRAINING PROGRAM

## 2024-11-12 PROCEDURE — 77387 GUIDANCE FOR RADJ TX DLVR: CPT | Performed by: RADIOLOGY

## 2024-11-12 PROCEDURE — 71046 X-RAY EXAM CHEST 2 VIEWS: CPT

## 2024-11-12 PROCEDURE — 83735 ASSAY OF MAGNESIUM: CPT

## 2024-11-12 RX ORDER — MORPHINE SULFATE 15 MG/1
15 TABLET, FILM COATED, EXTENDED RELEASE ORAL 2 TIMES DAILY
Qty: 60 TABLET | Refills: 0 | Status: SHIPPED | OUTPATIENT
Start: 2024-11-12 | End: 2024-12-12

## 2024-11-12 ASSESSMENT — PAIN SCALES - GENERAL: PAINLEVEL_OUTOF10: 5

## 2024-11-12 NOTE — PROGRESS NOTES
Gretchen SHERINE Sanchez is a 64 y.o. female who arrived to infusion from provider visit for IVF. See provider note for assessment. Tolerated infusion without incident, ambulated off unit independently.

## 2024-11-12 NOTE — TELEPHONE ENCOUNTER
[facility] They are submitted new PA now. Patient was already agreeable to fill using [facility] - we will update her once approved so she is aware for future fills.

## 2024-11-12 NOTE — TELEPHONE ENCOUNTER
[facility] [place].  Prescription to be filled today.  Patient called and updated.

## 2024-11-12 NOTE — TELEPHONE ENCOUNTER
Pt called stating her [facility] is out of Morphine until later next week. She said they told her the team should call the [facility] in [place] and see if it can be sent there because they seemed to have the most in stock currently. Pt is asking for a call back once this has been completed. Message sent to the [facility] care team.

## 2024-11-12 NOTE — PROGRESS NOTES
"NUTRITION Follow-Up NOTE    Nutrition Assessment     Reason for Visit:  Melvi Sanchez \"Gretchen\" is a 64 y.o. female who presents for scc orophayrnx dx 4/2022 with recurrence 7/2024.   TX L radical neck dissection with CN11 repair 8/26/24  Weekly cisplatin concurrent with proton reirradiation    11/12- Follow up in infusion. No chemo today or further chemotherapy. Radiation completes 11/19.  Sister with pt today. Pt eating a large breakfast burrito from [employer] when visited. Increased fatigue     Lab Results   Component Value Date/Time    GLUCOSE 101 (H) 11/12/2024 0851     (L) 11/12/2024 0851    K 4.0 11/12/2024 0851    CL 99 11/12/2024 0851    CO2 30 11/12/2024 0851    ANIONGAP 10 11/12/2024 0851    BUN 23 11/12/2024 0851    CREATININE 0.66 11/12/2024 0851    EGFR >90 11/12/2024 0851    CALCIUM 8.8 11/12/2024 0851    ALBUMIN 3.8 11/12/2024 0851    ALKPHOS 57 11/12/2024 0851    PROT 6.2 (L) 11/12/2024 0851    AST 11 11/12/2024 0851    BILITOT 0.5 11/12/2024 0851    ALT 16 11/12/2024 0851     Lab Results   Component Value Date/Time    VITD25 53 10/29/2024 0827       Anthropometrics:  Anthropometrics  Weight: 73.7 kg (162 lb 7.7 oz)  BMI (Calculated): 29.71  IBW/kg (Dietitian Calculated): 50 kg  Percent of IBW: 149 %  Adjusted Body Weight (kg): 56.1 kg  Weight Change  Weight History / % Weight Change: weight down a few pounds from last week        Wt Readings from Last 10 Encounters:   11/12/24 73.7 kg (162 lb 7.7 oz)   11/12/24 73.7 kg (162 lb 7.7 oz)   11/11/24 75.2 kg (165 lb 12.8 oz)   11/08/24 74.8 kg (165 lb)   11/08/24 77.2 kg (170 lb 3.1 oz)   11/05/24 74.5 kg (164 lb 3.9 oz)   11/05/24 74.5 kg (164 lb 3.9 oz)   11/04/24 75 kg (165 lb 6.4 oz)   11/01/24 77.6 kg (171 lb)   11/01/24 76.6 kg (168 lb 14 oz)      Denies any weight loss over past 2 years  Food And Nutrient Intake:  Food and Nutrient History  Energy Intake: Good > 75 % (normal - more than normal, eating well)  Fluid Intake: good - " "diet Dr Pepper, iced tea, Gatorlyte, Prime, Liquid IV, no plain water - doesn't like plain water  Food Allergy: no food allergies  GI Symptoms: none (heartburn few weeks ago but improved on medication, bowels moving regularly)  Oral Problems: odynophagia (from tx 2 years ago, needs water with every bite of food \"extreme dry mouth\" worse now than normal. mild pain with swallowing, not stopping her from eating.  Uses Xlimelts often. + s/s rinses. BMX prescribed today, increased throat pain)  Dentition: lower denture/partial, upper denture/partial     Food Intake  Amount of Food: 2 meals/day generally  Meal 1: doesn't eat first think in am. egg sandwich of some sort, fruit  Meal 3: salad, chicken  Snacks: not too many, pretzles, cheese, PNB crackers    Food Preparation  Cooking: Patient, Family  Grocery Shopping: Patient, Family (alot of help, children, siblings)  Dining Out:  (with treatment and having to drive they eat out sometimes)                                       Food Supplement Intake  Oral Nutrition Supplements:  (none. prior treatment tried and vomited. Had a glass of milk as we discussed last week. Forgot to get chocolate milk but will get some this week. Also suggested [employer] Breakfast [employer]entials, she thought she could tolerate that)           Nutrition Focused Physical Exam Findings:                          Energy Needs  Estimated Energy Needs  Total Energy Estimated Needs (kCal): 1575 kCal  Total Estimated Energy Need per Day (kCal/kg): 28 kCal/kg  Estimated Fluid Needs  Total Fluid Estimated Needs (mL): 1700 mL  Total Fluid Estimated Needs (mL/kg): 30 mL/kg  Estimated Protein Needs  Total Protein Estimated Needs (g): 67 g  Total Protein Estimated Needs (g/kg): 1.2 g/kg        Nutrition Diagnosis   Malnutrition Diagnosis  Patient has Malnutrition Diagnosis: No    Nutrition Diagnosis  Patient has Nutrition Diagnosis: Yes  Diagnosis Status (1): Ongoing  Nutrition Diagnosis 1: Increased energy " expenditure  Related to (1): increased metabolic demand, pathophysiology of disease process  As Evidenced by (1): chemoradiation treatment    Nutrition Interventions/Recommendations   Nutrition Prescription  Individualized Nutrition Prescription Provided for : General Healthy Diet - adequate calories and protein to maintain weight through treatment    Food and Nutrition Delivery  Food and Nutrition Delivery  Meals & Snacks: Energy-modified diet, General Healthful Diet, Modify Composition of Meals/Snacks, Protein-modified diet  Goals: incorporate soft high calorie high protein foods modified texture as needed, moist foods as needed. Include consistent meals and snacks. Avoid dry harsh  foods and acidic foods/beverages. Include adequate fluid intake  Goals: discussed drinking chocolate milk since she will not take an ONS - she is in agreement. She forgot, will try this week. May try [employer] Breakfast [employer]entials. Encouraged for soft extra calories and protein  Feeding Assistance: Mouth care  Goals: s/s rinses and BMX    Nutrition Education       Coordination of Care       11/5- encouraged pt to maintain adequate intake. Include snacks, she notes throat is beginning to get sore. Explained she should work to increase intake now in preparation for potentially decreased intake over next few weeks. She is worried about gaining weight.     Nutrition Monitoring and Evaluation   Food/Nutrient Related History Monitoring  Monitoring and Evaluation Plan: Energy intake, Meal/snack pattern, Protein intake, Fluid intake, Amount of food  Energy Intake: Estimated energy intake  Criteria: Meet >75% estimated energy needs- food recall  Fluid Intake: Estimated fluid intake  Criteria: maintain hydration  Amount of Food: Medical food intake  Criteria: ONS as recommeded  Meal/Snack Pattern: Estimated meal and snack pattern  Criteria: 4-6 meals/snacks daily  Estimated protein intake: Estimated protein intake  Criteria: meet >75% estimated  protein needs - food recall  Body Composition/Growth/Weight History  Monitoring and Evaluation Plan: Weight  Weight: Measured weight  Criteria: maintain weight          Time Spent  Prep time on day of patient encounter: 5 minutes  Time spent directly with patient, family or caregiver: 15 minutes  Additional Time Spent on Patient Care Activities: 0 minutes  Documentation Time: 20 minutes  Other Time Spent: 0 minutes  Total: 40 minutes          Following as needed

## 2024-11-13 ENCOUNTER — HOSPITAL ENCOUNTER (OUTPATIENT)
Dept: RADIATION ONCOLOGY | Facility: HOSPITAL | Age: 64
Setting detail: RADIATION/ONCOLOGY SERIES
Discharge: HOME | End: 2024-11-13
Payer: COMMERCIAL

## 2024-11-13 DIAGNOSIS — C77.0 SECONDARY AND UNSPECIFIED MALIGNANT NEOPLASM OF LYMPH NODES OF HEAD, FACE AND NECK: ICD-10-CM

## 2024-11-13 DIAGNOSIS — Z51.0 ENCOUNTER FOR ANTINEOPLASTIC RADIATION THERAPY: ICD-10-CM

## 2024-11-13 LAB
RAD ONC MSQ ACTUAL FRACTIONS DELIVERED: 23
RAD ONC MSQ ACTUAL SESSION BIOLOGICAL DOSE: 200 CCGE
RAD ONC MSQ ACTUAL SESSION DELIVERED DOSE: 182 CGRAY
RAD ONC MSQ ACTUAL TOTAL BIOLOGICAL DOSE: 4605 CCGE
RAD ONC MSQ ACTUAL TOTAL DOSE: 4186 CGRAY
RAD ONC MSQ ELAPSED DAYS: 30
RAD ONC MSQ LAST DATE: NORMAL
RAD ONC MSQ PRESCRIBED BIOLOGICAL FRACTIONAL DOSE: 200 CCGE
RAD ONC MSQ PRESCRIBED BIOLOGICAL TOTAL DOSE: 5400 CCGE
RAD ONC MSQ PRESCRIBED FRACTIONAL DOSE: 182 CGRAY
RAD ONC MSQ PRESCRIBED NUMBER OF FRACTIONS: 27
RAD ONC MSQ PRESCRIBED TECHNIQUE: NORMAL
RAD ONC MSQ PRESCRIBED TOTAL DOSE: 4909 CGRAY
RAD ONC MSQ PRESCRIPTION PATTERN COMMENT: NORMAL
RAD ONC MSQ START DATE: NORMAL
RAD ONC MSQ TREATMENT COURSE NUMBER: 2
RAD ONC MSQ TREATMENT SITE: NORMAL

## 2024-11-13 PROCEDURE — 77523 PROTON TRMT INTERMEDIATE: CPT | Performed by: RADIOLOGY

## 2024-11-13 PROCEDURE — 77387 GUIDANCE FOR RADJ TX DLVR: CPT | Performed by: RADIOLOGY

## 2024-11-14 ENCOUNTER — TELEPHONE (OUTPATIENT)
Dept: HEMATOLOGY/ONCOLOGY | Facility: HOSPITAL | Age: 64
End: 2024-11-14
Payer: COMMERCIAL

## 2024-11-14 ENCOUNTER — HOSPITAL ENCOUNTER (OUTPATIENT)
Dept: RADIATION ONCOLOGY | Facility: HOSPITAL | Age: 64
Setting detail: RADIATION/ONCOLOGY SERIES
Discharge: HOME | End: 2024-11-14
Payer: COMMERCIAL

## 2024-11-14 DIAGNOSIS — Z51.0 ENCOUNTER FOR ANTINEOPLASTIC RADIATION THERAPY: ICD-10-CM

## 2024-11-14 DIAGNOSIS — C77.0 SECONDARY AND UNSPECIFIED MALIGNANT NEOPLASM OF LYMPH NODES OF HEAD, FACE AND NECK: ICD-10-CM

## 2024-11-14 LAB
RAD ONC MSQ ACTUAL FRACTIONS DELIVERED: 24
RAD ONC MSQ ACTUAL SESSION BIOLOGICAL DOSE: 200 CCGE
RAD ONC MSQ ACTUAL SESSION DELIVERED DOSE: 182 CGRAY
RAD ONC MSQ ACTUAL TOTAL BIOLOGICAL DOSE: 4805 CCGE
RAD ONC MSQ ACTUAL TOTAL DOSE: 4368 CGRAY
RAD ONC MSQ ELAPSED DAYS: 31
RAD ONC MSQ LAST DATE: NORMAL
RAD ONC MSQ PRESCRIBED BIOLOGICAL FRACTIONAL DOSE: 200 CCGE
RAD ONC MSQ PRESCRIBED BIOLOGICAL TOTAL DOSE: 5400 CCGE
RAD ONC MSQ PRESCRIBED FRACTIONAL DOSE: 182 CGRAY
RAD ONC MSQ PRESCRIBED NUMBER OF FRACTIONS: 27
RAD ONC MSQ PRESCRIBED TECHNIQUE: NORMAL
RAD ONC MSQ PRESCRIBED TOTAL DOSE: 4909 CGRAY
RAD ONC MSQ PRESCRIPTION PATTERN COMMENT: NORMAL
RAD ONC MSQ START DATE: NORMAL
RAD ONC MSQ TREATMENT COURSE NUMBER: 2
RAD ONC MSQ TREATMENT SITE: NORMAL

## 2024-11-14 PROCEDURE — 77387 GUIDANCE FOR RADJ TX DLVR: CPT | Performed by: RADIOLOGY

## 2024-11-14 PROCEDURE — 77523 PROTON TRMT INTERMEDIATE: CPT | Performed by: RADIOLOGY

## 2024-11-14 NOTE — TELEPHONE ENCOUNTER
I spoke with the pharmacist at [facility] and they stated they did receive the BMX prescription but are awaiting a delivery with the meds needed to compound it. Patient notified via return [employer] message. Liz Badillo RN

## 2024-11-15 ENCOUNTER — INFUSION (OUTPATIENT)
Dept: HEMATOLOGY/ONCOLOGY | Facility: HOSPITAL | Age: 64
End: 2024-11-15
Payer: COMMERCIAL

## 2024-11-15 ENCOUNTER — HOSPITAL ENCOUNTER (OUTPATIENT)
Dept: RADIATION ONCOLOGY | Facility: HOSPITAL | Age: 64
Setting detail: RADIATION/ONCOLOGY SERIES
Discharge: HOME | End: 2024-11-15
Payer: COMMERCIAL

## 2024-11-15 ENCOUNTER — OFFICE VISIT (OUTPATIENT)
Dept: CARDIOLOGY | Facility: CLINIC | Age: 64
End: 2024-11-15
Payer: COMMERCIAL

## 2024-11-15 VITALS
RESPIRATION RATE: 20 BRPM | WEIGHT: 162.26 LBS | BODY MASS INDEX: 28.74 KG/M2 | HEART RATE: 86 BPM | OXYGEN SATURATION: 92 % | DIASTOLIC BLOOD PRESSURE: 41 MMHG | TEMPERATURE: 98.4 F | SYSTOLIC BLOOD PRESSURE: 107 MMHG

## 2024-11-15 VITALS
OXYGEN SATURATION: 92 % | RESPIRATION RATE: 18 BRPM | SYSTOLIC BLOOD PRESSURE: 139 MMHG | HEART RATE: 74 BPM | DIASTOLIC BLOOD PRESSURE: 77 MMHG | BODY MASS INDEX: 28.7 KG/M2 | WEIGHT: 162 LBS

## 2024-11-15 DIAGNOSIS — Z51.0 ENCOUNTER FOR ANTINEOPLASTIC RADIATION THERAPY: ICD-10-CM

## 2024-11-15 DIAGNOSIS — C77.0 SECONDARY AND UNSPECIFIED MALIGNANT NEOPLASM OF LYMPH NODES OF HEAD, FACE AND NECK: ICD-10-CM

## 2024-11-15 DIAGNOSIS — I25.10 ATHEROSCLEROSIS OF NATIVE CORONARY ARTERY, UNSPECIFIED WHETHER ANGINA PRESENT, UNSPECIFIED WHETHER NATIVE OR TRANSPLANTED HEART: Primary | ICD-10-CM

## 2024-11-15 DIAGNOSIS — C77.0 METASTASIS TO HEAD AND NECK LYMPH NODE (MULTI): ICD-10-CM

## 2024-11-15 DIAGNOSIS — R06.09 DOE (DYSPNEA ON EXERTION): ICD-10-CM

## 2024-11-15 DIAGNOSIS — I25.10 CORONARY ARTERY CALCIFICATION: ICD-10-CM

## 2024-11-15 DIAGNOSIS — C09.9 TONSIL CANCER (MULTI): ICD-10-CM

## 2024-11-15 DIAGNOSIS — R01.1 MURMUR: ICD-10-CM

## 2024-11-15 LAB
RAD ONC MSQ ACTUAL FRACTIONS DELIVERED: 25
RAD ONC MSQ ACTUAL SESSION BIOLOGICAL DOSE: 200 CCGE
RAD ONC MSQ ACTUAL SESSION DELIVERED DOSE: 182 CGRAY
RAD ONC MSQ ACTUAL TOTAL BIOLOGICAL DOSE: 5005 CCGE
RAD ONC MSQ ACTUAL TOTAL DOSE: 4550 CGRAY
RAD ONC MSQ ELAPSED DAYS: 32
RAD ONC MSQ LAST DATE: NORMAL
RAD ONC MSQ PRESCRIBED BIOLOGICAL FRACTIONAL DOSE: 200 CCGE
RAD ONC MSQ PRESCRIBED BIOLOGICAL TOTAL DOSE: 5400 CCGE
RAD ONC MSQ PRESCRIBED FRACTIONAL DOSE: 182 CGRAY
RAD ONC MSQ PRESCRIBED NUMBER OF FRACTIONS: 27
RAD ONC MSQ PRESCRIBED TECHNIQUE: NORMAL
RAD ONC MSQ PRESCRIBED TOTAL DOSE: 4909 CGRAY
RAD ONC MSQ PRESCRIPTION PATTERN COMMENT: NORMAL
RAD ONC MSQ START DATE: NORMAL
RAD ONC MSQ TREATMENT COURSE NUMBER: 2
RAD ONC MSQ TREATMENT SITE: NORMAL

## 2024-11-15 PROCEDURE — 77523 PROTON TRMT INTERMEDIATE: CPT | Performed by: RADIOLOGY

## 2024-11-15 PROCEDURE — 99244 OFF/OP CNSLTJ NEW/EST MOD 40: CPT | Performed by: INTERNAL MEDICINE

## 2024-11-15 PROCEDURE — 77336 RADIATION PHYSICS CONSULT: CPT | Performed by: RADIOLOGY

## 2024-11-15 PROCEDURE — 2500000004 HC RX 250 GENERAL PHARMACY W/ HCPCS (ALT 636 FOR OP/ED): Mod: SE | Performed by: STUDENT IN AN ORGANIZED HEALTH CARE EDUCATION/TRAINING PROGRAM

## 2024-11-15 PROCEDURE — 96360 HYDRATION IV INFUSION INIT: CPT | Mod: INF

## 2024-11-15 PROCEDURE — 77387 GUIDANCE FOR RADJ TX DLVR: CPT | Performed by: RADIOLOGY

## 2024-11-15 PROCEDURE — 93005 ELECTROCARDIOGRAM TRACING: CPT | Performed by: INTERNAL MEDICINE

## 2024-11-15 PROCEDURE — 99214 OFFICE O/P EST MOD 30 MIN: CPT | Mod: 25 | Performed by: INTERNAL MEDICINE

## 2024-11-15 RX ORDER — ASPIRIN 81 MG/1
81 TABLET ORAL DAILY
Qty: 90 TABLET | Refills: 3 | Status: SHIPPED | OUTPATIENT
Start: 2024-11-15 | End: 2025-11-15

## 2024-11-15 ASSESSMENT — ENCOUNTER SYMPTOMS
CHILLS: 0
DEPRESSION: 0
NAUSEA: 0
DIARRHEA: 0
DYSURIA: 0
ALTERED MENTAL STATUS: 0
CONSTIPATION: 0
SORE THROAT: 1
CONSTIPATION: 0
VOMITING: 0
VOMITING: 0
FEVER: 0
ARTHRALGIAS: 0
LIGHT-HEADEDNESS: 0
FEVER: 0
MYALGIAS: 0
DIARRHEA: 0
COUGH: 0
TROUBLE SWALLOWING: 1
ABDOMINAL PAIN: 0
CHILLS: 0
NUMBNESS: 0
ABDOMINAL PAIN: 0
MEMORY LOSS: 0
WHEEZING: 0
HEADACHES: 0
LEG SWELLING: 0
BLOATING: 0
FALLS: 0
SHORTNESS OF BREATH: 0
HEMOPTYSIS: 0
NAUSEA: 0
COUGH: 0
HEADACHES: 0
HEMATURIA: 0

## 2024-11-15 ASSESSMENT — PAIN SCALES - GENERAL: PAINLEVEL_OUTOF10: 3

## 2024-11-15 NOTE — PROGRESS NOTES
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 No chest pain. No dyspnea at rest. +ch WARREN, improved w/ MDI. No orthopnea/PND. No palps. No LH/dizzy/syncope. No edema. No claudication. +ch cough.   BP at XRT: 90s-100s/60s-70s  ECG 11/22: SR (87)  ECG 11/24: SR (76), ?SMI  CXR 11/24: no acute abnl, hyperinlation  CT chest 10/24: sev CAC, nl heart size, no peric eff, mod AA, no aneurysm, nl PA    Review of Systems   Constitutional: Negative for chills, fever and malaise/fatigue.   HENT:  Negative for hearing loss.    Eyes:  Negative for visual disturbance.   Respiratory:  Negative for cough, hemoptysis and wheezing.    Skin:  Negative for rash.   Musculoskeletal:  Negative for falls and myalgias.   Gastrointestinal:  Negative for bloating, abdominal pain, constipation, diarrhea, dysphagia, nausea and vomiting.   Genitourinary:  Negative for dysuria and hematuria.   Neurological:  Negative for headaches.   Psychiatric/Behavioral:  Negative for altered mental status, depression and memory loss.       Social History     Tobacco Use    Smoking status: Every Day     Current packs/day: 1.00     Average packs/day: 1 pack/day for 40.5 years (40.5 ttl pk-yrs)     Types: Cigarettes     Start date: 11/30/2023    Smokeless tobacco: Never    Tobacco comments:     3 or less cig. Daily per patient stated 10/10/24   Substance Use Topics    Alcohol use: Not Currently     Alcohol/week: 1.0 standard drink of alcohol     Types: 1 Glasses of wine per week      Family History   Problem Relation Name Age of Onset    Emphysema Mother      COPD Mother      Hyperlipidemia Sister      Breast cancer Paternal Grandmother        Allergies   Allergen Reactions    Duloxetine Hallucinations     Pt states she doesn't think she has an allergy just a bad reaction when mixed with morphine .      Current Outpatient  Medications   Medication Instructions    acetaminophen (TYLENOL EXTRA STRENGTH) 500 mg, oral, 3 times daily PRN    albuterol 90 mcg/actuation inhaler 2 puffs, inhalation, Every 4 hours PRN    baclofen (LIORESAL) 5 mg, oral, 3 times daily    benzonatate (TESSALON PERLES) 100 mg, oral, Every 6 hours PRN, Do not crush or chew.    bisacodyl (DULCOLAX (BISACODYL)) 5-10 mg, oral, Daily PRN, Do not crush, chew, or split.    BMX ORAL SUSPENSION (1:1:1) 10 mL, Swish & Spit, Every 8 hours PRN    buPROPion XL (WELLBUTRIN XL) 300 mg, oral, Every morning, Do not crush, chew, or split.    gabapentin (NEURONTIN) 300 mg, oral, Nightly    gabapentin (NEURONTIN) 300 mg, oral, 3 times daily    guaiFENesin (MUCINEX) 1,200 mg, oral, 2 times daily, Do not crush, chew, or split.    levothyroxine (SYNTHROID, LEVOXYL) 100 mcg, oral, Daily, Take on an empty stomach at the same time each day, either 30 to 60 minutes prior to breakfast    loperamide (Imodium A-D) 2 mg tablet Take 2 capsules (4 mg) by mouth with the first episode of diarrhea and 1 capsule (2 mg) by mouth with any additional episodes. Maximum 8 capsules (16 mg) per day    melatonin 10 mg, oral, Nightly    morphine CR (MS CONTIN) 15 mg, oral, 2 times daily, Do not crush, chew, or split.    naloxone (NARCAN) 4 mg, nasal, As needed, May repeat every 2-3 minutes if needed, alternating nostrils, until medical assistance becomes available.    nicotine (Nicoderm CQ) 21 mg/24 hr patch 1 patch, transdermal, Every 24 hours    OLANZapine (ZYPREXA) 5 mg, oral, Nightly    omeprazole (PRILOSEC) 40 mg, oral, Daily before breakfast, Do not crush or chew.    ondansetron (ZOFRAN) 8 mg, oral, Every 8 hours PRN    oxyCODONE-acetaminophen (Percocet) 7.5-325 mg tablet 1 tablet, oral, Every 4 hours PRN    oxygen (O2) 4 L/min, Continuous    polyethylene glycol (GLYCOLAX, MIRALAX) 17 g, oral, Daily    prochlorperazine (COMPAZINE) 10 mg, oral, Every 6 hours PRN    rOPINIRole (REQUIP) 2 mg, oral, Nightly     rosuvastatin (CRESTOR) 40 mg, oral, Daily    sertraline (ZOLOFT) 25 mg, oral, Daily    umeclidinium-vilanteroL (Anoro Ellipta) 62.5-25 mcg/actuation blister with device 1 puff, inhalation, Daily    varenicline (CHANTIX) 1 mg, oral, Daily    walker (Ultra-Light Rollator) misc 1 each, miscellaneous, As needed    white petrolatum (Aquaphor) 41 % ointment ointment 1 Application, Topical, 3 times daily      Vitals:    11/15/24 1602   BP: 139/77   Pulse: 74   Resp: 18   SpO2: 92%      Physical Exam  Constitutional:       Appearance: Normal appearance.   HENT:      Head: Normocephalic and atraumatic.      Nose: Nose normal.   Neck:      Vascular: No carotid bruit.   Cardiovascular:      Rate and Rhythm: Normal rate and regular rhythm.      Heart sounds: Murmur heard.      Systolic murmur is present with a grade of 2/6.   Pulmonary:      Effort: Pulmonary effort is normal.      Breath sounds: Normal breath sounds.   Abdominal:      Palpations: Abdomen is soft.      Tenderness: There is no abdominal tenderness.   Musculoskeletal:      Right lower leg: No edema.      Left lower leg: No edema.   Skin:     General: Skin is warm and dry.   Neurological:      General: No focal deficit present.      Mental Status: She is alert.   Psychiatric:         Mood and Affect: Mood normal.         Judgment: Judgment normal.        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 Doing well. No sig cardiac symptoms. Due to murmur, ch WARREN and CAC, check echo.  -add ASA 81 every day  -continue Rosuva 40 every day   -FU PRN    Kevin Cosby MD

## 2024-11-16 NOTE — PROGRESS NOTES
"    Patient ID: Melvi Sanchez \"Allie" is a 64 y.o. 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 Robin Gabrielc:  Dr. Chetna Lowry  [facility]: Dr. Alejandro Hurst   Supp Onc: Antoinette Soliman   PCP: Alton King, DO    PRIOR THERAPIES  07/07/2022: Completion of chemoradiation with weekly cisplatin with Dr. Razo  08/26/2024: L radical neck dissection 2-4 with CN11 repair  10/8 - 11/19/24: Completed 5 cycles of cisplatin (40 mg/m2) and 66 gy of proton reirradiation     CURRENT THERAPY    SITES OF DISEASE    CURRENT ONCOLOGICAL PROBLEMS  Post operative pain  Radiation dermatitis  Odynophagia     HISTORY OF PRESENT ILLNESS  Ms. 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    Patient initially treated by Dr. Razo in 2022. She first noticed a lump in the left side of her neck > 2 years ago. She was found that have enlarged level 2, 3, 4 nodes, but no obvious oropharyngeal lesion 03/2022 by Dr. Torres.  CT neck was obtained demonstrating  L neck mass, 3.2 x 5.9 x 8.4 cm and a 1.5 x 1.6 right paratracheal node -   FNA biopsy of left neck mass showed squamous cell carcinoma. On 4/21/2022 she underwent triple endoscopy and DL showed lesion of left tonsil; biopsy showed invasive squamous cell carcinoma, p16 positive. She was referred to radiation oncology and saw Dr. Crook and completed 70 cGy/35f CCRT with STEWART seen at 1 year. She has been followed by Dr. Torres in surveillance and was noted to have left neck LINA. CT neck was obtained on 6/20/24 demonstrating increase in edema, enlargement of level 2 node. L neck core biopsy on 7/5/24 demonstrated:     FINAL DIAGNOSIS   Left neck mass, core " biopsy:  - Metastatic squamous cell carcinoma involving fibrous tissue with extensive necrosis.  See note.   Note: By immunostaining, the tumor cells are positive for p40, supportive of the above diagnosis.     She proceeded with L neck dissection which revealed:    A. Left neck, level II-IV, neck dissection:  -- Poorly differentiated , nonkeratinizing squamous cell carcinoma with abundant necrosis,  involving soft tissue extensively, encompassing levels II and III  (2.2 cm).  - Fibrosis and foreign body granulomatous reaction, levels III  and IV.     Note:  Microscopic examination of H&E sections reveals poorly differentiated squamous cell carcinoma involving extensively soft tissue with minimal lymphoid tissue present and abundant necrosis and fibrosis.  Carcinoma reaches soft tissue inked margins focally.  Vein margin is negative.  History of metastatic squamous cell carcinoma of left tonsil, status post chemo therapy and radiation therapy is noted for this patient.     P16 by immunohistochemistry:  Positive     She was discussed at  and seen by Dr. Lowry with plans for post operative reirradiation with proton beam therapy and presents for medical oncology consultation to discuss addition of radiosensitizing systemic therapy.    PAST MEDICAL HISTORY  COPD, hypothyroidism, RLS    SURGICAL HISTORY  Cholecystectomy, hysterectomy     SOCIAL HISTORY  Lives in [place] by self. Son is next building. 3 kids, 6 grandkids.  Retired  at [employer].  Smoked 1-2 PPD, now down to 4 a day since diagnosis.  Occasional EtOH, no illicits. [place], enjoys bingo.  Has a mini [employer]     FAMILY HISTORY  Cousin with brain cancer    CURRENT MEDS REVIEWED    ALLERGIES REVIEWED       Subjective   Chief complaint: throat pain    HPI  Melvi Sanchez is a 64 y.o. year old female patient with Recurrent stage III ([id number]) p16+ SCC of L oropharynx who competed chemoRT w/ 5 cycles weekly Cisplatin and  66gy proton radiatio on 11/19/24.     Interval History:  Patient presents on last day of RT.   She's feeling same as last week. Still fatigued but not worse.   Pain is manageable on her current pain regimen, and she's able to eat a mostly regular diet despite the sore throat.   Her biggest complaint is the radiation changes in the skin of left neck. She will start applying Mepilex to the neck.   She is sleeping better since starting on Olanzapine 10mg at bedtime.   Has unchanged transient tinnitus in left ear, left ear feels muffled at times, no subjective hearing loss from baseline.   Bowels are moving daily, soft, 2x per day, using Miralax  She continue to smoke about 3 or less cigarettes a day. She's trying hard to cut down further.      Review of Systems   Constitutional:  Negative for chills and fever.   HENT:   Positive for mouth sores, sore throat and trouble swallowing. Negative for hearing loss, lump/mass, nosebleeds and tinnitus.    Respiratory:  Negative for cough and shortness of breath.    Cardiovascular:  Negative for chest pain and leg swelling.   Gastrointestinal:  Negative for abdominal pain, constipation, diarrhea, nausea and vomiting.   Musculoskeletal:  Negative for arthralgias.   Skin:  Negative for rash.   Neurological:  Negative for headaches, light-headedness and numbness.     Objective   /56   Pulse 82   Temp 36.2 °C (97.2 °F) (Core)   Resp 20   Wt 73.3 kg (161 lb 9.6 oz)   SpO2 95%   BMI 28.63 kg/m²   Daily Weight  11/19/24 : 73.3 kg (161 lb 9.6 oz)  11/18/24 : 74 kg (163 lb 1.6 oz)  11/15/24 : 73.5 kg (162 lb)  11/15/24 : 73.6 kg (162 lb 4.1 oz)  11/12/24 : 73.7 kg (162 lb 7.7 oz)  11/12/24 : 73.7 kg (162 lb 7.7 oz)  11/11/24 : 75.2 kg (165 lb 12.8 oz)    Physical Exam  Vitals reviewed.   Constitutional:       Appearance: Normal appearance. She is well-developed.   HENT:      Head: Normocephalic and atraumatic.      Right Ear: External ear normal. No tenderness.      Left Ear:  External ear normal. No tenderness.      Nose: Nose normal.      Mouth/Throat:      Lips: Pink.      Mouth: Mucous membranes are moist. No injury or oral lesions.      Tongue: No lesions.      Comments: White plagues on b/l buccal surface and soft palate  Eyes:      General: Lids are normal.      Extraocular Movements: Extraocular movements intact.      Conjunctiva/sclera: Conjunctivae normal.      Pupils: Pupils are equal, round, and reactive to light.   Neck:      Thyroid: No thyroid mass.      Comments: L neck scar well healed. Small area of scabbing  Moderate lymphedema in left jaw and submental region    Cardiovascular:      Rate and Rhythm: Normal rate and regular rhythm.      Pulses: Normal pulses.      Heart sounds: No murmur heard.     No gallop.   Pulmonary:      Effort: Pulmonary effort is normal. No respiratory distress.      Breath sounds: Normal breath sounds and air entry. No stridor. No wheezing.   Abdominal:      General: Abdomen is flat. Bowel sounds are normal. There is no distension or abdominal bruit.      Palpations: Abdomen is soft. There is no mass.      Tenderness: There is no abdominal tenderness.   Musculoskeletal:         General: Normal range of motion.      Cervical back: Normal range of motion and neck supple. No signs of trauma. Normal range of motion.      Right lower leg: No edema.      Left lower leg: No edema.   Lymphadenopathy:      Cervical: No cervical adenopathy.      Upper Body:      Right upper body: No axillary adenopathy.      Left upper body: No axillary adenopathy.   Skin:     General: Skin is warm and dry.      Comments: No new skin lesions   Neurological:      General: No focal deficit present.      Mental Status: She is alert and oriented to person, place, and time. Mental status is at baseline.      Gait: Gait is intact.   Psychiatric:         Attention and Perception: Attention normal.         Mood and Affect: Mood and affect normal.         Behavior: Behavior is  cooperative.       ECOGSCORE: 1- Restricted in physically strenuous activity.  Carries out light duty.    Diagnostic Results   LABS  Below labs were reviewed.  Results from last 7 days   Lab Units 11/19/24  1123   WBC AUTO x10*3/uL 5.1   HEMOGLOBIN g/dL 10.6*   HEMATOCRIT % 32.8*   PLATELETS AUTO x10*3/uL 126*   NEUTROS ABS x10*3/uL 4.10   LYMPHS ABS AUTO x10*3/uL 0.41*   MONOS ABS AUTO x10*3/uL 0.53   EOS ABS AUTO x10*3/uL 0.04   NEUTROS PCT AUTO % 80.2   LYMPHS PCT AUTO % 8.0   MONOS PCT AUTO % 10.4   EOS PCT AUTO % 0.8      Results from last 7 days   Lab Units 11/19/24  1123   GLUCOSE mg/dL 107*   SODIUM mmol/L 138   POTASSIUM mmol/L 4.2   CHLORIDE mmol/L 101   CO2 mmol/L 29   BUN mg/dL 15   CREATININE mg/dL 0.70   EGFR mL/min/1.73m*2 >90   CALCIUM mg/dL 9.3   MAGNESIUM mg/dL 1.49*   ALBUMIN g/dL 3.8   PROTEIN TOTAL g/dL 6.6   BILIRUBIN TOTAL mg/dL 0.3   ALK PHOS U/L 69   ALT U/L 17   AST U/L 14                          IMAGES  7/18/24 PET   1. New hypermetabolic activity seen in the left level 2 cervical lymph node, with central necrosis, consistent with heide metastasis.  2. New FDG avid clustered nodules are seen in the right upper lobe, which is nonspecific and may be infectious/inflammatory, however metastasis can not be excluded. Short-term follow-up chest CT is recommended.  3. Mild FDG uptake is seen throughout the esophagus, likely relating to esophagitis.  4. There is partial opacification of the left maxillary sinus, with mild FDG avidity, likely sinusitis.    CT Neck 6/20/24  IMPRESSION:  1. Compared to the CT neck from 07/14/2023, interval increase in edema within the aerodigestive tract, most pronounced within the supraglottic larynx and hypopharynx and there is resulting moderate narrowing of the supraglottic laryngeal lumen. There is also mild  prevertebral edema. There is no focal fluid collection to suggest an abscess. Cause of this edema is uncertain, could be infectious in etiology rather than  treatment related given that patient completed radiation therapy 2 years ago.  2. No striking new mass is seen within the visualized aerodigestive tract, noting that evaluation is somewhat limited due to presence of diffuse edema and therefore subtle lesions can not be excluded.  3. Interval enlargement of peripherally enhancing likely necrotic lymph node in the left level 2 heide station concerning for progression of disease. Alternatively, enlargement of the lymph node could reflect an infectious process. Otherwise, no cervical lymphadenopathy by size criteria.  4. Edema within the left facial and neck deep and superficial soft tissues as well as in the left supraclavicular fossa is favored to be treatment related and was also present on the prior CT. No focal fluid collection is seen within these regions to suggest an abscess. Correlate clinically.    10/18/24 CT Chest wo IV contrast  IMPRESSION:  1.  The previously noted posterior right upper lobe ill-defined nodular density has resolved. There is presently a subtle vague 1.5 cm ground-glass opacity within inferior right upper lobe, which is felt to represent focal bronchiolitis. Otherwise, no new suspicious pulmonary nodules or masses.  2. Similar bandlike opacity within lingula with associated mild bronchiectasis, which was minimally hypermetabolic on recent PET-CT, favoring benign/inflammatory etiology; attention on follow-up imaging within 6 months is recommended.  3. Redemonstrated moderate upper lung predominant centrilobular and paraseptal emphysema.  4. Severe coronary artery calcifications, indicating the presence of coronary artery disease. If the patient has associated symptoms recommend management as per chest pain guidelines       Assessment/Plan     Melvi Sanchez is a 64 y.o. year old female patient with PMH of stage III (cT1c cN3a M0) SCC of L tonsil, COPD, hypothyroidism and RLS, started treatment with CCRT w/ Cisplatin and proton  radiation on 10/8/24 for recurrence to L oropharynx with diffuse JOLANTA.    We discussed the treatment paradigm in recurrence and salvage surgery - given her pathology showing JOLANTA it is reasonable to proceed with concurrent reirradiation with Proton. We consented today and all questions were answered. We will proceed upon CT simulation.    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 Proton machine is down today so first dose RT will be on IMRT on 10/9 then resume PORT.    - 10/15/24: C2. Unchanged mild chronic dysphagia and xerostomia from prior RT. Left neck pain controlled w/ current pain regimen.  - 10/22/24: C3. Increased odynophagia in the last 3-4 days, like due to thrush. PO intake decreased and has resulting weight loss. Also with increased lymphedema in the left jaw and submental region. Left thigh incision still numb, fluid accumulation is slowed down. She's smoking about 1-2 cigarettes a day,   - 10/29/24: C4. Discussed goals of at least 200 mg/m2, will continue to evaluate. Thrush improved.   - 11/5/24: C5. Patient with more forgetfulness and weakness this week. Appetite remain good. No n/v. Stable mild lymphedema in left jaw/neck. Her left sided odynophagia is a little worse so will reach out to Supp Onc on increasing pain medications.   - 11/12/24: Patient completed 200mg/m2 of Cisplatin and we will not proceed with Cycle 6. She has expected SE from chemoRT w/ fatigue, odynophagia from mucositis, left jaw pain with flucuating lymphedema, skin changed from radiation.   - 11/19/24: Patient completed 66gy of radiation. Her SE from treatment are relatively well managed. Odynophagia controlled on current pain regimen, PO intake remain good and weight overall stable. Radiation dermatitis in left neck will be managed w/ topicals and mepilex. Has  lymphedema in the left jaw/neck that is unchanged.     # Sleep Disturbance  - Trouble sleeping, will continue Olanzapine 5mg nightly rather than just D1-4.   - Increased to Olanzapine 10mg nightly    # Oral Candidiasis: resolved  - 10/22: white plagues evident in oral cavity. Will start patient on Nystatin swish and swallow.   - 11/5: Thrush resolved, completed 2 weeks of Nystatin    # RUL nodules  - Being followed with CT scan in next 3 months  - 10/18/24 CT Chest wo contrast showed the previously noted posterior right upper lobe ill-defined nodular density has resolved. Similar bandlike opacity within lingula with associated mild bronchiectasis, which was minimally hypermetabolic on recent PET-CT, favoring benign/inflammatory etiology; attention on follow-up imaging within 6 months is recommended.  - Follow up on band like opacity within lingula with CT chest wo contrast likely in April 2025    PLAN  -- Only IV hydration today.  Continue IV hydration this Friday 11/22  -- RTC 1 week w/ MedOnc on 11/26, will add on for IVF same day if needed   -- Follow up w/ Supp Onc regarding odynophagia. Patient should start MS Contin 15mg BID for better baseline pain control. Will continue Percocet 7.5mg Q3-4hr PRN  -- Continue BMX solution PRN for mucositis pain  -- Olanzapine 10mg QHS for insomnia     frequency urgency flank pain

## 2024-11-18 ENCOUNTER — RADIATION ONCOLOGY OTV (OUTPATIENT)
Dept: RADIATION ONCOLOGY | Facility: HOSPITAL | Age: 64
End: 2024-11-18
Payer: COMMERCIAL

## 2024-11-18 ENCOUNTER — HOSPITAL ENCOUNTER (OUTPATIENT)
Dept: RADIATION ONCOLOGY | Facility: HOSPITAL | Age: 64
Setting detail: RADIATION/ONCOLOGY SERIES
Discharge: HOME | End: 2024-11-18
Payer: COMMERCIAL

## 2024-11-18 VITALS
WEIGHT: 163.1 LBS | OXYGEN SATURATION: 95 % | SYSTOLIC BLOOD PRESSURE: 132 MMHG | DIASTOLIC BLOOD PRESSURE: 77 MMHG | HEART RATE: 70 BPM | RESPIRATION RATE: 18 BRPM | TEMPERATURE: 96.6 F | BODY MASS INDEX: 28.89 KG/M2

## 2024-11-18 DIAGNOSIS — I25.10 CARDIOVASCULAR DISEASE: ICD-10-CM

## 2024-11-18 DIAGNOSIS — C77.0 SECONDARY AND UNSPECIFIED MALIGNANT NEOPLASM OF LYMPH NODES OF HEAD, FACE AND NECK: ICD-10-CM

## 2024-11-18 DIAGNOSIS — C10.9 OROPHARYNGEAL CANCER (MULTI): Primary | ICD-10-CM

## 2024-11-18 DIAGNOSIS — Z51.0 ENCOUNTER FOR ANTINEOPLASTIC RADIATION THERAPY: ICD-10-CM

## 2024-11-18 LAB
ATRIAL RATE: 76 BPM
P AXIS: 68 DEGREES
P OFFSET: 181 MS
P ONSET: 122 MS
PR INTERVAL: 208 MS
Q ONSET: 226 MS
QRS COUNT: 12 BEATS
QRS DURATION: 80 MS
QT INTERVAL: 360 MS
QTC CALCULATION(BAZETT): 405 MS
QTC FREDERICIA: 389 MS
R AXIS: 51 DEGREES
RAD ONC MSQ ACTUAL FRACTIONS DELIVERED: 26
RAD ONC MSQ ACTUAL SESSION BIOLOGICAL DOSE: 200 CCGE
RAD ONC MSQ ACTUAL SESSION DELIVERED DOSE: 182 CGRAY
RAD ONC MSQ ACTUAL TOTAL BIOLOGICAL DOSE: 5205 CCGE
RAD ONC MSQ ACTUAL TOTAL DOSE: 4732 CGRAY
RAD ONC MSQ ELAPSED DAYS: 35
RAD ONC MSQ LAST DATE: NORMAL
RAD ONC MSQ PRESCRIBED BIOLOGICAL FRACTIONAL DOSE: 200 CCGE
RAD ONC MSQ PRESCRIBED BIOLOGICAL TOTAL DOSE: 5400 CCGE
RAD ONC MSQ PRESCRIBED FRACTIONAL DOSE: 182 CGRAY
RAD ONC MSQ PRESCRIBED NUMBER OF FRACTIONS: 27
RAD ONC MSQ PRESCRIBED TECHNIQUE: NORMAL
RAD ONC MSQ PRESCRIBED TOTAL DOSE: 4909 CGRAY
RAD ONC MSQ PRESCRIPTION PATTERN COMMENT: NORMAL
RAD ONC MSQ START DATE: NORMAL
RAD ONC MSQ TREATMENT COURSE NUMBER: 2
RAD ONC MSQ TREATMENT SITE: NORMAL
T AXIS: 72 DEGREES
T OFFSET: 406 MS
VENTRICULAR RATE: 76 BPM

## 2024-11-18 PROCEDURE — 77387 GUIDANCE FOR RADJ TX DLVR: CPT | Performed by: RADIOLOGY

## 2024-11-18 PROCEDURE — 77523 PROTON TRMT INTERMEDIATE: CPT | Performed by: RADIOLOGY

## 2024-11-18 RX ORDER — SILVER SULFADIAZINE 10 G/1000G
CREAM TOPICAL DAILY
Qty: 400 G | Refills: 2 | Status: SHIPPED | OUTPATIENT
Start: 2024-11-18 | End: 2024-12-18

## 2024-11-18 RX ORDER — ROSUVASTATIN CALCIUM 40 MG/1
40 TABLET, COATED ORAL DAILY
Qty: 90 TABLET | Refills: 3 | Status: SHIPPED | OUTPATIENT
Start: 2024-11-18 | End: 2025-11-18

## 2024-11-18 RX ORDER — ROSUVASTATIN CALCIUM 40 MG/1
40 TABLET, COATED ORAL DAILY
Qty: 30 TABLET | Refills: 1 | Status: SHIPPED | OUTPATIENT
Start: 2024-11-18 | End: 2024-11-18 | Stop reason: SDUPTHER

## 2024-11-18 ASSESSMENT — PAIN SCALES - GENERAL: PAINLEVEL_OUTOF10: 7

## 2024-11-19 ENCOUNTER — DOCUMENTATION (OUTPATIENT)
Dept: RADIATION ONCOLOGY | Facility: HOSPITAL | Age: 64
End: 2024-11-19

## 2024-11-19 ENCOUNTER — LAB (OUTPATIENT)
Dept: LAB | Facility: HOSPITAL | Age: 64
End: 2024-11-19
Payer: COMMERCIAL

## 2024-11-19 ENCOUNTER — APPOINTMENT (OUTPATIENT)
Dept: HEMATOLOGY/ONCOLOGY | Facility: HOSPITAL | Age: 64
End: 2024-11-19
Payer: COMMERCIAL

## 2024-11-19 ENCOUNTER — HOSPITAL ENCOUNTER (OUTPATIENT)
Dept: RADIATION ONCOLOGY | Facility: HOSPITAL | Age: 64
Setting detail: RADIATION/ONCOLOGY SERIES
Discharge: HOME | End: 2024-11-19
Payer: COMMERCIAL

## 2024-11-19 ENCOUNTER — INFUSION (OUTPATIENT)
Dept: HEMATOLOGY/ONCOLOGY | Facility: HOSPITAL | Age: 64
End: 2024-11-19
Payer: COMMERCIAL

## 2024-11-19 ENCOUNTER — OFFICE VISIT (OUTPATIENT)
Dept: HEMATOLOGY/ONCOLOGY | Facility: HOSPITAL | Age: 64
End: 2024-11-19
Payer: COMMERCIAL

## 2024-11-19 VITALS
SYSTOLIC BLOOD PRESSURE: 126 MMHG | WEIGHT: 161.6 LBS | HEART RATE: 82 BPM | RESPIRATION RATE: 20 BRPM | TEMPERATURE: 97.2 F | DIASTOLIC BLOOD PRESSURE: 56 MMHG | BODY MASS INDEX: 28.63 KG/M2 | OXYGEN SATURATION: 95 %

## 2024-11-19 DIAGNOSIS — C09.9 TONSIL CANCER (MULTI): Primary | ICD-10-CM

## 2024-11-19 DIAGNOSIS — C77.0 METASTASIS TO HEAD AND NECK LYMPH NODE (MULTI): ICD-10-CM

## 2024-11-19 DIAGNOSIS — B37.0 ORAL CANDIDIASIS: ICD-10-CM

## 2024-11-19 DIAGNOSIS — I89.0 LYMPHEDEMA DUE TO RADIATION: ICD-10-CM
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
## 2024-11-19 DIAGNOSIS — Z51.0 ENCOUNTER FOR ANTINEOPLASTIC RADIATION THERAPY: ICD-10-CM

## 2024-11-19 DIAGNOSIS — R13.10 ODYNOPHAGIA: ICD-10-CM

## 2024-11-19 DIAGNOSIS — K12.33 MUCOSITIS DUE TO RADIATION THERAPY: ICD-10-CM

## 2024-11-19 DIAGNOSIS — K11.7 XEROSTOMIA DUE TO RADIOTHERAPY: ICD-10-CM

## 2024-11-19 DIAGNOSIS — C10.9 OROPHARYNGEAL CANCER (MULTI): Primary | ICD-10-CM

## 2024-11-19 DIAGNOSIS — M54.2 NECK PAIN: ICD-10-CM

## 2024-11-19 DIAGNOSIS — C09.9 TONSIL CANCER (MULTI): ICD-10-CM

## 2024-11-19 DIAGNOSIS — R22.0 LOCALIZED SWELLING, MASS, AND LUMP OF HEAD: ICD-10-CM

## 2024-11-19 DIAGNOSIS — R91.8 LUNG NODULES: ICD-10-CM

## 2024-11-19 DIAGNOSIS — C77.0 SECONDARY AND UNSPECIFIED MALIGNANT NEOPLASM OF LYMPH NODES OF HEAD, FACE AND NECK: ICD-10-CM

## 2024-11-19 DIAGNOSIS — G47.09 OTHER INSOMNIA: ICD-10-CM

## 2024-11-19 LAB
ALBUMIN SERPL BCP-MCNC: 3.8 G/DL (ref 3.4–5)
ALP SERPL-CCNC: 69 U/L (ref 33–136)
ALT SERPL W P-5'-P-CCNC: 17 U/L (ref 7–45)
ANION GAP SERPL CALC-SCNC: 12 MMOL/L (ref 10–20)
AST SERPL W P-5'-P-CCNC: 14 U/L (ref 9–39)
BASOPHILS # BLD AUTO: 0.01 X10*3/UL (ref 0–0.1)
BASOPHILS NFR BLD AUTO: 0.2 %
BILIRUB SERPL-MCNC: 0.3 MG/DL (ref 0–1.2)
BUN SERPL-MCNC: 15 MG/DL (ref 6–23)
CALCIUM SERPL-MCNC: 9.3 MG/DL (ref 8.6–10.3)
CHLORIDE SERPL-SCNC: 101 MMOL/L (ref 98–107)
CO2 SERPL-SCNC: 29 MMOL/L (ref 21–32)
CREAT SERPL-MCNC: 0.7 MG/DL (ref 0.5–1.05)
EGFRCR SERPLBLD CKD-EPI 2021: >90 ML/MIN/1.73M*2
EOSINOPHIL # BLD AUTO: 0.04 X10*3/UL (ref 0–0.7)
EOSINOPHIL NFR BLD AUTO: 0.8 %
ERYTHROCYTE [DISTWIDTH] IN BLOOD BY AUTOMATED COUNT: 14.7 % (ref 11.5–14.5)
GLUCOSE SERPL-MCNC: 107 MG/DL (ref 74–99)
HCT VFR BLD AUTO: 32.8 % (ref 36–46)
HGB BLD-MCNC: 10.6 G/DL (ref 12–16)
IMM GRANULOCYTES # BLD AUTO: 0.02 X10*3/UL (ref 0–0.7)
IMM GRANULOCYTES NFR BLD AUTO: 0.4 % (ref 0–0.9)
LYMPHOCYTES # BLD AUTO: 0.41 X10*3/UL (ref 1.2–4.8)
LYMPHOCYTES NFR BLD AUTO: 8 %
MAGNESIUM SERPL-MCNC: 1.49 MG/DL (ref 1.6–2.4)
MCH RBC QN AUTO: 31.4 PG (ref 26–34)
MCHC RBC AUTO-ENTMCNC: 32.3 G/DL (ref 32–36)
MCV RBC AUTO: 97 FL (ref 80–100)
MONOCYTES # BLD AUTO: 0.53 X10*3/UL (ref 0.1–1)
MONOCYTES NFR BLD AUTO: 10.4 %
NEUTROPHILS # BLD AUTO: 4.1 X10*3/UL (ref 1.2–7.7)
NEUTROPHILS NFR BLD AUTO: 80.2 %
NRBC BLD-RTO: 0 /100 WBCS (ref 0–0)
PLATELET # BLD AUTO: 126 X10*3/UL (ref 150–450)
POTASSIUM SERPL-SCNC: 4.2 MMOL/L (ref 3.5–5.3)
PROT SERPL-MCNC: 6.6 G/DL (ref 6.4–8.2)
RAD ONC MSQ ACTUAL FRACTIONS DELIVERED: 27
RAD ONC MSQ ACTUAL SESSION BIOLOGICAL DOSE: 200 CCGE
RAD ONC MSQ ACTUAL SESSION DELIVERED DOSE: 182 CGRAY
RAD ONC MSQ ACTUAL TOTAL BIOLOGICAL DOSE: 5405 CCGE
RAD ONC MSQ ACTUAL TOTAL DOSE: 4914 CGRAY
RAD ONC MSQ ELAPSED DAYS: 36
RAD ONC MSQ LAST DATE: NORMAL
RAD ONC MSQ PRESCRIBED BIOLOGICAL FRACTIONAL DOSE: 200 CCGE
RAD ONC MSQ PRESCRIBED BIOLOGICAL TOTAL DOSE: 5400 CCGE
RAD ONC MSQ PRESCRIBED FRACTIONAL DOSE: 182 CGRAY
RAD ONC MSQ PRESCRIBED NUMBER OF FRACTIONS: 27
RAD ONC MSQ PRESCRIBED TECHNIQUE: NORMAL
RAD ONC MSQ PRESCRIBED TOTAL DOSE: 4909 CGRAY
RAD ONC MSQ PRESCRIPTION PATTERN COMMENT: NORMAL
RAD ONC MSQ START DATE: NORMAL
RAD ONC MSQ TREATMENT COURSE NUMBER: 2
RAD ONC MSQ TREATMENT SITE: NORMAL
RBC # BLD AUTO: 3.38 X10*6/UL (ref 4–5.2)
SODIUM SERPL-SCNC: 138 MMOL/L (ref 136–145)
WBC # BLD AUTO: 5.1 X10*3/UL (ref 4.4–11.3)

## 2024-11-19 PROCEDURE — 83735 ASSAY OF MAGNESIUM: CPT

## 2024-11-19 PROCEDURE — 96360 HYDRATION IV INFUSION INIT: CPT | Mod: INF

## 2024-11-19 PROCEDURE — 2500000004 HC RX 250 GENERAL PHARMACY W/ HCPCS (ALT 636 FOR OP/ED): Mod: SE | Performed by: STUDENT IN AN ORGANIZED HEALTH CARE EDUCATION/TRAINING PROGRAM

## 2024-11-19 PROCEDURE — 84075 ASSAY ALKALINE PHOSPHATASE: CPT

## 2024-11-19 PROCEDURE — 77523 PROTON TRMT INTERMEDIATE: CPT | Performed by: RADIOLOGY

## 2024-11-19 PROCEDURE — 36415 COLL VENOUS BLD VENIPUNCTURE: CPT

## 2024-11-19 PROCEDURE — 77387 GUIDANCE FOR RADJ TX DLVR: CPT | Performed by: RADIOLOGY

## 2024-11-19 PROCEDURE — 99215 OFFICE O/P EST HI 40 MIN: CPT | Performed by: STUDENT IN AN ORGANIZED HEALTH CARE EDUCATION/TRAINING PROGRAM

## 2024-11-19 PROCEDURE — 85025 COMPLETE CBC W/AUTO DIFF WBC: CPT

## 2024-11-19 RX ORDER — DEXAMETHASONE 4 MG/1
12 TABLET ORAL ONCE
Status: CANCELLED | OUTPATIENT
Start: 2024-11-19

## 2024-11-19 RX ORDER — DEXAMETHASONE 6 MG/1
12 TABLET ORAL ONCE
Status: COMPLETED | OUTPATIENT
Start: 2024-11-19 | End: 2024-11-19

## 2024-11-19 ASSESSMENT — PAIN SCALES - GENERAL: PAINLEVEL_OUTOF10: 4

## 2024-11-19 NOTE — PROGRESS NOTES
Pt seen and examined by provider today prior to tx appt.  No further subjective changes offered by pt to this RN

## 2024-11-20 ENCOUNTER — APPOINTMENT (OUTPATIENT)
Dept: RADIATION ONCOLOGY | Facility: HOSPITAL | Age: 64
End: 2024-11-20
Payer: COMMERCIAL

## 2024-11-20 NOTE — PROGRESS NOTES
Radiation Oncology Treatment Summary    Patient Name:  Melvi Sanchez  MRN:  [id number]  :  1960    Radiation Oncologist:MD Chetna Gutierrez MD   Referring Provider: No ref. provider found  Primary Care Provider: Alton King DO    Brief History: Melvi Sanchez is a 64 y.o. female with No matching staging information was found for the patient..  The patient completed radiotherapy as outlined below.    Radiation Treatment Summary:    Proton Beam: Left Head and neck    Treatment Period Technique Fraction Dose Fractions Total Dose   Course 2 10/9/2024-2024  (days elapsed: 41)         VMAT L neck 10/9/2024-10/11/2024 VMAT 200 / 200 cGy 3 / 3 600 / 600 cGy         L neck 10/14/2024-2024 3-Field 182 / 182 cGy  4914 / 4,909 cGy     Concurrent Chemotherapy:  CISplatin with Concurrent Radiation (Weekly), 7 Week Cycle   Treatment goal Curative   Treatment line Second Line   Status Active   Start Date 10/8/2024   End Date 2024 (Planned)   Treatment Medications methylPREDNISolone sod succinate (SOLU-Medrol) 40 mg/mL injection 40 mg, 40 mg, intravenous, As needed, 1 of 1 cycle    CISplatin (Platinol) 71 mg in sodium chloride 0.9% 618 mL IV, 40 mg/m2 = 71 mg, intravenous, Once, 1 of 1 cycle  Administration: 71 mg (10/8/2024), 71 mg (10/15/2024), 71 mg (10/22/2024), 71 mg (10/29/2024), 71 mg (2024)         CTCAE Toxicity Overview:   Toxicity Assessment          10/14/2024    13:25 10/17/2024    13:42 10/24/2024    12:27 2024    10:22 2024    14:14 2024    12:01   Toxicity Assessment   Adverse Events Reviewed (WDL)     Yes (Within Defined Limits) No (Exceptions to WDL)   Treatment  and neck Head and neck Head and neck Head and neck Head and neck Head and neck   Anorexia Grade 0 Grade 0 Grade 0 Grade 0 Grade 0 Grade 0   Anxiety Grade 0 Grade 0 Grade 0 Grade 0 Grade 0    Dehydration Grade 0 Grade 0 Grade 0 Grade 0 Grade 0 Grade 0    Depression Grade 0 Grade 0 Grade 0 Grade 0 Grade 0    Dermatitis Radiation Grade 0 Grade 0 Grade 1 Grade 1 Grade 1 Grade 1   Diarrhea Grade 0 Grade 0 Grade 0 Grade 0 Grade 0 Grade 0   Fatigue Grade 0 Grade 1 Grade 1 Grade 2 Grade 2 Grade 1   Nausea Grade 0 Grade 0 Grade 0 Grade 0 Grade 0 Grade 0   Pain Grade 0  Grade 2 Grade 1 Grade 0 Grade 1   Tumor Pain Grade 0  Grade 0 Grade 0     Vomiting Grade 0 Grade 0 Grade 0 Grade 0  Grade 0   Hearing Impaired Grade 0        Blurred Vision Grade 0        Dry Eye Grade 0        Eye Pain Grade 0        Dry Mouth Grade 2        Febrile Neutropenia     Grade 0    Ear Pain Grade 0 Grade 0 Grade 0 Grade 0 Grade 0    Tinnitus    Grade 0     Watering Eyes  Grade 0 Grade 0 Grade 0 Grade 0    Oral Pain Grade 0 Grade 0 Grade 2       started on nystatin Grade 1       upper part of back of throat Grade 0    Edema Face Grade 0 Grade 0 Grade 0 Grade 0 Grade 0    Malaise Grade 0   Grade 0     Neck Edema Grade 1 Grade 0 Grade 1 Grade 1 Grade 1       mild Grade 1   Mucosal Infection   Grade 0      Otitis Media    Grade 0     Sinusitis    Grade 0     Skin Infection    Grade 0     Head Soft Tissue Necrosis Grade 0  Grade 0 Grade 0 Grade 0    Neck Soft Tissue Necrosis Grade 0   Grade 0 Grade 0    Osteonecrosis of Jaw     Grade 0    Trismus     Grade 0    Facial Nerve Disorder Grade 0        Stroke   Grade 0      Trigeminal Nerve Disorder Grade 0 Grade 0       Aspiration Grade 0 Grade 0 Grade 0 Grade 0 Grade 0    Hoarseness Grade 0 Grade 0 Grade 1 Grade 0 Grade 0    Stridor  Grade 0 Grade 0 Grade 0 Grade 0    Tracheal Mucositis     Grade 0    Voice Alteration Grade 0  Grade 0 Grade 0 Grade 0    Hot Flashes     Grade 0        Patient Disposition:   Future Appointments       Date / Time Provider Department Dept Phone    11/22/2024 9:30 AM INF 04 [facility] Medical [facility] Building [phone]    11/26/2024 2:00 PM (Arrive by 1:45 PM) Danny Rodriguez PA-C [facility]  [phone]    12/4/2024 10:30 AM POR ECHO 2 [facility] [phone]    12/5/2024 11:00 AM Delvin Momin APRN-CNP [facility] [phone]    12/10/2024 11:00 AM Antoinette Soliman APRN-CNP [facility] [phone]    1/7/2025 10:00 AM (Arrive by 9:45 AM) POR MAMMO 1 [facility] [phone]    1/10/2025 11:00 AM Tri De Los Santos MD [facility] [phone]    2/21/2025 2:30 PM Robin Brownlee MD [facility] [phone]    3/11/2025 10:20 AM Antoinette Matthews APRN-CNP [facility] [phone]    5/8/2025 1:20 PM Elvin Loera MD [facility] Building  Arrive at: [facility] Home [phone]

## 2024-11-21 ENCOUNTER — APPOINTMENT (OUTPATIENT)
Dept: SPEECH THERAPY | Facility: HOSPITAL | Age: 64
End: 2024-11-21
Payer: COMMERCIAL

## 2024-11-21 ENCOUNTER — APPOINTMENT (OUTPATIENT)
Dept: RADIATION ONCOLOGY | Facility: HOSPITAL | Age: 64
End: 2024-11-21
Payer: COMMERCIAL

## 2024-11-22 ENCOUNTER — INFUSION (OUTPATIENT)
Dept: HEMATOLOGY/ONCOLOGY | Facility: CLINIC | Age: 64
End: 2024-11-22
Payer: COMMERCIAL

## 2024-11-22 ENCOUNTER — APPOINTMENT (OUTPATIENT)
Dept: RADIATION ONCOLOGY | Facility: HOSPITAL | Age: 64
End: 2024-11-22
Payer: COMMERCIAL

## 2024-11-22 ENCOUNTER — APPOINTMENT (OUTPATIENT)
Dept: HEMATOLOGY/ONCOLOGY | Facility: HOSPITAL | Age: 64
End: 2024-11-22
Payer: COMMERCIAL

## 2024-11-22 VITALS
SYSTOLIC BLOOD PRESSURE: 130 MMHG | HEIGHT: 63 IN | WEIGHT: 163.3 LBS | TEMPERATURE: 94.5 F | DIASTOLIC BLOOD PRESSURE: 77 MMHG | OXYGEN SATURATION: 93 % | BODY MASS INDEX: 28.93 KG/M2 | HEART RATE: 57 BPM | RESPIRATION RATE: 16 BRPM

## 2024-11-22 DIAGNOSIS — C77.0 METASTASIS TO HEAD AND NECK LYMPH NODE (MULTI): ICD-10-CM

## 2024-11-22 DIAGNOSIS — C09.9 TONSIL CANCER (MULTI): ICD-10-CM

## 2024-11-22 PROCEDURE — 2500000004 HC RX 250 GENERAL PHARMACY W/ HCPCS (ALT 636 FOR OP/ED): Mod: SE | Performed by: STUDENT IN AN ORGANIZED HEALTH CARE EDUCATION/TRAINING PROGRAM

## 2024-11-22 PROCEDURE — 96360 HYDRATION IV INFUSION INIT: CPT | Mod: INF

## 2024-11-22 ASSESSMENT — PAIN SCALES - GENERAL: PAINLEVEL_OUTOF10: 0-NO PAIN

## 2024-11-22 NOTE — PROGRESS NOTES
Patient here for IV fluids only, tolerated well. Patient to have a follow up visit on 11/26/24. Patient denies any questions at this time. Patient discharged in stable condition.

## 2024-11-25 DIAGNOSIS — T45.1X5A CHEMOTHERAPY INDUCED NAUSEA AND VOMITING: Primary | ICD-10-CM

## 2024-11-25 DIAGNOSIS — G89.3 CANCER RELATED PAIN: ICD-10-CM

## 2024-11-25 DIAGNOSIS — R29.6 FREQUENT FALLS: ICD-10-CM

## 2024-11-25 DIAGNOSIS — R11.2 CHEMOTHERAPY INDUCED NAUSEA AND VOMITING: Primary | ICD-10-CM

## 2024-11-25 DIAGNOSIS — R29.898 LEG WEAKNESS, BILATERAL: Primary | ICD-10-CM

## 2024-11-25 DIAGNOSIS — R26.89 BALANCE PROBLEM: ICD-10-CM

## 2024-11-25 DIAGNOSIS — J43.2 CENTRILOBULAR EMPHYSEMA (MULTI): Primary | ICD-10-CM

## 2024-11-25 DIAGNOSIS — R29.898 WEAKNESS OF BOTH LEGS: ICD-10-CM

## 2024-11-25 RX ORDER — OXYCODONE AND ACETAMINOPHEN 7.5; 325 MG/1; MG/1
1 TABLET ORAL
COMMUNITY
End: 2024-11-25 | Stop reason: SDUPTHER

## 2024-11-25 RX ORDER — CALCIUM CARBONATE 160(400)MG
1 TABLET,CHEWABLE ORAL AS NEEDED
Qty: 1 EACH | Refills: 0 | Status: SHIPPED | OUTPATIENT
Start: 2024-11-25 | End: 2024-11-25

## 2024-11-25 RX ORDER — OXYCODONE AND ACETAMINOPHEN 7.5; 325 MG/1; MG/1
1 TABLET ORAL
Qty: 112 TABLET | Refills: 0 | Status: SHIPPED | OUTPATIENT
Start: 2024-11-25 | End: 2024-12-09

## 2024-11-25 RX ORDER — OLANZAPINE 10 MG/1
10 TABLET ORAL NIGHTLY
COMMUNITY
End: 2024-11-25 | Stop reason: SDUPTHER

## 2024-11-25 RX ORDER — OLANZAPINE 10 MG/1
10 TABLET ORAL NIGHTLY
Qty: 30 TABLET | Refills: 0 | Status: SHIPPED | OUTPATIENT
Start: 2024-11-25 | End: 2024-12-25

## 2024-11-25 RX ORDER — CALCIUM CARBONATE 160(400)MG
1 TABLET,CHEWABLE ORAL AS NEEDED
Qty: 1 EACH | Refills: 0 | Status: SHIPPED | OUTPATIENT
Start: 2024-11-25

## 2024-11-25 RX ORDER — CALCIUM CARBONATE 160(400)MG
1 TABLET,CHEWABLE ORAL AS NEEDED
Qty: 1 EACH | Refills: 0 | Status: CANCELLED | OUTPATIENT
Start: 2024-11-25

## 2024-11-25 ASSESSMENT — ENCOUNTER SYMPTOMS
CONSTIPATION: 0
LEG SWELLING: 0
SHORTNESS OF BREATH: 0
DIARRHEA: 0
ARTHRALGIAS: 0
LIGHT-HEADEDNESS: 0
COUGH: 0
SORE THROAT: 1
ABDOMINAL PAIN: 0
FEVER: 0
CHILLS: 0
TROUBLE SWALLOWING: 1
NUMBNESS: 0
NAUSEA: 0
HEADACHES: 0
VOMITING: 0

## 2024-11-25 NOTE — PROGRESS NOTES
"    Patient ID: Melvi Sanchez \"Allie" is a 64 y.o. 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 Robin Gabrielc:  Dr. Chetna Lowry  [facility]: Dr. Alejandro Hurst   Supp Onc: Antoinette Soliman   PCP: Alton King, DO    PRIOR THERAPIES  07/07/2022: Completion of chemoradiation with weekly cisplatin with Dr. Razo  08/26/2024: L radical neck dissection 2-4 with CN11 repair  10/8 - 11/19/24: Completed 5 cycles of cisplatin (40 mg/m2) and 66 gy of proton reirradiation     CURRENT THERAPY    SITES OF DISEASE    CURRENT ONCOLOGICAL PROBLEMS  Post operative pain  Radiation dermatitis  Odynophagia     HISTORY OF PRESENT ILLNESS  Ms. 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    Patient initially treated by Dr. Razo in 2022. She first noticed a lump in the left side of her neck > 2 years ago. She was found that have enlarged level 2, 3, 4 nodes, but no obvious oropharyngeal lesion 03/2022 by Dr. Torres.  CT neck was obtained demonstrating  L neck mass, 3.2 x 5.9 x 8.4 cm and a 1.5 x 1.6 right paratracheal node -   FNA biopsy of left neck mass showed squamous cell carcinoma. On 4/21/2022 she underwent triple endoscopy and DL showed lesion of left tonsil; biopsy showed invasive squamous cell carcinoma, p16 positive. She was referred to radiation oncology and saw Dr. Crook and completed 70 cGy/35f CCRT with STEWART seen at 1 year. She has been followed by Dr. Torres in surveillance and was noted to have left neck LINA. CT neck was obtained on 6/20/24 demonstrating increase in edema, enlargement of level 2 node. L neck core biopsy on 7/5/24 demonstrated:     FINAL DIAGNOSIS   Left neck mass, core " biopsy:  - Metastatic squamous cell carcinoma involving fibrous tissue with extensive necrosis.  See note.   Note: By immunostaining, the tumor cells are positive for p40, supportive of the above diagnosis.     She proceeded with L neck dissection which revealed:    A. Left neck, level II-IV, neck dissection:  -- Poorly differentiated , nonkeratinizing squamous cell carcinoma with abundant necrosis,  involving soft tissue extensively, encompassing levels II and III  (2.2 cm).  - Fibrosis and foreign body granulomatous reaction, levels III  and IV.     Note:  Microscopic examination of H&E sections reveals poorly differentiated squamous cell carcinoma involving extensively soft tissue with minimal lymphoid tissue present and abundant necrosis and fibrosis.  Carcinoma reaches soft tissue inked margins focally.  Vein margin is negative.  History of metastatic squamous cell carcinoma of left tonsil, status post chemo therapy and radiation therapy is noted for this patient.     P16 by immunohistochemistry:  Positive     She was discussed at  and seen by Dr. Lowry with plans for post operative reirradiation with proton beam therapy and presents for medical oncology consultation to discuss addition of radiosensitizing systemic therapy.    PAST MEDICAL HISTORY  COPD, hypothyroidism, RLS    SURGICAL HISTORY  Cholecystectomy, hysterectomy     SOCIAL HISTORY  Lives in [place] by self. Son is next building. 3 kids, 6 grandkids.  Retired  at [employer].  Smoked 1-2 PPD, now down to 4 a day since diagnosis.  Occasional EtOH, no illicits. [place], enjoys bingo.  Has a mini [employer]     FAMILY HISTORY  Cousin with brain cancer    CURRENT MEDS REVIEWED    ALLERGIES REVIEWED       Subjective   Chief complaint: throat pain    HPI  Melvi Sanchez is a 64 y.o. year old female patient with Recurrent stage III ([id number]) p16+ SCC of L oropharynx who competed chemoRT w/ 5 cycles weekly Cisplatin and  66gy proton radiatio on 11/19/24.     Interval History:  Patient presents for 1 week post treatment follow up.    She is feeling fairly well, still fatigued. Still is forgetful sometimes and she's frustrated about this.   Her odynophagia is improved, though still had dysphagia to some foods but not much worse than her baseline prior to starting this round of CCRT. She's able to eat a regular diet, her appetite is good.   She is sleeping better since starting on Olanzapine 10mg at bedtime.   Has unchanged transient tinnitus in left ear, left ear feels muffled at times, no subjective hearing loss from baseline.   Bowels are moving daily, soft, 2x per day, using Miralax  She continue to smoke about 3 or less cigarettes a day. She's trying hard to cut down further.   Patient would like a referral to [facility] today.    Review of Systems   Constitutional:  Negative for chills and fever.   HENT:   Positive for mouth sores, sore throat and trouble swallowing. Negative for hearing loss, lump/mass, nosebleeds and tinnitus.    Respiratory:  Negative for cough and shortness of breath.    Cardiovascular:  Negative for chest pain and leg swelling.   Gastrointestinal:  Negative for abdominal pain, constipation, diarrhea, nausea and vomiting.   Musculoskeletal:  Negative for arthralgias.   Skin:  Negative for rash.   Neurological:  Negative for headaches, light-headedness and numbness.     Objective   /59   Pulse 65   Temp 36.6 °C (97.9 °F) (Core)   Resp 20   Wt 73.1 kg (161 lb 2.5 oz)   SpO2 98%   BMI 28.55 kg/m²   Daily Weight  11/26/24 : 73.1 kg (161 lb 2.5 oz)  11/22/24 : 74.1 kg (163 lb 4.8 oz)  11/19/24 : 73.3 kg (161 lb 9.6 oz)  11/18/24 : 74 kg (163 lb 1.6 oz)  11/15/24 : 73.5 kg (162 lb)  11/15/24 : 73.6 kg (162 lb 4.1 oz)  11/12/24 : 73.7 kg (162 lb 7.7 oz)    Physical Exam  Vitals reviewed.   Constitutional:       Appearance: Normal appearance. She is well-developed.   HENT:      Head: Normocephalic and  atraumatic.      Right Ear: External ear normal. No tenderness.      Left Ear: External ear normal. No tenderness.      Nose: Nose normal.      Mouth/Throat:      Lips: Pink.      Mouth: Mucous membranes are moist. No injury or oral lesions.      Tongue: No lesions.   Eyes:      General: Lids are normal.      Extraocular Movements: Extraocular movements intact.      Conjunctiva/sclera: Conjunctivae normal.      Pupils: Pupils are equal, round, and reactive to light.   Neck:      Thyroid: No thyroid mass.      Comments: L neck scar well healed. Small area of scabbing  Moderate lymphedema in left jaw and submental region    Cardiovascular:      Rate and Rhythm: Normal rate and regular rhythm.      Pulses: Normal pulses.      Heart sounds: No murmur heard.     No gallop.   Pulmonary:      Effort: Pulmonary effort is normal. No respiratory distress.      Breath sounds: Normal breath sounds and air entry. No stridor. No wheezing.   Abdominal:      General: Abdomen is flat. Bowel sounds are normal. There is no distension or abdominal bruit.      Palpations: Abdomen is soft. There is no mass.      Tenderness: There is no abdominal tenderness.   Musculoskeletal:         General: Normal range of motion.      Cervical back: Normal range of motion and neck supple. No signs of trauma. Normal range of motion.      Right lower leg: No edema.      Left lower leg: No edema.   Lymphadenopathy:      Cervical: No cervical adenopathy.      Upper Body:      Right upper body: No axillary adenopathy.      Left upper body: No axillary adenopathy.   Skin:     General: Skin is warm and dry.      Comments: No new skin lesions   Neurological:      General: No focal deficit present.      Mental Status: She is alert and oriented to person, place, and time. Mental status is at baseline.      Gait: Gait is intact.   Psychiatric:         Attention and Perception: Attention normal.         Mood and Affect: Mood and affect normal.         Behavior:  Behavior is cooperative.       ECOGSCORE: 1- Restricted in physically strenuous activity.  Carries out light duty.    Diagnostic Results   LABS  Below labs were reviewed.  Results from last 7 days   Lab Units 11/26/24  1326   WBC AUTO x10*3/uL 5.2   HEMOGLOBIN g/dL 10.4*   HEMATOCRIT % 31.8*   PLATELETS AUTO x10*3/uL 197   NEUTROS ABS x10*3/uL 3.69   LYMPHS ABS AUTO x10*3/uL 0.65*   MONOS ABS AUTO x10*3/uL 0.78   EOS ABS AUTO x10*3/uL 0.04   NEUTROS PCT AUTO % 70.9   LYMPHS PCT AUTO % 12.5   MONOS PCT AUTO % 15.0   EOS PCT AUTO % 0.8      Results from last 7 days   Lab Units 11/26/24  1326   GLUCOSE mg/dL 85   SODIUM mmol/L 138   POTASSIUM mmol/L 5.0   CHLORIDE mmol/L 102   CO2 mmol/L 29   BUN mg/dL 16   CREATININE mg/dL 0.69   EGFR mL/min/1.73m*2 >90   CALCIUM mg/dL 9.0   MAGNESIUM mg/dL 1.70   ALBUMIN g/dL 3.8   PROTEIN TOTAL g/dL 6.7   BILIRUBIN TOTAL mg/dL 0.3   ALK PHOS U/L 68   ALT U/L 14   AST U/L 14                        IMAGES  7/18/24 PET   1. New hypermetabolic activity seen in the left level 2 cervical lymph node, with central necrosis, consistent with heide metastasis.  2. New FDG avid clustered nodules are seen in the right upper lobe, which is nonspecific and may be infectious/inflammatory, however metastasis can not be excluded. Short-term follow-up chest CT is recommended.  3. Mild FDG uptake is seen throughout the esophagus, likely relating to esophagitis.  4. There is partial opacification of the left maxillary sinus, with mild FDG avidity, likely sinusitis.    CT Neck 6/20/24  IMPRESSION:  1. Compared to the CT neck from 07/14/2023, interval increase in edema within the aerodigestive tract, most pronounced within the supraglottic larynx and hypopharynx and there is resulting moderate narrowing of the supraglottic laryngeal lumen. There is also mild  prevertebral edema. There is no focal fluid collection to suggest an abscess. Cause of this edema is uncertain, could be infectious in etiology  rather than treatment related given that patient completed radiation therapy 2 years ago.  2. No striking new mass is seen within the visualized aerodigestive tract, noting that evaluation is somewhat limited due to presence of diffuse edema and therefore subtle lesions can not be excluded.  3. Interval enlargement of peripherally enhancing likely necrotic lymph node in the left level 2 heide station concerning for progression of disease. Alternatively, enlargement of the lymph node could reflect an infectious process. Otherwise, no cervical lymphadenopathy by size criteria.  4. Edema within the left facial and neck deep and superficial soft tissues as well as in the left supraclavicular fossa is favored to be treatment related and was also present on the prior CT. No focal fluid collection is seen within these regions to suggest an abscess. Correlate clinically.    10/18/24 CT Chest wo IV contrast  IMPRESSION:  1.  The previously noted posterior right upper lobe ill-defined nodular density has resolved. There is presently a subtle vague 1.5 cm ground-glass opacity within inferior right upper lobe, which is felt to represent focal bronchiolitis. Otherwise, no new suspicious pulmonary nodules or masses.  2. Similar bandlike opacity within lingula with associated mild bronchiectasis, which was minimally hypermetabolic on recent PET-CT, favoring benign/inflammatory etiology; attention on follow-up imaging within 6 months is recommended.  3. Redemonstrated moderate upper lung predominant centrilobular and paraseptal emphysema.  4. Severe coronary artery calcifications, indicating the presence of coronary artery disease. If the patient has associated symptoms recommend management as per chest pain guidelines       Assessment/Plan     Melvi SHERINE Sanchez is a 64 y.o. year old female patient with PMH of stage III (cT1c cN3a M0) SCC of L tonsil, COPD, hypothyroidism and RLS, started treatment with CCRT w/ Cisplatin and  proton radiation on 10/8/24 for recurrence to L oropharynx with diffuse JOLANTA.    We discussed the treatment paradigm in recurrence and salvage surgery - given her pathology showing JOLANTA it is reasonable to proceed with concurrent reirradiation with Proton. We consented today and all questions were answered. We will proceed upon CT simulation.    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 Proton machine is down today so first dose RT will be on IMRT on 10/9 then resume PORT.    - 10/15/24: C2. Unchanged mild chronic dysphagia and xerostomia from prior RT. Left neck pain controlled w/ current pain regimen.  - 10/22/24: C3. Increased odynophagia in the last 3-4 days, like due to thrush. PO intake decreased and has resulting weight loss. Also with increased lymphedema in the left jaw and submental region. Left thigh incision still numb, fluid accumulation is slowed down. She's smoking about 1-2 cigarettes a day,   - 10/29/24: C4. Discussed goals of at least 200 mg/m2, will continue to evaluate. Thrush improved.   - 11/5/24: C5. Patient with more forgetfulness and weakness this week. Appetite remain good. No n/v. Stable mild lymphedema in left jaw/neck. Her left sided odynophagia is a little worse so will reach out to Supp Onc on increasing pain medications.   - 11/12/24: Patient completed 200mg/m2 of Cisplatin and we will not proceed with Cycle 6. She has expected SE from chemoRT w/ fatigue, odynophagia from mucositis, left jaw pain with flucuating lymphedema, skin changed from radiation.   - 11/19/24: Patient completed 66gy of radiation. Her SE from treatment are relatively well managed. Odynophagia controlled on current pain regimen, PO intake remain good and weight overall stable. Radiation dermatitis in left neck will be managed w/ topicals and mepilex.  Has lymphedema in the left jaw/neck that is unchanged.  - 11/26/24: Patient recovering well 1 week from treatment, tolerating regular diet, odynophagia is well controlled, chronic dysphagia and xerostomia is unchanged. Oral mucositis is improved.    # Sleep Disturbance  - Trouble sleeping, will continue Olanzapine 5mg nightly rather than just D1-4.   - Increased to Olanzapine 10mg nightly with improvement in sleep. Will continue this dose    # RUL nodules  - Being followed with CT scan in next 3 months  - 10/18/24 CT Chest wo contrast showed the previously noted posterior right upper lobe ill-defined nodular density has resolved. Similar bandlike opacity within lingula with associated mild bronchiectasis, which was minimally hypermetabolic on recent PET-CT, favoring benign/inflammatory etiology; attention on follow-up imaging within 6 months is recommended.  - Follow up on band like opacity within lingula with CT chest wo contrast likely in April 2025    PLAN  -- RTC 2 week w/ MedOnc on 12/11, will add on for IVF same day if needed   -- MS Contin 15mg BID for better baseline pain control. Will continue Percocet 7.5mg Q3-4hr PRN, f/u with Supp Onc  -- Continue BMX solution PRN for mucositis pain  -- Olanzapine 10mg QHS for insomnia  -- Referral to [facility] placed

## 2024-11-26 ENCOUNTER — TELEPHONE (OUTPATIENT)
Dept: PALLIATIVE MEDICINE | Facility: HOSPITAL | Age: 64
End: 2024-11-26
Payer: COMMERCIAL

## 2024-11-26 ENCOUNTER — LAB (OUTPATIENT)
Dept: LAB | Facility: HOSPITAL | Age: 64
End: 2024-11-26
Payer: COMMERCIAL

## 2024-11-26 ENCOUNTER — OFFICE VISIT (OUTPATIENT)
Dept: HEMATOLOGY/ONCOLOGY | Facility: HOSPITAL | Age: 64
End: 2024-11-26
Payer: COMMERCIAL

## 2024-11-26 VITALS
OXYGEN SATURATION: 98 % | WEIGHT: 161.16 LBS | RESPIRATION RATE: 20 BRPM | DIASTOLIC BLOOD PRESSURE: 59 MMHG | HEART RATE: 65 BPM | TEMPERATURE: 97.9 F | SYSTOLIC BLOOD PRESSURE: 131 MMHG | BODY MASS INDEX: 28.55 KG/M2

## 2024-11-26 DIAGNOSIS — K12.33 MUCOSITIS DUE TO RADIATION THERAPY: ICD-10-CM

## 2024-11-26 DIAGNOSIS — C77.0 METASTASIS TO HEAD AND NECK LYMPH NODE (MULTI): ICD-10-CM

## 2024-11-26 DIAGNOSIS — Z85.89 ENCOUNTER FOR FOLLOW-UP SURVEILLANCE OF HEAD AND NECK CANCER: ICD-10-CM

## 2024-11-26 DIAGNOSIS — C09.9 TONSIL CANCER (MULTI): Primary | ICD-10-CM

## 2024-11-26 DIAGNOSIS — C09.9 TONSIL CANCER (MULTI): ICD-10-CM
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
## 2024-11-26 DIAGNOSIS — R13.10 DYSPHAGIA, UNSPECIFIED TYPE: ICD-10-CM

## 2024-11-26 DIAGNOSIS — Z08 ENCOUNTER FOR FOLLOW-UP SURVEILLANCE OF HEAD AND NECK CANCER: ICD-10-CM

## 2024-11-26 DIAGNOSIS — I89.0 LYMPHEDEMA DUE TO RADIATION: ICD-10-CM

## 2024-11-26 DIAGNOSIS — K11.7 XEROSTOMIA DUE TO RADIOTHERAPY: ICD-10-CM

## 2024-11-26 LAB
ALBUMIN SERPL BCP-MCNC: 3.8 G/DL (ref 3.4–5)
ALP SERPL-CCNC: 68 U/L (ref 33–136)
ALT SERPL W P-5'-P-CCNC: 14 U/L (ref 7–45)
ANION GAP SERPL CALC-SCNC: 12 MMOL/L (ref 10–20)
AST SERPL W P-5'-P-CCNC: 14 U/L (ref 9–39)
BASOPHILS # BLD AUTO: 0.02 X10*3/UL (ref 0–0.1)
BASOPHILS NFR BLD AUTO: 0.4 %
BILIRUB SERPL-MCNC: 0.3 MG/DL (ref 0–1.2)
BUN SERPL-MCNC: 16 MG/DL (ref 6–23)
CALCIUM SERPL-MCNC: 9 MG/DL (ref 8.6–10.3)
CHLORIDE SERPL-SCNC: 102 MMOL/L (ref 98–107)
CO2 SERPL-SCNC: 29 MMOL/L (ref 21–32)
CREAT SERPL-MCNC: 0.69 MG/DL (ref 0.5–1.05)
EGFRCR SERPLBLD CKD-EPI 2021: >90 ML/MIN/1.73M*2
EOSINOPHIL # BLD AUTO: 0.04 X10*3/UL (ref 0–0.7)
EOSINOPHIL NFR BLD AUTO: 0.8 %
ERYTHROCYTE [DISTWIDTH] IN BLOOD BY AUTOMATED COUNT: 14.8 % (ref 11.5–14.5)
GLUCOSE SERPL-MCNC: 85 MG/DL (ref 74–99)
HCT VFR BLD AUTO: 31.8 % (ref 36–46)
HGB BLD-MCNC: 10.4 G/DL (ref 12–16)
IMM GRANULOCYTES # BLD AUTO: 0.02 X10*3/UL (ref 0–0.7)
IMM GRANULOCYTES NFR BLD AUTO: 0.4 % (ref 0–0.9)
LYMPHOCYTES # BLD AUTO: 0.65 X10*3/UL (ref 1.2–4.8)
LYMPHOCYTES NFR BLD AUTO: 12.5 %
MAGNESIUM SERPL-MCNC: 1.7 MG/DL (ref 1.6–2.4)
MCH RBC QN AUTO: 31.2 PG (ref 26–34)
MCHC RBC AUTO-ENTMCNC: 32.7 G/DL (ref 32–36)
MCV RBC AUTO: 96 FL (ref 80–100)
MONOCYTES # BLD AUTO: 0.78 X10*3/UL (ref 0.1–1)
MONOCYTES NFR BLD AUTO: 15 %
NEUTROPHILS # BLD AUTO: 3.69 X10*3/UL (ref 1.2–7.7)
NEUTROPHILS NFR BLD AUTO: 70.9 %
NRBC BLD-RTO: 0 /100 WBCS (ref 0–0)
PLATELET # BLD AUTO: 197 X10*3/UL (ref 150–450)
POTASSIUM SERPL-SCNC: 5 MMOL/L (ref 3.5–5.3)
PROT SERPL-MCNC: 6.7 G/DL (ref 6.4–8.2)
RBC # BLD AUTO: 3.33 X10*6/UL (ref 4–5.2)
SODIUM SERPL-SCNC: 138 MMOL/L (ref 136–145)
WBC # BLD AUTO: 5.2 X10*3/UL (ref 4.4–11.3)

## 2024-11-26 PROCEDURE — 80053 COMPREHEN METABOLIC PANEL: CPT

## 2024-11-26 PROCEDURE — 83735 ASSAY OF MAGNESIUM: CPT

## 2024-11-26 PROCEDURE — 99215 OFFICE O/P EST HI 40 MIN: CPT | Performed by: STUDENT IN AN ORGANIZED HEALTH CARE EDUCATION/TRAINING PROGRAM

## 2024-11-26 PROCEDURE — 36415 COLL VENOUS BLD VENIPUNCTURE: CPT

## 2024-11-26 PROCEDURE — 85025 COMPLETE CBC W/AUTO DIFF WBC: CPT

## 2024-11-26 ASSESSMENT — PAIN SCALES - GENERAL: PAINLEVEL_OUTOF10: 4

## 2024-11-26 NOTE — TELEPHONE ENCOUNTER
[facility] called to see if PA was required for percocet.  Pharmacy will call back if PA is needed.  Patient to fill prescription today.

## 2024-11-29 PROBLEM — J38.7 LESION OF LARYNX: Status: RESOLVED | Noted: 2023-04-03 | Resolved: 2024-11-29

## 2024-11-29 PROBLEM — R22.1 LUMP IN NECK: Status: RESOLVED | Noted: 2023-04-03 | Resolved: 2024-11-29

## 2024-11-29 PROBLEM — J34.89 NASAL SORE: Status: RESOLVED | Noted: 2024-01-31 | Resolved: 2024-11-29

## 2024-11-29 PROBLEM — R09.82 POSTNASAL DRIP: Status: RESOLVED | Noted: 2023-04-03 | Resolved: 2024-11-29

## 2024-12-02 NOTE — TELEPHONE ENCOUNTER
PA approved for percocet 7.5-325 mg tab by patient's insurance from 11/26/24-2/23/25.  [facility] called and updated.  Patient picked up prescription already.

## 2024-12-03 ENCOUNTER — TELEPHONE (OUTPATIENT)
Dept: HEMATOLOGY/ONCOLOGY | Facility: HOSPITAL | Age: 64
End: 2024-12-03
Payer: COMMERCIAL

## 2024-12-03 ENCOUNTER — NURSE TRIAGE (OUTPATIENT)
Dept: HEMATOLOGY/ONCOLOGY | Facility: HOSPITAL | Age: 64
End: 2024-12-03
Payer: COMMERCIAL

## 2024-12-03 NOTE — TELEPHONE ENCOUNTER
Additional Information  • Have you been exposed to other people or pets who are sick?     Granddaughter and sister were both sick at [facility] with URI symptoms.    Protocols used: Fever/Chills/Infection

## 2024-12-03 NOTE — PATIENT COMMUNICATION
Gretchen completed 5 cycles of cisplatin and proton reirradiation on 24. She sent a message stating she developed a fever so I called her to further assess.     Her fever started this morning; the highest has been 100.6 (just taken a few minutes ago). She woke up with chills and a severe headache so this prompted her to check her temperature.  She took 825mg Tylenol this morning around 0830 but this has not seemed to help.     Rating headache 9/10. No vision changes though she is experiencing dizziness that just came on a few minutes ago; it came on while she was sitting and lasted a couple of minutes and then went away.     No nausea, vomiting, diarrhea.  No new/worsening cough, no worsening of her sore throat (chronic sore throat r/t radiation).  No other respiratory changes. No shortness of breath or chest pain.     Does endorse a stiff neck as well; she is able to turn her neck to each side but it does feel stiff to her. Did have surgery of her neck in August but feels this neck pain is different.     She has an at-home covid test which she can take, however, it did  in August.

## 2024-12-03 NOTE — TELEPHONE ENCOUNTER
Called pt regarding a secure chat from telephone RN. Patient is experiencing flu like symptoms. Recommended pt to take her second dose of tylenol now, her last dose was early this morning. If pt still has a fever higher than 100.3- pt should go to ER since she just finished cisplatin a few weeks ago. She is still high risk to infection. Patient stated she understood that if her fever doesn't go away in the next hour or so- to go to ER.   Patient can alternate acetaminophen and ibuprofen.

## 2024-12-04 ENCOUNTER — TELEPHONE (OUTPATIENT)
Dept: RADIATION ONCOLOGY | Facility: HOSPITAL | Age: 64
End: 2024-12-04
Payer: COMMERCIAL

## 2024-12-04 ENCOUNTER — HOSPITAL ENCOUNTER (OUTPATIENT)
Dept: CARDIOLOGY | Facility: HOSPITAL | Age: 64
Discharge: HOME | End: 2024-12-04
Payer: COMMERCIAL

## 2024-12-04 DIAGNOSIS — I25.10 CORONARY ARTERY CALCIFICATION: ICD-10-CM

## 2024-12-04 DIAGNOSIS — R01.1 MURMUR: ICD-10-CM

## 2024-12-04 DIAGNOSIS — R06.09 DOE (DYSPNEA ON EXERTION): ICD-10-CM

## 2024-12-04 PROCEDURE — 93306 TTE W/DOPPLER COMPLETE: CPT

## 2024-12-04 PROCEDURE — 93306 TTE W/DOPPLER COMPLETE: CPT | Performed by: INTERNAL MEDICINE

## 2024-12-05 ENCOUNTER — APPOINTMENT (OUTPATIENT)
Dept: RADIATION ONCOLOGY | Facility: HOSPITAL | Age: 64
End: 2024-12-05
Payer: COMMERCIAL

## 2024-12-05 ENCOUNTER — APPOINTMENT (OUTPATIENT)
Dept: RADIOLOGY | Facility: HOSPITAL | Age: 64
End: 2024-12-05
Payer: COMMERCIAL

## 2024-12-05 ENCOUNTER — HOSPITAL ENCOUNTER (EMERGENCY)
Facility: HOSPITAL | Age: 64
Discharge: HOME | End: 2024-12-05
Payer: COMMERCIAL

## 2024-12-05 VITALS
BODY MASS INDEX: 28.35 KG/M2 | DIASTOLIC BLOOD PRESSURE: 62 MMHG | WEIGHT: 160 LBS | OXYGEN SATURATION: 94 % | RESPIRATION RATE: 16 BRPM | TEMPERATURE: 98.4 F | SYSTOLIC BLOOD PRESSURE: 108 MMHG | HEART RATE: 100 BPM | HEIGHT: 63 IN

## 2024-12-05 DIAGNOSIS — F32.A DEPRESSION, UNSPECIFIED DEPRESSION TYPE: ICD-10-CM

## 2024-12-05 DIAGNOSIS — R50.9 FEVER, UNSPECIFIED FEVER CAUSE: Primary | ICD-10-CM

## 2024-12-05 DIAGNOSIS — J69.0 ASPIRATION PNEUMONIA OF LEFT LOWER LOBE, UNSPECIFIED ASPIRATION PNEUMONIA TYPE (MULTI): ICD-10-CM

## 2024-12-05 LAB
ALBUMIN SERPL BCP-MCNC: 3.8 G/DL (ref 3.4–5)
ALP SERPL-CCNC: 64 U/L (ref 33–136)
ALT SERPL W P-5'-P-CCNC: 16 U/L (ref 7–45)
ANION GAP SERPL CALC-SCNC: 10 MMOL/L (ref 10–20)
AORTIC VALVE MEAN GRADIENT: 8 MMHG
AORTIC VALVE PEAK VELOCITY: 1.83 M/S
APPEARANCE UR: CLEAR
AST SERPL W P-5'-P-CCNC: 19 U/L (ref 9–39)
AV PEAK GRADIENT: 13 MMHG
AVA (PEAK VEL): 2.25 CM2
AVA (VTI): 2.26 CM2
BASOPHILS # BLD AUTO: 0.02 X10*3/UL (ref 0–0.1)
BASOPHILS NFR BLD AUTO: 0.3 %
BILIRUB SERPL-MCNC: 0.5 MG/DL (ref 0–1.2)
BILIRUB UR STRIP.AUTO-MCNC: NEGATIVE MG/DL
BUN SERPL-MCNC: 15 MG/DL (ref 6–23)
CALCIUM SERPL-MCNC: 8.8 MG/DL (ref 8.6–10.3)
CHLORIDE SERPL-SCNC: 100 MMOL/L (ref 98–107)
CO2 SERPL-SCNC: 26 MMOL/L (ref 21–32)
COLOR UR: YELLOW
CREAT SERPL-MCNC: 0.71 MG/DL (ref 0.5–1.05)
EGFRCR SERPLBLD CKD-EPI 2021: >90 ML/MIN/1.73M*2
EJECTION FRACTION APICAL 4 CHAMBER: 68.7
EJECTION FRACTION: 63 %
EOSINOPHIL # BLD AUTO: 0 X10*3/UL (ref 0–0.7)
EOSINOPHIL NFR BLD AUTO: 0 %
ERYTHROCYTE [DISTWIDTH] IN BLOOD BY AUTOMATED COUNT: 14.6 % (ref 11.5–14.5)
FLUAV RNA RESP QL NAA+PROBE: NOT DETECTED
FLUBV RNA RESP QL NAA+PROBE: NOT DETECTED
GLUCOSE SERPL-MCNC: 98 MG/DL (ref 74–99)
GLUCOSE UR STRIP.AUTO-MCNC: NORMAL MG/DL
HCT VFR BLD AUTO: 32.4 % (ref 36–46)
HGB BLD-MCNC: 10.7 G/DL (ref 12–16)
HOLD SPECIMEN: NORMAL
IMM GRANULOCYTES # BLD AUTO: 0.04 X10*3/UL (ref 0–0.7)
IMM GRANULOCYTES NFR BLD AUTO: 0.6 % (ref 0–0.9)
KETONES UR STRIP.AUTO-MCNC: NEGATIVE MG/DL
LACTATE SERPL-SCNC: 1 MMOL/L (ref 0.4–2)
LEFT ATRIUM VOLUME AREA LENGTH INDEX BSA: 23.5 ML/M2
LEFT VENTRICLE INTERNAL DIMENSION DIASTOLE: 3.69 CM (ref 3.5–6)
LEFT VENTRICULAR OUTFLOW TRACT DIAMETER: 1.86 CM
LEUKOCYTE ESTERASE UR QL STRIP.AUTO: NEGATIVE
LV EJECTION FRACTION BIPLANE: 72 %
LYMPHOCYTES # BLD AUTO: 0.35 X10*3/UL (ref 1.2–4.8)
LYMPHOCYTES NFR BLD AUTO: 4.9 %
MCH RBC QN AUTO: 30.8 PG (ref 26–34)
MCHC RBC AUTO-ENTMCNC: 33 G/DL (ref 32–36)
MCV RBC AUTO: 93 FL (ref 80–100)
MITRAL VALVE E/A RATIO: 0.7
MONOCYTES # BLD AUTO: 1.07 X10*3/UL (ref 0.1–1)
MONOCYTES NFR BLD AUTO: 15.1 %
MUCOUS THREADS #/AREA URNS AUTO: ABNORMAL /LPF
NEUTROPHILS # BLD AUTO: 5.61 X10*3/UL (ref 1.2–7.7)
NEUTROPHILS NFR BLD AUTO: 79.1 %
NITRITE UR QL STRIP.AUTO: NEGATIVE
NRBC BLD-RTO: 0 /100 WBCS (ref 0–0)
PH UR STRIP.AUTO: 5.5 [PH]
PLATELET # BLD AUTO: 183 X10*3/UL (ref 150–450)
POTASSIUM SERPL-SCNC: 4.1 MMOL/L (ref 3.5–5.3)
PROT SERPL-MCNC: 6.8 G/DL (ref 6.4–8.2)
PROT UR STRIP.AUTO-MCNC: NORMAL MG/DL
RBC # BLD AUTO: 3.47 X10*6/UL (ref 4–5.2)
RBC # UR STRIP.AUTO: NEGATIVE /UL
RBC #/AREA URNS AUTO: ABNORMAL /HPF
RIGHT VENTRICLE FREE WALL PEAK S': 17.46 CM/S
RIGHT VENTRICLE PEAK SYSTOLIC PRESSURE: 56.1 MMHG
RSV RNA RESP QL NAA+PROBE: NOT DETECTED
S PYO DNA THROAT QL NAA+PROBE: NOT DETECTED
SARS-COV-2 RNA RESP QL NAA+PROBE: NOT DETECTED
SODIUM SERPL-SCNC: 132 MMOL/L (ref 136–145)
SP GR UR STRIP.AUTO: 1.02
SQUAMOUS #/AREA URNS AUTO: ABNORMAL /HPF
TRICUSPID ANNULAR PLANE SYSTOLIC EXCURSION: 2.8 CM
UROBILINOGEN UR STRIP.AUTO-MCNC: NORMAL MG/DL
WBC # BLD AUTO: 7.1 X10*3/UL (ref 4.4–11.3)
WBC #/AREA URNS AUTO: ABNORMAL /HPF

## 2024-12-05 PROCEDURE — 81001 URINALYSIS AUTO W/SCOPE: CPT | Performed by: NURSE PRACTITIONER

## 2024-12-05 PROCEDURE — 71046 X-RAY EXAM CHEST 2 VIEWS: CPT | Mod: FOREIGN READ | Performed by: RADIOLOGY

## 2024-12-05 PROCEDURE — 71046 X-RAY EXAM CHEST 2 VIEWS: CPT

## 2024-12-05 PROCEDURE — 87637 SARSCOV2&INF A&B&RSV AMP PRB: CPT | Performed by: NURSE PRACTITIONER

## 2024-12-05 PROCEDURE — 87651 STREP A DNA AMP PROBE: CPT | Performed by: NURSE PRACTITIONER

## 2024-12-05 PROCEDURE — 83605 ASSAY OF LACTIC ACID: CPT | Performed by: NURSE PRACTITIONER

## 2024-12-05 PROCEDURE — 99284 EMERGENCY DEPT VISIT MOD MDM: CPT | Mod: 25

## 2024-12-05 PROCEDURE — 36415 COLL VENOUS BLD VENIPUNCTURE: CPT | Performed by: NURSE PRACTITIONER

## 2024-12-05 PROCEDURE — 85025 COMPLETE CBC W/AUTO DIFF WBC: CPT | Performed by: NURSE PRACTITIONER

## 2024-12-05 PROCEDURE — 84075 ASSAY ALKALINE PHOSPHATASE: CPT | Performed by: NURSE PRACTITIONER

## 2024-12-05 RX ORDER — AMOXICILLIN 875 MG/1
875 TABLET, FILM COATED ORAL 2 TIMES DAILY
Qty: 10 TABLET | Refills: 0 | Status: SHIPPED | OUTPATIENT
Start: 2024-12-05 | End: 2024-12-10

## 2024-12-05 RX ORDER — AZITHROMYCIN 250 MG/1
TABLET, FILM COATED ORAL
Qty: 6 TABLET | Refills: 0 | Status: SHIPPED | OUTPATIENT
Start: 2024-12-05 | End: 2024-12-06

## 2024-12-05 RX ORDER — SERTRALINE HYDROCHLORIDE 25 MG/1
25 TABLET, FILM COATED ORAL DAILY
Qty: 30 TABLET | Refills: 1 | Status: SHIPPED | OUTPATIENT
Start: 2024-12-05 | End: 2024-12-06 | Stop reason: DRUGHIGH

## 2024-12-05 ASSESSMENT — PAIN SCALES - GENERAL: PAINLEVEL_OUTOF10: 0 - NO PAIN

## 2024-12-05 ASSESSMENT — PAIN - FUNCTIONAL ASSESSMENT: PAIN_FUNCTIONAL_ASSESSMENT: 0-10

## 2024-12-05 NOTE — ED PROVIDER NOTES
HPI   Chief Complaint   Patient presents with    Fever    Headache       This is a 64-year-old  female, with a past medical history of recurrent stage III left oropharynx carcinoma.  The patient was originally diagnosed 2 years ago.  She reports that it went away after being treated with chemoradiation.  The cancer returned and the patient had a left radical neck dissection with CN XI repair.  The patient just completed chemoradiation November 19.  The patient reported 2 days ago she felt a little off balance.  She had a low-grade fever of 100 degrees.  The fever went away without intervention.  She did not have a fever yesterday.  The patient woke up this morning with a headache mostly on the right side of her head.  He had fever 102.8.  She took a Percocet tablet and her pain has improved slightly.  She does not have a fever on arrival with normal vital signs.  She denies any fever or cold-like symptoms.  Denies any chest pain, shortness of breath, palpitations, syncope, nausea, vomiting, gait disturbances.  She does not have a history of frequent headaches.  She denies any new medications.  Denies any sick contacts.  Not having any alteration in her urine or bowel function.  Denies any abdominal pain.      History provided by:  Patient   used: No            Patient History   Past Medical History:   Diagnosis Date    Acute hypoxemic respiratory failure (Multi) 09/20/2023    Breast calcification, left     COPD (chronic obstructive pulmonary disease) (Multi)     Dysphagia     Essential (primary) hypertension 12/28/2018    HTN (hypertension), benign    Fibroadenoma of left breast     H/O malignant neoplasm of tonsil     s/p XRT and chemotherapy completed July 8, 22.    Hypothyroidism     Morbid obesity (Multi) 04/03/2023    Nausea and/or vomiting 04/03/2023    OAB (overactive bladder)     Pharyngitis 04/03/2023    Pneumonia 04/03/2023    Restless legs syndrome     Sinusitis 04/03/2023      Past Surgical History:   Procedure Laterality Date    BREAST BIOPSY Left     benign FA     SECTION, LOW TRANSVERSE      CHOLECYSTECTOMY  2014    Cholecystectomy    COLONOSCOPY      HYSTERECTOMY  2014    Hysterectomy    MOUTH SURGERY  2014    Oral Surgery Tooth Extraction     Family History   Problem Relation Name Age of Onset    Emphysema Mother      COPD Mother      Hyperlipidemia Sister      Breast cancer Paternal Grandmother       Social History     Tobacco Use    Smoking status: Every Day     Current packs/day: 1.00     Average packs/day: 1 pack/day for 40.5 years (40.5 ttl pk-yrs)     Types: Cigarettes     Start date: 2023    Smokeless tobacco: Never    Tobacco comments:     3 or less cig. Daily per patient stated 10/10/24   Vaping Use    Vaping status: Not on file   Substance Use Topics    Alcohol use: Not Currently     Alcohol/week: 1.0 standard drink of alcohol     Types: 1 Glasses of wine per week    Drug use: Not Currently     Frequency: 1.0 times per week     Types: Marijuana     Comment: use within past week       Physical Exam   ED Triage Vitals [24 0636]   Temperature Heart Rate Respirations BP   36.9 °C (98.4 °F) 100 16 108/62      Pulse Ox Temp src Heart Rate Source Patient Position   94 % -- Monitor --      BP Location FiO2 (%)     -- --       Physical Exam  Vitals and nursing note reviewed.   Constitutional:       Appearance: Normal appearance. She is normal weight.   HENT:      Head: Normocephalic and atraumatic.      Right Ear: Tympanic membrane normal.      Left Ear: Tympanic membrane normal.      Nose: Nose normal.      Mouth/Throat:      Mouth: Mucous membranes are moist.      Pharynx: Oropharynx is clear.   Eyes:      Extraocular Movements: Extraocular movements intact.      Conjunctiva/sclera: Conjunctivae normal.      Pupils: Pupils are equal, round, and reactive to light.   Cardiovascular:      Rate and Rhythm: Normal rate and regular rhythm.       Pulses: Normal pulses.   Pulmonary:      Effort: Pulmonary effort is normal.      Breath sounds: Normal breath sounds.   Abdominal:      General: Abdomen is flat. Bowel sounds are normal.      Palpations: Abdomen is soft.   Genitourinary:     Comments: No CVA tenderness or pubic pain.  Musculoskeletal:         General: Normal range of motion.   Skin:     General: Skin is warm and dry.      Capillary Refill: Capillary refill takes less than 2 seconds.   Neurological:      General: No focal deficit present.      Mental Status: She is alert and oriented to person, place, and time.      GCS: GCS eye subscore is 4. GCS verbal subscore is 5. GCS motor subscore is 6.      Cranial Nerves: No facial asymmetry.      Sensory: No sensory deficit.      Motor: No weakness.      Deep Tendon Reflexes: Reflexes normal.   Psychiatric:         Mood and Affect: Mood normal.         Judgment: Judgment normal.           ED Course & MDM   Diagnoses as of 12/05/24 0916   Fever, unspecified fever cause   Aspiration pneumonia of left lower lobe, unspecified aspiration pneumonia type (Multi)                 No data recorded     [place] Coma Scale Score: 15 (12/05/24 0638 : Lorri Arora RN)                           Medical Decision Making  The patient was seen and evaluated by the nurse practitioner, Kelsey Sarmiento.  The patient is presenting to the emergency room with complaints of a fever COVID noted on origin.  Differential diagnosis includes viral illness, pneumonia, bronchitis, urinary tract infection, abdominal infection, sepsis, or other acute process.  The patient's vital signs are stable.  She was nontoxic in appearance.  A saline lock was established.  Laboratory studies were drawn with results as noted.  The patient does not have any acute leukocytosis or lactic acidosis.  Viral swabs were negative.  Strep pharyngitis testing was negative.  Routine urinalysis did not show any signs of infection.  An x-ray of the chest was  performed and showed haziness in the left lower base consistent with pulmonary infiltrate.  The patient will be treated empirically.  She was provided prescription for amoxicillin and azithromycin for home administration.  The patient is to follow up with their primary care physician in the next 2-3 days.  The patient was provided strict return precautions.  The patient was discharged in stable condition with computer discharge instructions given. Patient was agreeable with discharge planning.        Procedure  Procedures     PIYUSH Phipps-PATIENCE  12/05/24 0918

## 2024-12-05 NOTE — ED TRIAGE NOTES
Pt presents to the ED feeling fevered. States fever at home was around 102.8F around 0400. Took to Tylenol 500 mg of regular tylenol and then took a percocet that also contained 300 mg of tylenol. Pt states she has been having fevers on and off last 3 days since completing chemo/radiation on 11/19. Pt states she also has a headache intermittently with symptoms.

## 2024-12-06 DIAGNOSIS — I27.20 PULMONARY HYPERTENSION (MULTI): Primary | ICD-10-CM

## 2024-12-06 DIAGNOSIS — F32.1 CURRENT MODERATE EPISODE OF MAJOR DEPRESSIVE DISORDER WITHOUT PRIOR EPISODE (MULTI): Primary | ICD-10-CM

## 2024-12-06 RX ORDER — SERTRALINE HYDROCHLORIDE 50 MG/1
50 TABLET, FILM COATED ORAL DAILY
Qty: 30 TABLET | Refills: 5 | Status: SHIPPED | OUTPATIENT
Start: 2024-12-06 | End: 2025-06-04

## 2024-12-06 RX ORDER — AZITHROMYCIN 250 MG/1
TABLET, FILM COATED ORAL
Qty: 6 TABLET | Refills: 0 | Status: SHIPPED | OUTPATIENT
Start: 2024-12-06 | End: 2024-12-11

## 2024-12-07 DIAGNOSIS — F17.210 TOBACCO DEPENDENCE DUE TO CIGARETTES: ICD-10-CM

## 2024-12-09 ENCOUNTER — TELEPHONE (OUTPATIENT)
Dept: RADIATION ONCOLOGY | Facility: HOSPITAL | Age: 64
End: 2024-12-09
Payer: COMMERCIAL

## 2024-12-09 LAB
ATRIAL RATE: 76 BPM
P AXIS: 68 DEGREES
P OFFSET: 181 MS
P ONSET: 122 MS
PR INTERVAL: 208 MS
Q ONSET: 226 MS
QRS COUNT: 12 BEATS
QRS DURATION: 80 MS
QT INTERVAL: 360 MS
QTC CALCULATION(BAZETT): 405 MS
QTC FREDERICIA: 389 MS
R AXIS: 51 DEGREES
T AXIS: 72 DEGREES
T OFFSET: 406 MS
VENTRICULAR RATE: 76 BPM

## 2024-12-09 NOTE — PROGRESS NOTES
Radiation Oncology Follow-Up    Patient Name:  Melvi Sanchez  MRN:  [id number]  :  1960    Referring Provider: Chetna Lowry MD  Primary Care Provider: Alton King DO  Care Team: Patient Care Team:  Alton King DO as PCP - General (Family Medicine)  Alton King DO as PCP - [facility] PCP  Alton King DO as PCP - [facility] Medicaid PCP  Jimmie LEYVA MD as Consulting Physician (Radiation Oncology)  Marlon Patel MD as Surgeon (Pulmonary Disease)  Tiffanie Whitfield RN as Nurse Navigator (Hematology and Oncology)  Alejandro Hurst MD as Consulting Physician (Hematology and Oncology)  Chetna Lowry MD as Radiation Oncologist (Radiation Oncology)  Danny Rodriguez PA-C as Physician Assistant (Hematology and Oncology)  PIYUSH Schreiber-CNP as Nurse Practitioner (Radiation Oncology)    Date of Service: 2024    DIAGNOSIS AND STAGING  Diagnosis and Staging: 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 Razo  -2024: L radical neck dissection 2-4 with CN11 repair  -10/8 - 24: Completed 5 cycles of cisplatin (40 mg/m2) and 66 gy of proton reirradiation     Past Medical History:   Diagnosis Date    Acute hypoxemic respiratory failure (Multi) 2023    Breast calcification, left     COPD (chronic obstructive pulmonary disease) (Multi)     Dysphagia     Essential (primary) hypertension 2018    HTN (hypertension), benign    Fibroadenoma of left breast     H/O malignant neoplasm of tonsil     s/p XRT and chemotherapy completed .    Hypothyroidism     Morbid obesity (Multi) 2023    Nausea and/or vomiting 2023    OAB (overactive bladder)     Pharyngitis 2023    Pneumonia 2023    Restless legs syndrome     Sinusitis 2023      Past  "Surgical History:   Procedure Laterality Date    BREAST BIOPSY Left     benign FA     SECTION, LOW TRANSVERSE      CHOLECYSTECTOMY  2014    Cholecystectomy    COLONOSCOPY      HYSTERECTOMY  2014    Hysterectomy    MOUTH SURGERY  2014    Oral Surgery Tooth Extraction      Social History     Tobacco Use    Smoking status: Every Day     Current packs/day: 1.00     Average packs/day: 1 pack/day for 40.5 years (40.5 ttl pk-yrs)     Types: Cigarettes     Start date: 2023    Smokeless tobacco: Never    Tobacco comments:     3 or less cig. Daily per patient stated 10/10/24   Substance Use Topics    Alcohol use: Not Currently     Alcohol/week: 1.0 standard drink of alcohol     Types: 1 Glasses of wine per week           SUBJECTIVE  History of Present Illness:  Melvi Sanchez \"Gretchen\" is a 64 y.o. female who was previously seen at the [facility] Department of Radiation Oncology for ***.    Treatment Rendered:   Proton Beam: Left Head and neck    Treatment Period Technique Fraction Dose Fractions Total Dose   Course 2 10/9/2024-2024  (days elapsed: 41)         VMAT L neck 10/9/2024-10/11/2024 VMAT 200 / 200 cGy 3 / 3 600 / 600 cGy         L neck 10/14/2024-2024 3-Field 182 / 182 cGy  4914 / 4,909 cGy       Review of Systems:   Review of Systems - Oncology    Performance Status:   The Karnofsky performance scale today is {DESC; KARNOFSKY SCALE WITH ECOG EQUIVALENT:55167}.       OBJECTIVE  Vital Signs:  There were no vitals taken for this visit.  Physical Exam       ASSESSMENT:   Melvi Sanchez is a 64 y.o. female with No matching staging information was found for the patient..  ***.    PLAN:  ***.  NCCN Guidelines {Actions; were/were not:95313} applicable to guide this patients treatment plan.  Delvin Momin, APRN-CNP     "

## 2024-12-10 ENCOUNTER — TELEMEDICINE (OUTPATIENT)
Dept: PALLIATIVE MEDICINE | Facility: HOSPITAL | Age: 64
End: 2024-12-10
Payer: COMMERCIAL

## 2024-12-10 DIAGNOSIS — G89.3 CANCER RELATED PAIN: ICD-10-CM

## 2024-12-10 DIAGNOSIS — F41.9 ANXIETY AND DEPRESSION: Primary | ICD-10-CM

## 2024-12-10 DIAGNOSIS — G89.3 CANCER ASSOCIATED PAIN: ICD-10-CM

## 2024-12-10 DIAGNOSIS — C10.9 OROPHARYNGEAL CANCER (MULTI): ICD-10-CM

## 2024-12-10 DIAGNOSIS — F32.A ANXIETY AND DEPRESSION: Primary | ICD-10-CM

## 2024-12-10 DIAGNOSIS — K59.03 CONSTIPATION DUE TO PAIN MEDICATION THERAPY: ICD-10-CM

## 2024-12-10 PROCEDURE — 99214 OFFICE O/P EST MOD 30 MIN: CPT | Performed by: NURSE PRACTITIONER

## 2024-12-10 RX ORDER — MORPHINE SULFATE 15 MG/1
15 TABLET, FILM COATED, EXTENDED RELEASE ORAL 2 TIMES DAILY
Qty: 60 TABLET | Refills: 0 | Status: SHIPPED | OUTPATIENT
Start: 2024-12-10 | End: 2025-01-09

## 2024-12-10 RX ORDER — GABAPENTIN 300 MG/1
300 CAPSULE ORAL 3 TIMES DAILY
Qty: 270 CAPSULE | Refills: 0 | Status: SHIPPED | OUTPATIENT
Start: 2024-12-10 | End: 2025-03-10

## 2024-12-10 RX ORDER — IBUPROFEN 200 MG
1 TABLET ORAL EVERY 24 HOURS
Qty: 28 PATCH | Refills: 1 | Status: SHIPPED | OUTPATIENT
Start: 2024-12-10 | End: 2025-01-09

## 2024-12-10 RX ORDER — OXYCODONE AND ACETAMINOPHEN 7.5; 325 MG/1; MG/1
1 TABLET ORAL EVERY 4 HOURS PRN
Qty: 180 TABLET | Refills: 0 | Status: SHIPPED | OUTPATIENT
Start: 2024-12-10 | End: 2025-01-09

## 2024-12-10 ASSESSMENT — ENCOUNTER SYMPTOMS
HEADACHES: 0
SORE THROAT: 1
LEG SWELLING: 0
DIARRHEA: 0
CHILLS: 0
SHORTNESS OF BREATH: 0
ARTHRALGIAS: 0
LIGHT-HEADEDNESS: 0
NUMBNESS: 0
VOMITING: 0
ABDOMINAL PAIN: 0
TROUBLE SWALLOWING: 1
COUGH: 0
CONSTIPATION: 0
FEVER: 0
NAUSEA: 0

## 2024-12-10 NOTE — PROGRESS NOTES
SUPPORTIVE AND PALLIATIVE ONCOLOGY CONSULT - OUTPATIENT FOLLOW UP    Virtual or Telephone Consent     An interactive audio and video telecommunication system which permits real time communications between the patient (at the originating site) and provider (at the distant site) was utilized to provide this telehealth service.   Verbal consent was requested and obtained from Melvi Sanchez on this date, 12/10/24 for a telehealth visit.       SERVICE DATE: 12/10/2024    Referred by:  PIYUSH Lee-CNP  Medical Oncologist: Alejandro Hurst MD   Radiation Oncologist: MD Chetna Gutierrez MD  Primary Physician: Alton King  [phone]    REASON FOR CONSULT/CHIEF CONSULT COMPLAINT: Introduction to Supportive and Palliative Oncology Services    Subjective   HISTORY OF PRESENT ILLNESS: Melvi Sanchez is a 64 y.o. 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 Treatment plan is concurrent reirradiation with Proton / cisplatin.     Additional PMHx  COPD, hypothyroidism and RLS     11/5/24: Having increased throat pain. Gabapentin up to 300mg TID per rad onc. Started on zoloft 25mg per pcp but she hasn't started first dose yet.     12/10/24:     Symptom Assessment:    Pain:somewhat    Left lateral thigh  from mid thigh to knee - painful to touch - tingling - deep ache - some numbness   Left jaw/neck - stinging - localized - some numbness or neck/ear/cheek     Lack of appetite: a little - likely due to recent PNA  Weight loss: none  Pain in mouth: none   Dry mouth: somewhat - Xylomet PRN   Taste changes: a little  Nausea/early satiety: none  Vomiting: none  Constipation: none  Diarrhea: none  Lack of energy: a little  Difficulty sleeping: none - melatonin at bedtime   Anxiety: a little  Depression: a little  Shortness of breath: a little  Other: mild dysphagia     Information obtained  from: interview of patient  ______________________________________________________________________     Oncology History   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)   4/3/2023 Initial Diagnosis    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)     10/8/2024 -  Chemotherapy    CISplatin with Concurrent Radiation (Weekly), 7 Week Cycle     Tonsil cancer (Multi)   4/3/2023 Initial Diagnosis    Tonsil cancer (Multi)     10/8/2024 -  Chemotherapy    CISplatin with Concurrent Radiation (Weekly), 7 Week Cycle         Past Medical History:   Diagnosis Date    Acute hypoxemic respiratory failure (Multi) 2023    Breast calcification, left     COPD (chronic obstructive pulmonary disease) (Multi)     Dysphagia     Essential (primary) hypertension 2018    HTN (hypertension), benign    Fibroadenoma of left breast     H/O malignant neoplasm of tonsil     s/p XRT and chemotherapy completed .    Hypothyroidism     Morbid obesity (Multi) 2023    Nausea and/or vomiting 2023    OAB (overactive bladder)     Pharyngitis 2023    Pneumonia 2023    Restless legs syndrome     Sinusitis 2023     Past Surgical History:   Procedure Laterality Date    BREAST BIOPSY Left     benign FA     SECTION, LOW TRANSVERSE      CHOLECYSTECTOMY  2014    Cholecystectomy    COLONOSCOPY      HYSTERECTOMY  2014    Hysterectomy    MOUTH SURGERY  2014    Oral Surgery Tooth Extraction     Family History   Problem Relation Name Age of Onset    Emphysema Mother      COPD Mother      Hyperlipidemia Sister      Breast cancer Paternal Grandmother          SOCIAL HISTORY  . 3 children.   Social History:  reports that she has been smoking cigarettes. She started smoking about a year ago. She has a 40.5 pack-year smoking history. She has never used smokeless tobacco. She reports that she does not currently use alcohol after a past usage of about 1.0 standard drink of alcohol per week.  She reports that she does not currently use drugs after having used the following drugs: Marijuana. Frequency: 1.00 time per week.    REVIEW OF SYSTEMS  Review of systems negative unless noted in HPI.       Objective     Current Outpatient Medications   Medication Instructions    albuterol 90 mcg/actuation inhaler 2 puffs, inhalation, Every 4 hours PRN    amoxicillin (AMOXIL) 875 mg, oral, 2 times daily    aspirin 81 mg, oral, Daily    azithromycin (Zithromax) 250 mg tablet Take 2 tablets (500 mg) by mouth once daily for 1 day, THEN 1 tablet (250 mg) once daily for 4 days.    baclofen (LIORESAL) 5 mg, oral, 3 times daily    benzonatate (TESSALON PERLES) 100 mg, oral, Every 6 hours PRN, Do not crush or chew.    BMX ORAL SUSPENSION (1:1:1) 10 mL, Swish & Spit, Every 8 hours PRN    buPROPion XL (WELLBUTRIN XL) 300 mg, oral, Every morning, Do not crush, chew, or split.    gabapentin (NEURONTIN) 300 mg, oral, Nightly    gabapentin (NEURONTIN) 300 mg, oral, 3 times daily    guaiFENesin (MUCINEX) 1,200 mg, oral, 2 times daily, Do not crush, chew, or split.    levothyroxine (SYNTHROID, LEVOXYL) 100 mcg, oral, Daily, Take on an empty stomach at the same time each day, either 30 to 60 minutes prior to breakfast    loperamide (Imodium A-D) 2 mg tablet Take 2 capsules (4 mg) by mouth with the first episode of diarrhea and 1 capsule (2 mg) by mouth with any additional episodes. Maximum 8 capsules (16 mg) per day    melatonin 10 mg, oral, Nightly    morphine CR (MS CONTIN) 15 mg, oral, 2 times daily, Do not crush, chew, or split.    naloxone (NARCAN) 4 mg, nasal, As needed, May repeat every 2-3 minutes if needed, alternating nostrils, until medical assistance becomes available.    nicotine (Nicoderm CQ) 21 mg/24 hr patch 1 patch, transdermal, Every 24 hours    OLANZapine (ZYPREXA) 10 mg, oral, Nightly    omeprazole (PRILOSEC) 40 mg, oral, Daily before breakfast, Do not crush or chew.    ondansetron (ZOFRAN) 8 mg, oral, Every 8  hours PRN    oxygen (O2) 4 L/min, Continuous    polyethylene glycol (GLYCOLAX, MIRALAX) 17 g, oral, Daily    prochlorperazine (COMPAZINE) 10 mg, oral, Every 6 hours PRN    rOPINIRole (REQUIP) 2 mg, oral, Nightly    rosuvastatin (CRESTOR) 40 mg, oral, Daily    rosuvastatin (CRESTOR) 40 mg, oral, Daily    sertraline (ZOLOFT) 50 mg, oral, Daily    silver sulfADIAZINE (Silvadene) 1 % cream Topical, Daily    umeclidinium-vilanteroL (Anoro Ellipta) 62.5-25 mcg/actuation blister with device 1 puff, inhalation, Daily    varenicline (CHANTIX) 1 mg, oral, Daily    walker (Ultra-Light Rollator) misc 1 each, miscellaneous, As needed    white petrolatum (Aquaphor) 41 % ointment ointment 1 Application, Topical, 3 times daily       Allergies:   Allergies   Allergen Reactions    Duloxetine Hallucinations     Pt states she doesn't think she has an allergy just a bad reaction when mixed with morphine .             Creatinine   Date Value Ref Range Status   12/05/2024 0.71 0.50 - 1.05 mg/dL Final     AST   Date Value Ref Range Status   12/05/2024 19 9 - 39 U/L Final     ALT   Date Value Ref Range Status   12/05/2024 16 7 - 45 U/L Final     Comment:     Patients treated with Sulfasalazine may generate falsely decreased results for ALT.     Hemoglobin   Date Value Ref Range Status   12/05/2024 10.7 (L) 12.0 - 16.0 g/dL Final     Platelets   Date Value Ref Range Status   12/05/2024 183 150 - 450 x10*3/uL Final       PHYSICAL EXAMINATION  Vital Signs:   No VS due VV     Physical Exam  HENT:      Head: Normocephalic and atraumatic.      Comments: Left neck surgical changes noted  Eyes:      Extraocular Movements: Extraocular movements intact.      Conjunctiva/sclera: Conjunctivae normal.   Pulmonary:      Effort: Pulmonary effort is normal.   Neurological:      General: No focal deficit present.      Mental Status: She is alert.   Psychiatric:         Mood and Affect: Mood normal.         Thought Content: Thought content normal.          ASSESSMENT/PLAN    Pain  Pain is: cancer related pain  Type: somatic and neuropathic  -change percocet to 7.5/325mg q4 PRN form q3 PRN  -continue mscontin 15mg BID  -continue  gabapentin 300mg TID    Opioid Use  Medication Management:   - OARRS report reviewed with no aberrant behavior; consistent with  prescriptions/records and patient history  - MED 97.5.  Overdose Risk Score 410.   This has been discussed with patient.   - We will continue to closely monitor the patient for signs of prescription misuse including UDS, OARRS review and subjective reports at each visit.  - No concurrent benzodiazepine use   - I am a provider who either is or has consulted and collaborated with a provider certified in Hospice and Palliative Medicine and have conducted a face-face visit and examination for this patient.  - Routine Urine Drug Screen completed 11/5/24 and appropriate   - Controlled Substance Agreement completed 11/5/24  - Specifically discussed that controlled substance prescriptions will only be provided by our group as outlined in the completed agreement  - Prescribed naloxone: Confirmed already Rx'ed  - Red Flags: none     Tobacco dependence   -down to 3 cigs per day   -continue nicotine patch 21mg   -continue chantix  -continue wellbutrin 150mg daily     Xerostomia   -Xylomet PRN     Nausea   At risk for nausea with vomiting related to chemotherapy and opioids   -Continue ondansetron 8mg q8 PRN   -Continue prochlorperazine 10mg q6 PRN  -Continue dexamethasone 8mg daily for 3 days starting the day after treatment   -Continue zyprexa 10mg at bedtime - will decrease or stop at next visit if appetite better    Poor appetite  -Zyprexa 10mg at bedtime     Chemo induced diarrhea, at risk   -continue loperamide 2 mg - Take 2 capsules (4 mg) by mouth with the first episode of diarrhea and 1 capsule (2 mg) by mouth with any additional episodes. Maximum 8 capsules (16 mg) per day     Constipation   At risk for  constipation related to opioids  -may use miralax 17g daily PRN  -may use dulcolax 5-10mg at bedtime PRN if no bm in 2 days     Hiccups   -she will attempt to stop baclofen (only using one dose daily)     Altered Mood  Acute anxiety and depression related to health concerns   -on Wellbutrin per above   -zoloft 50mg daily per pcp (increased 2 days ago)    Chronic insomnia   -continue melatonin 10mg at bedtime PRN       Medical Decision Making/Goals of Care/Advance Care Planning:  Patient's current clinical condition, including diagnosis, and management plan, and goals of care were discussed.   Goals: symptom control and cancer directed therapy    Advance Directives  Existence of Advance Directives:No - has interest - she has paperwork   Decision maker: She would like to appointment Daughter, Beth, as HCPOA - otherwise 2/3 children     Next Follow-Up Visit:  Return to clinic in 1 month     Signature and billing  Thank you for allowing us to participate in the care of this patient. Recommendations will be communicated back to the consulting service by way of shared electronic medical record or face-to-face.    Medical complexity was moderate level due to due to complexity of problems, extensive data review, and high risk of management/treatment.  Time was spent on the following: Prep Time, Time Directly with Patient/Family/Caregiver, Documentation Time. Total time spent: 30 min      DATA   Diagnostic tests and information reviewed for today's visit:  Most recent labs and imaging results, Medications       Some elements copied from Supportive Oncology note on 11/5/24, the elements have been updated and all reflect current decision making from today, 12/10/2024.      Plan of Care discussed with: Patient, Family/Significant Other: sister, and RN      SIGNATURE: Antoinette Soliman APRN-CNP    Contact information:  Supportive and Palliative Oncology  Monday-Friday 8 AM-5 PM  Phone:  [phone], press option #5, then  option #1.   Or Epic Secure Chat

## 2024-12-10 NOTE — PROGRESS NOTES
"    Patient ID: Melvi Sanchez \"Allie" is a 64 y.o. 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 Robin Gabrielc:  Dr. Chetna Lowry  [facility]: Dr. Alejandro Hurst   Supp Onc: Antoinette Soliman   PCP: Alton King, DO    PRIOR THERAPIES  07/07/2022: Completion of chemoradiation with weekly cisplatin with Dr. Razo  08/26/2024: L radical neck dissection 2-4 with CN11 repair  10/8 - 11/19/24: Completed 5 cycles of cisplatin (40 mg/m2) and 66 gy of proton reirradiation     CURRENT THERAPY    SITES OF DISEASE    CURRENT ONCOLOGICAL PROBLEMS  Post operative pain  Radiation dermatitis  Odynophagia     HISTORY OF PRESENT ILLNESS  Ms. Gretchen Sanchez is a 65 YO with PMH of stage III (cT1c cN3a M0) SCC of L tonsil, COPD, hypothyroidism and RLS seen as initial consultation for recurrence to L oropharynx with diffuse JOLNATA.    Patient initially treated by Dr. Razo in 2022. She first noticed a lump in the left side of her neck > 2 years ago. She was found that have enlarged level 2, 3, 4 nodes, but no obvious oropharyngeal lesion 03/2022 by Dr. Torres.  CT neck was obtained demonstrating  L neck mass, 3.2 x 5.9 x 8.4 cm and a 1.5 x 1.6 right paratracheal node -   FNA biopsy of left neck mass showed squamous cell carcinoma. On 4/21/2022 she underwent triple endoscopy and DL showed lesion of left tonsil; biopsy showed invasive squamous cell carcinoma, p16 positive. She was referred to radiation oncology and saw Dr. Crook and completed 70 cGy/35f CCRT with STEWART seen at 1 year. She has been followed by Dr. Torres in surveillance and was noted to have left neck LINA. CT neck was obtained on 6/20/24 demonstrating increase in edema, enlargement of level 2 node. L neck core biopsy on 7/5/24 demonstrated:     FINAL DIAGNOSIS   Left neck mass, core " biopsy:  - Metastatic squamous cell carcinoma involving fibrous tissue with extensive necrosis.  See note.   Note: By immunostaining, the tumor cells are positive for p40, supportive of the above diagnosis.     She proceeded with L neck dissection which revealed:    A. Left neck, level II-IV, neck dissection:  -- Poorly differentiated , nonkeratinizing squamous cell carcinoma with abundant necrosis,  involving soft tissue extensively, encompassing levels II and III  (2.2 cm).  - Fibrosis and foreign body granulomatous reaction, levels III  and IV.     Note:  Microscopic examination of H&E sections reveals poorly differentiated squamous cell carcinoma involving extensively soft tissue with minimal lymphoid tissue present and abundant necrosis and fibrosis.  Carcinoma reaches soft tissue inked margins focally.  Vein margin is negative.  History of metastatic squamous cell carcinoma of left tonsil, status post chemo therapy and radiation therapy is noted for this patient.     P16 by immunohistochemistry:  Positive     She was discussed at  and seen by Dr. Lowry with plans for post operative reirradiation with proton beam therapy and presents for medical oncology consultation to discuss addition of radiosensitizing systemic therapy.    PAST MEDICAL HISTORY  COPD, hypothyroidism, RLS    SURGICAL HISTORY  Cholecystectomy, hysterectomy     SOCIAL HISTORY  Lives in [place] by self. Son is next building. 3 kids, 6 grandkids.  Retired  at [employer].  Smoked 1-2 PPD, now down to 4 a day since diagnosis.  Occasional EtOH, no illicits. [place], [place].  Has a mini [employer]     FAMILY HISTORY  Cousin with brain cancer    CURRENT MEDS REVIEWED    ALLERGIES REVIEWED       Subjective   Chief complaint: throat pain    HPI  Melvi Sanchez is a 64 y.o. year old female patient with Recurrent stage III ([id number]) p16+ SCC of L oropharynx who competed chemoRT w/ 5 cycles weekly Cisplatin and  66gy proton radiatio on 11/19/24.     Interval History:  Patient presents for 3 week post treatment follow up.    Patient is recovering well overall. She developed fever >100.4F last week and went to the ED, was found to have PNA and finished a course of Amoxicillin and  Azithromycin.   Her throat pain is improving, pain overall well controlled on current pain regimen.  Has not had any n/v/d/c, not taking any antiemetics.   She still takes Omeprazole 40mg and has not had reflux symptoms.   Left neck radiation dermatitis is well healed.   Has unchanged transient tinnitus in left ear, left ear still feels muffled at times, no subjective hearing loss from baseline.    She is sleeping better since starting on Olanzapine 10mg at bedtime.   Bowels are moving daily, soft, 2x per day, using Miralax PRN  Her biggest complaint is her worsening short-term memory loss. It is impacting her life more significantly.   She has stopped smoking on 11/24/24 and is only vaping.      Review of Systems   Constitutional:  Negative for chills and fever.   HENT:   Positive for mouth sores, sore throat and trouble swallowing. Negative for hearing loss, lump/mass, nosebleeds and tinnitus.    Respiratory:  Negative for cough and shortness of breath.    Cardiovascular:  Negative for chest pain and leg swelling.   Gastrointestinal:  Negative for abdominal pain, constipation, diarrhea, nausea and vomiting.   Musculoskeletal:  Negative for arthralgias.   Skin:  Negative for rash.   Neurological:  Negative for headaches, light-headedness and numbness.     Objective   /75   Pulse 84   Temp 36.5 °C (97.7 °F) (Core)   Resp 18   Wt 72.7 kg (160 lb 4.4 oz)   SpO2 95%   BMI 28.39 kg/m²   Daily Weight  12/11/24 : 72.7 kg (160 lb 4.4 oz)  12/05/24 : 72.6 kg (160 lb)  11/26/24 : 73.1 kg (161 lb 2.5 oz)  11/22/24 : 74.1 kg (163 lb 4.8 oz)  11/19/24 : 73.3 kg (161 lb 9.6 oz)  11/18/24 : 74 kg (163 lb 1.6 oz)  11/15/24 : 73.5 kg (162  lb)    Physical Exam  Vitals reviewed.   Constitutional:       Appearance: Normal appearance. She is well-developed.   HENT:      Head: Normocephalic and atraumatic.      Right Ear: External ear normal. No tenderness.      Left Ear: External ear normal. No tenderness.      Nose: Nose normal.      Mouth/Throat:      Lips: Pink.      Mouth: Mucous membranes are moist. No injury or oral lesions.      Tongue: No lesions.   Eyes:      General: Lids are normal.      Extraocular Movements: Extraocular movements intact.      Conjunctiva/sclera: Conjunctivae normal.      Pupils: Pupils are equal, round, and reactive to light.   Neck:      Thyroid: No thyroid mass.      Comments: L neck scar well healed. Small area of scabbing  Moderate lymphedema in left jaw and submental region    Cardiovascular:      Rate and Rhythm: Normal rate and regular rhythm.      Pulses: Normal pulses.      Heart sounds: No murmur heard.     No gallop.   Pulmonary:      Effort: Pulmonary effort is normal. No respiratory distress.      Breath sounds: Normal breath sounds and air entry. No stridor. No wheezing.   Abdominal:      General: Abdomen is flat. Bowel sounds are normal. There is no distension or abdominal bruit.      Palpations: Abdomen is soft. There is no mass.      Tenderness: There is no abdominal tenderness.   Musculoskeletal:         General: Normal range of motion.      Cervical back: Normal range of motion and neck supple. No signs of trauma. Normal range of motion.      Right lower leg: No edema.      Left lower leg: No edema.   Lymphadenopathy:      Cervical: No cervical adenopathy.      Upper Body:      Right upper body: No axillary adenopathy.      Left upper body: No axillary adenopathy.   Skin:     General: Skin is warm and dry.      Comments: No new skin lesions   Neurological:      General: No focal deficit present.      Mental Status: She is alert and oriented to person, place, and time. Mental status is at baseline.       Gait: Gait is intact.   Psychiatric:         Attention and Perception: Attention normal.         Mood and Affect: Mood and affect normal.         Behavior: Behavior is cooperative.     ECOGSCORE: 1- Restricted in physically strenuous activity.  Carries out light duty.    Diagnostic Results   LABS  Below labs were reviewed.  Results from last 7 days   Lab Units 12/11/24  1307 12/05/24  0754   WBC AUTO x10*3/uL 8.1 7.1   HEMOGLOBIN g/dL 10.3* 10.7*   HEMATOCRIT % 32.2* 32.4*   PLATELETS AUTO x10*3/uL 222 183   NEUTROS ABS x10*3/uL 6.82 5.61   LYMPHS ABS AUTO x10*3/uL 0.55* 0.35*   MONOS ABS AUTO x10*3/uL 0.58 1.07*   EOS ABS AUTO x10*3/uL 0.03 0.00   NEUTROS PCT AUTO % 84.1 79.1   LYMPHS PCT AUTO % 6.8 4.9   MONOS PCT AUTO % 7.2 15.1   EOS PCT AUTO % 0.4 0.0      Results from last 7 days   Lab Units 12/11/24  1307 12/05/24  0754   GLUCOSE mg/dL 156* 98   SODIUM mmol/L 139 132*   POTASSIUM mmol/L 3.6 4.1   CHLORIDE mmol/L 103 100   CO2 mmol/L 28 26   BUN mg/dL 11 15   CREATININE mg/dL 0.65 0.71   EGFR mL/min/1.73m*2 >90 >90   CALCIUM mg/dL 9.1 8.8   MAGNESIUM mg/dL 1.52*  --    ALBUMIN g/dL 3.6 3.8   PROTEIN TOTAL g/dL 6.5 6.8   BILIRUBIN TOTAL mg/dL 0.4 0.5   ALK PHOS U/L 59 64   ALT U/L 22 16   AST U/L 21 19             Results from last 7 days   Lab Units 12/05/24  0754   LACTATE mmol/L 1.0            IMAGES  7/18/24 PET   1. New hypermetabolic activity seen in the left level 2 cervical lymph node, with central necrosis, consistent with heide metastasis.  2. New FDG avid clustered nodules are seen in the right upper lobe, which is nonspecific and may be infectious/inflammatory, however metastasis can not be excluded. Short-term follow-up chest CT is recommended.  3. Mild FDG uptake is seen throughout the esophagus, likely relating to esophagitis.  4. There is partial opacification of the left maxillary sinus, with mild FDG avidity, likely sinusitis.    CT Neck 6/20/24  IMPRESSION:  1. Compared to the CT neck from  07/14/2023, interval increase in edema within the aerodigestive tract, most pronounced within the supraglottic larynx and hypopharynx and there is resulting moderate narrowing of the supraglottic laryngeal lumen. There is also mild  prevertebral edema. There is no focal fluid collection to suggest an abscess. Cause of this edema is uncertain, could be infectious in etiology rather than treatment related given that patient completed radiation therapy 2 years ago.  2. No striking new mass is seen within the visualized aerodigestive tract, noting that evaluation is somewhat limited due to presence of diffuse edema and therefore subtle lesions can not be excluded.  3. Interval enlargement of peripherally enhancing likely necrotic lymph node in the left level 2 heide station concerning for progression of disease. Alternatively, enlargement of the lymph node could reflect an infectious process. Otherwise, no cervical lymphadenopathy by size criteria.  4. Edema within the left facial and neck deep and superficial soft tissues as well as in the left supraclavicular fossa is favored to be treatment related and was also present on the prior CT. No focal fluid collection is seen within these regions to suggest an abscess. Correlate clinically.    10/18/24 CT Chest wo IV contrast  IMPRESSION:  1.  The previously noted posterior right upper lobe ill-defined nodular density has resolved. There is presently a subtle vague 1.5 cm ground-glass opacity within inferior right upper lobe, which is felt to represent focal bronchiolitis. Otherwise, no new suspicious pulmonary nodules or masses.  2. Similar bandlike opacity within lingula with associated mild bronchiectasis, which was minimally hypermetabolic on recent PET-CT, favoring benign/inflammatory etiology; attention on follow-up imaging within 6 months is recommended.  3. Redemonstrated moderate upper lung predominant centrilobular and paraseptal emphysema.  4. Severe coronary  artery calcifications, indicating the presence of coronary artery disease. If the patient has associated symptoms recommend management as per chest pain guidelines       Assessment/Plan     Melvi Sanchez is a 64 y.o. year old female patient with PMH of stage III (cT1c cN3a M0) SCC of L tonsil, COPD, hypothyroidism and RLS, started treatment with CCRT w/ Cisplatin and proton radiation on 10/8/24 for recurrence to L oropharynx with diffuse JOLANTA.    We discussed the treatment paradigm in recurrence and salvage surgery - given her pathology showing JOLANTA it is reasonable to proceed with concurrent reirradiation with Proton. We consented today and all questions were answered. We will proceed upon CT simulation.    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 Proton machine is down today so first dose RT will be on IMRT on 10/9 then resume PORT.    - 10/15/24: C2. Unchanged mild chronic dysphagia and xerostomia from prior RT. Left neck pain controlled w/ current pain regimen.  - 10/22/24: C3. Increased odynophagia in the last 3-4 days, like due to thrush. PO intake decreased and has resulting weight loss. Also with increased lymphedema in the left jaw and submental region. Left thigh incision still numb, fluid accumulation is slowed down. She's smoking about 1-2 cigarettes a day,   - 10/29/24: C4. Discussed goals of at least 200 mg/m2, will continue to evaluate. Thrush improved.   - 11/5/24: C5. Patient with more forgetfulness and weakness this week. Appetite remain good. No n/v. Stable mild lymphedema in left jaw/neck. Her left sided odynophagia is a little worse so will reach out to Supp Onc on increasing pain medications.   - 11/12/24: Patient completed 200mg/m2 of Cisplatin and we will not proceed with Cycle 6. She has expected SE from chemoRT w/  fatigue, odynophagia from mucositis, left jaw pain with flucuating lymphedema, skin changed from radiation.   - 11/19/24: Patient completed 66gy of radiation. Her SE from treatment are relatively well managed. Odynophagia controlled on current pain regimen, PO intake remain good and weight overall stable. Radiation dermatitis in left neck will be managed w/ topicals and mepilex. Has lymphedema in the left jaw/neck that is unchanged.  - 12/11/24: Patient recovering well 3 week from treatment, tolerating regular diet, odynophagia is well controlled, chronic dysphagia and xerostomia is unchanged. Oral mucositis is improved.    # Sleep Disturbance  - Trouble sleeping, will continue Olanzapine 5mg nightly rather than just D1-4.   - Increased to Olanzapine 10mg nightly with improvement in sleep. Will continue this dose    # RUL nodules  - Being followed with CT scan in next 3 months  - 10/18/24 CT Chest wo contrast showed the previously noted posterior right upper lobe ill-defined nodular density has resolved. Similar bandlike opacity within lingula with associated mild bronchiectasis, which was minimally hypermetabolic on recent PET-CT, favoring benign/inflammatory etiology; attention on follow-up imaging within 6 months is recommended.  - Follow up on band like opacity within lingula with CT chest wo contrast likely in April 2025    # Memory Loss  - Patient reported short-term memory loss after first round of chemoRT, however her memory loss is much worse with this round. Has trouble remembering, dates, recalling conversations, loses train of thoughts, even though she makes herself phone reminders. Reports her mother passed away from vascular dementia.   Will refer to Neurology.     PLAN  -- RTC 1 months on 1/16/25 w/ MedOnc, labs cbc, cmp, mag  -- MS Contin 15mg BID for better baseline pain control. Will continue Percocet 7.5mg Q3-4hr PRN, f/u with Supp Onc  -- Olanzapine 10mg QHS for insomnia  -- Referral to Neurology  for memory loss  -- Referral to [facility] placed

## 2024-12-11 ENCOUNTER — LAB (OUTPATIENT)
Dept: LAB | Facility: HOSPITAL | Age: 64
End: 2024-12-11
Payer: COMMERCIAL

## 2024-12-11 ENCOUNTER — OFFICE VISIT (OUTPATIENT)
Dept: HEMATOLOGY/ONCOLOGY | Facility: HOSPITAL | Age: 64
End: 2024-12-11
Payer: COMMERCIAL

## 2024-12-11 ENCOUNTER — HOSPITAL ENCOUNTER (OUTPATIENT)
Dept: RADIATION ONCOLOGY | Facility: HOSPITAL | Age: 64
Setting detail: RADIATION/ONCOLOGY SERIES
Discharge: HOME | End: 2024-12-11
Payer: COMMERCIAL

## 2024-12-11 VITALS
WEIGHT: 160.27 LBS | TEMPERATURE: 97.7 F | BODY MASS INDEX: 28.39 KG/M2 | OXYGEN SATURATION: 95 % | HEART RATE: 84 BPM | DIASTOLIC BLOOD PRESSURE: 75 MMHG | RESPIRATION RATE: 18 BRPM | SYSTOLIC BLOOD PRESSURE: 128 MMHG

## 2024-12-11 VITALS
DIASTOLIC BLOOD PRESSURE: 75 MMHG | BODY MASS INDEX: 28.34 KG/M2 | RESPIRATION RATE: 18 BRPM | HEART RATE: 84 BPM | OXYGEN SATURATION: 95 % | TEMPERATURE: 97.7 F | WEIGHT: 160 LBS | SYSTOLIC BLOOD PRESSURE: 128 MMHG

## 2024-12-11 DIAGNOSIS — R13.10 ODYNOPHAGIA: ICD-10-CM

## 2024-12-11 DIAGNOSIS — T45.1X5A CHEMOTHERAPY INDUCED NAUSEA AND VOMITING: ICD-10-CM

## 2024-12-11 DIAGNOSIS — I89.0 LYMPHEDEMA DUE TO RADIATION: ICD-10-CM

## 2024-12-11 DIAGNOSIS — C77.0 METASTASIS TO HEAD AND NECK LYMPH NODE (MULTI): ICD-10-CM

## 2024-12-11 DIAGNOSIS — R29.898 LEG WEAKNESS, BILATERAL: ICD-10-CM

## 2024-12-11 DIAGNOSIS — C09.9 TONSIL CANCER (MULTI): ICD-10-CM

## 2024-12-11 DIAGNOSIS — R22.0 LOCALIZED SWELLING, MASS, AND LUMP OF HEAD: ICD-10-CM

## 2024-12-11 DIAGNOSIS — Z08 ENCOUNTER FOR FOLLOW-UP SURVEILLANCE OF HEAD AND NECK CANCER: ICD-10-CM

## 2024-12-11 DIAGNOSIS — R29.6 FREQUENT FALLS: ICD-10-CM

## 2024-12-11 DIAGNOSIS — R26.81 GAIT INSTABILITY: Primary | ICD-10-CM

## 2024-12-11 DIAGNOSIS — G89.3 CANCER ASSOCIATED PAIN: ICD-10-CM

## 2024-12-11 DIAGNOSIS — C77.0 METASTASIS TO HEAD AND NECK LYMPH NODE (MULTI): Primary | ICD-10-CM

## 2024-12-11 DIAGNOSIS — K11.7 XEROSTOMIA DUE TO RADIOTHERAPY: ICD-10-CM

## 2024-12-11 DIAGNOSIS — R11.2 CHEMOTHERAPY INDUCED NAUSEA AND VOMITING: ICD-10-CM

## 2024-12-11 DIAGNOSIS — C09.9 TONSIL CANCER (MULTI): Primary | ICD-10-CM

## 2024-12-11 DIAGNOSIS — Z85.89 ENCOUNTER FOR FOLLOW-UP SURVEILLANCE OF HEAD AND NECK CANCER: ICD-10-CM
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
## 2024-12-11 DIAGNOSIS — R41.3 MEMORY LOSS: ICD-10-CM

## 2024-12-11 DIAGNOSIS — R20.0 LEG NUMBNESS: ICD-10-CM

## 2024-12-11 DIAGNOSIS — R26.89 BALANCE PROBLEM: ICD-10-CM

## 2024-12-11 LAB
ALBUMIN SERPL BCP-MCNC: 3.6 G/DL (ref 3.4–5)
ALP SERPL-CCNC: 59 U/L (ref 33–136)
ALT SERPL W P-5'-P-CCNC: 22 U/L (ref 7–45)
ANION GAP SERPL CALC-SCNC: 12 MMOL/L (ref 10–20)
AST SERPL W P-5'-P-CCNC: 21 U/L (ref 9–39)
BASOPHILS # BLD AUTO: 0.03 X10*3/UL (ref 0–0.1)
BASOPHILS NFR BLD AUTO: 0.4 %
BILIRUB SERPL-MCNC: 0.4 MG/DL (ref 0–1.2)
BUN SERPL-MCNC: 11 MG/DL (ref 6–23)
CALCIUM SERPL-MCNC: 9.1 MG/DL (ref 8.6–10.3)
CHLORIDE SERPL-SCNC: 103 MMOL/L (ref 98–107)
CO2 SERPL-SCNC: 28 MMOL/L (ref 21–32)
CREAT SERPL-MCNC: 0.65 MG/DL (ref 0.5–1.05)
EGFRCR SERPLBLD CKD-EPI 2021: >90 ML/MIN/1.73M*2
EOSINOPHIL # BLD AUTO: 0.03 X10*3/UL (ref 0–0.7)
EOSINOPHIL NFR BLD AUTO: 0.4 %
ERYTHROCYTE [DISTWIDTH] IN BLOOD BY AUTOMATED COUNT: 14.2 % (ref 11.5–14.5)
GLUCOSE SERPL-MCNC: 156 MG/DL (ref 74–99)
HCT VFR BLD AUTO: 32.2 % (ref 36–46)
HGB BLD-MCNC: 10.3 G/DL (ref 12–16)
IMM GRANULOCYTES # BLD AUTO: 0.09 X10*3/UL (ref 0–0.7)
IMM GRANULOCYTES NFR BLD AUTO: 1.1 % (ref 0–0.9)
LYMPHOCYTES # BLD AUTO: 0.55 X10*3/UL (ref 1.2–4.8)
LYMPHOCYTES NFR BLD AUTO: 6.8 %
MAGNESIUM SERPL-MCNC: 1.52 MG/DL (ref 1.6–2.4)
MCH RBC QN AUTO: 30.3 PG (ref 26–34)
MCHC RBC AUTO-ENTMCNC: 32 G/DL (ref 32–36)
MCV RBC AUTO: 95 FL (ref 80–100)
MONOCYTES # BLD AUTO: 0.58 X10*3/UL (ref 0.1–1)
MONOCYTES NFR BLD AUTO: 7.2 %
NEUTROPHILS # BLD AUTO: 6.82 X10*3/UL (ref 1.2–7.7)
NEUTROPHILS NFR BLD AUTO: 84.1 %
NRBC BLD-RTO: 0 /100 WBCS (ref 0–0)
PLATELET # BLD AUTO: 222 X10*3/UL (ref 150–450)
POTASSIUM SERPL-SCNC: 3.6 MMOL/L (ref 3.5–5.3)
PROT SERPL-MCNC: 6.5 G/DL (ref 6.4–8.2)
RBC # BLD AUTO: 3.4 X10*6/UL (ref 4–5.2)
SODIUM SERPL-SCNC: 139 MMOL/L (ref 136–145)
WBC # BLD AUTO: 8.1 X10*3/UL (ref 4.4–11.3)

## 2024-12-11 PROCEDURE — 80053 COMPREHEN METABOLIC PANEL: CPT

## 2024-12-11 PROCEDURE — 99215 OFFICE O/P EST HI 40 MIN: CPT | Performed by: STUDENT IN AN ORGANIZED HEALTH CARE EDUCATION/TRAINING PROGRAM

## 2024-12-11 PROCEDURE — 36415 COLL VENOUS BLD VENIPUNCTURE: CPT

## 2024-12-11 PROCEDURE — 99215 OFFICE O/P EST HI 40 MIN: CPT

## 2024-12-11 PROCEDURE — 83735 ASSAY OF MAGNESIUM: CPT

## 2024-12-11 PROCEDURE — 85025 COMPLETE CBC W/AUTO DIFF WBC: CPT

## 2024-12-11 RX ORDER — OLANZAPINE 10 MG/1
10 TABLET ORAL NIGHTLY
Qty: 30 TABLET | Refills: 0 | Status: SHIPPED | OUTPATIENT
Start: 2024-12-11 | End: 2025-01-10

## 2024-12-11 RX ORDER — CALCIUM CARBONATE 160(400)MG
1 TABLET,CHEWABLE ORAL AS NEEDED
Qty: 1 EACH | Refills: 0 | Status: SHIPPED | OUTPATIENT
Start: 2024-12-11

## 2024-12-11 RX ORDER — CALCIUM CARBONATE 160(400)MG
1 TABLET,CHEWABLE ORAL AS NEEDED
Qty: 1 EACH | Refills: 0 | Status: SHIPPED | OUTPATIENT
Start: 2024-12-11 | End: 2024-12-11 | Stop reason: SDUPTHER

## 2024-12-11 ASSESSMENT — ENCOUNTER SYMPTOMS
ADENOPATHY: 0
DIZZINESS: 0
SHORTNESS OF BREATH: 0
ABDOMINAL DISTENTION: 0
NUMBNESS: 1
SPEECH DIFFICULTY: 0
LIGHT-HEADEDNESS: 1
NAUSEA: 0
CHEST TIGHTNESS: 0
SORE THROAT: 1
FATIGUE: 1
LOSS OF SENSATION IN FEET: 0
NERVOUS/ANXIOUS: 0
ARTHRALGIAS: 0
VOMITING: 0
HEMATURIA: 0
CONSTIPATION: 0
DIFFICULTY URINATING: 0
TROUBLE SWALLOWING: 0
CHILLS: 0
FEVER: 0
DEPRESSION: 0
NECK PAIN: 0
PALPITATIONS: 0
EXTREMITY WEAKNESS: 0
DIARRHEA: 0
BLOOD IN STOOL: 0
CONFUSION: 0
EYE PROBLEMS: 0
COUGH: 0
HEADACHES: 0
OCCASIONAL FEELINGS OF UNSTEADINESS: 0

## 2024-12-11 ASSESSMENT — PAIN SCALES - GENERAL
PAINLEVEL_OUTOF10: 0-NO PAIN
PAINLEVEL_OUTOF10: 3

## 2024-12-11 ASSESSMENT — PATIENT HEALTH QUESTIONNAIRE - PHQ9
2. FEELING DOWN, DEPRESSED OR HOPELESS: NOT AT ALL
1. LITTLE INTEREST OR PLEASURE IN DOING THINGS: NOT AT ALL
SUM OF ALL RESPONSES TO PHQ9 QUESTIONS 1 AND 2: 0

## 2024-12-11 NOTE — PROGRESS NOTES
Radiation Oncology Follow-Up    Patient Name:  Melvi Sanchez  MRN:  [id number]  :  1960    Referring Provider: Chetna Lowry MD  Primary Care Provider: Alton King DO  Care Team: Patient Care Team:  Alton King DO as PCP - General (Family Medicine)  Alton King DO as PCP - [facility] PCP  Alton King DO as PCP - [facility] Medicaid PCP  Jimmie LEYVA MD as Consulting Physician (Radiation Oncology)  Marlon Patel MD as Surgeon (Pulmonary Disease)  Tiffanie Whitfield RN as Nurse Navigator (Hematology and Oncology)  Alejandro Hurst MD as Consulting Physician (Hematology and Oncology)  Chetna Lowry MD as Radiation Oncologist (Radiation Oncology)  Danny Rodriguez PA-C as Physician Assistant (Hematology and Oncology)  PIYUSH Schreiber-CNP as Nurse Practitioner (Radiation Oncology)    Date of Service: 2024    DIAGNOSIS AND STAGING  Diagnosis and Staging: 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     Patient initially treated by Dr. Razo in . She first noticed a lump in the left side of her neck > 2 years ago. She was found that have enlarged level 2, 3, 4 nodes, but no obvious oropharyngeal lesion 2022 by Dr. Torres.  CT neck was obtained demonstrating  L neck mass, 3.2 x 5.9 x 8.4 cm and a 1.5 x 1.6 right paratracheal node -   FNA biopsy of left neck mass showed squamous cell carcinoma. On 2022 she underwent triple endoscopy and DL showed lesion of left tonsil; biopsy showed invasive squamous cell carcinoma, p16 positive. She was referred to radiation oncology and saw Dr. Crook and completed 70 cGy/35f CCRT with STEWART seen at 1 year. She  has been followed by Dr. Torres in surveillance and was noted to have left neck LINA. CT neck was obtained on 24 demonstrating increase in edema, enlargement of level 2 node. L neck core biopsy on 24 demonstrated metastatic squamous cell carcinoma involving fibrous tissue with extensive necrosis.  The neck dissection showed Poorly differentiated , nonkeratinizing squamous cell carcinoma with abundant necrosis, involving soft tissue extensively, encompassing levels II and III (2.2 cm) and fibrosis and foreign body granulomatous reaction, levels III and IV.     PRIOR THERAPIES  2022: Completion of chemoradiation with weekly cisplatin with Dr. Razo  2024: L radical neck dissection 2-4 with CN11 repair  10/8 - 24: Completed 5 cycles of cisplatin (40 mg/m2) and 66 gy of proton reirradiation     Past Medical History:   Diagnosis Date    Acute hypoxemic respiratory failure (Multi) 2023    Breast calcification, left     COPD (chronic obstructive pulmonary disease) (Multi)     Dysphagia     Essential (primary) hypertension 2018    HTN (hypertension), benign    Fibroadenoma of left breast     H/O malignant neoplasm of tonsil     s/p XRT and chemotherapy completed .    Hypothyroidism     Morbid obesity (Multi) 2023    Nausea and/or vomiting 2023    OAB (overactive bladder)     Pharyngitis 2023    Pneumonia 2023    Restless legs syndrome     Sinusitis 2023      Past Surgical History:   Procedure Laterality Date    BREAST BIOPSY Left     benign FA     SECTION, LOW TRANSVERSE      CHOLECYSTECTOMY  2014    Cholecystectomy    COLONOSCOPY      HYSTERECTOMY  2014    Hysterectomy    MOUTH SURGERY  2014    Oral Surgery Tooth Extraction      Social History     Tobacco Use    Smoking status: Every Day     Current packs/day: 1.00     Average packs/day: 1 pack/day for 40.5 years (40.5 ttl pk-yrs)     Types: Cigarettes     Start  "date: 11/30/2023    Smokeless tobacco: Never    Tobacco comments:     3 or less cig. Daily per patient stated 10/10/24   Substance Use Topics    Alcohol use: Not Currently     Alcohol/week: 1.0 standard drink of alcohol     Types: 1 Glasses of wine per week      SUBJECTIVE  History of Present Illness:  Melvi Sanchez \"Gretchen\" is a 64 y.o. female who was previously seen at the [facility] Department of Radiation Oncology for recurrent stage III ([id number]) p16+ SCC of L oropharynx.  Recent treatment included a left radical neck dissection 2-4 with CN11 repair and cisplatin (40 mg/m2) x 5 cycles and 66 gy of proton re-irradiation ending on 11/19/24.   Today the patient states that she's doing \"okay\".  She states that she just recently completed antibiotics for \"double pneumonia\" that she had developed last week.  She stated that she was symptomatic with the exception of a fever.   She continue to eat a regular diet without coughing or choking.  She endorses moderate xerostomia and dysgeusia which have been stable.  She manages oral dryness with extra hydration.   Denies mucositis, thrush, or new lumps/bumps/discolorations in the mouth or around the head, neck or shoulders.  She does endorse that she continues to have numbness of the left ear, cheek, and neck since her initial treatment back in 2022.  The current radiation dermatitis is mostly healed with a couple of areas of sloughing skin.  She continues to have hearing changes to the left ear that began back in 2022.  She states that her hearing is muffled, like she's \"under water\".   She states that Dr. Earlee and discussed potential placement of myringotomy tube.  She's going to discuss with him on 2/21/25.  Denies chills, fever, SOB or chest pain.  She does endorse some mild fatigue.  Denies headaches, dizziness or new focal sensory/motor deficits.  She does endorse an occasional feeling of being \"off balance.     Treatment " Rendered:   Proton Beam: Left Head and neck    Treatment Period Technique Fraction Dose Fractions Total Dose   Course 2 10/9/2024-11/19/2024  (days elapsed: 41)         VMAT L neck 10/9/2024-10/11/2024 VMAT 200 / 200 cGy 3 / 3 600 / 600 cGy         L neck 10/14/2024-11/19/2024 3-Field 182 / 182 cGy 27 / 27 4914 / 4,909 cGy       Review of Systems:   Review of Systems   Constitutional:  Positive for fatigue. Negative for chills and fever.   HENT:   Positive for hearing loss (Left ear) and sore throat (Slight odynophagia that she's had since 2022.). Negative for lump/mass, mouth sores and trouble swallowing.    Eyes:  Negative for eye problems.   Respiratory:  Negative for chest tightness, cough and shortness of breath.    Cardiovascular:  Negative for chest pain and palpitations.   Gastrointestinal:  Negative for abdominal distention, blood in stool, constipation, diarrhea, nausea and vomiting.   Genitourinary:  Negative for difficulty urinating and hematuria.    Musculoskeletal:  Negative for arthralgias, gait problem and neck pain.   Neurological:  Positive for light-headedness (When standing quickly.) and numbness (Left ear, face and neck). Negative for dizziness, extremity weakness, gait problem, headaches and speech difficulty.   Hematological:  Negative for adenopathy.   Psychiatric/Behavioral:  Negative for confusion and depression. The patient is not nervous/anxious.      Performance Status:   The Karnofsky performance scale today is 100, Fully active, able to carry on all pre-disease performed without restriction (ECOG equivalent 0).    Pain: Throat pain (3-4).  Managing with MS Contin 15mg BID and Percocet 7.5mg Q3-4hr PRN.      OBJECTIVE  Vital Signs:      11/18/2024    11:57 AM 11/19/2024    11:35 AM 11/22/2024     9:46 AM 11/26/2024     1:59 PM 12/5/2024     6:36 AM 12/11/2024     1:09 PM 12/11/2024     2:52 PM   Vitals   Systolic 132 126 130 131 108 128 128   Diastolic 77 56 77 59 62 75 75   Heart Rate  "70 82 57 65 100 84 84   Temp  36.2 °C (97.2 °F) 34.7 °C (94.5 °F) 36.6 °C (97.9 °F) 36.9 °C (98.4 °F) 36.5 °C (97.7 °F) 36.5 °C (97.7 °F)   Resp 18 20 16 20 16 18 18   Height   1.6 m (5' 2.99\")  1.6 m (5' 3\")     Weight (lb)  161.6 163.3 161.16 160 160.27 160   BMI  28.63 kg/m2 28.93 kg/m2 28.55 kg/m2 28.34 kg/m2 28.39 kg/m2 28.34 kg/m2   BSA (m2)  1.81 m2 1.81 m2 1.8 m2 1.8 m2 1.8 m2 1.8 m2   Visit Report  Report  Report  Report Report      Physical Exam  Vitals and nursing note reviewed.   Constitutional:       General: She is not in acute distress.     Appearance: Normal appearance. She is normal weight. She is not ill-appearing.   HENT:      Head: Normocephalic and atraumatic. No masses.      Jaw: Trismus and swelling (some minor swelling of left jaw.) present. No tenderness or pain on movement.        Comments: Some swelling of the left jaw.      Nose: Nose normal. No congestion or rhinorrhea.      Mouth/Throat:      Mouth: Mucous membranes are dry. No injury, oral lesions or angioedema.      Dentition: Abnormal dentition. Does not have dentures. Gingival swelling present. No dental tenderness.      Tongue: No lesions. Tongue does not deviate from midline.      Palate: No mass and lesions.      Comments: Edentulous.   Eyes:      General: No scleral icterus.     Extraocular Movements: Extraocular movements intact.      Pupils: Pupils are equal, round, and reactive to light.   Cardiovascular:      Rate and Rhythm: Normal rate and regular rhythm.      Pulses: Normal pulses.      Heart sounds: Normal heart sounds. No murmur heard.  Pulmonary:      Effort: Pulmonary effort is normal. No respiratory distress.      Breath sounds: Normal breath sounds. No stridor. No wheezing or rhonchi.   Chest:      Chest wall: No tenderness.   Abdominal:      General: Abdomen is flat. Bowel sounds are normal.      Palpations: Abdomen is soft. There is no mass.      Tenderness: There is no abdominal tenderness. There is no guarding " or rebound.   Musculoskeletal:         General: No swelling or tenderness. Normal range of motion.      Cervical back: Normal range of motion and neck supple. No tenderness.   Skin:     General: Skin is warm and dry.      Coloration: Skin is not jaundiced.      Findings: No erythema or lesion.   Neurological:      General: No focal deficit present.      Mental Status: She is alert and oriented to person, place, and time.      Sensory: No sensory deficit.      Motor: No weakness.      Gait: Gait normal.      Deep Tendon Reflexes: Reflexes normal.   Psychiatric:         Mood and Affect: Mood normal.         Behavior: Behavior normal.         Thought Content: Thought content normal.         Judgment: Judgment normal.     XR CHEST 2 VIEWS; 11/12/2024 12:38 pm   IMPRESSION:  1.  Pulmonary hyperinflation  2. No acute cardiopulmonary pathology    ASSESSMENT:   64 y.o. female who was previously seen at the [facility] Department of Radiation Oncology for recurrent stage III ([id number]) p16+ SCC of L oropharynx.  Recent treatment included a left radical neck dissection 2-4 with CN11 repair and cisplatin (40 mg/m2) x 5 cycles and 66 gy of proton re-irradiation ending on 11/19/24.   Today we discussed the Radiation Oncology Survival Plan.  The patient was provided supportive materials along with 3 copies of the treatment summary.  All questions/concerns were addressed to the satisfaction of the patient.     PLAN:    --CT neck in on 1/21/25.    --CT neck and PET on 2/27/25.   --FU with Raul Momin CNP on 2/27/25.   --Continue to follow with Dr. Hurst and Ro Rodriguez PA-C for Medical Oncology management.   --Continue to follow with Dr. Brownlee for ENT management.   --[facility] today via mobile phlebotomy.   --Short interval carotid ultrasound in 6-8 months from EOT.   --Continue Crestor 40 mg daily.   --Discussed SLP and the patient would like hold off for now.   --Neurology consult for memory loss  (Medical Oncology).  --Continue MS Contin 15mg BID and Percocet 7.5mg Q3-4hr PRN for odynophagia--under the direction of Medical Oncology.      Please reach out with any questions or concerns.     NCCN Guidelines were applicable to guide this patients treatment plan.  Delvin Momin, APRN-CNP

## 2024-12-18 DIAGNOSIS — R29.898 LEG WEAKNESS, BILATERAL: ICD-10-CM

## 2024-12-18 DIAGNOSIS — R26.81 GAIT INSTABILITY: ICD-10-CM

## 2024-12-18 DIAGNOSIS — R26.89 BALANCE PROBLEM: ICD-10-CM

## 2024-12-18 DIAGNOSIS — R29.6 FREQUENT FALLS: ICD-10-CM

## 2024-12-18 DIAGNOSIS — R20.0 LEG NUMBNESS: ICD-10-CM

## 2024-12-18 RX ORDER — CALCIUM CARBONATE 160(400)MG
1 TABLET,CHEWABLE ORAL AS NEEDED
Qty: 1 EACH | Refills: 0 | Status: SHIPPED | OUTPATIENT
Start: 2024-12-18 | End: 2024-12-18 | Stop reason: SDUPTHER

## 2024-12-18 RX ORDER — CALCIUM CARBONATE 160(400)MG
1 TABLET,CHEWABLE ORAL AS NEEDED
Qty: 1 EACH | Refills: 0 | Status: SHIPPED | OUTPATIENT
Start: 2024-12-18

## 2025-01-07 ENCOUNTER — APPOINTMENT (OUTPATIENT)
Dept: RADIOLOGY | Facility: HOSPITAL | Age: 65
End: 2025-01-07
Payer: COMMERCIAL

## 2025-01-10 ENCOUNTER — APPOINTMENT (OUTPATIENT)
Dept: SURGERY | Facility: CLINIC | Age: 65
End: 2025-01-10
Payer: COMMERCIAL

## 2025-01-10 ENCOUNTER — APPOINTMENT (OUTPATIENT)
Dept: NUTRITION | Facility: HOSPITAL | Age: 65
End: 2025-01-10
Payer: COMMERCIAL

## 2025-01-14 ENCOUNTER — APPOINTMENT (OUTPATIENT)
Dept: RADIOLOGY | Facility: HOSPITAL | Age: 65
End: 2025-01-14
Payer: COMMERCIAL

## 2025-01-14 ENCOUNTER — HOSPITAL ENCOUNTER (INPATIENT)
Facility: HOSPITAL | Age: 65
LOS: 3 days | Discharge: HOME | End: 2025-01-17
Attending: STUDENT IN AN ORGANIZED HEALTH CARE EDUCATION/TRAINING PROGRAM | Admitting: INTERNAL MEDICINE
Payer: COMMERCIAL

## 2025-01-14 DIAGNOSIS — R13.10 DYSPHAGIA, UNSPECIFIED TYPE: ICD-10-CM

## 2025-01-14 DIAGNOSIS — A41.9 SEPSIS WITHOUT ACUTE ORGAN DYSFUNCTION, DUE TO UNSPECIFIED ORGANISM (MULTI): Primary | ICD-10-CM

## 2025-01-14 DIAGNOSIS — G89.3 CANCER ASSOCIATED PAIN: ICD-10-CM

## 2025-01-14 DIAGNOSIS — G89.3 CANCER RELATED PAIN: ICD-10-CM

## 2025-01-14 DIAGNOSIS — J18.9 PNEUMONIA OF BOTH LOWER LOBES DUE TO INFECTIOUS ORGANISM: ICD-10-CM

## 2025-01-14 LAB
ANION GAP SERPL CALC-SCNC: 11 MMOL/L (ref 10–20)
APPEARANCE UR: CLEAR
BACTERIA #/AREA URNS AUTO: ABNORMAL /HPF
BASOPHILS # BLD AUTO: 0.04 X10*3/UL (ref 0–0.1)
BASOPHILS NFR BLD AUTO: 0.3 %
BILIRUB UR STRIP.AUTO-MCNC: NEGATIVE MG/DL
BUN SERPL-MCNC: 13 MG/DL (ref 6–23)
CALCIUM SERPL-MCNC: 8.3 MG/DL (ref 8.6–10.3)
CHLORIDE SERPL-SCNC: 99 MMOL/L (ref 98–107)
CO2 SERPL-SCNC: 24 MMOL/L (ref 21–32)
COLOR UR: YELLOW
CREAT SERPL-MCNC: 0.8 MG/DL (ref 0.5–1.05)
EGFRCR SERPLBLD CKD-EPI 2021: 83 ML/MIN/1.73M*2
EOSINOPHIL # BLD AUTO: 0.01 X10*3/UL (ref 0–0.7)
EOSINOPHIL NFR BLD AUTO: 0.1 %
ERYTHROCYTE [DISTWIDTH] IN BLOOD BY AUTOMATED COUNT: 13.3 % (ref 11.5–14.5)
FLUAV RNA RESP QL NAA+PROBE: NOT DETECTED
FLUBV RNA RESP QL NAA+PROBE: NOT DETECTED
GLUCOSE SERPL-MCNC: 147 MG/DL (ref 74–99)
GLUCOSE UR STRIP.AUTO-MCNC: NORMAL MG/DL
HCT VFR BLD AUTO: 30.6 % (ref 36–46)
HGB BLD-MCNC: 9.9 G/DL (ref 12–16)
HYALINE CASTS #/AREA URNS AUTO: ABNORMAL /LPF
IMM GRANULOCYTES # BLD AUTO: 0.09 X10*3/UL (ref 0–0.7)
IMM GRANULOCYTES NFR BLD AUTO: 0.6 % (ref 0–0.9)
KETONES UR STRIP.AUTO-MCNC: NEGATIVE MG/DL
LACTATE SERPL-SCNC: 1.1 MMOL/L (ref 0.4–2)
LEUKOCYTE ESTERASE UR QL STRIP.AUTO: ABNORMAL
LYMPHOCYTES # BLD AUTO: 0.57 X10*3/UL (ref 1.2–4.8)
LYMPHOCYTES NFR BLD AUTO: 3.9 %
MCH RBC QN AUTO: 29.9 PG (ref 26–34)
MCHC RBC AUTO-ENTMCNC: 32.4 G/DL (ref 32–36)
MCV RBC AUTO: 92 FL (ref 80–100)
MONOCYTES # BLD AUTO: 0.75 X10*3/UL (ref 0.1–1)
MONOCYTES NFR BLD AUTO: 5.1 %
MRSA DNA SPEC QL NAA+PROBE: NOT DETECTED
MUCOUS THREADS #/AREA URNS AUTO: ABNORMAL /LPF
NEUTROPHILS # BLD AUTO: 13.21 X10*3/UL (ref 1.2–7.7)
NEUTROPHILS NFR BLD AUTO: 90 %
NITRITE UR QL STRIP.AUTO: NEGATIVE
NRBC BLD-RTO: 0 /100 WBCS (ref 0–0)
PH UR STRIP.AUTO: 5.5 [PH]
PLATELET # BLD AUTO: 191 X10*3/UL (ref 150–450)
POTASSIUM SERPL-SCNC: 3.7 MMOL/L (ref 3.5–5.3)
PROT UR STRIP.AUTO-MCNC: ABNORMAL MG/DL
RBC # BLD AUTO: 3.31 X10*6/UL (ref 4–5.2)
RBC # UR STRIP.AUTO: NEGATIVE /UL
RBC #/AREA URNS AUTO: ABNORMAL /HPF
RSV RNA RESP QL NAA+PROBE: NOT DETECTED
SARS-COV-2 RNA RESP QL NAA+PROBE: NOT DETECTED
SODIUM SERPL-SCNC: 130 MMOL/L (ref 136–145)
SP GR UR STRIP.AUTO: 1.04
SQUAMOUS #/AREA URNS AUTO: ABNORMAL /HPF
UROBILINOGEN UR STRIP.AUTO-MCNC: NORMAL MG/DL
WBC # BLD AUTO: 14.7 X10*3/UL (ref 4.4–11.3)
WBC #/AREA URNS AUTO: ABNORMAL /HPF

## 2025-01-14 PROCEDURE — 80048 BASIC METABOLIC PNL TOTAL CA: CPT | Performed by: NURSE PRACTITIONER

## 2025-01-14 PROCEDURE — 83605 ASSAY OF LACTIC ACID: CPT | Performed by: NURSE PRACTITIONER

## 2025-01-14 PROCEDURE — 2500000004 HC RX 250 GENERAL PHARMACY W/ HCPCS (ALT 636 FOR OP/ED): Performed by: STUDENT IN AN ORGANIZED HEALTH CARE EDUCATION/TRAINING PROGRAM

## 2025-01-14 PROCEDURE — 87634 RSV DNA/RNA AMP PROBE: CPT | Performed by: NURSE PRACTITIONER

## 2025-01-14 PROCEDURE — 2500000005 HC RX 250 GENERAL PHARMACY W/O HCPCS: Performed by: STUDENT IN AN ORGANIZED HEALTH CARE EDUCATION/TRAINING PROGRAM

## 2025-01-14 PROCEDURE — 71046 X-RAY EXAM CHEST 2 VIEWS: CPT | Performed by: RADIOLOGY

## 2025-01-14 PROCEDURE — 99291 CRITICAL CARE FIRST HOUR: CPT | Performed by: NURSE PRACTITIONER

## 2025-01-14 PROCEDURE — 36415 COLL VENOUS BLD VENIPUNCTURE: CPT | Performed by: STUDENT IN AN ORGANIZED HEALTH CARE EDUCATION/TRAINING PROGRAM

## 2025-01-14 PROCEDURE — 96361 HYDRATE IV INFUSION ADD-ON: CPT

## 2025-01-14 PROCEDURE — 85025 COMPLETE CBC W/AUTO DIFF WBC: CPT | Performed by: NURSE PRACTITIONER

## 2025-01-14 PROCEDURE — 2500000001 HC RX 250 WO HCPCS SELF ADMINISTERED DRUGS (ALT 637 FOR MEDICARE OP): Performed by: NURSE PRACTITIONER

## 2025-01-14 PROCEDURE — 2500000004 HC RX 250 GENERAL PHARMACY W/ HCPCS (ALT 636 FOR OP/ED)

## 2025-01-14 PROCEDURE — 1200000002 HC GENERAL ROOM WITH TELEMETRY DAILY

## 2025-01-14 PROCEDURE — 99223 1ST HOSP IP/OBS HIGH 75: CPT

## 2025-01-14 PROCEDURE — 94640 AIRWAY INHALATION TREATMENT: CPT

## 2025-01-14 PROCEDURE — 96375 TX/PRO/DX INJ NEW DRUG ADDON: CPT

## 2025-01-14 PROCEDURE — 87086 URINE CULTURE/COLONY COUNT: CPT | Mod: PORLAB | Performed by: STUDENT IN AN ORGANIZED HEALTH CARE EDUCATION/TRAINING PROGRAM

## 2025-01-14 PROCEDURE — 87899 AGENT NOS ASSAY W/OPTIC: CPT | Mod: PORLAB

## 2025-01-14 PROCEDURE — 96365 THER/PROPH/DIAG IV INF INIT: CPT | Mod: 59

## 2025-01-14 PROCEDURE — 2550000001 HC RX 255 CONTRASTS: Performed by: NURSE PRACTITIONER

## 2025-01-14 PROCEDURE — 2500000002 HC RX 250 W HCPCS SELF ADMINISTERED DRUGS (ALT 637 FOR MEDICARE OP, ALT 636 FOR OP/ED)

## 2025-01-14 PROCEDURE — 36415 COLL VENOUS BLD VENIPUNCTURE: CPT | Performed by: NURSE PRACTITIONER

## 2025-01-14 PROCEDURE — 87641 MR-STAPH DNA AMP PROBE: CPT | Performed by: STUDENT IN AN ORGANIZED HEALTH CARE EDUCATION/TRAINING PROGRAM

## 2025-01-14 PROCEDURE — 96367 TX/PROPH/DG ADDL SEQ IV INF: CPT

## 2025-01-14 PROCEDURE — 71275 CT ANGIOGRAPHY CHEST: CPT | Mod: FOREIGN READ | Performed by: RADIOLOGY

## 2025-01-14 PROCEDURE — S4991 NICOTINE PATCH NONLEGEND: HCPCS

## 2025-01-14 PROCEDURE — 87449 NOS EACH ORGANISM AG IA: CPT | Mod: PORLAB

## 2025-01-14 PROCEDURE — 2500000001 HC RX 250 WO HCPCS SELF ADMINISTERED DRUGS (ALT 637 FOR MEDICARE OP)

## 2025-01-14 PROCEDURE — 71046 X-RAY EXAM CHEST 2 VIEWS: CPT

## 2025-01-14 PROCEDURE — 87636 SARSCOV2 & INF A&B AMP PRB: CPT | Performed by: NURSE PRACTITIONER

## 2025-01-14 PROCEDURE — 81001 URINALYSIS AUTO W/SCOPE: CPT | Performed by: STUDENT IN AN ORGANIZED HEALTH CARE EDUCATION/TRAINING PROGRAM

## 2025-01-14 PROCEDURE — 96366 THER/PROPH/DIAG IV INF ADDON: CPT

## 2025-01-14 PROCEDURE — 87075 CULTR BACTERIA EXCEPT BLOOD: CPT | Mod: PORLAB | Performed by: STUDENT IN AN ORGANIZED HEALTH CARE EDUCATION/TRAINING PROGRAM

## 2025-01-14 PROCEDURE — 71275 CT ANGIOGRAPHY CHEST: CPT

## 2025-01-14 PROCEDURE — 96372 THER/PROPH/DIAG INJ SC/IM: CPT

## 2025-01-14 RX ORDER — LATANOPROST 50 UG/ML
1 SOLUTION/ DROPS OPHTHALMIC NIGHTLY
Status: ON HOLD | COMMUNITY
Start: 2024-12-06 | End: 2025-01-15 | Stop reason: ENTERED-IN-ERROR

## 2025-01-14 RX ORDER — IBUPROFEN 200 MG
1 TABLET ORAL DAILY
Status: DISCONTINUED | OUTPATIENT
Start: 2025-01-14 | End: 2025-01-17 | Stop reason: HOSPADM

## 2025-01-14 RX ORDER — GUAIFENESIN 600 MG/1
1200 TABLET, EXTENDED RELEASE ORAL 2 TIMES DAILY
Status: DISCONTINUED | OUTPATIENT
Start: 2025-01-14 | End: 2025-01-17 | Stop reason: HOSPADM

## 2025-01-14 RX ORDER — OLANZAPINE 5 MG/1
10 TABLET ORAL NIGHTLY
Status: DISCONTINUED | OUTPATIENT
Start: 2025-01-14 | End: 2025-01-17 | Stop reason: HOSPADM

## 2025-01-14 RX ORDER — PANTOPRAZOLE SODIUM 40 MG/1
40 TABLET, DELAYED RELEASE ORAL
Status: DISCONTINUED | OUTPATIENT
Start: 2025-01-15 | End: 2025-01-17 | Stop reason: HOSPADM

## 2025-01-14 RX ORDER — MORPHINE SULFATE 15 MG/1
15 TABLET, FILM COATED, EXTENDED RELEASE ORAL 2 TIMES DAILY
Status: DISCONTINUED | OUTPATIENT
Start: 2025-01-14 | End: 2025-01-17 | Stop reason: HOSPADM

## 2025-01-14 RX ORDER — IPRATROPIUM BROMIDE AND ALBUTEROL SULFATE 2.5; .5 MG/3ML; MG/3ML
3 SOLUTION RESPIRATORY (INHALATION)
Status: DISCONTINUED | OUTPATIENT
Start: 2025-01-14 | End: 2025-01-14

## 2025-01-14 RX ORDER — ACETAMINOPHEN 325 MG/1
650 TABLET ORAL EVERY 4 HOURS PRN
Status: DISCONTINUED | OUTPATIENT
Start: 2025-01-14 | End: 2025-01-17 | Stop reason: HOSPADM

## 2025-01-14 RX ORDER — ROPINIROLE 1 MG/1
2 TABLET, FILM COATED ORAL NIGHTLY
Status: DISCONTINUED | OUTPATIENT
Start: 2025-01-14 | End: 2025-01-17 | Stop reason: HOSPADM

## 2025-01-14 RX ORDER — LATANOPROST 50 UG/ML
1 SOLUTION/ DROPS OPHTHALMIC NIGHTLY
Status: DISCONTINUED | OUTPATIENT
Start: 2025-01-14 | End: 2025-01-17 | Stop reason: HOSPADM

## 2025-01-14 RX ORDER — IPRATROPIUM BROMIDE AND ALBUTEROL SULFATE 2.5; .5 MG/3ML; MG/3ML
3 SOLUTION RESPIRATORY (INHALATION)
Status: DISCONTINUED | OUTPATIENT
Start: 2025-01-15 | End: 2025-01-17 | Stop reason: HOSPADM

## 2025-01-14 RX ORDER — TALC
3 POWDER (GRAM) TOPICAL NIGHTLY PRN
Status: DISCONTINUED | OUTPATIENT
Start: 2025-01-14 | End: 2025-01-17 | Stop reason: HOSPADM

## 2025-01-14 RX ORDER — ACETAMINOPHEN 325 MG/1
975 TABLET ORAL ONCE
Status: COMPLETED | OUTPATIENT
Start: 2025-01-14 | End: 2025-01-14

## 2025-01-14 RX ORDER — LEVOTHYROXINE SODIUM 100 UG/1
100 TABLET ORAL DAILY
Status: DISCONTINUED | OUTPATIENT
Start: 2025-01-14 | End: 2025-01-17 | Stop reason: HOSPADM

## 2025-01-14 RX ORDER — BACLOFEN 10 MG/1
5 TABLET ORAL 3 TIMES DAILY
Status: DISCONTINUED | OUTPATIENT
Start: 2025-01-14 | End: 2025-01-17 | Stop reason: HOSPADM

## 2025-01-14 RX ORDER — ROSUVASTATIN CALCIUM 20 MG/1
40 TABLET, COATED ORAL NIGHTLY
Status: DISCONTINUED | OUTPATIENT
Start: 2025-01-14 | End: 2025-01-17 | Stop reason: HOSPADM

## 2025-01-14 RX ORDER — ALBUTEROL SULFATE 90 UG/1
2 INHALANT RESPIRATORY (INHALATION) EVERY 4 HOURS PRN
Status: DISCONTINUED | OUTPATIENT
Start: 2025-01-14 | End: 2025-01-17 | Stop reason: HOSPADM

## 2025-01-14 RX ORDER — ENOXAPARIN SODIUM 100 MG/ML
40 INJECTION SUBCUTANEOUS EVERY 24 HOURS
Status: DISCONTINUED | OUTPATIENT
Start: 2025-01-14 | End: 2025-01-17 | Stop reason: HOSPADM

## 2025-01-14 RX ORDER — BUPROPION HYDROCHLORIDE 150 MG/1
300 TABLET ORAL EVERY MORNING
Status: DISCONTINUED | OUTPATIENT
Start: 2025-01-15 | End: 2025-01-17 | Stop reason: HOSPADM

## 2025-01-14 RX ORDER — PANTOPRAZOLE SODIUM 40 MG/10ML
40 INJECTION, POWDER, LYOPHILIZED, FOR SOLUTION INTRAVENOUS
Status: DISCONTINUED | OUTPATIENT
Start: 2025-01-15 | End: 2025-01-17 | Stop reason: HOSPADM

## 2025-01-14 RX ORDER — OXYCODONE AND ACETAMINOPHEN 5; 325 MG/1; MG/1
1.5 TABLET ORAL EVERY 4 HOURS PRN
Status: DISCONTINUED | OUTPATIENT
Start: 2025-01-14 | End: 2025-01-17 | Stop reason: HOSPADM

## 2025-01-14 RX ORDER — OXYCODONE AND ACETAMINOPHEN 5; 325 MG/1; MG/1
1 TABLET ORAL EVERY 6 HOURS PRN
Status: DISCONTINUED | OUTPATIENT
Start: 2025-01-14 | End: 2025-01-14

## 2025-01-14 RX ORDER — AMOXICILLIN AND CLAVULANATE POTASSIUM 875; 125 MG/1; MG/1
1 TABLET, FILM COATED ORAL ONCE
Status: DISCONTINUED | OUTPATIENT
Start: 2025-01-14 | End: 2025-01-14

## 2025-01-14 RX ORDER — AZITHROMYCIN 250 MG/1
500 TABLET, FILM COATED ORAL ONCE
Status: DISCONTINUED | OUTPATIENT
Start: 2025-01-14 | End: 2025-01-14

## 2025-01-14 RX ORDER — GABAPENTIN 300 MG/1
300 CAPSULE ORAL 3 TIMES DAILY
Status: DISCONTINUED | OUTPATIENT
Start: 2025-01-14 | End: 2025-01-17 | Stop reason: HOSPADM

## 2025-01-14 RX ORDER — VARENICLINE TARTRATE 0.5 MG/1
1 TABLET, FILM COATED ORAL DAILY
Status: DISCONTINUED | OUTPATIENT
Start: 2025-01-14 | End: 2025-01-14

## 2025-01-14 RX ORDER — ONDANSETRON HYDROCHLORIDE 2 MG/ML
4 INJECTION, SOLUTION INTRAVENOUS EVERY 6 HOURS PRN
Status: DISCONTINUED | OUTPATIENT
Start: 2025-01-14 | End: 2025-01-17 | Stop reason: HOSPADM

## 2025-01-14 RX ORDER — IBUPROFEN 400 MG/1
400 TABLET ORAL ONCE
Status: COMPLETED | OUTPATIENT
Start: 2025-01-14 | End: 2025-01-14

## 2025-01-14 RX ORDER — CEFTRIAXONE 2 G/50ML
2 INJECTION, SOLUTION INTRAVENOUS EVERY 24 HOURS
Status: DISCONTINUED | OUTPATIENT
Start: 2025-01-14 | End: 2025-01-17 | Stop reason: HOSPADM

## 2025-01-14 RX ORDER — FORMOTEROL FUMARATE DIHYDRATE 20 UG/2ML
20 SOLUTION RESPIRATORY (INHALATION)
Status: DISCONTINUED | OUTPATIENT
Start: 2025-01-14 | End: 2025-01-17 | Stop reason: HOSPADM

## 2025-01-14 RX ORDER — ASPIRIN 81 MG/1
81 TABLET ORAL DAILY
Status: DISCONTINUED | OUTPATIENT
Start: 2025-01-14 | End: 2025-01-17 | Stop reason: HOSPADM

## 2025-01-14 RX ORDER — SERTRALINE HYDROCHLORIDE 50 MG/1
50 TABLET, FILM COATED ORAL DAILY
Status: DISCONTINUED | OUTPATIENT
Start: 2025-01-14 | End: 2025-01-17 | Stop reason: HOSPADM

## 2025-01-14 RX ORDER — OXYCODONE AND ACETAMINOPHEN 5; 325 MG/1; MG/1
2 TABLET ORAL EVERY 6 HOURS PRN
Status: DISCONTINUED | OUTPATIENT
Start: 2025-01-14 | End: 2025-01-14

## 2025-01-14 RX ADMIN — OXYCODONE HYDROCHLORIDE AND ACETAMINOPHEN 1.5 TABLET: 5; 325 TABLET ORAL at 16:08

## 2025-01-14 RX ADMIN — MORPHINE SULFATE 15 MG: 15 TABLET, EXTENDED RELEASE ORAL at 21:47

## 2025-01-14 RX ADMIN — SERTRALINE HYDROCHLORIDE 50 MG: 50 TABLET ORAL at 17:19

## 2025-01-14 RX ADMIN — ROPINIROLE 2 MG: 1 TABLET, FILM COATED ORAL at 20:31

## 2025-01-14 RX ADMIN — SODIUM CHLORIDE 1000 ML: 9 INJECTION, SOLUTION INTRAVENOUS at 15:56

## 2025-01-14 RX ADMIN — IOHEXOL 50 ML: 350 INJECTION, SOLUTION INTRAVENOUS at 13:52

## 2025-01-14 RX ADMIN — IPRATROPIUM BROMIDE AND ALBUTEROL SULFATE 3 ML: 2.5; .5 SOLUTION RESPIRATORY (INHALATION) at 15:56

## 2025-01-14 RX ADMIN — FORMOTEROL FUMARATE DIHYDRATE 20 MCG: 20 SOLUTION RESPIRATORY (INHALATION) at 21:50

## 2025-01-14 RX ADMIN — AZITHROMYCIN MONOHYDRATE 490.2 MG: 500 INJECTION, POWDER, LYOPHILIZED, FOR SOLUTION INTRAVENOUS at 16:53

## 2025-01-14 RX ADMIN — ALBUTEROL SULFATE 2 PUFF: 90 AEROSOL, METERED RESPIRATORY (INHALATION) at 17:18

## 2025-01-14 RX ADMIN — PIPERACILLIN SODIUM AND TAZOBACTAM SODIUM 4.5 G: 4; .5 INJECTION, SOLUTION INTRAVENOUS at 15:56

## 2025-01-14 RX ADMIN — NICOTINE 1 PATCH: 21 PATCH, EXTENDED RELEASE TRANSDERMAL at 17:18

## 2025-01-14 RX ADMIN — GUAIFENESIN 1200 MG: 600 TABLET ORAL at 20:19

## 2025-01-14 RX ADMIN — ASPIRIN 81 MG: 81 TABLET, COATED ORAL at 17:19

## 2025-01-14 RX ADMIN — IBUPROFEN 400 MG: 400 TABLET, FILM COATED ORAL at 10:20

## 2025-01-14 RX ADMIN — OXYCODONE HYDROCHLORIDE AND ACETAMINOPHEN 1.5 TABLET: 5; 325 TABLET ORAL at 20:22

## 2025-01-14 RX ADMIN — SODIUM CHLORIDE 1000 ML: 9 INJECTION, SOLUTION INTRAVENOUS at 14:52

## 2025-01-14 RX ADMIN — ACETAMINOPHEN 975 MG: 325 TABLET ORAL at 10:19

## 2025-01-14 RX ADMIN — ENOXAPARIN SODIUM 40 MG: 40 INJECTION SUBCUTANEOUS at 17:18

## 2025-01-14 RX ADMIN — GABAPENTIN 300 MG: 300 CAPSULE ORAL at 20:21

## 2025-01-14 RX ADMIN — IPRATROPIUM BROMIDE AND ALBUTEROL SULFATE 3 ML: 2.5; .5 SOLUTION RESPIRATORY (INHALATION) at 21:50

## 2025-01-14 RX ADMIN — GABAPENTIN 300 MG: 300 CAPSULE ORAL at 15:56

## 2025-01-14 RX ADMIN — VANCOMYCIN HYDROCHLORIDE 1750 MG: 10 INJECTION, POWDER, LYOPHILIZED, FOR SOLUTION INTRAVENOUS at 20:18

## 2025-01-14 RX ADMIN — OLANZAPINE 10 MG: 5 TABLET, FILM COATED ORAL at 20:31

## 2025-01-14 RX ADMIN — ROSUVASTATIN 40 MG: 20 TABLET, FILM COATED ORAL at 20:21

## 2025-01-14 SDOH — ECONOMIC STABILITY: FOOD INSECURITY: WITHIN THE PAST 12 MONTHS, THE FOOD YOU BOUGHT JUST DIDN'T LAST AND YOU DIDN'T HAVE MONEY TO GET MORE.: NEVER TRUE

## 2025-01-14 SDOH — ECONOMIC STABILITY: HOUSING INSECURITY: IN THE LAST 12 MONTHS, WAS THERE A TIME WHEN YOU WERE NOT ABLE TO PAY THE MORTGAGE OR RENT ON TIME?: NO

## 2025-01-14 SDOH — SOCIAL STABILITY: SOCIAL INSECURITY: WITHIN THE LAST YEAR, HAVE YOU BEEN AFRAID OF YOUR PARTNER OR EX-PARTNER?: NO

## 2025-01-14 SDOH — SOCIAL STABILITY: SOCIAL INSECURITY: HAVE YOU HAD THOUGHTS OF HARMING ANYONE ELSE?: NO

## 2025-01-14 SDOH — ECONOMIC STABILITY: HOUSING INSECURITY: IN THE PAST 12 MONTHS, HOW MANY TIMES HAVE YOU MOVED WHERE YOU WERE LIVING?: 0

## 2025-01-14 SDOH — SOCIAL STABILITY: SOCIAL INSECURITY
WITHIN THE LAST YEAR, HAVE YOU BEEN RAPED OR FORCED TO HAVE ANY KIND OF SEXUAL ACTIVITY BY YOUR PARTNER OR EX-PARTNER?: NO

## 2025-01-14 SDOH — SOCIAL STABILITY: SOCIAL INSECURITY: WITHIN THE LAST YEAR, HAVE YOU BEEN HUMILIATED OR EMOTIONALLY ABUSED IN OTHER WAYS BY YOUR PARTNER OR EX-PARTNER?: NO

## 2025-01-14 SDOH — ECONOMIC STABILITY: FOOD INSECURITY: WITHIN THE PAST 12 MONTHS, YOU WORRIED THAT YOUR FOOD WOULD RUN OUT BEFORE YOU GOT THE MONEY TO BUY MORE.: NEVER TRUE

## 2025-01-14 SDOH — ECONOMIC STABILITY: HOUSING INSECURITY: AT ANY TIME IN THE PAST 12 MONTHS, WERE YOU HOMELESS OR LIVING IN A SHELTER (INCLUDING NOW)?: NO

## 2025-01-14 SDOH — SOCIAL STABILITY: SOCIAL INSECURITY: WERE YOU ABLE TO COMPLETE ALL THE BEHAVIORAL HEALTH SCREENINGS?: YES

## 2025-01-14 SDOH — ECONOMIC STABILITY: INCOME INSECURITY: IN THE PAST 12 MONTHS HAS THE ELECTRIC, GAS, OIL, OR WATER COMPANY THREATENED TO SHUT OFF SERVICES IN YOUR HOME?: NO

## 2025-01-14 SDOH — ECONOMIC STABILITY: TRANSPORTATION INSECURITY: IN THE PAST 12 MONTHS, HAS LACK OF TRANSPORTATION KEPT YOU FROM MEDICAL APPOINTMENTS OR FROM GETTING MEDICATIONS?: NO

## 2025-01-14 SDOH — ECONOMIC STABILITY: FOOD INSECURITY: HOW HARD IS IT FOR YOU TO PAY FOR THE VERY BASICS LIKE FOOD, HOUSING, MEDICAL CARE, AND HEATING?: NOT HARD AT ALL

## 2025-01-14 ASSESSMENT — COGNITIVE AND FUNCTIONAL STATUS - GENERAL
DAILY ACTIVITIY SCORE: 24
MOBILITY SCORE: 24
DAILY ACTIVITIY SCORE: 24
PATIENT BASELINE BEDBOUND: NO
MOBILITY SCORE: 24

## 2025-01-14 ASSESSMENT — PAIN - FUNCTIONAL ASSESSMENT
PAIN_FUNCTIONAL_ASSESSMENT: 0-10

## 2025-01-14 ASSESSMENT — ENCOUNTER SYMPTOMS
ABDOMINAL PAIN: 0
CONSTIPATION: 0
TREMORS: 0
HEADACHES: 0
CHEST TIGHTNESS: 0
COUGH: 1
MYALGIAS: 1
FATIGUE: 0
WEAKNESS: 0
WOUND: 0
HEMATURIA: 0
JOINT SWELLING: 0
WHEEZING: 0
FEVER: 1
SHORTNESS OF BREATH: 0
CHILLS: 1
LIGHT-HEADEDNESS: 0
DIARRHEA: 0
FLANK PAIN: 0
BACK PAIN: 0
PALPITATIONS: 0
VOMITING: 0
NAUSEA: 0

## 2025-01-14 ASSESSMENT — ACTIVITIES OF DAILY LIVING (ADL)
DRESSING YOURSELF: INDEPENDENT
LACK_OF_TRANSPORTATION: NO
FEEDING YOURSELF: INDEPENDENT
WALKS IN HOME: INDEPENDENT
JUDGMENT_ADEQUATE_SAFELY_COMPLETE_DAILY_ACTIVITIES: YES
HEARING - LEFT EAR: FUNCTIONAL
BATHING: INDEPENDENT
PATIENT'S MEMORY ADEQUATE TO SAFELY COMPLETE DAILY ACTIVITIES?: YES
TOILETING: INDEPENDENT
LACK_OF_TRANSPORTATION: NO
HEARING - RIGHT EAR: FUNCTIONAL
ADEQUATE_TO_COMPLETE_ADL: YES
GROOMING: INDEPENDENT

## 2025-01-14 ASSESSMENT — PAIN DESCRIPTION - PAIN TYPE: TYPE: ACUTE PAIN

## 2025-01-14 ASSESSMENT — PAIN SCALES - GENERAL
PAINLEVEL_OUTOF10: 9
PAINLEVEL_OUTOF10: 10 - WORST POSSIBLE PAIN
PAINLEVEL_OUTOF10: 6
PAINLEVEL_OUTOF10: 8
PAINLEVEL_OUTOF10: 7
PAINLEVEL_OUTOF10: 9

## 2025-01-14 ASSESSMENT — LIFESTYLE VARIABLES
HOW OFTEN DO YOU HAVE 6 OR MORE DRINKS ON ONE OCCASION: NEVER
HOW OFTEN DO YOU HAVE A DRINK CONTAINING ALCOHOL: NEVER
AUDIT-C TOTAL SCORE: 0
TOTAL SCORE: 0
SUBSTANCE_ABUSE_PAST_12_MONTHS: NO
EVER HAD A DRINK FIRST THING IN THE MORNING TO STEADY YOUR NERVES TO GET RID OF A HANGOVER: NO
HOW MANY STANDARD DRINKS CONTAINING ALCOHOL DO YOU HAVE ON A TYPICAL DAY: PATIENT DOES NOT DRINK
HAVE PEOPLE ANNOYED YOU BY CRITICIZING YOUR DRINKING: NO
EVER FELT BAD OR GUILTY ABOUT YOUR DRINKING: NO
SKIP TO QUESTIONS 9-10: 1
PRESCIPTION_ABUSE_PAST_12_MONTHS: NO
HAVE YOU EVER FELT YOU SHOULD CUT DOWN ON YOUR DRINKING: NO
AUDIT-C TOTAL SCORE: 0

## 2025-01-14 ASSESSMENT — PAIN DESCRIPTION - LOCATION: LOCATION: RIB CAGE

## 2025-01-14 ASSESSMENT — PAIN DESCRIPTION - ORIENTATION: ORIENTATION: LEFT

## 2025-01-14 NOTE — H&P
"[facility] - GENERAL MEDICINE HISTORY AND PHYSICAL    History Obtained From (Primary Source): Patient  Collateral History (Secondary Sources): D/w ED provider, chart review    History Of Present Illness (HPI):  Melvi Sanchez \"Gretchen\" is a 64 y.o. female with PMHx s/f stage III SCC of left tonsil, CAD, HTN, HLD, COPD, hypothyroidism, tobacco dependence, depression presenting with flulike symptoms for 2 days. She reports of waking up with really bad headache 2 days ago with fever of 100.6F and chills. Has associated productive cough w/o sputum production, bodyaches. She also notes of left sided rib pain with deep breathing. Denies n/v/d, decreased appetite, lightheadedness, sob, abd pain. Quitted smoking 2 months ago, still vaping. No alcohol use.    ED Course (Summary - please note all labs, imaging studies, and interventions noted below have been personally reviewed and/or interpreted on day of admission):   Vitals on presentation: 102.7 F, 103 bpm, 20 RR, 126/72, 94% on RA  Labs: CMP-glucose 147, sodium 130  Lactate 1.1  CBC-WBC 14.7, hemoglobin 9.9, neutrophils absolute 13.21  Viral studies negative  EKG: None  Imaging: CXR-worsening interstitial infiltration  CTAP-no acute PE.  Pulmonary consultation bilaterally with most prominent in the left lower lobe.  Moderate upper lung predominant emphysema.  Mildly enlarged mediastinal and hilar lymph nodes.  Coronary artery calcification.  Interventions: NS 2178 mL, ibuprofen 400 mg, Tylenol 975 mg, started on Zosyn, vancomycin, azithromycin, admission for further management    12-point ROS reviewed and found to be negative aside from aforementioned positives in HPI and/or noted in dedicated ROS section below.     ED Course (From ED Provider):  Diagnoses as of 01/14/25 1525   Sepsis without acute organ dysfunction, due to unspecified organism (Multi)   Pneumonia of both lower lobes due to infectious organism     Relevant Results  Results for orders " placed or performed during the hospital encounter of 01/14/25 (from the past 24 hours)   Influenza A, and B PCR   Result Value Ref Range    Flu A Result Not Detected Not Detected    Flu B Result Not Detected Not Detected   Sars-CoV-2 PCR   Result Value Ref Range    Coronavirus 2019, PCR Not Detected Not Detected   RSV PCR   Result Value Ref Range    RSV PCR Not Detected Not Detected   CBC and Auto Differential   Result Value Ref Range    WBC 14.7 (H) 4.4 - 11.3 x10*3/uL    nRBC 0.0 0.0 - 0.0 /100 WBCs    RBC 3.31 (L) 4.00 - 5.20 x10*6/uL    Hemoglobin 9.9 (L) 12.0 - 16.0 g/dL    Hematocrit 30.6 (L) 36.0 - 46.0 %    MCV 92 80 - 100 fL    MCH 29.9 26.0 - 34.0 pg    MCHC 32.4 32.0 - 36.0 g/dL    RDW 13.3 11.5 - 14.5 %    Platelets 191 150 - 450 x10*3/uL    Neutrophils % 90.0 40.0 - 80.0 %    Immature Granulocytes %, Automated 0.6 0.0 - 0.9 %    Lymphocytes % 3.9 13.0 - 44.0 %    Monocytes % 5.1 2.0 - 10.0 %    Eosinophils % 0.1 0.0 - 6.0 %    Basophils % 0.3 0.0 - 2.0 %    Neutrophils Absolute 13.21 (H) 1.20 - 7.70 x10*3/uL    Immature Granulocytes Absolute, Automated 0.09 0.00 - 0.70 x10*3/uL    Lymphocytes Absolute 0.57 (L) 1.20 - 4.80 x10*3/uL    Monocytes Absolute 0.75 0.10 - 1.00 x10*3/uL    Eosinophils Absolute 0.01 0.00 - 0.70 x10*3/uL    Basophils Absolute 0.04 0.00 - 0.10 x10*3/uL   Basic metabolic panel   Result Value Ref Range    Glucose 147 (H) 74 - 99 mg/dL    Sodium 130 (L) 136 - 145 mmol/L    Potassium 3.7 3.5 - 5.3 mmol/L    Chloride 99 98 - 107 mmol/L    Bicarbonate 24 21 - 32 mmol/L    Anion Gap 11 10 - 20 mmol/L    Urea Nitrogen 13 6 - 23 mg/dL    Creatinine 0.80 0.50 - 1.05 mg/dL    eGFR 83 >60 mL/min/1.73m*2    Calcium 8.3 (L) 8.6 - 10.3 mg/dL   Lactate   Result Value Ref Range    Lactate 1.1 0.4 - 2.0 mmol/L     *Note: Due to a large number of results and/or encounters for the requested time period, some results have not been displayed. A complete set of results can be found in Results Review.       CT angio chest for pulmonary embolism    Result Date: 1/14/2025  STUDY: CT Angiogram of the Chest; 01/14/2025 at 2:04 PM INDICATION: Left rib pain. COMPARISON: None. ACCESSION NUMBER(S): [id number] ORDERING CLINICIAN: MARK MCKENZIE TECHNIQUE:  CTA of the chest was performed with intravenous contrast. Images are reviewed and processed at a workstation according to the CT angiogram protocol with 3-D and/or MIP post processing imaging generated.  Omnipaque-350 50 mL was administered intravenously. Automated mA/kV exposure control was utilized and patient examination was performed in strict accordance with principles of ALARA. FINDINGS: Pulmonary arteries: No pulmonary embolus is identified. Lungs/Pleura: No significant pleural effusion but there is scattered atelectasis bilaterally. Pulmonary consolidation is noted bilaterally, most prominently in the left lower lobe and concerning for pneumonia in the correct clinical setting. Moderate upper lung predominant emphysema. No pneumothorax. Patent central airways. Mediastinum: Mildly enlarged mediastinal and hilar lymph nodes measure up to 1.1 cm in short axis. Thoracic aorta normal in caliber without evidence for dissection. Heart size normal. No pericardial effusion. There is coronary artery calcification, a marker for coronary atherosclerotic disease. Other: Imaging through the upper abdomen reveals no acute finding. No acute fracture.    *No evidence for pulmonary embolus. *Pulmonary consolidation noted bilaterally, most prominently in the left lower lobe and concerning for pneumonia in the correct clinical setting. *Moderate upper lung predominant emphysema. *Mildly enlarged mediastinal and hilar lymph nodes, likely reactive. *Coronary artery calcification, a marker for coronary atherosclerotic disease. Signed by Magno Cochran MD    XR chest 2 views    Result Date: 1/14/2025  Interpreted By:  Rose Smith, STUDY: XR CHEST 2 VIEWS;  1/14/2025 10:49 am    INDICATION: Signs/Symptoms:fever and cough.   COMPARISON: 12/05/2024   ACCESSION NUMBER(S): [id number]   ORDERING CLINICIAN: MARK MCKENZIE   FINDINGS: Heart is normal in size.   There is interstitial lung disease. The disease is worse on the left than the right. There is no obvious pleural fluid.   The mediastinum is unremarkable. There are degenerative changes of the spine. The patient is scoliotic.   COMPARISON OF FINDING: The lungs appear worse.       Worsening interstitial infiltration. Further evaluation may be useful.   MACRO: none   Signed by: Rose Smith 1/14/2025 10:57 AM Dictation workstation:   [id number]    Scheduled medications:  aspirin, 81 mg, oral, Daily  azithromycin, 500 mg, intravenous, Once  azithromycin, 500 mg, intravenous, q24h  baclofen, 5 mg, oral, TID  [START ON 1/15/2025] buPROPion XL, 300 mg, oral, q AM  cefTRIAXone, 2 g, intravenous, q24h  enoxaparin, 40 mg, subcutaneous, q24h  tiotropium, 2 puff, inhalation, Daily   And  formoterol, 20 mcg, nebulization, q12h  gabapentin, 300 mg, oral, TID  guaiFENesin, 1,200 mg, oral, BID  ipratropium-albuteroL, 3 mL, nebulization, q6h  latanoprost, 1 drop, Left Eye, Nightly  levothyroxine, 100 mcg, oral, Daily  OLANZapine, 10 mg, oral, Nightly  [START ON 1/15/2025] pantoprazole, 40 mg, oral, Daily before breakfast   Or  [START ON 1/15/2025] pantoprazole, 40 mg, intravenous, Daily before breakfast  piperacillin-tazobactam, 4.5 g, intravenous, Once  rosuvastatin, 40 mg, oral, Daily  sertraline, 50 mg, oral, Daily  sodium chloride, 1,178 mL, intravenous, Once  vancomycin, 1,750 mg, intravenous, Once  varenicline, 1 mg, oral, Daily      Continuous medications:     PRN medications:  PRN medications: acetaminophen, albuterol, melatonin, ondansetron, oxyCODONE-acetaminophen, oxyCODONE-acetaminophen     Past Medical History  She has a past medical history of Acute hypoxemic respiratory failure (Multi) (09/20/2023), Breast calcification, left, COPD  (chronic obstructive pulmonary disease) (Multi), Dysphagia, Essential (primary) hypertension (2018), Fibroadenoma of left breast, H/O malignant neoplasm of tonsil, Hypothyroidism, Morbid obesity (Multi) (2023), Nausea and/or vomiting (2023), OAB (overactive bladder), Pharyngitis (2023), Pneumonia (2023), Restless legs syndrome, and Sinusitis (2023).    Surgical History  She has a past surgical history that includes Cholecystectomy (2014); Hysterectomy (2014); Mouth surgery (2014); Breast biopsy (Left); Colonoscopy; and  section, low transverse.     Social History  She reports that she has been smoking cigarettes. She started smoking about 13 months ago. She has a 40.6 pack-year smoking history. She has never used smokeless tobacco. She reports that she does not currently use alcohol after a past usage of about 1.0 standard drink of alcohol per week. She reports that she does not currently use drugs after having used the following drugs: Marijuana. Frequency: 1.00 time per week.    Family History  Family History   Problem Relation Name Age of Onset    Emphysema Mother      COPD Mother      Hyperlipidemia Sister      Breast cancer Paternal Grandmother         Allergies  Duloxetine    Code Status  Full Code     Review of Systems   Constitutional:  Positive for chills and fever. Negative for fatigue.   Respiratory:  Positive for cough. Negative for chest tightness, shortness of breath and wheezing.    Cardiovascular:  Negative for chest pain, palpitations and leg swelling.   Gastrointestinal:  Negative for abdominal pain, constipation, diarrhea, nausea and vomiting.   Genitourinary:  Negative for flank pain and hematuria.   Musculoskeletal:  Positive for myalgias. Negative for back pain and joint swelling.        Left sided rib pain   Skin:  Negative for rash and wound.   Neurological:  Negative for tremors, syncope, weakness, light-headedness and headaches.        Last Recorded Vitals  BP 85/52   Pulse 65   Temp 37.2 °C (99 °F)   Resp 20   Wt 72.6 kg (160 lb)   SpO2 94%      Physical Exam:  Vital signs and nursing notes reviewed.   Constitutional: Pleasant and cooperative. Laying in bed in no acute distress. Conversant.   Skin: Warm and dry; no obvious lesions, rashes, pallor, or jaundice.   Eyes: EOMI. Anicteric sclera.   ENT: Mucous membranes moist; no obvious injury or deformity appreciated.   Head and Neck: Normocephalic, atraumatic. ROM preserved. Trachea midline. No appreciable JVD.   Respiratory: Nonlabored on RA. Lungs with rhonchi in bilateral bases. Chest rise is equal.  Cardiovascular: RRR. No gross murmur, gallop, or rub. Extremities are warm and well-perfused with good capillary refill (< 3 seconds). No chest wall tenderness.   GI: Abdomen soft, nontender, nondistended. No obvious organomegaly appreciated. Bowel sounds are present.  : No CVA tenderness.   MSK: No gross abnormalities appreciated. No limitations to AROM/PROM appreciated.   Extremities: No cyanosis, edema, or clubbing evident. Neurovascularly intact.   Neuro: A&Ox3. CN 2-12 grossly intact. Able to respond to questions appropriately and clearly. No acute focal neurologic deficits appreciated.  Psych: Appropriate mood and behavior.    Assessment/Plan     64 y.o. female with PMHx s/f stage III SCC of left tonsil, CAD, HTN, HLD, COPD, hypothyroidism, tobacco dependence, depression presenting with flulike symptoms.    Sepsis without acute end-organ dysfunction presumed 2/2 CAP of bilateral lower lobes  -SIRS criteria (4/4): fever, WBC, HR, RR  -Source: pulm  -Lactate normal at 1.1  -BP hypotensive at 85/52 at admit, repeat pending  -receiving IVF of 30ml/kg  -Blood cultures x2  -rec'd zosyn/vanc/azithromycin in ED, continue IV abx with rocephin and azithromycin  -Follow fever curve, WBCs    Pneumonia of bilateral lower lobes  -Continue antibiotic coverage as above  -Send urine antigens. Send  sputum culture if able  -DuoNebs, High dose mucinex  -Pulmonary hygiene   -RT c/s appreciated    CAD, HLD  -Continue home medications - aspirin, statin  -Monitor and adjust as needed while admitted     COPD  -does not appear to be in acute exacerbation  -continue home inhalers    Hypothyroidism  -continue levothyroxine    Depression  -continue zoloft    stage III SCC of left tonsil  -started treatment with CCRT w/ Cisplatin and proton radiation on 10/8/24 for recurrence to L oropharynx   -following outpatient oncology and palliative care  -continue with olanzapine for sleep  -continue with contin 15mg BID and Percocet 7.5mg Q3-4hr PRN     Diet: Cardiac  DVT Prophylaxis: Lovenox SQ  Code Status: Full code  Case Discussed With: ED provider  Additional Sources Reviewed: Oncology, primary care notes    Anticipated Length of Stay (LOS): Patient will require 2+ midnights for sepsis/ pneumonia management     Bud Cardoza PA-C    Dragon dictation software was used to dictate this note and thus there may be minor errors in translation/transcription including garbled speech or misspellings. Please contact for clarification if needed.   Home

## 2025-01-14 NOTE — ED PROVIDER NOTES
Chief Complaint   Patient presents with    Flu Symptoms     Pt c/o fever, lower left lung pain, HA, wet cough x approx 2 days. Recent pneumonia approx 1 month ago, finished chemo for lymph node and tonsil cx in October.       HPI       64 year old female presents to the Emergency Department today complaining of a 2 day history of fever, chills, body aches, headache, nasal congestion, postnasal drip, and a cough productive of clear sputum. Notes to having left sided chest pain that is present with coughing and deep inspiration. Denies any associated fever, chills, headache, neck pain, chest pain, shortness of breath, abdominal pain, nausea, vomiting, diarrhea, constipation, or urinary symptoms. Currently receiving chemotherapy for tonsillar cancer. No known sick contacts.       History provided by:  Patient             Patient History   Past Medical History:   Diagnosis Date    Acute hypoxemic respiratory failure (Multi) 2023    Breast calcification, left     COPD (chronic obstructive pulmonary disease) (Multi)     Dysphagia     Essential (primary) hypertension 2018    HTN (hypertension), benign    Fibroadenoma of left breast     H/O malignant neoplasm of tonsil     s/p XRT and chemotherapy completed .    Hypothyroidism     Morbid obesity (Multi) 2023    Nausea and/or vomiting 2023    OAB (overactive bladder)     Pharyngitis 2023    Pneumonia 2023    Restless legs syndrome     Sinusitis 2023     Past Surgical History:   Procedure Laterality Date    BREAST BIOPSY Left     benign FA     SECTION, LOW TRANSVERSE      CHOLECYSTECTOMY  2014    Cholecystectomy    COLONOSCOPY      HYSTERECTOMY  2014    Hysterectomy    MOUTH SURGERY  2014    Oral Surgery Tooth Extraction     Family History   Problem Relation Name Age of Onset    Emphysema Mother      COPD Mother      Hyperlipidemia Sister      Breast cancer Paternal Grandmother       Social History      Tobacco Use    Smoking status: Every Day     Current packs/day: 1.00     Average packs/day: 1 pack/day for 40.6 years (40.6 ttl pk-yrs)     Types: Cigarettes     Start date: 11/30/2023    Smokeless tobacco: Never    Tobacco comments:     3 or less cig. Daily per patient stated 10/10/24   Vaping Use    Vaping status: Not on file   Substance Use Topics    Alcohol use: Not Currently     Alcohol/week: 1.0 standard drink of alcohol     Types: 1 Glasses of wine per week    Drug use: Not Currently     Frequency: 1.0 times per week     Types: Marijuana     Comment: use within past week           Physical Exam  Constitutional:       Appearance: Normal appearance.   HENT:      Head: Normocephalic.      Right Ear: Tympanic membrane, ear canal and external ear normal.      Left Ear: Tympanic membrane, ear canal and external ear normal.      Nose: Nose normal.      Mouth/Throat:      Mouth: Mucous membranes are moist.      Pharynx: Oropharynx is clear. No oropharyngeal exudate or posterior oropharyngeal erythema.   Eyes:      Conjunctiva/sclera: Conjunctivae normal.      Pupils: Pupils are equal, round, and reactive to light.   Cardiovascular:      Rate and Rhythm: Normal rate and regular rhythm.      Pulses:           Radial pulses are 3+ on the right side and 3+ on the left side.        Dorsalis pedis pulses are 3+ on the right side and 3+ on the left side.      Heart sounds: Normal heart sounds. No murmur heard.     No friction rub. No gallop.   Pulmonary:      Effort: Pulmonary effort is normal. No respiratory distress.      Breath sounds: Normal breath sounds. No wheezing, rhonchi or rales.   Abdominal:      General: Abdomen is flat. Bowel sounds are normal.      Palpations: Abdomen is soft.      Tenderness: There is no abdominal tenderness. There is no right CVA tenderness, left CVA tenderness, guarding or rebound. Negative signs include Momin's sign and McBurney's sign.   Musculoskeletal:         General: No swelling  or deformity.      Cervical back: Full passive range of motion without pain.      Right lower leg: No edema.      Left lower leg: No edema.   Lymphadenopathy:      Cervical: No cervical adenopathy.   Skin:     Capillary Refill: Capillary refill takes less than 2 seconds.      Coloration: Skin is not jaundiced.      Findings: No rash.   Neurological:      General: No focal deficit present.      Mental Status: She is alert and oriented to person, place, and time. Mental status is at baseline.      Gait: Gait is intact.   Psychiatric:         Mood and Affect: Mood normal.         Behavior: Behavior is cooperative.         Labs Reviewed   CBC WITH AUTO DIFFERENTIAL - Abnormal       Result Value    WBC 14.7 (*)     nRBC 0.0      RBC 3.31 (*)     Hemoglobin 9.9 (*)     Hematocrit 30.6 (*)     MCV 92      MCH 29.9      MCHC 32.4      RDW 13.3      Platelets 191      Neutrophils % 90.0      Immature Granulocytes %, Automated 0.6      Lymphocytes % 3.9      Monocytes % 5.1      Eosinophils % 0.1      Basophils % 0.3      Neutrophils Absolute 13.21 (*)     Immature Granulocytes Absolute, Automated 0.09      Lymphocytes Absolute 0.57 (*)     Monocytes Absolute 0.75      Eosinophils Absolute 0.01      Basophils Absolute 0.04     BASIC METABOLIC PANEL - Abnormal    Glucose 147 (*)     Sodium 130 (*)     Potassium 3.7      Chloride 99      Bicarbonate 24      Anion Gap 11      Urea Nitrogen 13      Creatinine 0.80      eGFR 83      Calcium 8.3 (*)    INFLUENZA A AND B PCR - Normal    Flu A Result Not Detected      Flu B Result Not Detected      Narrative:     This assay is an in vitro diagnostic multiplex nucleic acid amplification test for the detection and discrimination of Influenza A & B from nasopharyngeal specimens, and has been validated for use at [facility] [place]. Negative results do not preclude Influenza A/B infections, and should not be used as the sole basis for diagnosis, treatment, or other  management decisions. If Influenza A/B and RSV PCR results are negative, testing for Parainfluenza virus, Adenovirus and Metapneumovirus is routinely performed for [facility] [place] [facility] pediatric oncology and intensive care inpatients, and is available on other patients by placing an add-on request.   SARS-COV-2 PCR - Normal    Coronavirus 2019, PCR Not Detected      Narrative:     This assay has received FDA Emergency Use Authorization (EUA) and is only authorized for the duration of time that circumstances exist to justify the authorization of the emergency use of in vitro diagnostic tests for the detection of SARS-CoV-2 virus and/or diagnosis of COVID-19 infection under section 564(b)(1) of the Act, 21 U.S.C. 360bbb-3(b)(1). This assay is an in vitro diagnostic nucleic acid amplification test for the qualitative detection of SARS-CoV-2 from nasopharyngeal specimens and has been validated for use at [facility]. Negative results do not preclude COVID-19 infections and should not be used as the sole basis for diagnosis, treatment, or other management decisions.     RSV PCR - Normal    RSV PCR Not Detected      Narrative:     This assay is an FDA-cleared, in vitro diagnostic nucleic acid amplification test for the detection of RSV from nasopharyngeal specimens, and has been validated for use at [facility]. Negative results do not preclude RSV infections, and should not be used as the sole basis for diagnosis, treatment, or other management decisions. If Influenza A/B and RSV PCR results are negative, testing for Parainfluenza virus, Adenovirus and Metapneumovirus is routinely performed for pediatric oncology and intensive care inpatients at [facility] [place] [facility], and is available on other patients by placing an add-on request.       LACTATE - Normal    Lactate 1.1      Narrative:     Venipuncture immediately after or during the administration of Metamizole may lead to falsely low results. Testing should  be performed immediately prior to Metamizole dosing.   BLOOD CULTURE   BLOOD CULTURE   MRSA SURVEILLANCE FOR VANCOMYCIN DE-ESCALATION, PCR   RESPIRATORY CULTURE/SMEAR   LEGIONELLA ANTIGEN, URINE   STREPTOCOCCUS PNEUMONIAE ANTIGEN, URINE   URINALYSIS WITH REFLEX CULTURE AND MICROSCOPIC    Narrative:     The following orders were created for panel order Urinalysis with Reflex Culture and Microscopic.  Procedure                               Abnormality         Status                     ---------                               -----------         ------                     Urinalysis with Reflex C...[[id number]]                                                 Extra Urine Gray Tube[[id number]]                                                         Please view results for these tests on the individual orders.   URINALYSIS WITH REFLEX CULTURE AND MICROSCOPIC   EXTRA URINE GRAY TUBE       CT angio chest for pulmonary embolism   Final Result   *No evidence for pulmonary embolus.   *Pulmonary consolidation noted bilaterally, most prominently in   the left lower lobe and concerning for pneumonia in the correct   clinical setting.   *Moderate upper lung predominant emphysema.   *Mildly enlarged mediastinal and hilar lymph nodes, likely   reactive.   *Coronary artery calcification, a marker for coronary   atherosclerotic disease.   Signed by Magno Cochran MD      XR chest 2 views   Final Result   Worsening interstitial infiltration. Further evaluation may be useful.        MACRO:   none        Signed by: Rose Smith 1/14/2025 10:57 AM   Dictation workstation:   [id number]               ED Course & MDM   Diagnoses as of 01/14/25 1537   Sepsis without acute organ dysfunction, due to unspecified organism (Multi)   Pneumonia of both lower lobes due to infectious organism           Medical Decision Making  Patient was seen and evaluated by Dr. Gorman. Saline lock was established with labs drawn and results as above. Blood  cultures x 2 were drawn and pending. Anemia is at baseline. Noted to have an elevated WBC count of 14.7. Remainder of blood counts, electrolytes, kidney function, and lactate were unremarkable. Influenza, RSV, and COVID were all unremarkable. CXR showed worsening interstitial infiltration. Due to her pleuritic chest pain and history of cancer, I ordered a CT scan of her chest. That showed no evidence for pulmonary embolus; pulmonary consolidation noted bilaterally, most prominently in the left lower lobe and concerning for pneumonia in the correct clinical setting; Moderate upper lung predominant emphysema; mildly enlarged mediastinal and hilar lymph nodes, likely   reactive; and coronary artery calcification, a marker for coronary atherosclerotic disease. She was given Tylenol and Motrin with improvement in her fever. Upon repeat VS, she was noted to be hypotensive. She was given IV fluids per sepsis protocol. Treated her pneumonia with Zithromax, Vancomycin, and Zosyn. Case was discussed with ALEXANDRA Monterroso PA-C, who agrees to admit the patient for further evaluation and care. Will be transferred to the medical floor in stable condition.     Diagnostic Impression:    1. Acute bilateral pneumonia     2. Hypotension secondary to possible sepsis    3. IV meds and fluids in ED     4. Critical care time 45 minutes             Your medication list        CONTINUE taking these medications        Instructions Last Dose Given Next Dose Due   Ultra-Light Rollator misc  Generic drug: walker      1 each if needed (as needed for gait and balance).              ASK your doctor about these medications        Instructions Last Dose Given Next Dose Due   albuterol 90 mcg/actuation inhaler      Inhale 2 puffs every 4 hours if needed for shortness of breath.       Anoro Ellipta 62.5-25 mcg/actuation blister with device  Generic drug: umeclidinium-vilanteroL      Inhale 1 puff once daily.       aspirin 81 mg EC tablet      Take 1 tablet  (81 mg) by mouth once daily.       baclofen 5 mg tablet  Commonly known as: Lioresal      Take 1 tablet (5 mg) by mouth 3 times a day.       benzonatate 100 mg capsule  Commonly known as: Tessalon Perles  Ask about: Should I take this medication?      Take 1 capsule (100 mg) by mouth every 6 hours if needed for cough. Do not crush or chew.       buPROPion  mg 24 hr tablet  Commonly known as: Wellbutrin XL      Take 1 tablet (300 mg) by mouth once daily in the morning. Do not crush, chew, or split.       gabapentin 300 mg capsule  Commonly known as: Neurontin      Take 1 capsule (300 mg) by mouth 3 times a day.       guaiFENesin 600 mg 12 hr tablet  Commonly known as: Mucinex      Take 2 tablets (1,200 mg) by mouth 2 times a day. Do not crush, chew, or split.       latanoprost 0.005 % ophthalmic solution  Commonly known as: Xalatan           levothyroxine 100 mcg tablet  Commonly known as: Synthroid, Levoxyl      Take 1 tablet (100 mcg) by mouth early in the morning.. Take on an empty stomach at the same time each day, either 30 to 60 minutes prior to breakfast       melatonin 10 mg tablet      Take 1 tablet (10 mg) by mouth once daily at bedtime.       morphine CR 15 mg 12 hr tablet  Commonly known as: MS Contin  Ask about: Should I take this medication?      Take 1 tablet (15 mg) by mouth 2 times a day. Do not crush, chew, or split.       naloxone 4 mg/0.1 mL nasal spray  Commonly known as: Narcan      Administer 1 spray (4 mg) into affected nostril(s) if needed for opioid reversal or respiratory depression. May repeat every 2-3 minutes if needed, alternating nostrils, until medical assistance becomes available.       nicotine 21 mg/24 hr patch  Commonly known as: Nicoderm CQ      PLACE 1 PATCH OVER 24 HOURS ON THE SKIN ONCE EVERY 24 HOURS       OLANZapine 10 mg tablet  Commonly known as: ZyPREXA      Take 1 tablet (10 mg) by mouth once daily at bedtime.       omeprazole 40 mg DR capsule  Commonly known as:  PriLOSEC      Take 1 capsule (40 mg) by mouth once daily in the morning. Take before meals. Do not crush or chew.       oxyCODONE-acetaminophen 7.5-325 mg tablet  Commonly known as: Percocet  Ask about: Should I take this medication?      Take 1 tablet by mouth every 4 hours if needed for severe pain (7 - 10).       oxygen gas therapy  Commonly known as: O2           polyethylene glycol 17 gram packet  Commonly known as: Glycolax, Miralax      Take 17 g by mouth once daily.       rOPINIRole 1 mg tablet  Commonly known as: Requip      Take 2 tablets (2 mg) by mouth once daily at bedtime.       rosuvastatin 40 mg tablet  Commonly known as: Crestor      Take 1 tablet (40 mg) by mouth once daily.       sertraline 50 mg tablet  Commonly known as: Zoloft      Take 1 tablet (50 mg) by mouth once daily.       varenicline 1 mg tablet  Commonly known as: Chantix      Take 1 tablet (1 mg) by mouth once daily.       white petrolatum 41 % ointment ointment  Commonly known as: Aquaphor      Apply 1 Application topically 3 times a day.                  Procedure  Critical Care    Performed by: PIYUSH Washington-CNP  Authorized by: Chetna Gorman MD    Critical care provider statement:     Critical care time (minutes):  45    Critical care time was exclusive of:  Separately billable procedures and treating other patients    Critical care was necessary to treat or prevent imminent or life-threatening deterioration of the following conditions:  Sepsis    Critical care was time spent personally by me on the following activities:  Blood draw for specimens, development of treatment plan with patient or surrogate, discussions with consultants, evaluation of patient's response to treatment, obtaining history from patient or surrogate, re-evaluation of patient's condition, pulse oximetry, ordering and review of radiographic studies, ordering and review of laboratory studies and ordering and performing treatments and  interventions       Cole Erwin, APRN-CNP  01/14/25 7231

## 2025-01-15 LAB
ALBUMIN SERPL BCP-MCNC: 3.5 G/DL (ref 3.4–5)
ALP SERPL-CCNC: 63 U/L (ref 33–136)
ALT SERPL W P-5'-P-CCNC: 24 U/L (ref 7–45)
ANION GAP SERPL CALC-SCNC: 11 MMOL/L (ref 10–20)
AST SERPL W P-5'-P-CCNC: 27 U/L (ref 9–39)
BILIRUB SERPL-MCNC: 0.5 MG/DL (ref 0–1.2)
BUN SERPL-MCNC: 12 MG/DL (ref 6–23)
CALCIUM SERPL-MCNC: 8.4 MG/DL (ref 8.6–10.3)
CHLORIDE SERPL-SCNC: 105 MMOL/L (ref 98–107)
CO2 SERPL-SCNC: 22 MMOL/L (ref 21–32)
CREAT SERPL-MCNC: 0.66 MG/DL (ref 0.5–1.05)
EGFRCR SERPLBLD CKD-EPI 2021: >90 ML/MIN/1.73M*2
ERYTHROCYTE [DISTWIDTH] IN BLOOD BY AUTOMATED COUNT: 13.4 % (ref 11.5–14.5)
GLUCOSE SERPL-MCNC: 89 MG/DL (ref 74–99)
HCT VFR BLD AUTO: 28.3 % (ref 36–46)
HGB BLD-MCNC: 9 G/DL (ref 12–16)
HOLD SPECIMEN: NORMAL
LEGIONELLA AG UR QL: NEGATIVE
MCH RBC QN AUTO: 29.4 PG (ref 26–34)
MCHC RBC AUTO-ENTMCNC: 31.8 G/DL (ref 32–36)
MCV RBC AUTO: 93 FL (ref 80–100)
NRBC BLD-RTO: 0 /100 WBCS (ref 0–0)
PLATELET # BLD AUTO: 191 X10*3/UL (ref 150–450)
POTASSIUM SERPL-SCNC: 3.9 MMOL/L (ref 3.5–5.3)
PROT SERPL-MCNC: 6 G/DL (ref 6.4–8.2)
RBC # BLD AUTO: 3.06 X10*6/UL (ref 4–5.2)
S PNEUM AG UR QL: POSITIVE
SODIUM SERPL-SCNC: 134 MMOL/L (ref 136–145)
WBC # BLD AUTO: 6.3 X10*3/UL (ref 4.4–11.3)

## 2025-01-15 PROCEDURE — 2500000001 HC RX 250 WO HCPCS SELF ADMINISTERED DRUGS (ALT 637 FOR MEDICARE OP)

## 2025-01-15 PROCEDURE — 99232 SBSQ HOSP IP/OBS MODERATE 35: CPT | Performed by: INTERNAL MEDICINE

## 2025-01-15 PROCEDURE — 87205 SMEAR GRAM STAIN: CPT | Mod: PORLAB

## 2025-01-15 PROCEDURE — 94667 MNPJ CHEST WALL 1ST: CPT

## 2025-01-15 PROCEDURE — 85027 COMPLETE CBC AUTOMATED: CPT

## 2025-01-15 PROCEDURE — 2500000002 HC RX 250 W HCPCS SELF ADMINISTERED DRUGS (ALT 637 FOR MEDICARE OP, ALT 636 FOR OP/ED)

## 2025-01-15 PROCEDURE — 80053 COMPREHEN METABOLIC PANEL: CPT

## 2025-01-15 PROCEDURE — 2500000004 HC RX 250 GENERAL PHARMACY W/ HCPCS (ALT 636 FOR OP/ED)

## 2025-01-15 PROCEDURE — 1200000002 HC GENERAL ROOM WITH TELEMETRY DAILY

## 2025-01-15 PROCEDURE — 94668 MNPJ CHEST WALL SBSQ: CPT

## 2025-01-15 PROCEDURE — 36415 COLL VENOUS BLD VENIPUNCTURE: CPT

## 2025-01-15 PROCEDURE — 92610 EVALUATE SWALLOWING FUNCTION: CPT | Mod: GN | Performed by: PHARMACIST

## 2025-01-15 PROCEDURE — 94640 AIRWAY INHALATION TREATMENT: CPT

## 2025-01-15 PROCEDURE — S4991 NICOTINE PATCH NONLEGEND: HCPCS

## 2025-01-15 RX ORDER — BISACODYL 5 MG
5 TABLET, DELAYED RELEASE (ENTERIC COATED) ORAL DAILY PRN
COMMUNITY

## 2025-01-15 RX ADMIN — IPRATROPIUM BROMIDE AND ALBUTEROL SULFATE 3 ML: 2.5; .5 SOLUTION RESPIRATORY (INHALATION) at 20:22

## 2025-01-15 RX ADMIN — NICOTINE 1 PATCH: 21 PATCH, EXTENDED RELEASE TRANSDERMAL at 17:33

## 2025-01-15 RX ADMIN — ASPIRIN 81 MG: 81 TABLET, COATED ORAL at 20:24

## 2025-01-15 RX ADMIN — ROPINIROLE 2 MG: 1 TABLET, FILM COATED ORAL at 17:33

## 2025-01-15 RX ADMIN — TIOTROPIUM BROMIDE INHALATION SPRAY 2 PUFF: 3.12 SPRAY, METERED RESPIRATORY (INHALATION) at 06:19

## 2025-01-15 RX ADMIN — CEFTRIAXONE SODIUM 2 G: 2 INJECTION, SOLUTION INTRAVENOUS at 01:08

## 2025-01-15 RX ADMIN — ENOXAPARIN SODIUM 40 MG: 40 INJECTION SUBCUTANEOUS at 17:33

## 2025-01-15 RX ADMIN — GUAIFENESIN 1200 MG: 600 TABLET ORAL at 09:06

## 2025-01-15 RX ADMIN — GABAPENTIN 300 MG: 300 CAPSULE ORAL at 14:12

## 2025-01-15 RX ADMIN — IPRATROPIUM BROMIDE AND ALBUTEROL SULFATE 3 ML: 2.5; .5 SOLUTION RESPIRATORY (INHALATION) at 11:38

## 2025-01-15 RX ADMIN — GABAPENTIN 300 MG: 300 CAPSULE ORAL at 20:19

## 2025-01-15 RX ADMIN — FORMOTEROL FUMARATE DIHYDRATE 20 MCG: 20 SOLUTION RESPIRATORY (INHALATION) at 20:21

## 2025-01-15 RX ADMIN — BUPROPION HYDROCHLORIDE 300 MG: 150 TABLET, EXTENDED RELEASE ORAL at 09:06

## 2025-01-15 RX ADMIN — PANTOPRAZOLE SODIUM 40 MG: 40 TABLET, DELAYED RELEASE ORAL at 06:09

## 2025-01-15 RX ADMIN — AZITHROMYCIN MONOHYDRATE 490.2 MG: 500 INJECTION, POWDER, LYOPHILIZED, FOR SOLUTION INTRAVENOUS at 17:33

## 2025-01-15 RX ADMIN — SERTRALINE HYDROCHLORIDE 50 MG: 50 TABLET ORAL at 20:19

## 2025-01-15 RX ADMIN — ROSUVASTATIN 40 MG: 20 TABLET, FILM COATED ORAL at 20:20

## 2025-01-15 RX ADMIN — GABAPENTIN 300 MG: 300 CAPSULE ORAL at 09:06

## 2025-01-15 RX ADMIN — MORPHINE SULFATE 15 MG: 15 TABLET, EXTENDED RELEASE ORAL at 09:06

## 2025-01-15 RX ADMIN — GUAIFENESIN 1200 MG: 600 TABLET ORAL at 20:19

## 2025-01-15 RX ADMIN — LEVOTHYROXINE SODIUM 100 MCG: 0.1 TABLET ORAL at 06:09

## 2025-01-15 RX ADMIN — OXYCODONE HYDROCHLORIDE AND ACETAMINOPHEN 1.5 TABLET: 5; 325 TABLET ORAL at 20:25

## 2025-01-15 RX ADMIN — OLANZAPINE 10 MG: 5 TABLET, FILM COATED ORAL at 20:20

## 2025-01-15 RX ADMIN — OXYCODONE HYDROCHLORIDE AND ACETAMINOPHEN 1.5 TABLET: 5; 325 TABLET ORAL at 09:06

## 2025-01-15 RX ADMIN — MORPHINE SULFATE 15 MG: 15 TABLET, EXTENDED RELEASE ORAL at 21:06

## 2025-01-15 RX ADMIN — FORMOTEROL FUMARATE DIHYDRATE 20 MCG: 20 SOLUTION RESPIRATORY (INHALATION) at 08:01

## 2025-01-15 RX ADMIN — OXYCODONE HYDROCHLORIDE AND ACETAMINOPHEN 1.5 TABLET: 5; 325 TABLET ORAL at 14:11

## 2025-01-15 RX ADMIN — IPRATROPIUM BROMIDE AND ALBUTEROL SULFATE 3 ML: 2.5; .5 SOLUTION RESPIRATORY (INHALATION) at 08:01

## 2025-01-15 ASSESSMENT — PAIN SCALES - GENERAL
PAINLEVEL_OUTOF10: 9
PAINLEVEL_OUTOF10: 8
PAINLEVEL_OUTOF10: 9
PAINLEVEL_OUTOF10: 6
PAINLEVEL_OUTOF10: 0 - NO PAIN
PAINLEVEL_OUTOF10: 9

## 2025-01-15 ASSESSMENT — ENCOUNTER SYMPTOMS
LIGHT-HEADEDNESS: 0
NAUSEA: 0
DIARRHEA: 0
ABDOMINAL PAIN: 0
ARTHRALGIAS: 0
VOMITING: 0
CONSTIPATION: 0
CHILLS: 0
LEG SWELLING: 0
FEVER: 0
SORE THROAT: 1
TROUBLE SWALLOWING: 1
NUMBNESS: 0
HEADACHES: 0
SHORTNESS OF BREATH: 0
COUGH: 0

## 2025-01-15 ASSESSMENT — COGNITIVE AND FUNCTIONAL STATUS - GENERAL
DAILY ACTIVITIY SCORE: 24
DAILY ACTIVITIY SCORE: 24
MOBILITY SCORE: 24
MOBILITY SCORE: 24

## 2025-01-15 ASSESSMENT — PAIN DESCRIPTION - LOCATION
LOCATION: RIB CAGE

## 2025-01-15 ASSESSMENT — PAIN DESCRIPTION - ORIENTATION
ORIENTATION: LEFT

## 2025-01-15 ASSESSMENT — PAIN - FUNCTIONAL ASSESSMENT
PAIN_FUNCTIONAL_ASSESSMENT: 0-10
PAIN_FUNCTIONAL_ASSESSMENT: 0-10

## 2025-01-15 ASSESSMENT — ACTIVITIES OF DAILY LIVING (ADL): LACK_OF_TRANSPORTATION: NO

## 2025-01-15 NOTE — ED NOTES
Pt arrives to ED via POV from Home    Code Status:  Full Code    HPI     Chief Complaint   Patient presents with    Flu Symptoms     Pt c/o fever, lower left lung pain, HA, wet cough x approx 2 days. Recent pneumonia approx 1 month ago, finished chemo for lymph node and tonsil cx in October.       /64   Pulse 60   Temp 36.4 °C (97.5 °F)   Resp 16   Wt 72.6 kg (160 lb)   SpO2 95%     Monet Coma Scale Score: 15      LDA:   Peripheral IV 01/14/25 20 G Right;Anterior Forearm (Active)   Placement Date/Time: 01/14/25 1251   Hand Hygiene Completed: Yes  Size (Gauge): 20 G  Orientation: Right;Anterior  Location: Forearm  Technique: Anatomical landmarks  Insertion attempts: 1  Patient Tolerance: Tolerated well   Number of days: 0       Closed/Suction Drain 3 Left;Anterior Thigh Bulb 10 Fr. (Active)   Placement Date/Time: 08/26/24 1151   Placed by: Pablo Saltagi  Hand Hygiene Completed: Yes  Tube Number: 3  Orientation: Left;Anterior  Location: (c) Thigh  Drain Tube Type: Bulb  Size (Fr.): 10 Fr.  Drain Reservoir Size (mL): 100 mL  Number of Suture...   Number of days: 141       Closed/Suction Drain 4 Left;Anterior Thigh Bulb 10 Fr. (Active)   Placement Date/Time: 08/26/24 1151   Placed by: Pablo Saltagi  Hand Hygiene Completed: Yes  Tube Number: 4  Orientation: Left;Anterior  Location: (c) Thigh  Drain Tube Type: Bulb  Size (Fr.): 10 Fr.  Drain Reservoir Size (mL): 100 mL  Number of Suture...   Number of days: 141        BACKGROUND  Past Medical History:   Diagnosis Date    Acute hypoxemic respiratory failure (Multi) 09/20/2023    Breast calcification, left     COPD (chronic obstructive pulmonary disease) (Multi)     Dysphagia     Essential (primary) hypertension 12/28/2018    HTN (hypertension), benign    Fibroadenoma of left breast     H/O malignant neoplasm of tonsil     s/p XRT and chemotherapy completed July 8, 22.    Hypothyroidism     Morbid obesity (Multi) 04/03/2023    Nausea and/or vomiting 04/03/2023     OAB (overactive bladder)     Pharyngitis 2023    Pneumonia 2023    Restless legs syndrome     Sinusitis 2023     Past Surgical History:   Procedure Laterality Date    BREAST BIOPSY Left     benign FA     SECTION, LOW TRANSVERSE      CHOLECYSTECTOMY  2014    Cholecystectomy    COLONOSCOPY      HYSTERECTOMY  2014    Hysterectomy    MOUTH SURGERY  2014    Oral Surgery Tooth Extraction     No current facility-administered medications on file prior to encounter.     Current Outpatient Medications on File Prior to Encounter   Medication Sig Dispense Refill    latanoprost (Xalatan) 0.005 % ophthalmic solution Administer 1 drop into the left eye once daily at bedtime.      albuterol 90 mcg/actuation inhaler Inhale 2 puffs every 4 hours if needed for shortness of breath. 18 g 11    aspirin 81 mg EC tablet Take 1 tablet (81 mg) by mouth once daily. 90 tablet 3    baclofen (Lioresal) 5 mg tablet Take 1 tablet (5 mg) by mouth 3 times a day. 90 tablet 1    [] benzonatate (Tessalon Perles) 100 mg capsule Take 1 capsule (100 mg) by mouth every 6 hours if needed for cough. Do not crush or chew. 90 capsule 1    buPROPion XL (Wellbutrin XL) 300 mg 24 hr tablet Take 1 tablet (300 mg) by mouth once daily in the morning. Do not crush, chew, or split. 30 tablet 5    gabapentin (Neurontin) 300 mg capsule Take 1 capsule (300 mg) by mouth 3 times a day. 270 capsule 0    guaiFENesin (Mucinex) 600 mg 12 hr tablet Take 2 tablets (1,200 mg) by mouth 2 times a day. Do not crush, chew, or split. 120 tablet 11    levothyroxine (Synthroid, Levoxyl) 100 mcg tablet Take 1 tablet (100 mcg) by mouth early in the morning.. Take on an empty stomach at the same time each day, either 30 to 60 minutes prior to breakfast 90 tablet 1    melatonin 10 mg tablet Take 1 tablet (10 mg) by mouth once daily at bedtime. 30 tablet 3    [] morphine CR (MS Contin) 15 mg 12 hr tablet Take 1 tablet (15 mg) by  mouth 2 times a day. Do not crush, chew, or split. 60 tablet 0    naloxone (Narcan) 4 mg/0.1 mL nasal spray Administer 1 spray (4 mg) into affected nostril(s) if needed for opioid reversal or respiratory depression. May repeat every 2-3 minutes if needed, alternating nostrils, until medical assistance becomes available. 2 each 11    nicotine (Nicoderm CQ) 21 mg/24 hr patch PLACE 1 PATCH OVER 24 HOURS ON THE SKIN ONCE EVERY 24 HOURS 28 patch 1    OLANZapine (ZyPREXA) 10 mg tablet Take 1 tablet (10 mg) by mouth once daily at bedtime. 30 tablet 0    omeprazole (PriLOSEC) 40 mg DR capsule Take 1 capsule (40 mg) by mouth once daily in the morning. Take before meals. Do not crush or chew. 30 capsule 1    [] oxyCODONE-acetaminophen (Percocet) 7.5-325 mg tablet Take 1 tablet by mouth every 4 hours if needed for severe pain (7 - 10). 180 tablet 0    oxygen (O2) gas therapy Inhale 4 L/min continuously. Indications: o2      polyethylene glycol (Glycolax, Miralax) 17 gram packet Take 17 g by mouth once daily. 30 packet 3    rOPINIRole (Requip) 1 mg tablet Take 2 tablets (2 mg) by mouth once daily at bedtime. 180 tablet 3    rosuvastatin (Crestor) 40 mg tablet Take 1 tablet (40 mg) by mouth once daily. 90 tablet 3    sertraline (Zoloft) 50 mg tablet Take 1 tablet (50 mg) by mouth once daily. 30 tablet 5    umeclidinium-vilanteroL (Anoro Ellipta) 62.5-25 mcg/actuation blister with device Inhale 1 puff once daily. 30 each 11    varenicline (Chantix) 1 mg tablet Take 1 tablet (1 mg) by mouth once daily. 30 tablet 5    walker (Ultra-Light Rollator) misc 1 each if needed (as needed for gait and balance). 1 each 0    white petrolatum (Aquaphor) 41 % ointment ointment Apply 1 Application topically 3 times a day. 200 g 0        ASSESSMENT  Diagnoses as of 25   Sepsis without acute organ dysfunction, due to unspecified organism (Multi)   Pneumonia of both lower lobes due to infectious organism       Medications  Currently Running:        Medications Given:  ED Medication Administration from 01/14/2025 0909 to 01/14/2025 2056         Date/Time Order Dose Route Action Action by     01/14/2025 1019 EST acetaminophen (Tylenol) tablet 975 mg 975 mg oral Given [facility], T     01/14/2025 1020 EST ibuprofen tablet 400 mg 400 mg oral Given [facility], T     01/14/2025 1352 EST iohexol (OMNIPaque) 350 mg iodine/mL solution 50 mL 50 mL intravenous Given Garcia, S     01/14/2025 1452 EST amoxicillin-pot clavulanate (Augmentin) 875-125 mg per tablet 1 tablet 1 tablet oral Not Given Paredes, M     01/14/2025 1452 EST sodium chloride 0.9 % bolus 1,000 mL 1,000 mL intravenous [facility]     01/14/2025 1453 EST azithromycin (Zithromax) tablet 500 mg 500 mg oral Not Given [facility]     01/14/2025 1551 EST baclofen (Lioresal) tablet 5 mg 5 mg oral Not Given [facility]     01/14/2025 1551 EST levothyroxine (Synthroid, Levoxyl) tablet 100 mcg 100 mcg oral Not Given Paredes, M     01/14/2025 1552 EST varenicline (Chantix) tablet 1 mg 1 mg oral Not Given [facility]     01/14/2025 1556 EST gabapentin (Neurontin) capsule 300 mg 300 mg oral Given Paredes, M     01/14/2025 1556 EST ipratropium-albuteroL (Duo-Neb) 0.5-2.5 mg/3 mL nebulizer solution 3 mL 3 mL nebulization Given Paredes, M     01/14/2025 1556 EST piperacillin-tazobactam (Zosyn) 4.5 g in dextrose (iso)  mL 4.5 g intravenous New [facility]     01/14/2025 1556 EST sodium chloride 0.9 % bolus 1,178 mL 1,000 mL intravenous New Bag [facility]     01/14/2025 1608 EST oxyCODONE-acetaminophen (Percocet) 5-325 mg per tablet 1.5 tablet 1.5 tablet oral Given Paredes, M     01/14/2025 1652 EST piperacillin-tazobactam (Zosyn) 4.5 g in dextrose (iso)  mL 0 g intravenous Stopped [facility]     01/14/2025 1653 EST azithromycin (Zithromax) 500 mg in dextrose 5% 250 mL .1961 mg intravenous New Bag [facility]     01/14/2025 1709 EST sodium chloride 0.9 % bolus 1,000 mL 0 mL intravenous  Stopped [facility]     01/14/2025 1718 EST albuterol 90 mcg/actuation inhaler 2 puff 2 puff inhalation Given [facility]     01/14/2025 1718 EST enoxaparin (Lovenox) syringe 40 mg 40 mg subcutaneous Given [facility]     01/14/2025 1718 EST nicotine (Nicoderm CQ) 21 mg/24 hr patch 1 patch 1 patch transdermal Medication Applied [facility]     01/14/2025 1719 EST aspirin EC tablet 81 mg 81 mg oral Given [facility]     01/14/2025 1719 EST sertraline (Zoloft) tablet 50 mg 50 mg oral Given [facility]     01/14/2025 1800 EST sodium chloride 0.9 % bolus 1,178 mL 0 mL intravenous Stopped [facility]     01/14/2025 2010 EST azithromycin (Zithromax) 500 mg in dextrose 5% 250 mL IV 0 mg intravenous Stopped [facility]     01/14/2025 2018 EST vancomycin 1,750 mg in dextrose 5% 500 mL IV 1,750 mg intravenous New Bag [facility]     01/14/2025 2019 EST guaiFENesin (Mucinex) 12 hr tablet 1,200 mg 1,200 mg oral Given [facility]     01/14/2025 2021 EST gabapentin (Neurontin) capsule 300 mg 300 mg oral Given [facility]     01/14/2025 2021 EST rosuvastatin (Crestor) tablet 40 mg 40 mg oral Given [facility]     01/14/2025 2022 EST oxyCODONE-acetaminophen (Percocet) 5-325 mg per tablet 1.5 tablet 1.5 tablet oral Given [facility]     01/14/2025 2031 EST OLANZapine (ZyPREXA) tablet 10 mg 10 mg oral Given [facility]     01/14/2025 2031 EST rOPINIRole (Requip) tablet 2 mg 2 mg oral Given [facility]     01/14/2025 2032 EST sertraline (Zoloft) tablet 50 mg 50 mg oral Not Given [facility]                 RESULTS    Imaging:  CT angio chest for pulmonary embolism   Final Result   *No evidence for pulmonary embolus.   *Pulmonary consolidation noted bilaterally, most prominently in   the left lower lobe and concerning for pneumonia in the correct   clinical setting.   *Moderate upper lung predominant emphysema.   *Mildly enlarged mediastinal and hilar lymph nodes, likely   reactive.   *Coronary artery calcification, a marker for coronary   atherosclerotic disease.    Signed by Magno Cochran MD      XR chest 2 views   Final Result   Worsening interstitial infiltration. Further evaluation may be useful.        MACRO:   none        Signed by: Rose Luis 1/14/2025 10:57 AM   Dictation workstation:   [id number]         }  Labs ::99  Abnormal Labs Reviewed   CBC WITH AUTO DIFFERENTIAL - Abnormal; Notable for the following components:       Result Value    WBC 14.7 (*)     RBC 3.31 (*)     Hemoglobin 9.9 (*)     Hematocrit 30.6 (*)     Neutrophils Absolute 13.21 (*)     Lymphocytes Absolute 0.57 (*)     All other components within normal limits   BASIC METABOLIC PANEL - Abnormal; Notable for the following components:    Glucose 147 (*)     Sodium 130 (*)     Calcium 8.3 (*)     All other components within normal limits   URINALYSIS WITH REFLEX CULTURE AND MICROSCOPIC - Abnormal; Notable for the following components:    Specific Gravity, Urine 1.036 (*)     Leukocyte Esterase, Urine 250 Darrius/uL (*)     All other components within normal limits   MICROSCOPIC ONLY, URINE - Abnormal; Notable for the following components:    WBC, Urine 11-20 (*)     Bacteria, Urine 1+ (*)     Hyaline Casts, Urine 3+ (*)     All other components within normal limits                 Antonio Simon RN  01/14/25 2057

## 2025-01-15 NOTE — PROGRESS NOTES
"Melvi Sanchez \"Allie" is a 64 y.o. female admitted for Sepsis without acute organ dysfunction, due to unspecified organism (Multi). Pharmacy reviewed the patient's [id number] medications and allergies for accuracy.    The list below reflects the PTA list prior to pharmacy medication history. A summary a changes to the PTA medication list has been listed below. Please review each medication in order reconciliation for additional clarification and justification.    Source of information: t2p    Medications added:  Dulcolax 5mg - 1 every day prn constipation     Medications modified:  Miralax 17g --> added prn     Medications to be removed:  Baclofen 5mg   Benzonatate 100mg   Latanoprost solution   Prilosec 40mg   Chantix 1mg   Aquaphor ointment     Medications of concern:      Prior to Admission Medications   Prescriptions Last Dose Informant Patient Reported? Taking?   OLANZapine (ZyPREXA) 10 mg tablet   No No   Sig: Take 1 tablet (10 mg) by mouth once daily at bedtime.   albuterol 90 mcg/actuation inhaler 1/13/2025  No Yes   Sig: Inhale 2 puffs every 4 hours if needed for shortness of breath.   aspirin 81 mg EC tablet 1/14/2025  No Yes   Sig: Take 1 tablet (81 mg) by mouth once daily.   baclofen (Lioresal) 5 mg tablet 1/14/2025  No Yes   Sig: Take 1 tablet (5 mg) by mouth 3 times a day.   benzonatate (Tessalon Perles) 100 mg capsule   No No   Sig: Take 1 capsule (100 mg) by mouth every 6 hours if needed for cough. Do not crush or chew.   buPROPion XL (Wellbutrin XL) 300 mg 24 hr tablet 1/14/2025  No Yes   Sig: Take 1 tablet (300 mg) by mouth once daily in the morning. Do not crush, chew, or split.   gabapentin (Neurontin) 300 mg capsule 1/14/2025  No Yes   Sig: Take 1 capsule (300 mg) by mouth 3 times a day.   guaiFENesin (Mucinex) 600 mg 12 hr tablet 1/14/2025  No Yes   Sig: Take 2 tablets (1,200 mg) by mouth 2 times a day. Do not crush, chew, or split.   latanoprost (Xalatan) 0.005 % ophthalmic " solution 1/13/2025  Yes Yes   Sig: Administer 1 drop into the left eye once daily at bedtime.   levothyroxine (Synthroid, Levoxyl) 100 mcg tablet 1/14/2025  No Yes   Sig: Take 1 tablet (100 mcg) by mouth early in the morning.. Take on an empty stomach at the same time each day, either 30 to 60 minutes prior to breakfast   melatonin 10 mg tablet 1/13/2025 Bedtime  No Yes   Sig: Take 1 tablet (10 mg) by mouth once daily at bedtime.   morphine CR (MS Contin) 15 mg 12 hr tablet   No No   Sig: Take 1 tablet (15 mg) by mouth 2 times a day. Do not crush, chew, or split.   naloxone (Narcan) 4 mg/0.1 mL nasal spray Unknown  No No   Sig: Administer 1 spray (4 mg) into affected nostril(s) if needed for opioid reversal or respiratory depression. May repeat every 2-3 minutes if needed, alternating nostrils, until medical assistance becomes available.   nicotine (Nicoderm CQ) 21 mg/24 hr patch   No No   Sig: PLACE 1 PATCH OVER 24 HOURS ON THE SKIN ONCE EVERY 24 HOURS   omeprazole (PriLOSEC) 40 mg DR capsule   No No   Sig: Take 1 capsule (40 mg) by mouth once daily in the morning. Take before meals. Do not crush or chew.   oxyCODONE-acetaminophen (Percocet) 7.5-325 mg tablet   No No   Sig: Take 1 tablet by mouth every 4 hours if needed for severe pain (7 - 10).   oxygen (O2) gas therapy   Yes No   Sig: Inhale 4 L/min continuously. Indications: o2   polyethylene glycol (Glycolax, Miralax) 17 gram packet 1/13/2025  No Yes   Sig: Take 17 g by mouth once daily.   rOPINIRole (Requip) 1 mg tablet Past Month Self No Yes   Sig: Take 2 tablets (2 mg) by mouth once daily at bedtime.   rosuvastatin (Crestor) 40 mg tablet 1/14/2025  No Yes   Sig: Take 1 tablet (40 mg) by mouth once daily.   sertraline (Zoloft) 50 mg tablet 1/13/2025  No Yes   Sig: Take 1 tablet (50 mg) by mouth once daily.   umeclidinium-vilanteroL (Anoro Ellipta) 62.5-25 mcg/actuation blister with device 1/14/2025  No Yes   Sig: Inhale 1 puff once daily.   varenicline  (Chantix) 1 mg tablet More than a month Self No No   Sig: Take 1 tablet (1 mg) by mouth once daily.   Patient not taking: Reported on 2025   walker (Ultra-Light Rollator) misc 2025  No Yes   Si each if needed (as needed for gait and balance).   white petrolatum (Aquaphor) 41 % ointment ointment More than a month  No No   Sig: Apply 1 Application topically 3 times a day.   Patient not taking: Reported on 2025      Facility-Administered Medications: None       RAFAEL MATT

## 2025-01-15 NOTE — PROGRESS NOTES
"Occupational Therapy                 Therapy Communication Note    Patient Name: Melvi Sanchez \"Gretchen\"  MRN: [id number]  Department: [facility]note 3[facility] 2 E  Room: [address] [place], [place] [address]  Today's Date: 1/15/2025     Discipline: Occupational Therapy      OT screen/DC 14:12-14:18 PM    Missed Visit Reason: Missed Visit Reason:  (OT Screen/DC)    Comment: Orders received and chart review completed; Nursing AMPAC of 24. Pt reports up to bathroom independently in hospital room. Functional mobility in hospital hallway for community distance without device requiring SUP. No OT concerns for discharge. No skilled OT intervention indicated at this time. Will discharge OT orders.   "

## 2025-01-15 NOTE — PROGRESS NOTES
"Physical Therapy                 Therapy Communication Note    Patient Name: Melvi Sanchez \"Gretchen\"  MRN: [id number]  Department: [facility] 2 E  Room: [address] [place], [place] [address]  Today's Date: 1/15/2025     Discipline: Physical Therapy          Missed Visit Reason: Missed Visit Reason:  (SCREEN/DC; PT. AMB INDEP IN ROOM, SHE DID AMBINHALL 150 FT W/ THEAPY,  MILD SOB LAST 50 FT 2/2 AMB WHILE TALKING, RECOVERED IN 3 MIN, AMB FOR SHORT DISTANCE STABLE, WHEN SHE FATIGUED W/ DISTANCE AMB SLIGHT NEAR LOB W/ SELF CORRECTION)      Comment: PT. REPORTS INDEP AMB/ADL AT HOME W/ MILD DECREASED ENDURANCE FOR DISTANCE AMB SO SHE AMB W/ SBA FOR  LONG DISTANCES, NO REPORTS OF FALLS, STATES NO MOBILITY CONCERNS, EDUCATED TO PACE HER ACTIVITIES,, HAVE SBA FOR DISTANCE AMB HERE AND AT HOME, DISCH FORM P.T. 2/2 NO ACUTE REHAB NEEDS  "

## 2025-01-15 NOTE — PROGRESS NOTES
"Speech-Language Pathology Clinical Swallow Evaluation    Patient Name: Melvi Sanchez \"Gretchen\"  MRN: [id number]  : 1960  Today's Date: 01/15/25  Start Time: 1455  Stop Time: 1513  Time Calculation (min): 18 min      ASSESSMENT  Impressions:  WFL oral phase and known pharyngeal phase dysphagia which is suspected to be worse from functional baseline. Further objective assessment via MBSS is warranted.    Pt would benefit from skilled ST to minimize aspiration risk and ensure ongoing safety with the least restrictive diet.  Prognosis: Good    PLAN  Recommendations:  Is MBSS recommended? Yes; MBSS is recommended in order to objectively assess for aspiration risk, safest/least restrictive diet, and any effective compensatory strategies.  Solid consistency: Regular until MBS  Liquid consistency: Thin until MBS  Medication administration:  as bes tolerated  Compensatory swallow strategies:  - Upright positioning for all PO intake  - Slow rate of intake  - Small bites  - Small sips  - One bite/sip at a time  - Alternate bites and sips    Recommended frequency/duration:  Skilled SLP services recommended: Yes  Frequency: 2x/week  Duration: 2 weeks  Discharge recommendation: Recommend LOW intensity ST upon DC in order to ensure safety with least restrictive diet.  Treatment/Interventions: Assess diet tolerance, Diet recommendations, Complete MBSS, Respiratory muscle strength training, Oral motor exercises, Pharyngeal exercises, Patient/family education (pending results of MBS)  Strengths: Cognition and Motivation  Barriers to participation in tx: Baseline impaired functional status    Goals:  In 1 day...  Pt will participate in MBSS to assess for safest/least restrictive diet and any effective compensatory strategies.   GOAL START: 1/15/2025  GOAL END: 1/15/25   Status: Goal initiated this date   Progress this date: N/A      SUBJECTIVE    PMHx relevant to rehab: stage III SCC of left tonsil, CAD, HTN, HLD, COPD, " "hypothyroidism, tobacco dependence, depression    Chief complaint: Pt was admitted on 1/14/25 due to   Chief Complaint   Patient presents with    Flu Symptoms     Pt c/o fever, lower left lung pain, HA, wet cough x approx 2 days. Recent pneumonia approx 1 month ago, finished chemo for lymph node and tonsil cx in October.   . She was found to have Sepsis without acute organ dysfunction, due to unspecified organism (Multi) in the setting of PNA.    Relevant imaging results:  From CT angio chest for PE 1/14/25: Lungs/Pleura: No significant pleural effusion but there is scattered  atelectasis bilaterally. Pulmonary consolidation is noted bilaterally,  most prominently in the left lower lobe and concerning for pneumonia  in the correct clinical setting. Moderate upper lung predominant  emphysema. No pneumothorax. Patent central airways.    MBSS 8/15/24: SLP Impressions with Severity Rating:  \"Pt presents with mild oropharyngeal dysphagia upon completion of  modified barium swallow study this date. Swallowing physiology is  detailed above. Impairments most impacting swallowing safety and  efficiency include diminished airway protection. Patient demonstrated  trace laryngeal penetration of thin liquids with laryngeal residue.  No further penetration was observed for any other consistency, and no  aspiration was visualized during study. Patient demonstrated mild  oral and pharyngeal residue that was reduced with double swallow and  liquid rinse.\"  Regular textures and thin liquids with safe swallow strategies were recommended.    General Visit Information:  SLP Received On: 01/15/25  Patient Class: Inpatient  Living Environment: Home  Ordering Physician: Dr. Wahl  Reason for Referral: Swallow evaluation in the setting of recurrent PNA and chronic dysphagia  Prior to Session Communication: Bedside nurse    RN cleared pt to participate in session.    Pt reported that she has had dysphagia since her first round of HNC " treatment ~2 years ago. She reports that she had a positive lymph node removed followed by a second round of radiation tx ~2 months ago. She reports that she does cough while eating.    Date of Onset: 01/14/25  Date of Order: 01/15/25  BaseLine Diet: Regular/thin  Current Diet : Cardiac; regular texture/thin liquid    Status at time of evaluation:  Pain Assessment  Pain Assessment: 0-10  0-10 (Numeric) Pain Score: 0 - No pain    Pt was alert, pleasant, cooperative, and engaged for session.  Orientation: Oriented to self, Oriented to location, Oriented to situation, and Did not assess orientation to time  Ability to follow functional commands: Follows complex commands after some simplification/repetition  Nutritional status: Appears well-nourished/no concerns; pt reports no recent weight loss    Respiratory status: Room air  Baseline Vocal Quality: Normal  Volitional Cough: Strong  Volitional Swallow: Within Functional Limits  Patient positioning: Upright in bed      OBJECTIVE  Clinical swallow evaluation completed and consisted of interview, oral motor assessment, and PO trials (thin liquids x10 single straw sips, applesauce x3 bites, cantaloupe x2 bites, jose cracker x1 bite). Pt fed self using appropriate bolus size and rate, and alternating liquids with solids.    ORAL PHASE: Upper and lower dentures in place. Oral mucosa were pink, moist, and free of obvious lesions. Lingual strength and ROM were mildly diminished on the L. Labial seal was adequate. Mastication of regular solids was grossly WFL. A/P transit and oral clearance were complete (pt used liquid wash to facilitate oral clearance with of solid).    PHARYNGEAL PHASE: Laryngeal elevation was visualized or palpated with all trials, however adequacy of hyolaryngeal elevation/excursion cannot be determined at bedside. Pt coughed frequently before and after PO trials, with no observable change in frequency or severity of cough after PO trials.    Was 3oz  challenge administered: N/A; pt needs single sips at baseline      Treatment/Education:  Results and recommendations were relayed to: Patient, Bedside nurse, and Physician  Education provided: Yes   Learner: Patient   Barriers to learning: None   Method of teaching: Verbal   Topic: results of assessment, recommendation for MBSS, and recommended safe swallow strategies   Outcome of teaching: Pt/family demonstrated good understanding  Treatment provided: No  Next Treatment Priority: MBSS

## 2025-01-15 NOTE — CARE PLAN
The patient's goals for the shift include    Problem: Pain - Adult  Goal: Verbalizes/displays adequate comfort level or baseline comfort level  Outcome: Progressing     Problem: Safety - Adult  Goal: Free from fall injury  Outcome: Progressing     Problem: Discharge Planning  Goal: Discharge to home or other facility with appropriate resources  Outcome: Progressing     Problem: Chronic Conditions and Co-morbidities  Goal: Patient's chronic conditions and co-morbidity symptoms are monitored and maintained or improved  Outcome: Progressing     Problem: Pain  Goal: Takes deep breaths with improved pain control throughout the shift  Outcome: Progressing  Goal: Turns in bed with improved pain control throughout the shift  Outcome: Progressing  Goal: Walks with improved pain control throughout the shift  Outcome: Progressing  Goal: Performs ADL's with improved pain control throughout shift  Outcome: Progressing  Goal: Participates in PT with improved pain control throughout the shift  Outcome: Progressing  Goal: Free from opioid side effects throughout the shift  Outcome: Progressing  Goal: Free from acute confusion related to pain meds throughout the shift  Outcome: Progressing     Problem: Fall/Injury  Goal: Not fall by end of shift  Outcome: Progressing  Goal: Be free from injury by end of the shift  Outcome: Progressing  Goal: Verbalize understanding of personal risk factors for fall in the hospital  Outcome: Progressing  Goal: Verbalize understanding of risk factor reduction measures to prevent injury from fall in the home  Outcome: Progressing  Goal: Use assistive devices by end of the shift  Outcome: Progressing  Goal: Pace activities to prevent fatigue by end of the shift  Outcome: Progressing       The clinical goals for the shift include decreased pain throughout shift.    See assessment and mar. Remains on room air. Tele as ordered. Pending sputum collection if cough becomes productive. Nicotine patch on.

## 2025-01-15 NOTE — PROGRESS NOTES
"Melvi Sanchez \"Allie" is a 64 y.o. female on day 1 of admission presenting with Sepsis without acute organ dysfunction, due to unspecified organism (Multi).      Subjective   The pt is seen and evaluated this AM. She states that she feels better since coming to the hospital. She states she has cough but unable to bring up sputm. She has history of COPD as well. [place] any further fevers. T Max on 1/14 was 102.7       Objective     Last Recorded Vitals  /79 (BP Location: Right arm, Patient Position: Lying)   Pulse 71   Temp 37.1 °C (98.7 °F) (Temporal)   Resp 18   Wt 72.6 kg (160 lb)   SpO2 96%   Intake/Output last 3 Shifts:    Intake/Output Summary (Last 24 hours) at 1/15/2025 1149  Last data filed at 1/15/2025 0108  Gross per 24 hour   Intake 50 ml   Output --   Net 50 ml       Admission Weight  Weight: 72.6 kg (160 lb) (01/14/25 0931)    Daily Weight  01/14/25 : 72.6 kg (160 lb)    Image Results  CT angio chest for pulmonary embolism  Narrative: STUDY:  CT Angiogram of the Chest; 01/14/2025 at 2:04 PM  INDICATION:  Left rib pain.  COMPARISON: None.  ACCESSION NUMBER(S):  [id number]  ORDERING CLINICIAN:  MARK MCKENZIE  TECHNIQUE:  CTA of the chest was performed with intravenous contrast.   Images are reviewed and processed at a workstation according to the CT  angiogram protocol with 3-D and/or MIP post processing imaging  generated.  Omnipaque-350 50 mL was administered intravenously.  Automated mA/kV exposure control was utilized and patient examination  was performed in strict accordance with principles of ALARA.  FINDINGS:  Pulmonary arteries: No pulmonary embolus is identified.  Lungs/Pleura: No significant pleural effusion but there is scattered  atelectasis bilaterally. Pulmonary consolidation is noted bilaterally,  most prominently in the left lower lobe and concerning for pneumonia  in the correct clinical setting. Moderate upper lung predominant  emphysema. No pneumothorax. Patent " central airways.  Mediastinum: Mildly enlarged mediastinal and hilar lymph nodes measure  up to 1.1 cm in short axis. Thoracic aorta normal in caliber without  evidence for dissection. Heart size normal. No pericardial effusion.  There is coronary artery calcification, a marker for coronary  atherosclerotic disease.  Other: Imaging through the upper abdomen reveals no acute finding. No  acute fracture.  Impression: *No evidence for pulmonary embolus.  *Pulmonary consolidation noted bilaterally, most prominently in  the left lower lobe and concerning for pneumonia in the correct  clinical setting.  *Moderate upper lung predominant emphysema.  *Mildly enlarged mediastinal and hilar lymph nodes, likely  reactive.  *Coronary artery calcification, a marker for coronary  atherosclerotic disease.  Signed by Magno Cochran MD  XR chest 2 views  Narrative: Interpreted By:  Roes Smith,   STUDY:  XR CHEST 2 VIEWS;  1/14/2025 10:49 am      INDICATION:  Signs/Symptoms:fever and cough.      COMPARISON:  12/05/2024      ACCESSION NUMBER(S):  [id number]      ORDERING CLINICIAN:  MARK MCKENZIE      FINDINGS:  Heart is normal in size.      There is interstitial lung disease. The disease is worse on the left  than the right. There is no obvious pleural fluid.      The mediastinum is unremarkable. There are degenerative changes of  the spine. The patient is scoliotic.      COMPARISON OF FINDING:  The lungs appear worse.      Impression: Worsening interstitial infiltration. Further evaluation may be useful.      MACRO:  none      Signed by: Rose Smith 1/14/2025 10:57 AM  Dictation workstation:   [id number]      Physical Exam  HENT:      Head: Normocephalic and atraumatic.      Nose: Nose normal.      Mouth/Throat:      Mouth: Mucous membranes are dry.   Eyes:      Extraocular Movements: Extraocular movements intact.      Conjunctiva/sclera: Conjunctivae normal.   Cardiovascular:      Rate and Rhythm: Normal rate and regular  "rhythm.      Pulses: Normal pulses.      Heart sounds: Normal heart sounds.   Pulmonary:      Effort: Pulmonary effort is normal.      Breath sounds: Rales present.   Abdominal:      General: Bowel sounds are normal.      Palpations: Abdomen is soft.   Musculoskeletal:         General: Normal range of motion.      Cervical back: Normal range of motion and neck supple.   Skin:     General: Skin is warm and dry.   Neurological:      General: No focal deficit present.      Mental Status: She is alert. Mental status is at baseline.   Psychiatric:         Mood and Affect: Mood normal.         Relevant Results               Assessment/Plan          Melvi SHERINE Sanchez \"Gretchen\" is a 64 y.o. female with PMHx s/f stage III SCC of left tonsil, CAD, HTN, HLD, COPD, hypothyroidism, tobacco dependence, depression presenting with flulike symptoms for 2 days Vitals on presentation: 102.7 F CTAP-no acute PE.  Pulmonary consultation bilaterally with most prominent in the left lower lobe.  Moderate upper lung predominant emphysema.  Mildly enlarged mediastinal and hilar lymph nodes.  Coronary artery calcification.        Assessment & Plan  Sepsis without acute organ dysfunction, due to unspecified organism (Multi)    Sepsis without acute end-organ dysfunction presumed 2/2 CAP of bilateral lower lobes  Pneumonia of bilateral lower lobes  -received Sepsis bolus. BP improved.   -Blood cultures x2 NGTD  -Strep pneumonia Ag is positive.   -rec'd zosyn/vanc/azithromycin in ED, continue IV abx with rocephin and azithromycin  -Follow fever curve, WBCs  -Urine Cx are pending as well.      CAD, HLD  -Continue home medications - aspirin, statin  -Monitor and adjust as needed while admitted      COPD  -does not appear to be in acute exacerbation  -continue home inhalers     Hypothyroidism  -continue levothyroxine     Depression  -continue zoloft     stage III SCC of left tonsil  -started treatment with CCRT w/ Cisplatin and proton radiation on " 10/8/24 for recurrence to L oropharynx   -following outpatient oncology and palliative care  -continue with olanzapine for sleep  -continue with contin 15mg BID and Percocet 7.5mg Q3-4hr PRN   -Will ask speech to see the pt while she is here as well.      Diet: Cardiac  DVT Prophylaxis: Lovenox SQ  Code Status: Full code    Dispo: Anticipate discharge in 24 hours if continues to improve.               Marlin Wahl MD

## 2025-01-15 NOTE — CARE PLAN
The patient's goals for the shift include      The clinical goals for the shift include Patient remains HDS and maintained safety throghout the shift      Problem: Pain - Adult  Goal: Verbalizes/displays adequate comfort level or baseline comfort level  Outcome: Progressing     Problem: Safety - Adult  Goal: Free from fall injury  Outcome: Progressing     Problem: Discharge Planning  Goal: Discharge to home or other facility with appropriate resources  Outcome: Progressing     Problem: Chronic Conditions and Co-morbidities  Goal: Patient's chronic conditions and co-morbidity symptoms are monitored and maintained or improved  Outcome: Progressing

## 2025-01-15 NOTE — PROGRESS NOTES
"    Patient ID: Melvi Sanchez \"Allie" is a 64 y.o. 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 Robin Gabrielc:  Dr. Chetna Lowry  [facility]: Dr. Alejandro Husrt   Supp Onc: Antoinette Soliman   PCP: Alton King, DO    PRIOR THERAPIES  07/07/2022: Completion of chemoradiation with weekly cisplatin with Dr. Razo  08/26/2024: L radical neck dissection 2-4 with CN11 repair  10/8 - 11/19/24: Completed 5 cycles of cisplatin (40 mg/m2) and 66 gy of proton reirradiation     CURRENT THERAPY    SITES OF DISEASE    CURRENT ONCOLOGICAL PROBLEMS  Post operative pain  Radiation dermatitis  Odynophagia     HISTORY OF PRESENT ILLNESS  Ms. 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    Patient initially treated by Dr. Razo in 2022. She first noticed a lump in the left side of her neck > 2 years ago. She was found that have enlarged level 2, 3, 4 nodes, but no obvious oropharyngeal lesion 03/2022 by Dr. Torres.  CT neck was obtained demonstrating  L neck mass, 3.2 x 5.9 x 8.4 cm and a 1.5 x 1.6 right paratracheal node -   FNA biopsy of left neck mass showed squamous cell carcinoma. On 4/21/2022 she underwent triple endoscopy and DL showed lesion of left tonsil; biopsy showed invasive squamous cell carcinoma, p16 positive. She was referred to radiation oncology and saw Dr. Crook and completed 70 cGy/35f CCRT with STEWART seen at 1 year. She has been followed by Dr. Torres in surveillance and was noted to have left neck LINA. CT neck was obtained on 6/20/24 demonstrating increase in edema, enlargement of level 2 node. L neck core biopsy on 7/5/24 demonstrated:     FINAL DIAGNOSIS   Left neck mass, core " biopsy:  - Metastatic squamous cell carcinoma involving fibrous tissue with extensive necrosis.  See note.   Note: By immunostaining, the tumor cells are positive for p40, supportive of the above diagnosis.     She proceeded with L neck dissection which revealed:    A. Left neck, level II-IV, neck dissection:  -- Poorly differentiated , nonkeratinizing squamous cell carcinoma with abundant necrosis,  involving soft tissue extensively, encompassing levels II and III  (2.2 cm).  - Fibrosis and foreign body granulomatous reaction, levels III  and IV.     Note:  Microscopic examination of H&E sections reveals poorly differentiated squamous cell carcinoma involving extensively soft tissue with minimal lymphoid tissue present and abundant necrosis and fibrosis.  Carcinoma reaches soft tissue inked margins focally.  Vein margin is negative.  History of metastatic squamous cell carcinoma of left tonsil, status post chemo therapy and radiation therapy is noted for this patient.     P16 by immunohistochemistry:  Positive     She was discussed at  and seen by Dr. Lowry with plans for post operative reirradiation with proton beam therapy and presents for medical oncology consultation to discuss addition of radiosensitizing systemic therapy.    PAST MEDICAL HISTORY  COPD, hypothyroidism, RLS    SURGICAL HISTORY  Cholecystectomy, hysterectomy     SOCIAL HISTORY  Lives in [place] by self. Son is next building. 3 kids, 6 grandkids.  Retired  at [employer].  Smoked 1-2 PPD, now down to 4 a day since diagnosis.  Occasional EtOH, no illicits. [place], enjoys bingo.  Has a mini [employer]     FAMILY HISTORY  Cousin with brain cancer    CURRENT MEDS REVIEWED  ALLERGIES REVIEWED       Subjective   Melvi SHERINE Sanchez is a 64 y.o. year old female patient with Recurrent stage III ([id number]) p16+ SCC of L oropharynx who competed chemoRT w/ 5 cycles weekly Cisplatin and 66gy proton radiatio on 11/19/24.      Interval History:  Patient presents for 2 months post treatment follow up.    Admitted 1/14/25 for PNA.     Patient is recovering well overall. She developed fever >100.4F last week and went to the ED, was found to have PNA and finished a course of Amoxicillin and  Azithromycin.   Her throat pain is improving, pain overall well controlled on current pain regimen.  Has not had any n/v/d/c, not taking any antiemetics.   She still takes Omeprazole 40mg and has not had reflux symptoms.   Left neck radiation dermatitis is well healed.   Has unchanged transient tinnitus in left ear, left ear still feels muffled at times, no subjective hearing loss from baseline.    She is sleeping better since starting on Olanzapine 10mg at bedtime.   Bowels are moving daily, soft, 2x per day, using Miralax PRN  Her biggest complaint is her worsening short-term memory loss. It is impacting her life more significantly.   She has stopped smoking on 11/24/24 and is only vaping.      Review of Systems   Constitutional:  Negative for chills and fever.   HENT:   Positive for mouth sores, sore throat and trouble swallowing. Negative for hearing loss, lump/mass, nosebleeds and tinnitus.    Respiratory:  Negative for cough and shortness of breath.    Cardiovascular:  Negative for chest pain and leg swelling.   Gastrointestinal:  Negative for abdominal pain, constipation, diarrhea, nausea and vomiting.   Musculoskeletal:  Negative for arthralgias.   Skin:  Negative for rash.   Neurological:  Negative for headaches, light-headedness and numbness.     Objective   There were no vitals taken for this visit.  Daily Weight  01/14/25 : 72.6 kg (160 lb)  12/11/24 : 72.6 kg (160 lb)  12/11/24 : 72.7 kg (160 lb 4.4 oz)  12/05/24 : 72.6 kg (160 lb)  11/26/24 : 73.1 kg (161 lb 2.5 oz)  11/22/24 : 74.1 kg (163 lb 4.8 oz)  11/19/24 : 73.3 kg (161 lb 9.6 oz)    Physical Exam  Vitals reviewed.   Constitutional:       Appearance: Normal appearance. She is  well-developed.   HENT:      Head: Normocephalic and atraumatic.      Right Ear: External ear normal. No tenderness.      Left Ear: External ear normal. No tenderness.      Nose: Nose normal.      Mouth/Throat:      Lips: Pink.      Mouth: Mucous membranes are moist. No injury or oral lesions.      Tongue: No lesions.   Eyes:      General: Lids are normal.      Extraocular Movements: Extraocular movements intact.      Conjunctiva/sclera: Conjunctivae normal.      Pupils: Pupils are equal, round, and reactive to light.   Neck:      Thyroid: No thyroid mass.      Comments: L neck scar well healed. Small area of scabbing  Moderate lymphedema in left jaw and submental region    Cardiovascular:      Rate and Rhythm: Normal rate and regular rhythm.      Pulses: Normal pulses.      Heart sounds: No murmur heard.     No gallop.   Pulmonary:      Effort: Pulmonary effort is normal. No respiratory distress.      Breath sounds: Normal breath sounds and air entry. No stridor. No wheezing.   Abdominal:      General: Abdomen is flat. Bowel sounds are normal. There is no distension or abdominal bruit.      Palpations: Abdomen is soft. There is no mass.      Tenderness: There is no abdominal tenderness.   Musculoskeletal:         General: Normal range of motion.      Cervical back: Normal range of motion and neck supple. No signs of trauma. Normal range of motion.      Right lower leg: No edema.      Left lower leg: No edema.   Lymphadenopathy:      Cervical: No cervical adenopathy.      Upper Body:      Right upper body: No axillary adenopathy.      Left upper body: No axillary adenopathy.   Skin:     General: Skin is warm and dry.      Comments: No new skin lesions   Neurological:      General: No focal deficit present.      Mental Status: She is alert and oriented to person, place, and time. Mental status is at baseline.      Gait: Gait is intact.   Psychiatric:         Attention and Perception: Attention normal.         Mood  and Affect: Mood and affect normal.         Behavior: Behavior is cooperative.       ECOGSCORE: 1- Restricted in physically strenuous activity.  Carries out light duty.    Diagnostic Results   LABS  Below labs were reviewed.  Results from last 7 days   Lab Units 01/15/25  0341 01/14/25  1249   WBC AUTO x10*3/uL 6.3 14.7*   HEMOGLOBIN g/dL 9.0* 9.9*   HEMATOCRIT % 28.3* 30.6*   PLATELETS AUTO x10*3/uL 191 191   NEUTROS ABS x10*3/uL  --  13.21*   LYMPHS ABS AUTO x10*3/uL  --  0.57*   MONOS ABS AUTO x10*3/uL  --  0.75   EOS ABS AUTO x10*3/uL  --  0.01   NEUTROS PCT AUTO %  --  90.0   LYMPHS PCT AUTO %  --  3.9   MONOS PCT AUTO %  --  5.1   EOS PCT AUTO %  --  0.1      Results from last 7 days   Lab Units 01/15/25  0341 01/14/25  1249   GLUCOSE mg/dL 89 147*   SODIUM mmol/L 134* 130*   POTASSIUM mmol/L 3.9 3.7   CHLORIDE mmol/L 105 99   CO2 mmol/L 22 24   BUN mg/dL 12 13   CREATININE mg/dL 0.66 0.80   EGFR mL/min/1.73m*2 >90 83   CALCIUM mg/dL 8.4* 8.3*   ALBUMIN g/dL 3.5  --    PROTEIN TOTAL g/dL 6.0*  --    BILIRUBIN TOTAL mg/dL 0.5  --    ALK PHOS U/L 63  --    ALT U/L 24  --    AST U/L 27  --              Results from last 7 days   Lab Units 01/14/25  1249   LACTATE mmol/L 1.1            IMAGES  7/18/24 PET   1. New hypermetabolic activity seen in the left level 2 cervical lymph node, with central necrosis, consistent with heide metastasis.  2. New FDG avid clustered nodules are seen in the right upper lobe, which is nonspecific and may be infectious/inflammatory, however metastasis can not be excluded. Short-term follow-up chest CT is recommended.  3. Mild FDG uptake is seen throughout the esophagus, likely relating to esophagitis.  4. There is partial opacification of the left maxillary sinus, with mild FDG avidity, likely sinusitis.    CT Neck 6/20/24  IMPRESSION:  1. Compared to the CT neck from 07/14/2023, interval increase in edema within the aerodigestive tract, most pronounced within the supraglottic larynx  and hypopharynx and there is resulting moderate narrowing of the supraglottic laryngeal lumen. There is also mild  prevertebral edema. There is no focal fluid collection to suggest an abscess. Cause of this edema is uncertain, could be infectious in etiology rather than treatment related given that patient completed radiation therapy 2 years ago.  2. No striking new mass is seen within the visualized aerodigestive tract, noting that evaluation is somewhat limited due to presence of diffuse edema and therefore subtle lesions can not be excluded.  3. Interval enlargement of peripherally enhancing likely necrotic lymph node in the left level 2 heide station concerning for progression of disease. Alternatively, enlargement of the lymph node could reflect an infectious process. Otherwise, no cervical lymphadenopathy by size criteria.  4. Edema within the left facial and neck deep and superficial soft tissues as well as in the left supraclavicular fossa is favored to be treatment related and was also present on the prior CT. No focal fluid collection is seen within these regions to suggest an abscess. Correlate clinically.    10/18/24 CT Chest wo IV contrast  IMPRESSION:  1.  The previously noted posterior right upper lobe ill-defined nodular density has resolved. There is presently a subtle vague 1.5 cm ground-glass opacity within inferior right upper lobe, which is felt to represent focal bronchiolitis. Otherwise, no new suspicious pulmonary nodules or masses.  2. Similar bandlike opacity within lingula with associated mild bronchiectasis, which was minimally hypermetabolic on recent PET-CT, favoring benign/inflammatory etiology; attention on follow-up imaging within 6 months is recommended.  3. Redemonstrated moderate upper lung predominant centrilobular and paraseptal emphysema.  4. Severe coronary artery calcifications, indicating the presence of coronary artery disease. If the patient has associated symptoms  recommend management as per chest pain guidelines       Assessment/Plan     Melvi Sanchez is a 64 y.o. year old female patient with PMH of stage III (cT1c cN3a M0) SCC of L tonsil, COPD, hypothyroidism and RLS, started treatment with CCRT w/ Cisplatin and proton radiation on 10/8/24 for recurrence to L oropharynx with diffuse JOLANTA.    We discussed the treatment paradigm in recurrence and salvage surgery - given her pathology showing JOLANTA it is reasonable to proceed with concurrent reirradiation with Proton. We consented today and all questions were answered. We will proceed upon CT simulation.    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 Proton machine is down today so first dose RT will be on IMRT on 10/9 then resume PORT.    - 10/15/24: C2. Unchanged mild chronic dysphagia and xerostomia from prior RT. Left neck pain controlled w/ current pain regimen.  - 10/22/24: C3. Increased odynophagia in the last 3-4 days, like due to thrush. PO intake decreased and has resulting weight loss. Also with increased lymphedema in the left jaw and submental region. Left thigh incision still numb, fluid accumulation is slowed down. She's smoking about 1-2 cigarettes a day,   - 10/29/24: C4. Discussed goals of at least 200 mg/m2, will continue to evaluate. Thrush improved.   - 11/5/24: C5. Patient with more forgetfulness and weakness this week. Appetite remain good. No n/v. Stable mild lymphedema in left jaw/neck. Her left sided odynophagia is a little worse so will reach out to Supp Onc on increasing pain medications.   - 11/12/24: Patient completed 200mg/m2 of Cisplatin and we will not proceed with Cycle 6. She has expected SE from chemoRT w/ fatigue, odynophagia from mucositis, left jaw pain with flucuating lymphedema, skin changed from radiation.   - 11/19/24:  Patient completed 66gy of radiation. Her SE from treatment are relatively well managed. Odynophagia controlled on current pain regimen, PO intake remain good and weight overall stable. Radiation dermatitis in left neck will be managed w/ topicals and mepilex. Has lymphedema in the left jaw/neck that is unchanged.  - 12/11/24: Patient recovering well 3 week from treatment, tolerating regular diet, odynophagia is well controlled, chronic dysphagia and xerostomia is unchanged. Oral mucositis is improved.    # Sleep Disturbance  - Trouble sleeping, will continue Olanzapine 5mg nightly rather than just D1-4.   - Increased to Olanzapine 10mg nightly with improvement in sleep. Will continue this dose    # RUL nodules  - Being followed with CT scan in next 3 months  - 10/18/24 CT Chest wo contrast showed the previously noted posterior right upper lobe ill-defined nodular density has resolved. Similar bandlike opacity within lingula with associated mild bronchiectasis, which was minimally hypermetabolic on recent PET-CT, favoring benign/inflammatory etiology; attention on follow-up imaging within 6 months is recommended.  - Follow up on band like opacity within lingula with CT chest wo contrast likely in April 2025    # Memory Loss  - Patient reported short-term memory loss after first round of chemoRT, however her memory loss is much worse with this round. Has trouble remembering, dates, recalling conversations, loses train of thoughts, even though she makes herself phone reminders. Reports her mother passed away from vascular dementia.   Will refer to Neurology.     PLAN  -- RTC 1 months on 1/16/25 w/ MedOnc, labs cbc, cmp, mag  -- MS Contin 15mg BID for better baseline pain control. Will continue Percocet 7.5mg Q3-4hr PRN, f/u with Supp Onc  -- Olanzapine 10mg QHS for insomnia  -- Referral to Neurology for memory loss  -- Referral to [facility] placed

## 2025-01-15 NOTE — PROGRESS NOTES
01/15/25 1028   Discharge Planning   Living Arrangements Alone   Support Systems Children;Family members   Type of Residence Private residence   Home or Post Acute Services In home services   Type of Home Care Services Home nursing visits;Home OT;Home PT   Expected Discharge Disposition Home H   Does the patient need discharge transport arranged? Yes   RoundTrip coordination needed? Yes   Has discharge transport been arranged? No   Financial Resource Strain   How hard is it for you to pay for the very basics like food, housing, medical care, and heating? Not hard   Housing Stability   In the last 12 months, was there a time when you were not able to pay the mortgage or rent on time? N   At any time in the past 12 months, were you homeless or living in a shelter (including now)? N   Transportation Needs   In the past 12 months, has lack of transportation kept you from medical appointments or from getting medications? no   In the past 12 months, has lack of transportation kept you from meetings, work, or from getting things needed for daily living? No     PCP is Alton King DO. Patient is from home alone with support from her 3 children. Patient is independent with ambulation, self care, shopping, and meals. She occasionally uses a rollator for longer distances. She does not drive, her children provide transportation when needed. PT/OT pending. She is open to [facility] if recommended. TCC to follow.

## 2025-01-16 ENCOUNTER — APPOINTMENT (OUTPATIENT)
Dept: RADIOLOGY | Facility: HOSPITAL | Age: 65
End: 2025-01-16
Payer: COMMERCIAL

## 2025-01-16 ENCOUNTER — APPOINTMENT (OUTPATIENT)
Dept: HEMATOLOGY/ONCOLOGY | Facility: HOSPITAL | Age: 65
End: 2025-01-16
Payer: COMMERCIAL

## 2025-01-16 DIAGNOSIS — C09.9 TONSIL CANCER (MULTI): Primary | ICD-10-CM

## 2025-01-16 LAB
ANION GAP SERPL CALC-SCNC: 10 MMOL/L (ref 10–20)
BACTERIA UR CULT: NORMAL
BASOPHILS # BLD AUTO: 0.03 X10*3/UL (ref 0–0.1)
BASOPHILS NFR BLD AUTO: 0.6 %
BUN SERPL-MCNC: 10 MG/DL (ref 6–23)
CALCIUM SERPL-MCNC: 8.8 MG/DL (ref 8.6–10.3)
CHLORIDE SERPL-SCNC: 105 MMOL/L (ref 98–107)
CO2 SERPL-SCNC: 27 MMOL/L (ref 21–32)
CREAT SERPL-MCNC: 0.58 MG/DL (ref 0.5–1.05)
EGFRCR SERPLBLD CKD-EPI 2021: >90 ML/MIN/1.73M*2
EOSINOPHIL # BLD AUTO: 0.18 X10*3/UL (ref 0–0.7)
EOSINOPHIL NFR BLD AUTO: 3.5 %
ERYTHROCYTE [DISTWIDTH] IN BLOOD BY AUTOMATED COUNT: 13.5 % (ref 11.5–14.5)
GLUCOSE SERPL-MCNC: 89 MG/DL (ref 74–99)
HCT VFR BLD AUTO: 29.1 % (ref 36–46)
HGB BLD-MCNC: 9 G/DL (ref 12–16)
IMM GRANULOCYTES # BLD AUTO: 0.03 X10*3/UL (ref 0–0.7)
IMM GRANULOCYTES NFR BLD AUTO: 0.6 % (ref 0–0.9)
LYMPHOCYTES # BLD AUTO: 0.64 X10*3/UL (ref 1.2–4.8)
LYMPHOCYTES NFR BLD AUTO: 12.4 %
MCH RBC QN AUTO: 29.3 PG (ref 26–34)
MCHC RBC AUTO-ENTMCNC: 30.9 G/DL (ref 32–36)
MCV RBC AUTO: 95 FL (ref 80–100)
MONOCYTES # BLD AUTO: 0.73 X10*3/UL (ref 0.1–1)
MONOCYTES NFR BLD AUTO: 14.1 %
NEUTROPHILS # BLD AUTO: 3.57 X10*3/UL (ref 1.2–7.7)
NEUTROPHILS NFR BLD AUTO: 68.8 %
NRBC BLD-RTO: 0 /100 WBCS (ref 0–0)
PLATELET # BLD AUTO: 212 X10*3/UL (ref 150–450)
POTASSIUM SERPL-SCNC: 4.3 MMOL/L (ref 3.5–5.3)
RBC # BLD AUTO: 3.07 X10*6/UL (ref 4–5.2)
SODIUM SERPL-SCNC: 138 MMOL/L (ref 136–145)
WBC # BLD AUTO: 5.2 X10*3/UL (ref 4.4–11.3)

## 2025-01-16 PROCEDURE — 94640 AIRWAY INHALATION TREATMENT: CPT

## 2025-01-16 PROCEDURE — 36415 COLL VENOUS BLD VENIPUNCTURE: CPT | Performed by: INTERNAL MEDICINE

## 2025-01-16 PROCEDURE — 2500000004 HC RX 250 GENERAL PHARMACY W/ HCPCS (ALT 636 FOR OP/ED)

## 2025-01-16 PROCEDURE — 74230 X-RAY XM SWLNG FUNCJ C+: CPT | Performed by: RADIOLOGY

## 2025-01-16 PROCEDURE — 2500000004 HC RX 250 GENERAL PHARMACY W/ HCPCS (ALT 636 FOR OP/ED): Performed by: INTERNAL MEDICINE

## 2025-01-16 PROCEDURE — 85025 COMPLETE CBC W/AUTO DIFF WBC: CPT | Performed by: INTERNAL MEDICINE

## 2025-01-16 PROCEDURE — S4991 NICOTINE PATCH NONLEGEND: HCPCS

## 2025-01-16 PROCEDURE — 92526 ORAL FUNCTION THERAPY: CPT | Mod: GN | Performed by: PHARMACIST

## 2025-01-16 PROCEDURE — 94668 MNPJ CHEST WALL SBSQ: CPT

## 2025-01-16 PROCEDURE — 1200000002 HC GENERAL ROOM WITH TELEMETRY DAILY

## 2025-01-16 PROCEDURE — 92611 MOTION FLUOROSCOPY/SWALLOW: CPT | Mod: GN | Performed by: PHARMACIST

## 2025-01-16 PROCEDURE — 74230 X-RAY XM SWLNG FUNCJ C+: CPT

## 2025-01-16 PROCEDURE — 2500000005 HC RX 250 GENERAL PHARMACY W/O HCPCS: Performed by: INTERNAL MEDICINE

## 2025-01-16 PROCEDURE — 80048 BASIC METABOLIC PNL TOTAL CA: CPT | Performed by: INTERNAL MEDICINE

## 2025-01-16 PROCEDURE — 99233 SBSQ HOSP IP/OBS HIGH 50: CPT | Performed by: INTERNAL MEDICINE

## 2025-01-16 PROCEDURE — 2500000002 HC RX 250 W HCPCS SELF ADMINISTERED DRUGS (ALT 637 FOR MEDICARE OP, ALT 636 FOR OP/ED)

## 2025-01-16 PROCEDURE — 2500000001 HC RX 250 WO HCPCS SELF ADMINISTERED DRUGS (ALT 637 FOR MEDICARE OP)

## 2025-01-16 RX ADMIN — BARIUM SULFATE 20 ML: 0.81 POWDER, FOR SUSPENSION ORAL at 10:47

## 2025-01-16 RX ADMIN — OLANZAPINE 10 MG: 5 TABLET, FILM COATED ORAL at 21:31

## 2025-01-16 RX ADMIN — GUAIFENESIN 1200 MG: 600 TABLET ORAL at 09:10

## 2025-01-16 RX ADMIN — MORPHINE SULFATE 15 MG: 15 TABLET, EXTENDED RELEASE ORAL at 09:11

## 2025-01-16 RX ADMIN — GUAIFENESIN 1200 MG: 600 TABLET ORAL at 19:54

## 2025-01-16 RX ADMIN — BACLOFEN 5 MG: 10 TABLET ORAL at 19:54

## 2025-01-16 RX ADMIN — GABAPENTIN 300 MG: 300 CAPSULE ORAL at 19:54

## 2025-01-16 RX ADMIN — FORMOTEROL FUMARATE DIHYDRATE 20 MCG: 20 SOLUTION RESPIRATORY (INHALATION) at 08:05

## 2025-01-16 RX ADMIN — GABAPENTIN 300 MG: 300 CAPSULE ORAL at 09:11

## 2025-01-16 RX ADMIN — NICOTINE 1 PATCH: 21 PATCH, EXTENDED RELEASE TRANSDERMAL at 09:09

## 2025-01-16 RX ADMIN — CEFTRIAXONE SODIUM 2 G: 2 INJECTION, SOLUTION INTRAVENOUS at 00:46

## 2025-01-16 RX ADMIN — OXYCODONE HYDROCHLORIDE AND ACETAMINOPHEN 1.5 TABLET: 5; 325 TABLET ORAL at 01:29

## 2025-01-16 RX ADMIN — BARIUM SULFATE 15 ML: 400 SUSPENSION ORAL at 10:48

## 2025-01-16 RX ADMIN — IPRATROPIUM BROMIDE AND ALBUTEROL SULFATE 3 ML: 2.5; .5 SOLUTION RESPIRATORY (INHALATION) at 19:42

## 2025-01-16 RX ADMIN — OXYCODONE HYDROCHLORIDE AND ACETAMINOPHEN 1.5 TABLET: 5; 325 TABLET ORAL at 14:15

## 2025-01-16 RX ADMIN — ENOXAPARIN SODIUM 40 MG: 40 INJECTION SUBCUTANEOUS at 17:05

## 2025-01-16 RX ADMIN — BUPROPION HYDROCHLORIDE 300 MG: 150 TABLET, EXTENDED RELEASE ORAL at 09:11

## 2025-01-16 RX ADMIN — ROSUVASTATIN 40 MG: 20 TABLET, FILM COATED ORAL at 19:54

## 2025-01-16 RX ADMIN — OXYCODONE HYDROCHLORIDE AND ACETAMINOPHEN 1.5 TABLET: 5; 325 TABLET ORAL at 19:53

## 2025-01-16 RX ADMIN — LEVOTHYROXINE SODIUM 100 MCG: 0.1 TABLET ORAL at 05:39

## 2025-01-16 RX ADMIN — PANTOPRAZOLE SODIUM 40 MG: 40 TABLET, DELAYED RELEASE ORAL at 05:39

## 2025-01-16 RX ADMIN — BARIUM SULFATE 10 ML: 400 SUSPENSION ORAL at 10:47

## 2025-01-16 RX ADMIN — IPRATROPIUM BROMIDE AND ALBUTEROL SULFATE 3 ML: 2.5; .5 SOLUTION RESPIRATORY (INHALATION) at 08:02

## 2025-01-16 RX ADMIN — ASPIRIN 81 MG: 81 TABLET, COATED ORAL at 21:31

## 2025-01-16 RX ADMIN — OXYCODONE HYDROCHLORIDE AND ACETAMINOPHEN 1.5 TABLET: 5; 325 TABLET ORAL at 06:26

## 2025-01-16 RX ADMIN — GABAPENTIN 300 MG: 300 CAPSULE ORAL at 14:16

## 2025-01-16 RX ADMIN — CEFTRIAXONE SODIUM 2 G: 2 INJECTION, SOLUTION INTRAVENOUS at 21:31

## 2025-01-16 RX ADMIN — BARIUM SULFATE 700 MG: 700 TABLET ORAL at 10:48

## 2025-01-16 RX ADMIN — FORMOTEROL FUMARATE DIHYDRATE 20 MCG: 20 SOLUTION RESPIRATORY (INHALATION) at 19:43

## 2025-01-16 RX ADMIN — ROPINIROLE 2 MG: 1 TABLET, FILM COATED ORAL at 17:05

## 2025-01-16 RX ADMIN — IPRATROPIUM BROMIDE AND ALBUTEROL SULFATE 3 ML: 2.5; .5 SOLUTION RESPIRATORY (INHALATION) at 11:20

## 2025-01-16 RX ADMIN — SERTRALINE HYDROCHLORIDE 50 MG: 50 TABLET ORAL at 19:54

## 2025-01-16 RX ADMIN — MORPHINE SULFATE 15 MG: 15 TABLET, EXTENDED RELEASE ORAL at 19:54

## 2025-01-16 RX ADMIN — BARIUM SULFATE 15 ML: 400 PASTE ORAL at 10:48

## 2025-01-16 ASSESSMENT — COGNITIVE AND FUNCTIONAL STATUS - GENERAL
DAILY ACTIVITIY SCORE: 24
MOBILITY SCORE: 24

## 2025-01-16 ASSESSMENT — PAIN SCALES - GENERAL
PAINLEVEL_OUTOF10: 10 - WORST POSSIBLE PAIN
PAINLEVEL_OUTOF10: 8
PAINLEVEL_OUTOF10: 0 - NO PAIN
PAINLEVEL_OUTOF10: 9
PAINLEVEL_OUTOF10: 5 - MODERATE PAIN
PAINLEVEL_OUTOF10: 8
PAINLEVEL_OUTOF10: 5 - MODERATE PAIN

## 2025-01-16 ASSESSMENT — PAIN DESCRIPTION - LOCATION: LOCATION: RIB CAGE

## 2025-01-16 ASSESSMENT — PAIN - FUNCTIONAL ASSESSMENT
PAIN_FUNCTIONAL_ASSESSMENT: 0-10

## 2025-01-16 NOTE — CARE PLAN
The clinical goals for the shift include maintain patient safety    Problem: Pain - Adult  Goal: Verbalizes/displays adequate comfort level or baseline comfort level  Outcome: Progressing     Problem: Safety - Adult  Goal: Free from fall injury  Outcome: Progressing     Problem: Discharge Planning  Goal: Discharge to home or other facility with appropriate resources  Outcome: Progressing     Problem: Chronic Conditions and Co-morbidities  Goal: Patient's chronic conditions and co-morbidity symptoms are monitored and maintained or improved  Outcome: Progressing     Problem: Pain  Goal: Takes deep breaths with improved pain control throughout the shift  Outcome: Progressing  Goal: Turns in bed with improved pain control throughout the shift  Outcome: Progressing  Goal: Walks with improved pain control throughout the shift  Outcome: Progressing  Goal: Performs ADL's with improved pain control throughout shift  Outcome: Progressing  Goal: Participates in PT with improved pain control throughout the shift  Outcome: Progressing  Goal: Free from opioid side effects throughout the shift  Outcome: Progressing  Goal: Free from acute confusion related to pain meds throughout the shift  Outcome: Progressing     Problem: Fall/Injury  Goal: Not fall by end of shift  Outcome: Progressing  Goal: Be free from injury by end of the shift  Outcome: Progressing  Goal: Verbalize understanding of personal risk factors for fall in the hospital  Outcome: Progressing  Goal: Verbalize understanding of risk factor reduction measures to prevent injury from fall in the home  Outcome: Progressing  Goal: Use assistive devices by end of the shift  Outcome: Progressing  Goal: Pace activities to prevent fatigue by end of the shift  Outcome: Progressing

## 2025-01-16 NOTE — PROGRESS NOTES
"Melvi Sanchez \"Gretchen\" is a 64 y.o. female on day 2 of admission presenting with Sepsis without acute organ dysfunction, due to unspecified organism (Multi).      Subjective   The pt is seen and evaluated this AM. She states that she feels better since coming to the hospital. She states she has cough but unable to bring up sputm. She has history of COPD as well.        Objective     Last Recorded Vitals  /72 (BP Location: Left arm, Patient Position: Sitting)   Pulse 74   Temp 36.9 °C (98.4 °F) (Temporal)   Resp 16   Wt 72.6 kg (160 lb)   SpO2 97%   Intake/Output last 3 Shifts:    Intake/Output Summary (Last 24 hours) at 1/16/2025 1400  Last data filed at 1/16/2025 1022  Gross per 24 hour   Intake 300 ml   Output --   Net 300 ml       Admission Weight  Weight: 72.6 kg (160 lb) (01/14/25 0931)    Daily Weight  01/14/25 : 72.6 kg (160 lb)    Image Results  CT angio chest for pulmonary embolism  Narrative: STUDY:  CT Angiogram of the Chest; 01/14/2025 at 2:04 PM  INDICATION:  Left rib pain.  COMPARISON: None.  ACCESSION NUMBER(S):  [id number]  ORDERING CLINICIAN:  MARK MCKENZIE  TECHNIQUE:  CTA of the chest was performed with intravenous contrast.   Images are reviewed and processed at a workstation according to the CT  angiogram protocol with 3-D and/or MIP post processing imaging  generated.  Omnipaque-350 50 mL was administered intravenously.  Automated mA/kV exposure control was utilized and patient examination  was performed in strict accordance with principles of ALARA.  FINDINGS:  Pulmonary arteries: No pulmonary embolus is identified.  Lungs/Pleura: No significant pleural effusion but there is scattered  atelectasis bilaterally. Pulmonary consolidation is noted bilaterally,  most prominently in the left lower lobe and concerning for pneumonia  in the correct clinical setting. Moderate upper lung predominant  emphysema. No pneumothorax. Patent central airways.  Mediastinum: Mildly enlarged " mediastinal and hilar lymph nodes measure  up to 1.1 cm in short axis. Thoracic aorta normal in caliber without  evidence for dissection. Heart size normal. No pericardial effusion.  There is coronary artery calcification, a marker for coronary  atherosclerotic disease.  Other: Imaging through the upper abdomen reveals no acute finding. No  acute fracture.  Impression: *No evidence for pulmonary embolus.  *Pulmonary consolidation noted bilaterally, most prominently in  the left lower lobe and concerning for pneumonia in the correct  clinical setting.  *Moderate upper lung predominant emphysema.  *Mildly enlarged mediastinal and hilar lymph nodes, likely  reactive.  *Coronary artery calcification, a marker for coronary  atherosclerotic disease.  Signed by Magno Cochran MD  XR chest 2 views  Narrative: Interpreted By:  Rose Smith,   STUDY:  XR CHEST 2 VIEWS;  1/14/2025 10:49 am      INDICATION:  Signs/Symptoms:fever and cough.      COMPARISON:  12/05/2024      ACCESSION NUMBER(S):  [id number]      ORDERING CLINICIAN:  MARK MCKENZIE      FINDINGS:  Heart is normal in size.      There is interstitial lung disease. The disease is worse on the left  than the right. There is no obvious pleural fluid.      The mediastinum is unremarkable. There are degenerative changes of  the spine. The patient is scoliotic.      COMPARISON OF FINDING:  The lungs appear worse.      Impression: Worsening interstitial infiltration. Further evaluation may be useful.      MACRO:  none      Signed by: Rose Smiht 1/14/2025 10:57 AM  Dictation workstation:   [id number]      Physical Exam  HENT:      Head: Normocephalic and atraumatic.      Nose: Nose normal.      Mouth/Throat:      Mouth: Mucous membranes are dry.   Eyes:      Extraocular Movements: Extraocular movements intact.      Conjunctiva/sclera: Conjunctivae normal.   Cardiovascular:      Rate and Rhythm: Normal rate and regular rhythm.      Pulses: Normal pulses.      Heart  "sounds: Normal heart sounds.   Pulmonary:      Effort: Pulmonary effort is normal.      Breath sounds: Rales present.   Abdominal:      General: Bowel sounds are normal.      Palpations: Abdomen is soft.   Musculoskeletal:         General: Normal range of motion.      Cervical back: Normal range of motion and neck supple.   Skin:     General: Skin is warm and dry.   Neurological:      General: No focal deficit present.      Mental Status: She is alert. Mental status is at baseline.   Psychiatric:         Mood and Affect: Mood normal.         Relevant Results               Assessment/Plan          Melviarlin Sanchez \"Gretchen\" is a 64 y.o. female with PMHx s/f stage III SCC of left tonsil, CAD, HTN, HLD, COPD, hypothyroidism, tobacco dependence, depression presenting with flulike symptoms for 2 days Vitals on presentation: 102.7 F CTAP-no acute PE.  Pulmonary consultation bilaterally with most prominent in the left lower lobe.  Moderate upper lung predominant emphysema.  Mildly enlarged mediastinal and hilar lymph nodes.  Coronary artery calcification.        Assessment & Plan  Sepsis without acute organ dysfunction, due to unspecified organism (Multi)    Sepsis without acute end-organ dysfunction presumed 2/2 CAP of bilateral lower lobes  Pneumonia of bilateral lower lobes  -received Sepsis bolus. BP improved.   -Blood cultures x2 NGTD  -Strep pneumonia Ag is positive.   -rec'd zosyn/vanc/azithromycin in ED, continue IV abx with rocephin D3  -Follow fever curve, WBCs  -Urine Cx are pending as well.      CAD, HLD  -Continue home medications - aspirin, statin  -Monitor and adjust as needed while admitted      COPD  -does not appear to be in acute exacerbation  -continue home inhalers     Hypothyroidism  -continue levothyroxine     Depression  -continue zoloft     stage III SCC of left tonsil  -started treatment with CCRT w/ Cisplatin and proton radiation on 10/8/24 for recurrence to L oropharynx   -following " outpatient oncology and palliative care  -continue with olanzapine for sleep  -continue with contin 15mg BID and Percocet 7.5mg Q3-4hr PRN   -SLP consulted: DIANN on 1/16. Her swallow function declined after her most recent cancer tx. She is at high risk for aspiration and SLP can't recommend a safe diet for her at this time. I spoke to patient regarding this at length. She wishes to continue taking PO normally for now, understanding the risk for aspiration. She is going to discuss with her children and may make a decision regarding a G-tube tomorrow.     DVT Prophylaxis: Lovenox SQ  Code Status: Full code    Elvin Calle MD PhD

## 2025-01-16 NOTE — PROCEDURES
"Speech-Language Pathology    Inpatient Modified Barium Swallow Study    Patient Name: Melvi Sanchez \"Gretchen\"  MRN: [id number]  : 1960  Today's Date: 25  Time Calculation  Start Time: 0950  Stop Time: 1040  Time Calculation (min): 50 min        *See joint MBSS report under \"Imaging\" tab for complete comments from SLP and radiologist.*    Modified Barium Swallow Study completed. Informed verbal consent obtained prior to completion of exam. The study was completed per protocol with various liquid barium consistencies, pudding, solids and a 13mm barium tablet. A 1.9 cm or .75 inch (outer diameter) ring was placed on the chin in the lateral view and on the lateral, left side of the neck in the a-p view in order to complete objective measurements during swallowing. The anatomic structures and function of the oropharynx, larynx, hypopharynx and cervical esophagus were evaluated.    Necessary deviations from the [facility] protocol:  - positioning is suboptimal due to low exposure to pharyngeal/laryngeal structures when lips were in view; oral cavity was excluded from view for most of study to optimize visualization of pharynx/larynx/cervical esophagus  - Bolus sizes were modified due to extent of pharyngeal dysphagia; most larger bolus trials were excluded for pt's safety    SLP: Radha Rouse, SLP   Contact info: Available via secure chat      Reason for Referral: Recommended by SLP due to concern for pharyngeal dysphagia based on clinical swallow evaluation; determine safest/least restrictive diet and any effective compensatory strategies.    Patient Hx: stage III SCC of left tonsil, CAD, HTN, HLD, COPD, hypothyroidism, tobacco dependence, depression  Respiratory Status: Room air  Current diet: Cardiac; regular texture/thin liquid    Pain:  Pain Scale: did not rate  Location: L ribs  Type: acute pain; pt received pain meds and reported pain is less severe than it was yesterday    FINAL SPEECH " RECOMMENDATIONS    DIET:   - NPO with consideration of alternative nutrition source such as PEG tube  - Limited trials of all consistencies with SLP for training for compensatory strategies    **Addendum: per discussion with attending physician, who spoke with pt after the study, pt has chosen to continue a regular diet at this time with knowledge and acceptance of the risks of chronic aspiration. Pt will continue to consider if she wants to pursue a feeding tube as primary nutrition/hydration source.  Note that, as pt aspirates trace-min amounts per bolus (but aspirates with the majority of boluses), she may be able to safely consume liquids and solids in SMALL amounts, thereby limiting the overall amount of aspiration and reducing the risk of pulmonary complications. SLP will continue to educate pt re: benefits of non-oral nutrition supplementation to reduce amount of aspiration and possibly reduce the risk of developing further PNA or other complications.    STRATEGIES:  - Upright positioning for all PO intake  - Slow rate of intake  - Small bites  - Small sips  - Single sips  - One bite/sip at a time  - Alternate bites and sips  - Chew thoroughly    Recommend frequent/thorough oral care in order to reduce risk of oral/pharyngeal/respiratory infection.    SLP PLAN:  Skilled SLP Services: Skilled SLP intervention for dysphagia is warranted.  SLP Frequency: 2x per week  Duration: 2 weeks  Discharge recommendation: Low intensity level of continued care in order to maximize safety with PO intake and reduce the risk of sequelae of aspiration.  Treatment/Interventions:   - Oropharyngeal exercises  - Bolus trials  - Compensatory strategy training  - Patient/caregiver education    Discussed POC: Patient  Discussed Risks/Benefits: Yes  Patient/Caregiver Agreeable: Partially    Short term goals established 01/16/25:   Pt will demonstrate follow-through of trained compensatory strategies during a meal/snack (pt has chosen  to continue to eat/drink by mouth) with 90% acc independently.   Status: goal initiated this date  Progress: pt consumed ~10 sips of thin liquid via straw with small sip size independently. Intermittent coughs were noted after sips.    Pt will complete oral/pharyngeal/laryngeal strengthening exercises as directed given no cues from SLP in order to improve swallow function.   Status: goal initiated this date    Pt/family will demonstrate understanding of education related to dysphagia independently.   Status: goal initiated/progressing   Progress this date: pt demonstrated good understanding of info provided via conversation and teachback      Long term goals established 01/16/25:   Pt will meet 50% of nutrition/hydration needs by mouth, via least restrictive diet, without pulmonary compromise.      Education Provided: Pt viewed flouro images while SLP educated re: swallow anatomy/physiology, results of assessment, risk for aspiration, possible sequelae of chronic aspiration, and recommendations. Pt verbalized understanding.    Treatment Provided Today: Yes. SLP returned to pt's room after the study for ongoing education and counseling regarding results of MBS, possible sequelae of chronic aspiration, education about feeding tubes, and importance of participating in dysphagia therapy. Pt was also educated re: importance and effectiveness of oral care for reducing infection risk, recommended products/procedures for oral care, and optimal timing for oral care (e.g., 3+ times per day, ideally within 2 hours prior to meals). Pt asked appropriate questions, all of which were answered to her satisfaction. Pt stated no further questions at end of session. Pt demonstrated good understanding via conversation and teachback. Ongoing education to be completed throughout tx.    Additional Medical Consults Suggested:   Consider general surgery consult for PEG tube placement, if appropriate    Repeat study/ dc plan: Repeat MBSS  after 3-5 weeks of dysphagia therapy, or as clinically indicated, in order to determine safety for diet advancement.       Mechanics of the Swallow Summary:  ORAL PHASE:  Lip Closure - Interlabial escape/no progression to anterior lip   Tongue Control During Bolus Hold - Escape to lateral buccal cavity and/or floor of mouth   Bolus prep/mastication - Timely and efficient mastication skills   Bolus transport/lingual motion - Brisk tongue motion for A-P movement of the bolus   Oral residue - Residue collection on oral structure (pt appeared to clear spontaneously with re-swallows)    PHARYNGEAL PHASE:  Initiation of pharyngeal swallow - Collection of bolus at the level of the pyriform sinus   Soft palate elevation - No bolus between soft palate/pharyngeal wall   Laryngeal elevation - Partial superior movement of thyroid cartilage and/or partial approximation of arytenoids to epiglottic petiole   Anterior hyoid excursion - Partial anterior movement   Epiglottic movement - Partial inversion  Laryngeal vestibule closure - Incomplete - narrow column of air/contrast in laryngeal vestibule   Pharyngeal stripping wave - Present, however, diminished   Pharyngeal contraction (A/P view) - Incomplete  Pharyngoesophageal segment opening - Partial distension/partial duration with partial obstruction of flow of bolus   Tongue base retraction - Narrow column of contrast or air between tongue base and pharyngeal wall   Pharyngeal residue - Collection of residue within or on the pharyngeal structures     ESOPHAGEAL PHASE:  Esophageal clearance - Esophageal retention       SLP Impressions with Severity Rating:   Pt presents with mild oral dysphagia and severe pharyngeal dysphagia upon completion of modified barium swallow study this date. Swallowing physiology is detailed above. Impairments most impacting swallowing safety and efficiency include delayed swallow onset and incomplete laryngeal vestibule closure. Patient demonstrated  penetration and aspiration during/after the swallow with all liquid consistencies. No penetration or aspiration were noted with pudding or solids, however pt is at risk for same due to significant pharyngeal residuals requiring liquid wash to clear. Pt frequently demonstrated immediate or delayed cough after aspiration, which was partially effective in clearing aspirate from trachea. Aspiration was occasionally silent.    Strategies attempted- Chin tuck was not consistently effective at preventing aspiration. Super supraglottic swallow was effective at preventing aspiration, however this is not anticipated to be a functional swallow maneuver for pt. Pt unable to complete double swallow.    A-P bolus follow through revealed stasis of pudding bolus in the esophagus after the swallow x1; barium tablet was given whole in pudding and cleared promptly into the stomach. *Of note: The A-P bolus follow-through is not intended to be utilized as a diagnostic assessment of the esophagus, rather a tool to observe the biomechanical aspects of the swallow continuum and to inform the need for further evaluation by medical specialists, as applicable.       OUTCOME MEASURES:  Functional Oral Intake Scale  Functional Oral Intake Scale: Level 2        tube dependent with minimal attempts of food and liquid       Rosenbek's Penetration Aspiration Scale  Thin Liquids: 8. SILENT ASPIRATION - contrast passes glottis, visible residue, NO pt response]   After the Swallow  Nectar Thick Liquids: 7. OVERT ASPIRATION, material is not cleared - contrast passes glottis, visible residue, W/pt response  After the Swallow  Honey Thick Liquids: 8. SILENT ASPIRATION - contrast passes glottis, visible residue, NO pt response]   During the Swallow  Puree: 1. NO ASPIRATION & NO PENETRATION - no aspiration, contrast does not enter airway  Solids: 1. NO ASPIRATION & NO PENETRATION - no aspiration, contrast does not enter airway

## 2025-01-16 NOTE — CARE PLAN
The patient's goals for the shift include      The clinical goals for the shift include Patient to remain HDS and decrease in pain throughout the shift      Problem: Pain - Adult  Goal: Verbalizes/displays adequate comfort level or baseline comfort level  Outcome: Progressing     Problem: Safety - Adult  Goal: Free from fall injury  Outcome: Progressing     Problem: Discharge Planning  Goal: Discharge to home or other facility with appropriate resources  Outcome: Progressing     Problem: Chronic Conditions and Co-morbidities  Goal: Patient's chronic conditions and co-morbidity symptoms are monitored and maintained or improved  Outcome: Progressing

## 2025-01-17 ENCOUNTER — PHARMACY VISIT (OUTPATIENT)
Dept: PHARMACY | Facility: CLINIC | Age: 65
End: 2025-01-17
Payer: MEDICAID

## 2025-01-17 VITALS
SYSTOLIC BLOOD PRESSURE: 117 MMHG | DIASTOLIC BLOOD PRESSURE: 75 MMHG | BODY MASS INDEX: 28.35 KG/M2 | HEIGHT: 63 IN | HEART RATE: 84 BPM | TEMPERATURE: 97.8 F | WEIGHT: 160 LBS | RESPIRATION RATE: 18 BRPM | OXYGEN SATURATION: 92 %

## 2025-01-17 PROBLEM — A41.9 SEPSIS WITHOUT ACUTE ORGAN DYSFUNCTION, DUE TO UNSPECIFIED ORGANISM (MULTI): Status: RESOLVED | Noted: 2025-01-14 | Resolved: 2025-01-17

## 2025-01-17 PROCEDURE — 2500000004 HC RX 250 GENERAL PHARMACY W/ HCPCS (ALT 636 FOR OP/ED)

## 2025-01-17 PROCEDURE — 2500000002 HC RX 250 W HCPCS SELF ADMINISTERED DRUGS (ALT 637 FOR MEDICARE OP, ALT 636 FOR OP/ED): Performed by: INTERNAL MEDICINE

## 2025-01-17 PROCEDURE — 2700000081 HC SLP EMST 150 EXP MUSCLE STRGTH TRAINER

## 2025-01-17 PROCEDURE — 94668 MNPJ CHEST WALL SBSQ: CPT

## 2025-01-17 PROCEDURE — 2500000002 HC RX 250 W HCPCS SELF ADMINISTERED DRUGS (ALT 637 FOR MEDICARE OP, ALT 636 FOR OP/ED)

## 2025-01-17 PROCEDURE — 99239 HOSP IP/OBS DSCHRG MGMT >30: CPT | Performed by: INTERNAL MEDICINE

## 2025-01-17 PROCEDURE — S4991 NICOTINE PATCH NONLEGEND: HCPCS | Performed by: INTERNAL MEDICINE

## 2025-01-17 PROCEDURE — 94640 AIRWAY INHALATION TREATMENT: CPT

## 2025-01-17 PROCEDURE — 2500000001 HC RX 250 WO HCPCS SELF ADMINISTERED DRUGS (ALT 637 FOR MEDICARE OP)

## 2025-01-17 PROCEDURE — 92526 ORAL FUNCTION THERAPY: CPT | Mod: GN

## 2025-01-17 PROCEDURE — RXMED WILLOW AMBULATORY MEDICATION CHARGE

## 2025-01-17 RX ORDER — MORPHINE SULFATE 15 MG/1
15 TABLET, FILM COATED, EXTENDED RELEASE ORAL 2 TIMES DAILY
Start: 2025-01-17 | End: 2025-01-20 | Stop reason: SDUPTHER

## 2025-01-17 RX ORDER — CEFDINIR 300 MG/1
300 CAPSULE ORAL 2 TIMES DAILY
Qty: 3 CAPSULE | Refills: 0 | Status: SHIPPED | OUTPATIENT
Start: 2025-01-17 | End: 2025-01-19

## 2025-01-17 RX ORDER — OXYCODONE AND ACETAMINOPHEN 7.5; 325 MG/1; MG/1
1 TABLET ORAL EVERY 4 HOURS PRN
Start: 2025-01-17 | End: 2025-01-20 | Stop reason: SDUPTHER

## 2025-01-17 RX ADMIN — LEVOTHYROXINE SODIUM 100 MCG: 0.1 TABLET ORAL at 06:15

## 2025-01-17 RX ADMIN — GUAIFENESIN 1200 MG: 600 TABLET ORAL at 08:18

## 2025-01-17 RX ADMIN — BUPROPION HYDROCHLORIDE 300 MG: 150 TABLET, EXTENDED RELEASE ORAL at 08:18

## 2025-01-17 RX ADMIN — IPRATROPIUM BROMIDE AND ALBUTEROL SULFATE 3 ML: 2.5; .5 SOLUTION RESPIRATORY (INHALATION) at 12:00

## 2025-01-17 RX ADMIN — FORMOTEROL FUMARATE DIHYDRATE 20 MCG: 20 SOLUTION RESPIRATORY (INHALATION) at 07:59

## 2025-01-17 RX ADMIN — NICOTINE 1 PATCH: 21 PATCH, EXTENDED RELEASE TRANSDERMAL at 08:18

## 2025-01-17 RX ADMIN — GABAPENTIN 300 MG: 300 CAPSULE ORAL at 08:18

## 2025-01-17 RX ADMIN — OXYCODONE HYDROCHLORIDE AND ACETAMINOPHEN 1.5 TABLET: 5; 325 TABLET ORAL at 08:24

## 2025-01-17 RX ADMIN — MORPHINE SULFATE 15 MG: 15 TABLET, EXTENDED RELEASE ORAL at 08:18

## 2025-01-17 RX ADMIN — PANTOPRAZOLE SODIUM 40 MG: 40 TABLET, DELAYED RELEASE ORAL at 06:15

## 2025-01-17 RX ADMIN — IPRATROPIUM BROMIDE AND ALBUTEROL SULFATE 3 ML: 2.5; .5 SOLUTION RESPIRATORY (INHALATION) at 07:58

## 2025-01-17 ASSESSMENT — COGNITIVE AND FUNCTIONAL STATUS - GENERAL
DAILY ACTIVITIY SCORE: 24
MOBILITY SCORE: 24

## 2025-01-17 ASSESSMENT — PAIN - FUNCTIONAL ASSESSMENT
PAIN_FUNCTIONAL_ASSESSMENT: 0-10

## 2025-01-17 ASSESSMENT — PAIN DESCRIPTION - LOCATION: LOCATION: RIB CAGE

## 2025-01-17 ASSESSMENT — PAIN SCALES - GENERAL
PAINLEVEL_OUTOF10: 7
PAINLEVEL_OUTOF10: 0 - NO PAIN
PAINLEVEL_OUTOF10: 3

## 2025-01-17 ASSESSMENT — PAIN DESCRIPTION - ORIENTATION: ORIENTATION: RIGHT

## 2025-01-17 NOTE — NURSING NOTE
Patient discharged per physician order. Patient verbalized understanding of discharge instructions and copy of instructions sent with patient . IV taken out x1 per policy with catheter tip intact. Patient received meds to beds.

## 2025-01-17 NOTE — CARE PLAN
Problem: Pain - Adult  Goal: Verbalizes/displays adequate comfort level or baseline comfort level  Outcome: Progressing     Problem: Safety - Adult  Goal: Free from fall injury  Outcome: Progressing     Problem: Discharge Planning  Goal: Discharge to home or other facility with appropriate resources  Outcome: Progressing     Problem: Chronic Conditions and Co-morbidities  Goal: Patient's chronic conditions and co-morbidity symptoms are monitored and maintained or improved  Outcome: Progressing     Problem: Pain  Goal: Takes deep breaths with improved pain control throughout the shift  Outcome: Progressing  Goal: Turns in bed with improved pain control throughout the shift  Outcome: Progressing  Goal: Walks with improved pain control throughout the shift  Outcome: Progressing  Goal: Performs ADL's with improved pain control throughout shift  Outcome: Progressing  Goal: Participates in PT with improved pain control throughout the shift  Outcome: Progressing  Goal: Free from opioid side effects throughout the shift  Outcome: Progressing  Goal: Free from acute confusion related to pain meds throughout the shift  Outcome: Progressing     Problem: Fall/Injury  Goal: Not fall by end of shift  Outcome: Progressing  Goal: Be free from injury by end of the shift  Outcome: Progressing  Goal: Verbalize understanding of personal risk factors for fall in the hospital  Outcome: Progressing  Goal: Verbalize understanding of risk factor reduction measures to prevent injury from fall in the home  Outcome: Progressing  Goal: Use assistive devices by end of the shift  Outcome: Progressing  Goal: Pace activities to prevent fatigue by end of the shift  Outcome: Progressing   The patient's goals for the shift include  Pt will remain free of injury    The clinical goals for the shift include maintain patient safety

## 2025-01-17 NOTE — PROGRESS NOTES
"Speech-Language Pathology Clinical Swallow Treatment    Patient Name: Melvi Sanchez \"Gretchen\"  MRN: [id number]  : 1960  Today's Date: 25  Start time: Start Time: 1005  Stop time: Stop Time: 1027  Time calculation (min) : Time Calculation (min): 22 min    ASSESSMENT  SLP TX Intervention Outcome: Making Progress Towards Goals     Treatment Tolerance: Patient tolerated treatment well  Prognosis: Good     IMPRESSIONS:     Pt presents with mild oral dysphagia and severe pharyngeal dysphagia upon completion of modified barium swallow study previous date- 2025. Impairments most impacting swallowing safety and efficiency include delayed swallow onset and incomplete laryngeal vestibule closure. Patient demonstrated penetration and aspiration during/after the swallow with all liquid consistencies. Pt frequently demonstrated immediate or delayed cough after aspiration, which was partially effective in clearing aspirate from trachea. Aspiration was occasionally silent.     Pt continues to demonstrate need for ST for compensatory strategies training, oropharyngeal strengthening, RMST and assessment of safe diet tolerance. Pt currently refusing NPO recommendations and using safe swallow strategies for regular and thin diet.         PLAN  Recommended solid consistency: NPO  Recommended liquid consistency: NPO  (** from MBSS- per discussion with attending physician, who spoke with pt after the study, pt has chosen to continue a regular diet at this time with knowledge and acceptance of the risks of chronic aspiration. )    Safe swallow strategies:   - Upright positioning for all PO intake  - Slow rate of intake  - Small bites  - Small sips  - Single sips  - One bite/sip at a time  - Alternate bites and sips  - Chew thoroughly     Recommend frequent/thorough oral care in order to reduce risk of oral/pharyngeal/respiratory infection.    Discharge recommendation: Low intensity level of continued care in order to " maximize safety with PO intake and reduce the risk of sequelae of aspiration.   Inpatient/Swing Bed or Outpatient: Inpatient  SLP TX Plan: Continue Plan of Care  SLP Plan: Skilled SLP  SLP Frequency: 2x per week  Duration: 2 weeks  Next Treatment Priority: follow up with compensatory strategy training, RMST and oropharngeal strength training  Discussed POC: Patient  Patient/Caregiver Agreeable: Yes      Short term goals established 01/16/25:   Pt will demonstrate follow-through of trained compensatory strategies during a meal/snack (pt has chosen to continue to eat/drink by mouth) with 90% acc independently.              Status: Progressing  Progress: pt consumed breakfast tray, able to report and demonstrate use of compensatory strategies.     Pt will complete oral/pharyngeal/laryngeal strengthening exercises as directed given no cues from SLP in order to improve swallow function.              Status: Progressing   Progress: Provided pt with EMST 150 and education for use. Pt demonstrated use and comprehension of directions.      Pt/family will demonstrate understanding of education related to dysphagia independently.              Status: goal initiated/progressing              Progress this date: pt demonstrated good understanding of info provided via conversation and teachback        Long term goals established 01/16/25:   Pt will meet 50% of nutrition/hydration needs by mouth, via least restrictive diet, without pulmonary compromise.      SUBJECTIVE  Prior to Session Communication: Bedside nurse  RN cleared pt to participate in session  Respiratory status: Room air  Positioning: Upright in chair  Pt was alert, pleasant, and cooperative for session.  Pt noted agreeable to ST session and reported use of strategies. Pt reports that she is interested in ST in outpatient setting once discharged.     Pain Assessment: 0-10  0-10 (Numeric) Pain Score: 0 - No pain  Pain Type: Acute pain  Pain Location: Rib cage  Pain  Interventions: Medication (See MAR)    ORIENTATION: Ox4    OBJECTIVE  Therapeutic swallow intervention:  Therapeutic Swallow  Therapeutic Swallow Intervention : PO Trials, Caregiver Education  Swallow Comments: Pt in room with breakfast tray, currently eating. Pt able to independently report use of strategies and demonstrate follow through. Pt demonstrated cough 2x with regular textured items. Reviewed safe swallow strategies with pt and importance of oral care. Pt agreeable to education. Targeted RMST per recommendations from MBSS, pt completed 5 sets of 5 EMST 150, provided education on use for follow through after discharge.    Treatment/Education:  Results and recommendations were relayed to: Patient  Education provided: Yes   Learner: Patient   Barriers to learning: None   Method of teaching: Verbal   Topic: role of ST, results of assessment, risk for aspiration, recommended safe swallow strategies, and recommendation for dysphagia follow-up   Outcome of teaching: Pt/family demonstrated good understanding

## 2025-01-17 NOTE — DISCHARGE SUMMARY
"DISCHARGE SUMMARY     Discharge Diagnosis  Sepsis without acute organ dysfunction, due to unspecified organism (Multi)    This discharge took greater than 35 minutes.    Test Results Pending At Discharge  Pending Labs       Order Current Status    Blood Culture Preliminary result    Blood Culture Preliminary result    Respiratory Culture/Smear Preliminary result            Hospital Course   Melvi Sanchez \"Allie" is a 64 y.o. female with PMHx s/f stage III SCC of left tonsil, CAD, HTN, HLD, COPD, hypothyroidism, tobacco dependence, depression presenting with flulike symptoms for 2 days. She reports of waking up with really bad headache 2 days ago with fever of 100.6F and chills. Has associated productive cough w/o sputum production, bodyaches. She also notes of left sided rib pain with deep breathing. Denies n/v/d, decreased appetite, lightheadedness, sob, abd pain. Quitted smoking 2 months ago, still vaping. No alcohol use.     ED Course (Summary - please note all labs, imaging studies, and interventions noted below have been personally reviewed and/or interpreted on day of admission):   Vitals on presentation: 102.7 F, 103 bpm, 20 RR, 126/72, 94% on RA  Labs: CMP-glucose 147, sodium 130  Lactate 1.1  CBC-WBC 14.7, hemoglobin 9.9, neutrophils absolute 13.21  Viral studies negative  EKG: None  Imaging: CXR-worsening interstitial infiltration  CTAP-no acute PE.  Pulmonary consultation bilaterally with most prominent in the left lower lobe.  Moderate upper lung predominant emphysema.  Mildly enlarged mediastinal and hilar lymph nodes.  Coronary artery calcification.  Interventions: NS 2178 mL, ibuprofen 400 mg, Tylenol 975 mg, started on Zosyn, vancomycin, azithromycin, admission for further management     12-point ROS reviewed and found to be negative aside from aforementioned positives in HPI and/or noted in dedicated ROS section below.     Sepsis without acute end-organ dysfunction presumed 2/2 CAP of " bilateral lower lobes  -SIRS criteria (4/4): fever, WBC, HR, RR  -Source: pulm  -Lactate normal at 1.1  -Blood cultures NGTD  -Transitioned ceftriaxone to cefdinir to complete the course of tx as op     Pneumonia of bilateral lower lobes  -Continue antibiotic coverage as above  -remains on RA     CAD, HLD  -Continue home medications - aspirin, statin     COPD  -does not appear to be in acute exacerbation  -continue home inhalers     Hypothyroidism  -continue levothyroxine     Depression  -continue zoloft     stage III SCC of left tonsil  -started treatment with CCRT w/ Cisplatin and proton radiation on 10/8/24 for recurrence to L oropharynx   -following outpatient oncology and palliative care  -continue with olanzapine for sleep  -continue with contin 15mg BID and Percocet 7.5mg Q3-4hr PRN     Dysphagia 2/2 Above   -SLP consulted: DIANN on 1/16. Her swallow function declined after her most recent cancer tx. She is at high risk for aspiration and SLP can't recommend a safe diet for her at this time. I spoke to patient regarding this at length. She wishes to continue taking PO normally for now, understanding the risk for aspiration. She is going to discuss with her children and may make a decision regarding a G-tube at a later date.   -OP SLP referral placed      Pertinent Physical Exam At Time of Discharge  Constitutional: Pleasant and cooperative. Laying in bed in no acute distress. Conversant.   Skin: Warm and dry; no obvious lesions, rashes, pallor, or jaundice.   Eyes: EOMI. Anicteric sclera.   ENT: Mucous membranes moist; no obvious injury or deformity appreciated.   Head and Neck: Normocephalic, atraumatic. ROM preserved. Trachea midline. No appreciable JVD.   Respiratory: Nonlabored on RA. Lungs with rhonchi in bilateral bases. Chest rise is equal.  Cardiovascular: RRR. No gross murmur, gallop, or rub. Extremities are warm and well-perfused with good capillary refill (< 3 seconds). No chest wall tenderness.    GI: Abdomen soft, nontender, nondistended. No obvious organomegaly appreciated. Bowel sounds are present.  : No CVA tenderness.   MSK: No gross abnormalities appreciated. No limitations to AROM/PROM appreciated.   Extremities: No cyanosis, edema, or clubbing evident. Neurovascularly intact.   Neuro: A&Ox3. CN 2-12 grossly intact. Able to respond to questions appropriately and clearly. No acute focal neurologic deficits appreciated.  Psych: Appropriate mood and behavior.    Home Medications     Medication List      START taking these medications     cefdinir 300 mg capsule; Commonly known as: Omnicef; Take 1 capsule (300   mg) by mouth 2 times a day for 3 doses. First dose 1/17 PM     CONTINUE taking these medications     albuterol 90 mcg/actuation inhaler; Inhale 2 puffs every 4 hours if   needed for shortness of breath.   Anoro Ellipta 62.5-25 mcg/actuation blister with device; Generic drug:   umeclidinium-vilanteroL; Inhale 1 puff once daily.   aspirin 81 mg EC tablet; Take 1 tablet (81 mg) by mouth once daily.   bisacodyl 5 mg EC tablet; Commonly known as: Dulcolax   buPROPion  mg 24 hr tablet; Commonly known as: Wellbutrin XL; Take   1 tablet (300 mg) by mouth once daily in the morning. Do not crush, chew,   or split.   gabapentin 300 mg capsule; Commonly known as: Neurontin; Take 1 capsule   (300 mg) by mouth 3 times a day.   guaiFENesin 600 mg 12 hr tablet; Commonly known as: Mucinex; Take 2   tablets (1,200 mg) by mouth 2 times a day. Do not crush, chew, or split.   levothyroxine 100 mcg tablet; Commonly known as: Synthroid, Levoxyl;   Take 1 tablet (100 mcg) by mouth early in the morning.. Take on an empty   stomach at the same time each day, either 30 to 60 minutes prior to   breakfast   melatonin 10 mg tablet; Take 1 tablet (10 mg) by mouth once daily at   bedtime.   morphine CR 15 mg 12 hr tablet; Commonly known as: MS Contin; Take 1   tablet (15 mg) by mouth 2 times a day. Do not crush, chew,  or split.   naloxone 4 mg/0.1 mL nasal spray; Commonly known as: Narcan; Administer   1 spray (4 mg) into affected nostril(s) if needed for opioid reversal or   respiratory depression. May repeat every 2-3 minutes if needed,   alternating nostrils, until medical assistance becomes available.   nicotine 21 mg/24 hr patch; Commonly known as: Nicoderm CQ; PLACE 1   PATCH OVER 24 HOURS ON THE SKIN ONCE EVERY 24 HOURS   OLANZapine 10 mg tablet; Commonly known as: ZyPREXA; Take 1 tablet (10   mg) by mouth once daily at bedtime.   oxyCODONE-acetaminophen 7.5-325 mg tablet; Commonly known as: Percocet;   Take 1 tablet by mouth every 4 hours if needed for severe pain (7 - 10).   oxygen gas therapy; Commonly known as: O2   polyethylene glycol 17 gram packet; Commonly known as: Glycolax,   Miralax; Take 17 g by mouth once daily.   rOPINIRole 1 mg tablet; Commonly known as: Requip; Take 2 tablets (2 mg)   by mouth once daily at bedtime.   rosuvastatin 40 mg tablet; Commonly known as: Crestor; Take 1 tablet (40   mg) by mouth once daily.   sertraline 50 mg tablet; Commonly known as: Zoloft; Take 1 tablet (50   mg) by mouth once daily.   Ultra-Light Rollator misc; Generic drug: walker; 1 each if needed (as   needed for gait and balance).     STOP taking these medications     baclofen 5 mg tablet; Commonly known as: Lioresal   benzonatate 100 mg capsule; Commonly known as: Tessalon Perles   omeprazole 40 mg DR capsule; Commonly known as: PriLOSEC   varenicline 1 mg tablet; Commonly known as: Chantix   white petrolatum 41 % ointment ointment; Commonly known as: Aquaphor       Outpatient Follow-Up  No follow-ups on file.     Elvin Calle MD PhD  1/17/2025  11:40 AM

## 2025-01-18 LAB
BACTERIA BLD CULT: NORMAL
BACTERIA BLD CULT: NORMAL
BACTERIA SPEC RESP CULT: NORMAL
GRAM STN SPEC: NORMAL
GRAM STN SPEC: NORMAL

## 2025-01-20 DIAGNOSIS — G89.3 CANCER RELATED PAIN: ICD-10-CM

## 2025-01-20 DIAGNOSIS — Z51.5 PALLIATIVE CARE ENCOUNTER: ICD-10-CM

## 2025-01-20 DIAGNOSIS — G89.3 CANCER ASSOCIATED PAIN: ICD-10-CM

## 2025-01-20 RX ORDER — OXYCODONE AND ACETAMINOPHEN 7.5; 325 MG/1; MG/1
1 TABLET ORAL EVERY 4 HOURS PRN
Qty: 180 TABLET | Refills: 0 | Status: SHIPPED | OUTPATIENT
Start: 2025-01-20 | End: 2025-01-21 | Stop reason: SDUPTHER

## 2025-01-20 RX ORDER — MORPHINE SULFATE 15 MG/1
15 TABLET, FILM COATED, EXTENDED RELEASE ORAL 2 TIMES DAILY
Qty: 60 TABLET | Refills: 0 | Status: SHIPPED | OUTPATIENT
Start: 2025-01-20 | End: 2025-01-21 | Stop reason: SDUPTHER

## 2025-01-20 NOTE — TELEPHONE ENCOUNTER
Refill requests received for the following medications:    Morphine 15mg  Percoset 7.5-325mg    Preferred pharmacy is [facility] at  [address] in [place],    Message sent to Palliative Care team.

## 2025-01-20 NOTE — TELEPHONE ENCOUNTER
Patient last seen by PIYUSH Soliman on 12/10 with plan to continue MSER 15mg BID and percocet 7.5/325mg q4h PRN. No fuv is scheduled. I will place an order for this. OARRS reviewed and no aberrancy noted. Patient last filled percocet 7.5/325mg #180/30 days on 12/11 and MSER 15mg #60/30 days on 12/20. Prescriptions pended to provider to approve.

## 2025-01-21 ENCOUNTER — PATIENT OUTREACH (OUTPATIENT)
Dept: PRIMARY CARE | Facility: CLINIC | Age: 65
End: 2025-01-21
Payer: COMMERCIAL

## 2025-01-21 ENCOUNTER — TELEPHONE (OUTPATIENT)
Dept: ADMISSION | Facility: HOSPITAL | Age: 65
End: 2025-01-21
Payer: COMMERCIAL

## 2025-01-21 ENCOUNTER — HOSPITAL ENCOUNTER (OUTPATIENT)
Dept: RADIOLOGY | Facility: HOSPITAL | Age: 65
Discharge: HOME | End: 2025-01-21
Payer: COMMERCIAL

## 2025-01-21 DIAGNOSIS — R92.1 BREAST CALCIFICATION, LEFT: ICD-10-CM

## 2025-01-21 DIAGNOSIS — C77.0 METASTASIS TO HEAD AND NECK LYMPH NODE (MULTI): ICD-10-CM

## 2025-01-21 DIAGNOSIS — G89.3 CANCER ASSOCIATED PAIN: ICD-10-CM

## 2025-01-21 DIAGNOSIS — G89.3 CANCER RELATED PAIN: ICD-10-CM

## 2025-01-21 PROCEDURE — 76982 USE 1ST TARGET LESION: CPT | Mod: LT

## 2025-01-21 PROCEDURE — 76642 ULTRASOUND BREAST LIMITED: CPT | Mod: LEFT SIDE | Performed by: RADIOLOGY

## 2025-01-21 PROCEDURE — 77065 DX MAMMO INCL CAD UNI: CPT | Mod: LEFT SIDE | Performed by: RADIOLOGY

## 2025-01-21 PROCEDURE — 70491 CT SOFT TISSUE NECK W/DYE: CPT | Performed by: RADIOLOGY

## 2025-01-21 PROCEDURE — 70491 CT SOFT TISSUE NECK W/DYE: CPT

## 2025-01-21 PROCEDURE — 76642 ULTRASOUND BREAST LIMITED: CPT | Mod: LT

## 2025-01-21 PROCEDURE — 2550000001 HC RX 255 CONTRASTS

## 2025-01-21 PROCEDURE — 77061 BREAST TOMOSYNTHESIS UNI: CPT | Mod: LEFT SIDE | Performed by: RADIOLOGY

## 2025-01-21 PROCEDURE — 77061 BREAST TOMOSYNTHESIS UNI: CPT | Mod: LT

## 2025-01-21 RX ORDER — MORPHINE SULFATE 15 MG/1
15 TABLET, FILM COATED, EXTENDED RELEASE ORAL 2 TIMES DAILY
Qty: 60 TABLET | Refills: 0 | Status: SHIPPED | OUTPATIENT
Start: 2025-01-21 | End: 2025-02-20

## 2025-01-21 RX ORDER — OXYCODONE AND ACETAMINOPHEN 7.5; 325 MG/1; MG/1
1 TABLET ORAL EVERY 4 HOURS PRN
Qty: 180 TABLET | Refills: 0 | Status: SHIPPED | OUTPATIENT
Start: 2025-01-21 | End: 2025-02-20

## 2025-01-21 RX ADMIN — IOHEXOL 75 ML: 350 INJECTION, SOLUTION INTRAVENOUS at 11:45

## 2025-01-21 NOTE — PROGRESS NOTES
Discharge Facility: [facility]   Discharge Diagnosis: Sepsis without acute organ dysfunction, due to unspecified organism; Pneumonia of both lower lobes due to infectious organism; Dysphagia, unspecified type   Admission Date: 1/14/2025   Discharge Date: 1/17/2025     PCP Appointment Date: TBD-office to arrange fup with PCP as needed  Specialist Appointment Date: TBD  Hospital Encounter and Summary Linked: Yes  ED to Hosp-Admission (Discharged) with Elvin Calle MD PhD; Chetna Gorman MD (01/14/2025)     Two attempts were made to reach patient within two business days after discharge. Voicemail left with contact information for patient to call back with any non-emergent questions or concerns.

## 2025-01-21 NOTE — TELEPHONE ENCOUNTER
Patient calling because she cannot fill her Percocet and MS Contin at the current [facility] because it is not available.    Requesting script be moved to different store. [facility] in [place] #[id number]. She confirmed they will be able to fill at this store

## 2025-01-21 NOTE — DOCUMENTATION CLARIFICATION NOTE
PATIENT:               SERGIO FOSTER  ACCT #:                  [id number]  MRN:                       [id number]  :                       1960  ADMIT DATE:       2025 9:40 AM  DISCH DATE:        2025 1:56 PM  RESPONDING PROVIDER #:        [id number]          PROVIDER RESPONSE TEXT:    Sepsis with cardiac organ dysfunction of hypotension    CDI QUERY TEXT:    Clarification        Instruction:    Based on your assessment of the patient and the clinical information, please provide the requested documentation by clicking on the appropriate radio button and enter any additional information if prompted.    Question: Based on your medical judgment, can you please clarify which of these conditions is the most clinically supported    When answering this query, please exercise your independent professional judgment. The fact that a question is being asked, does not imply that any particular answer is desired or expected.    The patient's clinical indicators include:  Clinical Information: There is conflicting documentation in the medical record which requires clarification.    -The diagnosis of Sepsis-Patient subsequently became hypotensive, given 30 mL/KG bolus for patient now meeting septic criteria.  Patient to be admitted to [facility] for pneumonia meeting septic criteria-documented 25 ED.    -The diagnosis of Sepsis without organ dysfunction was documented on 25 in discharge note.    Clinical Indicators:  Vital signs on admit: T 102.7, hr 103, RR 18/20,  B/P: 85/52, 126/72, Pox 94% on ra  Labs on admit: , Sodium 130, WBC 14.7, Absolute Neutrophils 13.21. Bun/cr 13/0.80, Lactate 1.1. Blood cultures no growth. Streptococcus pneumoniae positive.  CXR : worsening interstitial infiltration.    Treatment: NS 1000 cc FB x2, Augmentin po x 1, IV Zithromax IV every 24 hrs., IV Rocephin every 24 hrs., IV Zosyn x 1, IV Vancomycin x 1    Risk Factors :Pneumonia, dysphagia  Options provided:  -- Sepsis  with cardiac organ dysfunction of hypotension  -- Sepsis with multi system organ dysfunction of hypotension and hyperglycemia in non diabetic  -- Sepsis without organ dysfunction  -- Other - I will add my own diagnosis  -- Refer to Clinical Documentation Reviewer    Query created by: Ofelia Barboza on 1/21/2025 8:06 AM      Electronically signed by:  JIMBO GUERRA MD PhD 1/21/2025 10:24 AM

## 2025-01-24 ENCOUNTER — APPOINTMENT (OUTPATIENT)
Dept: SURGERY | Facility: CLINIC | Age: 65
End: 2025-01-24
Payer: COMMERCIAL

## 2025-01-24 VITALS
WEIGHT: 161.2 LBS | HEIGHT: 63 IN | BODY MASS INDEX: 28.56 KG/M2 | OXYGEN SATURATION: 92 % | SYSTOLIC BLOOD PRESSURE: 100 MMHG | HEART RATE: 65 BPM | DIASTOLIC BLOOD PRESSURE: 65 MMHG

## 2025-01-24 DIAGNOSIS — Z12.31 ENCOUNTER FOR SCREENING MAMMOGRAM FOR BREAST CANCER: ICD-10-CM

## 2025-01-24 DIAGNOSIS — D24.2 FIBROADENOMA OF LEFT BREAST: ICD-10-CM

## 2025-01-24 DIAGNOSIS — N60.02 BENIGN BREAST CYST IN FEMALE, LEFT: ICD-10-CM

## 2025-01-24 DIAGNOSIS — R92.1 BREAST CALCIFICATION, LEFT: Primary | ICD-10-CM

## 2025-01-24 PROCEDURE — 99213 OFFICE O/P EST LOW 20 MIN: CPT | Performed by: SURGERY

## 2025-01-24 PROCEDURE — 3008F BODY MASS INDEX DOCD: CPT | Performed by: SURGERY

## 2025-01-24 NOTE — PATIENT INSTRUCTIONS
1.  The previously biopsied fibroadenoma of your left breast is unchanged by mammogram over the last year.  The area at the 12 o'clock position / behind the nipple area of your left breast is most consistent with a cluster of cyst.  This area has not changed in the last 12 months.  Will continue to monitor these abnormalities in 6 months.  Since she will be due for a right sided screening mammogram at the same time, we will schedule for mammogram of both breast in 6 months.  Follow-up in Dr. De Los Santos's office after this mammogram.  2.  Continue doing monthly self breast exams.  If you identify any abnormalities, please call Dr. De Los Santos's office immediately.  [phone]

## 2025-01-24 NOTE — LETTER
"January 24, 2025     No Recipients    Patient: Gretchen Sanchez   YOB: 1960   Date of Visit: 1/24/2025       Dear Dr. Frausto Recipients:    Thank you for referring Gretchen Sanchez to me for evaluation. Below are my notes for this consultation.  If you have questions, please do not hesitate to call me. I look forward to following your patient along with you.       Sincerely,     Tri De Los Santos MD      CC: No Recipients  ______________________________________________________________________________________        GENERAL SURGERY OFFICE NOTE    Patient: Melvi Sanchez    Age: 64 y.o.   Gender: female    MRN: [id number]    PCP: Alton King, DO        SUBJECTIVE     Chief Complaint  Follow-up (Patient is here for a 6 month left breast follow up. Patient states that she is not having any problems with the left breast.)       HPI  Melvi \"Gretchen returns to the office for a 6-month follow-up of multiple abnormalities of the left breast.  She had a biopsy-proven fibroadenoma associated with breast calcifications.  One year ago, she was found to have a new area of abnormality at the 12 o'clock position of the left breast consistent with a cluster of calcifications.  Over the last 6 months, she has not had any new breast issues.  She denies any breast pain, palpable masses or nipple discharge.  She had a left diagnostic mammogram which showed that the areas of asymmetry (cysts deep to the nipple) and the left breast mass (previously biopsy-proven fibroadenoma) are unchanged..  No new areas of concern.  She does note that she recently underwent surgery of her left neck for recurrent cancer in the lymph node.  She did complete chemotherapy and radiation therapy for this cancer as well.    Risk factors for breast cancer: 64-year-old white female; menarche at age 15; first live birth at age 28; no previous biopsy; no family history of breast cancer. She does have a paternal uncle who had leukemia. " Paternal cousin had brain cancer. She has a personal history of tonsillar cancer. She went through menopause around age 47 but never had hormone replacement therapy. This gives her a 5-year Leilani score of 1.6% and a lifetime risk of 7.9% which overall puts her in a low risk category.     ROS  Review of Systems   Constitutional: no fever and no chills.   Eyes: no loss of vision, no discharge from the eyes, no itching of the eyes and no eye pain.   ENT: neck pain, hoarseness and sore throat, but no hearing loss.   Cardiovascular: no chest pain, no palpitations and no lower extremity edema.   Respiratory: no dyspnea, no dyspnea during exertion and no cough.   Breast: no nipple discharge, no breast mass, no pain in breast, no erythema, no change in breast skin and no axillary adenopathy.   Gastrointestinal: no abdominal pain, no vomiting, no nausea.   Genitourinary: no dysuria and no hematuria.   Musculoskeletal: no arthralgias, no myalgias and no hernia.   Integumentary: a rash, but no skin lesions.   Neurological: no headache, no dizziness and no numbness.   Psychiatric: no anxiety, no depression and no emotional problems.   Endocrine: no heat or cold intolerance and no increased thirst.   Hematologic/Lymphatic: a tendency for easy bleeding and a tendency for easy bruising.   All other systems have been reviewed and are negative for complaint.     HISTORY     Past Medical History:   Diagnosis Date   • Acute hypoxemic respiratory failure (Multi) 09/20/2023   • Breast calcification, left    • COPD (chronic obstructive pulmonary disease) (Multi)    • Dysphagia    • Essential (primary) hypertension 12/28/2018    HTN (hypertension), benign   • Fibroadenoma of left breast    • H/O malignant neoplasm of tonsil     s/p XRT and chemotherapy completed July 8, 22.   • Hypothyroidism    • Morbid obesity (Multi) 04/03/2023   • Nausea and/or vomiting 04/03/2023   • OAB (overactive bladder)    • Pharyngitis 04/03/2023   • Pneumonia  2023   • Restless legs syndrome    • Sinusitis 2023        Past Surgical History:   Procedure Laterality Date   • BREAST BIOPSY Left     benign FA   •  SECTION, LOW TRANSVERSE     • CHOLECYSTECTOMY  2014    Cholecystectomy   • COLONOSCOPY     • HYSTERECTOMY  2004    Hysterectomy   • MOUTH SURGERY  2014    Oral Surgery Tooth Extraction   • NECK SURGERY  2024    Creation Free Flap Head/Neck, Dissection Neck        Allergies   Allergen Reactions   • Duloxetine Hallucinations     Pt states she doesn't think she has an allergy just a bad reaction when mixed with morphine .        Social History     Tobacco Use   Smoking Status Former   • Current packs/day: 1.00   • Average packs/day: 1 pack/day for 40.7 years (40.7 ttl pk-yrs)   • Types: Cigarettes   • Start date: 2023   Smokeless Tobacco Never   Tobacco Comments    3 or less cig. Daily per patient stated 10/10/24        Social History     Substance and Sexual Activity   Alcohol Use Not Currently   • Alcohol/week: 1.0 standard drink of alcohol   • Types: 1 Glasses of wine per week        HOME MEDICATIONS  Current Outpatient Medications   Medication Instructions   • albuterol 90 mcg/actuation inhaler 2 puffs, inhalation, Every 4 hours PRN   • aspirin 81 mg, oral, Daily   • bisacodyl (DULCOLAX) 5 mg, oral, Daily PRN, Do not crush, chew, or split.   • buPROPion XL (WELLBUTRIN XL) 300 mg, oral, Every morning, Do not crush, chew, or split.   • gabapentin (NEURONTIN) 300 mg, oral, 3 times daily   • guaiFENesin (MUCINEX) 1,200 mg, oral, 2 times daily, Do not crush, chew, or split.   • levothyroxine (SYNTHROID, LEVOXYL) 100 mcg, oral, Daily, Take on an empty stomach at the same time each day, either 30 to 60 minutes prior to breakfast   • melatonin 10 mg, oral, Nightly   • morphine CR (MS CONTIN) 15 mg, oral, 2 times daily, Do not crush, chew, or split.   • naloxone (NARCAN) 4 mg, nasal, As needed, May repeat every 2-3 minutes if  "needed, alternating nostrils, until medical assistance becomes available.   • nicotine (Nicoderm CQ) 21 mg/24 hr patch 1 patch, transdermal, Every 24 hours   • OLANZapine (ZYPREXA) 10 mg, oral, Nightly   • oxyCODONE-acetaminophen (Percocet) 7.5-325 mg tablet 1 tablet, oral, Every 4 hours PRN   • oxygen (O2) 4 L/min, Continuous   • polyethylene glycol (GLYCOLAX, MIRALAX) 17 g, oral, Daily   • rOPINIRole (REQUIP) 2 mg, oral, Nightly   • rosuvastatin (CRESTOR) 40 mg, oral, Daily   • sertraline (ZOLOFT) 50 mg, oral, Daily   • umeclidinium-vilanteroL (Anoro Ellipta) 62.5-25 mcg/actuation blister with device 1 puff, inhalation, Daily   • walker (Ultra-Light Rollator) misc 1 each, miscellaneous, As needed          OBJECTIVE   Last Recorded Vitals.  Blood pressure 100/65, pulse 65, height 1.6 m (5' 3\"), weight 73.1 kg (161 lb 3.2 oz), SpO2 92%.     PHYSICAL EXAM  Physical Exam   General: Well-developed, well-nourished and in no acute distress.  Head: Normocephalic. Atraumatic.  Neck/thyroid: Neck is supple. Significant skin thickening and chronic changes to the anterior neck bilaterally. Skin scarring noted at the base of the left neck.  Eyes: Pupils equal round and reactive to light. Conjunctiva normal.  ENMT: No masses or deformity of external nose. External ears without masses.  Respiratory/Chest: Normal respiratory effort.  Breast: Moderate sized breasts with loose tissue consistent with weight loss. Symmetrical. No palpable abnormalities. No expressible nipple discharge. No erythema.  Lymphatics: No palpable lymphadenopathy of the bilateral axillary regions. Palpation of lymph nodes is difficult in the neck and supraclavicular region due to the skin changes from XRT.  Cardiovascular: Regular rate and rhythm.   Abdomen: Soft, nontender, nondistended. No hernias.   Musculoskeletal: Joints and limbs are grossly normal. Normal gait. Normal range of motion of major joints.  Neuro: Oriented to person, place and time. No " obvious neurological deficit. Motor strength grossly normal.  Psych: Normal mood and affect.     RESULTS   Labs  No results found for this or any previous visit (from the past 24 hours).    Radiology Resutls  mammo left diagnostic tomosynthesis  BI US breast limited left  Status: Final result     PACS Images     Show images for BI mammo left diagnostic tomosynthesis  Signed by    Signed Time Phone Pager   Branden Davenport MD 1/21/2025 11:51 [phone]     Exam Information    Status Exam Begun Exam Ended   Final 1/21/2025 09:53 1/21/2025 10:27     Study Result    Narrative & Impression   Interpreted By:  Branden Davenport,   STUDY:  BI MAMMO LEFT DIAGNOSTIC TOMOSYNTHESIS; BI US BREAST LIMITED LEFT;  1/21/2025 10:27 am; 1/21/2025 11:17 am      ACCESSION NUMBER(S):  [id number]; [id number]      ORDERING CLINICIAN:  MONSTER FLORES      INDICATION:  Asymmetry left breast      ,R92.1 Mammographic calcification found on diagnostic imaging of  breast      COMPARISON:  06/14/2020      FINDINGS:  MAMMOGRAPHY: 2D and tomosynthesis images were reviewed at 1 mm slice  thickness.      Density:  The breasts are almost entirely fatty.      9.2 mm asymmetry left breast middle depth cc projection 4.7 cm from  nipple along the posterior nipple line persists.          ULTRASOUND: Targeted ultrasound was performed of the left breast by a  registered sonographer with elastography. Left breast 7 o'clock  position 4 cm from nipple demonstrates a horizontally oriented  hypoechoic horizontally oriented mass measuring 4.2 mm in greatest  dimension probably benign. Elastography soft. This may or may not  account for the mammographic finding given the difference in size.          Left axilla demonstrates a normal lymph node.      IMPRESSION:  Probable benign findings. Continued follow-up recommended with  sonography and mammography beginning date 06/14/2023.      BI-RADS CATEGORY:  BI-RADS Category:  3 Probably  "Benign.  Recommendation:  Short-term Interval Follow-up Imaging.  Recommended Date:  6 Months.  Laterality:  Left.         ASSESSMENT / PLAN   Diagnoses and all orders for this visit:  Breast calcification, left  -     BI mammo bilateral diagnostic tomosynthesis; Future  Fibroadenoma of left breast  -     BI mammo bilateral diagnostic tomosynthesis; Future  Benign breast cyst in female, left  Encounter for screening mammogram for breast cancer  -     BI mammo bilateral diagnostic tomosynthesis; Future          Plan  June 2023: LEFT; tight cluster of calc in UOQ; stereotactic biopsy showed fibroadenoma; second area of cluster of cysts at the 12 o'clock position    1. The stereotactic biopsy of the left breast calcifications demonstrated a fibroadenoma with associated calcifications. We discussed that this is a benign diagnosis, but does need continued monitoring to make sure that it remains \"stable\".  It has been stable for 1.5 years.  Plan left diagnostic mammogram in 6 months.  2.  On the mammogram from 12 months ago, new area of abnormality was identified at the 12 o'clock position (deep to the nipple line).  Ultrasound suggest that this is a cluster of cysts.  Most recent mammogram shows this area to be stable.  Continue to monitor for stability.  Plan left diagnostic mammogram in 6 months.  3.  Since she will be due for a right screening mammogram in 6 months, schedule for bilateral diagnostic mammogram in 6 months.  Return to the office after her next mammogram.  4.  She was encouraged to do self breast exams, and she will contact the office if she identifies any abnormalities.  5.  She will follow-up with her other providers regarding the history of tonsillar cancer with a positive left neck lymph node.      Tri De Los Santos MD, FACS  [facility] General Surgery  [address] [place] [address];   [address]  [place]  [address] [phone]  "

## 2025-01-24 NOTE — PROGRESS NOTES
"    GENERAL SURGERY OFFICE NOTE    Patient: Melvi Sanchez    Age: 64 y.o.   Gender: female    MRN: [id number]    PCP: Alton King, DO        SUBJECTIVE     Chief Complaint  Follow-up (Patient is here for a 6 month left breast follow up. Patient states that she is not having any problems with the left breast.)       HPI  Melvi \"Gretchen returns to the office for a 6-month follow-up of multiple abnormalities of the left breast.  She had a biopsy-proven fibroadenoma associated with breast calcifications.  One year ago, she was found to have a new area of abnormality at the 12 o'clock position of the left breast consistent with a cluster of calcifications.  Over the last 6 months, she has not had any new breast issues.  She denies any breast pain, palpable masses or nipple discharge.  She had a left diagnostic mammogram which showed that the areas of asymmetry (cysts deep to the nipple) and the left breast mass (previously biopsy-proven fibroadenoma) are unchanged..  No new areas of concern.  She does note that she recently underwent surgery of her left neck for recurrent cancer in the lymph node.  She did complete chemotherapy and radiation therapy for this cancer as well.    Risk factors for breast cancer: 64-year-old white female; menarche at age 15; first live birth at age 28; no previous biopsy; no family history of breast cancer. She does have a paternal uncle who had leukemia. Paternal cousin had brain cancer. She has a personal history of tonsillar cancer. She went through menopause around age 47 but never had hormone replacement therapy. This gives her a 5-year Leilani score of 1.6% and a lifetime risk of 7.9% which overall puts her in a low risk category.     ROS  Review of Systems   Constitutional: no fever and no chills.   Eyes: no loss of vision, no discharge from the eyes, no itching of the eyes and no eye pain.   ENT: neck pain, hoarseness and sore throat, but no hearing loss.   Cardiovascular: " no chest pain, no palpitations and no lower extremity edema.   Respiratory: no dyspnea, no dyspnea during exertion and no cough.   Breast: no nipple discharge, no breast mass, no pain in breast, no erythema, no change in breast skin and no axillary adenopathy.   Gastrointestinal: no abdominal pain, no vomiting, no nausea.   Genitourinary: no dysuria and no hematuria.   Musculoskeletal: no arthralgias, no myalgias and no hernia.   Integumentary: a rash, but no skin lesions.   Neurological: no headache, no dizziness and no numbness.   Psychiatric: no anxiety, no depression and no emotional problems.   Endocrine: no heat or cold intolerance and no increased thirst.   Hematologic/Lymphatic: a tendency for easy bleeding and a tendency for easy bruising.   All other systems have been reviewed and are negative for complaint.     HISTORY     Past Medical History:   Diagnosis Date    Acute hypoxemic respiratory failure (Multi) 2023    Breast calcification, left     COPD (chronic obstructive pulmonary disease) (Multi)     Dysphagia     Essential (primary) hypertension 2018    HTN (hypertension), benign    Fibroadenoma of left breast     H/O malignant neoplasm of tonsil     s/p XRT and chemotherapy completed .    Hypothyroidism     Morbid obesity (Multi) 2023    Nausea and/or vomiting 2023    OAB (overactive bladder)     Pharyngitis 2023    Pneumonia 2023    Restless legs syndrome     Sinusitis 2023        Past Surgical History:   Procedure Laterality Date    BREAST BIOPSY Left     benign FA     SECTION, LOW TRANSVERSE      CHOLECYSTECTOMY  2014    Cholecystectomy    COLONOSCOPY      HYSTERECTOMY  2004    Hysterectomy    MOUTH SURGERY  2014    Oral Surgery Tooth Extraction    NECK SURGERY  2024    Creation Free Flap Head/Neck, Dissection Neck        Allergies   Allergen Reactions    Duloxetine Hallucinations     Pt states she doesn't think she  has an allergy just a bad reaction when mixed with morphine .        Social History     Tobacco Use   Smoking Status Former    Current packs/day: 1.00    Average packs/day: 1 pack/day for 40.7 years (40.7 ttl pk-yrs)    Types: Cigarettes    Start date: 11/30/2023   Smokeless Tobacco Never   Tobacco Comments    3 or less cig. Daily per patient stated 10/10/24        Social History     Substance and Sexual Activity   Alcohol Use Not Currently    Alcohol/week: 1.0 standard drink of alcohol    Types: 1 Glasses of wine per week        HOME MEDICATIONS  Current Outpatient Medications   Medication Instructions    albuterol 90 mcg/actuation inhaler 2 puffs, inhalation, Every 4 hours PRN    aspirin 81 mg, oral, Daily    bisacodyl (DULCOLAX) 5 mg, oral, Daily PRN, Do not crush, chew, or split.    buPROPion XL (WELLBUTRIN XL) 300 mg, oral, Every morning, Do not crush, chew, or split.    gabapentin (NEURONTIN) 300 mg, oral, 3 times daily    guaiFENesin (MUCINEX) 1,200 mg, oral, 2 times daily, Do not crush, chew, or split.    levothyroxine (SYNTHROID, LEVOXYL) 100 mcg, oral, Daily, Take on an empty stomach at the same time each day, either 30 to 60 minutes prior to breakfast    melatonin 10 mg, oral, Nightly    morphine CR (MS CONTIN) 15 mg, oral, 2 times daily, Do not crush, chew, or split.    naloxone (NARCAN) 4 mg, nasal, As needed, May repeat every 2-3 minutes if needed, alternating nostrils, until medical assistance becomes available.    nicotine (Nicoderm CQ) 21 mg/24 hr patch 1 patch, transdermal, Every 24 hours    OLANZapine (ZYPREXA) 10 mg, oral, Nightly    oxyCODONE-acetaminophen (Percocet) 7.5-325 mg tablet 1 tablet, oral, Every 4 hours PRN    oxygen (O2) 4 L/min, Continuous    polyethylene glycol (GLYCOLAX, MIRALAX) 17 g, oral, Daily    rOPINIRole (REQUIP) 2 mg, oral, Nightly    rosuvastatin (CRESTOR) 40 mg, oral, Daily    sertraline (ZOLOFT) 50 mg, oral, Daily    umeclidinium-vilanteroL (Anoro Ellipta) 62.5-25  "mcg/actuation blister with device 1 puff, inhalation, Daily    walker (Ultra-Light Rollator) misc 1 each, miscellaneous, As needed          OBJECTIVE   Last Recorded Vitals.  Blood pressure 100/65, pulse 65, height 1.6 m (5' 3\"), weight 73.1 kg (161 lb 3.2 oz), SpO2 92%.     PHYSICAL EXAM  Physical Exam   General: Well-developed, well-nourished and in no acute distress.  Head: Normocephalic. Atraumatic.  Neck/thyroid: Neck is supple. Significant skin thickening and chronic changes to the anterior neck bilaterally. Skin scarring noted at the base of the left neck.  Eyes: Pupils equal round and reactive to light. Conjunctiva normal.  ENMT: No masses or deformity of external nose. External ears without masses.  Respiratory/Chest: Normal respiratory effort.  Breast: Moderate sized breasts with loose tissue consistent with weight loss. Symmetrical. No palpable abnormalities. No expressible nipple discharge. No erythema.  Lymphatics: No palpable lymphadenopathy of the bilateral axillary regions. Palpation of lymph nodes is difficult in the neck and supraclavicular region due to the skin changes from XRT.  Cardiovascular: Regular rate and rhythm.   Abdomen: Soft, nontender, nondistended. No hernias.   Musculoskeletal: Joints and limbs are grossly normal. Normal gait. Normal range of motion of major joints.  Neuro: Oriented to person, place and time. No obvious neurological deficit. Motor strength grossly normal.  Psych: Normal mood and affect.     RESULTS   Labs  No results found for this or any previous visit (from the past 24 hours).    Radiology Resutls  mammo left diagnostic tomosynthesis  BI US breast limited left  Status: Final result     PACS Images     Show images for BI mammo left diagnostic tomosynthesis  Signed by    Signed Time Phone Pager   Branden Davenport MD 1/21/2025 11:51 [phone]     Exam Information    Status Exam Begun Exam Ended   Final 1/21/2025 09:53 1/21/2025 10:27     Study " Result    Narrative & Impression   Interpreted By:  Branden Davenport,   STUDY:  BI MAMMO LEFT DIAGNOSTIC TOMOSYNTHESIS; BI US BREAST LIMITED LEFT;  1/21/2025 10:27 am; 1/21/2025 11:17 am      ACCESSION NUMBER(S):  [id number]; [id number]      ORDERING CLINICIAN:  MONSTER FLORES      INDICATION:  Asymmetry left breast      ,R92.1 Mammographic calcification found on diagnostic imaging of  breast      COMPARISON:  06/14/2020      FINDINGS:  MAMMOGRAPHY: 2D and tomosynthesis images were reviewed at 1 mm slice  thickness.      Density:  The breasts are almost entirely fatty.      9.2 mm asymmetry left breast middle depth cc projection 4.7 cm from  nipple along the posterior nipple line persists.          ULTRASOUND: Targeted ultrasound was performed of the left breast by a  registered sonographer with elastography. Left breast 7 o'clock  position 4 cm from nipple demonstrates a horizontally oriented  hypoechoic horizontally oriented mass measuring 4.2 mm in greatest  dimension probably benign. Elastography soft. This may or may not  account for the mammographic finding given the difference in size.          Left axilla demonstrates a normal lymph node.      IMPRESSION:  Probable benign findings. Continued follow-up recommended with  sonography and mammography beginning date 06/14/2023.      BI-RADS CATEGORY:  BI-RADS Category:  3 Probably Benign.  Recommendation:  Short-term Interval Follow-up Imaging.  Recommended Date:  6 Months.  Laterality:  Left.         ASSESSMENT / PLAN   Diagnoses and all orders for this visit:  Breast calcification, left  -     BI mammo bilateral diagnostic tomosynthesis; Future  Fibroadenoma of left breast  -     BI mammo bilateral diagnostic tomosynthesis; Future  Benign breast cyst in female, left  Encounter for screening mammogram for breast cancer  -     BI mammo bilateral diagnostic tomosynthesis; Future          Plan  June 2023: LEFT; tight cluster of calc in UOQ; stereotactic biopsy  "showed fibroadenoma; second area of cluster of cysts at the 12 o'clock position    1. The stereotactic biopsy of the left breast calcifications demonstrated a fibroadenoma with associated calcifications. We discussed that this is a benign diagnosis, but does need continued monitoring to make sure that it remains \"stable\".  It has been stable for 1.5 years.  Plan left diagnostic mammogram in 6 months.  2.  On the mammogram from 12 months ago, new area of abnormality was identified at the 12 o'clock position (deep to the nipple line).  Ultrasound suggest that this is a cluster of cysts.  Most recent mammogram shows this area to be stable.  Continue to monitor for stability.  Plan left diagnostic mammogram in 6 months.  3.  Since she will be due for a right screening mammogram in 6 months, schedule for bilateral diagnostic mammogram in 6 months.  Return to the office after her next mammogram.  4.  She was encouraged to do self breast exams, and she will contact the office if she identifies any abnormalities.  5.  She will follow-up with her other providers regarding the history of tonsillar cancer with a positive left neck lymph node.      Tri De Los Santos MD, FACS  [facility] General Surgery  [address] [place] [address];   [address]  [place]  [address] [phone]  "

## 2025-01-27 ENCOUNTER — TELEPHONE (OUTPATIENT)
Dept: RADIATION ONCOLOGY | Facility: HOSPITAL | Age: 65
End: 2025-01-27
Payer: COMMERCIAL

## 2025-01-27 NOTE — TELEPHONE ENCOUNTER
Called the patient regarding a 1/21/25 CT neck.  Per a Radiology read, the left neck has postoperative changes from the dissection and flap reconstruction; no mass or fluid collection in this region is noted. The previously noted mass/necrotic node has been removed.  Soft tissue edema extending from the pharynx to the upper larynx has mildly improved.  A call back number was left in my message.

## 2025-01-28 DIAGNOSIS — J44.9 CHRONIC OBSTRUCTIVE PULMONARY DISEASE, UNSPECIFIED COPD TYPE (MULTI): Primary | ICD-10-CM

## 2025-01-30 ENCOUNTER — OFFICE VISIT (OUTPATIENT)
Dept: HEMATOLOGY/ONCOLOGY | Facility: HOSPITAL | Age: 65
End: 2025-01-30
Payer: COMMERCIAL

## 2025-01-30 ENCOUNTER — LAB (OUTPATIENT)
Dept: LAB | Facility: HOSPITAL | Age: 65
End: 2025-01-30
Payer: COMMERCIAL

## 2025-01-30 ENCOUNTER — HOSPITAL ENCOUNTER (OUTPATIENT)
Dept: RADIOLOGY | Facility: HOSPITAL | Age: 65
Discharge: HOME | End: 2025-01-30
Payer: COMMERCIAL

## 2025-01-30 VITALS
DIASTOLIC BLOOD PRESSURE: 74 MMHG | WEIGHT: 160.27 LBS | SYSTOLIC BLOOD PRESSURE: 122 MMHG | BODY MASS INDEX: 28.39 KG/M2 | RESPIRATION RATE: 18 BRPM | OXYGEN SATURATION: 97 % | TEMPERATURE: 97.2 F | HEART RATE: 69 BPM

## 2025-01-30 DIAGNOSIS — C09.9 TONSIL CANCER (MULTI): ICD-10-CM

## 2025-01-30 DIAGNOSIS — Z08 ENCOUNTER FOR FOLLOW-UP SURVEILLANCE OF HEAD AND NECK CANCER: ICD-10-CM

## 2025-01-30 DIAGNOSIS — J18.9 PNEUMONIA DUE TO INFECTIOUS ORGANISM, UNSPECIFIED LATERALITY, UNSPECIFIED PART OF LUNG: ICD-10-CM

## 2025-01-30 DIAGNOSIS — R06.02 SOB (SHORTNESS OF BREATH): Primary | ICD-10-CM

## 2025-01-30 DIAGNOSIS — R13.10 DYSPHAGIA, UNSPECIFIED TYPE: ICD-10-CM
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
## 2025-01-30 DIAGNOSIS — Z85.89 ENCOUNTER FOR FOLLOW-UP SURVEILLANCE OF HEAD AND NECK CANCER: ICD-10-CM

## 2025-01-30 DIAGNOSIS — I89.0 LYMPHEDEMA DUE TO RADIATION: ICD-10-CM

## 2025-01-30 DIAGNOSIS — R06.02 SOB (SHORTNESS OF BREATH): ICD-10-CM

## 2025-01-30 DIAGNOSIS — R41.3 MEMORY LOSS: ICD-10-CM

## 2025-01-30 DIAGNOSIS — D64.9 ANEMIA, UNSPECIFIED TYPE: ICD-10-CM

## 2025-01-30 DIAGNOSIS — K11.7 XEROSTOMIA DUE TO RADIOTHERAPY: ICD-10-CM

## 2025-01-30 LAB
ALBUMIN SERPL BCP-MCNC: 3.9 G/DL (ref 3.4–5)
ALP SERPL-CCNC: 65 U/L (ref 33–136)
ALT SERPL W P-5'-P-CCNC: 22 U/L (ref 7–45)
ANION GAP SERPL CALC-SCNC: 12 MMOL/L (ref 10–20)
AST SERPL W P-5'-P-CCNC: 18 U/L (ref 9–39)
BASOPHILS # BLD AUTO: 0.04 X10*3/UL (ref 0–0.1)
BASOPHILS NFR BLD AUTO: 0.5 %
BILIRUB SERPL-MCNC: 0.6 MG/DL (ref 0–1.2)
BUN SERPL-MCNC: 13 MG/DL (ref 6–23)
CALCIUM SERPL-MCNC: 9.5 MG/DL (ref 8.6–10.6)
CHLORIDE SERPL-SCNC: 102 MMOL/L (ref 98–107)
CO2 SERPL-SCNC: 28 MMOL/L (ref 21–32)
CREAT SERPL-MCNC: 0.61 MG/DL (ref 0.5–1.05)
EGFRCR SERPLBLD CKD-EPI 2021: >90 ML/MIN/1.73M*2
EOSINOPHIL # BLD AUTO: 0.12 X10*3/UL (ref 0–0.7)
EOSINOPHIL NFR BLD AUTO: 1.4 %
ERYTHROCYTE [DISTWIDTH] IN BLOOD BY AUTOMATED COUNT: 13.5 % (ref 11.5–14.5)
GLUCOSE SERPL-MCNC: 64 MG/DL (ref 74–99)
HCT VFR BLD AUTO: 33.7 % (ref 36–46)
HGB BLD-MCNC: 10.6 G/DL (ref 12–16)
IMM GRANULOCYTES # BLD AUTO: 0.02 X10*3/UL (ref 0–0.7)
IMM GRANULOCYTES NFR BLD AUTO: 0.2 % (ref 0–0.9)
LYMPHOCYTES # BLD AUTO: 0.71 X10*3/UL (ref 1.2–4.8)
LYMPHOCYTES NFR BLD AUTO: 8.5 %
MAGNESIUM SERPL-MCNC: 1.6 MG/DL (ref 1.6–2.4)
MCH RBC QN AUTO: 29.4 PG (ref 26–34)
MCHC RBC AUTO-ENTMCNC: 31.5 G/DL (ref 32–36)
MCV RBC AUTO: 93 FL (ref 80–100)
MONOCYTES # BLD AUTO: 0.96 X10*3/UL (ref 0.1–1)
MONOCYTES NFR BLD AUTO: 11.5 %
NEUTROPHILS # BLD AUTO: 6.53 X10*3/UL (ref 1.2–7.7)
NEUTROPHILS NFR BLD AUTO: 77.9 %
NRBC BLD-RTO: 0 /100 WBCS (ref 0–0)
PLATELET # BLD AUTO: 192 X10*3/UL (ref 150–450)
POTASSIUM SERPL-SCNC: 4 MMOL/L (ref 3.5–5.3)
PROT SERPL-MCNC: 6.7 G/DL (ref 6.4–8.2)
RBC # BLD AUTO: 3.61 X10*6/UL (ref 4–5.2)
SODIUM SERPL-SCNC: 138 MMOL/L (ref 136–145)
WBC # BLD AUTO: 8.4 X10*3/UL (ref 4.4–11.3)

## 2025-01-30 PROCEDURE — 80053 COMPREHEN METABOLIC PANEL: CPT

## 2025-01-30 PROCEDURE — 99215 OFFICE O/P EST HI 40 MIN: CPT | Performed by: STUDENT IN AN ORGANIZED HEALTH CARE EDUCATION/TRAINING PROGRAM

## 2025-01-30 PROCEDURE — 85025 COMPLETE CBC W/AUTO DIFF WBC: CPT

## 2025-01-30 PROCEDURE — 71046 X-RAY EXAM CHEST 2 VIEWS: CPT

## 2025-01-30 PROCEDURE — 83735 ASSAY OF MAGNESIUM: CPT

## 2025-01-30 PROCEDURE — 36415 COLL VENOUS BLD VENIPUNCTURE: CPT

## 2025-01-30 ASSESSMENT — ENCOUNTER SYMPTOMS
ARTHRALGIAS: 0
LIGHT-HEADEDNESS: 0
CONSTIPATION: 0
TROUBLE SWALLOWING: 1
COUGH: 0
VOMITING: 0
DIARRHEA: 0
CHILLS: 0
NAUSEA: 0
LEG SWELLING: 0
FEVER: 0
SORE THROAT: 0
NUMBNESS: 0
HEADACHES: 0
ABDOMINAL PAIN: 0
SHORTNESS OF BREATH: 1

## 2025-01-30 ASSESSMENT — PAIN SCALES - GENERAL: PAINLEVEL_OUTOF10: 3

## 2025-01-30 NOTE — PROGRESS NOTES
"    Patient ID: Melvi Sanchez \"Allie" is a 64 y.o. 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 Robin Gabrielc:  Dr. Chetna Lowry  [facility]: Dr. Alejandro Hurst   Supp Onc: Antoinette Soliman   PCP: Alton King, DO    PRIOR THERAPIES  07/07/2022: Completion of chemoradiation with weekly cisplatin with Dr. Razo  08/26/2024: L radical neck dissection 2-4 with CN11 repair  10/8 - 11/19/24: Completed 5 cycles of cisplatin (40 mg/m2) and 66 gy of proton reirradiation     CURRENT THERAPY    SITES OF DISEASE    CURRENT ONCOLOGICAL PROBLEMS  Post operative pain  Radiation dermatitis  Odynophagia     HISTORY OF PRESENT ILLNESS  Ms. 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    Patient initially treated by Dr. Razo in 2022. She first noticed a lump in the left side of her neck > 2 years ago. She was found that have enlarged level 2, 3, 4 nodes, but no obvious oropharyngeal lesion 03/2022 by Dr. Torres.  CT neck was obtained demonstrating  L neck mass, 3.2 x 5.9 x 8.4 cm and a 1.5 x 1.6 right paratracheal node -   FNA biopsy of left neck mass showed squamous cell carcinoma. On 4/21/2022 she underwent triple endoscopy and DL showed lesion of left tonsil; biopsy showed invasive squamous cell carcinoma, p16 positive. She was referred to radiation oncology and saw Dr. Crook and completed 70 cGy/35f CCRT with STEWART seen at 1 year. She has been followed by Dr. Torres in surveillance and was noted to have left neck LINA. CT neck was obtained on 6/20/24 demonstrating increase in edema, enlargement of level 2 node. L neck core biopsy on 7/5/24 demonstrated:     FINAL DIAGNOSIS   Left neck mass, core " biopsy:  - Metastatic squamous cell carcinoma involving fibrous tissue with extensive necrosis.  See note.   Note: By immunostaining, the tumor cells are positive for p40, supportive of the above diagnosis.     She proceeded with L neck dissection which revealed:    A. Left neck, level II-IV, neck dissection:  -- Poorly differentiated , nonkeratinizing squamous cell carcinoma with abundant necrosis,  involving soft tissue extensively, encompassing levels II and III  (2.2 cm).  - Fibrosis and foreign body granulomatous reaction, levels III  and IV.     Note:  Microscopic examination of H&E sections reveals poorly differentiated squamous cell carcinoma involving extensively soft tissue with minimal lymphoid tissue present and abundant necrosis and fibrosis.  Carcinoma reaches soft tissue inked margins focally.  Vein margin is negative.  History of metastatic squamous cell carcinoma of left tonsil, status post chemo therapy and radiation therapy is noted for this patient.     P16 by immunohistochemistry:  Positive     She was discussed at  and seen by Dr. Lowry with plans for post operative reirradiation with proton beam therapy and presents for medical oncology consultation to discuss addition of radiosensitizing systemic therapy.    PAST MEDICAL HISTORY  COPD, hypothyroidism, RLS    SURGICAL HISTORY  Cholecystectomy, hysterectomy     SOCIAL HISTORY  Lives in [facility] by self. Son is next building. 3 kids, 6 grandkids.  Retired  at [employer].  Smoked 1-2 PPD, now down to 4 a day since diagnosis.  Occasional EtOH, no illicits. [place].  Has a mini [employer]     FAMILY HISTORY  Cousin with brain cancer    CURRENT MEDS REVIEWED  Current Outpatient Medications   Medication Instructions    albuterol 90 mcg/actuation inhaler 2 puffs, inhalation, Every 4 hours PRN    aspirin 81 mg, oral, Daily    bisacodyl (DULCOLAX) 5 mg, Daily PRN    buPROPion XL (WELLBUTRIN XL) 300 mg, oral,  Every morning, Do not crush, chew, or split.    gabapentin (NEURONTIN) 300 mg, oral, 3 times daily    guaiFENesin (MUCINEX) 1,200 mg, oral, 2 times daily, Do not crush, chew, or split.    levothyroxine (SYNTHROID, LEVOXYL) 100 mcg, oral, Daily, Take on an empty stomach at the same time each day, either 30 to 60 minutes prior to breakfast    melatonin 10 mg, oral, Nightly    morphine CR (MS CONTIN) 15 mg, oral, 2 times daily, Do not crush, chew, or split.    naloxone (NARCAN) 4 mg, nasal, As needed, May repeat every 2-3 minutes if needed, alternating nostrils, until medical assistance becomes available.    nicotine (Nicoderm CQ) 21 mg/24 hr patch 1 patch, transdermal, Every 24 hours    OLANZapine (ZYPREXA) 10 mg, oral, Nightly    oxyCODONE-acetaminophen (Percocet) 7.5-325 mg tablet 1 tablet, oral, Every 4 hours PRN    oxygen (O2) 4 L/min, Continuous    polyethylene glycol (GLYCOLAX, MIRALAX) 17 g, oral, Daily    rOPINIRole (REQUIP) 2 mg, oral, Nightly    rosuvastatin (CRESTOR) 40 mg, oral, Daily    sertraline (ZOLOFT) 50 mg, oral, Daily    umeclidinium-vilanteroL (Anoro Ellipta) 62.5-25 mcg/actuation blister with device 1 puff, inhalation, Daily    walker (Ultra-Light Rollator) misc 1 each, miscellaneous, As needed     Allergies   Allergen Reactions    Duloxetine Hallucinations     Pt states she doesn't think she has an allergy just a bad reaction when mixed with morphine .        Subjective   Melviarlin Sanchez is a 64 y.o. year old female patient with Recurrent stage III ([id number]) p16+ SCC of L oropharynx who competed chemoRT w/ 5 cycles weekly Cisplatin and 66gy proton radiatio on 11/19/24.     Interval History:  Patient presents for 2 months post treatment follow up.    Patient was admitted 1/14/25 for PNA. Patient is recovering well overall. She finished a course of Amoxicillin and Azithromycin. She still c/o some episodes of SOB that is worse from her baseline over the last week.  She has chronic and stable  dysphagia, seems to have improved as dry mouth may have improved.   Her throat pain is mild, pain overall well controlled on current pain regimen.  Has not had any n/v/d/c, not taking any antiemetics.   She still takes Omeprazole 40mg and has not had reflux symptoms.   Left neck radiation dermatitis is well healed.   Has unchanged transient tinnitus in left ear, left ear still feels muffled at times, no subjective hearing loss from baseline.    She is sleeping better since starting on Olanzapine 10mg at bedtime.   Her biggest complaint is her worsening short-term memory loss. It is impacting her life more significantly.   She has stopped smoking on 11/24/24 and is only vaping.      Review of Systems   Constitutional:  Negative for chills and fever.   HENT:   Positive for tinnitus and trouble swallowing. Negative for hearing loss, lump/mass, mouth sores, nosebleeds and sore throat.    Respiratory:  Positive for shortness of breath. Negative for cough.    Cardiovascular:  Negative for chest pain and leg swelling.   Gastrointestinal:  Negative for abdominal pain, constipation, diarrhea, nausea and vomiting.   Musculoskeletal:  Negative for arthralgias.   Skin:  Negative for rash.   Neurological:  Negative for headaches, light-headedness and numbness.     Objective   /74   Pulse 69   Temp 36.2 °C (97.2 °F) (Core)   Resp 18   Wt 72.7 kg (160 lb 4.4 oz)   SpO2 97%   BMI 28.39 kg/m²   Daily Weight  01/30/25 : 72.7 kg (160 lb 4.4 oz)  01/24/25 : 73.1 kg (161 lb 3.2 oz)  01/14/25 : 72.6 kg (160 lb)  12/11/24 : 72.6 kg (160 lb)  12/11/24 : 72.7 kg (160 lb 4.4 oz)  12/05/24 : 72.6 kg (160 lb)  11/26/24 : 73.1 kg (161 lb 2.5 oz)    Physical Exam  Vitals reviewed.   Constitutional:       Appearance: Normal appearance. She is well-developed.   HENT:      Head: Normocephalic and atraumatic.      Right Ear: External ear normal. No tenderness.      Left Ear: External ear normal. No tenderness.      Nose: Nose normal.       Mouth/Throat:      Lips: Pink.      Mouth: Mucous membranes are moist. No injury or oral lesions.      Tongue: No lesions.   Eyes:      General: Lids are normal.      Extraocular Movements: Extraocular movements intact.      Conjunctiva/sclera: Conjunctivae normal.      Pupils: Pupils are equal, round, and reactive to light.   Neck:      Thyroid: No thyroid mass.      Comments: L neck scar well healed. Small area of scabbing  Moderate lymphedema in left jaw and submental region    Cardiovascular:      Rate and Rhythm: Normal rate and regular rhythm.      Pulses: Normal pulses.      Heart sounds: No murmur heard.     No gallop.   Pulmonary:      Effort: Pulmonary effort is normal. No respiratory distress.      Breath sounds: Normal breath sounds and air entry. No stridor. No wheezing.   Abdominal:      General: Abdomen is flat. Bowel sounds are normal. There is no distension or abdominal bruit.      Palpations: Abdomen is soft. There is no mass.      Tenderness: There is no abdominal tenderness.   Musculoskeletal:         General: Normal range of motion.      Cervical back: Normal range of motion and neck supple. No signs of trauma. Normal range of motion.      Right lower leg: No edema.      Left lower leg: No edema.   Lymphadenopathy:      Cervical: No cervical adenopathy.      Upper Body:      Right upper body: No axillary adenopathy.      Left upper body: No axillary adenopathy.   Skin:     General: Skin is warm and dry.      Comments: No new skin lesions   Neurological:      General: No focal deficit present.      Mental Status: She is alert and oriented to person, place, and time. Mental status is at baseline.      Gait: Gait is intact.   Psychiatric:         Attention and Perception: Attention normal.         Mood and Affect: Mood and affect normal.         Behavior: Behavior is cooperative.       ECOGSCORE: 1- Restricted in physically strenuous activity.  Carries out light duty.    Diagnostic Results    LABS  Below labs were reviewed.  Results from last 7 days   Lab Units 01/30/25  1045   WBC AUTO x10*3/uL 8.4   HEMOGLOBIN g/dL 10.6*   HEMATOCRIT % 33.7*   PLATELETS AUTO x10*3/uL 192   NEUTROS ABS x10*3/uL 6.53   LYMPHS ABS AUTO x10*3/uL 0.71*   MONOS ABS AUTO x10*3/uL 0.96   EOS ABS AUTO x10*3/uL 0.12   NEUTROS PCT AUTO % 77.9   LYMPHS PCT AUTO % 8.5   MONOS PCT AUTO % 11.5   EOS PCT AUTO % 1.4      Results from last 7 days   Lab Units 01/30/25  1045   GLUCOSE mg/dL 64*   SODIUM mmol/L 138   POTASSIUM mmol/L 4.0   CHLORIDE mmol/L 102   CO2 mmol/L 28   BUN mg/dL 13   CREATININE mg/dL 0.61   EGFR mL/min/1.73m*2 >90   CALCIUM mg/dL 9.5   MAGNESIUM mg/dL 1.60   ALBUMIN g/dL 3.9   PROTEIN TOTAL g/dL 6.7   BILIRUBIN TOTAL mg/dL 0.6   ALK PHOS U/L 65   ALT U/L 22   AST U/L 18                       IMAGES  7/18/24 PET   1. New hypermetabolic activity seen in the left level 2 cervical lymph node, with central necrosis, consistent with heide metastasis.  2. New FDG avid clustered nodules are seen in the right upper lobe, which is nonspecific and may be infectious/inflammatory, however metastasis can not be excluded. Short-term follow-up chest CT is recommended.  3. Mild FDG uptake is seen throughout the esophagus, likely relating to esophagitis.  4. There is partial opacification of the left maxillary sinus, with mild FDG avidity, likely sinusitis.    CT Neck 6/20/24  IMPRESSION:  1. Compared to the CT neck from 07/14/2023, interval increase in edema within the aerodigestive tract, most pronounced within the supraglottic larynx and hypopharynx and there is resulting moderate narrowing of the supraglottic laryngeal lumen. There is also mild  prevertebral edema. There is no focal fluid collection to suggest an abscess. Cause of this edema is uncertain, could be infectious in etiology rather than treatment related given that patient completed radiation therapy 2 years ago.  2. No striking new mass is seen within the  visualized aerodigestive tract, noting that evaluation is somewhat limited due to presence of diffuse edema and therefore subtle lesions can not be excluded.  3. Interval enlargement of peripherally enhancing likely necrotic lymph node in the left level 2 heide station concerning for progression of disease. Alternatively, enlargement of the lymph node could reflect an infectious process. Otherwise, no cervical lymphadenopathy by size criteria.  4. Edema within the left facial and neck deep and superficial soft tissues as well as in the left supraclavicular fossa is favored to be treatment related and was also present on the prior CT. No focal fluid collection is seen within these regions to suggest an abscess. Correlate clinically.    10/18/24 CT Chest wo IV contrast  IMPRESSION:  1.  The previously noted posterior right upper lobe ill-defined nodular density has resolved. There is presently a subtle vague 1.5 cm ground-glass opacity within inferior right upper lobe, which is felt to represent focal bronchiolitis. Otherwise, no new suspicious pulmonary nodules or masses.  2. Similar bandlike opacity within lingula with associated mild bronchiectasis, which was minimally hypermetabolic on recent PET-CT, favoring benign/inflammatory etiology; attention on follow-up imaging within 6 months is recommended.  3. Redemonstrated moderate upper lung predominant centrilobular and paraseptal emphysema.  4. Severe coronary artery calcifications, indicating the presence of coronary artery disease. If the patient has associated symptoms recommend management as per chest pain guidelines       01/21/25 CT SOFT TISSUE NECK W IV CONTRAST  - Impression -  1. The left neck has postoperative changes from the dissection and flap reconstruction; no mass or fluid collection in this region is noted. The previously noted mass/necrotic node has been removed.  2. Soft tissue edema extending from the pharynx to the upper larynx has mildly  improved.    Assessment/Plan     Melvi Sanchez is a 64 y.o. year old female patient with PMH of stage III (cT1c cN3a M0) SCC of L tonsil, COPD, hypothyroidism and RLS, started treatment with CCRT w/ Cisplatin and proton radiation on 10/8/24 for recurrence to L oropharynx with diffuse JOLANTA.    We discussed the treatment paradigm in recurrence and salvage surgery - given her pathology showing JOLANTA it is reasonable to proceed with concurrent reirradiation with Proton. We consented today and all questions were answered. We will proceed upon CT simulation.    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 Proton machine is down today so first dose RT will be on IMRT on 10/9 then resume PORT.    - 10/15/24: C2. Unchanged mild chronic dysphagia and xerostomia from prior RT. Left neck pain controlled w/ current pain regimen.  - 10/22/24: C3. Increased odynophagia in the last 3-4 days, like due to thrush. PO intake decreased and has resulting weight loss. Also with increased lymphedema in the left jaw and submental region. Left thigh incision still numb, fluid accumulation is slowed down. She's smoking about 1-2 cigarettes a day,   - 10/29/24: C4. Discussed goals of at least 200 mg/m2, will continue to evaluate. Thrush improved.   - 11/5/24: C5. Patient with more forgetfulness and weakness this week. Appetite remain good. No n/v. Stable mild lymphedema in left jaw/neck. Her left sided odynophagia is a little worse so will reach out to Supp Onc on increasing pain medications.   - 11/12/24: Patient completed 200mg/m2 of Cisplatin and we will not proceed with Cycle 6. She has expected SE from chemoRT w/ fatigue, odynophagia from mucositis, left jaw pain with flucuating lymphedema, skin changed from radiation.   - 11/19/24: Patient completed 66gy of radiation. Her  SE from treatment are relatively well managed. Odynophagia controlled on current pain regimen, PO intake remain good and weight overall stable. Radiation dermatitis in left neck will be managed w/ topicals and mepilex. Has lymphedema in the left jaw/neck that is unchanged.  - 1/30/25: Patient is 2 months from treatment. Recently admitted for PNA, s/p abx, with some improvement but still has worse SOB from baseline. Will follow up with a CXR. Otherwise her chronic SE are stable/improving. Tolerating regular diet, odynophagia is well controlled, chronic dysphagia and xerostomia is unchanged. Oral mucositis is resolved. She's still fatigued and have lower stamina, has anemia likely 2ry to chemo treatment.     # Sleep Disturbance  - Trouble sleeping, will continue Olanzapine 5mg nightly rather than just D1-4.   - Increased to Olanzapine 10mg nightly with improvement in sleep. Will continue this dose    # RUL nodules  - Being followed with CT scan in next 3 months  - 10/18/24 CT Chest wo contrast showed the previously noted posterior right upper lobe ill-defined nodular density has resolved. Similar bandlike opacity within lingula with associated mild bronchiectasis, which was minimally hypermetabolic on recent PET-CT, favoring benign/inflammatory etiology; attention on follow-up imaging within 6 months is recommended.  - Follow up on band like opacity within lingula with CT chest wo contrast likely in April 2025    # Memory Loss  - Patient reported short-term memory loss after first round of chemoRT, however her memory loss is much worse with this round. Has trouble remembering, dates, recalling conversations, loses train of thoughts, even though she makes herself phone reminders. Reports her mother passed away from vascular dementia.   Will refer to Neurology.     PLAN  -- RTC 1 months on 2/27/25 w/ MedOnc, labs cbc, cmp, tsh/t4, iron labs ordered per patient request. 3 months post treatment PET & CT Neck same day  --  STAT CXR today to follow up on PNA and SOB  -- Olanzapine 10mg QHS for insomnia  -- Referral to Neurology for memory loss, scheduled in June 2025

## 2025-01-30 NOTE — PROGRESS NOTES
Pt here today with daughter. States was inpt last month for pneumonia. States still pain in lower lungs that is managed with medication. Denies any new or worsening signs and symptoms. Medications, pharmacy preference and allergies were reviewed with patient and updated  Patient verbalized understanding and agreement regarding discussed information via verbal feedback.  Education Documentation  Returning to Work/School/Activities, taught by Brionna Caputo RN at 1/30/2025 11:06 AM.  Learner: Family, Patient  Readiness: Acceptance  Method: Explanation, Demonstration  Response: Verbalizes Understanding, Demonstrated Understanding    Stress Management, taught by Brionna Caputo RN at 1/30/2025 11:06 AM.  Learner: Family, Patient  Readiness: Acceptance  Method: Explanation, Demonstration  Response: Verbalizes Understanding, Demonstrated Understanding    Changing Role with Self and Family, taught by Brionna Caputo RN at 1/30/2025 11:06 AM.  Learner: Family, Patient  Readiness: Acceptance  Method: Explanation, Demonstration  Response: Verbalizes Understanding, Demonstrated Understanding    Leisure Education, taught by Brionna Caputo RN at 1/30/2025 11:06 AM.  Learner: Family, Patient  Readiness: Acceptance  Method: Explanation, Demonstration  Response: Verbalizes Understanding, Demonstrated Understanding    Health Systems & Community Resources, taught by Brionna Caputo RN at 1/30/2025 11:06 AM.  Learner: Family, Patient  Readiness: Acceptance  Method: Explanation, Demonstration  Response: Verbalizes Understanding, Demonstrated Understanding    Advanced Medical Directives, taught by Brionna Caputo RN at 1/30/2025 11:06 AM.  Learner: Family, Patient  Readiness: Acceptance  Method: Explanation, Demonstration  Response: Verbalizes Understanding, Demonstrated Understanding    Escort, Parking, Transportation Home, taught by Brionna Caputo RN at 1/30/2025 11:06  AM.  Learner: Family, Patient  Readiness: Acceptance  Method: Explanation, Demonstration  Response: Verbalizes Understanding, Demonstrated Understanding    Referrals to Support Services, taught by Brionna Caputo RN at 1/30/2025 11:06 AM.  Learner: Family, Patient  Readiness: Acceptance  Method: Explanation, Demonstration  Response: Verbalizes Understanding, Demonstrated Understanding    Support Systems, taught by Brionna Caputo RN at 1/30/2025 11:06 AM.  Learner: Family, Patient  Readiness: Acceptance  Method: Explanation, Demonstration  Response: Verbalizes Understanding, Demonstrated Understanding    Financial Assistance Information, taught by Brionna Caputo RN at 1/30/2025 11:06 AM.  Learner: Family, Patient  Readiness: Acceptance  Method: Explanation, Demonstration  Response: Verbalizes Understanding, Demonstrated Understanding    Financial Benefits Summary, taught by Brionna Caputo RN at 1/30/2025 11:06 AM.  Learner: Family, Patient  Readiness: Acceptance  Method: Explanation, Demonstration  Response: Verbalizes Understanding, Demonstrated Understanding    Healthy Lifestyle, taught by Brionna Caputo RN at 1/30/2025 11:06 AM.  Learner: Family, Patient  Readiness: Acceptance  Method: Explanation, Demonstration  Response: Verbalizes Understanding, Demonstrated Understanding    Monitoring Weight, taught by Brionna Caputo RN at 1/30/2025 11:06 AM.  Learner: Family, Patient  Readiness: Acceptance  Method: Explanation, Demonstration  Response: Verbalizes Understanding, Demonstrated Understanding    Tips for Daily Living, taught by Brionna Caputo RN at 1/30/2025 11:06 AM.  Learner: Family, Patient  Readiness: Acceptance  Method: Explanation, Demonstration  Response: Verbalizes Understanding, Demonstrated Understanding    Nutrition/Diet, taught by Brionna Caputo RN at 1/30/2025 11:06 AM.  Learner: Family, Patient  Readiness: Acceptance  Method:  Explanation, Demonstration  Response: Verbalizes Understanding, Demonstrated Understanding    Food-Drug Interactions, taught by Brionna Caputo RN at 1/30/2025 11:06 AM.  Learner: Family, Patient  Readiness: Acceptance  Method: Explanation, Demonstration  Response: Verbalizes Understanding, Demonstrated Understanding    Nutrition Related Education, taught by Brionna Caputo RN at 1/30/2025 11:06 AM.  Learner: Family, Patient  Readiness: Acceptance  Method: Explanation, Demonstration  Response: Verbalizes Understanding, Demonstrated Understanding    Pain Medication Actions & Side Effects, taught by Brionna Caputo RN at 1/30/2025 11:06 AM.  Learner: Family, Patient  Readiness: Acceptance  Method: Explanation, Demonstration  Response: Verbalizes Understanding, Demonstrated Understanding    Patient Controlled Analgesia, taught by Brionna Caputo RN at 1/30/2025 11:06 AM.  Learner: Family, Patient  Readiness: Acceptance  Method: Explanation, Demonstration  Response: Verbalizes Understanding, Demonstrated Understanding    Discuss the Use of Pain Control Measures Before Pain Becomes Severe, taught by Brionna Caputo RN at 1/30/2025 11:06 AM.  Learner: Family, Patient  Readiness: Acceptance  Method: Explanation, Demonstration  Response: Verbalizes Understanding, Demonstrated Understanding    Pain Management, taught by Brionna Caputo RN at 1/30/2025 11:06 AM.  Learner: Family, Patient  Readiness: Acceptance  Method: Explanation, Demonstration  Response: Verbalizes Understanding, Demonstrated Understanding    Pain Rating Scale, taught by Brionna Caputo RN at 1/30/2025 11:06 AM.  Learner: Family, Patient  Readiness: Acceptance  Method: Explanation, Demonstration  Response: Verbalizes Understanding, Demonstrated Understanding    Education Comments  No comments found.

## 2025-02-04 ENCOUNTER — PATIENT OUTREACH (OUTPATIENT)
Dept: PRIMARY CARE | Facility: CLINIC | Age: 65
End: 2025-02-04
Payer: COMMERCIAL

## 2025-02-04 DIAGNOSIS — J18.9 COMMUNITY ACQUIRED PNEUMONIA OF RIGHT UPPER LOBE OF LUNG: Primary | ICD-10-CM

## 2025-02-04 RX ORDER — AMOXICILLIN AND CLAVULANATE POTASSIUM 875; 125 MG/1; MG/1
875 TABLET, FILM COATED ORAL 2 TIMES DAILY
Qty: 20 TABLET | Refills: 0 | Status: SHIPPED | OUTPATIENT
Start: 2025-02-04 | End: 2025-02-14

## 2025-02-04 NOTE — PROGRESS NOTES
Call regarding after hospitalization.  At time of outreach call the patient feels as if their condition has (improved) since her hospitalization. States she does still having some SOB at times and cough. Had an xray completed on 1/30 but did not hear from anyone based on the results. Checked and it appears in report that there was minimal improvement of opacities in chest xray. PCP notified of this. Patient verbalized understanding of upcoming appt with PCP on 2/7.

## 2025-02-05 ENCOUNTER — APPOINTMENT (OUTPATIENT)
Dept: PALLIATIVE MEDICINE | Facility: CLINIC | Age: 65
End: 2025-02-05
Payer: COMMERCIAL

## 2025-02-05 DIAGNOSIS — E03.8 OTHER SPECIFIED HYPOTHYROIDISM: ICD-10-CM

## 2025-02-05 RX ORDER — LEVOTHYROXINE SODIUM 100 UG/1
TABLET ORAL
Qty: 90 TABLET | Refills: 1 | Status: SHIPPED | OUTPATIENT
Start: 2025-02-05

## 2025-02-07 ENCOUNTER — APPOINTMENT (OUTPATIENT)
Dept: PRIMARY CARE | Facility: CLINIC | Age: 65
End: 2025-02-07
Payer: COMMERCIAL

## 2025-02-07 VITALS
HEIGHT: 63 IN | DIASTOLIC BLOOD PRESSURE: 72 MMHG | BODY MASS INDEX: 29.06 KG/M2 | WEIGHT: 164 LBS | HEART RATE: 71 BPM | SYSTOLIC BLOOD PRESSURE: 128 MMHG | OXYGEN SATURATION: 96 %

## 2025-02-07 DIAGNOSIS — R29.898 LEG WEAKNESS, BILATERAL: ICD-10-CM

## 2025-02-07 DIAGNOSIS — R29.6 FREQUENT FALLS: ICD-10-CM

## 2025-02-07 DIAGNOSIS — D68.9 COAGULATION DEFECT, UNSPECIFIED: ICD-10-CM

## 2025-02-07 DIAGNOSIS — C77.0 METASTASIS TO HEAD AND NECK LYMPH NODE (MULTI): ICD-10-CM

## 2025-02-07 DIAGNOSIS — J15.9 COMMUNITY ACQUIRED BACTERIAL PNEUMONIA: Primary | ICD-10-CM

## 2025-02-07 DIAGNOSIS — R26.89 BALANCE PROBLEM: ICD-10-CM

## 2025-02-07 DIAGNOSIS — R42 DIZZINESS: ICD-10-CM

## 2025-02-07 DIAGNOSIS — J44.1 CHRONIC OBSTRUCTIVE PULMONARY DISEASE WITH ACUTE EXACERBATION (MULTI): ICD-10-CM

## 2025-02-07 DIAGNOSIS — J96.21 ACUTE AND CHRONIC RESPIRATORY FAILURE WITH HYPOXIA (MULTI): ICD-10-CM

## 2025-02-07 DIAGNOSIS — R20.0 LEG NUMBNESS: ICD-10-CM

## 2025-02-07 DIAGNOSIS — R26.81 GAIT INSTABILITY: ICD-10-CM

## 2025-02-07 PROBLEM — Z87.891 FORMER LIGHT TOBACCO SMOKER: Status: ACTIVE | Noted: 2025-02-07

## 2025-02-07 PROBLEM — F17.200 TOBACCO USE DISORDER: Status: RESOLVED | Noted: 2023-09-20 | Resolved: 2025-02-07

## 2025-02-07 PROCEDURE — 99214 OFFICE O/P EST MOD 30 MIN: CPT | Performed by: FAMILY MEDICINE

## 2025-02-07 PROCEDURE — 3008F BODY MASS INDEX DOCD: CPT | Performed by: FAMILY MEDICINE

## 2025-02-07 RX ORDER — FLUTICASONE FUROATE, UMECLIDINIUM BROMIDE AND VILANTEROL TRIFENATATE 200; 62.5; 25 UG/1; UG/1; UG/1
1 POWDER RESPIRATORY (INHALATION) DAILY
Qty: 60 EACH | Refills: 11 | Status: SHIPPED | OUTPATIENT
Start: 2025-02-07

## 2025-02-07 RX ORDER — METHYLPREDNISOLONE 4 MG/1
TABLET ORAL
Qty: 21 TABLET | Refills: 0 | Status: SHIPPED | OUTPATIENT
Start: 2025-02-07 | End: 2025-02-13

## 2025-02-07 RX ORDER — CALCIUM CARBONATE 160(400)MG
1 TABLET,CHEWABLE ORAL AS NEEDED
Qty: 1 EACH | Refills: 0 | Status: SHIPPED | OUTPATIENT
Start: 2025-02-07

## 2025-02-07 ASSESSMENT — PATIENT HEALTH QUESTIONNAIRE - PHQ9
1. LITTLE INTEREST OR PLEASURE IN DOING THINGS: NOT AT ALL
2. FEELING DOWN, DEPRESSED OR HOPELESS: NOT AT ALL
SUM OF ALL RESPONSES TO PHQ9 QUESTIONS 1 & 2: 0

## 2025-02-07 NOTE — PROGRESS NOTES
"Subjective   Patient ID: Melvi Sanchez \"Gretchen\" is a 64 y.o. female who presents for Follow-up (Hospital follow up pneumonia feels pretty good ).  HPI  Was started on Augmentin for CAP on CXR  Breathing is not doing too bad  Some cough- feels like needs to cough something up but it does not come up  No fever, chills, CP  No n/v, diarrhea  No wheezing  Feeling tired    Current Outpatient Medications:     albuterol 90 mcg/actuation inhaler, Inhale 2 puffs every 4 hours if needed for shortness of breath., Disp: 18 g, Rfl: 11    amoxicillin-pot clavulanate (Augmentin) 875-125 mg tablet, Take 1 tablet (875 mg) by mouth 2 times a day for 10 days., Disp: 20 tablet, Rfl: 0    aspirin 81 mg EC tablet, Take 1 tablet (81 mg) by mouth once daily., Disp: 90 tablet, Rfl: 3    buPROPion XL (Wellbutrin XL) 300 mg 24 hr tablet, Take 1 tablet (300 mg) by mouth once daily in the morning. Do not crush, chew, or split., Disp: 30 tablet, Rfl: 5    gabapentin (Neurontin) 300 mg capsule, Take 1 capsule (300 mg) by mouth 3 times a day., Disp: 270 capsule, Rfl: 0    guaiFENesin (Mucinex) 600 mg 12 hr tablet, Take 2 tablets (1,200 mg) by mouth 2 times a day. Do not crush, chew, or split., Disp: 120 tablet, Rfl: 11    levothyroxine (Synthroid, Levoxyl) 100 mcg tablet, TAKE 1 TABLET BY MOUTH EARLY IN THE MORNING. TAKE ON AN EMPTY STOMACH AT THE SAME TIME EACH DAY, EITHER 30 TO 60 MINUTES PRIOR TO BREAKFAST., Disp: 90 tablet, Rfl: 1    melatonin 10 mg tablet, Take 1 tablet (10 mg) by mouth once daily at bedtime., Disp: 30 tablet, Rfl: 3    morphine CR (MS Contin) 15 mg 12 hr tablet, Take 1 tablet (15 mg) by mouth 2 times a day. Do not crush, chew, or split., Disp: 60 tablet, Rfl: 0    naloxone (Narcan) 4 mg/0.1 mL nasal spray, Administer 1 spray (4 mg) into affected nostril(s) if needed for opioid reversal or respiratory depression. May repeat every 2-3 minutes if needed, alternating nostrils, until medical assistance becomes available., " Disp: 2 each, Rfl: 11    nicotine (Nicoderm CQ) 21 mg/24 hr patch, PLACE 1 PATCH OVER 24 HOURS ON THE SKIN ONCE EVERY 24 HOURS, Disp: 28 patch, Rfl: 1    OLANZapine (ZyPREXA) 10 mg tablet, Take 1 tablet (10 mg) by mouth once daily at bedtime., Disp: 30 tablet, Rfl: 0    oxyCODONE-acetaminophen (Percocet) 7.5-325 mg tablet, Take 1 tablet by mouth every 4 hours if needed for severe pain (7 - 10)., Disp: 180 tablet, Rfl: 0    rOPINIRole (Requip) 1 mg tablet, Take 2 tablets (2 mg) by mouth once daily at bedtime., Disp: 180 tablet, Rfl: 3    rosuvastatin (Crestor) 40 mg tablet, Take 1 tablet (40 mg) by mouth once daily., Disp: 90 tablet, Rfl: 3    sertraline (Zoloft) 50 mg tablet, Take 1 tablet (50 mg) by mouth once daily., Disp: 30 tablet, Rfl: 5    fluticasone-umeclidin-vilanter (Trelegy Ellipta) 200-62.5-25 mcg blister with device, Inhale 1 puff once daily., Disp: 60 each, Rfl: 11    methylPREDNISolone (Medrol Dospak) 4 mg tablets, Take as directed on package., Disp: 21 tablet, Rfl: 0    walker (Ultra-Light Rollator) misc, 1 each if needed (as needed for gait and balance)., Disp: 1 each, Rfl: 0   Past Surgical History:   Procedure Laterality Date    BREAST BIOPSY Left     benign FA     SECTION, LOW TRANSVERSE      CHOLECYSTECTOMY  2014    Cholecystectomy    COLONOSCOPY      HYSTERECTOMY  2004    Hysterectomy    MOUTH SURGERY  2014    Oral Surgery Tooth Extraction    NECK SURGERY  2024    Creation Free Flap Head/Neck, Dissection Neck      Past Medical History:   Diagnosis Date    Acute hypoxemic respiratory failure (Multi) 2023    Breast calcification, left     COPD (chronic obstructive pulmonary disease) (Multi)     Dysphagia     Essential (primary) hypertension 2018    HTN (hypertension), benign    Fibroadenoma of left breast     H/O malignant neoplasm of tonsil     s/p XRT and chemotherapy completed .    Hypothyroidism     Morbid obesity (Multi) 2023     "Nausea and/or vomiting 04/03/2023    OAB (overactive bladder)     Pharyngitis 04/03/2023    Pneumonia 04/03/2023    Restless legs syndrome     Sinusitis 04/03/2023    Tobacco use disorder 09/20/2023     Social History     Tobacco Use    Smoking status: Former     Current packs/day: 1.00     Average packs/day: 1 pack/day for 40.7 years (40.7 ttl pk-yrs)     Types: Cigarettes     Start date: 11/30/2023    Smokeless tobacco: Never    Tobacco comments:     3 or less cig. Daily per patient stated 10/10/24   Substance Use Topics    Alcohol use: Not Currently     Alcohol/week: 1.0 standard drink of alcohol     Types: 1 Glasses of wine per week    Drug use: Not Currently     Frequency: 1.0 times per week     Types: Marijuana     Comment: use within past week      Family History   Problem Relation Name Age of Onset    Emphysema Mother      COPD Mother      Hyperlipidemia Sister      Breast cancer Paternal Grandmother        Review of Systems    Objective   /72   Pulse 71   Ht 1.6 m (5' 3\")   Wt 74.4 kg (164 lb)   SpO2 96%   BMI 29.05 kg/m²    Physical Exam  Vitals and nursing note reviewed.   Constitutional:       Appearance: Normal appearance.   HENT:      Head: Normocephalic and atraumatic.      Right Ear: Ear canal and external ear normal.      Left Ear: Tympanic membrane, ear canal and external ear normal.      Nose: Congestion present.      Mouth/Throat:      Mouth: Mucous membranes are moist.      Pharynx: Oropharynx is clear. No oropharyngeal exudate or posterior oropharyngeal erythema.   Eyes:      Extraocular Movements: Extraocular movements intact.      Conjunctiva/sclera: Conjunctivae normal.      Pupils: Pupils are equal, round, and reactive to light.   Cardiovascular:      Rate and Rhythm: Normal rate and regular rhythm.      Pulses: Normal pulses.      Heart sounds: Normal heart sounds.   Pulmonary:      Breath sounds: Decreased breath sounds and wheezing present. No rhonchi or rales.   Abdominal:    "   General: Abdomen is flat. Bowel sounds are normal.      Palpations: Abdomen is soft.   Musculoskeletal:      Cervical back: Normal range of motion and neck supple.   Skin:     Capillary Refill: Capillary refill takes less than 2 seconds.   Neurological:      General: No focal deficit present.      Mental Status: She is alert and oriented to person, place, and time.   Psychiatric:         Mood and Affect: Mood normal.         Behavior: Behavior normal.         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)    Leg weakness, bilateral    Relevant Medications    walker (Ultra-Light Rollator) misc    Frequent falls    Relevant Medications    walker (Ultra-Light Rollator) misc    COPD (chronic obstructive pulmonary disease) (Multi)    Relevant Medications    methylPREDNISolone (Medrol Dospak) 4 mg tablets    fluticasone-umeclidin-vilanter (Trelegy Ellipta) 200-62.5-25 mcg blister with device    Other Relevant Orders    XR chest 2 views    Coagulation defect, unspecified    Balance problem    Relevant Medications    walker (Ultra-Light Rollator) misc    Acute and chronic respiratory failure with hypoxia (Multi)     Other Visit Diagnoses       Community acquired bacterial pneumonia    -  Primary    Relevant Orders    XR chest 2 views    Gait instability        Relevant Medications    walker (Ultra-Light Rollator) misc    Leg numbness        Relevant Medications    walker (Ultra-Light Rollator) misc    Dizziness        Relevant Medications    walker (Ultra-Light Rollator) misc        CAP- Augmentin, fluids, inhalers    COPD/Acute on CRF- medrol, Change to Breztri, avoid cigarette smoke    Head and Neck Met- follow with oncology    Balance issues- Rolling walker, Home PT    Patient understands and agrees with treatment plan    Alton King,

## 2025-02-10 DIAGNOSIS — J44.1 CHRONIC OBSTRUCTIVE PULMONARY DISEASE WITH ACUTE EXACERBATION (MULTI): ICD-10-CM

## 2025-02-10 RX ORDER — FLUTICASONE FUROATE, UMECLIDINIUM BROMIDE AND VILANTEROL TRIFENATATE 200; 62.5; 25 UG/1; UG/1; UG/1
1 POWDER RESPIRATORY (INHALATION) DAILY
Qty: 60 EACH | Refills: 11 | Status: SHIPPED | OUTPATIENT
Start: 2025-02-10 | End: 2025-02-12 | Stop reason: SDUPTHER

## 2025-02-11 ENCOUNTER — TELEPHONE (OUTPATIENT)
Dept: PRIMARY CARE | Facility: CLINIC | Age: 65
End: 2025-02-11
Payer: COMMERCIAL

## 2025-02-11 DIAGNOSIS — F17.210 NICOTINE DEPENDENCE, CIGARETTES, UNCOMPLICATED: Primary | ICD-10-CM

## 2025-02-11 RX ORDER — VARENICLINE TARTRATE 1 MG/1
1 TABLET, FILM COATED ORAL 2 TIMES DAILY
Qty: 60 TABLET | Refills: 5 | Status: SHIPPED | OUTPATIENT
Start: 2025-02-11 | End: 2025-08-10

## 2025-02-12 ENCOUNTER — TELEPHONE (OUTPATIENT)
Dept: PRIMARY CARE | Facility: CLINIC | Age: 65
End: 2025-02-12

## 2025-02-12 ENCOUNTER — OFFICE VISIT (OUTPATIENT)
Dept: PALLIATIVE MEDICINE | Facility: CLINIC | Age: 65
End: 2025-02-12
Payer: COMMERCIAL

## 2025-02-12 VITALS
TEMPERATURE: 97.5 F | BODY MASS INDEX: 28.84 KG/M2 | SYSTOLIC BLOOD PRESSURE: 118 MMHG | HEART RATE: 63 BPM | WEIGHT: 162.8 LBS | DIASTOLIC BLOOD PRESSURE: 66 MMHG | OXYGEN SATURATION: 97 % | RESPIRATION RATE: 16 BRPM

## 2025-02-12 DIAGNOSIS — R26.89 BALANCE PROBLEM: ICD-10-CM

## 2025-02-12 DIAGNOSIS — F32.A ANXIETY AND DEPRESSION: ICD-10-CM

## 2025-02-12 DIAGNOSIS — T45.1X5A CHEMOTHERAPY INDUCED NAUSEA AND VOMITING: ICD-10-CM

## 2025-02-12 DIAGNOSIS — R26.81 GAIT INSTABILITY: ICD-10-CM

## 2025-02-12 DIAGNOSIS — Z51.5 PALLIATIVE CARE ENCOUNTER: ICD-10-CM

## 2025-02-12 DIAGNOSIS — R29.898 LEG WEAKNESS, BILATERAL: ICD-10-CM

## 2025-02-12 DIAGNOSIS — G47.00 INSOMNIA, UNSPECIFIED TYPE: ICD-10-CM

## 2025-02-12 DIAGNOSIS — R42 DIZZINESS: ICD-10-CM

## 2025-02-12 DIAGNOSIS — R20.0 LEG NUMBNESS: ICD-10-CM

## 2025-02-12 DIAGNOSIS — R29.6 FREQUENT FALLS: ICD-10-CM

## 2025-02-12 DIAGNOSIS — M17.0 PRIMARY OSTEOARTHRITIS OF BOTH KNEES: ICD-10-CM

## 2025-02-12 DIAGNOSIS — F41.9 ANXIETY AND DEPRESSION: ICD-10-CM

## 2025-02-12 DIAGNOSIS — M17.0 PRIMARY OSTEOARTHRITIS OF BOTH KNEES: Primary | ICD-10-CM

## 2025-02-12 DIAGNOSIS — C10.9 OROPHARYNGEAL CANCER (MULTI): ICD-10-CM

## 2025-02-12 DIAGNOSIS — R11.2 CHEMOTHERAPY INDUCED NAUSEA AND VOMITING: ICD-10-CM

## 2025-02-12 DIAGNOSIS — R63.0 DECREASED APPETITE: Primary | ICD-10-CM

## 2025-02-12 DIAGNOSIS — J43.2 CENTRILOBULAR EMPHYSEMA (MULTI): Primary | ICD-10-CM

## 2025-02-12 DIAGNOSIS — J44.1 CHRONIC OBSTRUCTIVE PULMONARY DISEASE WITH ACUTE EXACERBATION (MULTI): ICD-10-CM

## 2025-02-12 DIAGNOSIS — G89.3 CANCER ASSOCIATED PAIN: ICD-10-CM

## 2025-02-12 PROCEDURE — 99214 OFFICE O/P EST MOD 30 MIN: CPT | Performed by: NURSE PRACTITIONER

## 2025-02-12 RX ORDER — CALCIUM CARBONATE 160(400)MG
1 TABLET,CHEWABLE ORAL AS NEEDED
Qty: 1 EACH | Refills: 0 | Status: SHIPPED | OUTPATIENT
Start: 2025-02-12 | End: 2025-02-12 | Stop reason: SDUPTHER

## 2025-02-12 RX ORDER — ACETAMINOPHEN, DIPHENHYDRAMINE HCL, PHENYLEPHRINE HCL 325; 25; 5 MG/1; MG/1; MG/1
1 TABLET ORAL NIGHTLY
Qty: 30 TABLET | Refills: 3 | Status: SHIPPED | OUTPATIENT
Start: 2025-02-12

## 2025-02-12 RX ORDER — CALCIUM CARBONATE 160(400)MG
1 TABLET,CHEWABLE ORAL AS NEEDED
Qty: 1 EACH | Refills: 0 | Status: SHIPPED | OUTPATIENT
Start: 2025-02-12

## 2025-02-12 RX ORDER — GABAPENTIN 300 MG/1
300 CAPSULE ORAL NIGHTLY
Qty: 30 CAPSULE | Refills: 2 | Status: SHIPPED | OUTPATIENT
Start: 2025-02-12 | End: 2025-05-13

## 2025-02-12 RX ORDER — OXYCODONE HYDROCHLORIDE 5 MG/1
5 TABLET ORAL EVERY 6 HOURS PRN
Qty: 120 TABLET | Refills: 0 | Status: SHIPPED | OUTPATIENT
Start: 2025-02-12 | End: 2025-04-13

## 2025-02-12 RX ORDER — OLANZAPINE 10 MG/1
10 TABLET ORAL NIGHTLY
Qty: 30 TABLET | Refills: 2 | Status: SHIPPED | OUTPATIENT
Start: 2025-02-12 | End: 2025-05-13

## 2025-02-12 RX ORDER — FLUTICASONE FUROATE, UMECLIDINIUM BROMIDE AND VILANTEROL TRIFENATATE 200; 62.5; 25 UG/1; UG/1; UG/1
1 POWDER RESPIRATORY (INHALATION) DAILY
Qty: 60 EACH | Refills: 11 | Status: SHIPPED | OUTPATIENT
Start: 2025-02-12 | End: 2025-02-13 | Stop reason: CLARIF

## 2025-02-12 RX ORDER — MORPHINE SULFATE 15 MG/1
15 TABLET, FILM COATED, EXTENDED RELEASE ORAL 2 TIMES DAILY
Qty: 60 TABLET | Refills: 0 | Status: SHIPPED | OUTPATIENT
Start: 2025-02-12 | End: 2025-03-14

## 2025-02-12 ASSESSMENT — ENCOUNTER SYMPTOMS
LOSS OF SENSATION IN FEET: 0
DEPRESSION: 0
OCCASIONAL FEELINGS OF UNSTEADINESS: 0

## 2025-02-12 ASSESSMENT — PAIN SCALES - GENERAL: PAINLEVEL_OUTOF10: 2

## 2025-02-12 NOTE — PROGRESS NOTES
"SUPPORTIVE AND PALLIATIVE ONCOLOGY CONSULT - OUTPATIENT FOLLOW UP    SERVICE DATE: 2/12/2025    Referred by:  PIYUSH Lee-CNP  Medical Oncologist: Alejandro Hurst MD   Radiation Oncologist: MD Chetna Gutierrez MD  Primary Physician: Alton King  [phone]    REASON FOR CONSULT/CHIEF CONSULT COMPLAINT: Introduction to Supportive and Palliative Oncology Services    Subjective   HISTORY OF PRESENT ILLNESS: Melvi Sanchez is a 64 y.o. 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 Treatment plan is concurrent reirradiation with Proton / cisplatin.     Additional PMHx  COPD, hypothyroidism and RLS     11/5/24: Having increased throat pain. Gabapentin up to 300mg TID per rad onc. Started on zoloft 25mg per pcp but she hasn't started first dose yet.     2/12/25: Hospitalized for PNA. Still on abx. Pain is better down to 4 oxy per day from 6. Still taking Mscontin BID. Fatigued but slowly improving. Having some memory issues and has appt with neurology scheduled. Finished chemo.    Symptom Assessment:    Pain:a little    Left lateral thigh  from mid thigh to knee - painful to touch - tingling - deep ache - some numbness   Left jaw/neck - stinging / \"raw\" - localized - some numbness or neck/ear/cheek - no worse with mastication     Lack of appetite: none   Weight loss: none  Pain in mouth: none   Dry mouth: somewhat - Xylomet PRN   Taste changes: a little - improving   Nausea/early satiety: none  Vomiting: none  Constipation: none  Diarrhea: none  Lack of energy: a little  Difficulty sleeping: somewhat - (onset and maintenance)  melatonin at bedtime and olanzapine but she ran out   Anxiety: a little  Depression: a little  Shortness of breath: a little - resolving pna   Other: mild dysphagia (better) and odynophagia (stable and very mild)    Information obtained from: interview of " patient  ______________________________________________________________________     Oncology History   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)   4/3/2023 Initial Diagnosis    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)     10/8/2024 -  Chemotherapy    CISplatin with Concurrent Radiation (Weekly), 7 Week Cycle     Tonsil cancer (Multi)   4/3/2023 Initial Diagnosis    Tonsil cancer (Multi)     10/8/2024 -  Chemotherapy    CISplatin with Concurrent Radiation (Weekly), 7 Week Cycle         Past Medical History:   Diagnosis Date    Acute hypoxemic respiratory failure (Multi) 2023    Breast calcification, left     COPD (chronic obstructive pulmonary disease) (Multi)     Dysphagia     Essential (primary) hypertension 2018    HTN (hypertension), benign    Fibroadenoma of left breast     H/O malignant neoplasm of tonsil     s/p XRT and chemotherapy completed .    Hypothyroidism     Morbid obesity (Multi) 2023    Nausea and/or vomiting 2023    OAB (overactive bladder)     Pharyngitis 2023    Pneumonia 2023    Restless legs syndrome     Sinusitis 2023    Tobacco use disorder 2023     Past Surgical History:   Procedure Laterality Date    BREAST BIOPSY Left     benign FA     SECTION, LOW TRANSVERSE      CHOLECYSTECTOMY  2014    Cholecystectomy    COLONOSCOPY      HYSTERECTOMY  2004    Hysterectomy    MOUTH SURGERY  2014    Oral Surgery Tooth Extraction    NECK SURGERY  2024    Creation Free Flap Head/Neck, Dissection Neck     Family History   Problem Relation Name Age of Onset    Emphysema Mother      COPD Mother      Hyperlipidemia Sister      Breast cancer Paternal Grandmother          SOCIAL HISTORY  . 3 children.   Social History:  reports that she has quit smoking. Her smoking use included cigarettes. She started smoking about 14 months ago. She has a 40.7 pack-year smoking history. She has never used smokeless  tobacco. She reports that she does not currently use alcohol after a past usage of about 1.0 standard drink of alcohol per week. She reports that she does not currently use drugs after having used the following drugs: Marijuana. Frequency: 1.00 time per week.    REVIEW OF SYSTEMS  Review of systems negative unless noted in HPI.       Objective     Current Outpatient Medications   Medication Instructions    albuterol 90 mcg/actuation inhaler 2 puffs, inhalation, Every 4 hours PRN    amoxicillin-pot clavulanate (Augmentin) 875-125 mg tablet 875 mg, oral, 2 times daily    aspirin 81 mg, oral, Daily    buPROPion XL (WELLBUTRIN XL) 300 mg, oral, Every morning, Do not crush, chew, or split.    fluticasone-umeclidin-vilanter (Trelegy Ellipta) 200-62.5-25 mcg blister with device 1 puff, inhalation, Daily    gabapentin (NEURONTIN) 300 mg, oral, 3 times daily    guaiFENesin (MUCINEX) 1,200 mg, oral, 2 times daily, Do not crush, chew, or split.    levothyroxine (Synthroid, Levoxyl) 100 mcg tablet TAKE 1 TABLET BY MOUTH EARLY IN THE MORNING. TAKE ON AN EMPTY STOMACH AT THE SAME TIME EACH DAY, EITHER 30 TO 60 MINUTES PRIOR TO BREAKFAST.    melatonin 10 mg, oral, Nightly    methylPREDNISolone (Medrol Dospak) 4 mg tablets Take as directed on package.    morphine CR (MS CONTIN) 15 mg, oral, 2 times daily, Do not crush, chew, or split.    naloxone (NARCAN) 4 mg, nasal, As needed, May repeat every 2-3 minutes if needed, alternating nostrils, until medical assistance becomes available.    nicotine (Nicoderm CQ) 21 mg/24 hr patch 1 patch, transdermal, Every 24 hours    OLANZapine (ZYPREXA) 10 mg, oral, Nightly    oxyCODONE-acetaminophen (Percocet) 7.5-325 mg tablet 1 tablet, oral, Every 4 hours PRN    rOPINIRole (REQUIP) 2 mg, oral, Nightly    rosuvastatin (CRESTOR) 40 mg, oral, Daily    sertraline (ZOLOFT) 50 mg, oral, Daily    varenicline tartrate (CHANTIX) 1 mg, oral, 2 times daily, Take with full glass of water.    walker  "(Ultra-Light Rollator) misc 1 each, miscellaneous, As needed       Allergies:   Allergies   Allergen Reactions    Duloxetine Hallucinations     Pt states she doesn't think she has an allergy just a bad reaction when mixed with morphine .             Creatinine   Date Value Ref Range Status   01/30/2025 0.61 0.50 - 1.05 mg/dL Final     AST   Date Value Ref Range Status   01/30/2025 18 9 - 39 U/L Final     ALT   Date Value Ref Range Status   01/30/2025 22 7 - 45 U/L Final     Comment:     Patients treated with Sulfasalazine may generate falsely decreased results for ALT.     Hemoglobin   Date Value Ref Range Status   01/30/2025 10.6 (L) 12.0 - 16.0 g/dL Final     Platelets   Date Value Ref Range Status   01/30/2025 192 150 - 450 x10*3/uL Final       PHYSICAL EXAMINATION  Vital Signs:       1/17/2025     7:58 AM 1/17/2025     9:00 AM 1/17/2025    12:00 PM 1/24/2025    12:06 PM 1/30/2025    10:56 AM 2/7/2025    11:35 AM 2/12/2025    11:04 AM   Vitals   Systolic  117  100 122 128 118   Diastolic  75  65 74 72 66   BP Location  Left arm        Heart Rate 79 89 84 65 69 71 63   Temp  36.6 °C (97.8 °F)   36.2 °C (97.2 °F)  36.4 °C (97.5 °F)   Resp 18 18 18  18  16   Height    1.6 m (5' 3\")  1.6 m (5' 3\")    Weight (lb)    161.2 160.27 164 162.8   BMI    28.56 kg/m2 28.39 kg/m2 29.05 kg/m2 28.84 kg/m2   BSA (m2)    1.8 m2 1.8 m2 1.82 m2 1.81 m2   Visit Report    Report Report Report Report          Physical Exam  HENT:      Head: Normocephalic and atraumatic.      Comments: Left neck surgical changes noted  Eyes:      Extraocular Movements: Extraocular movements intact.      Conjunctiva/sclera: Conjunctivae normal.   Pulmonary:      Effort: Pulmonary effort is normal.   Neurological:      General: No focal deficit present.      Mental Status: She is alert.   Psychiatric:         Mood and Affect: Mood normal.         Thought Content: Thought content normal.       ASSESSMENT/PLAN    Pain  Pain is: cancer related pain  Type: " somatic and neuropathic  -stop percocet and reduce to oxycodone 5mg q6 prn   -may use tylenol prn but not to exceed 3g per day  -continue mscontin 15mg BID  -reduce gabapentin to 300mg at bedtime (she self reduced to bid)   -med reduction may help brain fog / memory issues     Opioid Use  Medication Management:   - OARRS report reviewed with no aberrant behavior; consistent with  prescriptions/records and patient history  - MED 97.5.  Overdose Risk Score 410.   This has been discussed with patient.   - We will continue to closely monitor the patient for signs of prescription misuse including UDS, OARRS review and subjective reports at each visit.  - No concurrent benzodiazepine use   - I am a provider who either is or has consulted and collaborated with a provider certified in Hospice and Palliative Medicine and have conducted a face-face visit and examination for this patient.  - Routine Urine Drug Screen completed 11/5/24 and appropriate   - Controlled Substance Agreement completed 11/5/24  - Specifically discussed that controlled substance prescriptions will only be provided by our group as outlined in the completed agreement  - Prescribed naloxone: Confirmed already Rx'ed  - Red Flags: none     Tobacco dependence   -last cig 11/13 but does vape on occasion   -continue nicotine patch 21mg   -continue chantix  -continue wellbutrin 150mg daily     Xerostomia   -Xylomet PRN    Poor appetite - improving   -Zyprexa 10mg at bedtime     Constipation   At risk for constipation related to opioids  -may use miralax 17g daily PRN  -may use dulcolax 5-10mg at bedtime PRN if no bm in 2 days     Altered Mood  Acute anxiety and depression related to health concerns   -on Wellbutrin per above   -zoloft 50mg daily per pcp     Chronic insomnia   -continue melatonin 10mg at bedtime PRN   -zyprexa 10mg at bedtime     Medical Decision Making/Goals of Care/Advance Care Planning:  Patient's current clinical condition, including  diagnosis, and management plan, and goals of care were discussed.   Goals: symptom control and cancer directed therapy    Advance Directives  Existence of Advance Directives:No - has interest - she has paperwork   Decision maker: She would like to appointment Daughter, Beth, as HCPOA - otherwise 2/3 children     Next Follow-Up Visit:  Return to clinic in 1 month     Signature and billing  Thank you for allowing us to participate in the care of this patient. Recommendations will be communicated back to the consulting service by way of shared electronic medical record or face-to-face.    Medical complexity was moderate level due to due to complexity of problems, extensive data review, and high risk of management/treatment.  Time was spent on the following: Prep Time, Time Directly with Patient/Family/Caregiver, Documentation Time. Total time spent: 30 min      DATA   Diagnostic tests and information reviewed for today's visit:  Most recent labs and imaging results, Medications       Some elements copied from Supportive Oncology note on 12/10/24, the elements have been updated and all reflect current decision making from today, 2/12/2025.      Plan of Care discussed with: Patient, Family/Significant Other: sister, and RN      SIGNATURE: PIYUSH Son-CNP    Contact information:  Supportive and Palliative Oncology  Monday-Friday 8 AM-5 PM  Phone:  [phone], press option #5, then option #1.   Or Epic Secure Chat

## 2025-02-12 NOTE — TELEPHONE ENCOUNTER
Trying to get her rollator approved needs a better dx code   Unstable gait will not work   Usually osteoarthritis will approve please fix dx code and resend to   [facility] [phone]   Fax [phone]

## 2025-02-13 DIAGNOSIS — J44.9 CHRONIC OBSTRUCTIVE PULMONARY DISEASE, UNSPECIFIED COPD TYPE (MULTI): Primary | ICD-10-CM

## 2025-02-13 RX ORDER — BUDESONIDE AND FORMOTEROL FUMARATE DIHYDRATE 160; 4.5 UG/1; UG/1
2 AEROSOL RESPIRATORY (INHALATION)
Qty: 10.2 G | Refills: 5 | Status: SHIPPED | OUTPATIENT
Start: 2025-02-13 | End: 2026-02-13

## 2025-02-13 RX ORDER — TIOTROPIUM BROMIDE INHALATION SPRAY 3.12 UG/1
2 SPRAY, METERED RESPIRATORY (INHALATION) DAILY
Qty: 8 G | Refills: 11 | Status: SHIPPED | OUTPATIENT
Start: 2025-02-13 | End: 2026-02-13

## 2025-02-20 ENCOUNTER — CLINICAL SUPPORT (OUTPATIENT)
Dept: NUTRITION | Facility: HOSPITAL | Age: 65
End: 2025-02-20
Payer: COMMERCIAL

## 2025-02-20 DIAGNOSIS — C09.9 TONSIL CANCER (MULTI): ICD-10-CM

## 2025-02-20 NOTE — PROGRESS NOTES
[employer]  Diet Recommendation 1: Healthy Eating  Diet Recommendation 2: MyPlate  Food Intolerance Avoidance: NKFA  Nutrition Goals Stated: no nutrition goal stated at this time  Household Size: 1 Family Member  Interventions: Referral Number: 1st 6 Mo Referral 6 Mos  Interventions: Visit Number: 1 of 6 Visits - Max 6 Visits/Referral Each 6 Mo Period  Education Today: MyPlate Meals  Fruit: % Fresh  Vegetables: Fresh - 100%  Proteins: 1-2 Plant-based Items  Dairy: 25-50% Lowfat  Originating Site of Referral Order: Danny Rodriguez PA-C  Initials of RD Assisting Today: MB

## 2025-02-21 ENCOUNTER — APPOINTMENT (OUTPATIENT)
Dept: OTOLARYNGOLOGY | Facility: CLINIC | Age: 65
End: 2025-02-21
Payer: COMMERCIAL

## 2025-02-24 ENCOUNTER — TELEPHONE (OUTPATIENT)
Dept: RADIATION ONCOLOGY | Facility: HOSPITAL | Age: 65
End: 2025-02-24
Payer: COMMERCIAL

## 2025-02-24 ASSESSMENT — ENCOUNTER SYMPTOMS
SHORTNESS OF BREATH: 1
ARTHRALGIAS: 0
FEVER: 0
VOMITING: 0
LEG SWELLING: 0
NUMBNESS: 0
LIGHT-HEADEDNESS: 0
CHILLS: 0
COUGH: 0
SORE THROAT: 0
HEADACHES: 0
DIARRHEA: 0
CONSTIPATION: 0
ABDOMINAL PAIN: 0
NAUSEA: 0
TROUBLE SWALLOWING: 1

## 2025-02-24 NOTE — PROGRESS NOTES
"    Patient ID: Melvi Sanchez \"Allie" is a 64 y.o. 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 Robin Gabrielc:  Dr. Chetna Lowry  [facility]: Dr. Alejandro Hurst   Supp Onc: Antoinette Soliman   PCP: Alton King, DO    PRIOR THERAPIES  07/07/2022: Completion of chemoradiation with weekly cisplatin with Dr. Razo  08/26/2024: L radical neck dissection 2-4 with CN11 repair  10/8 - 11/19/24: Completed 5 cycles of cisplatin (40 mg/m2) and 66 gy of proton reirradiation     CURRENT THERAPY    SITES OF DISEASE    CURRENT ONCOLOGICAL PROBLEMS  Post operative pain  Radiation dermatitis  Odynophagia     HISTORY OF PRESENT ILLNESS  Ms. 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    Patient initially treated by Dr. Razo in 2022. She first noticed a lump in the left side of her neck > 2 years ago. She was found that have enlarged level 2, 3, 4 nodes, but no obvious oropharyngeal lesion 03/2022 by Dr. Torres.  CT neck was obtained demonstrating  L neck mass, 3.2 x 5.9 x 8.4 cm and a 1.5 x 1.6 right paratracheal node -   FNA biopsy of left neck mass showed squamous cell carcinoma. On 4/21/2022 she underwent triple endoscopy and DL showed lesion of left tonsil; biopsy showed invasive squamous cell carcinoma, p16 positive. She was referred to radiation oncology and saw Dr. Crook and completed 70 cGy/35f CCRT with STEWART seen at 1 year. She has been followed by Dr. Torres in surveillance and was noted to have left neck LINA. CT neck was obtained on 6/20/24 demonstrating increase in edema, enlargement of level 2 node. L neck core biopsy on 7/5/24 demonstrated:     FINAL DIAGNOSIS   Left neck mass, core " biopsy:  - Metastatic squamous cell carcinoma involving fibrous tissue with extensive necrosis.  See note.   Note: By immunostaining, the tumor cells are positive for p40, supportive of the above diagnosis.     She proceeded with L neck dissection which revealed:    A. Left neck, level II-IV, neck dissection:  -- Poorly differentiated , nonkeratinizing squamous cell carcinoma with abundant necrosis,  involving soft tissue extensively, encompassing levels II and III  (2.2 cm).  - Fibrosis and foreign body granulomatous reaction, levels III  and IV.     Note:  Microscopic examination of H&E sections reveals poorly differentiated squamous cell carcinoma involving extensively soft tissue with minimal lymphoid tissue present and abundant necrosis and fibrosis.  Carcinoma reaches soft tissue inked margins focally.  Vein margin is negative.  History of metastatic squamous cell carcinoma of left tonsil, status post chemo therapy and radiation therapy is noted for this patient.     P16 by immunohistochemistry:  Positive     She was discussed at  and seen by Dr. Lowry with plans for post operative reirradiation with proton beam therapy and presents for medical oncology consultation to discuss addition of radiosensitizing systemic therapy.    PAST MEDICAL HISTORY  COPD, hypothyroidism, RLS    SURGICAL HISTORY  Cholecystectomy, hysterectomy     SOCIAL HISTORY  Lives in [facility] by self. Son is next building. 3 kids, 6 grandkids.  Retired  at [employer].  Smoked 1-2 PPD, now down to 4 a day since diagnosis.  Occasional EtOH, no illicits. [place], [place].  Has a mini [employer]     FAMILY HISTORY  Cousin with brain cancer    CURRENT MEDS REVIEWED  Current Outpatient Medications   Medication Instructions    albuterol 90 mcg/actuation inhaler 2 puffs, inhalation, Every 4 hours PRN    aspirin 81 mg, oral, Daily    budesonide-formoteroL (Symbicort) 160-4.5 mcg/actuation inhaler 2 puffs,  inhalation, 2 times daily RT, Rinse mouth with water after use to reduce aftertaste and incidence of candidiasis. Do not swallow.    buPROPion XL (WELLBUTRIN XL) 300 mg, oral, Every morning, Do not crush, chew, or split.    gabapentin (NEURONTIN) 300 mg, oral, Nightly    guaiFENesin (MUCINEX) 1,200 mg, oral, 2 times daily, Do not crush, chew, or split.    levothyroxine (Synthroid, Levoxyl) 100 mcg tablet TAKE 1 TABLET BY MOUTH EARLY IN THE MORNING. TAKE ON AN EMPTY STOMACH AT THE SAME TIME EACH DAY, EITHER 30 TO 60 MINUTES PRIOR TO BREAKFAST.    melatonin 10 mg, oral, Nightly    morphine CR (MS CONTIN) 15 mg, oral, 2 times daily, Do not crush, chew, or split.    naloxone (NARCAN) 4 mg, nasal, As needed, May repeat every 2-3 minutes if needed, alternating nostrils, until medical assistance becomes available.    nicotine (Nicoderm CQ) 21 mg/24 hr patch 1 patch, transdermal, Every 24 hours    OLANZapine (ZYPREXA) 10 mg, oral, Nightly    oxyCODONE (ROXICODONE) 5 mg, oral, Every 6 hours PRN    rOPINIRole (REQUIP) 2 mg, oral, Nightly    rosuvastatin (CRESTOR) 40 mg, oral, Daily    sertraline (ZOLOFT) 50 mg, oral, Daily    tiotropium (Spiriva Respimat) 2.5 mcg/actuation inhaler 2 puffs, inhalation, Daily    varenicline tartrate (CHANTIX) 1 mg, oral, 2 times daily, Take with full glass of water.    walker (Ultra-Light Rollator) misc 1 each, miscellaneous, As needed     Allergies   Allergen Reactions    Duloxetine Hallucinations     Pt states she doesn't think she has an allergy just a bad reaction when mixed with morphine .        Subjective   Melvi SHERINE Sanchez is a 64 y.o. year old female patient with Recurrent stage III ([id number]) p16+ SCC of L oropharynx who competed chemoRT w/ 5 cycles weekly Cisplatin and 66gy proton radiatio on 11/19/24.     Interval History:  Patient presents for 3 months post treatment follow up.  *** Scans of 3/6  Patient was admitted 1/14/25 for PNA. Patient is recovering well overall. She  finished a course of Amoxicillin and Azithromycin. She still c/o some episodes of SOB that is worse from her baseline over the last week.  She has chronic and stable dysphagia, seems to have improved as dry mouth may have improved.   Her throat pain is mild, pain overall well controlled on current pain regimen.  Has not had any n/v/d/c, not taking any antiemetics.   She still takes Omeprazole 40mg and has not had reflux symptoms.   Left neck radiation dermatitis is well healed.   Has unchanged transient tinnitus in left ear, left ear still feels muffled at times, no subjective hearing loss from baseline.    She is sleeping better since starting on Olanzapine 10mg at bedtime.   Her biggest complaint is her worsening short-term memory loss. It is impacting her life more significantly.   She has stopped smoking on 11/24/24 and is only vaping.      Review of Systems   Constitutional:  Negative for chills and fever.   HENT:   Positive for tinnitus and trouble swallowing. Negative for hearing loss, lump/mass, mouth sores, nosebleeds and sore throat.    Respiratory:  Positive for shortness of breath. Negative for cough.    Cardiovascular:  Negative for chest pain and leg swelling.   Gastrointestinal:  Negative for abdominal pain, constipation, diarrhea, nausea and vomiting.   Musculoskeletal:  Negative for arthralgias.   Skin:  Negative for rash.   Neurological:  Negative for headaches, light-headedness and numbness.     Objective   There were no vitals taken for this visit.  Daily Weight  02/12/25 : 73.8 kg (162 lb 12.8 oz)  02/07/25 : 74.4 kg (164 lb)  01/30/25 : 72.7 kg (160 lb 4.4 oz)  01/24/25 : 73.1 kg (161 lb 3.2 oz)  01/14/25 : 72.6 kg (160 lb)  12/11/24 : 72.6 kg (160 lb)  12/11/24 : 72.7 kg (160 lb 4.4 oz)    Physical Exam  Vitals reviewed.   Constitutional:       Appearance: Normal appearance. She is well-developed.   HENT:      Head: Normocephalic and atraumatic.      Right Ear: External ear normal. No  tenderness.      Left Ear: External ear normal. No tenderness.      Nose: Nose normal.      Mouth/Throat:      Lips: Pink.      Mouth: Mucous membranes are moist. No injury or oral lesions.      Tongue: No lesions.   Eyes:      General: Lids are normal.      Extraocular Movements: Extraocular movements intact.      Conjunctiva/sclera: Conjunctivae normal.      Pupils: Pupils are equal, round, and reactive to light.   Neck:      Thyroid: No thyroid mass.      Comments: L neck scar well healed. Small area of scabbing  Moderate lymphedema in left jaw and submental region    Cardiovascular:      Rate and Rhythm: Normal rate and regular rhythm.      Pulses: Normal pulses.      Heart sounds: No murmur heard.     No gallop.   Pulmonary:      Effort: Pulmonary effort is normal. No respiratory distress.      Breath sounds: Normal breath sounds and air entry. No stridor. No wheezing.   Abdominal:      General: Abdomen is flat. Bowel sounds are normal. There is no distension or abdominal bruit.      Palpations: Abdomen is soft. There is no mass.      Tenderness: There is no abdominal tenderness.   Musculoskeletal:         General: Normal range of motion.      Cervical back: Normal range of motion and neck supple. No signs of trauma. Normal range of motion.      Right lower leg: No edema.      Left lower leg: No edema.   Lymphadenopathy:      Cervical: No cervical adenopathy.      Upper Body:      Right upper body: No axillary adenopathy.      Left upper body: No axillary adenopathy.   Skin:     General: Skin is warm and dry.      Comments: No new skin lesions   Neurological:      General: No focal deficit present.      Mental Status: She is alert and oriented to person, place, and time. Mental status is at baseline.      Gait: Gait is intact.   Psychiatric:         Attention and Perception: Attention normal.         Mood and Affect: Mood and affect normal.         Behavior: Behavior is cooperative.       ECOGSCORE: 1-  Restricted in physically strenuous activity.  Carries out light duty.    Diagnostic Results   LABS  Below labs were reviewed.                                 IMAGES  7/18/24 PET   1. New hypermetabolic activity seen in the left level 2 cervical lymph node, with central necrosis, consistent with heide metastasis.  2. New FDG avid clustered nodules are seen in the right upper lobe, which is nonspecific and may be infectious/inflammatory, however metastasis can not be excluded. Short-term follow-up chest CT is recommended.  3. Mild FDG uptake is seen throughout the esophagus, likely relating to esophagitis.  4. There is partial opacification of the left maxillary sinus, with mild FDG avidity, likely sinusitis.    CT Neck 6/20/24  IMPRESSION:  1. Compared to the CT neck from 07/14/2023, interval increase in edema within the aerodigestive tract, most pronounced within the supraglottic larynx and hypopharynx and there is resulting moderate narrowing of the supraglottic laryngeal lumen. There is also mild  prevertebral edema. There is no focal fluid collection to suggest an abscess. Cause of this edema is uncertain, could be infectious in etiology rather than treatment related given that patient completed radiation therapy 2 years ago.  2. No striking new mass is seen within the visualized aerodigestive tract, noting that evaluation is somewhat limited due to presence of diffuse edema and therefore subtle lesions can not be excluded.  3. Interval enlargement of peripherally enhancing likely necrotic lymph node in the left level 2 heide station concerning for progression of disease. Alternatively, enlargement of the lymph node could reflect an infectious process. Otherwise, no cervical lymphadenopathy by size criteria.  4. Edema within the left facial and neck deep and superficial soft tissues as well as in the left supraclavicular fossa is favored to be treatment related and was also present on the prior CT. No focal fluid  collection is seen within these regions to suggest an abscess. Correlate clinically.    10/18/24 CT Chest wo IV contrast  IMPRESSION:  1.  The previously noted posterior right upper lobe ill-defined nodular density has resolved. There is presently a subtle vague 1.5 cm ground-glass opacity within inferior right upper lobe, which is felt to represent focal bronchiolitis. Otherwise, no new suspicious pulmonary nodules or masses.  2. Similar bandlike opacity within lingula with associated mild bronchiectasis, which was minimally hypermetabolic on recent PET-CT, favoring benign/inflammatory etiology; attention on follow-up imaging within 6 months is recommended.  3. Redemonstrated moderate upper lung predominant centrilobular and paraseptal emphysema.  4. Severe coronary artery calcifications, indicating the presence of coronary artery disease. If the patient has associated symptoms recommend management as per chest pain guidelines       01/21/25 CT SOFT TISSUE NECK W IV CONTRAST  - Impression -  1. The left neck has postoperative changes from the dissection and flap reconstruction; no mass or fluid collection in this region is noted. The previously noted mass/necrotic node has been removed.  2. Soft tissue edema extending from the pharynx to the upper larynx has mildly improved.    Assessment/Plan     Melvi Sanchez is a 64 y.o. year old female patient with PMH of stage III (cT1c cN3a M0) SCC of L tonsil, COPD, hypothyroidism and RLS, started treatment with CCRT w/ Cisplatin and proton radiation on 10/8/24 for recurrence to L oropharynx with diffuse JOLANTA.    We discussed the treatment paradigm in recurrence and salvage surgery - given her pathology showing JOLANTA it is reasonable to proceed with concurrent reirradiation with Proton. We consented today and all questions were answered. We will proceed upon CT simulation.    # Recurrent stage III (cT1c cN3a M0) SCC of L tonsil   - 8/26/24: S/p L radical neck dissection  2-4 with CN11 repair showing diffuse JOLANTA   - 10/8/24 Started weekly Cisplatin (40 mg/m2) with proton radiation. Proton machine is down today so first dose RT will be on IMRT on 10/9 then resume PORT.    - 10/15/24: C2. Unchanged mild chronic dysphagia and xerostomia from prior RT. Left neck pain controlled w/ current pain regimen.  - 10/22/24: C3. Increased odynophagia in the last 3-4 days, like due to thrush. PO intake decreased and has resulting weight loss. Also with increased lymphedema in the left jaw and submental region. Left thigh incision still numb, fluid accumulation is slowed down. She's smoking about 1-2 cigarettes a day,   - 10/29/24: C4. Discussed goals of at least 200 mg/m2, will continue to evaluate. Thrush improved.   - 11/5/24: C5. Patient with more forgetfulness and weakness this week. Appetite remain good. No n/v. Stable mild lymphedema in left jaw/neck. Her left sided odynophagia is a little worse so will reach out to Supp Onc on increasing pain medications.   - 11/12/24: Patient completed 200mg/m2 of Cisplatin and we will not proceed with Cycle 6. She has expected SE from chemoRT w/ fatigue, odynophagia from mucositis, left jaw pain with flucuating lymphedema, skin changed from radiation.   - 11/19/24: Patient completed 66gy of radiation. Her SE from treatment are relatively well managed. Odynophagia controlled on current pain regimen, PO intake remain good and weight overall stable. Radiation dermatitis in left neck will be managed w/ topicals and mepilex. Has lymphedema in the left jaw/neck that is unchanged.  - 1/30/25: Patient is 2 months from treatment. Recently admitted for PNA, s/p abx, with some improvement but still has worse SOB from baseline. Will follow up with a CXR. Otherwise her chronic SE are stable/improving. Tolerating regular diet, odynophagia is well controlled, chronic dysphagia and xerostomia is unchanged. Oral mucositis is resolved. She's still fatigued and have lower  stamina, has anemia likely 2ry to chemo treatment.     # Sleep Disturbance  - Trouble sleeping, will continue Olanzapine 5mg nightly rather than just D1-4.   - Increased to Olanzapine 10mg nightly with improvement in sleep. Will continue this dose    # RUL nodules  - Being followed with CT scan in next 3 months  - 10/18/24 CT Chest wo contrast showed the previously noted posterior right upper lobe ill-defined nodular density has resolved. Similar bandlike opacity within lingula with associated mild bronchiectasis, which was minimally hypermetabolic on recent PET-CT, favoring benign/inflammatory etiology; attention on follow-up imaging within 6 months is recommended.  - Follow up on band like opacity within lingula with CT chest wo contrast likely in April 2025    # Memory Loss  - Patient reported short-term memory loss after first round of chemoRT, however her memory loss is much worse with this round. Has trouble remembering, dates, recalling conversations, loses train of thoughts, even though she makes herself phone reminders. Reports her mother passed away from vascular dementia.   Will refer to Neurology.     PLAN  -- RTC 1 months on 2/27/25 w/ [facility], labs cbc, cmp, tsh/t4, iron labs ordered per patient request. 3 months post treatment PET & CT Neck same day   -- Olanzapine 10mg QHS for insomnia  -- Referral to Neurology for memory loss, scheduled in June 2025

## 2025-02-25 DIAGNOSIS — U07.1 COVID-19: ICD-10-CM

## 2025-02-25 DIAGNOSIS — J15.9 COMMUNITY ACQUIRED BACTERIAL PNEUMONIA: Primary | ICD-10-CM

## 2025-02-25 RX ORDER — DOXYCYCLINE 100 MG/1
100 CAPSULE ORAL 2 TIMES DAILY
Qty: 20 CAPSULE | Refills: 0 | Status: SHIPPED | OUTPATIENT
Start: 2025-02-25 | End: 2025-03-07

## 2025-02-26 ASSESSMENT — ENCOUNTER SYMPTOMS
CHEST TIGHTNESS: 0
SORE THROAT: 0
PALPITATIONS: 0
VOMITING: 0
TROUBLE SWALLOWING: 0
ADENOPATHY: 0
DEPRESSION: 0
LIGHT-HEADEDNESS: 1
DIARRHEA: 0
CHILLS: 0
BLOOD IN STOOL: 0
SHORTNESS OF BREATH: 0
SPEECH DIFFICULTY: 0
CONFUSION: 0
HEMATURIA: 0
FEVER: 0
EYE PROBLEMS: 0
ARTHRALGIAS: 0
DIFFICULTY URINATING: 0
ABDOMINAL DISTENTION: 0
NECK PAIN: 0
NERVOUS/ANXIOUS: 0
COUGH: 0
NUMBNESS: 1
DIZZINESS: 0
EXTREMITY WEAKNESS: 0
FATIGUE: 1
HEADACHES: 0
CONSTIPATION: 0
NAUSEA: 0

## 2025-02-26 NOTE — PROGRESS NOTES
Radiation Oncology Follow-Up    Patient Name:  Melvi Sanchez  MRN:  [id number]  :  1960    Referring Provider: Delvin Momin, *  Primary Care Provider: Alton King DO  Care Team: Patient Care Team:  Alton King DO as PCP - General (Family Medicine)  [facility]  Alton King DO as PCP - [facility] PCP  Jimmie LEYVA MD as Consulting Physician (Radiation Oncology)  Marlon Patel MD as Surgeon (Pulmonary Disease)  Tiffanie Whitfield, MALOU as Nurse Navigator (Hematology and Oncology)  Alejandro Hurst MD as Consulting Physician (Hematology and Oncology)  Chetna Lowry MD as Radiation Oncologist (Radiation Oncology)  Danny Rodriguez PA-C as Physician Assistant (Hematology and Oncology)  PIYUSH Schreiber-CNP as Nurse Practitioner (Radiation Oncology)  Victorino Grijalva, MALOU as Care Manager (Case Management)    Date of Service: 2025    DIAGNOSIS AND STAGING  Diagnosis and Staging: 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    Patient initially treated by Dr. Razo in . She first noticed a lump in the left side of her neck > 2 years ago. She was found that have enlarged level 2, 3, 4 nodes, but no obvious oropharyngeal lesion 2022 by Dr. Torres.  CT neck was obtained demonstrating  L neck mass, 3.2 x 5.9 x 8.4 cm and a 1.5 x 1.6 right paratracheal node -   FNA biopsy of left neck mass showed squamous cell carcinoma. On 2022 she underwent triple endoscopy and DL showed lesion of left tonsil; biopsy showed invasive squamous cell carcinoma, p16 positive. She was referred to radiation oncology and saw Dr. Crook and  completed 70 cGy/35f CCRT with STEWART seen at 1 year. She has been followed by Dr. Torres in surveillance and was noted to have left neck LINA. CT neck was obtained on 6/20/24 demonstrating increase in edema, enlargement of level 2 node. L neck core biopsy on 7/5/24 demonstrated metastatic squamous cell carcinoma involving fibrous tissue with extensive necrosis.  The neck dissection showed Poorly differentiated , nonkeratinizing squamous cell carcinoma with abundant necrosis, involving soft tissue extensively, encompassing levels II and III (2.2 cm) and fibrosis and foreign body granulomatous reaction, levels III and IV.     PRIOR THERAPIES  07/07/2022: Completion of chemoradiation with weekly cisplatin with Dr. Razo  08/26/2024: L radical neck dissection 2-4 with CN11 repair  10/8 - 11/19/24: Completed 5 cycles of cisplatin (40 mg/m2) and 66 gy of proton reirradiation     Proton Beam: Left Head and neck    Treatment Period Technique Fraction Dose Fractions Total Dose   Course 2 10/9/2024-11/19/2024  (days elapsed: 41)         VMAT L neck 10/9/2024-10/11/2024 VMAT 200 / 200 cGy 3 / 3 600 / 600 cGy         L neck 10/14/2024-11/19/2024 3-Field 182 / 182 cGy 27 / 27 4914 / 4,909 cGy     Past Medical History:   Diagnosis Date   • Acute hypoxemic respiratory failure (Multi) 09/20/2023   • Breast calcification, left    • COPD (chronic obstructive pulmonary disease) (Multi)    • Dysphagia    • Essential (primary) hypertension 12/28/2018    HTN (hypertension), benign   • Fibroadenoma of left breast    • H/O malignant neoplasm of tonsil     s/p XRT and chemotherapy completed July 8, 22.   • Hypothyroidism    • Morbid obesity (Multi) 04/03/2023   • Nausea and/or vomiting 04/03/2023   • OAB (overactive bladder)    • Pharyngitis 04/03/2023   • Pneumonia 04/03/2023   • Restless legs syndrome    • Sinusitis 04/03/2023   • Tobacco use disorder 09/20/2023      Past Surgical History:   Procedure Laterality Date   • BREAST BIOPSY  "Left     benign FA   •  SECTION, LOW TRANSVERSE     • CHOLECYSTECTOMY  2014    Cholecystectomy   • COLONOSCOPY     • HYSTERECTOMY  2004    Hysterectomy   • MOUTH SURGERY  2014    Oral Surgery Tooth Extraction   • NECK SURGERY  2024    Creation Free Flap Head/Neck, Dissection Neck      Social History     Tobacco Use   • Smoking status: Former     Current packs/day: 1.00     Average packs/day: 1 pack/day for 40.7 years (40.7 ttl pk-yrs)     Types: Cigarettes     Start date: 2023   • Smokeless tobacco: Never   • Tobacco comments:     3 or less cig. Daily per patient stated 10/10/24   Substance Use Topics   • Alcohol use: Not Currently     Alcohol/week: 1.0 standard drink of alcohol     Types: 1 Glasses of wine per week      SUBJECTIVE  History of Present Illness:  Melvi Sanchez \"Gretchen\" is a 64 y.o. female who was previously seen at the [facility] Department of Radiation Oncology for recurrent stage III ([id number]) p16+ SCC of L oropharynx.  Recent treatment included a left radical neck dissection 2-4 with CN11 repair and cisplatin (40 mg/m2) x 5 cycles and 66 gy of proton re-irradiation ending on 24.   Today the patient states that she's doing \"okay\".  She states that she just recently completed antibiotics for \"double pneumonia\" that she had developed last week.  She stated that she was symptomatic with the exception of a fever.   She continue to eat a regular diet without coughing or choking.  She endorses moderate xerostomia and dysgeusia which have been stable.  She manages oral dryness with extra hydration.   Denies mucositis, thrush, or new lumps/bumps/discolorations in the mouth or around the head, neck or shoulders.  She does endorse that she continues to have numbness of the left ear, cheek, and neck since her initial treatment back in .  The current radiation dermatitis is mostly healed with a couple of areas of sloughing " "skin.  She continues to have hearing changes to the left ear that began back in 2022.  She states that her hearing is muffled, like she's \"under water\".   She states that Dr. Earlee and discussed potential placement of myringotomy tube.  She's going to discuss with him on 2/21/25.  Denies chills, fever, SOB or chest pain.  She does endorse some mild fatigue.  Denies headaches, dizziness or new focal sensory/motor deficits.  She does endorse an occasional feeling of being \"off balance.     2/27/25 Interval FU visit.  Today the patient is in clinic for a routine Radiation Oncology FU visit and review of a restaging PET and CT neck completed today.  Today, the patient states that she doing better.  She continues to eat a regular diet without coughing or choking.  She does have some xerostomia and dysgeusia which are improved.  Endorses being able to maintain her weight. Denies mucositis, thrush, or new lumps/bumps/discolorations in the mouth or around the head, neck or shoulders.  Continues to endorse that she continues to have numbness of the left ear, cheek, and neck since her initial treatment back in 2022.  Denies chills, fever, SOB or chest pain.  She does endorse some mild fatigue.  Denies headaches, dizziness or new focal sensory/motor deficits.  She continues to have some hearing changes (fulness) in her left ear that is stable.    She does endorse an occasional feeling of being \"off balance.      Review of Systems:   Review of Systems   Constitutional:  Positive for fatigue. Negative for chills and fever.   HENT:   Positive for hearing loss (Left ear). Negative for lump/mass, mouth sores, sore throat and trouble swallowing.    Eyes:  Negative for eye problems.   Respiratory:  Negative for chest tightness, cough and shortness of breath.    Cardiovascular:  Negative for chest pain and palpitations.   Gastrointestinal:  Negative for abdominal distention, blood in stool, constipation, diarrhea, nausea and " "vomiting.   Genitourinary:  Negative for difficulty urinating and hematuria.    Musculoskeletal:  Negative for arthralgias, gait problem and neck pain.   Neurological:  Positive for light-headedness (When standing quickly.) and numbness (Left ear, face and neck). Negative for dizziness, extremity weakness, gait problem, headaches and speech difficulty.   Hematological:  Negative for adenopathy.   Psychiatric/Behavioral:  Negative for confusion and depression. The patient is not nervous/anxious.      Performance Status:   The Karnofsky performance scale today is 100, Fully active, able to carry on all pre-disease performed without restriction (ECOG equivalent 0).    Pain: Throat pain (3-4).  Managing with MS Contin 15mg BID and Percocet 7.5mg Q3-4hr PRN.      OBJECTIVE  Vital Signs:      1/17/2025     7:58 AM 1/17/2025     9:00 AM 1/17/2025    12:00 PM 1/24/2025    12:06 PM 1/30/2025    10:56 AM 2/7/2025    11:35 AM 2/12/2025    11:04 AM   Vitals   Systolic  117  100 122 128 118   Diastolic  75  65 74 72 66   BP Location  Left arm        Heart Rate 79 89 84 65 69 71 63   Temp  36.6 °C (97.8 °F)   36.2 °C (97.2 °F)  36.4 °C (97.5 °F)   Resp 18 18 18  18  16   Height    1.6 m (5' 3\")  1.6 m (5' 3\")    Weight (lb)    161.2 160.27 164 162.8   BMI    28.56 kg/m2 28.39 kg/m2 29.05 kg/m2 28.84 kg/m2   BSA (m2)    1.8 m2 1.8 m2 1.82 m2 1.81 m2   Visit Report    Report Report Report Report      Physical Exam  Vitals and nursing note reviewed.   Constitutional:       General: She is not in acute distress.     Appearance: Normal appearance. She is normal weight. She is not ill-appearing.   HENT:      Head: Normocephalic and atraumatic. No masses.      Jaw: Trismus and swelling (some minor swelling of left jaw.) present. No tenderness or pain on movement.        Comments: Some swelling of the left jaw.      Nose: Nose normal. No congestion or rhinorrhea.      Mouth/Throat:      Mouth: Mucous membranes are dry. No injury, oral " lesions or angioedema.      Dentition: Abnormal dentition. Does not have dentures. Gingival swelling present. No dental tenderness.      Tongue: No lesions. Tongue does not deviate from midline.      Palate: No mass and lesions.      Comments: Edentulous.   Eyes:      General: No scleral icterus.     Extraocular Movements: Extraocular movements intact.      Pupils: Pupils are equal, round, and reactive to light.   Cardiovascular:      Rate and Rhythm: Normal rate and regular rhythm.      Pulses: Normal pulses.      Heart sounds: Normal heart sounds. No murmur heard.  Pulmonary:      Effort: Pulmonary effort is normal. No respiratory distress.      Breath sounds: Normal breath sounds. No stridor. No wheezing or rhonchi.   Chest:      Chest wall: No tenderness.   Abdominal:      General: Abdomen is flat. Bowel sounds are normal.      Palpations: Abdomen is soft. There is no mass.      Tenderness: There is no abdominal tenderness. There is no guarding or rebound.   Musculoskeletal:         General: No swelling or tenderness. Normal range of motion.      Cervical back: Normal range of motion and neck supple. No tenderness.   Skin:     General: Skin is warm and dry.      Coloration: Skin is not jaundiced.      Findings: No erythema or lesion.   Neurological:      General: No focal deficit present.      Mental Status: She is alert and oriented to person, place, and time.      Sensory: No sensory deficit.      Motor: No weakness.      Gait: Gait normal.      Deep Tendon Reflexes: Reflexes normal.   Psychiatric:         Mood and Affect: Mood normal.         Behavior: Behavior normal.         Thought Content: Thought content normal.         Judgment: Judgment normal.   XR CHEST 2 VIEWS; 11/12/2024 12:38 pm   IMPRESSION:  1.  Pulmonary hyperinflation  2. No acute cardiopulmonary pathology    ASSESSMENT:   64 y.o. female who was previously seen at the [facility] Department of Radiation Oncology  for recurrent stage III ([id number]) p16+ SCC of L oropharynx.  Recent treatment included a left radical neck dissection 2-4 with CN11 repair and cisplatin (40 mg/m2) x 5 cycles and 66 gy of proton re-irradiation ending on 11/19/24.   Today we discussed the Radiation Oncology Survival Plan.  The patient was provided supportive materials along with 3 copies of the treatment summary.  All questions/concerns were addressed to the satisfaction of the patient.     PLAN:    --FU with Raul Momin CNP in 3 months.    --Carotid ultrasound in 3 months.    --NavDx today.   --Continue to follow with Dr. Hurst and Ro Rodriguez PA-C for Medical Oncology management.   --Continue to follow with Dr. Brownlee for ENT management.   --Continue Crestor 40 mg daily.   --Discussed SLP and the patient would like hold off for now.        Please reach out with any questions or concerns.     NCCN Guidelines were applicable to guide this patients treatment plan.  PIYUSH Schreiber-CNP

## 2025-02-27 ENCOUNTER — APPOINTMENT (OUTPATIENT)
Dept: HEMATOLOGY/ONCOLOGY | Facility: HOSPITAL | Age: 65
End: 2025-02-27
Payer: COMMERCIAL

## 2025-02-27 ENCOUNTER — APPOINTMENT (OUTPATIENT)
Dept: RADIATION ONCOLOGY | Facility: HOSPITAL | Age: 65
End: 2025-02-27
Payer: COMMERCIAL

## 2025-02-27 ENCOUNTER — HOSPITAL ENCOUNTER (OUTPATIENT)
Dept: RADIOLOGY | Facility: HOSPITAL | Age: 65
End: 2025-02-27
Payer: COMMERCIAL

## 2025-02-27 ENCOUNTER — APPOINTMENT (OUTPATIENT)
Dept: RADIOLOGY | Facility: HOSPITAL | Age: 65
End: 2025-02-27
Payer: COMMERCIAL

## 2025-02-27 ENCOUNTER — HOSPITAL ENCOUNTER (OUTPATIENT)
Dept: RADIATION ONCOLOGY | Facility: HOSPITAL | Age: 65
Setting detail: RADIATION/ONCOLOGY SERIES
Discharge: HOME | End: 2025-02-27
Payer: COMMERCIAL

## 2025-02-27 ENCOUNTER — HOSPITAL ENCOUNTER (OUTPATIENT)
Dept: RADIOLOGY | Facility: HOSPITAL | Age: 65
Discharge: HOME | End: 2025-02-27
Payer: COMMERCIAL

## 2025-02-27 DIAGNOSIS — C09.9 TONSIL CANCER (MULTI): Primary | ICD-10-CM

## 2025-02-27 DIAGNOSIS — D64.9 ANEMIA, UNSPECIFIED TYPE: ICD-10-CM

## 2025-02-27 DIAGNOSIS — C77.0 METASTASIS TO HEAD AND NECK LYMPH NODE (MULTI): ICD-10-CM

## 2025-02-27 ASSESSMENT — ENCOUNTER SYMPTOMS
CONSTIPATION: 0
ARTHRALGIAS: 0
HEADACHES: 0
ABDOMINAL PAIN: 0
LIGHT-HEADEDNESS: 0
SORE THROAT: 0
COUGH: 0
DIARRHEA: 0
SHORTNESS OF BREATH: 1
NAUSEA: 0
TROUBLE SWALLOWING: 1
VOMITING: 0
LEG SWELLING: 0
FEVER: 0
CHILLS: 0
NUMBNESS: 0

## 2025-02-27 NOTE — PROGRESS NOTES
"    Patient ID: Melvi Sanchez \"Allie" is a 64 y.o. 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 Robin Gabrielc:  Dr. Chetna Lowry  [facility]: Dr. Alejandro Hurst   Supp Onc: Antoinette Soliman   PCP: Alton King, DO    PRIOR THERAPIES  07/07/2022: Completion of chemoradiation with weekly cisplatin with Dr. Razo  08/26/2024: L radical neck dissection 2-4 with CN11 repair  10/8 - 11/19/24: Completed 5 cycles of cisplatin (40 mg/m2) and 66 gy of proton reirradiation     CURRENT THERAPY    SITES OF DISEASE    CURRENT ONCOLOGICAL PROBLEMS  Post operative pain  Radiation dermatitis  Odynophagia     HISTORY OF PRESENT ILLNESS  Ms. 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    Patient initially treated by Dr. Razo in 2022. She first noticed a lump in the left side of her neck > 2 years ago. She was found that have enlarged level 2, 3, 4 nodes, but no obvious oropharyngeal lesion 03/2022 by Dr. Torres.  CT neck was obtained demonstrating  L neck mass, 3.2 x 5.9 x 8.4 cm and a 1.5 x 1.6 right paratracheal node -   FNA biopsy of left neck mass showed squamous cell carcinoma. On 4/21/2022 she underwent triple endoscopy and DL showed lesion of left tonsil; biopsy showed invasive squamous cell carcinoma, p16 positive. She was referred to radiation oncology and saw Dr. Crook and completed 70 cGy/35f CCRT with STEWART seen at 1 year. She has been followed by Dr. Torres in surveillance and was noted to have left neck LINA. CT neck was obtained on 6/20/24 demonstrating increase in edema, enlargement of level 2 node. L neck core biopsy on 7/5/24 demonstrated:     FINAL DIAGNOSIS   Left neck mass, core " biopsy:  - Metastatic squamous cell carcinoma involving fibrous tissue with extensive necrosis.  See note.   Note: By immunostaining, the tumor cells are positive for p40, supportive of the above diagnosis.     She proceeded with L neck dissection which revealed:    A. Left neck, level II-IV, neck dissection:  -- Poorly differentiated , nonkeratinizing squamous cell carcinoma with abundant necrosis,  involving soft tissue extensively, encompassing levels II and III  (2.2 cm).  - Fibrosis and foreign body granulomatous reaction, levels III  and IV.     Note:  Microscopic examination of H&E sections reveals poorly differentiated squamous cell carcinoma involving extensively soft tissue with minimal lymphoid tissue present and abundant necrosis and fibrosis.  Carcinoma reaches soft tissue inked margins focally.  Vein margin is negative.  History of metastatic squamous cell carcinoma of left tonsil, status post chemo therapy and radiation therapy is noted for this patient.     P16 by immunohistochemistry:  Positive     She was discussed at  and seen by Dr. Lowry with plans for post operative reirradiation with proton beam therapy and presents for medical oncology consultation to discuss addition of radiosensitizing systemic therapy.    PAST MEDICAL HISTORY  COPD, hypothyroidism, RLS    SURGICAL HISTORY  Cholecystectomy, hysterectomy     SOCIAL HISTORY  Lives in [facility] by self. Son is next building. 3 kids, 6 grandkids.  Retired  at [employer].  Smoked 1-2 PPD, now down to 4 a day since diagnosis.  Occasional EtOH, no illicits. [place], [place] bingo.  Has a mini "[employer]     FAMILY HISTORY  Cousin with brain cancer    CURRENT MEDS REVIEWED  Current Outpatient Medications   Medication Instructions    albuterol 90 mcg/actuation inhaler 2 puffs, inhalation, Every 4 hours PRN    aspirin 81 mg, oral, Daily    budesonide-formoteroL (Symbicort) 160-4.5 mcg/actuation inhaler 2 puffs,  inhalation, 2 times daily RT, Rinse mouth with water after use to reduce aftertaste and incidence of candidiasis. Do not swallow.    buPROPion XL (WELLBUTRIN XL) 300 mg, oral, Every morning, Do not crush, chew, or split.    doxycycline (VIBRAMYCIN) 100 mg, oral, 2 times daily    gabapentin (NEURONTIN) 300 mg, oral, Nightly    guaiFENesin (MUCINEX) 1,200 mg, oral, 2 times daily, Do not crush, chew, or split.    levothyroxine (Synthroid, Levoxyl) 100 mcg tablet TAKE 1 TABLET BY MOUTH EARLY IN THE MORNING. TAKE ON AN EMPTY STOMACH AT THE SAME TIME EACH DAY, EITHER 30 TO 60 MINUTES PRIOR TO BREAKFAST.    melatonin 10 mg, oral, Nightly    morphine CR (MS CONTIN) 15 mg, oral, 2 times daily, Do not crush, chew, or split.    naloxone (NARCAN) 4 mg, nasal, As needed, May repeat every 2-3 minutes if needed, alternating nostrils, until medical assistance becomes available.    nicotine (Nicoderm CQ) 21 mg/24 hr patch 1 patch, transdermal, Every 24 hours    OLANZapine (ZYPREXA) 10 mg, oral, Nightly    oxyCODONE (ROXICODONE) 5 mg, oral, Every 6 hours PRN    rOPINIRole (REQUIP) 2 mg, oral, Nightly    rosuvastatin (CRESTOR) 40 mg, oral, Daily    sertraline (ZOLOFT) 50 mg, oral, Daily    tiotropium (Spiriva Respimat) 2.5 mcg/actuation inhaler 2 puffs, inhalation, Daily    varenicline tartrate (CHANTIX) 1 mg, oral, 2 times daily, Take with full glass of water.    walker (Ultra-Light Rollator) misc 1 each, miscellaneous, As needed     Allergies   Allergen Reactions    Duloxetine Hallucinations     Pt states she doesn't think she has an allergy just a bad reaction when mixed with morphine .        Subjective   Melvi SHERINE Sanchez is a 64 y.o. year old female patient with Recurrent stage III ([id number]) p16+ SCC of L oropharynx who competed chemoRT w/ 5 cycles weekly Cisplatin and 66gy proton radiatio on 11/19/24.     Interval History:  Patient presents for 3 months post treatment follow up.  *** Scans of 3/6  Patient was admitted  1/14/25 for PNA. Patient is recovering well overall. She finished a course of Amoxicillin and Azithromycin. She still c/o some episodes of SOB that is worse from her baseline over the last week.  She has chronic and stable dysphagia, seems to have improved as dry mouth may have improved.   Her throat pain is mild, pain overall well controlled on current pain regimen.  Has not had any n/v/d/c, not taking any antiemetics.   She still takes Omeprazole 40mg and has not had reflux symptoms.   Left neck radiation dermatitis is well healed.   Has unchanged transient tinnitus in left ear, left ear still feels muffled at times, no subjective hearing loss from baseline.    She is sleeping better since starting on Olanzapine 10mg at bedtime.   Her biggest complaint is her worsening short-term memory loss. It is impacting her life more significantly.   She has stopped smoking on 11/24/24 and is only vaping.      Review of Systems   Constitutional:  Negative for chills and fever.   HENT:   Positive for tinnitus and trouble swallowing. Negative for hearing loss, lump/mass, mouth sores, nosebleeds and sore throat.    Respiratory:  Positive for shortness of breath. Negative for cough.    Cardiovascular:  Negative for chest pain and leg swelling.   Gastrointestinal:  Negative for abdominal pain, constipation, diarrhea, nausea and vomiting.   Musculoskeletal:  Negative for arthralgias.   Skin:  Negative for rash.   Neurological:  Negative for headaches, light-headedness and numbness.     Objective   There were no vitals taken for this visit.  Daily Weight  02/12/25 : 73.8 kg (162 lb 12.8 oz)  02/07/25 : 74.4 kg (164 lb)  01/30/25 : 72.7 kg (160 lb 4.4 oz)  01/24/25 : 73.1 kg (161 lb 3.2 oz)  01/14/25 : 72.6 kg (160 lb)  12/11/24 : 72.6 kg (160 lb)  12/11/24 : 72.7 kg (160 lb 4.4 oz)    Physical Exam  Vitals reviewed.   Constitutional:       Appearance: Normal appearance. She is well-developed.   HENT:      Head: Normocephalic and  atraumatic.      Right Ear: External ear normal. No tenderness.      Left Ear: External ear normal. No tenderness.      Nose: Nose normal.      Mouth/Throat:      Lips: Pink.      Mouth: Mucous membranes are moist. No injury or oral lesions.      Tongue: No lesions.   Eyes:      General: Lids are normal.      Extraocular Movements: Extraocular movements intact.      Conjunctiva/sclera: Conjunctivae normal.      Pupils: Pupils are equal, round, and reactive to light.   Neck:      Thyroid: No thyroid mass.      Comments: L neck scar well healed. Small area of scabbing  Moderate lymphedema in left jaw and submental region    Cardiovascular:      Rate and Rhythm: Normal rate and regular rhythm.      Pulses: Normal pulses.      Heart sounds: No murmur heard.     No gallop.   Pulmonary:      Effort: Pulmonary effort is normal. No respiratory distress.      Breath sounds: Normal breath sounds and air entry. No stridor. No wheezing.   Abdominal:      General: Abdomen is flat. Bowel sounds are normal. There is no distension or abdominal bruit.      Palpations: Abdomen is soft. There is no mass.      Tenderness: There is no abdominal tenderness.   Musculoskeletal:         General: Normal range of motion.      Cervical back: Normal range of motion and neck supple. No signs of trauma. Normal range of motion.      Right lower leg: No edema.      Left lower leg: No edema.   Lymphadenopathy:      Cervical: No cervical adenopathy.      Upper Body:      Right upper body: No axillary adenopathy.      Left upper body: No axillary adenopathy.   Skin:     General: Skin is warm and dry.      Comments: No new skin lesions   Neurological:      General: No focal deficit present.      Mental Status: She is alert and oriented to person, place, and time. Mental status is at baseline.      Gait: Gait is intact.   Psychiatric:         Attention and Perception: Attention normal.         Mood and Affect: Mood and affect normal.         Behavior:  Behavior is cooperative.       ECOGSCORE: 1- Restricted in physically strenuous activity.  Carries out light duty.    Diagnostic Results   LABS  Below labs were reviewed.                                 IMAGES  7/18/24 PET   1. New hypermetabolic activity seen in the left level 2 cervical lymph node, with central necrosis, consistent with heide metastasis.  2. New FDG avid clustered nodules are seen in the right upper lobe, which is nonspecific and may be infectious/inflammatory, however metastasis can not be excluded. Short-term follow-up chest CT is recommended.  3. Mild FDG uptake is seen throughout the esophagus, likely relating to esophagitis.  4. There is partial opacification of the left maxillary sinus, with mild FDG avidity, likely sinusitis.    CT Neck 6/20/24  IMPRESSION:  1. Compared to the CT neck from 07/14/2023, interval increase in edema within the aerodigestive tract, most pronounced within the supraglottic larynx and hypopharynx and there is resulting moderate narrowing of the supraglottic laryngeal lumen. There is also mild  prevertebral edema. There is no focal fluid collection to suggest an abscess. Cause of this edema is uncertain, could be infectious in etiology rather than treatment related given that patient completed radiation therapy 2 years ago.  2. No striking new mass is seen within the visualized aerodigestive tract, noting that evaluation is somewhat limited due to presence of diffuse edema and therefore subtle lesions can not be excluded.  3. Interval enlargement of peripherally enhancing likely necrotic lymph node in the left level 2 heide station concerning for progression of disease. Alternatively, enlargement of the lymph node could reflect an infectious process. Otherwise, no cervical lymphadenopathy by size criteria.  4. Edema within the left facial and neck deep and superficial soft tissues as well as in the left supraclavicular fossa is favored to be treatment related and was  also present on the prior CT. No focal fluid collection is seen within these regions to suggest an abscess. Correlate clinically.    10/18/24 CT Chest wo IV contrast  IMPRESSION:  1.  The previously noted posterior right upper lobe ill-defined nodular density has resolved. There is presently a subtle vague 1.5 cm ground-glass opacity within inferior right upper lobe, which is felt to represent focal bronchiolitis. Otherwise, no new suspicious pulmonary nodules or masses.  2. Similar bandlike opacity within lingula with associated mild bronchiectasis, which was minimally hypermetabolic on recent PET-CT, favoring benign/inflammatory etiology; attention on follow-up imaging within 6 months is recommended.  3. Redemonstrated moderate upper lung predominant centrilobular and paraseptal emphysema.  4. Severe coronary artery calcifications, indicating the presence of coronary artery disease. If the patient has associated symptoms recommend management as per chest pain guidelines       01/21/25 CT SOFT TISSUE NECK W IV CONTRAST  - Impression -  1. The left neck has postoperative changes from the dissection and flap reconstruction; no mass or fluid collection in this region is noted. The previously noted mass/necrotic node has been removed.  2. Soft tissue edema extending from the pharynx to the upper larynx has mildly improved.    Assessment/Plan     Melviarlin Sanchez is a 64 y.o. year old female patient with PMH of stage III (cT1c cN3a M0) SCC of L tonsil, COPD, hypothyroidism and RLS, started treatment with CCRT w/ Cisplatin and proton radiation on 10/8/24 for recurrence to L oropharynx with diffuse JOLANTA.    We discussed the treatment paradigm in recurrence and salvage surgery - given her pathology showing JOLANTA it is reasonable to proceed with concurrent reirradiation with Proton. We consented today and all questions were answered. We will proceed upon CT simulation.    # Recurrent stage III (cT1c cN3a M0) SCC of L  tonsil   - 8/26/24: S/p L radical neck dissection 2-4 with CN11 repair showing diffuse JOLANTA   - 10/8/24 Started weekly Cisplatin (40 mg/m2) with proton radiation. Proton machine is down today so first dose RT will be on IMRT on 10/9 then resume PORT.    - 10/15/24: C2. Unchanged mild chronic dysphagia and xerostomia from prior RT. Left neck pain controlled w/ current pain regimen.  - 10/22/24: C3. Increased odynophagia in the last 3-4 days, like due to thrush. PO intake decreased and has resulting weight loss. Also with increased lymphedema in the left jaw and submental region. Left thigh incision still numb, fluid accumulation is slowed down. She's smoking about 1-2 cigarettes a day,   - 10/29/24: C4. Discussed goals of at least 200 mg/m2, will continue to evaluate. Thrush improved.   - 11/5/24: C5. Patient with more forgetfulness and weakness this week. Appetite remain good. No n/v. Stable mild lymphedema in left jaw/neck. Her left sided odynophagia is a little worse so will reach out to Supp Onc on increasing pain medications.   - 11/12/24: Patient completed 200mg/m2 of Cisplatin and we will not proceed with Cycle 6. She has expected SE from chemoRT w/ fatigue, odynophagia from mucositis, left jaw pain with flucuating lymphedema, skin changed from radiation.   - 11/19/24: Patient completed 66gy of radiation. Her SE from treatment are relatively well managed. Odynophagia controlled on current pain regimen, PO intake remain good and weight overall stable. Radiation dermatitis in left neck will be managed w/ topicals and mepilex. Has lymphedema in the left jaw/neck that is unchanged.  - 1/30/25: Patient is 2 months from treatment. Recently admitted for PNA, s/p abx, with some improvement but still has worse SOB from baseline. Will follow up with a CXR. Otherwise her chronic SE are stable/improving. Tolerating regular diet, odynophagia is well controlled, chronic dysphagia and xerostomia is unchanged. Oral mucositis  is resolved. She's still fatigued and have lower stamina, has anemia likely 2ry to chemo treatment.     # Sleep Disturbance  - Trouble sleeping, will continue Olanzapine 5mg nightly rather than just D1-4.   - Increased to Olanzapine 10mg nightly with improvement in sleep. Will continue this dose    # RUL nodules  - Being followed with CT scan in next 3 months  - 10/18/24 CT Chest wo contrast showed the previously noted posterior right upper lobe ill-defined nodular density has resolved. Similar bandlike opacity within lingula with associated mild bronchiectasis, which was minimally hypermetabolic on recent PET-CT, favoring benign/inflammatory etiology; attention on follow-up imaging within 6 months is recommended.  - Follow up on band like opacity within lingula with CT chest wo contrast likely in April 2025    # Memory Loss  - Patient reported short-term memory loss after first round of chemoRT, however her memory loss is much worse with this round. Has trouble remembering, dates, recalling conversations, loses train of thoughts, even though she makes herself phone reminders. Reports her mother passed away from vascular dementia.   Will refer to Neurology.     PLAN  -- RTC 1 months on 2/27/25 w/ MedOnc, labs cbc, cmp, tsh/t4, iron labs ordered per patient request. 3 months post treatment PET & CT Neck same day   -- Olanzapine 10mg QHS for insomnia  -- Referral to Neurology for memory loss, scheduled in June 2025

## 2025-02-28 ENCOUNTER — TELEPHONE (OUTPATIENT)
Dept: PRIMARY CARE | Facility: CLINIC | Age: 65
End: 2025-02-28
Payer: COMMERCIAL

## 2025-02-28 NOTE — TELEPHONE ENCOUNTER
I am here with mom and she has tested positive for covid on a home test.  She is sleeping a lot, her lungs hurt (I am not sure she is totally recovered from the pneumonia) but given the fact she just went through cancer treatments and what not should she be seen?  Should I take her in to the er?  I mean it is not like she is in distress to where I would call a squad or anything  but just would like to know your opinion on what we should do.

## 2025-03-04 ENCOUNTER — APPOINTMENT (OUTPATIENT)
Dept: OTOLARYNGOLOGY | Facility: CLINIC | Age: 65
End: 2025-03-04
Payer: COMMERCIAL

## 2025-03-05 ENCOUNTER — PATIENT OUTREACH (OUTPATIENT)
Dept: PRIMARY CARE | Facility: CLINIC | Age: 65
End: 2025-03-05
Payer: COMMERCIAL

## 2025-03-06 ENCOUNTER — HOSPITAL ENCOUNTER (OUTPATIENT)
Dept: RADIOLOGY | Facility: HOSPITAL | Age: 65
Discharge: HOME | End: 2025-03-06
Payer: COMMERCIAL

## 2025-03-06 PROCEDURE — 3430000001 HC RX 343 DIAGNOSTIC RADIOPHARMACEUTICALS

## 2025-03-06 PROCEDURE — 78816 PET IMAGE W/CT FULL BODY: CPT | Mod: PS

## 2025-03-06 PROCEDURE — A9552 F18 FDG: HCPCS

## 2025-03-06 PROCEDURE — 70491 CT SOFT TISSUE NECK W/DYE: CPT

## 2025-03-06 PROCEDURE — 2550000001 HC RX 255 CONTRASTS

## 2025-03-06 RX ORDER — FLUDEOXYGLUCOSE F 18 200 MCI/ML
13 INJECTION, SOLUTION INTRAVENOUS
Status: COMPLETED | OUTPATIENT
Start: 2025-03-06 | End: 2025-03-06

## 2025-03-06 RX ADMIN — IOHEXOL 75 ML: 350 INJECTION, SOLUTION INTRAVENOUS at 11:14

## 2025-03-06 RX ADMIN — FLUDEOXYGLUCOSE F 18 13 MILLICURIE: 200 INJECTION, SOLUTION INTRAVENOUS at 09:50

## 2025-03-07 ENCOUNTER — APPOINTMENT (OUTPATIENT)
Dept: HEMATOLOGY/ONCOLOGY | Facility: HOSPITAL | Age: 65
End: 2025-03-07
Payer: COMMERCIAL

## 2025-03-07 ENCOUNTER — APPOINTMENT (OUTPATIENT)
Dept: RADIATION ONCOLOGY | Facility: HOSPITAL | Age: 65
End: 2025-03-07
Payer: COMMERCIAL

## 2025-03-10 ASSESSMENT — ENCOUNTER SYMPTOMS
ARTHRALGIAS: 0
CONSTIPATION: 0
CHILLS: 0
DIARRHEA: 0
FEVER: 0
TROUBLE SWALLOWING: 1
LEG SWELLING: 0
LIGHT-HEADEDNESS: 0
ABDOMINAL PAIN: 0
NUMBNESS: 0
SORE THROAT: 0
HEADACHES: 0
VOMITING: 0
NAUSEA: 0
COUGH: 0

## 2025-03-10 NOTE — PROGRESS NOTES
"    Patient ID: Melvi Sanchez \"Allie" is a 64 y.o. 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 Robin Gabrielc:  Dr. Chetna Lowry  [facility]   Supp Onc: Antoinette Soliman   PCP: Alton King, DO    PRIOR THERAPIES  07/07/2022: Completion of chemoradiation with weekly cisplatin with Dr. Razo  08/26/2024: L radical neck dissection 2-4 with CN11 repair  10/8 - 11/19/24: Completed 5 cycles of cisplatin (40 mg/m2) and 66 gy of proton reirradiation     SITES OF DISEASE    CURRENT ONCOLOGICAL PROBLEMS  Post operative pain  Radiation dermatitis  Odynophagia     HISTORY OF PRESENT ILLNESS  Ms. 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    Patient initially treated by Dr. Razo in 2022. She first noticed a lump in the left side of her neck > 2 years ago. She was found that have enlarged level 2, 3, 4 nodes, but no obvious oropharyngeal lesion 03/2022 by Dr. Torres.  CT neck was obtained demonstrating  L neck mass, 3.2 x 5.9 x 8.4 cm and a 1.5 x 1.6 right paratracheal node -   FNA biopsy of left neck mass showed squamous cell carcinoma. On 4/21/2022 she underwent triple endoscopy and DL showed lesion of left tonsil; biopsy showed invasive squamous cell carcinoma, p16 positive. She was referred to radiation oncology and saw Dr. Crook and completed 70 cGy/35f CCRT with STEWART seen at 1 year. She has been followed by Dr. Torres in surveillance and was noted to have left neck LINA. CT neck was obtained on 6/20/24 demonstrating increase in edema, enlargement of level 2 node. L neck core biopsy on 7/5/24 demonstrated:     FINAL DIAGNOSIS   Left neck mass, core biopsy:  - " Metastatic squamous cell carcinoma involving fibrous tissue with extensive necrosis.  See note.   Note: By immunostaining, the tumor cells are positive for p40, supportive of the above diagnosis.     She proceeded with L neck dissection which revealed:    A. Left neck, level II-IV, neck dissection:  -- Poorly differentiated , nonkeratinizing squamous cell carcinoma with abundant necrosis,  involving soft tissue extensively, encompassing levels II and III  (2.2 cm).  - Fibrosis and foreign body granulomatous reaction, levels III  and IV.     Note:  Microscopic examination of H&E sections reveals poorly differentiated squamous cell carcinoma involving extensively soft tissue with minimal lymphoid tissue present and abundant necrosis and fibrosis.  Carcinoma reaches soft tissue inked margins focally.  Vein margin is negative.  History of metastatic squamous cell carcinoma of left tonsil, status post chemo therapy and radiation therapy is noted for this patient.     P16 by immunohistochemistry:  Positive     She was discussed at  and seen by Dr. Lowry with plans for post operative reirradiation with proton beam therapy and presents for medical oncology consultation to discuss addition of radiosensitizing systemic therapy.    PAST MEDICAL HISTORY  COPD, hypothyroidism, RLS    SURGICAL HISTORY  Cholecystectomy, hysterectomy     SOCIAL HISTORY  Lives in [place] by self. Son is next building. 3 kids, 6 grandkids.  Retired  at [employer].  Smoked 1-2 PPD, now down to 4 a day since diagnosis.  Occasional EtOH, no illicits. [place], [place] bingo.  Has a mini dauchson snickers     FAMILY HISTORY  Cousin with brain cancer    Subjective   Melviarlin Sanchez is a 64 y.o. year old female patient with Recurrent stage III (cC1Z2C2) p16+ SCC of L oropharynx who competed chemoRT w/ 5 cycles weekly Cisplatin and 66gy proton radiatio on 11/19/24.     Interval History:  Patient presents for 3 months post  treatment follow up.    Patient had back to back PNA and then Covid last month, she just finished her abx last week and has improvement in her symptoms. She still has a cough and is feeling tired, but denies SOB. She does have COPD and is using her inhaler.   She has chronic and stable dysphagia, generally no hindrance to her PO intake.   She has chronic stiffness in her left neck 2ry to radiation and surgery, this is unchanged.   She still takes Omeprazole 40mg and has not had reflux symptoms.   She has unchanged transient tinnitus in left ear, left ear still feels muffled at times, no subjective hearing loss from baseline.    She is sleeping better since starting on Olanzapine 10mg at bedtime.   Her biggest complaint is her worsening short-term memory loss. It is impacting her life more significantly.   She has stopped smoking on 11/24/24 and is only vaping.      Review of Systems   Constitutional:  Negative for chills and fever.   HENT:   Positive for tinnitus and trouble swallowing. Negative for hearing loss, lump/mass, mouth sores, nosebleeds and sore throat.    Respiratory:  Negative for cough and shortness of breath.    Cardiovascular:  Negative for chest pain and leg swelling.   Gastrointestinal:  Negative for abdominal pain, constipation, diarrhea, nausea and vomiting.   Musculoskeletal:  Negative for arthralgias.   Skin:  Negative for rash.   Neurological:  Negative for headaches, light-headedness and numbness.     Current Outpatient Medications   Medication Instructions    albuterol 90 mcg/actuation inhaler 2 puffs, inhalation, Every 4 hours PRN    aspirin 81 mg, oral, Daily    budesonide-formoteroL (Symbicort) 160-4.5 mcg/actuation inhaler 2 puffs, inhalation, 2 times daily RT, Rinse mouth with water after use to reduce aftertaste and incidence of candidiasis. Do not swallow.    buPROPion XL (WELLBUTRIN XL) 300 mg, oral, Every morning, Do not crush, chew, or split.    gabapentin (NEURONTIN) 300 mg, oral,  Nightly    guaiFENesin (MUCINEX) 1,200 mg, oral, 2 times daily, Do not crush, chew, or split.    levothyroxine (Synthroid, Levoxyl) 100 mcg tablet TAKE 1 TABLET BY MOUTH EARLY IN THE MORNING. TAKE ON AN EMPTY STOMACH AT THE SAME TIME EACH DAY, EITHER 30 TO 60 MINUTES PRIOR TO BREAKFAST.    melatonin 10 mg, oral, Nightly    morphine CR (MS CONTIN) 15 mg, oral, 2 times daily, Do not crush, chew, or split.    naloxone (NARCAN) 4 mg, nasal, As needed, May repeat every 2-3 minutes if needed, alternating nostrils, until medical assistance becomes available.    nicotine (Nicoderm CQ) 21 mg/24 hr patch 1 patch, transdermal, Every 24 hours    OLANZapine (ZYPREXA) 10 mg, oral, Nightly    oxyCODONE (ROXICODONE) 5 mg, oral, Every 6 hours PRN    rOPINIRole (REQUIP) 2 mg, oral, Nightly    rosuvastatin (CRESTOR) 40 mg, oral, Daily    sertraline (ZOLOFT) 50 mg, oral, Daily    tiotropium (Spiriva Respimat) 2.5 mcg/actuation inhaler 2 puffs, inhalation, Daily    varenicline tartrate (CHANTIX) 1 mg, oral, 2 times daily, Take with full glass of water.    walker (Ultra-Light Rollator) misc 1 each, miscellaneous, As needed     Allergies   Allergen Reactions    Duloxetine Hallucinations     Pt states she doesn't think she has an allergy just a bad reaction when mixed with morphine .     Objective   There were no vitals taken for this visit. /73, HR 64, O2 96% O2, R 18   Daily Weight  03/12/25 : 72.3 kg (159 lb 8 oz)  02/12/25 : 73.8 kg (162 lb 12.8 oz)  02/07/25 : 74.4 kg (164 lb)  01/30/25 : 72.7 kg (160 lb 4.4 oz)  01/24/25 : 73.1 kg (161 lb 3.2 oz)  01/14/25 : 72.6 kg (160 lb)  12/11/24 : 72.6 kg (160 lb)    Physical Exam  Vitals reviewed.   Constitutional:       Appearance: Normal appearance. She is well-developed.   HENT:      Head: Normocephalic and atraumatic.      Right Ear: External ear normal. No tenderness.      Left Ear: External ear normal. No tenderness.      Nose: Nose normal.      Mouth/Throat:      Lips: Pink.       Mouth: Mucous membranes are moist. No injury or oral lesions.      Tongue: No lesions.   Eyes:      General: Lids are normal.      Extraocular Movements: Extraocular movements intact.      Conjunctiva/sclera: Conjunctivae normal.      Pupils: Pupils are equal, round, and reactive to light.   Neck:      Thyroid: No thyroid mass.      Comments: Muscle stiffness in left neck  Cardiovascular:      Rate and Rhythm: Normal rate and regular rhythm.      Pulses: Normal pulses.      Heart sounds: No murmur heard.     No gallop.   Pulmonary:      Effort: Pulmonary effort is normal. No respiratory distress.      Breath sounds: Normal breath sounds and air entry. No stridor. No wheezing.   Abdominal:      General: Abdomen is flat. Bowel sounds are normal. There is no distension or abdominal bruit.      Palpations: Abdomen is soft. There is no mass.      Tenderness: There is no abdominal tenderness.   Musculoskeletal:         General: Normal range of motion.      Cervical back: Normal range of motion and neck supple. No signs of trauma. Normal range of motion.      Right lower leg: No edema.      Left lower leg: No edema.   Lymphadenopathy:      Cervical: No cervical adenopathy.      Upper Body:      Right upper body: No axillary adenopathy.      Left upper body: No axillary adenopathy.   Skin:     General: Skin is warm and dry.      Comments: No new skin lesions   Neurological:      General: No focal deficit present.      Mental Status: She is alert and oriented to person, place, and time. Mental status is at baseline.      Gait: Gait is intact.   Psychiatric:         Attention and Perception: Attention normal.         Mood and Affect: Mood and affect normal.         Behavior: Behavior is cooperative.       ECOGSCORE: 1- Restricted in physically strenuous activity.  Carries out light duty.    Diagnostic Results   LABS  Below labs were reviewed.  Lab Results   Component Value Date    WBC 5.2 03/12/2025    HGB 11.4 (L) 03/12/2025  "   HCT 35.7 (L) 03/12/2025    MCV 90 03/12/2025     03/12/2025      Lab Results   Component Value Date    NEUTROABS 4.09 03/12/2025    EOSABS 0.00 03/12/2025      Lab Results   Component Value Date    GLUCOSE 72 (L) 03/12/2025    CALCIUM 9.2 03/12/2025     03/12/2025    K 3.6 03/12/2025    CO2 29 03/12/2025     03/12/2025    BUN 9 03/12/2025    CREATININE 0.63 03/12/2025    MG 1.60 01/30/2025    PHOS 4.6 09/03/2024    ALBUMIN 3.9 03/12/2025    PROT 6.7 03/12/2025     Lab Results   Component Value Date    ALT 21 03/12/2025    AST 20 03/12/2025    ALKPHOS 74 03/12/2025    BILITOT 0.5 03/12/2025      Lab Results   Component Value Date    TSH 1.37 03/12/2025    FREET4 1.35 10/29/2024     Lab Results   Component Value Date    TSH 1.37 03/12/2025    FREET4 1.35 10/29/2024     No results found for: \"RETICCTPCT\", \"RETIC\", \"IMMRETICFR\", \"RETICHGB\"  Lab Results   Component Value Date    LACTATE 1.1 01/14/2025    FERRITIN 268 (H) 03/12/2025    IRON 38 03/12/2025    TIBC 242 03/12/2025       IMAGES  10/18/24 CT Chest wo IV contrast  IMPRESSION:  1.  The previously noted posterior right upper lobe ill-defined nodular density has resolved. There is presently a subtle vague 1.5 cm ground-glass opacity within inferior right upper lobe, which is felt to represent focal bronchiolitis. Otherwise, no new suspicious pulmonary nodules or masses.  2. Similar bandlike opacity within lingula with associated mild bronchiectasis, which was minimally hypermetabolic on recent PET-CT, favoring benign/inflammatory etiology; attention on follow-up imaging within 6 months is recommended.  3. Redemonstrated moderate upper lung predominant centrilobular and paraseptal emphysema.  4. Severe coronary artery calcifications, indicating the presence of coronary artery disease. If the patient has associated symptoms recommend management as per chest pain guidelines       01/21/25 CT SOFT TISSUE NECK W IV CONTRAST  - Impression -  1. The " left neck has postoperative changes from the dissection and flap reconstruction; no mass or fluid collection in this region is noted. The previously noted mass/necrotic node has been removed.  2. Soft tissue edema extending from the pharynx to the upper larynx has mildly improved.    02/27/25 NM PET CT WHOLE BODY  - Impression -  1. Interval development of focal hypermetabolic activity within the larynx and proximal esophagus. Findings are nonspecific and correlation with direct visualization is recommended to exclude a neoplastic process.  2. Postsurgical changes within the left neck without evidence of hypermetabolic recurrent/residual disease. No hypermetabolic cervical lymphadenopathy.  3. Interval development of multifocal consolidative opacities most prominent within the dependent portion of the right lower lobe is most compatible with infectious/inflammatory process and correlate with concern for aspiration pneumonia given distribution. Hypermetabolic mediastinal lymphadenopathy is favored to be reactive in nature. Follow-up chest CT to ensure resolution is recommended.  4. No evidence of hypermetabolic distant metastatic disease.    02/27/25 CT SOFT TISSUE NECK W IV CONTRAST  - Impression -  1. Status post left radical neck dissection with ALT free flap reconstruction. There is stable diffuse edema noted of the upper aerodigestive soft tissue structures, similar in appearance to the comparison examination. No evidence of recurrent or residual discrete mass or pathologically enlarged lymph node.  2. New pulmonary nodules observed in the right upper lobe as well as a left apical lower lobe subpleural consolidative opacity. These findings may represent an infectious/inflammatory process, however metastatic disease is not excluded. Follow-up imaging may be performed to evaluate for resolution.    Assessment/Plan   Melvi Sanchez is a 64 y.o. year old female patient with PMH of stage III (cT1c cN3a M0) SCC  of L tonsil, COPD, hypothyroidism and RLS, started treatment with CCRT w/ Cisplatin and proton radiation on 10/8/24 for recurrence to L oropharynx with diffuse JOLANTA.    We discussed the treatment paradigm in recurrence and salvage surgery - given her pathology showing JOLANTA it is reasonable to proceed with concurrent reirradiation with Proton. Patient completed 5 cycles of cisplatin (40 mg/m2) and 66 gy of proton reirradiation on 11/19/24.    3/12/25: Patient is 3 months from treatment and is recovering well despite having had PNA and Covid last month. She still has a lingering cough but denies SOB and able to perform her ADLs without trouble. She is still fatigued and stamina is low. Her chronic dysphagia and neck stiffness are unchanged, transient tinnitus is unchanged from baseline.   Her 3/6/25 restaging CT Neck and PET showed some uptake in the larynx which seem to correspond with swelling on scope by  RadOnc today, the interval development of multifocal consolidative opacities in the RLL are likely infectious/inflammatory given patient's recent PNA and Covid infection. Will plan on 1 month follow up CT chest to monitor for resolution.     # Recurrent stage III (cT1c cN3a M0) SCC of L tonsil   - 07/07/2022: Completion of chemoradiation with weekly cisplatin with Dr. Razo  - 08/26/2024: L radical neck dissection 2-4 with CN11 repair showing diffuse JOLANTA   - 10/8 - 11/19/24: Completed 5 cycles of cisplatin (40 mg/m2) and 66 gy of proton reirradiation   - 3/12/25: Repeat CT Chest wo contrast on 4/9/25, follow up on resolution of lung consolidation seen on 3/6 PET. RTC 3 months on 6/12 w/ MedOnc, labs cbc, cmp.     # RUL nodules  - Being followed with CT scan in next 3 months  - 10/18/24 CT Chest wo contrast showed the previously noted posterior right upper lobe ill-defined nodular density has resolved. Similar bandlike opacity within lingula with associated mild bronchiectasis, which was minimally hypermetabolic  on recent PET-CT, favoring benign/inflammatory etiology; attention on follow-up imaging within 6 months is recommended.  - Follow up on band like opacity within lingula with CT chest wo contrast likely in April 2025    # Sleep Disturbance  - Trouble sleeping, will continue Olanzapine 5mg nightly rather than just D1-4.   - Continue Olanzapine 10mg nightly     # Memory Loss  - Patient reported short-term memory loss after first round of chemoRT, however her memory loss is much worse with this round. Has trouble remembering, dates, recalling conversations, loses train of thoughts, even though she makes herself phone reminders. Reports her mother passed away from vascular dementia.   Referred to Neurology - Cognitive Testing, NPV on 6/6/25

## 2025-03-11 ENCOUNTER — APPOINTMENT (OUTPATIENT)
Dept: PULMONOLOGY | Facility: HOSPITAL | Age: 65
End: 2025-03-11
Payer: COMMERCIAL

## 2025-03-11 ENCOUNTER — TELEPHONE (OUTPATIENT)
Dept: RADIATION ONCOLOGY | Facility: HOSPITAL | Age: 65
End: 2025-03-11
Payer: COMMERCIAL

## 2025-03-12 ENCOUNTER — LAB (OUTPATIENT)
Dept: LAB | Facility: HOSPITAL | Age: 65
End: 2025-03-12
Payer: COMMERCIAL

## 2025-03-12 ENCOUNTER — HOSPITAL ENCOUNTER (OUTPATIENT)
Dept: RADIATION ONCOLOGY | Facility: HOSPITAL | Age: 65
Setting detail: RADIATION/ONCOLOGY SERIES
Discharge: HOME | End: 2025-03-12
Payer: COMMERCIAL

## 2025-03-12 ENCOUNTER — APPOINTMENT (OUTPATIENT)
Dept: PALLIATIVE MEDICINE | Facility: CLINIC | Age: 65
End: 2025-03-12
Payer: COMMERCIAL

## 2025-03-12 ENCOUNTER — OFFICE VISIT (OUTPATIENT)
Dept: HEMATOLOGY/ONCOLOGY | Facility: HOSPITAL | Age: 65
End: 2025-03-12
Payer: COMMERCIAL

## 2025-03-12 ENCOUNTER — TELEPHONE (OUTPATIENT)
Dept: PALLIATIVE MEDICINE | Facility: HOSPITAL | Age: 65
End: 2025-03-12
Payer: COMMERCIAL

## 2025-03-12 VITALS
DIASTOLIC BLOOD PRESSURE: 73 MMHG | HEART RATE: 64 BPM | WEIGHT: 159.5 LBS | BODY MASS INDEX: 28.25 KG/M2 | RESPIRATION RATE: 18 BRPM | TEMPERATURE: 97.2 F | SYSTOLIC BLOOD PRESSURE: 135 MMHG | OXYGEN SATURATION: 96 %
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
## 2025-03-12 DIAGNOSIS — Z08 ENCOUNTER FOR FOLLOW-UP SURVEILLANCE OF HEAD AND NECK CANCER: ICD-10-CM

## 2025-03-12 DIAGNOSIS — C77.0 METASTASIS TO HEAD AND NECK LYMPH NODE (MULTI): ICD-10-CM

## 2025-03-12 DIAGNOSIS — J18.9 PNEUMONIA DUE TO INFECTIOUS ORGANISM, UNSPECIFIED LATERALITY, UNSPECIFIED PART OF LUNG: ICD-10-CM

## 2025-03-12 DIAGNOSIS — I89.0 LYMPHEDEMA DUE TO RADIATION: ICD-10-CM

## 2025-03-12 DIAGNOSIS — C09.9 TONSIL CANCER (MULTI): Primary | ICD-10-CM

## 2025-03-12 DIAGNOSIS — R13.10 DYSPHAGIA, UNSPECIFIED TYPE: ICD-10-CM

## 2025-03-12 DIAGNOSIS — D64.9 ANEMIA, UNSPECIFIED TYPE: ICD-10-CM

## 2025-03-12 DIAGNOSIS — R91.8 LUNG NODULES: ICD-10-CM

## 2025-03-12 DIAGNOSIS — R41.3 MEMORY LOSS: ICD-10-CM

## 2025-03-12 DIAGNOSIS — C09.9 TONSIL CANCER (MULTI): ICD-10-CM

## 2025-03-12 DIAGNOSIS — K11.7 XEROSTOMIA DUE TO RADIOTHERAPY: ICD-10-CM

## 2025-03-12 DIAGNOSIS — Z85.89 ENCOUNTER FOR FOLLOW-UP SURVEILLANCE OF HEAD AND NECK CANCER: ICD-10-CM

## 2025-03-12 LAB
ALBUMIN SERPL BCP-MCNC: 3.9 G/DL (ref 3.4–5)
ALP SERPL-CCNC: 74 U/L (ref 33–136)
ALT SERPL W P-5'-P-CCNC: 21 U/L (ref 7–45)
ANION GAP SERPL CALC-SCNC: 11 MMOL/L (ref 10–20)
AST SERPL W P-5'-P-CCNC: 20 U/L (ref 9–39)
BASOPHILS # BLD AUTO: 0.03 X10*3/UL (ref 0–0.1)
BASOPHILS NFR BLD AUTO: 0.6 %
BILIRUB SERPL-MCNC: 0.5 MG/DL (ref 0–1.2)
BUN SERPL-MCNC: 9 MG/DL (ref 6–23)
CALCIUM SERPL-MCNC: 9.2 MG/DL (ref 8.6–10.3)
CHLORIDE SERPL-SCNC: 104 MMOL/L (ref 98–107)
CO2 SERPL-SCNC: 29 MMOL/L (ref 21–32)
CREAT SERPL-MCNC: 0.63 MG/DL (ref 0.5–1.05)
EGFRCR SERPLBLD CKD-EPI 2021: >90 ML/MIN/1.73M*2
EOSINOPHIL # BLD AUTO: 0 X10*3/UL (ref 0–0.7)
EOSINOPHIL NFR BLD AUTO: 0 %
ERYTHROCYTE [DISTWIDTH] IN BLOOD BY AUTOMATED COUNT: 13.9 % (ref 11.5–14.5)
FERRITIN SERPL-MCNC: 268 NG/ML (ref 8–150)
GLUCOSE SERPL-MCNC: 72 MG/DL (ref 74–99)
HCT VFR BLD AUTO: 35.7 % (ref 36–46)
HGB BLD-MCNC: 11.4 G/DL (ref 12–16)
IMM GRANULOCYTES # BLD AUTO: 0.02 X10*3/UL (ref 0–0.7)
IMM GRANULOCYTES NFR BLD AUTO: 0.4 % (ref 0–0.9)
IRON SATN MFR SERPL: 16 % (ref 25–45)
IRON SERPL-MCNC: 38 UG/DL (ref 35–150)
LYMPHOCYTES # BLD AUTO: 0.45 X10*3/UL (ref 1.2–4.8)
LYMPHOCYTES NFR BLD AUTO: 8.6 %
MCH RBC QN AUTO: 28.7 PG (ref 26–34)
MCHC RBC AUTO-ENTMCNC: 31.9 G/DL (ref 32–36)
MCV RBC AUTO: 90 FL (ref 80–100)
MONOCYTES # BLD AUTO: 0.62 X10*3/UL (ref 0.1–1)
MONOCYTES NFR BLD AUTO: 11.9 %
NEUTROPHILS # BLD AUTO: 4.09 X10*3/UL (ref 1.2–7.7)
NEUTROPHILS NFR BLD AUTO: 78.5 %
NRBC BLD-RTO: 0 /100 WBCS (ref 0–0)
PLATELET # BLD AUTO: 239 X10*3/UL (ref 150–450)
POTASSIUM SERPL-SCNC: 3.6 MMOL/L (ref 3.5–5.3)
PROT SERPL-MCNC: 6.7 G/DL (ref 6.4–8.2)
RBC # BLD AUTO: 3.97 X10*6/UL (ref 4–5.2)
SODIUM SERPL-SCNC: 140 MMOL/L (ref 136–145)
TIBC SERPL-MCNC: 242 UG/DL (ref 240–445)
TSH SERPL-ACNC: 1.37 MIU/L (ref 0.44–3.98)
UIBC SERPL-MCNC: 204 UG/DL (ref 110–370)
WBC # BLD AUTO: 5.2 X10*3/UL (ref 4.4–11.3)

## 2025-03-12 PROCEDURE — 31575 DIAGNOSTIC LARYNGOSCOPY: CPT

## 2025-03-12 PROCEDURE — 82728 ASSAY OF FERRITIN: CPT

## 2025-03-12 PROCEDURE — 80053 COMPREHEN METABOLIC PANEL: CPT

## 2025-03-12 PROCEDURE — 99215 OFFICE O/P EST HI 40 MIN: CPT

## 2025-03-12 PROCEDURE — 99215 OFFICE O/P EST HI 40 MIN: CPT | Performed by: STUDENT IN AN ORGANIZED HEALTH CARE EDUCATION/TRAINING PROGRAM

## 2025-03-12 PROCEDURE — 99215 OFFICE O/P EST HI 40 MIN: CPT | Mod: 25

## 2025-03-12 PROCEDURE — 36415 COLL VENOUS BLD VENIPUNCTURE: CPT

## 2025-03-12 PROCEDURE — 85025 COMPLETE CBC W/AUTO DIFF WBC: CPT

## 2025-03-12 PROCEDURE — 84443 ASSAY THYROID STIM HORMONE: CPT

## 2025-03-12 PROCEDURE — 83550 IRON BINDING TEST: CPT

## 2025-03-12 RX ORDER — LIDOCAINE HYDROCHLORIDE 20 MG/ML
JELLY TOPICAL
Status: DISCONTINUED
Start: 2025-03-12 | End: 2025-03-13 | Stop reason: HOSPADM

## 2025-03-12 ASSESSMENT — ENCOUNTER SYMPTOMS
CONSTIPATION: 0
COUGH: 1
TROUBLE SWALLOWING: 1
CONFUSION: 0
ADENOPATHY: 0
NECK PAIN: 0
NUMBNESS: 1
DIFFICULTY URINATING: 0
NAUSEA: 0
SHORTNESS OF BREATH: 1
DIARRHEA: 0
SPEECH DIFFICULTY: 0
SHORTNESS OF BREATH: 0
DIZZINESS: 0
BLOOD IN STOOL: 0
HEMATURIA: 0
ABDOMINAL DISTENTION: 0
FEVER: 0
LIGHT-HEADEDNESS: 1
OCCASIONAL FEELINGS OF UNSTEADINESS: 0
HEADACHES: 0
PALPITATIONS: 0
CHEST TIGHTNESS: 0
ARTHRALGIAS: 0
LOSS OF SENSATION IN FEET: 0
SORE THROAT: 0
VOMITING: 0
EYE PROBLEMS: 0
FATIGUE: 1
DEPRESSION: 0
NERVOUS/ANXIOUS: 0
EXTREMITY WEAKNESS: 0
CHILLS: 0

## 2025-03-12 ASSESSMENT — PAIN SCALES - GENERAL: PAINLEVEL_OUTOF10: 0-NO PAIN

## 2025-03-12 NOTE — PROGRESS NOTES
Radiation Oncology Follow-Up    Patient Name:  Melvi Sanchez  MRN:  [id number]  :  1960    Referring Provider: Delvin Momin, *  Primary Care Provider: Alton King DO  Care Team: Patient Care Team:  Alton King DO as PCP - General (Family Medicine)  Alton King DO as PCP - [facility]  Alton King DO as PCP - [facility] Medicaid PCP  Jimmie LEYVA MD as Consulting Physician (Radiation Oncology)  Marlon Patel MD as Surgeon (Pulmonary Disease)  Tiffanie Whitfield, MALOU as Nurse Navigator (Hematology and Oncology)  Alejandro Hurst MD as Consulting Physician (Hematology and Oncology)  Chetna Lowry MD as Radiation Oncologist (Radiation Oncology)  Danny Rodriguez PA-C as Physician Assistant (Hematology and Oncology)  PIYUSH Schreiber-CNP as Nurse Practitioner (Radiation Oncology)  Victorino Grijalva, MALOU as Care Manager (Case Management)    Date of Service: 3/12/2025    DIAGNOSIS AND STAGING  Diagnosis and Staging: 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    Patient initially treated by Dr. Razo in . She first noticed a lump in the left side of her neck > 2 years ago. She was found that have enlarged level 2, 3, 4 nodes, but no obvious oropharyngeal lesion 2022 by Dr. Torres.  CT neck was obtained demonstrating  L neck mass, 3.2 x 5.9 x 8.4 cm and a 1.5 x 1.6 right paratracheal node -   FNA biopsy of left neck mass showed squamous cell carcinoma. On 2022 she underwent triple endoscopy and DL showed lesion of left tonsil; biopsy showed invasive squamous cell carcinoma, p16 positive. She was referred to radiation oncology and saw Dr. Crook and  completed 70 cGy/35f CCRT with STEWART seen at 1 year. She has been followed by Dr. Torres in surveillance and was noted to have left neck LINA. CT neck was obtained on 6/20/24 demonstrating increase in edema, enlargement of level 2 node. L neck core biopsy on 7/5/24 demonstrated metastatic squamous cell carcinoma involving fibrous tissue with extensive necrosis.  The neck dissection showed Poorly differentiated , nonkeratinizing squamous cell carcinoma with abundant necrosis, involving soft tissue extensively, encompassing levels II and III (2.2 cm) and fibrosis and foreign body granulomatous reaction, levels III and IV.     PRIOR THERAPIES  07/07/2022: Completion of chemoradiation with weekly cisplatin with Dr. Razo  08/26/2024: L radical neck dissection 2-4 with CN11 repair  10/8 - 11/19/24: Completed 5 cycles of cisplatin (40 mg/m2) and 66 gy of proton reirradiation     Proton Beam: Left Head and neck    Treatment Period Technique Fraction Dose Fractions Total Dose   Course 2 10/9/2024-11/19/2024  (days elapsed: 41)         VMAT L neck 10/9/2024-10/11/2024 VMAT 200 / 200 cGy 3 / 3 600 / 600 cGy         L neck 10/14/2024-11/19/2024 3-Field 182 / 182 cGy 27 / 27 4914 / 4,909 cGy     Past Medical History:   Diagnosis Date   • Acute hypoxemic respiratory failure (Multi) 09/20/2023   • Breast calcification, left    • COPD (chronic obstructive pulmonary disease) (Multi)    • Dysphagia    • Essential (primary) hypertension 12/28/2018    HTN (hypertension), benign   • Fibroadenoma of left breast    • H/O malignant neoplasm of tonsil     s/p XRT and chemotherapy completed July 8, 22.   • Hypothyroidism    • Morbid obesity (Multi) 04/03/2023   • Nausea and/or vomiting 04/03/2023   • OAB (overactive bladder)    • Pharyngitis 04/03/2023   • Pneumonia 04/03/2023   • Restless legs syndrome    • Sinusitis 04/03/2023   • Tobacco use disorder 09/20/2023      Past Surgical History:   Procedure Laterality Date   • BREAST BIOPSY  "Left     benign FA   •  SECTION, LOW TRANSVERSE     • CHOLECYSTECTOMY  2014    Cholecystectomy   • COLONOSCOPY     • HYSTERECTOMY  2004    Hysterectomy   • MOUTH SURGERY  2014    Oral Surgery Tooth Extraction   • NECK SURGERY  2024    Creation Free Flap Head/Neck, Dissection Neck      Social History     Tobacco Use   • Smoking status: Former     Current packs/day: 1.00     Average packs/day: 1 pack/day for 40.8 years (40.8 ttl pk-yrs)     Types: Cigarettes     Start date: 2023   • Smokeless tobacco: Never   • Tobacco comments:     3 or less cig. Daily per patient stated 10/10/24   Substance Use Topics   • Alcohol use: Not Currently     Alcohol/week: 1.0 standard drink of alcohol     Types: 1 Glasses of wine per week      SUBJECTIVE  History of Present Illness:  Melvi Sanchez \"Gretchen\" is a 64 y.o. female who was previously seen at the [facility] Department of Radiation Oncology for recurrent stage III ([id number]) p16+ SCC of L oropharynx.  Recent treatment included a left radical neck dissection 2-4 with CN11 repair and cisplatin (40 mg/m2) x 5 cycles and 66 gy of proton re-irradiation ending on 24.   Today the patient states that she's doing \"okay\".  She states that she just recently completed antibiotics for \"double pneumonia\" that she had developed last week.  She stated that she was symptomatic with the exception of a fever.   She continue to eat a regular diet without coughing or choking.  She endorses moderate xerostomia and dysgeusia which have been stable.  She manages oral dryness with extra hydration.   Denies mucositis, thrush, or new lumps/bumps/discolorations in the mouth or around the head, neck or shoulders.  She does endorse that she continues to have numbness of the left ear, cheek, and neck since her initial treatment back in .  The current radiation dermatitis is mostly healed with a couple of areas of sloughing " "skin.  She continues to have hearing changes to the left ear that began back in 2022.  She states that her hearing is muffled, like she's \"under water\".   She states that Dr. Brownlee and discussed potential placement of myringotomy tube.  She's going to discuss with him on 2/21/25.  Denies chills, fever, SOB or chest pain.  She does endorse some mild fatigue.  Denies headaches, dizziness or new focal sensory/motor deficits.  She does endorse an occasional feeling of being \"off balance.     3/12/25 Interval FU visit.  Today the patient is in clinic for a routine Radiation Oncology FU visit and review of a restaging PET and CT neck completed on 3/6/25.  Today, the patient states that she doing better s/p recent back-to-back pneumonia and covid infections in Feb and March 2025.  She endorses just completing the last of her antibiotics last week.  She continues to eat a regular diet with some coughing and choking per her daughter.  We discussed that it's probably a good idea if she reconnects with SLP for dysphagia management and she was agreeable.  She continues to have some xerostomia and dysgeusia which are improved.  She quantifies her taste at about 80% of her pretreatment baseline.  Denies mucositis, thrush, or new lumps/bumps/discolorations in the mouth or around the head, neck or shoulders.  Continues to have numbness of the left ear, cheek, and neck since her initial treatment back in 2022. She also has some pain in the left jaw and manages with PRN oxycodone.  Denies chills, fever, or chest pain.  Does endorse some SOB.  She also endorses some mild fatigue that has been stable. Denies headaches, dizziness or new focal sensory/motor deficits.  She does endorse some mild instability when standing.  She continues to have some hearing changes (fulness) in her left ear that is stable.  She's going to address with Dr. Brownlee on 3/25/25 and is hoping for a myringotomy tube placement.   She does endorse an occasional " "feeling of being \"off balance which may be related to the fullness in her ear.     A restaging CT neck completed on 3/6/25 showed new pulmonary nodules in the right upper lobe as well as a left apical lower lobe subpleural consolidative opacity that may represent an infectious/inflammatory process, however metastatic disease is not excluded.  This was discussed with Dr. Lowry and in light of her recent pneumonia and Covid infections, she felt that it could be infectious in nature.  Medical Oncology has ordered a CT chest to be performed in 1 months time to better characterize.  A NavDx was also ordered to follow circulating HPV16.  This was discussed with the patient.     A restaging PET also completed on 3/6/25 showed development of multifocal consolidative opacities most prominent within the dependent portion of the right lower lobe most compatible with infectious/inflammatory process with concern for aspiration pneumonia.  It also showed Interval development of focal hypermetabolic activity within the larynx and proximal esophagus. Findings are nonspecific and correlation with direct visualization is recommended to exclude a  neoplastic process.     3/12/25 Flexible fiberoptic laryngopharyngoscopy:   performed via the left nares, after topical anesthesia and decongestant was instilled:  Nasopharynx: There are no noted lesions in the nasopharynx. The bilateral torus  tubari and fossa of Rosenmueller show no lesions and Eustachian tube orifice  normal. The mucosal surface of nasopharynx is clear without appreciated masses.  The nasopharyngeal surface of the palate has no abnormal lesions. Oropharynx: The tonsillar fossa appears normal bilaterally. The tongue base and vallecula show no lesions. The lingual surface of the epiglottis with significant swelling. The oropharynx is clear of any masses. Larynx: The laryngeal surface of epiglottis, aryepiglottic folds, false cords, and the arytenoids also show significant " swelling. The vocal cords were difficult to visualize because of the aforementioned swelling but seemed to have normal mobility.  Scope was removed. The patient tolerated the procedure well.    The team was notified about the above findings via email.  I included laryngoscopy photos of the swelling in my communications.      Review of Systems:   Review of Systems   Constitutional:  Positive for fatigue. Negative for chills and fever.   HENT:   Positive for hearing loss (Left ear) and trouble swallowing. Negative for lump/mass, mouth sores and sore throat.    Eyes:  Negative for eye problems.   Respiratory:  Positive for cough and shortness of breath. Negative for chest tightness.    Cardiovascular:  Negative for chest pain and palpitations.   Gastrointestinal:  Negative for abdominal distention, blood in stool, constipation, diarrhea, nausea and vomiting.   Genitourinary:  Negative for difficulty urinating and hematuria.    Musculoskeletal:  Negative for arthralgias, gait problem and neck pain.   Neurological:  Positive for light-headedness (When standing quickly.) and numbness (Left ear, face and neck.  Also in the left thigh from graft site.). Negative for dizziness, extremity weakness, gait problem, headaches and speech difficulty.   Hematological:  Negative for adenopathy.   Psychiatric/Behavioral:  Negative for confusion and depression. The patient is not nervous/anxious.      Performance Status:   The Karnofsky performance scale today is 100, Fully active, able to carry on all pre-disease performed without restriction (ECOG equivalent 0).    Pain: Throat pain (3-4).  Managing with MS Contin 15mg BID and Percocet 7.5mg Q3-4hr PRN.      OBJECTIVE  Vital Signs:      1/17/2025     9:00 AM 1/17/2025    12:00 PM 1/24/2025    12:06 PM 1/30/2025    10:56 AM 2/7/2025    11:35 AM 2/12/2025    11:04 AM 3/12/2025    12:29 PM   Vitals   Systolic 117  100 122 128 118 135   Diastolic 75  65 74 72 66 73   BP Location Left arm  "        Heart Rate 89 84 65 69 71 63 64   Temp 36.6 °C (97.8 °F)   36.2 °C (97.2 °F)  36.4 °C (97.5 °F) 36.2 °C (97.2 °F)   Resp 18 18  18  16 18   Height   1.6 m (5' 3\")  1.6 m (5' 3\")     Weight (lb)   161.2 160.27 164 162.8 159.5   BMI   28.56 kg/m2 28.39 kg/m2 29.05 kg/m2 28.84 kg/m2 28.25 kg/m2   BSA (m2)   1.8 m2 1.8 m2 1.82 m2 1.81 m2 1.79 m2   Visit Report   Report Report Report Report Report      Physical Exam  Vitals and nursing note reviewed.   Constitutional:       General: She is not in acute distress.     Appearance: Normal appearance. She is normal weight. She is not ill-appearing.   HENT:      Head: Normocephalic and atraumatic. No masses.      Jaw: Trismus, swelling (some minor swelling of left jaw.) and pain on movement (some moderate pain in the left jaw.) present. No tenderness.        Comments: Edentulous      Nose: Nose normal. No congestion or rhinorrhea.      Mouth/Throat:      Mouth: Mucous membranes are dry. No injury, oral lesions or angioedema.      Dentition: Abnormal dentition. Has dentures. Gingival swelling present. No dental tenderness.      Tongue: No lesions. Tongue does not deviate from midline.      Palate: No mass and lesions.      Comments: Edentulous.   Eyes:      General: No scleral icterus.     Extraocular Movements: Extraocular movements intact.      Pupils: Pupils are equal, round, and reactive to light.   Cardiovascular:      Rate and Rhythm: Normal rate and regular rhythm.      Pulses: Normal pulses.      Heart sounds: Normal heart sounds. No murmur heard.  Pulmonary:      Effort: Pulmonary effort is normal. No respiratory distress.      Breath sounds: Normal breath sounds. No stridor. No wheezing or rhonchi.   Chest:      Chest wall: No tenderness.   Abdominal:      General: Abdomen is flat. Bowel sounds are normal.      Palpations: Abdomen is soft. There is no mass.      Tenderness: There is no abdominal tenderness. There is no guarding or rebound.   Musculoskeletal:   "       General: No swelling or tenderness. Normal range of motion.      Cervical back: Normal range of motion and neck supple. No tenderness.   Skin:     General: Skin is warm and dry.      Coloration: Skin is not jaundiced.      Findings: No erythema or lesion.   Neurological:      General: No focal deficit present.      Mental Status: She is alert and oriented to person, place, and time.      Sensory: No sensory deficit.      Motor: No weakness.      Gait: Gait normal.      Deep Tendon Reflexes: Reflexes normal.   Psychiatric:         Mood and Affect: Mood normal.         Behavior: Behavior normal.         Thought Content: Thought content normal.         Judgment: Judgment normal.   XR CHEST 2 VIEWS; 11/12/2024 12:38 pm   IMPRESSION:  1.  Pulmonary hyperinflation  2. No acute cardiopulmonary pathology    ASSESSMENT:   64 y.o. female who was previously seen at the [facility] Department of Radiation Oncology for recurrent stage III ([id number]) p16+ SCC of L oropharynx.  Recent treatment included a left radical neck dissection 2-4 with CN11 repair and cisplatin (40 mg/m2) x 5 cycles and 66 gy of proton re-irradiation ending on 11/19/24.   Today we discussed the Radiation Oncology Survival Plan.  The patient was provided supportive materials along with 3 copies of the treatment summary.  All questions/concerns were addressed to the satisfaction of the patient.     PLAN:    --Email sent to the team with results from a 3/12/25 laryngoscopy.   --FU with Raul Momin CNP in 3 months.    --Carotid ultrasound in 3 months.    --NavDx today.   --Referral back to SLP for dysphagia.   --Continue to follow with Dr. Hurst and Ro Rodriguez PA-C for Medical Oncology management.   --Continue to follow with Dr. Brownlee for ENT management.   --Continue Crestor 40 mg daily.      Please reach out with any questions or concerns.     NCCN Guidelines were applicable to guide this patients treatment  plan.  Delvin Momin, APRN-CNP

## 2025-03-12 NOTE — PATIENT INSTRUCTIONS
Repeat CT Chest without contrast in 1 month, 2nd week of April 2025.   We will call you with results.     You will see Medical Oncology in 3 months, in June 2025. Labs prior to the visit.

## 2025-03-12 NOTE — TELEPHONE ENCOUNTER
Called patient to inform Antoinette Soliman is running behind in her clinic and it would be more like noon before she connects. Patient stated she has other appointments and agreed to rescheduling on 3/14/25. Appointment scheduled

## 2025-03-13 ENCOUNTER — APPOINTMENT (OUTPATIENT)
Dept: HEMATOLOGY/ONCOLOGY | Facility: HOSPITAL | Age: 65
End: 2025-03-13
Payer: COMMERCIAL

## 2025-03-14 ENCOUNTER — TELEMEDICINE (OUTPATIENT)
Dept: PALLIATIVE MEDICINE | Facility: HOSPITAL | Age: 65
End: 2025-03-14
Payer: COMMERCIAL

## 2025-03-14 DIAGNOSIS — F41.9 ANXIETY AND DEPRESSION: Primary | ICD-10-CM

## 2025-03-14 DIAGNOSIS — F32.A ANXIETY AND DEPRESSION: Primary | ICD-10-CM

## 2025-03-14 DIAGNOSIS — G89.3 CANCER ASSOCIATED PAIN: ICD-10-CM

## 2025-03-14 DIAGNOSIS — R63.0 DECREASED APPETITE: ICD-10-CM

## 2025-03-14 DIAGNOSIS — K59.03 CONSTIPATION DUE TO PAIN MEDICATION THERAPY: ICD-10-CM

## 2025-03-14 PROCEDURE — 99214 OFFICE O/P EST MOD 30 MIN: CPT | Performed by: NURSE PRACTITIONER

## 2025-03-14 RX ORDER — OXYCODONE HYDROCHLORIDE 5 MG/1
5 TABLET ORAL EVERY 6 HOURS PRN
Qty: 120 TABLET | Refills: 0 | Status: SHIPPED | OUTPATIENT
Start: 2025-03-14 | End: 2025-05-13

## 2025-03-14 RX ORDER — MORPHINE SULFATE 15 MG/1
15 TABLET, FILM COATED, EXTENDED RELEASE ORAL 2 TIMES DAILY
Qty: 60 TABLET | Refills: 0 | Status: SHIPPED | OUTPATIENT
Start: 2025-03-14 | End: 2025-04-13

## 2025-03-14 RX ORDER — OXYCODONE HYDROCHLORIDE 5 MG/1
5 TABLET ORAL EVERY 6 HOURS PRN
Qty: 120 TABLET | Refills: 0 | Status: SHIPPED | OUTPATIENT
Start: 2025-03-14 | End: 2025-03-14

## 2025-03-14 NOTE — PROGRESS NOTES
SUPPORTIVE AND PALLIATIVE ONCOLOGY CONSULT - OUTPATIENT FOLLOW UP    Virtual or Telephone Consent     An interactive audio and video telecommunication system which permits real time communications between the patient (at the originating site) and provider (at the distant site) was utilized to provide this telehealth service.   Verbal consent was requested and obtained from Melvi Sanchez on this date, 3/14/25 for a telehealth visit.      SERVICE DATE: 3/14/2025    Referred by:  PIYUSH Lee-CNP  Medical Oncologist: Alejandro Hurst MD   Radiation Oncologist: MD Chetna Gutierrez MD  Primary Physician: Alton King  [phone]    REASON FOR CONSULT/CHIEF CONSULT COMPLAINT: Introduction to Supportive and Palliative Oncology Services    Subjective   HISTORY OF PRESENT ILLNESS: Melvi Sanchez is a 64 y.o. 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 Treatment plan is concurrent reirradiation with Proton / cisplatin.     Additional PMHx  COPD, hypothyroidism and RLS     11/5/24: Having increased throat pain. Gabapentin up to 300mg TID per rad onc. Started on zoloft 25mg per pcp but she hasn't started first dose yet.     2/12/25: Hospitalized for PNA. Still on abx. Pain is better down to 4 oxy per day from 6. Still taking Mscontin BID. Fatigued but slowly improving. Having some memory issues and has appt with neurology scheduled. Finished chemo.    3/14/25: Pain well controlled. 3/6/25 restaging CT Neck and PET showed some uptake in the larynx which correspond with swelling on scope by RadOnc and the interval development of multifocal consolidative opacities in the RLL are likely infectious/inflammatory given patient's recent PNA and Covid infection per oncology. CT chest in one month.     Symptom Assessment:    Pain:a little    Left lateral thigh  from mid thigh to knee -  "painful to touch - tingling - deep ache - some numbness   Left jaw/neck - stinging / \"raw\" - localized - some numbness or neck/ear/cheek - no worse with mastication     Lack of appetite: none   Weight loss: none  Pain in mouth: none   Dry mouth: somewhat - Xylomet PRN   Taste changes: a little - improving   Nausea/early satiety: none  Vomiting: none  Constipation: none  Diarrhea: none  Lack of energy: a little  Difficulty sleeping: none   Anxiety: none  Depression: none  Shortness of breath: a little - resolving pna and covid  Other: mild dysphagia - to FU with SLP  Memory issues - neuro appt in     Information obtained from: interview of patient  ______________________________________________________________________     Oncology History   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)   4/3/2023 Initial Diagnosis    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)     10/8/2024 -  Chemotherapy    CISplatin with Concurrent Radiation (Weekly), 7 Week Cycle     Tonsil cancer (Multi)   4/3/2023 Initial Diagnosis    Tonsil cancer (Multi)     10/8/2024 -  Chemotherapy    CISplatin with Concurrent Radiation (Weekly), 7 Week Cycle         Past Medical History:   Diagnosis Date    Acute hypoxemic respiratory failure (Multi) 2023    Breast calcification, left     COPD (chronic obstructive pulmonary disease) (Multi)     Dysphagia     Essential (primary) hypertension 2018    HTN (hypertension), benign    Fibroadenoma of left breast     H/O malignant neoplasm of tonsil     s/p XRT and chemotherapy completed .    Hypothyroidism     Morbid obesity (Multi) 2023    Nausea and/or vomiting 2023    OAB (overactive bladder)     Pharyngitis 2023    Pneumonia 2023    Restless legs syndrome     Sinusitis 2023    Tobacco use disorder 2023     Past Surgical History:   Procedure Laterality Date    BREAST BIOPSY Left     benign FA     SECTION, LOW TRANSVERSE      " CHOLECYSTECTOMY  04/17/2014    Cholecystectomy    COLONOSCOPY      HYSTERECTOMY  04/17/2004    Hysterectomy    MOUTH SURGERY  04/17/2014    Oral Surgery Tooth Extraction    NECK SURGERY  08/26/2024    Creation Free Flap Head/Neck, Dissection Neck     Family History   Problem Relation Name Age of Onset    Emphysema Mother      COPD Mother      Hyperlipidemia Sister      Breast cancer Paternal Grandmother          SOCIAL HISTORY  . 3 children.   Social History:  reports that she has quit smoking. Her smoking use included cigarettes. She started smoking about 15 months ago. She has a 40.8 pack-year smoking history. She has never used smokeless tobacco. She reports that she does not currently use alcohol after a past usage of about 1.0 standard drink of alcohol per week. She reports that she does not currently use drugs after having used the following drugs: Marijuana. Frequency: 1.00 time per week.    REVIEW OF SYSTEMS  Review of systems negative unless noted in HPI.       Objective     Current Outpatient Medications   Medication Instructions    albuterol 90 mcg/actuation inhaler 2 puffs, inhalation, Every 4 hours PRN    aspirin 81 mg, oral, Daily    budesonide-formoteroL (Symbicort) 160-4.5 mcg/actuation inhaler 2 puffs, inhalation, 2 times daily RT, Rinse mouth with water after use to reduce aftertaste and incidence of candidiasis. Do not swallow.    buPROPion XL (WELLBUTRIN XL) 300 mg, oral, Every morning, Do not crush, chew, or split.    gabapentin (NEURONTIN) 300 mg, oral, Nightly    guaiFENesin (MUCINEX) 1,200 mg, oral, 2 times daily, Do not crush, chew, or split.    levothyroxine (Synthroid, Levoxyl) 100 mcg tablet TAKE 1 TABLET BY MOUTH EARLY IN THE MORNING. TAKE ON AN EMPTY STOMACH AT THE SAME TIME EACH DAY, EITHER 30 TO 60 MINUTES PRIOR TO BREAKFAST.    melatonin 10 mg, oral, Nightly    morphine CR (MS CONTIN) 15 mg, oral, 2 times daily, Do not crush, chew, or split.    naloxone (NARCAN) 4 mg, nasal,  As needed, May repeat every 2-3 minutes if needed, alternating nostrils, until medical assistance becomes available.    nicotine (Nicoderm CQ) 21 mg/24 hr patch 1 patch, transdermal, Every 24 hours    OLANZapine (ZYPREXA) 10 mg, oral, Nightly    oxyCODONE (ROXICODONE) 5 mg, oral, Every 6 hours PRN    rOPINIRole (REQUIP) 2 mg, oral, Nightly    rosuvastatin (CRESTOR) 40 mg, oral, Daily    sertraline (ZOLOFT) 50 mg, oral, Daily    tiotropium (Spiriva Respimat) 2.5 mcg/actuation inhaler 2 puffs, inhalation, Daily    varenicline tartrate (CHANTIX) 1 mg, oral, 2 times daily, Take with full glass of water.    walker (Ultra-Light Rollator) misc 1 each, miscellaneous, As needed       Allergies:   Allergies   Allergen Reactions    Duloxetine Hallucinations     Pt states she doesn't think she has an allergy just a bad reaction when mixed with morphine .             Creatinine   Date Value Ref Range Status   03/12/2025 0.63 0.50 - 1.05 mg/dL Final     AST   Date Value Ref Range Status   03/12/2025 20 9 - 39 U/L Final     ALT   Date Value Ref Range Status   03/12/2025 21 7 - 45 U/L Final     Comment:     Patients treated with Sulfasalazine may generate falsely decreased results for ALT.     Hemoglobin   Date Value Ref Range Status   03/12/2025 11.4 (L) 12.0 - 16.0 g/dL Final     Platelets   Date Value Ref Range Status   03/12/2025 239 150 - 450 x10*3/uL Final       PHYSICAL EXAMINATION  Vital Signs:   No VS due to VV     Physical Exam  HENT:      Head: Normocephalic and atraumatic.      Comments: Left neck surgical changes noted  Eyes:      Extraocular Movements: Extraocular movements intact.      Conjunctiva/sclera: Conjunctivae normal.   Pulmonary:      Effort: Pulmonary effort is normal.   Neurological:      General: No focal deficit present.      Mental Status: She is alert.   Psychiatric:         Mood and Affect: Mood normal.         Thought Content: Thought content normal.         ASSESSMENT/PLAN    Pain  Pain is: cancer  related pain  Type: somatic and neuropathic  -continue oxycodone 5mg q6 prn   -may use tylenol prn but not to exceed 3g per day  -continue mscontin 15mg BID  -continue gabapentin to 300mg at bedtime     Opioid Use  Medication Management:   - OARRS report reviewed with no aberrant behavior; consistent with  prescriptions/records and patient history  - MED 30.  Overdose Risk Score 380.   This has been discussed with patient.   - We will continue to closely monitor the patient for signs of prescription misuse including UDS, OARRS review and subjective reports at each visit.  - No concurrent benzodiazepine use   - I am a provider who either is or has consulted and collaborated with a provider certified in Hospice and Palliative Medicine and have conducted a face-face visit and examination for this patient.  - Routine Urine Drug Screen completed 11/5/24 and appropriate   - Controlled Substance Agreement completed 11/5/24  - Specifically discussed that controlled substance prescriptions will only be provided by our group as outlined in the completed agreement  - Prescribed naloxone: Confirmed already Rx'ed  - Red Flags: none     Tobacco dependence   -last cig 11/13 but does vape on occasion   -continue nicotine patch 21mg   -continue chantix  -continue wellbutrin 300mg daily per pcp    Xerostomia   -Xylomet PRN    Poor appetite - improving   -Zyprexa 10mg at bedtime     Constipation   At risk for constipation related to opioids  -may use miralax 17g daily PRN  -may use dulcolax 5-10mg at bedtime PRN if no bm in 2 days     Altered Mood  Acute anxiety and depression related to health concerns   -on Wellbutrin per above   -zoloft 50mg daily per pcp     Chronic insomnia   -continue melatonin 10mg at bedtime PRN   -zyprexa 10mg at bedtime     Medical Decision Making/Goals of Care/Advance Care Planning:  Patient's current clinical condition, including diagnosis, and management plan, and goals of care were discussed.   Goals:  symptom control and cancer directed therapy    Advance Directives  Existence of Advance Directives:No - has interest - she has paperwork   Decision maker: She would like to appointment Daughter, Beth, as HCPOA - otherwise 2/3 children     Next Follow-Up Visit:  Return to clinic in 1 month     Signature and billing  Thank you for allowing us to participate in the care of this patient. Recommendations will be communicated back to the consulting service by way of shared electronic medical record or face-to-face.    Medical complexity was moderate level due to due to complexity of problems, extensive data review, and high risk of management/treatment.  Time was spent on the following: Prep Time, Time Directly with Patient/Family/Caregiver, Documentation Time. Total time spent: 30 min      DATA   Diagnostic tests and information reviewed for today's visit:  Most recent labs and imaging results, Medications       Some elements copied from Supportive Oncology note on 2/12/25, the elements have been updated and all reflect current decision making from today, 3/14/2025.      Plan of Care discussed with: Patient, Family/Significant Other: sister, and RN      SIGNATURE: PIYUSH Son-CNP    Contact information:  Supportive and Palliative Oncology  Monday-Friday 8 AM-5 PM  Phone:  [phone], press option #5, then option #1.   Or Epic Secure Chat

## 2025-03-25 ENCOUNTER — HOSPITAL ENCOUNTER (OUTPATIENT)
Dept: RADIOLOGY | Facility: CLINIC | Age: 65
Discharge: HOME | End: 2025-03-25
Payer: COMMERCIAL

## 2025-03-25 ENCOUNTER — APPOINTMENT (OUTPATIENT)
Dept: OTOLARYNGOLOGY | Facility: CLINIC | Age: 65
End: 2025-03-25
Payer: COMMERCIAL

## 2025-03-25 VITALS — BODY MASS INDEX: 28.53 KG/M2 | HEIGHT: 63 IN | WEIGHT: 161 LBS

## 2025-03-25 DIAGNOSIS — R05.3 CHRONIC COUGH: ICD-10-CM

## 2025-03-25 DIAGNOSIS — R09.81 NASAL CONGESTION: ICD-10-CM

## 2025-03-25 DIAGNOSIS — C77.0 METASTASIS TO HEAD AND NECK LYMPH NODE (MULTI): ICD-10-CM

## 2025-03-25 PROCEDURE — 71046 X-RAY EXAM CHEST 2 VIEWS: CPT | Performed by: STUDENT IN AN ORGANIZED HEALTH CARE EDUCATION/TRAINING PROGRAM

## 2025-03-25 PROCEDURE — 71046 X-RAY EXAM CHEST 2 VIEWS: CPT

## 2025-03-25 PROCEDURE — 3008F BODY MASS INDEX DOCD: CPT | Performed by: OTOLARYNGOLOGY

## 2025-03-25 PROCEDURE — 99214 OFFICE O/P EST MOD 30 MIN: CPT | Performed by: OTOLARYNGOLOGY

## 2025-03-25 PROCEDURE — 31575 DIAGNOSTIC LARYNGOSCOPY: CPT | Performed by: OTOLARYNGOLOGY

## 2025-03-25 RX ORDER — BISMUTH SUBSALICYLATE 262 MG
1 TABLET,CHEWABLE ORAL DAILY
COMMUNITY

## 2025-03-25 RX ORDER — FLUTICASONE PROPIONATE 50 MCG
2 SPRAY, SUSPENSION (ML) NASAL DAILY
Qty: 48 G | Refills: 3 | Status: SHIPPED | OUTPATIENT
Start: 2025-03-25 | End: 2025-06-23

## 2025-03-27 ENCOUNTER — TELEPHONE (OUTPATIENT)
Dept: OTOLARYNGOLOGY | Facility: CLINIC | Age: 65
End: 2025-03-27
Payer: COMMERCIAL

## 2025-03-27 NOTE — TELEPHONE ENCOUNTER
----- Message from Robin Brownlee sent at 3/27/2025  1:28 PM EDT -----  Mary please let her know no obvious pneumonia on the chest x-ray but perhaps needs to see Dr. Cruz or her pulmonologist for the persistent cough    I left her a message on her cell    I reviewed the PET scan and do not see worrisome areas that they are talking about in the voicebox or the esophagus that is likely irritation and swelling but no obvious tumor present.  Just to reassure her.    I am happy to talk to her and her daughter about it more directly

## 2025-03-28 ENCOUNTER — CLINICAL SUPPORT (OUTPATIENT)
Dept: NUTRITION | Facility: HOSPITAL | Age: 65
End: 2025-03-28
Payer: COMMERCIAL

## 2025-03-28 PROBLEM — R05.3 CHRONIC COUGH: Status: ACTIVE | Noted: 2025-03-28

## 2025-03-28 NOTE — PROGRESS NOTES
[employer]  Diet Recommendation 1: Healthy Eating  Diet Recommendation 2: MyPlate  Food Intolerance Avoidance: NKFA  Household Size: 1 Family Member  Interventions: Referral Number: 1st 6 Mo Referral 6 Mos  Interventions: Visit Number: 2 of 6 Visits - Max 6 Visits/Referral Each 6 Mo Period  Education Today: MyPlate Meals  Follow Up Notes for Future Visits: Pt states she does not follow any special kind of diet or avoid any foods. Came with granddaughter today.  Grains: 50-75% Whole  Fruit: 25-50% Fresh  Vegetables: 0-25% Fresh  Proteins: 1-2 Plant-based Items  Dairy: 0-25% Lowfat  Originating Site of Referral Order: Danny Rodriguez PA-C  Initials of RD Assisting Today: SARWAT

## 2025-03-28 NOTE — PROGRESS NOTES
T1N3a SCCa of left tonsil s/p CXRT with recurrent left neck disease now s/p left radical ND 2-4, recon with left ALT 8/26/24       Underwent proton reirradiation with chemo    Recent PET scan shows nodularity in the lungs but also question of activity at the level of the larynx and proximal esophagus    Patient denies voice change    She has been having persistent cough        She also describes recurrent pneumonia      No recent chest x-ray given the change in her symptoms        Physical Exam:  CONSTITUTIONAL:  No acute distress  VOICE:  No hoarseness or other abnormality  RESPIRATION:  Breathing comfortably, no stridor  CV:  No clubbing/cyanosis/edema in hands  EYES:  EOM intact, sclera normal  NEURO:  Alert and oriented times 3, Cranial nerves II-XII grossly intact and symmetric bilaterally  HEAD AND FACE:  Symmetric facial features, no masses or lesions, sinuses non-tender to palpation  SALIVARY GLANDS:  Parotid and submandibular glands normal bilaterally  EARS:  Normal external ears, external auditory canals, and TMs to otoscopy, normal hearing to whispered voice.  NOSE:  External nose midline, anterior rhinoscopy is normal with limited visualization to the anterior aspect of the interior turbinates, no bleeding or drainage, no lesions  ORAL CAVITY/OROPHARYNX/LIPS:  Normal mucous membranes, normal floor of mouth/tongue/OP, no masses or lesions  PHARYNGEAL WALLS:  No masses or lesions  NECK/LYMPH:  No LAD, no thyroid masses, trachea midline  SKIN: Extensive radiotherapy and surgical changes  PSYCH:  Alert and oriented with appropriate mood and affect         Flexible fiberoptic laryngopharyngoscopy was performed via the nares. After topical anesthesia and decongestant was instilled:    Nasopharynx:Eustachian tube orifice normal, fossa of Rosenmueller clear, mucosa clean, no masses appreciated  Palate: Nasopharyngeal surface of palate clear  Tongue base vallecula clear, lingual surface of epiglottis normal,  oropharynx clear  Larynx laryngeal surface of epiglottis, clear, area epiglottic folds, diffuse edema and radiotherapy changes, no obvious lesions noted  Scope was removed, patient tolerated the procedure well        PET scan has been reviewed showing activity at the level of the larynx and in the proximal esophagus without obvious mass noted on CT scan      Also no correlate on flexible scope although the proximal esophagus is not able to be visualized            Imp: STEWART local regionally but with PET scan showing hypermetabolic to but he which will need to be monitored      Will send for chest x-ray and asked her to reach out to pulmonology regarding her persistent cough      Will also discussed with Suzie Thorpe and Aris ongoing strategies and if exam under anesthesia to evaluate the larynx and proximal esophagus if this would impact care but appears to be more inflammatory rather than neoplastic      I will see her back again in 6 weeks for repeat examination

## 2025-04-01 ENCOUNTER — TELEPHONE (OUTPATIENT)
Dept: RADIATION ONCOLOGY | Facility: HOSPITAL | Age: 65
End: 2025-04-01
Payer: COMMERCIAL

## 2025-04-01 NOTE — TELEPHONE ENCOUNTER
Called the patient with the results of 3/12/25 [employer] testing.  Per a [facility] report, the patient is negative for [id number].  A call back number was left in my message.

## 2025-04-07 ENCOUNTER — PATIENT OUTREACH (OUTPATIENT)
Dept: PRIMARY CARE | Facility: CLINIC | Age: 65
End: 2025-04-07
Payer: COMMERCIAL

## 2025-04-08 ENCOUNTER — OFFICE VISIT (OUTPATIENT)
Dept: PULMONOLOGY | Facility: HOSPITAL | Age: 65
End: 2025-04-08
Payer: COMMERCIAL

## 2025-04-08 VITALS
HEIGHT: 63 IN | OXYGEN SATURATION: 95 % | HEART RATE: 77 BPM | WEIGHT: 161 LBS | SYSTOLIC BLOOD PRESSURE: 128 MMHG | DIASTOLIC BLOOD PRESSURE: 80 MMHG | RESPIRATION RATE: 16 BRPM | BODY MASS INDEX: 28.53 KG/M2

## 2025-04-08 DIAGNOSIS — J96.11 CHRONIC RESPIRATORY FAILURE WITH HYPOXIA: ICD-10-CM

## 2025-04-08 DIAGNOSIS — G47.33 OSA (OBSTRUCTIVE SLEEP APNEA): ICD-10-CM

## 2025-04-08 DIAGNOSIS — J44.9 CHRONIC OBSTRUCTIVE PULMONARY DISEASE, UNSPECIFIED COPD TYPE (MULTI): ICD-10-CM

## 2025-04-08 DIAGNOSIS — J43.9 PULMONARY EMPHYSEMA, UNSPECIFIED EMPHYSEMA TYPE (MULTI): Primary | ICD-10-CM

## 2025-04-08 DIAGNOSIS — R93.89 ABNORMAL CT OF THE CHEST: ICD-10-CM

## 2025-04-08 DIAGNOSIS — C09.9 TONSIL CANCER (MULTI): ICD-10-CM

## 2025-04-08 DIAGNOSIS — F17.210 NICOTINE DEPENDENCE, CIGARETTES, UNCOMPLICATED: ICD-10-CM

## 2025-04-08 PROCEDURE — 99214 OFFICE O/P EST MOD 30 MIN: CPT | Performed by: NURSE PRACTITIONER

## 2025-04-08 PROCEDURE — 3008F BODY MASS INDEX DOCD: CPT | Performed by: NURSE PRACTITIONER

## 2025-04-08 RX ORDER — UMECLIDINIUM BROMIDE AND VILANTEROL TRIFENATATE 62.5; 25 UG/1; UG/1
1 POWDER RESPIRATORY (INHALATION) DAILY
Qty: 1 EACH | Refills: 11 | Status: SHIPPED | OUTPATIENT
Start: 2025-04-08

## 2025-04-08 ASSESSMENT — ENCOUNTER SYMPTOMS
UNEXPECTED WEIGHT CHANGE: 0
RHINORRHEA: 0
FEVER: 0
COUGH: 1
FATIGUE: 0
WHEEZING: 0
SHORTNESS OF BREATH: 1
CHILLS: 0

## 2025-04-08 NOTE — PATIENT INSTRUCTIONS
Stop Symbicort and Spiriva and start on Anoro one puff once a day, everyday.    Continue albuterol as needed.   Continue on Mucinex twice a day, everyday.  Start on the allergy pill once a day for now.   Call with any questions or concerns.   Follow up with me in 6 months.

## 2025-04-08 NOTE — PROGRESS NOTES
Subjective   Patient ID: Gretchen Sanchez is a 64 y.o. here for follow up COPD.     HPI: She was diagnosed with tonsillar CA due to left neck lump in March 2022. She just finished XRT and chemotherapy on July 8, 22. She is currently on Anoro for COPD. She is currently on Chantix and using nicotine patches but mostly has smoked about 1 ppd and has smoked for several years. She is here for hospital follow up for pneumonia in September. She was discharged home on 3L at rest and 6L on exertion. She was able to stop wearing it last week and is 99% on RA today. She has no other concerns.     Today she is here for follow up after not being seen since October 2023. She was able to get off of daily oxygen in November 2023 after her episode of pneumonia. She was found to have recent recurrent left neck disease and s/p repair and reconstruction on 8/26/24 and she is following closely with ENT. She was sent home on 2L of oxygen at .     Today she is here for follow up. She states her breathing has been stable but she is still with a daily cough that feels like it should be productive but she cannot cough anything up. She is taking Mucinex twice a day. She states she is not having any allergy symptoms currently. She is scheduled for follow up CT chest tomorrow. She is vaping with nicotine but states she quit smoking cigarettes November 2024 and she does not vape often. She has no other concerns.     Review of Systems   Constitutional:  Negative for chills, fatigue, fever and unexpected weight change.   HENT:  Negative for congestion, postnasal drip and rhinorrhea.    Respiratory:  Positive for cough (denies hemoptysis.) and shortness of breath. Negative for wheezing.    Cardiovascular:  Negative for chest pain and leg swelling.   All other systems reviewed and are negative.      Objective   Physical Exam  Vitals reviewed.   Constitutional:       Appearance: Normal appearance.   HENT:      Head: Normocephalic.    Cardiovascular:      Rate and Rhythm: Normal rate and regular rhythm.   Pulmonary:      Effort: Pulmonary effort is normal.      Breath sounds: Decreased breath sounds present.   Skin:     General: Skin is warm and dry.   Neurological:      Mental Status: She is alert.       Assessment/Plan     1. COPD  2. Tobacco dependence  3. Suspect BAUTISTA  4. Seasonal allergic rhinitis  5. Tonsillar CA Squamous Cell, recurrence  6. Abnormal CT chest     Plan:     Pt's PFTS show FEV1 81%, RV and TLC normal, DLCO 54%.   -She was able to come off of daytime supplemental oxygen November 2023, she was started on 2L at HS during hospitalization. I ordered an in lab sleep study for her. This has not been done yet.  -PCP changed to triple therapy, she has not noted improvement in this, recommend keeping her on Anoro one puff once a day, order placed for this.  -continue prn albuterol.   -Continue Mucinex BID prn.   -Follow up with Sleep Specialist for suspected sleep apnea. pt states that she lost weight due to cancer and does not have symptoms of EDS or fatigue or snoring.   -She had CT chest in June 2024 that showed an area of clustered nodules in the RLL, this was hyperactive on PET scan in July but could be infectious, I recommend getting follow up CT chest in 3 months from PET. I ordered this for mid October 2024, this area had resolved. She had PET scan done on 3/6/25 which showed interval development of multifocal consolidative opacities in the RLL again and compatible with inflammatory/infectious process. Suspect aspiration. She is getting CT chest with oncology tomorrow 4/9/25. Discussed with her this is suspected to be aspiration. She states she did have swallow study done.   -She was able to quit smoking cigarettes in November 2024 but is currently vaping with nicotine at times.   -She is following closely with ENT.  -Recommend OTC allergy pill she has this and will see if this helps improve cough.     Overall I will change  her back to Anoro one puff once a day and have her take generic allergy pill that she has at home for now to see if this helps with cough. She is getting CT scans with oncology currently, I will follow up on results. I will see her back in 6 months. I instructed patient to call sooner if needed.      Total time:  30 min.

## 2025-04-09 ENCOUNTER — HOSPITAL ENCOUNTER (OUTPATIENT)
Dept: RADIOLOGY | Facility: HOSPITAL | Age: 65
Discharge: HOME | End: 2025-04-09
Payer: COMMERCIAL

## 2025-04-09 DIAGNOSIS — C09.9 TONSIL CANCER (MULTI): ICD-10-CM

## 2025-04-09 PROCEDURE — 71250 CT THORAX DX C-: CPT

## 2025-04-15 DIAGNOSIS — F32.1 CURRENT MODERATE EPISODE OF MAJOR DEPRESSIVE DISORDER WITHOUT PRIOR EPISODE (MULTI): ICD-10-CM

## 2025-04-15 RX ORDER — BUPROPION HYDROCHLORIDE 300 MG/1
300 TABLET ORAL EVERY MORNING
Qty: 30 TABLET | Refills: 5 | Status: SHIPPED | OUTPATIENT
Start: 2025-04-15 | End: 2025-10-12

## 2025-04-21 ENCOUNTER — TELEPHONE (OUTPATIENT)
Dept: ADMISSION | Facility: HOSPITAL | Age: 65
End: 2025-04-21
Payer: COMMERCIAL

## 2025-04-21 DIAGNOSIS — G89.3 CANCER ASSOCIATED PAIN: ICD-10-CM

## 2025-04-21 RX ORDER — MORPHINE SULFATE 15 MG/1
15 TABLET, FILM COATED, EXTENDED RELEASE ORAL 2 TIMES DAILY
Qty: 60 TABLET | Refills: 0 | Status: SHIPPED | OUTPATIENT
Start: 2025-04-21 | End: 2025-04-25 | Stop reason: SDUPTHER

## 2025-04-21 RX ORDER — OXYCODONE HYDROCHLORIDE 5 MG/1
5 TABLET ORAL EVERY 6 HOURS PRN
Qty: 120 TABLET | Refills: 0 | Status: SHIPPED | OUTPATIENT
Start: 2025-04-21 | End: 2025-06-20

## 2025-04-21 NOTE — TELEPHONE ENCOUNTER
Melvi Sanchez called the refill line for Morphine 15mg ER and Oxycodone 5mg. Would like refills to be sent to [facility] pharmacy on file. Message sent to Palliative Care team to send in.

## 2025-04-21 NOTE — TELEPHONE ENCOUNTER
OARRS reviewed and no concerns noted. Per last visit with Antoinette Soliman NP on 3/14/25 patient to continue MS CONTIN and Oxycodone. F/U visit  needs to be scheduled and message was sent to patient.   Refills sent to provider for review/approval . Patient updated.

## 2025-04-25 ENCOUNTER — TELEPHONE (OUTPATIENT)
Dept: HEMATOLOGY/ONCOLOGY | Facility: HOSPITAL | Age: 65
End: 2025-04-25
Payer: COMMERCIAL

## 2025-04-25 DIAGNOSIS — G89.3 CANCER ASSOCIATED PAIN: ICD-10-CM

## 2025-04-25 RX ORDER — MORPHINE SULFATE 15 MG/1
15 TABLET, FILM COATED, EXTENDED RELEASE ORAL 2 TIMES DAILY
Qty: 60 TABLET | Refills: 0 | Status: SHIPPED | OUTPATIENT
Start: 2025-04-25 | End: 2025-05-25

## 2025-04-25 NOTE — TELEPHONE ENCOUNTER
Reason for Conversation  PA for morphine ER    Background   Called pharmacy back as patient will likely be out of her MSER tomorrow. They told me several stores that have MSER in stock I messaged the patient and left her a VM as well    Disposition   Awaiting a return call from patient and then can send to another pharmacy  Patient returned call and will fill at [facility]. Called pharmacy they do have this in stock. Pended script to provider.   PA sent to [email or web address] for processing.    PA is still pending, updated patient via VM, send [facility] info via [employer] message. Asked patient to call back to schedule a FUV

## 2025-04-25 NOTE — TELEPHONE ENCOUNTER
Marco calls to let us know that prior auth is needed for the MS Contin.    The oxycodone went thru.    They also will not have the MS contin until Monday.    The PA  phone number is [phone]      the ID number is  [id number]    Please advise    Message sent

## 2025-05-01 DIAGNOSIS — J18.9 PNEUMONIA DUE TO INFECTIOUS ORGANISM, UNSPECIFIED LATERALITY, UNSPECIFIED PART OF LUNG: Primary | ICD-10-CM

## 2025-05-01 RX ORDER — LEVOFLOXACIN 500 MG/1
500 TABLET, FILM COATED ORAL DAILY
Qty: 10 TABLET | Refills: 0 | Status: SHIPPED | OUTPATIENT
Start: 2025-05-01 | End: 2025-05-11

## 2025-05-02 ENCOUNTER — CLINICAL SUPPORT (OUTPATIENT)
Dept: NUTRITION | Facility: HOSPITAL | Age: 65
End: 2025-05-02
Payer: COMMERCIAL

## 2025-05-02 NOTE — PROGRESS NOTES
[employer]  Diet Recommendation 1: Healthy Eating  Diet Recommendation 2: MyPlate  Food Intolerance Avoidance: NKFA  Household Size: 1 Family Member  Interventions: Referral Number: 1st 6 Mo Referral 6 Mos  Interventions: Visit Number: 4 of 6 Visits - Max 6 Visits/Referral Each 6 Mo Period  Education Today: MyPlate Meals  Follow Up Notes for Future Visits: Liked the canned pasta sauce from last FFL visit so she got more today  Grains: % Whole  Fruit: 50-75% Fresh  Vegetables: 50-75% Fresh  Proteins: 1-2 Plant-based Items  Originating Site of Referral Order: Danny Rodriguez PA-C  Initials of RD Assisting Today: SARWAT

## 2025-05-06 ENCOUNTER — APPOINTMENT (OUTPATIENT)
Dept: OTOLARYNGOLOGY | Facility: CLINIC | Age: 65
End: 2025-05-06
Payer: COMMERCIAL

## 2025-05-06 ENCOUNTER — CLINICAL SUPPORT (OUTPATIENT)
Dept: AUDIOLOGY | Facility: CLINIC | Age: 65
End: 2025-05-06
Payer: COMMERCIAL

## 2025-05-06 ENCOUNTER — TELEPHONE (OUTPATIENT)
Dept: OTOLARYNGOLOGY | Facility: CLINIC | Age: 65
End: 2025-05-06

## 2025-05-06 VITALS — BODY MASS INDEX: 29.06 KG/M2 | HEIGHT: 63 IN | WEIGHT: 164 LBS

## 2025-05-06 DIAGNOSIS — H91.90 HEARING LOSS, UNSPECIFIED HEARING LOSS TYPE, UNSPECIFIED LATERALITY: ICD-10-CM

## 2025-05-06 DIAGNOSIS — H93.8X2 FULLNESS IN LEFT EAR: ICD-10-CM

## 2025-05-06 DIAGNOSIS — C77.0 METASTASIS TO HEAD AND NECK LYMPH NODE (MULTI): Primary | ICD-10-CM

## 2025-05-06 DIAGNOSIS — H90.3 SENSORINEURAL HEARING LOSS (SNHL) OF BOTH EARS: Primary | ICD-10-CM

## 2025-05-06 DIAGNOSIS — M54.2 CERVICALGIA: ICD-10-CM

## 2025-05-06 PROCEDURE — 92550 TYMPANOMETRY & REFLEX THRESH: CPT | Mod: 52 | Performed by: AUDIOLOGIST

## 2025-05-06 PROCEDURE — 99213 OFFICE O/P EST LOW 20 MIN: CPT | Performed by: OTOLARYNGOLOGY

## 2025-05-06 PROCEDURE — 92557 COMPREHENSIVE HEARING TEST: CPT | Performed by: AUDIOLOGIST

## 2025-05-06 NOTE — PROGRESS NOTES
"AUDIOLOGY ADULT AUDIOMETRIC EVALUATION      Name:  Melvi Sanchez \"Gretchen\"   :  1960  Age:  64 y.o.  Date of Evaluation:  2025    Time: 9483-1383    IMPRESSIONS     Right Ear: Hearing sensitivity within normal limits 125-1000 Hz falling to mild sensorineural hearing loss at 6323-0516 Hz.  Left Ear: Hearing sensitivity essentially within normal limits 125-1000 Hz falling to mild sensorineural hearing loss 6257-9346 Hz.  Word recognition in quiet is excellent in both ears.  Tympanometry indicates normal middle ear pressure and tympanic membrane mobility in both ears.     Amplification needs: Did not strongly recommend the use of hearing aids.    RECOMMENDATIONS     Continue medical follow up with primary care provider and/or Ears Nose and Throat (ENT) provider as recommended.  Return for audiologic evaluation annually to monitor hearing sensitivity and assess middle ear status or sooner should concerns arise. The audiology department can be reached at [phone] to schedule an appointment.   Avoid exposure to loud sounds by moving away from the noise, turning down the volume, or wearing proper hearing protection correctly.    HISTORY     Reason for visit:  Ms. Sanchez is seen today for an add-on initial audiologic evaluation at the request of Robin Brownlee MD. History obtained from patient report and chart review.     Change in Hearing: yes in the left ear, muffled, sounding as if under water  Aural Pressure/Fullness: yes in the left ear  History of Ear Surgeries: denied  Hearing Aid Use: None  Other Significant History: History of chemotherapy and radiation, reports Cisplatin    EVALUATION     See scanned audiogram in \"Media\"     TEST RESULTS     Otoscopic Evaluation: Deferred as patient came from ENT.    Tympanometry (226 Hz):  Right Ear: Type A, middle ear pressure and tympanic membrane compliance within normal limits.   Left Ear: Type A, middle ear pressure and tympanic membrane compliance " within normal limits.     Acoustic Reflexes:   Right Ear: (Ipsilateral) Responses present at 500 Hz, and absent at 1000, 2000, and 4000 Hz.  Left Ear: (Ipsilateral) Responses present at 500 Hz, artifact at 5984-1722 Hz preventing interpretation.     Distortion Product Otoacoustic Emissions:  Right Ear: Did not test.  Left Ear:  Did not test.  Present OAEs suggest normal or near cochlear outer hair cell function for corresponding frequency region(s). Absent OAEs with normal middle ear function can be consistent with some degree of hearing loss. Assessment of cochlear outer hair cell function may be impacted by outer or middle ear function.    Test technique:  Pure Tone Audiometry via insert earphones  Reliability: Good    Pure Tone Audiometry:    Right Ear: Hearing sensitivity within normal limits 125-1000 Hz falling to mild sensorineural hearing loss at 2440-2471 Hz.  Left Ear: Hearing sensitivity essentially within normal limits 125-1000 Hz falling to mild sensorineural hearing loss 2766-8643 Hz.    Speech Audiometry:   Right Ear:  Speech Reception Threshold (SRT) was obtained at 25 dB HL. Word Recognition scores were excellent (96%) in quiet when words were presented at 65 dB HL. These results are based on [facility] Auditory Test No.6 (NU-6) (N=25).   Left Ear:  Speech Reception Threshold (SRT) was obtained at 25 dB HL. Word Recognition scores were excellent (96%) in quiet when words were presented at 65 dB HL. These results are based on [facility] Auditory Test No.6 (NU-6) (N=25).     Comparison of today's results with previous test results: No previous results available.         PATIENT EDUCATION     Discussed results and recommendations with Ms. Sanchez. Questions were addressed and the patient was encouraged to contact our department at [phone] should concerns arise.       Luis Eduardo Taveras, CCC-A  Senior Clinical Audiologist      Degree of   Hearing Sensitivity dB Range    Within Normal Limits (WNL) 0 - 20   Slight 25   Mild 26 - 40   Moderate 41 - 55   Moderately-Severe 56 - 70   Severe 71 - 90   Profound 91 +     Key   CHL Conductive Hearing Loss   ECV Ear Canal Volume   HA Hearing Aid   NIHL Noise-Induced Hearing Loss   PTA Pure Tone Average   SNHL Sensorineural Hearing Loss   TM Tympanic Membrane   WNL Within Normal Limits

## 2025-05-06 NOTE — TELEPHONE ENCOUNTER
Spoke to patient.  She is aware hearing test was ok.  She is on the mend and no need for ear tube. She is to continue the flonase and will add in saline nasal spray 3x/day.  Follow up appt arranged for 3 months.

## 2025-05-06 NOTE — PROGRESS NOTES
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 At the time there was concern for L sided aural fullness with exam showing middle ear fluid. Today she complains of persistent middle ear fluid. Additionally, she reports having issues with stiffness of L neck and shooting paresthesias along her L scalp. She has not recently had an audiogram. Plan after previous clinic visit was to consider myringotomy with PE tube placement if she has persistent middle ear fluid.    Review of Systems  A complete 14 point review of systems was obtained and was negative other than what is stated in the history of present illness.    Past Medical History  Medical History[1]    Past Surgical History  Surgical History[2]    Medications  Medications Ordered Prior to Encounter[3]    Allergies  Allergies[4]    Social History  Social History[5]    Family History  Family History[6]    Physical Examination  Vitals: Ht 1.6 m (5' 3\")   Wt 74.4 kg (164 lb)   BMI 29.05 kg/m²   GENERAL: Appears well developed, well nourished  RESPIRATION: Breathing comfortably on room air, no stridor  EYES: EOM Intact, sclera normal  NEURO: Alert and conversive  HEAD AND FACE: Skin with no masses or lesions, sinuses nontender to palpation  SALIVARY GLANDS: Parotid and submandibular glands normal bilaterally  EARS: Normal external ears, external auditory canals, and TMs to otoscopy, normal hearing to voice, L ear with cloudy appearing TM but no obvious appearance of fluid behind it compared to documented exam from last visit  NOSE: External nose midline, anterior rhinoscopy is normal with limited visualization to the anterior aspect of the interior turbinates, no lesions " noted  ORAL CAVITY/OROPHARYNX/LIPS: Normal mucous membranes, normal floor of mouth/tongue/OP, no masses or lesions are noted  PHARYNGEAL WALLS AND NASOPHARYNX: No masses noted  NECK/LYMPH: No LAD, no thyroid masses    Procedure note: Right Sided Cerumen Disimpaction  Diagnosis: Right Sided Impacted cerumen  Indication:  The patient has impacted cerumen. Binocular microscopy and the use of curettes, grasping alligators, picks, and suctions was used to remove the cerumen.  Procedure: Removal of impacted cerumen  Procedure in detail:  Verbal Consent obtained.  The R ear was addressed. Cerumen removed from R external auditory canal without complications.  The EAC and tympanic membrane was normal.       Impression  Melvi Sanchez is a 64 y.o. female who presents to ENT clinic today for routine follow-up. She continues to complain of persistent L sided aural fullness which has been an issue for nearly 3 years per patient. Ear tube placement was deferred today due to no obvious findings of middle ear fluid on exam. Will plan to formally evaluate with audiogram w/ tymps. Patient's neck stiffness and discomfort is likely from muscle spasms after radiation.    Plan  - PT for management of L sided neck stiffness/spasms  - formal audiogram with tymps for hearing eval and to assess for middle ear fluid     Jose Drummond MD - PGY1  Otolaryngology - Head and Neck Surgery  Head & Neck team phone: [phone]  ENT consult pager: [phone]   Pediatric ENT pager: [phone]  ENT Outpatient scheduling number: [phone]       The above resident note has been reviewed and confirmed.  Patient was seen and examined with the resident today.  Documentation has been reviewed.        Audiogram without effusion, will monitor      Will send for PT               [1]   Past Medical History:  Diagnosis Date    Acute hypoxemic respiratory failure 09/20/2023    Breast calcification, left     COPD (chronic obstructive pulmonary disease) (Multi)      Dysphagia     Essential (primary) hypertension 2018    HTN (hypertension), benign    Fibroadenoma of left breast     H/O malignant neoplasm of tonsil     s/p XRT and chemotherapy completed .    Hypothyroidism     Morbid obesity (Multi) 2023    Nausea and/or vomiting 2023    OAB (overactive bladder)     Pharyngitis 2023    Pneumonia 2023    Restless legs syndrome     Sinusitis 2023    Tobacco use disorder 2023   [2]   Past Surgical History:  Procedure Laterality Date    BREAST BIOPSY Left     benign FA     SECTION, LOW TRANSVERSE      CHOLECYSTECTOMY  2014    Cholecystectomy    COLONOSCOPY      HYSTERECTOMY  2004    Hysterectomy    MOUTH SURGERY  2014    Oral Surgery Tooth Extraction    NECK SURGERY  2024    Creation Free Flap Head/Neck, Dissection Neck   [3]   Current Outpatient Medications on File Prior to Visit   Medication Sig Dispense Refill    albuterol 90 mcg/actuation inhaler Inhale 2 puffs every 4 hours if needed for shortness of breath. 18 g 11    aspirin 81 mg EC tablet Take 1 tablet (81 mg) by mouth once daily. 90 tablet 3    buPROPion XL (Wellbutrin XL) 300 mg 24 hr tablet Take 1 tablet (300 mg) by mouth once daily in the morning. Do not crush, chew, or split. 30 tablet 5    fluticasone (Flonase) 50 mcg/actuation nasal spray Administer 2 sprays into each nostril once daily. Shake gently. Before first use, prime pump. After use, clean tip and replace cap. 48 g 3    gabapentin (Neurontin) 300 mg capsule Take 1 capsule (300 mg) by mouth once daily at bedtime. 30 capsule 2    guaiFENesin (Mucinex) 600 mg 12 hr tablet Take 2 tablets (1,200 mg) by mouth 2 times a day. Do not crush, chew, or split. 120 tablet 11    levoFLOXacin (Levaquin) 500 mg tablet Take 1 tablet (500 mg) by mouth once daily for 10 days. 10 tablet 0    levothyroxine (Synthroid, Levoxyl) 100 mcg tablet TAKE 1 TABLET BY MOUTH EARLY IN THE MORNING. TAKE ON AN  EMPTY STOMACH AT THE SAME TIME EACH DAY, EITHER 30 TO 60 MINUTES PRIOR TO BREAKFAST. 90 tablet 1    melatonin 10 mg tablet Take 1 tablet (10 mg) by mouth once daily at bedtime. 30 tablet 3    morphine CR (MS Contin) 15 mg 12 hr tablet Take 1 tablet (15 mg) by mouth 2 times a day. Do not crush, chew, or split. 60 tablet 0    multivitamin tablet Take 1 tablet by mouth once daily.      naloxone (Narcan) 4 mg/0.1 mL nasal spray Administer 1 spray (4 mg) into affected nostril(s) if needed for opioid reversal or respiratory depression. May repeat every 2-3 minutes if needed, alternating nostrils, until medical assistance becomes available. 2 each 11    OLANZapine (ZyPREXA) 10 mg tablet Take 1 tablet (10 mg) by mouth once daily at bedtime. 30 tablet 2    oxyCODONE (Roxicodone) 5 mg immediate release tablet Take 1 tablet (5 mg) by mouth every 6 hours if needed for moderate pain (4 - 6). Ok to fill 2/18/25 120 tablet 0    rOPINIRole (Requip) 1 mg tablet Take 2 tablets (2 mg) by mouth once daily at bedtime. 180 tablet 3    rosuvastatin (Crestor) 40 mg tablet Take 1 tablet (40 mg) by mouth once daily. 90 tablet 3    sertraline (Zoloft) 50 mg tablet Take 1 tablet (50 mg) by mouth once daily. 30 tablet 5    umeclidinium-vilanteroL (Anoro Ellipta) 62.5-25 mcg/actuation blister with inhaler device Inhale 1 puff once daily. 1 each 11    varenicline tartrate (Chantix) 1 mg tablet Take 1 tablet (1 mg) by mouth 2 times a day. Take with full glass of water. 60 tablet 5    walker (Ultra-Light Rollator) misc 1 each if needed (as needed for gait and balance). 1 each 0    nicotine (Nicoderm CQ) 21 mg/24 hr patch PLACE 1 PATCH OVER 24 HOURS ON THE SKIN ONCE EVERY 24 HOURS 28 patch 1     No current facility-administered medications on file prior to visit.   [4]   Allergies  Allergen Reactions    Duloxetine Hallucinations     Pt states she doesn't think she has an allergy just a bad reaction when mixed with morphine .   [5]   Social  History  Tobacco Use    Smoking status: Former     Current packs/day: 1.00     Average packs/day: 1 pack/day for 40.9 years (40.9 ttl pk-yrs)     Types: Cigarettes     Start date: 11/30/2023    Smokeless tobacco: Never    Tobacco comments:     3 or less cig. Daily per patient stated 10/10/24   Substance Use Topics    Alcohol use: Not Currently     Alcohol/week: 1.0 standard drink of alcohol     Types: 1 Glasses of wine per week    Drug use: Not Currently     Frequency: 1.0 times per week     Types: Marijuana     Comment: use within past week   [6]   Family History  Problem Relation Name Age of Onset    Emphysema Mother      COPD Mother      Hyperlipidemia Sister      Breast cancer Paternal Grandmother

## 2025-05-07 PROBLEM — H91.90 HEARING LOSS: Status: ACTIVE | Noted: 2025-05-07

## 2025-05-07 PROBLEM — M54.2 CERVICALGIA: Status: ACTIVE | Noted: 2025-05-07

## 2025-05-08 ENCOUNTER — APPOINTMENT (OUTPATIENT)
Dept: UROLOGY | Facility: CLINIC | Age: 65
End: 2025-05-08
Payer: COMMERCIAL

## 2025-05-08 DIAGNOSIS — N32.81 OAB (OVERACTIVE BLADDER): Primary | ICD-10-CM

## 2025-05-08 DIAGNOSIS — N39.3 SUI (STRESS URINARY INCONTINENCE, FEMALE): ICD-10-CM

## 2025-05-08 PROCEDURE — 99213 OFFICE O/P EST LOW 20 MIN: CPT | Performed by: STUDENT IN AN ORGANIZED HEALTH CARE EDUCATION/TRAINING PROGRAM

## 2025-05-08 NOTE — PROGRESS NOTES
Virtual or Telephone Consent    While technically available, the patient was unable or unwilling to consent to connect via audio/video telehealth technology; therefore, I performed this visit using a real-time audio only connection between Melvi SHERINE Sanchez & Elvin Loera MD.  Verbal consent was requested and obtained from Melvi Sanchez on this date, 25 for a telehealth visit and the patient's location was confirmed at the time of the visit.    HISTORY OF PRESENT ILLNESS:  Melvi Sanchez is a 64 y.o. female who presents today for a virtual follow up visit. She is doing okay. She has lost some weight and notices some positional pain occasionally. She has no other complaints or concerns. She is happy with her improvement.           Past Medical History  She has a past medical history of Acute hypoxemic respiratory failure (2023), Breast calcification, left, COPD (chronic obstructive pulmonary disease) (Multi), Dysphagia, Essential (primary) hypertension (2018), Fibroadenoma of left breast, H/O malignant neoplasm of tonsil, Hypothyroidism, Morbid obesity (Multi) (2023), Nausea and/or vomiting (2023), OAB (overactive bladder), Pharyngitis (2023), Pneumonia (2023), Restless legs syndrome, Sinusitis (2023), and Tobacco use disorder (2023).    Surgical History  She has a past surgical history that includes Cholecystectomy (2014); Hysterectomy (2004); Mouth surgery (2014); Breast biopsy (Left); Colonoscopy;  section, low transverse; and Neck surgery (2024).     Social History  She reports that she has quit smoking. Her smoking use included cigarettes. She started smoking about 17 months ago. She has a 40.9 pack-year smoking history. She has never used smokeless tobacco. She reports that she does not currently use alcohol after a past usage of about 1.0 standard drink of alcohol per week. She reports that she does not  currently use drugs after having used the following drugs: Marijuana. Frequency: 1.00 time per week.    Family History  Family History[1]     Allergies  Duloxetine      A comprehensive 10+ review of systems was negative except for: see hpi                  Assessment:  65 yo with SIVA, stress dominant       SIVA:   -failed oxybutynin, myrbetriq and fesoterodine   -s/p sling 11/11/22 , doing well no RICHELLE   -UDS demostrates DO  -S/p Botox, 100 units, 3/31/23, not much improvement  -Status post sacral neuromodulation 5/17/2023  -Over 90% improvement, will add bladder training exercise  -No changes with Vesicare, patient to stop taking this         GUSM and sexual arousal:  -Continue estradiol for gusm  -Will add topical sildenafil for libido      Follow up in 3 mos        I spent a total of 20 minutes speaking with the patient on the telephone     All questions and concerns were answered and addressed.  The patient expressed understanding and agrees with the plan.     Elvin Loera MD    Scribe Attestation  By signing my name below, I, Adri Jeff, [employer]   attest that this documentation has been prepared under the direction and in the presence of Elvin Loera MD.         [1]   Family History  Problem Relation Name Age of Onset    Emphysema Mother      COPD Mother      Hyperlipidemia Sister      Breast cancer Paternal Grandmother

## 2025-05-09 ENCOUNTER — APPOINTMENT (OUTPATIENT)
Dept: CARDIOLOGY | Facility: HOSPITAL | Age: 65
End: 2025-05-09
Payer: COMMERCIAL

## 2025-05-09 ENCOUNTER — APPOINTMENT (OUTPATIENT)
Dept: RADIOLOGY | Facility: HOSPITAL | Age: 65
End: 2025-05-09
Payer: COMMERCIAL

## 2025-05-09 ENCOUNTER — HOSPITAL ENCOUNTER (EMERGENCY)
Facility: HOSPITAL | Age: 65
Discharge: HOME | End: 2025-05-09
Attending: STUDENT IN AN ORGANIZED HEALTH CARE EDUCATION/TRAINING PROGRAM
Payer: COMMERCIAL

## 2025-05-09 ENCOUNTER — TELEPHONE (OUTPATIENT)
Dept: UROLOGY | Facility: CLINIC | Age: 65
End: 2025-05-09
Payer: COMMERCIAL

## 2025-05-09 VITALS
HEART RATE: 68 BPM | WEIGHT: 160 LBS | OXYGEN SATURATION: 94 % | TEMPERATURE: 98.4 F | SYSTOLIC BLOOD PRESSURE: 113 MMHG | DIASTOLIC BLOOD PRESSURE: 73 MMHG | HEIGHT: 63 IN | RESPIRATION RATE: 18 BRPM | BODY MASS INDEX: 28.35 KG/M2

## 2025-05-09 DIAGNOSIS — J44.1 COPD EXACERBATION (MULTI): ICD-10-CM

## 2025-05-09 DIAGNOSIS — J98.4 ACUTE PNEUMONITIS: Primary | ICD-10-CM

## 2025-05-09 LAB
ALBUMIN SERPL BCP-MCNC: 3.6 G/DL (ref 3.4–5)
ALP SERPL-CCNC: 67 U/L (ref 33–136)
ALT SERPL W P-5'-P-CCNC: 41 U/L (ref 7–45)
ANION GAP SERPL CALC-SCNC: 10 MMOL/L (ref 10–20)
APPEARANCE UR: CLEAR
AST SERPL W P-5'-P-CCNC: 50 U/L (ref 9–39)
BACTERIA #/AREA URNS AUTO: ABNORMAL /HPF
BASE EXCESS BLDV CALC-SCNC: -0.2 MMOL/L (ref -2–3)
BASOPHILS # BLD AUTO: 0.03 X10*3/UL (ref 0–0.1)
BASOPHILS NFR BLD AUTO: 0.2 %
BILIRUB SERPL-MCNC: 0.5 MG/DL (ref 0–1.2)
BILIRUB UR STRIP.AUTO-MCNC: NEGATIVE MG/DL
BNP SERPL-MCNC: 68 PG/ML (ref 0–99)
BODY TEMPERATURE: 37 DEGREES CELSIUS
BUN SERPL-MCNC: 18 MG/DL (ref 6–23)
CALCIUM SERPL-MCNC: 8.8 MG/DL (ref 8.6–10.3)
CARDIAC TROPONIN I PNL SERPL HS: 5 NG/L (ref 0–13)
CARDIAC TROPONIN I PNL SERPL HS: 5 NG/L (ref 0–13)
CHLORIDE SERPL-SCNC: 101 MMOL/L (ref 98–107)
CO2 SERPL-SCNC: 26 MMOL/L (ref 21–32)
COLOR UR: ABNORMAL
CREAT SERPL-MCNC: 0.8 MG/DL (ref 0.5–1.05)
EGFRCR SERPLBLD CKD-EPI 2021: 83 ML/MIN/1.73M*2
EOSINOPHIL # BLD AUTO: 0.01 X10*3/UL (ref 0–0.7)
EOSINOPHIL NFR BLD AUTO: 0.1 %
ERYTHROCYTE [DISTWIDTH] IN BLOOD BY AUTOMATED COUNT: 14.7 % (ref 11.5–14.5)
FLUAV RNA RESP QL NAA+PROBE: NOT DETECTED
FLUBV RNA RESP QL NAA+PROBE: NOT DETECTED
GLUCOSE SERPL-MCNC: 95 MG/DL (ref 74–99)
GLUCOSE UR STRIP.AUTO-MCNC: NORMAL MG/DL
HCO3 BLDV-SCNC: 25.4 MMOL/L (ref 22–26)
HCT VFR BLD AUTO: 33.5 % (ref 36–46)
HGB BLD-MCNC: 10.7 G/DL (ref 12–16)
HOLD SPECIMEN: 293
HYALINE CASTS #/AREA URNS AUTO: ABNORMAL /LPF
IMM GRANULOCYTES # BLD AUTO: 0.1 X10*3/UL (ref 0–0.7)
IMM GRANULOCYTES NFR BLD AUTO: 0.6 % (ref 0–0.9)
INHALED O2 CONCENTRATION: 21 %
KETONES UR STRIP.AUTO-MCNC: NEGATIVE MG/DL
LACTATE SERPL-SCNC: 0.7 MMOL/L (ref 0.4–2)
LEUKOCYTE ESTERASE UR QL STRIP.AUTO: ABNORMAL
LYMPHOCYTES # BLD AUTO: 0.46 X10*3/UL (ref 1.2–4.8)
LYMPHOCYTES NFR BLD AUTO: 2.7 %
MAGNESIUM SERPL-MCNC: 1.29 MG/DL (ref 1.6–2.4)
MCH RBC QN AUTO: 27.4 PG (ref 26–34)
MCHC RBC AUTO-ENTMCNC: 31.9 G/DL (ref 32–36)
MCV RBC AUTO: 86 FL (ref 80–100)
MONOCYTES # BLD AUTO: 0.93 X10*3/UL (ref 0.1–1)
MONOCYTES NFR BLD AUTO: 5.6 %
NEUTROPHILS # BLD AUTO: 15.21 X10*3/UL (ref 1.2–7.7)
NEUTROPHILS NFR BLD AUTO: 90.8 %
NITRITE UR QL STRIP.AUTO: NEGATIVE
NRBC BLD-RTO: 0 /100 WBCS (ref 0–0)
OXYHGB MFR BLDV: 61.7 % (ref 45–75)
PCO2 BLDV: 44 MM HG (ref 41–51)
PH BLDV: 7.37 PH (ref 7.33–7.43)
PH UR STRIP.AUTO: 5.5 [PH]
PLATELET # BLD AUTO: 213 X10*3/UL (ref 150–450)
PO2 BLDV: 37 MM HG (ref 35–45)
POTASSIUM SERPL-SCNC: 3.9 MMOL/L (ref 3.5–5.3)
PROT SERPL-MCNC: 6.6 G/DL (ref 6.4–8.2)
PROT UR STRIP.AUTO-MCNC: NEGATIVE MG/DL
RBC # BLD AUTO: 3.91 X10*6/UL (ref 4–5.2)
RBC # UR STRIP.AUTO: NEGATIVE MG/DL
RBC #/AREA URNS AUTO: ABNORMAL /HPF
SAO2 % BLDV: 63 % (ref 45–75)
SARS-COV-2 RNA RESP QL NAA+PROBE: NOT DETECTED
SODIUM SERPL-SCNC: 133 MMOL/L (ref 136–145)
SP GR UR STRIP.AUTO: 1.02
SQUAMOUS #/AREA URNS AUTO: ABNORMAL /HPF
UROBILINOGEN UR STRIP.AUTO-MCNC: NORMAL MG/DL
WBC # BLD AUTO: 16.7 X10*3/UL (ref 4.4–11.3)
WBC #/AREA URNS AUTO: ABNORMAL /HPF

## 2025-05-09 PROCEDURE — 99285 EMERGENCY DEPT VISIT HI MDM: CPT | Mod: 25 | Performed by: STUDENT IN AN ORGANIZED HEALTH CARE EDUCATION/TRAINING PROGRAM

## 2025-05-09 PROCEDURE — 93005 ELECTROCARDIOGRAM TRACING: CPT

## 2025-05-09 PROCEDURE — 96365 THER/PROPH/DIAG IV INF INIT: CPT | Mod: 59

## 2025-05-09 PROCEDURE — 84484 ASSAY OF TROPONIN QUANT: CPT | Performed by: NURSE PRACTITIONER

## 2025-05-09 PROCEDURE — 36415 COLL VENOUS BLD VENIPUNCTURE: CPT | Performed by: NURSE PRACTITIONER

## 2025-05-09 PROCEDURE — 96375 TX/PRO/DX INJ NEW DRUG ADDON: CPT | Mod: 59

## 2025-05-09 PROCEDURE — 82810 BLOOD GASES O2 SAT ONLY: CPT | Performed by: NURSE PRACTITIONER

## 2025-05-09 PROCEDURE — 81001 URINALYSIS AUTO W/SCOPE: CPT | Performed by: NURSE PRACTITIONER

## 2025-05-09 PROCEDURE — 83605 ASSAY OF LACTIC ACID: CPT | Performed by: NURSE PRACTITIONER

## 2025-05-09 PROCEDURE — 2500000004 HC RX 250 GENERAL PHARMACY W/ HCPCS (ALT 636 FOR OP/ED): Mod: JZ | Performed by: STUDENT IN AN ORGANIZED HEALTH CARE EDUCATION/TRAINING PROGRAM

## 2025-05-09 PROCEDURE — 87040 BLOOD CULTURE FOR BACTERIA: CPT | Mod: PORLAB | Performed by: NURSE PRACTITIONER

## 2025-05-09 PROCEDURE — 83735 ASSAY OF MAGNESIUM: CPT | Performed by: NURSE PRACTITIONER

## 2025-05-09 PROCEDURE — 2500000002 HC RX 250 W HCPCS SELF ADMINISTERED DRUGS (ALT 637 FOR MEDICARE OP, ALT 636 FOR OP/ED): Performed by: NURSE PRACTITIONER

## 2025-05-09 PROCEDURE — 80053 COMPREHEN METABOLIC PANEL: CPT | Performed by: NURSE PRACTITIONER

## 2025-05-09 PROCEDURE — 87636 SARSCOV2 & INF A&B AMP PRB: CPT | Performed by: NURSE PRACTITIONER

## 2025-05-09 PROCEDURE — 94640 AIRWAY INHALATION TREATMENT: CPT

## 2025-05-09 PROCEDURE — 71275 CT ANGIOGRAPHY CHEST: CPT

## 2025-05-09 PROCEDURE — 85025 COMPLETE CBC W/AUTO DIFF WBC: CPT | Performed by: NURSE PRACTITIONER

## 2025-05-09 PROCEDURE — 71275 CT ANGIOGRAPHY CHEST: CPT | Mod: FOREIGN READ | Performed by: RADIOLOGY

## 2025-05-09 PROCEDURE — 83880 ASSAY OF NATRIURETIC PEPTIDE: CPT | Performed by: NURSE PRACTITIONER

## 2025-05-09 PROCEDURE — 2550000001 HC RX 255 CONTRASTS: Mod: JZ | Performed by: STUDENT IN AN ORGANIZED HEALTH CARE EDUCATION/TRAINING PROGRAM

## 2025-05-09 PROCEDURE — 87086 URINE CULTURE/COLONY COUNT: CPT | Mod: PORLAB | Performed by: NURSE PRACTITIONER

## 2025-05-09 PROCEDURE — 2500000004 HC RX 250 GENERAL PHARMACY W/ HCPCS (ALT 636 FOR OP/ED): Performed by: NURSE PRACTITIONER

## 2025-05-09 RX ORDER — IPRATROPIUM BROMIDE AND ALBUTEROL SULFATE 2.5; .5 MG/3ML; MG/3ML
3 SOLUTION RESPIRATORY (INHALATION) ONCE
Status: COMPLETED | OUTPATIENT
Start: 2025-05-09 | End: 2025-05-09

## 2025-05-09 RX ORDER — PREDNISONE 50 MG/1
50 TABLET ORAL DAILY
Qty: 5 TABLET | Refills: 0 | Status: SHIPPED | OUTPATIENT
Start: 2025-05-09 | End: 2025-05-14

## 2025-05-09 RX ORDER — DIPHENHYDRAMINE HYDROCHLORIDE 50 MG/ML
25 INJECTION, SOLUTION INTRAMUSCULAR; INTRAVENOUS ONCE
Status: COMPLETED | OUTPATIENT
Start: 2025-05-09 | End: 2025-05-09

## 2025-05-09 RX ORDER — DOXYCYCLINE 100 MG/1
100 CAPSULE ORAL 2 TIMES DAILY
Qty: 10 CAPSULE | Refills: 0 | Status: SHIPPED | OUTPATIENT
Start: 2025-05-09 | End: 2025-05-14

## 2025-05-09 RX ORDER — MAGNESIUM SULFATE HEPTAHYDRATE 40 MG/ML
2 INJECTION, SOLUTION INTRAVENOUS ONCE
Status: COMPLETED | OUTPATIENT
Start: 2025-05-09 | End: 2025-05-09

## 2025-05-09 RX ORDER — PROCHLORPERAZINE EDISYLATE 5 MG/ML
10 INJECTION INTRAMUSCULAR; INTRAVENOUS ONCE
Status: COMPLETED | OUTPATIENT
Start: 2025-05-09 | End: 2025-05-09

## 2025-05-09 RX ORDER — KETOROLAC TROMETHAMINE 30 MG/ML
15 INJECTION, SOLUTION INTRAMUSCULAR; INTRAVENOUS ONCE
Status: COMPLETED | OUTPATIENT
Start: 2025-05-09 | End: 2025-05-09

## 2025-05-09 RX ORDER — AMOXICILLIN AND CLAVULANATE POTASSIUM 875; 125 MG/1; MG/1
1 TABLET, FILM COATED ORAL EVERY 12 HOURS
Qty: 10 TABLET | Refills: 0 | Status: SHIPPED | OUTPATIENT
Start: 2025-05-09 | End: 2025-05-14

## 2025-05-09 RX ADMIN — MAGNESIUM SULFATE HEPTAHYDRATE 2 G: 40 INJECTION, SOLUTION INTRAVENOUS at 18:08

## 2025-05-09 RX ADMIN — KETOROLAC TROMETHAMINE 15 MG: 30 INJECTION, SOLUTION INTRAMUSCULAR at 15:04

## 2025-05-09 RX ADMIN — DIPHENHYDRAMINE HYDROCHLORIDE 25 MG: 50 INJECTION, SOLUTION INTRAMUSCULAR; INTRAVENOUS at 15:04

## 2025-05-09 RX ADMIN — PROCHLORPERAZINE EDISYLATE 10 MG: 5 INJECTION INTRAMUSCULAR; INTRAVENOUS at 15:04

## 2025-05-09 RX ADMIN — IPRATROPIUM BROMIDE AND ALBUTEROL SULFATE 3 ML: 2.5; .5 SOLUTION RESPIRATORY (INHALATION) at 15:04

## 2025-05-09 RX ADMIN — IOHEXOL 75 ML: 350 INJECTION, SOLUTION INTRAVENOUS at 17:29

## 2025-05-09 RX ADMIN — METHYLPREDNISOLONE SODIUM SUCCINATE 125 MG: 125 INJECTION, POWDER, FOR SOLUTION INTRAMUSCULAR; INTRAVENOUS at 15:04

## 2025-05-09 ASSESSMENT — LIFESTYLE VARIABLES
HAVE YOU EVER FELT YOU SHOULD CUT DOWN ON YOUR DRINKING: NO
EVER FELT BAD OR GUILTY ABOUT YOUR DRINKING: NO
TOTAL SCORE: 0
HAVE PEOPLE ANNOYED YOU BY CRITICIZING YOUR DRINKING: NO
EVER HAD A DRINK FIRST THING IN THE MORNING TO STEADY YOUR NERVES TO GET RID OF A HANGOVER: NO

## 2025-05-09 ASSESSMENT — PAIN SCALES - GENERAL
PAINLEVEL_OUTOF10: 6
PAINLEVEL_OUTOF10: 5 - MODERATE PAIN

## 2025-05-09 ASSESSMENT — PAIN - FUNCTIONAL ASSESSMENT
PAIN_FUNCTIONAL_ASSESSMENT: 0-10
PAIN_FUNCTIONAL_ASSESSMENT: 0-10

## 2025-05-09 ASSESSMENT — PAIN DESCRIPTION - LOCATION: LOCATION: HEAD

## 2025-05-09 ASSESSMENT — PAIN DESCRIPTION - PAIN TYPE: TYPE: ACUTE PAIN

## 2025-05-09 NOTE — ED TRIAGE NOTES
Pt to ED via POV c/o HA, fever. Pt recently dx with PNA (on levaquin), states woke up this morning with fever and headache,. Has taken tylenol/mortin with no relief. Pt states she has 2 days left of ABX. Called PCP and they sent to ED for further evaluation. A&Ox4, GCS 15. Afebrile in triage.

## 2025-05-09 NOTE — TELEPHONE ENCOUNTER
Contacted patient to schedule FUV 1 year with Dr. Loera. Patient was driving. Informed patient that she is welcome to call back later, or we can try to contact her another time.

## 2025-05-09 NOTE — ED PROVIDER NOTES
HPI   Chief Complaint   Patient presents with    Headache    Fever       65-year-old female with a past history of tonsillar cancer on RXT and chemotherapy July 22, COPD, obstructive sleep apnea, former tobacco dependence, hypothyroidism, hyperlipidemia presents to the emergency department today for cough, congestion and shortness of breath.  Patient states that she has had cough congestion and fever about a week ago was started on Levaquin concerning for pneumonia.  She woke up this morning and her fever, cough, congestion and headache have returned.  Did take Tylenol and Motrin this morning with no improvement.  She has 2 days left of the Levaquin.  She called her PCP wanted to be evaluated in the ED.  Denies any neck pain or stiffness.  No abdominal or back pain.  Denies any lower extremity swelling.  Her cough is productive in nature.  No changes in urination or bowel habits.  Does have left-sided pleuritic chest pain.                          Monet Coma Scale Score: 15                  Patient History   Medical History[1]  Surgical History[2]  Family History[3]  Social History[4]    Physical Exam   ED Triage Vitals [05/09/25 1249]   Temperature Heart Rate Respirations BP   36.9 °C (98.4 °F) 87 20 105/56      Pulse Ox Temp Source Heart Rate Source Patient Position   94 % Tympanic Monitor --      BP Location FiO2 (%)     -- --       Physical Exam  Vitals and nursing note reviewed.   Constitutional:       General: She is not in acute distress.     Appearance: Normal appearance. She is not toxic-appearing.   HENT:      Right Ear: Tympanic membrane normal.      Left Ear: Tympanic membrane normal.      Mouth/Throat:      Mouth: Mucous membranes are moist.      Pharynx: Oropharynx is clear.   Eyes:      Extraocular Movements: Extraocular movements intact.      Pupils: Pupils are equal, round, and reactive to light.   Neck:      Meningeal: Brudzinski's sign and Kernig's sign absent.   Cardiovascular:      Rate and  Rhythm: Normal rate and regular rhythm.      Pulses: Normal pulses.      Heart sounds: Normal heart sounds.   Pulmonary:      Effort: Pulmonary effort is normal.      Breath sounds: Decreased breath sounds, wheezing and rhonchi present.   Abdominal:      General: Abdomen is flat. Bowel sounds are normal.      Palpations: Abdomen is soft.      Tenderness: There is no abdominal tenderness.   Musculoskeletal:         General: Normal range of motion.      Cervical back: Normal range of motion and neck supple. No rigidity. No pain with movement or spinous process tenderness.      Right lower leg: No edema.      Left lower leg: No edema.   Skin:     General: Skin is warm and dry.      Capillary Refill: Capillary refill takes less than 2 seconds.   Neurological:      General: No focal deficit present.      Mental Status: She is alert and oriented to person, place, and time.   Psychiatric:         Mood and Affect: Mood normal.         Behavior: Behavior normal.         Judgment: Judgment normal.         Labs Reviewed   BLOOD CULTURE   BLOOD CULTURE   URINALYSIS WITH REFLEX CULTURE AND MICROSCOPIC    Narrative:     The following orders were created for panel order Urinalysis with Reflex Culture and Microscopic.  Procedure                               Abnormality         Status                     ---------                               -----------         ------                     Urinalysis with Reflex C...[[id number]]                                                 Extra Urine Gray Tube[[id number]]                                                         Please view results for these tests on the individual orders.   CBC WITH AUTO DIFFERENTIAL   COMPREHENSIVE METABOLIC PANEL   LACTATE   MAGNESIUM   B-TYPE NATRIURETIC PEPTIDE   TROPONIN SERIES- (INITIAL, 1 HR)    Narrative:     The following orders were created for panel order Troponin I Series, High Sensitivity (0, 1 HR).  Procedure                               Abnormality          Status                     ---------                               -----------         ------                     Troponin I, High Sensiti...[[id number]]                                                   Please view results for these tests on the individual orders.   URINALYSIS WITH REFLEX CULTURE AND MICROSCOPIC   EXTRA URINE GRAY TUBE   SERIAL TROPONIN-INITIAL     Pain Management Panel  More data exists         Latest Ref Rng & Units 11/5/2024 5/8/2023   Pain Management Panel   Amphetamine Screen, Urine Presumptive Negative Presumptive Negative  PRESUMPTIVE NEGATIVE    Barbiturate Screen, Urine Presumptive Negative Presumptive Negative  PRESUMPTIVE NEGATIVE    Benzodiazepines Screen, Urine Presumptive Negative Presumptive Negative  -   Codeine <50 ng/mL <50  -   Fentanyl Screen, Urine Presumptive Negative Presumptive Negative  PRESUMPTIVE NEGATIVE    Hydromorphone Urine <25 ng/mL <25  -   Methadone Screen, Urine Presumptive Negative Presumptive Negative  PRESUMPTIVE NEGATIVE    Morphine  <50 ng/mL <50  -     CT angio chest for pulmonary embolism    (Results Pending)       ED Course & MDM        Medical Decision Making  On initial evaluation patient is well-appearing the emergency department at this time.  Lung sounds are rhonchorous and wheezing throughout.  She was given a DuoNeb and Solu-Medrol in the ED.  I did give her Compazine, Toradol and Benadryl for her headache.  Blood cultures x 2 were ordered for the patient as well as further lab work.  CBC shows leukocytosis 16.7 VBG within normal limits.  CT angio and rest of the lab work is pending at this time.  Patient will likely require admission for outpatient failure antibiotics.  Attending physician will disposition the patient.        Procedure  Procedures      Differential Diagnoses include pneumonia, sepsis, PE  This is not an exhaustive list of all the diagnosis and therapeutics are considered during the patient's evaluation for an emergency  medical condition.         [1]   Past Medical History:  Diagnosis Date    Acute hypoxemic respiratory failure 2023    Breast calcification, left     COPD (chronic obstructive pulmonary disease) (Multi)     Dysphagia     Essential (primary) hypertension 2018    HTN (hypertension), benign    Fibroadenoma of left breast     H/O malignant neoplasm of tonsil     s/p XRT and chemotherapy completed .    Hypothyroidism     Morbid obesity (Multi) 2023    Nausea and/or vomiting 2023    OAB (overactive bladder)     Pharyngitis 2023    Pneumonia 2023    Restless legs syndrome     Sinusitis 2023    Tobacco use disorder 2023   [2]   Past Surgical History:  Procedure Laterality Date    BREAST BIOPSY Left     benign FA     SECTION, LOW TRANSVERSE      CHOLECYSTECTOMY  2014    Cholecystectomy    COLONOSCOPY      HYSTERECTOMY  2004    Hysterectomy    MOUTH SURGERY  2014    Oral Surgery Tooth Extraction    NECK SURGERY  2024    Creation Free Flap Head/Neck, Dissection Neck   [3]   Family History  Problem Relation Name Age of Onset    Emphysema Mother      COPD Mother      Hyperlipidemia Sister      Breast cancer Paternal Grandmother     [4]   Social History  Tobacco Use    Smoking status: Former     Current packs/day: 1.00     Average packs/day: 1 pack/day for 40.9 years (40.9 ttl pk-yrs)     Types: Cigarettes     Start date: 2023    Smokeless tobacco: Never    Tobacco comments:     3 or less cig. Daily per patient stated 10/10/24   Vaping Use    Vaping status: Never Used   Substance Use Topics    Alcohol use: Not Currently     Alcohol/week: 1.0 standard drink of alcohol     Types: 1 Glasses of wine per week    Drug use: Not Currently     Frequency: 1.0 times per week     Types: Marijuana     Comment: use within past week        PIYUSH Cuba-PATIENCE  25 1520

## 2025-05-10 DIAGNOSIS — G25.81 RESTLESS LEGS SYNDROME: ICD-10-CM

## 2025-05-11 LAB
BACTERIA BLD CULT: NORMAL
BACTERIA BLD CULT: NORMAL
BACTERIA UR CULT: NORMAL

## 2025-05-12 LAB — BACTERIA UR CULT: NORMAL

## 2025-05-12 RX ORDER — ROPINIROLE 1 MG/1
TABLET, FILM COATED ORAL
Qty: 180 TABLET | Refills: 3 | Status: SHIPPED | OUTPATIENT
Start: 2025-05-12

## 2025-05-13 LAB
ATRIAL RATE: 75 BPM
BACTERIA BLD CULT: NORMAL
BACTERIA BLD CULT: NORMAL
P AXIS: 55 DEGREES
PR INTERVAL: 193 MS
Q ONSET: 249 MS
QRS COUNT: 12 BEATS
QRS DURATION: 92 MS
QT INTERVAL: 367 MS
QTC CALCULATION(BAZETT): 410 MS
QTC FREDERICIA: 395 MS
R AXIS: -9 DEGREES
T AXIS: 27 DEGREES
T OFFSET: 433 MS
VENTRICULAR RATE: 75 BPM

## 2025-05-16 ENCOUNTER — TELEPHONE (OUTPATIENT)
Dept: PALLIATIVE MEDICINE | Facility: HOSPITAL | Age: 65
End: 2025-05-16
Payer: COMMERCIAL

## 2025-05-16 ENCOUNTER — TELEPHONE (OUTPATIENT)
Dept: UROLOGY | Facility: CLINIC | Age: 65
End: 2025-05-16
Payer: COMMERCIAL

## 2025-05-16 DIAGNOSIS — R11.2 CHEMOTHERAPY INDUCED NAUSEA AND VOMITING: ICD-10-CM

## 2025-05-16 DIAGNOSIS — T45.1X5A CHEMOTHERAPY INDUCED NAUSEA AND VOMITING: ICD-10-CM

## 2025-05-16 RX ORDER — OLANZAPINE 10 MG/1
10 TABLET, FILM COATED ORAL NIGHTLY
Qty: 30 TABLET | Refills: 2 | Status: SHIPPED | OUTPATIENT
Start: 2025-05-16

## 2025-05-16 NOTE — TELEPHONE ENCOUNTER
Per last visit with Antoinette Soliman on 3/14 patient to continue olanzapine 10mg nightly. Patient does not have a follow up scheduled, called patient and left VM for her to call back and schedule a FUV (in person). Pended refill to provider to be sent to patients pharmacy.

## 2025-05-16 NOTE — TELEPHONE ENCOUNTER
Called patient to set up FUV with aly Duran VM    Received refill request from pharmacy for olanzapine, per last visit on 3/14 patient to continue olanzapine 10mg nightly.  Pended refill to provider to be sent to pharmacy.

## 2025-05-16 NOTE — TELEPHONE ENCOUNTER
Anesthesia Post Evaluation    Patient: Nuris Cm    Procedure(s) Performed: Procedure(s) (LRB):  ESOPHAGOGASTRODUODENOSCOPY (EGD) (N/A)    Final Anesthesia Type: general      Patient location during evaluation: GI PACU  Patient participation: Yes- Able to Participate  Level of consciousness: awake and alert  Post-procedure vital signs: reviewed and stable  Pain management: adequate  Airway patency: patent    PONV status at discharge: No PONV  Anesthetic complications: no      Cardiovascular status: hemodynamically stable  Respiratory status: unassisted, spontaneous ventilation and room air  Hydration status: euvolemic  Follow-up not needed.          Vitals Value Taken Time   /67 04/19/22 1408   Temp 36.2 °C (97.2 °F) 04/19/22 1339   Pulse 72 04/19/22 1408   Resp 16 04/19/22 1408   SpO2 97 % 04/19/22 1408         Event Time   Out of Recovery 14:11:29         Pain/Dixie Score: No data recorded       Attempted to contact patient to schedule FUV 1 year with Dr. Loera. Left office phone number and business hours. Asked patient to call us back to schedule this appt.

## 2025-05-18 NOTE — TELEPHONE ENCOUNTER
PA submitted to  specialty pharmacy for Oxycontin 15 mg tab.  Spoke with [facility] pharmacy.  Pharmacy will be ordering medication to come in this Thursday 11/7.  Patient called and updated.   You are seen in the emergency department today for rectal pain and bleeding.  We did blood work which showed that you are not having a significant amount of bleeding.  Because of the amount of pain and discomfort you are having, we did not do an internal rectal exam today as it would likely worsen your symptoms and not make a significant difference in our management.  We treated you with pain medicine.  We are sending you home with pain medicine (ketorolac and Percocet) to help with your pain.  We are also sending you home with a rectal foam that will provide direct pain relief for you as well.  We recommend trying to take the ketorolac and the Proctofoam first to see if that will help your symptoms, and then you can take the Percocet if you are still having significant pain.  They recommend that you do the MiraLAX to help soften your stools and make it easier to have a bowel movement and decrease the amount of rectal pressure that you are having.  You can also continue using topical lidocaine, sitz bath's and Tucks pads if they help your symptoms.  We are giving you the information for our colorectal surgeon to follow-up with.  You can also call your primary care doctor to make a follow-up appointment.

## 2025-05-21 ENCOUNTER — OFFICE VISIT (OUTPATIENT)
Dept: PALLIATIVE MEDICINE | Facility: CLINIC | Age: 65
End: 2025-05-21
Payer: COMMERCIAL

## 2025-05-21 VITALS
WEIGHT: 164.4 LBS | OXYGEN SATURATION: 93 % | BODY MASS INDEX: 29.12 KG/M2 | TEMPERATURE: 97.9 F | SYSTOLIC BLOOD PRESSURE: 120 MMHG | RESPIRATION RATE: 16 BRPM | HEART RATE: 64 BPM | DIASTOLIC BLOOD PRESSURE: 69 MMHG

## 2025-05-21 DIAGNOSIS — B37.31 VAGINAL YEAST INFECTION: ICD-10-CM

## 2025-05-21 DIAGNOSIS — K59.03 CONSTIPATION DUE TO PAIN MEDICATION THERAPY: ICD-10-CM

## 2025-05-21 DIAGNOSIS — G89.3 CANCER ASSOCIATED PAIN: ICD-10-CM

## 2025-05-21 DIAGNOSIS — G47.09 OTHER INSOMNIA: Primary | ICD-10-CM

## 2025-05-21 PROCEDURE — 99214 OFFICE O/P EST MOD 30 MIN: CPT | Performed by: NURSE PRACTITIONER

## 2025-05-21 RX ORDER — OXYCODONE HYDROCHLORIDE 5 MG/1
5 TABLET ORAL EVERY 6 HOURS PRN
Qty: 120 TABLET | Refills: 0 | Status: SHIPPED | OUTPATIENT
Start: 2025-05-21 | End: 2025-07-20

## 2025-05-21 RX ORDER — MORPHINE SULFATE 15 MG/1
15 TABLET, FILM COATED, EXTENDED RELEASE ORAL 2 TIMES DAILY
Qty: 60 TABLET | Refills: 0 | Status: SHIPPED | OUTPATIENT
Start: 2025-05-21 | End: 2025-06-20

## 2025-05-21 RX ORDER — FLUCONAZOLE 150 MG/1
150 TABLET ORAL SEE ADMIN INSTRUCTIONS
Qty: 2 TABLET | Refills: 0 | Status: SHIPPED | OUTPATIENT
Start: 2025-05-21 | End: 2025-05-22

## 2025-05-21 RX ORDER — TRAZODONE HYDROCHLORIDE 50 MG/1
50 TABLET ORAL NIGHTLY PRN
Qty: 30 TABLET | Refills: 2 | Status: SHIPPED | OUTPATIENT
Start: 2025-05-21 | End: 2026-05-21

## 2025-05-21 ASSESSMENT — PAIN SCALES - GENERAL: PAINLEVEL_OUTOF10: 0-NO PAIN

## 2025-05-21 ASSESSMENT — ENCOUNTER SYMPTOMS
OCCASIONAL FEELINGS OF UNSTEADINESS: 0
DEPRESSION: 0
LOSS OF SENSATION IN FEET: 0

## 2025-05-21 NOTE — PROGRESS NOTES
SUPPORTIVE AND PALLIATIVE ONCOLOGY CONSULT - OUTPATIENT FOLLOW UP    SERVICE DATE: 5/21/2025    Referred by:  PIYUSH Lee-CNP  Medical Oncologist: Alejandro Hurst MD   Radiation Oncologist: MD Chetna Gutierrez MD  Primary Physician: Alton King  [phone]    REASON FOR CONSULT/CHIEF CONSULT COMPLAINT: Introduction to Supportive and Palliative Oncology Services    Subjective   HISTORY OF PRESENT ILLNESS: Melvi Sanchez is a 65 y.o. 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 Treatment plan is concurrent reirradiation with Proton / cisplatin.     Additional PMHx  COPD, hypothyroidism and RLS     11/5/24: Having increased throat pain. Gabapentin up to 300mg TID per rad onc. Started on zoloft 25mg per pcp but she hasn't started first dose yet.     2/12/25: Hospitalized for PNA. Still on abx. Pain is better down to 4 oxy per day from 6. Still taking Mscontin BID. Fatigued but slowly improving. Having some memory issues and has appt with neurology scheduled. Finished chemo.    5/21/25: PT ordered by Dr. Brownlee for left neck stiffness. Audiogram with tymps for hearing eval and to assess for middle ear fluid ordered. ER visit 5/9 for pneumonitis. She has a vaginal yeast infection from abx. Pain is chronic and stable.    Symptom Assessment:    Pain:a little    Left lateral thigh  from mid thigh to knee - painful to touch - tingling - deep ache - some numbness   Left jaw/neck - tightness - sore - tingling - localized - no worse with mastication     Lack of appetite: none   Weight loss: none  Pain in mouth: none   Dry mouth: somewhat - Xylomet PRN   Taste changes: a little   Nausea/early satiety: none  Vomiting: none  Constipation: none  Diarrhea: none  Lack of energy: a little  Difficulty sleeping: somewhat   Anxiety: none  Depression: none  Shortness of breath: none    Other: none      Information obtained from: interview of patient  ______________________________________________________________________     Oncology History   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)   4/3/2023 Initial Diagnosis    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)     10/8/2024 -  Chemotherapy    CISplatin with Concurrent Radiation (Weekly), 7 Week Cycle     Tonsil cancer (Multi)   4/3/2023 Initial Diagnosis    Tonsil cancer (Multi)     10/8/2024 -  Chemotherapy    CISplatin with Concurrent Radiation (Weekly), 7 Week Cycle         Past Medical History:   Diagnosis Date    Acute hypoxemic respiratory failure 2023    Breast calcification, left     COPD (chronic obstructive pulmonary disease) (Multi)     Dysphagia     Essential (primary) hypertension 2018    HTN (hypertension), benign    Fibroadenoma of left breast     H/O malignant neoplasm of tonsil     s/p XRT and chemotherapy completed .    Hypothyroidism     Morbid obesity (Multi) 2023    Nausea and/or vomiting 2023    OAB (overactive bladder)     Pharyngitis 2023    Pneumonia 2023    Restless legs syndrome     Sinusitis 2023    Tobacco use disorder 2023     Past Surgical History:   Procedure Laterality Date    BREAST BIOPSY Left     benign FA     SECTION, LOW TRANSVERSE      CHOLECYSTECTOMY  2014    Cholecystectomy    COLONOSCOPY      HYSTERECTOMY  2004    Hysterectomy    MOUTH SURGERY  2014    Oral Surgery Tooth Extraction    NECK SURGERY  2024    Creation Free Flap Head/Neck, Dissection Neck     Family History   Problem Relation Name Age of Onset    Emphysema Mother      COPD Mother      Hyperlipidemia Sister      Breast cancer Paternal Grandmother          SOCIAL HISTORY  . 3 children.   Social History:  reports that she has quit smoking. Her smoking use included cigarettes. She started smoking about 17 months ago. She has a 41 pack-year smoking history.  She has never used smokeless tobacco. She reports that she does not currently use alcohol after a past usage of about 1.0 standard drink of alcohol per week. She reports that she does not currently use drugs after having used the following drugs: Marijuana. Frequency: 1.00 time per week.    REVIEW OF SYSTEMS  Review of systems negative unless noted in HPI.       Objective     Current Outpatient Medications   Medication Instructions    albuterol 90 mcg/actuation inhaler 2 puffs, inhalation, Every 4 hours PRN    aspirin 81 mg, oral, Daily    buPROPion XL (WELLBUTRIN XL) 300 mg, oral, Every morning, Do not crush, chew, or split.    fluticasone (Flonase) 50 mcg/actuation nasal spray 2 sprays, Each Nostril, Daily, Shake gently. Before first use, prime pump. After use, clean tip and replace cap.    gabapentin (NEURONTIN) 300 mg, oral, Nightly    guaiFENesin (MUCINEX) 1,200 mg, oral, 2 times daily, Do not crush, chew, or split.    levothyroxine (Synthroid, Levoxyl) 100 mcg tablet TAKE 1 TABLET BY MOUTH EARLY IN THE MORNING. TAKE ON AN EMPTY STOMACH AT THE SAME TIME EACH DAY, EITHER 30 TO 60 MINUTES PRIOR TO BREAKFAST.    melatonin 10 mg, oral, Nightly    morphine CR (MS CONTIN) 15 mg, oral, 2 times daily, Do not crush, chew, or split.    multivitamin tablet 1 tablet, Daily    naloxone (NARCAN) 4 mg, nasal, As needed, May repeat every 2-3 minutes if needed, alternating nostrils, until medical assistance becomes available.    nicotine (Nicoderm CQ) 21 mg/24 hr patch 1 patch, transdermal, Every 24 hours    OLANZapine (ZYPREXA) 10 mg, oral, Nightly    oxyCODONE (ROXICODONE) 5 mg, oral, Every 6 hours PRN, Ok to fill 2/18/25    rOPINIRole (Requip) 1 mg tablet TAKE 2 TABLETS(2 MG) BY MOUTH DAILY AT BEDTIME    rosuvastatin (CRESTOR) 40 mg, oral, Daily    sertraline (ZOLOFT) 50 mg, oral, Daily    umeclidinium-vilanteroL (Anoro Ellipta) 62.5-25 mcg/actuation blister with inhaler device 1 puff, inhalation, Daily    varenicline  "tartrate (CHANTIX) 1 mg, oral, 2 times daily, Take with full glass of water.    walker (Ultra-Light Rollator) misc 1 each, miscellaneous, As needed       Allergies:   Allergies   Allergen Reactions    Duloxetine Hallucinations     Pt states she doesn't think she has an allergy just a bad reaction when mixed with morphine .             Creatinine   Date Value Ref Range Status   05/09/2025 0.80 0.50 - 1.05 mg/dL Final     AST   Date Value Ref Range Status   05/09/2025 50 (H) 9 - 39 U/L Final     ALT   Date Value Ref Range Status   05/09/2025 41 7 - 45 U/L Final     Comment:     Patients treated with Sulfasalazine may generate falsely decreased results for ALT.     Hemoglobin   Date Value Ref Range Status   05/09/2025 10.7 (L) 12.0 - 16.0 g/dL Final     Platelets   Date Value Ref Range Status   05/09/2025 213 150 - 450 x10*3/uL Final       PHYSICAL EXAMINATION  Vital Signs:       2/12/2025    11:04 AM 3/12/2025    12:29 PM 3/25/2025    12:30 PM 4/8/2025     9:08 AM 5/6/2025     1:09 PM 5/9/2025    12:49 PM 5/9/2025     7:24 PM   Vitals   Systolic 118 135  128  105 113   Diastolic 66 73  80  56 73   Heart Rate 63 64  77  87 68   Temp 36.4 °C (97.5 °F) 36.2 °C (97.2 °F)    36.9 °C (98.4 °F)    Resp 16 18  16  20 18   Height   1.6 m (5' 3\") 1.6 m (5' 3\") 1.6 m (5' 3\") 1.6 m (5' 3\")    Weight (lb) 162.8 159.5 161 161 164 160    BMI 28.84 kg/m2 28.25 kg/m2 28.52 kg/m2 28.52 kg/m2 29.05 kg/m2 28.34 kg/m2    BSA (m2) 1.81 m2 1.79 m2 1.8 m2 1.8 m2 1.82 m2 1.8 m2    Visit Report Report Report Report Report             Physical Exam  HENT:      Head: Normocephalic and atraumatic.      Comments: Left neck surgical changes noted  Eyes:      Extraocular Movements: Extraocular movements intact.      Conjunctiva/sclera: Conjunctivae normal.   Pulmonary:      Effort: Pulmonary effort is normal.   Neurological:      General: No focal deficit present.      Mental Status: She is alert.   Psychiatric:         Mood and Affect: Mood normal. "         Thought Content: Thought content normal.         ASSESSMENT/PLAN    Pain  Pain is: cancer related pain  Type: somatic and neuropathic  -continue reduce to oxycodone 5mg q6 prn   -may use tylenol prn but not to exceed 3g per day  -continue mscontin 15mg BID  -continue gabapentin 300mg at bedtime   -PT ordered by ENT    Opioid Use  Medication Management:   - OARRS report reviewed with no aberrant behavior; consistent with  prescriptions/records and patient history  - MED 60.  Overdose Risk Score 300.   This has been discussed with patient.   - We will continue to closely monitor the patient for signs of prescription misuse including UDS, OARRS review and subjective reports at each visit.  - No concurrent benzodiazepine use   - I am a provider who either is or has consulted and collaborated with a provider certified in Hospice and Palliative Medicine and have conducted a face-face visit and examination for this patient.  - Routine Urine Drug Screen completed 11/5/24 and appropriate   - Controlled Substance Agreement completed 11/5/24  - Specifically discussed that controlled substance prescriptions will only be provided by our group as outlined in the completed agreement  - Prescribed naloxone: Confirmed already Rx'ed  - Red Flags: none     Tobacco dependence   -last cig 11/13 but does vape on occasion   -continue nicotine patch 21mg   -continue chantix  -continue wellbutrin 150mg daily     Xerostomia   -Xylomet PRN    Poor appetite - improved  -stop zyprexa     Constipation   At risk for constipation related to opioids  -may use miralax 17g daily PRN  -may use dulcolax 5-10mg at bedtime PRN if no bm in 2 days     Altered Mood  Acute anxiety and depression related to health concerns   -on Wellbutrin per above   -zoloft 50mg daily per pcp     Chronic insomnia   -melatonin ineffective   -trial trazodone 50mg at bedtime prn     Vaginal yeast infection   -diflucan 150mg x1 and repeat in 7 days if symptoms  persist    Medical Decision Making/Goals of Care/Advance Care Planning:  Patient's current clinical condition, including diagnosis, and management plan, and goals of care were discussed.   Goals: symptom control and cancer directed therapy    Advance Directives  Existence of Advance Directives:No - has interest - she has paperwork   Decision maker: She would like to appointment Daughter, Beth, as HCPOA - otherwise 2/3 children     Next Follow-Up Visit:  Return to clinic in 1 month     Signature and billing  Thank you for allowing us to participate in the care of this patient. Recommendations will be communicated back to the consulting service by way of shared electronic medical record or face-to-face.    Medical complexity was moderate level due to due to complexity of problems, extensive data review, and high risk of management/treatment.  Time was spent on the following: Prep Time, Time Directly with Patient/Family/Caregiver, Documentation Time. Total time spent: 30 min      DATA   Diagnostic tests and information reviewed for today's visit:  Most recent labs and imaging results, Medications       Some elements copied from Supportive Oncology note on 3/14/25, the elements have been updated and all reflect current decision making from today, 5/21/2025.      Plan of Care discussed with: Patient and RN      SIGNATURE: Antoinette Soliman, APRN-CNP    Contact information:  Supportive and Palliative Oncology  Monday-Friday 8 AM-5 PM  Phone:  [phone], press option #5, then option #1.   Or Epic Secure Chat

## 2025-06-04 DIAGNOSIS — F32.1 CURRENT MODERATE EPISODE OF MAJOR DEPRESSIVE DISORDER WITHOUT PRIOR EPISODE (MULTI): ICD-10-CM

## 2025-06-04 RX ORDER — SERTRALINE HYDROCHLORIDE 50 MG/1
50 TABLET, FILM COATED ORAL DAILY
Qty: 30 TABLET | Refills: 5 | Status: SHIPPED | OUTPATIENT
Start: 2025-06-04 | End: 2025-12-01

## 2025-06-06 ENCOUNTER — OFFICE VISIT (OUTPATIENT)
Dept: NEUROLOGY | Facility: CLINIC | Age: 65
End: 2025-06-06
Payer: COMMERCIAL

## 2025-06-06 VITALS
RESPIRATION RATE: 18 BRPM | HEART RATE: 69 BPM | SYSTOLIC BLOOD PRESSURE: 115 MMHG | DIASTOLIC BLOOD PRESSURE: 63 MMHG | WEIGHT: 159 LBS | BODY MASS INDEX: 28.17 KG/M2

## 2025-06-06 DIAGNOSIS — R51.9 CHRONIC DAILY HEADACHE: ICD-10-CM

## 2025-06-06 DIAGNOSIS — R41.3 MEMORY LOSS: Primary | ICD-10-CM

## 2025-06-06 DIAGNOSIS — C77.0 METASTASIS TO HEAD AND NECK LYMPH NODE (MULTI): ICD-10-CM

## 2025-06-06 PROCEDURE — 99205 OFFICE O/P NEW HI 60 MIN: CPT | Performed by: PSYCHIATRY & NEUROLOGY

## 2025-06-06 PROCEDURE — 99215 OFFICE O/P EST HI 40 MIN: CPT | Performed by: PSYCHIATRY & NEUROLOGY

## 2025-06-06 ASSESSMENT — MINI MENTAL STATE EXAM
WHAT IS THE YEAR, SEASON, DATE, DAY, AND MONTH: 5 CORRECT
HAND THE PERSON A PENCIL AND PAPER. SAY:  WRITE ANY COMPLETE SENTENCE ON THAT PIECE OF PAPER. (NOTE: THE SENTENCE MUST MAKE SENSE.  IGNORE SPELLING ERRORS): 1 CORRECT
WHAT STATE, COUNTRY, CITY, HOSPITAL, FLOOR: 5 CORRECT
SPELL THE WORD WORLD FORWARD AND BACKWARDS OR SERIAL 7S: 5 CORRECT
PLACE DESIGN, ERASER AND PENCIL IN FRONT OF THE PERSON.  SAY:  COPY THIS DESIGN PLEASE.  SHOW: DESIGN. ALLOW: MULTIPLE TRIES. WAIT UNTIL PERSON IS FINISHED AND HANDS IT BACK. SCORE: ONLY FOR DIAGRAM WITH 4-SIDED FIGURE BETWEEN TWO 5-SIDED FIGURES: 1 CORRECT
SUM ALL MMSE QUESTIONS FOR TOTAL SCORE [OUT OF 30].: 30
SHOW: PENCIL [OBJECT] ASK: WHAT IS THIS CALLED?: 2 CORRECT
RECALL THE 3 OBJECTS FROM ABOVE (APPLE, TABLE, PENNY) OR (BALL, TREE, FLAG): 3 CORRECT
SAY: I WOULD LIKE YOU TO REPEAT THIS PHRASE AFTER ME: NO IFS, ANDS, OR BUTS.: 1 CORRECT
PLEASE COPY THIS PICTURE (NOTE ALL 10 ANGLES MUST BE PRESENT AND TWO MUST INTERSECT): 1 CORRECT
NAME OR REPEAT 3 OBJECTS - (APPLE, TABLE, PENNY) OR (BALL, TREE, FLAG): 3 CORRECT
SAY:  READ THE WORDS ON THE PAGE AND THEN DO WHAT IT SAYS.  THEN HAND THE PERSON THE SHEET WITH CLOSE YOUR EYES ON IT.  IF THE SUBJECT READS AND DOES NOT CLOSE THEIR EYES, REPEAT UP TO THREE TIMES.  SCORE ONLY IF SUBJECT CLOSES EYES.: 3 CORRECT

## 2025-06-06 ASSESSMENT — PAIN SCALES - GENERAL: PAINLEVEL_OUTOF10: 5

## 2025-06-06 NOTE — PROGRESS NOTES
Neurological [place]   Melvi Sanchez is a 65 y.o. year old female presenting for neurologic evaluation.   Referred by: Danny Rodriguez PA-C  PCP: Alton King DO    6/6/25- Referred by Oncology PA for memory concerns, noted with 1st round of chemotherapy and worse after 2nd round.    Concerned she may forget things, brings a family member to office visits.  Short term- can repeat herself.  Remote memories intact.  Doesn't lose things. No issues driving.  No concerns doing finances- mostly automated.    Dry mouth during the day and on awakening with difficulty articulating.  Naps during the day, tired all day.    Main concern from patient/ son is onset of headaches several weeks ago shortly after starting trazodone but did not resolve when stopped it.  Head/ scalp feels tight all the time, vertex but mostly left.   Takes morphine twice a day and oxy every 6 hours.  On this regimen since last October after surgery.   Pain Mgt follows, does not think the pain is controlled better than 4-5/10.   Added tylenol q 5hr.  Drinks caffeine all day long.  Stopped smoking.     Pertinent history:   Cervical squamous cell carcinoma- S/p cisplatin and radiation- 2022, 2024.   Vascular risk factors- smoking, HPL- LDL 88,   No history of Stroke, anticoagulant therapy, Seizure disorder, Significant traumatic brain injury   Medications- narcotics for pain management, olanzapine for insomnia  Marijuana use, no excessive alcohol or substance abuse.     Cognitive evaluation   Prior Neurocognitive assessment no MMSE, MOCA  Labs - normal TSH   Brain imaging 3/2025- Whole body PET no suggestion of brain metastasis    Extensive review of notes in EMR, labs, tests, Interpretation of neuroimaging  as outlined  Encounter Date: 05/09/25   ECG 12 lead   Result Value    Ventricular Rate 75    Atrial Rate 75    IN Interval 193    QRS Duration 92    QT Interval 367    QTC Calculation(Bazett) 410    P Axis 55    R Axis -9    T  "[place] 27    QRS Count 12    Q Onset 249    T Offset 433    QTC [place] 395     No echocardiogram results found for the past 12 months     Lab Results   Component Value Date    CHOL 146 08/22/2024    TRIG 81 08/22/2024    HDL 42.1 08/22/2024    CHHDL 3.5 08/22/2024    LDLF 133 (H) 05/06/2022    VLDL 16 08/22/2024    NHDL 104 08/22/2024     Lab Results   Component Value Date    BNP 68 05/09/2025     Lab Results   Component Value Date    GLUCOSE 95 05/09/2025     (L) 05/09/2025    K 3.9 05/09/2025     05/09/2025    CO2 26 05/09/2025    ANIONGAP 10 05/09/2025    BUN 18 05/09/2025    CREATININE 0.80 05/09/2025    CALCIUM 8.8 05/09/2025    PHOS 4.6 09/03/2024    ALBUMIN 3.6 05/09/2025     Lab Results   Component Value Date    CALCIUM 8.8 05/09/2025    PHOS 4.6 09/03/2024    PROT 6.6 05/09/2025    ALBUMIN 3.6 05/09/2025    AST 50 (H) 05/09/2025    ALT 41 05/09/2025    ALKPHOS 67 05/09/2025    BILITOT 0.5 05/09/2025     Lab Results   Component Value Date    WBC 16.7 (H) 05/09/2025    HGB 10.7 (L) 05/09/2025    HCT 33.5 (L) 05/09/2025    MCV 86 05/09/2025     05/09/2025     INR   Date Value Ref Range Status   09/17/2023 1.2 (H) 0.9 - 1.1 Final     Lab Results   Component Value Date    TSH 1.37 03/12/2025     No results found for: \"FOLATE\"  No results found for: \"RPR\"  No results found for: \"SPAZHRMU12\"  Lab Results   Component Value Date    HIV1X2 NONREACTIVE 05/06/2022     Lab Results   Component Value Date    VITD25 53 10/29/2024        Relevant ROS, Problem list, Past Medical/ Surgical/ Family/ Social history- reviewed and pertinent details noted in history.     Objective     Visit Vitals  /63 (BP Location: Right arm, Patient Position: Sitting, BP Cuff Size: Large adult)   Pulse 69   Resp 18   Wt 72.1 kg (159 lb)   BMI 28.17 kg/m²   OB Status Hysterectomy   Smoking Status Former   BSA 1.79 m²         Physical Exam:   General appearance: no acute distress.  Scarred skin over L neck with reduced " ROM.   Neurological Exam:    Mental status: The patient was alert, interactive and cooperative with an appropriate affect.  Speech is clear.  Language intact for comprehension, expression, and vocabulary.    MMSe 30/30 Fund of knowledge adequate for current events and past history.   Cranial Nerves- Visual fields full to confrontation. L ptosis without miosis noted, subtle.  Ocular movements full without nystagmus. Facial sensation and strength are normal.  Hearing intact to whispers.  Shoulder shrug is 5/5.   Motor- No abnormal or adventitious movements were noted.  Muscle strength is 5/5.  Hand dexterity is normal.    Sensation intact to light touch.  Coordination testing shows no limb dystaxia.   Stance is stable and traightaway gait is normal without spasticity or bradykinesia.      MMSE:   MMSE Total Score:: 30    Assessment/Plan   1. Memory loss    2. Chronic daily headache    3. 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    Headaches, multifactorial with medication effect, excessive caffeine use and likely BAUTISTA.     PLAN   Complete workup for other causes of memory impairment with Brain imaging and Labs- B12, folate, Vitamin D, and sleep study for daytime hypersomnolence/ lack of alertness.   Add MRV to imaging given newer onset headache.    Reduce caffeine intake during the day. Discuss with Pain Mgt provider re: increasing gabapentin as needed.     Brain Health- You can keep your brain healthy by following with your primary doctor to keep your medical conditions well controlled.    Bring your medicines to doctor visits to make sure they are right for you and you are not on too many or unnecessary medicines.    Use a weekly pill box to keep organized.    Eat a heart-healthy diet- like the Mediterranean diet or a plant-based diet- unless you are prescribed a specific diet for your medical conditions.    Do not drink  alcohol excessively or smoke.    Keep yourself mentally active and maintain a schedule to stay physically and socially active. Try to challenge yourself by learning something new like a craft, dance, or computer program.   Exercise regularly and get at least 7 hours of quality sleep at night to be your most alert and active during the day.     If you have cognitive difficulties, things you can do to minimize the impact in your life and compensate include:   Limit the use of any medications that can impair brain attention and memory, including pain medications and sedatives.   Get the most information from the world around you by having the best vision and hearing you can with glasses or hearing aids if needed.    Limit distractions when you are trying to concentrate and focus on one task at a time.    Save the complicated tasks for the time of day that you are most fresh and alert and take breaks as needed.   Have a routine in your day to bolster your memory and make sure you have a system to consistently put important items like keys/ phone in the same place.   Use calendars, notes, alarms and sticky notes to keep you on track.   Maintain a schedule of activities during the day to stay physically, socially, and mentally active.     Total time spent on the same day of the patient encounter, before/ during / after the visit, encompassing medical record review/ evaluation/ management/ counseling/ documentation is 60,m  Orders Placed This Encounter   Procedures    MR brain w and wo IV contrast    MR venography intracranial wo IV contrast    Folate    Vitamin D 25-Hydroxy,Total (for eval of Vitamin D levels)    Vitamin B12

## 2025-06-06 NOTE — PATIENT INSTRUCTIONS
Assessment/Plan   1. Memory loss    2. Chronic daily headache    Memory concerns, normal screening exam today, discussed relationship to chemotherapy.    Headaches, multifactorial with medication effect, excessive caffeine use and likely BAUTISTA.     PLAN   Complete workup for other causes of memory impairment with Brain imaging and Labs- B12, folate, Vitamin D, and sleep study for daytime hypersomnolence/ lack of alertness.   Add MRV to imaging given newer onset headache.    Reduce caffeine intake during the day. Discuss with Pain Mgt provider re: increasing gabapentin as needed.

## 2025-06-10 ENCOUNTER — CLINICAL SUPPORT (OUTPATIENT)
Dept: NUTRITION | Facility: HOSPITAL | Age: 65
End: 2025-06-10
Payer: COMMERCIAL

## 2025-06-10 NOTE — PROGRESS NOTES
[employer]  Diet Recommendation 1: Healthy Eating  Diet Recommendation 2: MyPlate  Food Intolerance Avoidance: NKFA  Household Size: 1 Family Member  Interventions: Referral Number: 1st 6 Mo Referral 6 Mos  Interventions: Visit Number: 5 of 6 Visits - Max 6 Visits/Referral Each 6 Mo Period  Education Today: My[employer]ate Meals  Follow Up Notes for Future Visits: Gave [employer] coupons  Grains: 25-50% Whole  Fruit: Fresh - 100%  Vegetables: % Fresh  Proteins: 1-2 Plant-based Items  Originating Site of Referral Order: Danny Rodriguez PA-C  Initials of RD Assisting Today: [facility]

## 2025-06-11 ENCOUNTER — TELEPHONE (OUTPATIENT)
Dept: RADIATION ONCOLOGY | Facility: HOSPITAL | Age: 65
End: 2025-06-11
Payer: COMMERCIAL

## 2025-06-11 ASSESSMENT — ENCOUNTER SYMPTOMS
ABDOMINAL PAIN: 0
SPEECH DIFFICULTY: 0
CONFUSION: 0
COUGH: 0
NAUSEA: 0
CONSTIPATION: 0
BLOOD IN STOOL: 0
NUMBNESS: 0
ARTHRALGIAS: 0
NERVOUS/ANXIOUS: 0
CHEST TIGHTNESS: 0
DIFFICULTY URINATING: 0
VOMITING: 0
SORE THROAT: 0
CHILLS: 0
ADENOPATHY: 0
SORE THROAT: 0
FATIGUE: 1
DEPRESSION: 0
ABDOMINAL DISTENTION: 0
ARTHRALGIAS: 0
FEVER: 0
LIGHT-HEADEDNESS: 0
FEVER: 0
CHILLS: 0
DIARRHEA: 0
NAUSEA: 0
DIZZINESS: 0
LIGHT-HEADEDNESS: 1
COUGH: 1
SHORTNESS OF BREATH: 0
DIARRHEA: 0
SHORTNESS OF BREATH: 1
CONSTIPATION: 0
TROUBLE SWALLOWING: 1
LEG SWELLING: 0
TROUBLE SWALLOWING: 1
HEADACHES: 0
HEMATURIA: 0
EXTREMITY WEAKNESS: 0
VOMITING: 0
NUMBNESS: 1
PALPITATIONS: 0
EYE PROBLEMS: 0
NECK PAIN: 0

## 2025-06-11 NOTE — PROGRESS NOTES
Radiation Oncology Follow-Up    Patient Name:  Melvi Sanchez  MRN:  [id number]  :  1960    Referring Provider: Delvin Momin, *  Primary Care Provider: Alton King DO  Care Team: Patient Care Team:  Alton King DO as PCP - General (Family Medicine)  Alton King DO as PCP - [facility] Medicaid PCP  Danny Rodriguez PA-C as PCP - [facility] PCP  Jimmie LEYVA MD as Consulting Physician (Radiation Oncology)  Marlon Patel MD as Surgeon (Pulmonary Disease)  Tiffanie Whitfield RN as Nurse Navigator (Hematology and Oncology)  Alejandro Hurst MD as Consulting Physician (Hematology and Oncology)  Chetna Lowry MD as Radiation Oncologist (Radiation Oncology)  Danny Rodriguez PA-C as Physician Assistant (Hematology and Oncology)  PIYUSH Schreiber-CNP as Nurse Practitioner (Radiation Oncology)    Date of Service: 2025    DIAGNOSIS AND STAGING  Diagnosis and Staging: 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    Patient initially treated by Dr. Razo in . She first noticed a lump in the left side of her neck > 2 years ago. She was found that have enlarged level 2, 3, 4 nodes, but no obvious oropharyngeal lesion 2022 by Dr. Torres.  CT neck was obtained demonstrating  L neck mass, 3.2 x 5.9 x 8.4 cm and a 1.5 x 1.6 right paratracheal node -   FNA biopsy of left neck mass showed squamous cell carcinoma. On 2022 she underwent triple endoscopy and DL showed lesion of left tonsil; biopsy showed invasive squamous cell carcinoma, p16 positive. She was referred to radiation oncology and saw Dr. Crook and completed 70 cGy/35f CCRT with STEWART seen at 1 year. She  has been followed by Dr. Torres in surveillance and was noted to have left neck LINA. CT neck was obtained on 24 demonstrating increase in edema, enlargement of level 2 node. L neck core biopsy on 24 demonstrated metastatic squamous cell carcinoma involving fibrous tissue with extensive necrosis.  The neck dissection showed Poorly differentiated , nonkeratinizing squamous cell carcinoma with abundant necrosis, involving soft tissue extensively, encompassing levels II and III (2.2 cm) and fibrosis and foreign body granulomatous reaction, levels III and IV.     PRIOR THERAPIES  2022: Completion of chemoradiation with weekly cisplatin with Dr. Razo  2024: L radical neck dissection 2-4 with CN11 repair  10/8 - 24: Completed 5 cycles of cisplatin (40 mg/m2) and 66 gy of proton reirradiation     Proton Beam: Left Head and neck    Treatment Period Technique Fraction Dose Fractions Total Dose   Course 2 10/9/2024-2024  (days elapsed: 41)         VMAT L neck 10/9/2024-10/11/2024 VMAT 200 / 200 cGy 3 / 3 600 / 600 cGy         L neck 10/14/2024-2024 3-Field 182 / 182 cGy  4914 / 4,909 cGy     Past Medical History:   Diagnosis Date    Acute hypoxemic respiratory failure 2023    Breast calcification, left     COPD (chronic obstructive pulmonary disease) (Multi)     Dysphagia     Essential (primary) hypertension 2018    HTN (hypertension), benign    Fibroadenoma of left breast     H/O malignant neoplasm of tonsil     s/p XRT and chemotherapy completed .    Hypothyroidism     Morbid obesity (Multi) 2023    Nausea and/or vomiting 2023    OAB (overactive bladder)     Pharyngitis 2023    Pneumonia 2023    Restless legs syndrome     Sinusitis 2023    Tobacco use disorder 2023      Past Surgical History:   Procedure Laterality Date    BREAST BIOPSY Left     benign FA     SECTION, LOW TRANSVERSE      CHOLECYSTECTOMY   "04/17/2014    Cholecystectomy    COLONOSCOPY      HYSTERECTOMY  04/17/2004    Hysterectomy    MOUTH SURGERY  04/17/2014    Oral Surgery Tooth Extraction    NECK SURGERY  08/26/2024    Creation Free Flap Head/Neck, Dissection Neck      Social History     Tobacco Use    Smoking status: Former     Current packs/day: 1.00     Average packs/day: 1 pack/day for 41.0 years (41.0 ttl pk-yrs)     Types: Cigarettes     Start date: 11/30/2023    Smokeless tobacco: Never    Tobacco comments:     3 or less cig. Daily per patient stated 10/10/24          Patient states she vapes nicotine.    Substance Use Topics    Alcohol use: Not Currently     Alcohol/week: 1.0 standard drink of alcohol     Types: 1 Glasses of wine per week      SUBJECTIVE  History of Present Illness:  Melvi Sanchez \"Gretchen\" is a 65 y.o. female who was previously seen at the [facility] Department of Radiation Oncology for recurrent stage III ([id number]) p16+ SCC of L oropharynx.  Recent treatment included a left radical neck dissection 2-4 with CN11 repair and cisplatin (40 mg/m2) x 5 cycles and 66 gy of proton re-irradiation ending on 11/19/24.   Today the patient states that she's doing \"okay\".  She states that she just recently completed antibiotics for \"double pneumonia\" that she had developed last week.  She stated that she was symptomatic with the exception of a fever.   She continue to eat a regular diet without coughing or choking.  She endorses moderate xerostomia and dysgeusia which have been stable.  She manages oral dryness with extra hydration.   Denies mucositis, thrush, or new lumps/bumps/discolorations in the mouth or around the head, neck or shoulders.  She does endorse that she continues to have numbness of the left ear, cheek, and neck since her initial treatment back in 2022.  The current radiation dermatitis is mostly healed with a couple of areas of sloughing skin.  She continues to have hearing changes " "to the left ear that began back in 2022.  She states that her hearing is muffled, like she's \"under water\".   She states that Dr. Brownlee and discussed potential placement of myringotomy tube.  She's going to discuss with him on 2/21/25.  Denies chills, fever, SOB or chest pain.  She does endorse some mild fatigue.  Denies headaches, dizziness or new focal sensory/motor deficits.  She does endorse an occasional feeling of being \"off balance.     3/12/25 Interval FU visit.  Today the patient is in clinic for a routine Radiation Oncology FU visit and review of a restaging PET and CT neck completed on 3/6/25.  Today, the patient states that she doing better s/p recent back-to-back pneumonia and covid infections in Feb and March 2025.  She endorses just completing the last of her antibiotics last week.  She continues to eat a regular diet with some coughing and choking per her daughter.  We discussed that it's probably a good idea if she reconnects with SLP for dysphagia management and she was agreeable.  She continues to have some xerostomia and dysgeusia which are improved.  She quantifies her taste at about 80% of her pretreatment baseline.  Denies mucositis, thrush, or new lumps/bumps/discolorations in the mouth or around the head, neck or shoulders.  Continues to have numbness of the left ear, cheek, and neck since her initial treatment back in 2022. She also has some pain in the left jaw and manages with PRN oxycodone.  Denies chills, fever, or chest pain.  Does endorse some SOB.  She also endorses some mild fatigue that has been stable. Denies headaches, dizziness or new focal sensory/motor deficits.  She does endorse some mild instability when standing.  She continues to have some hearing changes (fulness) in her left ear that is stable.  She's going to address with Dr. Brownlee on 3/25/25 and is hoping for a myringotomy tube placement.   She does endorse an occasional feeling of being \"off balance which may be " related to the fullness in her ear.     A restaging CT neck completed on 3/6/25 showed new pulmonary nodules in the right upper lobe as well as a left apical lower lobe subpleural consolidative opacity that may represent an infectious/inflammatory process, however metastatic disease is not excluded.  This was discussed with Dr. Lowry and in light of her recent pneumonia and Covid infections, she felt that it could be infectious in nature.  Medical Oncology has ordered a CT chest to be performed in 1 months time to better characterize.  A NavDx was also ordered to follow circulating HPV16.  This was discussed with the patient.     A restaging PET also completed on 3/6/25 showed development of multifocal consolidative opacities most prominent within the dependent portion of the right lower lobe most compatible with infectious/inflammatory process with concern for aspiration pneumonia.  It also showed Interval development of focal hypermetabolic activity within the larynx and proximal esophagus. Findings are nonspecific and correlation with direct visualization is recommended to exclude a  neoplastic process.     3/12/25 Flexible fiberoptic laryngopharyngoscopy:   performed via the left nares, after topical anesthesia and decongestant was instilled:  Nasopharynx: There are no noted lesions in the nasopharynx. The bilateral torus  tubari and fossa of Rosenmueller show no lesions and Eustachian tube orifice  normal. The mucosal surface of nasopharynx is clear without appreciated masses.  The nasopharyngeal surface of the palate has no abnormal lesions. Oropharynx: The tonsillar fossa appears normal bilaterally. The tongue base and vallecula show no lesions. The lingual surface of the epiglottis with significant swelling. The oropharynx is clear of any masses. Larynx: The laryngeal surface of epiglottis, aryepiglottic folds, false cords, and the arytenoids also show significant swelling. The vocal cords were difficult to  "visualize because of the aforementioned swelling but seemed to have normal mobility.  Scope was removed. The patient tolerated the procedure well.    The team was notified about the above findings via email.  I included laryngoscopy photos of the swelling in my communications.     6/12/25 Interval FU visit:   Today the patient is in clinic for a routine Radiation Oncology FU visit.   She states that she feels \"tired\".   She endorse an ongoing headaches today and is being managed by Dr. Cruz in Neurology.  She also c/o of SOB, cough and left sided sore throat.   She was seen in the ER on 5/9/25 for suspected pneumonia and was discharged on ABX.  I was able to auscultate her lungs and appreciated some wheezes and tightness of her airways.  I suggested a CXR to further characterize and the patient was agreeable.   She continues to eat a regular diet with some coughing/choking.  She does endorse food getting stuck in her throat which can prompt a double-swallow.  We, again, discussed reconnecting with SLP and the patient was amenable.  She continues to have some xerostomia and dysgeusia which are improved.  She quantifies her taste at about 80% of her pretreatment baseline.  Denies mucositis, thrush, or new lumps/bumps/discolorations in the mouth or around the head, neck or shoulders.  Continues to have numbness of the left ear, cheek, and neck since her initial treatment back in 2022.  Denies chills, fever, or chest pain.  Denies abdominal pain, nausea, vomiting, diarrhea or constipation.      Review of Systems:   Review of Systems   Constitutional:  Positive for fatigue. Negative for chills and fever.   HENT:   Positive for hearing loss (Left ear) and trouble swallowing. Negative for lump/mass, mouth sores and sore throat.    Eyes:  Negative for eye problems.   Respiratory:  Positive for cough and shortness of breath. Negative for chest tightness.    Cardiovascular:  Negative for chest pain and palpitations. " "  Gastrointestinal:  Negative for abdominal distention, blood in stool, constipation, diarrhea, nausea and vomiting.   Genitourinary:  Negative for difficulty urinating and hematuria.    Musculoskeletal:  Negative for arthralgias, gait problem and neck pain.   Neurological:  Positive for light-headedness (When standing quickly.) and numbness (Left ear, face and neck.  Also in the left thigh from graft site.). Negative for dizziness, extremity weakness, gait problem, headaches and speech difficulty.   Hematological:  Negative for adenopathy.   Psychiatric/Behavioral:  Negative for confusion and depression. The patient is not nervous/anxious.      Performance Status:   The Karnofsky performance scale today is 100, Fully active, able to carry on all pre-disease performed without restriction (ECOG equivalent 0).    Pain: Throat pain (3-4).  Managing with MS Contin 15mg BID and Percocet 7.5mg Q3-4hr PRN.      OBJECTIVE  Vital Signs:      3/25/2025    12:30 PM 4/8/2025     9:08 AM 5/6/2025     1:09 PM 5/9/2025    12:49 PM 5/9/2025     7:24 PM 5/21/2025    10:18 AM 6/6/2025     3:00 PM   Vitals   Systolic  128  105 113 120 115   Diastolic  80  56 73 69 63   BP Location       Right arm   Heart Rate  77  87 68 64 69   Temp    36.9 °C (98.4 °F)  36.6 °C (97.9 °F)    Resp  16  20 18 16 18   Height 1.6 m (5' 3\") 1.6 m (5' 3\") 1.6 m (5' 3\") 1.6 m (5' 3\")      Weight (lb) 161 161 164 160  164.4 159   BMI 28.52 kg/m2 28.52 kg/m2 29.05 kg/m2 28.34 kg/m2  29.12 kg/m2 28.17 kg/m2   BSA (m2) 1.8 m2 1.8 m2 1.82 m2 1.8 m2  1.82 m2 1.79 m2   Visit Report Report Report    Report Report      Physical Exam  Vitals and nursing note reviewed.   Constitutional:       General: She is not in acute distress.     Appearance: Normal appearance. She is normal weight. She is not ill-appearing.   HENT:      Head: Normocephalic and atraumatic. No masses.      Jaw: Trismus, swelling (some minor swelling of left jaw.) and pain on movement (some moderate " pain in the left jaw.) present. No tenderness.        Comments: Edentulous      Nose: Nose normal. No congestion or rhinorrhea.      Mouth/Throat:      Mouth: Mucous membranes are dry. No injury, oral lesions or angioedema.      Dentition: Abnormal dentition. Has dentures. Gingival swelling present. No dental tenderness.      Tongue: No lesions. Tongue does not deviate from midline.      Palate: No mass and lesions.      Comments: Edentulous.   Eyes:      General: No scleral icterus.     Extraocular Movements: Extraocular movements intact.      Pupils: Pupils are equal, round, and reactive to light.   Cardiovascular:      Rate and Rhythm: Normal rate and regular rhythm.      Pulses: Normal pulses.      Heart sounds: Normal heart sounds. No murmur heard.  Pulmonary:      Effort: Pulmonary effort is normal. No respiratory distress.      Breath sounds: No stridor. Examination of the right-upper field reveals wheezing. Examination of the left-upper field reveals wheezing. Examination of the right-middle field reveals decreased breath sounds and wheezing. Examination of the left-middle field reveals decreased breath sounds and wheezing. Examination of the right-lower field reveals decreased breath sounds and wheezing. Examination of the left-lower field reveals decreased breath sounds and wheezing. Decreased breath sounds and wheezing present. No rhonchi.   Chest:      Chest wall: No tenderness.   Abdominal:      General: Abdomen is flat. Bowel sounds are normal.      Palpations: Abdomen is soft. There is no mass.      Tenderness: There is no abdominal tenderness. There is no guarding or rebound.   Musculoskeletal:         General: No swelling or tenderness. Normal range of motion.      Cervical back: Normal range of motion and neck supple. No tenderness.   Skin:     General: Skin is warm and dry.      Coloration: Skin is not jaundiced.      Findings: No erythema or lesion.   Neurological:      General: No focal deficit  present.      Mental Status: She is alert and oriented to person, place, and time.      Sensory: No sensory deficit.      Motor: No weakness.      Gait: Gait normal.      Deep Tendon Reflexes: Reflexes normal.   Psychiatric:         Mood and Affect: Mood normal.         Behavior: Behavior normal.         Thought Content: Thought content normal.         Judgment: Judgment normal.     XR CHEST 2 VIEWS; 11/12/2024 12:38 pm   IMPRESSION:  1.  Pulmonary hyperinflation  2. No acute cardiopulmonary pathology    ASSESSMENT:   65 y.o. female who was previously seen at the [facility] Department of Radiation Oncology for recurrent stage III ([id number]) p16+ SCC of L oropharynx.  Recent treatment included a left radical neck dissection 2-4 with CN11 repair and cisplatin (40 mg/m2) x 5 cycles and 66 gy of proton re-irradiation ending on 11/19/24.  The patient continues to do well in long-term survival.    All questions/concerns were addressed to the satisfaction of the patient.     PLAN:    --FU with Raul Momin CNP in 3 months.   --CXR today for SOB, cough and chest tightness.    --Carotid ultrasound on 6/25/25.     --NavDx today.   --Referral back to SLP for dysphagia.   --Continue to follow with Dr. Hurst and Ro Rodriguez PA-C for Medical Oncology management.   --Continue to follow with Dr. Brownlee for ENT management.   --Continue Crestor 40 mg daily.      Please reach out with any questions or concerns.     NCCN Guidelines were applicable to guide this patients treatment plan.  PIYUSH Schreiber-PATIENCE

## 2025-06-11 NOTE — PROGRESS NOTES
"    Patient ID: Melvi Sanchez \"Allie" is a 65 y.o. 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 Robin Gabrielc:  Dr. Chetna Lowry  [facility]   Supp Onc: Antoinette Soliman   PCP: Alton King, DO    PRIOR THERAPIES  07/07/2022: Completion of chemoradiation with weekly cisplatin with Dr. Razo  08/26/2024: L radical neck dissection 2-4 with CN11 repair  10/8 - 11/19/24: Completed 5 cycles of cisplatin (40 mg/m2) and 66 gy of proton reirradiation     SITES OF DISEASE    CURRENT ONCOLOGICAL PROBLEMS  Post operative pain  Radiation dermatitis  Odynophagia     HISTORY OF PRESENT ILLNESS  Ms. 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    Patient initially treated by Dr. Razo in 2022. She first noticed a lump in the left side of her neck > 2 years ago. She was found that have enlarged level 2, 3, 4 nodes, but no obvious oropharyngeal lesion 03/2022 by Dr. Torres.  CT neck was obtained demonstrating  L neck mass, 3.2 x 5.9 x 8.4 cm and a 1.5 x 1.6 right paratracheal node -   FNA biopsy of left neck mass showed squamous cell carcinoma. On 4/21/2022 she underwent triple endoscopy and DL showed lesion of left tonsil; biopsy showed invasive squamous cell carcinoma, p16 positive. She was referred to radiation oncology and saw Dr. Crook and completed 70 cGy/35f CCRT with STEAWRT seen at 1 year. She has been followed by Dr. Torres in surveillance and was noted to have left neck LINA. CT neck was obtained on 6/20/24 demonstrating increase in edema, enlargement of level 2 node. L neck core biopsy on 7/5/24 demonstrated:     FINAL DIAGNOSIS   Left neck mass, core biopsy:  - " Metastatic squamous cell carcinoma involving fibrous tissue with extensive necrosis.  See note.   Note: By immunostaining, the tumor cells are positive for p40, supportive of the above diagnosis.     She proceeded with L neck dissection which revealed:    A. Left neck, level II-IV, neck dissection:  -- Poorly differentiated , nonkeratinizing squamous cell carcinoma with abundant necrosis,  involving soft tissue extensively, encompassing levels II and III  (2.2 cm).  - Fibrosis and foreign body granulomatous reaction, levels III  and IV.     Note:  Microscopic examination of H&E sections reveals poorly differentiated squamous cell carcinoma involving extensively soft tissue with minimal lymphoid tissue present and abundant necrosis and fibrosis.  Carcinoma reaches soft tissue inked margins focally.  Vein margin is negative.  History of metastatic squamous cell carcinoma of left tonsil, status post chemo therapy and radiation therapy is noted for this patient.     P16 by immunohistochemistry:  Positive     She was discussed at  and seen by Dr. Lowry with plans for post operative reirradiation with proton beam therapy and presents for medical oncology consultation to discuss addition of radiosensitizing systemic therapy.    PAST MEDICAL HISTORY  COPD, hypothyroidism, RLS    SURGICAL HISTORY  Cholecystectomy, hysterectomy     SOCIAL HISTORY  Lives in [place] by self. Son is next building. 3 kids, 6 grandkids.  Retired  at [employer].  Smoked 1-2 PPD, now down to 4 a day since diagnosis.  Occasional EtOH, no illicits. [place], [place] bingo.  Has a mini dauchson snickers     FAMILY HISTORY  Cousin with brain cancer    Subjective   Melviarlin Sanchez is a 65 y.o. year old female patient with Recurrent stage III ([id number]) p16+ SCC of L oropharynx who competed chemoRT w/ 5 cycles weekly Cisplatin and 66gy proton radiatio on 11/19/24.     Interval History:  Patient presents for 6 months post  treatment follow up.    Patient reports having a left sided headache that radiates down her neck for 2 weeks. Also reports a left sided sore throat that started at the same time.   She has a chronic cough with some mucous production but is not able to cough up anything.   She has lymphedema in the left neck and stiffness. She has had some left eye ptosis as well.   Denies any odynophagia, her chronic dysphagia is unchanged.   Stamina is still low, has WARREN.   Hearing is slightly improved, hearing in the right ear is better than left. Endorse transient tinnitus.  Reports started back on melatonin to help with sleep. She has stopped Trazodone.      She has stopped smoking on 11/24/24 and is only vaping.      Review of Systems   Constitutional:  Negative for chills and fever.   HENT:   Positive for tinnitus and trouble swallowing. Negative for hearing loss, lump/mass, mouth sores, nosebleeds and sore throat.    Respiratory:  Negative for cough and shortness of breath.    Cardiovascular:  Negative for chest pain and leg swelling.   Gastrointestinal:  Negative for abdominal pain, constipation, diarrhea, nausea and vomiting.   Musculoskeletal:  Negative for arthralgias.   Skin:  Negative for rash.   Neurological:  Positive for headaches. Negative for light-headedness and numbness.     Current Outpatient Medications   Medication Instructions    albuterol 90 mcg/actuation inhaler 2 puffs, inhalation, Every 4 hours PRN    aspirin 81 mg, oral, Daily    buPROPion XL (WELLBUTRIN XL) 300 mg, oral, Every morning, Do not crush, chew, or split.    fluticasone (Flonase) 50 mcg/actuation nasal spray 2 sprays, Each Nostril, Daily, Shake gently. Before first use, prime pump. After use, clean tip and replace cap.    gabapentin (NEURONTIN) 300 mg, oral, Nightly    guaiFENesin (MUCINEX) 1,200 mg, oral, 2 times daily, Do not crush, chew, or split.    levothyroxine (Synthroid, Levoxyl) 100 mcg tablet TAKE 1 TABLET BY MOUTH EARLY IN THE  MORNING. TAKE ON AN EMPTY STOMACH AT THE SAME TIME EACH DAY, EITHER 30 TO 60 MINUTES PRIOR TO BREAKFAST.    melatonin 10 mg, oral, Nightly    morphine CR (MS CONTIN) 15 mg, oral, 2 times daily, Do not crush, chew, or split.    multivitamin tablet 1 tablet, Daily    naloxone (NARCAN) 4 mg, nasal, As needed, May repeat every 2-3 minutes if needed, alternating nostrils, until medical assistance becomes available.    oxyCODONE (ROXICODONE) 5 mg, oral, Every 6 hours PRN, Ok to fill 2/18/25    rOPINIRole (Requip) 1 mg tablet TAKE 2 TABLETS(2 MG) BY MOUTH DAILY AT BEDTIME    rosuvastatin (CRESTOR) 40 mg, oral, Daily    sertraline (ZOLOFT) 50 mg, oral, Daily    traZODone (DESYREL) 50 mg, oral, Nightly PRN    umeclidinium-vilanteroL (Anoro Ellipta) 62.5-25 mcg/actuation blister with inhaler device 1 puff, inhalation, Daily    varenicline tartrate (CHANTIX) 1 mg, oral, 2 times daily, Take with full glass of water.    walker (Ultra-Light Rollator) misc 1 each, miscellaneous, As needed     Allergies   Allergen Reactions    Duloxetine Hallucinations     Pt states she doesn't think she has an allergy just a bad reaction when mixed with morphine .     Objective   /78   Pulse 71   Temp 36.9 °C (98.4 °F)   Resp 18   SpO2 93%     Daily Weight  06/12/25 : 73 kg (161 lb)  06/06/25 : 72.1 kg (159 lb)  05/21/25 : 74.6 kg (164 lb 6.4 oz)  05/09/25 : 72.6 kg (160 lb)  05/06/25 : 74.4 kg (164 lb)  04/08/25 : 73 kg (161 lb)  03/25/25 : 73 kg (161 lb)    Physical Exam  Vitals reviewed.   Constitutional:       Appearance: Normal appearance. She is well-developed.   HENT:      Head: Normocephalic and atraumatic.      Right Ear: External ear normal. No tenderness.      Left Ear: External ear normal. No tenderness.      Nose: Nose normal.      Mouth/Throat:      Lips: Pink.      Mouth: Mucous membranes are moist. No injury or oral lesions.      Tongue: No lesions.   Eyes:      General: Lids are normal.      Extraocular Movements:  Extraocular movements intact.      Conjunctiva/sclera: Conjunctivae normal.      Pupils: Pupils are equal, round, and reactive to light.   Neck:      Thyroid: No thyroid mass.      Comments: Muscle stiffness in left neck, lymphedema in left neck  Cardiovascular:      Rate and Rhythm: Normal rate and regular rhythm.      Pulses: Normal pulses.      Heart sounds: No murmur heard.     No gallop.   Pulmonary:      Effort: Pulmonary effort is normal. No respiratory distress.      Breath sounds: Normal breath sounds and air entry. No stridor. No wheezing.   Abdominal:      General: Abdomen is flat. Bowel sounds are normal. There is no distension or abdominal bruit.      Palpations: Abdomen is soft. There is no mass.      Tenderness: There is no abdominal tenderness.   Musculoskeletal:         General: Normal range of motion.      Cervical back: Normal range of motion and neck supple. No signs of trauma. Normal range of motion.      Right lower leg: No edema.      Left lower leg: No edema.   Lymphadenopathy:      Cervical: No cervical adenopathy.      Upper Body:      Right upper body: No axillary adenopathy.      Left upper body: No axillary adenopathy.   Skin:     General: Skin is warm and dry.      Comments: No new skin lesions   Neurological:      General: No focal deficit present.      Mental Status: She is alert and oriented to person, place, and time. Mental status is at baseline.      Gait: Gait is intact.   Psychiatric:         Attention and Perception: Attention normal.         Mood and Affect: Mood and affect normal.         Behavior: Behavior is cooperative.       ECOGSCORE: 1- Restricted in physically strenuous activity.  Carries out light duty.    Diagnostic Results   LABS  Below labs were reviewed.  Lab Results   Component Value Date    WBC 9.3 06/12/2025    HGB 10.9 (L) 06/12/2025    HCT 35.6 (L) 06/12/2025    MCV 89 06/12/2025     06/12/2025      Lab Results   Component Value Date    NEUTROABS 7.76  "(H) 06/12/2025    EOSABS 0.05 06/12/2025      Lab Results   Component Value Date    GLUCOSE 96 06/12/2025    CALCIUM 9.4 06/12/2025     06/12/2025    K 4.1 06/12/2025    CO2 27 06/12/2025     06/12/2025    BUN 11 06/12/2025    CREATININE 0.70 06/12/2025    MG 1.29 (L) 05/09/2025    PHOS 4.6 09/03/2024    ALBUMIN 4.0 06/12/2025    PROT 6.7 06/12/2025     Lab Results   Component Value Date    ALT 11 06/12/2025    AST 13 06/12/2025    ALKPHOS 58 06/12/2025    BILITOT 0.5 06/12/2025      Lab Results   Component Value Date    TSH 1.37 03/12/2025    FREET4 1.35 10/29/2024     Lab Results   Component Value Date    TSH 1.37 03/12/2025    FREET4 1.35 10/29/2024     No results found for: \"RETICCTPCT\", \"RETIC\", \"IMMRETICFR\", \"RETICHGB\"  Lab Results   Component Value Date    LACTATE 0.7 05/09/2025    FERRITIN 268 (H) 03/12/2025    IRON 38 03/12/2025    TIBC 242 03/12/2025     IMAGES  02/27/25 NM PET CT WHOLE BODY  - Impression -  1. Interval development of focal hypermetabolic activity within the larynx and proximal esophagus. Findings are nonspecific and correlation with direct visualization is recommended to exclude a neoplastic process.  2. Postsurgical changes within the left neck without evidence of hypermetabolic recurrent/residual disease. No hypermetabolic cervical lymphadenopathy.  3. Interval development of multifocal consolidative opacities most prominent within the dependent portion of the right lower lobe is most compatible with infectious/inflammatory process and correlate with concern for aspiration pneumonia given distribution. Hypermetabolic mediastinal lymphadenopathy is favored to be reactive in nature. Follow-up chest CT to ensure resolution is recommended.  4. No evidence of hypermetabolic distant metastatic disease.    02/27/25 CT SOFT TISSUE NECK W IV CONTRAST  - Impression -  1. Status post left radical neck dissection with ALT free flap reconstruction. There is stable diffuse edema noted of " the upper aerodigestive soft tissue structures, similar in appearance to the comparison examination. No evidence of recurrent or residual discrete mass or pathologically enlarged lymph node.  2. New pulmonary nodules observed in the right upper lobe as well as a left apical lower lobe subpleural consolidative opacity. These findings may represent an infectious/inflammatory process, however metastatic disease is not excluded. Follow-up imaging may be performed to evaluate for resolution.    Assessment/Plan   Melvi Sanchez is a 65 y.o. year old female patient with PMH of stage III (cT1c cN3a M0) SCC of L tonsil, COPD, hypothyroidism and RLS, started treatment with CCRT w/ Cisplatin and proton radiation on 10/8/24 for recurrence to L oropharynx with diffuse JOLANTA.    We discussed the treatment paradigm in recurrence and salvage surgery - given her pathology showing JOLANTA it is reasonable to proceed with concurrent reirradiation with Proton. Patient completed 5 cycles of cisplatin (40 mg/m2) and 66 gy of proton reirradiation on 11/19/24.    6/12/25: Patient is 6 months from treatment and is mostly stable. She has chronic SE of lymphedema in the left jaw and neck. This swelling is likely causing her persistent left sided headache that radiates down to her posterior and left neck. She can benefit again from lymphedema therapy which she had back in 2022. She is still fatigued and stamina is low. Her chronic dysphagia and neck stiffness are unchanged, transient tinnitus is unchanged from baseline.      # Recurrent stage III (cT1c cN3a M0) SCC of L tonsil   - 07/07/2022: Completion of chemoradiation with weekly cisplatin with Dr. Razo  - 08/26/2024: L radical neck dissection 2-4 with CN11 repair showing diffuse JOLANTA   - 10/8 - 11/19/24: Completed 5 cycles of cisplatin (40 mg/m2) and 66 gy of proton reirradiation   - 3/6/25 PET and CT Neck showed some uptake in the larynx which seem to correspond with swelling on scope  by [facility] today, the interval development of multifocal consolidative opacities in the RLL are likely infectious/inflammatory given patient's recent PNA and Covid infection.   - 4/9/25 CT Chest showed emphysema w/ scattered small bulla throughout the lungs, a few scattered predominaly peripheral nodules <5mm.   PLAN: Referral to lymphedema therapy for left neck swelling/headache. RTC 3 months w/ surveillance CT N/C (9/12), FUV w/ MedOnc (9/17). Labs cbc, cmp.     # RUL nodules  - Being followed with CT scan in next 3 months  - 10/18/24 CT Chest wo contrast showed the previously noted posterior right upper lobe ill-defined nodular density has resolved. Similar bandlike opacity within lingula with associated mild bronchiectasis, which was minimally hypermetabolic on recent PET-CT, favoring benign/inflammatory etiology; attention on follow-up imaging within 6 months is recommended.  - Follow up on band like opacity within lingula with CT chest wo contrast likely in April 2025    # Sleep Disturbance  - Taking Melatonin. Stopped Trazodone due to HA.     # Memory Loss  - Patient reported short-term memory loss after first round of chemoRT, however her memory loss is much worse with this round. Has trouble remembering, dates, recalling conversations, loses train of thoughts, even though she makes herself phone reminders. Reports her mother passed away from vascular dementia.   - Patient established with Neurologist Dr. Cruz, will be getting MR Brain and MR venography intracranial .

## 2025-06-12 ENCOUNTER — OFFICE VISIT (OUTPATIENT)
Dept: HEMATOLOGY/ONCOLOGY | Facility: HOSPITAL | Age: 65
End: 2025-06-12
Payer: COMMERCIAL

## 2025-06-12 ENCOUNTER — LAB (OUTPATIENT)
Dept: LAB | Facility: HOSPITAL | Age: 65
End: 2025-06-12
Payer: COMMERCIAL

## 2025-06-12 ENCOUNTER — HOSPITAL ENCOUNTER (OUTPATIENT)
Dept: RADIOLOGY | Facility: HOSPITAL | Age: 65
Discharge: HOME | End: 2025-06-12
Payer: COMMERCIAL

## 2025-06-12 ENCOUNTER — HOSPITAL ENCOUNTER (OUTPATIENT)
Dept: RADIATION ONCOLOGY | Facility: HOSPITAL | Age: 65
Setting detail: RADIATION/ONCOLOGY SERIES
Discharge: HOME | End: 2025-06-12
Payer: COMMERCIAL

## 2025-06-12 VITALS
SYSTOLIC BLOOD PRESSURE: 134 MMHG | RESPIRATION RATE: 18 BRPM | OXYGEN SATURATION: 93 % | TEMPERATURE: 98.4 F | DIASTOLIC BLOOD PRESSURE: 78 MMHG | HEART RATE: 71 BPM

## 2025-06-12 VITALS
DIASTOLIC BLOOD PRESSURE: 78 MMHG | OXYGEN SATURATION: 93 % | TEMPERATURE: 98.4 F | HEART RATE: 71 BPM | RESPIRATION RATE: 18 BRPM | WEIGHT: 161 LBS | BODY MASS INDEX: 28.52 KG/M2 | SYSTOLIC BLOOD PRESSURE: 134 MMHG

## 2025-06-12 DIAGNOSIS — C77.0 METASTASIS TO HEAD AND NECK LYMPH NODE (MULTI): ICD-10-CM

## 2025-06-12 DIAGNOSIS — C09.9 TONSIL CANCER (MULTI): Primary | ICD-10-CM

## 2025-06-12 DIAGNOSIS — C09.9 TONSIL CANCER (MULTI): ICD-10-CM

## 2025-06-12 DIAGNOSIS — J43.9 PULMONARY EMPHYSEMA, UNSPECIFIED EMPHYSEMA TYPE (MULTI): ICD-10-CM

## 2025-06-12 DIAGNOSIS — I89.0 LYMPHEDEMA DUE TO AND NOT CONCURRENT WITH IONIZING RADIOTHERAPY: ICD-10-CM

## 2025-06-12 DIAGNOSIS — R05.3 CHRONIC COUGH: ICD-10-CM

## 2025-06-12 DIAGNOSIS — R05.3 CHRONIC COUGH: Primary | ICD-10-CM
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
## 2025-06-12 LAB
ALBUMIN SERPL BCP-MCNC: 4 G/DL (ref 3.4–5)
ALP SERPL-CCNC: 58 U/L (ref 33–136)
ALT SERPL W P-5'-P-CCNC: 11 U/L (ref 7–45)
ANION GAP SERPL CALC-SCNC: 13 MMOL/L (ref 10–20)
AST SERPL W P-5'-P-CCNC: 13 U/L (ref 9–39)
BASOPHILS # BLD AUTO: 0.03 X10*3/UL (ref 0–0.1)
BASOPHILS NFR BLD AUTO: 0.3 %
BILIRUB SERPL-MCNC: 0.5 MG/DL (ref 0–1.2)
BUN SERPL-MCNC: 11 MG/DL (ref 6–23)
CALCIUM SERPL-MCNC: 9.4 MG/DL (ref 8.6–10.3)
CHLORIDE SERPL-SCNC: 103 MMOL/L (ref 98–107)
CO2 SERPL-SCNC: 27 MMOL/L (ref 21–32)
CREAT SERPL-MCNC: 0.7 MG/DL (ref 0.5–1.05)
EGFRCR SERPLBLD CKD-EPI 2021: >90 ML/MIN/1.73M*2
EOSINOPHIL # BLD AUTO: 0.05 X10*3/UL (ref 0–0.7)
EOSINOPHIL NFR BLD AUTO: 0.5 %
ERYTHROCYTE [DISTWIDTH] IN BLOOD BY AUTOMATED COUNT: 15.1 % (ref 11.5–14.5)
FOLATE SERPL-MCNC: 23.5 NG/ML
GLUCOSE SERPL-MCNC: 96 MG/DL (ref 74–99)
HCT VFR BLD AUTO: 35.6 % (ref 36–46)
HGB BLD-MCNC: 10.9 G/DL (ref 12–16)
IMM GRANULOCYTES # BLD AUTO: 0.03 X10*3/UL (ref 0–0.7)
IMM GRANULOCYTES NFR BLD AUTO: 0.3 % (ref 0–0.9)
IRON SATN MFR SERPL: 13 % (ref 25–45)
IRON SERPL-MCNC: 39 UG/DL (ref 35–150)
LYMPHOCYTES # BLD AUTO: 0.61 X10*3/UL (ref 1.2–4.8)
LYMPHOCYTES NFR BLD AUTO: 6.5 %
MCH RBC QN AUTO: 27.3 PG (ref 26–34)
MCHC RBC AUTO-ENTMCNC: 30.6 G/DL (ref 32–36)
MCV RBC AUTO: 89 FL (ref 80–100)
MONOCYTES # BLD AUTO: 0.84 X10*3/UL (ref 0.1–1)
MONOCYTES NFR BLD AUTO: 9 %
NEUTROPHILS # BLD AUTO: 7.76 X10*3/UL (ref 1.2–7.7)
NEUTROPHILS NFR BLD AUTO: 83.4 %
NRBC BLD-RTO: 0 /100 WBCS (ref 0–0)
PLATELET # BLD AUTO: 213 X10*3/UL (ref 150–450)
POTASSIUM SERPL-SCNC: 4.1 MMOL/L (ref 3.5–5.3)
PROT SERPL-MCNC: 6.7 G/DL (ref 6.4–8.2)
RBC # BLD AUTO: 3.99 X10*6/UL (ref 4–5.2)
SODIUM SERPL-SCNC: 139 MMOL/L (ref 136–145)
TIBC SERPL-MCNC: 301 UG/DL (ref 240–445)
TSH SERPL-ACNC: 2.53 MIU/L (ref 0.44–3.98)
UIBC SERPL-MCNC: 262 UG/DL (ref 110–370)
VIT B12 SERPL-MCNC: 411 PG/ML (ref 211–911)
WBC # BLD AUTO: 9.3 X10*3/UL (ref 4.4–11.3)

## 2025-06-12 PROCEDURE — 71046 X-RAY EXAM CHEST 2 VIEWS: CPT | Performed by: RADIOLOGY

## 2025-06-12 PROCEDURE — 99214 OFFICE O/P EST MOD 30 MIN: CPT

## 2025-06-12 PROCEDURE — 85025 COMPLETE CBC W/AUTO DIFF WBC: CPT

## 2025-06-12 PROCEDURE — 82746 ASSAY OF FOLIC ACID SERUM: CPT | Performed by: PSYCHIATRY & NEUROLOGY

## 2025-06-12 PROCEDURE — 82607 VITAMIN B-12: CPT | Performed by: PSYCHIATRY & NEUROLOGY

## 2025-06-12 PROCEDURE — 84443 ASSAY THYROID STIM HORMONE: CPT

## 2025-06-12 PROCEDURE — 71046 X-RAY EXAM CHEST 2 VIEWS: CPT

## 2025-06-12 PROCEDURE — 99215 OFFICE O/P EST HI 40 MIN: CPT | Mod: 25 | Performed by: STUDENT IN AN ORGANIZED HEALTH CARE EDUCATION/TRAINING PROGRAM

## 2025-06-12 PROCEDURE — 99215 OFFICE O/P EST HI 40 MIN: CPT | Performed by: STUDENT IN AN ORGANIZED HEALTH CARE EDUCATION/TRAINING PROGRAM

## 2025-06-12 PROCEDURE — 83550 IRON BINDING TEST: CPT

## 2025-06-12 PROCEDURE — 84075 ASSAY ALKALINE PHOSPHATASE: CPT

## 2025-06-12 ASSESSMENT — PAIN SCALES - GENERAL: PAINLEVEL_OUTOF10: 4

## 2025-06-12 ASSESSMENT — ENCOUNTER SYMPTOMS
OCCASIONAL FEELINGS OF UNSTEADINESS: 0
DEPRESSION: 0
LOSS OF SENSATION IN FEET: 0
HEADACHES: 1

## 2025-06-12 NOTE — ADDENDUM NOTE
Encounter addended by: PIYUSH Schreiber-CNP on: 6/12/2025 11:40 AM   Actions taken: Level of Service modified, SmartForm saved, Clinical Note Signed, Order list changed, Diagnosis association updated

## 2025-06-12 NOTE — PATIENT INSTRUCTIONS
Lymphedema   - You have significant lymphedema in the left jaw/neck area. I think your left sided headache may be related to this swelling also.   - I'm referring you to lymphedema therapy at [place]. Please call to make an appointment  - Remember to let your lymphedema therapist know you have a lymphedema pump and they can help you program it.     Scans  - Please schedule your Brain MRI that Dr. Cruz ordered  - I'm ordering Head & Neck Cancer Surveillance CT Neck and Chest in September. I will see you the week after your scans.   - Please remember to get labs before your scans.

## 2025-06-18 ENCOUNTER — TELEPHONE (OUTPATIENT)
Dept: ADMISSION | Facility: HOSPITAL | Age: 65
End: 2025-06-18
Payer: COMMERCIAL

## 2025-06-18 DIAGNOSIS — G89.3 CANCER ASSOCIATED PAIN: ICD-10-CM

## 2025-06-18 RX ORDER — OXYCODONE HYDROCHLORIDE 5 MG/1
5 TABLET ORAL EVERY 6 HOURS PRN
Qty: 120 TABLET | Refills: 0 | Status: SHIPPED | OUTPATIENT
Start: 2025-06-21 | End: 2025-07-21

## 2025-06-18 RX ORDER — MORPHINE SULFATE 15 MG/1
15 TABLET, FILM COATED, EXTENDED RELEASE ORAL 2 TIMES DAILY
Qty: 60 TABLET | Refills: 0 | Status: SHIPPED | OUTPATIENT
Start: 2025-06-25 | End: 2025-07-25

## 2025-06-18 NOTE — TELEPHONE ENCOUNTER
Pt requesting refills  Oxycodone 5mg. 1 tablet every 6 hrs prn  Morphine CR 15mg. 1 tablet BID   Ph[facility]: [facility] in [place]  Pt states PA is needed every month prior to medications being filled.   Last FUV 5/21, next FUV 8/20

## 2025-06-18 NOTE — TELEPHONE ENCOUNTER
Patient last seen by PIYUSH Soliman on 5/21 with plan to continue MSER 15mg BID and reduce oxycodone 5mg q6h PRN. Follow up visit is scheduled for 8/20. OARRS reviewed and no aberrancy noted. Patient last filled oxycodone 5mg #120/30 days on 5/23 and MSER 15mg #60/40 days on 5/27. Prescriptions pended to provider to approve. I spoke with the pharmacy who states they cannot tell if a PA is needed as medications are not due to be filled. I have emailed [facility] to see if they are able to look into this.    UPDATE: No PA needed for oxycodone. PA on file for MSER though October per [facility].

## 2025-06-19 ENCOUNTER — EVALUATION (OUTPATIENT)
Dept: OCCUPATIONAL THERAPY | Facility: HOSPITAL | Age: 65
End: 2025-06-19
Payer: COMMERCIAL
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
## 2025-06-19 DIAGNOSIS — C09.9 TONSIL CANCER (MULTI): ICD-10-CM

## 2025-06-19 DIAGNOSIS — I89.0 LYMPHEDEMA DUE TO AND NOT CONCURRENT WITH IONIZING RADIOTHERAPY: Primary | ICD-10-CM

## 2025-06-19 PROCEDURE — 97165 OT EVAL LOW COMPLEX 30 MIN: CPT | Mod: GO | Performed by: OCCUPATIONAL THERAPIST

## 2025-06-19 PROCEDURE — 97140 MANUAL THERAPY 1/> REGIONS: CPT | Mod: GO | Performed by: OCCUPATIONAL THERAPIST

## 2025-06-19 ASSESSMENT — ENCOUNTER SYMPTOMS
DEPRESSION: 0
OCCASIONAL FEELINGS OF UNSTEADINESS: 1
LOSS OF SENSATION IN FEET: 0

## 2025-06-19 NOTE — PROGRESS NOTES
"Occupational Therapy    Occupational Therapy Evaluation    Name: Melvi Sanchez \"Gretchen\"  MRN: [id number]  : 1960  Date: 2025    Time Entry:  Time Calculation  Start Time: 1100  Stop Time: 1200  Time Calculation (min): 60 min  OT Evaluation Time Entry  Evaluation (Low) Time Entry: 40     OT Therapeutic Procedures Time Entry  Manual Therapy Time Entry: 20     Assessment:  Pt has secondary lymphedema in her neck and face.  She has fibrosis on the left side of her neck.  Recommend a compression wrap with padding for her head/neck.  OT Assessment  OT Assessment Results: Decreased ADL status, Decreased upper extremity range of motion, Decreased upper extremity strength, Other (Comment)  Strengths: Ability to acquire knowledge  She has significant tightness on the left side of her neck and anterior shoulder.  Plan:  Outpatient Plan  Frequency: 2x/wk x 4 weeks, 1 time a week for 8 weeks.  Frequency to be modified as necessary to meet patient's needs.  Duration: 12 weeks  Plan of Care Agreement: Patient    Subjective   Current Problem:  1. Lymphedema due to and not concurrent with ionizing radiotherapy  Referral to Occupational Therapy    Follow Up In Occupational Therapy      2. Tonsil cancer (Multi)  Referral to Occupational Therapy        General:   Surgery on 24 for metastasis to head and neck lymph node.  64 yr old female with T1N3a SCCa Left Tonsil s/p CXRT with recurrent left neck disease s/p left radical ND 2-4 with CN 11 repair with left ALT reconstruction on 24 with Dr. Rice and Dr. Brownlee-from doctor note.   34 radiation treatment and 5 chemo.  End of November was last radiation.    Pt has had swelling in the left face and neck since.  She states for the last month she has had a constant headache 8/10.    Pt was seen previously follow ing initial diagnosis of cancer and treatment.  At that time she received a flexitouch compression pump and a fabricated compression wrap for her " neck.  OT Received On: 06/19/25  Precautions:  Standard lymphedema precautions        Pain Assessment:   Headache       WFL unless documented below   Flexion Extension Lateral Rotation Lateral Flexion    Right WFL 32 50 26    Left   50 15     Shoulder ROM:  WFL unless documented below   Flexion Extension External Rotation Internal rotation Abduction   Right 158    155   Left 135    105      Lymphedema Assessment    Head and Neck Measurements:   Horizontal neck superior 41.5 cm  Middle 38.0  Inferior 35  Head and Neck Appearance:  Fibrosis  pitting  hyperpigmentation        ADL:  Pt states she is eating at least 75% of normal in regards to amount and variety.   Voice hoarse, dry mouth, needs to drink after every bite.  Food gets stuck.      Pain Assessment:  Headache 8/10          Outcome Measures:   LLIS score of    OP EDUCATION:  Education  Individual(s) Educated: Patient  Education Provided: Diagnosis & Precautions, Lymphedema, Symptom management, Risk and benefits of OT discussed with patient or other, POC discussed and agreed upon  Home Program: AROM, Edema control, Handout issued  Risk and Benefits Discussed with Patient/Caregiver/Other: yes  Patient/Caregiver Demonstrated Understanding: yes  Plan of Care Discussed and Agreed Upon: yes  Patient Response to Education: Patient/Caregiver Verbalized Understanding of Information    Goals:         and pain 0-2/10.            OT Problem       PC       Start:  06/19/25    Expected End:  01/01/26               OT Problem       PATIENT STATED GOAL decrease size of neck and improve eating.       Start:  06/19/25    Expected End:  01/01/26       PATIENT STATED GOAL decrease size of neck and improve eating.

## 2025-06-23 ENCOUNTER — TREATMENT (OUTPATIENT)
Dept: OCCUPATIONAL THERAPY | Facility: HOSPITAL | Age: 65
End: 2025-06-23
Payer: COMMERCIAL
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
## 2025-06-23 DIAGNOSIS — I89.0 LYMPHEDEMA DUE TO AND NOT CONCURRENT WITH IONIZING RADIOTHERAPY: ICD-10-CM

## 2025-06-23 PROCEDURE — 97112 NEUROMUSCULAR REEDUCATION: CPT | Mod: GO,CO

## 2025-06-23 PROCEDURE — 97140 MANUAL THERAPY 1/> REGIONS: CPT | Mod: GO,CO

## 2025-06-23 PROCEDURE — 97535 SELF CARE MNGMENT TRAINING: CPT | Mod: GO,CO

## 2025-06-23 ASSESSMENT — PAIN - FUNCTIONAL ASSESSMENT: PAIN_FUNCTIONAL_ASSESSMENT: 0-10

## 2025-06-23 ASSESSMENT — PAIN SCALES - GENERAL: PAINLEVEL_OUTOF10: 0 - NO PAIN

## 2025-06-23 ASSESSMENT — ACTIVITIES OF DAILY LIVING (ADL): HOME_MANAGEMENT_TIME_ENTRY: 15

## 2025-06-23 NOTE — PROGRESS NOTES
"  Occupational Therapy    Occupational Therapy Treatment    Name: Melvi Sanchez \"Allie"  MRN: [id number]  : 1960  Date: 2025  Time Calculation  Start Time: 1400  Stop Time: 1500  Time Calculation (min): 60 min     OT Therapeutic Procedures Time Entry  Self Care/Home Management (ADLs) Time Entry: 15  Manual Therapy Time Entry: 30  Neuromuscular Re-Education Time Entry: 15                 Visit# 2    Subjective Pt states that she used her vaso pneumatic pump this am and her neck felt better afterwards.   General:        Pain Assessment:  Pain Assessment  Pain Assessment: 0-10  0-10 (Numeric) Pain Score: 0 - No pain    Objective    Activities of Daily Living: Educated pt that she may use her vaso pneumatic pump more than once a day. Pt can use it 3-4 times daily just not back to back txs. Pt verbalized understanding. Reviewed some lymphatic anatomy as it pertains to MLD. Pt verbalized understanding.     Therapy/Activity: Inst pt to perform light stretching exs supine or semi reclined. Pt inst to lay as flat as she can on her back and to spread BUEs into abduction and let gravity stretch for 30 seconds 5 reps. Do throughout the day. Pt verbalized and demonstrated understanding.     Manual: MLD chest head and neck. Posterior approach. SCF chest anterior and posterior. B axilla.     Lymphedema Assessment    Head and Neck Measurements:  Tragus to Mouth Angle: Left (11)  Mandibular Angle to Nasal Wing: Left (12)  Mandibular Angle to Internal Eye Corner: Left (13)  Mandibular Angle to External Eye Corner: Left (9)  Mandibular Angle to Mental Protuberance: Left (12)  Horizontal Neck Circumference Superior (just below mandible): 35  Horizontal Neck Circumference Middle (midway between top and bottom): 34  Horizontal Neck Circumference Inferior (lowest circumferential  location): 35  Head and Neck Appearance:     Pain Assessment:  Pain Assessment: 0-10  0-10 (Numeric) Pain Score: 0 - No pain  Therapy " "Precautions:  Medical Precautions: Lymphedema precautions    Assessment: Pts neck measurements decreased by 10.5 cm. L side of neck is very fibrous and shortened. Pt finds it \"tender\" but not painful. Inst pt in positioning to stretch the left side of her neck with gravity assist. Pt verbalized understanding. Pt to use hervaso pneumatic pump at least twice a day, am and pm to maximize lymphatic reduction.      Plan: Cont skilled OT to decrease lymphedema and fibrosis to improve neck AROM and improve swallowing.     OP EDUCATION: Inst pt in some self stretch to the cording in her neck and stretchin her neck and upper body too decrease neck tightness and improve AROM.       Goals:             "

## 2025-06-25 ENCOUNTER — HOSPITAL ENCOUNTER (OUTPATIENT)
Dept: VASCULAR MEDICINE | Facility: CLINIC | Age: 65
Discharge: HOME | End: 2025-06-25
Payer: COMMERCIAL

## 2025-06-25 DIAGNOSIS — I65.23 OCCLUSION AND STENOSIS OF BILATERAL CAROTID ARTERIES: ICD-10-CM

## 2025-06-25 DIAGNOSIS — C09.9 TONSIL CANCER (MULTI): ICD-10-CM

## 2025-06-25 DIAGNOSIS — C77.0 METASTASIS TO HEAD AND NECK LYMPH NODE (MULTI): ICD-10-CM

## 2025-06-25 PROCEDURE — 93880 EXTRACRANIAL BILAT STUDY: CPT | Performed by: SURGERY

## 2025-06-25 PROCEDURE — 93880 EXTRACRANIAL BILAT STUDY: CPT

## 2025-06-26 ENCOUNTER — TREATMENT (OUTPATIENT)
Dept: OCCUPATIONAL THERAPY | Facility: HOSPITAL | Age: 65
End: 2025-06-26
Payer: COMMERCIAL
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
## 2025-06-26 DIAGNOSIS — I89.0 LYMPHEDEMA DUE TO AND NOT CONCURRENT WITH IONIZING RADIOTHERAPY: ICD-10-CM

## 2025-06-26 PROCEDURE — 97140 MANUAL THERAPY 1/> REGIONS: CPT | Mod: GO,CO

## 2025-06-26 PROCEDURE — 97110 THERAPEUTIC EXERCISES: CPT | Mod: GO,CO

## 2025-06-26 ASSESSMENT — PAIN - FUNCTIONAL ASSESSMENT: PAIN_FUNCTIONAL_ASSESSMENT: 0-10

## 2025-06-26 ASSESSMENT — PAIN SCALES - GENERAL: PAINLEVEL_OUTOF10: 0 - NO PAIN

## 2025-06-26 NOTE — PROGRESS NOTES
"Occupational Therapy    Occupational Therapy Treatment    Name: Melvi Sanchez \"Gretchen\"  MRN: [id number]  : 1960  Date: 2025  Time Calculation  Start Time: 1300  Stop Time: 1400  Time Calculation (min): 60 min     OT Therapeutic Procedures Time Entry  Therapeutic Exercise Time Entry: 15  Manual Therapy Time Entry: 45                 Visit# 3    Subjective Pt states she is doing ok.  General: Ptstates that she had an US to her neck and allow of the arteries and veins are good.         Pain Assessment:  Pain Assessment  Pain Assessment: 0-10  0-10 (Numeric) Pain Score: 0 - No pain (tender on the left)    Objective    Activities of Daily Living:      Therapy/Activity: Pt performing AROM Exs, lateral bends, rotation and flex ext to her tolerance with holding for gentle stretch for 5 seconds.     Manual: MLD SCF, chest, B axilla, head and neck posterior approach due to fibrosis on front and side of left submental area. Pt has cording along the collar bone area along the SCF. Gentle release to the cording with mild stretch to the neck.     Lymphedema Assessment    Head and Neck Measurements:  Horizontal Neck Circumference Superior (just below mandible): 35  Horizontal Neck Circumference Middle (midway between top and bottom): 34  Horizontal Neck Circumference Inferior (lowest circumferential  location): 34  Head and Neck Appearance:  Cheek: Left  Neck: Left  Edema - Fibrotic: left  Edema - Pitting: left  Hemosiderin Staining: yes  Hyperpigmentation: yes  Pain Assessment:  Pain Assessment: 0-10  0-10 (Numeric) Pain Score: 0 - No pain (tender on the left)  Therapy Precautions:  Medical Precautions: Lymphedema precautions    Assessment: Pt decreased by 1 cm in her neck. Pt cont to have phlegm and coughing plus eye watering especially with tx. Pt performs AROM neck side to side, rotation and lateral bends throughout the day. Reminded pt to lie on her back with arms stretched out for a slow gravity stretch. Pt " verbalized understanding.     Plan: Cont skilled OT to decrease lymphedema and fibrosis in head and neck to improve AROM B shldrs and neck for IADLs.     OP EDUCATION: Inst pt in gentle pressure to the most fibrous area on her neck to soften the tissue for improved mobility of her neck.        Goals:

## 2025-06-30 ENCOUNTER — OFFICE VISIT (OUTPATIENT)
Dept: OTOLARYNGOLOGY | Facility: CLINIC | Age: 65
End: 2025-06-30
Payer: COMMERCIAL

## 2025-06-30 VITALS — BODY MASS INDEX: 27.46 KG/M2 | WEIGHT: 155 LBS | HEIGHT: 63 IN | TEMPERATURE: 98.6 F

## 2025-06-30 DIAGNOSIS — C09.9 TONSIL CANCER (MULTI): ICD-10-CM

## 2025-06-30 DIAGNOSIS — C77.0 METASTASIS TO HEAD AND NECK LYMPH NODE (MULTI): ICD-10-CM

## 2025-06-30 DIAGNOSIS — K14.8 TONGUE MASS: ICD-10-CM

## 2025-06-30 DIAGNOSIS — J38.01 PARALYSIS OF LEFT VOCAL CORD: ICD-10-CM

## 2025-06-30 PROCEDURE — 3008F BODY MASS INDEX DOCD: CPT | Performed by: OTOLARYNGOLOGY

## 2025-06-30 PROCEDURE — 99214 OFFICE O/P EST MOD 30 MIN: CPT | Performed by: OTOLARYNGOLOGY

## 2025-06-30 PROCEDURE — 92511 NASOPHARYNGOSCOPY: CPT | Performed by: OTOLARYNGOLOGY

## 2025-06-30 ASSESSMENT — ENCOUNTER SYMPTOMS
TROUBLE SWALLOWING: 1
NECK PAIN: 1
SHORTNESS OF BREATH: 1
NAUSEA: 0
VOMITING: 0
FACIAL SWELLING: 1
VOICE CHANGE: 1
NECK STIFFNESS: 1
SORE THROAT: 1

## 2025-06-30 NOTE — PROGRESS NOTES
Chief Complaint   Patient presents with    Follow-up     HPI  Melvi Sanchez is a 65 y.o. female patient of Dr. Nair here for an acute visit for concern regarding issues of swallowing, talking and breathing. Reports she feels pain in the back of her tongue on the left. She has a history of history of tonsillar cancer which she had XT and chemotherapy which she completed 11/2024. She has been getting lymphedema treatment which she just had last week after recently seeing medical oncology.  She has noticed a change in her swelling from her right face down into her right neck with the lymphedema therapy treatment.  Breathing is feeling tight.  Swallowing is painful and more difficult.  She is still able to eat/drink softer stuff.  Voice has changed as well when prompted.  Neck feels more tender.  Has left sided headaches which have been chronic but worse.    Social History  She reports that she has quit smoking. Her smoking use included cigarettes. She started smoking about 19 months ago. She has a 41.1 pack-year smoking history. She has never used smokeless tobacco. She reports that she does not currently use alcohol after a past usage of about 1.0 standard drink of alcohol per week. She reports that she does not currently use drugs after having used the following drugs: Marijuana. Frequency: 1.00 time per week.    PMH  She has a past medical history of Acute hypoxemic respiratory failure (09/20/2023), Breast calcification, left, COPD (chronic obstructive pulmonary disease) (Multi), Dysphagia, Essential (primary) hypertension (12/28/2018), Fibroadenoma of left breast, H/O malignant neoplasm of tonsil, Hypothyroidism, Morbid obesity (Multi) (04/03/2023), Nausea and/or vomiting (04/03/2023), OAB (overactive bladder), Pharyngitis (04/03/2023), Pneumonia (04/03/2023), Restless legs syndrome, Sinusitis (04/03/2023), and Tobacco use disorder (09/20/2023).     PSH  She has a past surgical history that includes  Cholecystectomy (2014); Hysterectomy (2004); Mouth surgery (2014); Breast biopsy (Left); Colonoscopy;  section, low transverse; and Neck surgery (2024).    ROS  Review of Systems   HENT:  Positive for facial swelling, sore throat, trouble swallowing and voice change.    Respiratory:  Positive for shortness of breath.    Gastrointestinal:  Negative for nausea and vomiting.   Musculoskeletal:  Positive for neck pain and neck stiffness.   All other systems reviewed and are negative.       PE  ENT Physical Exam  Constitutional  Appearance: patient appears well-developed and well-nourished,  Constitutional comments: Older than stated age, coarse voice but understandable  Head and Face  Appearance: head appears normal and face appears normal;  Ear  Auricles: right auricle normal; left auricle normal;  Ear Canals: right ear canal normal; left ear canal normal;  Tympanic Membranes: right tympanic membrane normal;  Ear comments: +chronic fluid on left  Nose  Internal Nose: nasal mucosa normal; septum normal;  Oral Cavity/Oropharynx  Teeth: dentures noted;  Gums: gingiva normal;  Tongue: normal;  OC/OP comments: +OP on the left with what appears to be superficial ulceration and tenderness  Neck  Neck: normal trachea;  Neck comments: S/p left radical neck dissection, scar is well healed but tender, +lymphedema submental is the worse   Respiratory  Inspection: breathing unlabored;  Cardiovascular  Inspection: extremities are warm and well perfused;  Neurovestibular  Mental Status: alert and oriented;  Psychiatric: mood normal; affect is appropriate;  Cranial Nerves: cranial nerves intact;       Procedures   PROCEDURE NOTE:  Recommended flexible nasopharyngoscopy & laryngoscopy.  Risks, benefits, personnel and alternatives were explained.  The patient wished to proceed.  S/he was re-identified.  PROCEDURE:  Flexible nasopharyngoscopy and laryngoscopy  PREOPERATIVE DIAGNOSIS: Cancer  surveillance  POSTOPERATIVE DIAGNOSIS: Same as above, +left VC immobility and left oropharyngeal lesion concerning for mass, +radiation change  INDICATIONS: Inability to tolerate mirror exam  ANESTHESIA: 4% lidocaine and 0.5% phenylephrine  PROCEDURE:  With the patient sitting upright topical anesthesia and vasoconstriction was applied with spray to the right side(s) of the nose.  After waiting an appropriate period of time for anesthesia/vasoconstriction to become effective, a flexible laryngoscope was passed through the right side(s) of the nose.  Nasopharynx, oropharynx, hypopharynx and larynx were examined.  FINDINGS:  The nasopharynx was normal without any lesions or masses visualized.  Upon entrance into the oropharynx there was what appears to be a mass in the left oropharynx however it is difficult due to her extensive previous surgery and this is the first time I am examining her.  There is definite asymmetry L>R and a mass-like lesion (photo in media) with L>R BOT.  There is blood staining either from preparation of the nose or my palpation of the BOT.  There is radiation change/thickening of the epiglottis.  There is immobility of the left VC which does not appear to be noted in prior scope exams but I do not know for sure if this is new onset.Otherwise negative.  Patient tolerated the procedure well, and there were no complications.     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)    Relevant Orders    CT soft tissue neck w IV contrast    CT chest w IV contrast    Tonsil cancer (Multi)    Overview   TONSIL CANCER         Current Assessment & Plan   H/o tonsil cancer with recurrence in 2024 completed tx 11/24  Now with left BOT asymmetry and left VC immobility  Recommend CT neck and chest STAT  Discussed possible left BOT mass and left VC immobility  Recommend DL/biopsy after CT neck obtained  She had new onset lung nodules at the time  of her CT neck 3/25 - will re-evaluate with new chest imaging now to see if these are increase/new nodules are seen  She will follow up with Dr. Brownlee upon his return next week for scheduling of DL/biopsy and discussion regarding imaging to be obtained STAT   All questions answered         Relevant Orders    CT soft tissue neck w IV contrast    CT chest w IV contrast    Paralysis of left vocal cord    Current Assessment & Plan   This may be resulting in restricted airway feeling  Discussed strategies for slow, deep breaths when breathing feels tight  Does not need airway intervention (trach etc) at this time  No need for ED/hospitalization at this time         Relevant Orders    CT soft tissue neck w IV contrast    CT chest w IV contrast        Miranda Gandara MD    Head & Neck Surgical Oncology & Reconstruction  Department of Otolaryngology - Head and Neck Surgery     By signing my name below, I, Will Arreolaibe, attest that this documentation has been prepared under the direction and in the presence of Dr. Miranda Gandara MD.     All medical record entries made by the Scribe were at my direction and personally dictated by me, Dr. Miranda Gandara. I have reviewed the chart and agree that the record accurately reflects my personal performance of the history, physical exam, discussion and plan.

## 2025-06-30 NOTE — ASSESSMENT & PLAN NOTE
H/o tonsil cancer with recurrence in 2024 completed tx 11/24  Now with left BOT asymmetry and left VC immobility  Recommend CT neck and chest STAT  Discussed possible left BOT mass and left VC immobility  Recommend DL/biopsy after CT neck obtained  She had new onset lung nodules at the time of her CT neck 3/25 - will re-evaluate with new chest imaging now to see if these are increase/new nodules are seen  She will follow up with Dr. Brownlee upon his return next week for scheduling of DL/biopsy and discussion regarding imaging to be obtained STAT   All questions answered

## 2025-06-30 NOTE — ASSESSMENT & PLAN NOTE
This may be resulting in restricted airway feeling  Discussed strategies for slow, deep breaths when breathing feels tight  Does not need airway intervention (trach etc) at this time  No need for ED/hospitalization at this time

## 2025-06-30 NOTE — PROGRESS NOTES
Spoke to patient.   Follow up appt arranged for 7/11 with Dr. Brownlee and surgery date of 7/17 at [facility].

## 2025-07-01 ENCOUNTER — HOSPITAL ENCOUNTER (OUTPATIENT)
Dept: RADIOLOGY | Facility: CLINIC | Age: 65
Discharge: HOME | End: 2025-07-01
Payer: COMMERCIAL

## 2025-07-01 ENCOUNTER — TELEPHONE (OUTPATIENT)
Dept: HEMATOLOGY/ONCOLOGY | Facility: CLINIC | Age: 65
End: 2025-07-01
Payer: COMMERCIAL

## 2025-07-01 DIAGNOSIS — G89.3 CANCER ASSOCIATED PAIN: ICD-10-CM

## 2025-07-01 DIAGNOSIS — C77.0 METASTASIS TO HEAD AND NECK LYMPH NODE (MULTI): ICD-10-CM

## 2025-07-01 DIAGNOSIS — C09.9 TONSIL CANCER (MULTI): ICD-10-CM

## 2025-07-01 DIAGNOSIS — J38.01 PARALYSIS OF LEFT VOCAL CORD: ICD-10-CM

## 2025-07-01 PROCEDURE — 70491 CT SOFT TISSUE NECK W/DYE: CPT

## 2025-07-01 PROCEDURE — 71260 CT THORAX DX C+: CPT | Performed by: RADIOLOGY

## 2025-07-01 PROCEDURE — 2550000001 HC RX 255 CONTRASTS: Mod: JW | Performed by: OTOLARYNGOLOGY

## 2025-07-01 PROCEDURE — 70491 CT SOFT TISSUE NECK W/DYE: CPT | Performed by: RADIOLOGY

## 2025-07-01 PROCEDURE — 71260 CT THORAX DX C+: CPT

## 2025-07-01 RX ADMIN — IOHEXOL 68 ML: 350 INJECTION, SOLUTION INTRAVENOUS at 11:55

## 2025-07-01 NOTE — TELEPHONE ENCOUNTER
Patient urgently saw Dr. Gandara while Dr. Dumont was out of the office due to patients worsening symptoms. Patient reports a new lump was found in throat, her throat pain has worsened greatly and she has had headaches for one month. She would like to increase her oxycodone dosage, previously took 7.5 mg percocet. She has 5mg tablets at home, she feels 5mg only helps a little bit, and does not last 6 hours, she is needing oxycodone around every 4 hours. She is still taking mser 15mg BID and has self increased her gabapentin to 300 mg TID. Reached out to provider to discuss further.    Antoinette Soliman would like pt to take 1-2 tabs oxy q4h prn for tonight. We will follow up with her tomorrow to see how she is tolerating the change. Patient agreeable with this plan

## 2025-07-02 ENCOUNTER — TELEPHONE (OUTPATIENT)
Dept: PALLIATIVE MEDICINE | Facility: HOSPITAL | Age: 65
End: 2025-07-02
Payer: COMMERCIAL

## 2025-07-02 ENCOUNTER — TREATMENT (OUTPATIENT)
Dept: OCCUPATIONAL THERAPY | Facility: HOSPITAL | Age: 65
End: 2025-07-02
Payer: COMMERCIAL

## 2025-07-02 ENCOUNTER — TELEPHONE (OUTPATIENT)
Dept: OTOLARYNGOLOGY | Facility: HOSPITAL | Age: 65
End: 2025-07-02
Payer: COMMERCIAL

## 2025-07-02 DIAGNOSIS — I89.0 LYMPHEDEMA DUE TO AND NOT CONCURRENT WITH IONIZING RADIOTHERAPY: ICD-10-CM

## 2025-07-02 DIAGNOSIS — G47.00 INSOMNIA, UNSPECIFIED TYPE: ICD-10-CM

## 2025-07-02 DIAGNOSIS — C10.9 OROPHARYNGEAL CANCER (MULTI): ICD-10-CM
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
## 2025-07-02 RX ORDER — ACETAMINOPHEN, DIPHENHYDRAMINE HCL, PHENYLEPHRINE HCL 325; 25; 5 MG/1; MG/1; MG/1
1 TABLET ORAL NIGHTLY
Qty: 30 TABLET | Refills: 3 | Status: SHIPPED | OUTPATIENT
Start: 2025-07-02

## 2025-07-02 RX ORDER — GABAPENTIN 300 MG/1
300 CAPSULE ORAL 3 TIMES DAILY
Qty: 90 CAPSULE | Refills: 1 | Status: SHIPPED | OUTPATIENT
Start: 2025-07-02 | End: 2025-08-31

## 2025-07-02 NOTE — TELEPHONE ENCOUNTER
Called Gretchen to check in on her pain. She states the oxycodone (2 tabs) helped her headaches a lot, headaches are almost gone. It helped the soreness of the neck/ throat some but not as much as she hoped. Tolerating oxy fairly well, a little sleepy but she said this happened when she was on 10s back when she first started treated and her body got used to the medication over time. Asking for refill of gabapentin as she now is taking that 300 TID and melatonin, she stopped trazodone when headaches started, melatonin is helping her sleep well enough she said. Reached out to Antoinette to discuss further.

## 2025-07-02 NOTE — TELEPHONE ENCOUNTER
Called patient to update her that Antoinette would like her to continue oxycodone 5-10mg q4h prn she can let us know when she has about3 days left of medication. She is sometimes taking 5mg especially if she has to drive to the 10mg dose makes her sleepy.   We will check in with her after her biopsy that she said is sched for 7/17 and can move up her follow up with Antoinette if needed.    Pended refills of gabapentin and melatonin to provider to be sent to [facility].

## 2025-07-02 NOTE — TELEPHONE ENCOUNTER
Called, no answer.  Left VM.  Reviewed CT neck and chest.  I compared latest scans to the scans from 3/25.  There is new LEFT BOT tissue which is more prominent now than previous.  This was also seen at the time of exam and on endoscopy (see photo in media).  She has new VC immobility.  CT chest was negative.  She is scheduled to follow up with Dr. Brownlee next week for his evaluation and he will resume care from there.    Miranda Gandara MD    Head & Neck Surgical Oncology & Reconstruction  Department of Otolaryngology - Head and Neck Surgery

## 2025-07-03 ENCOUNTER — APPOINTMENT (OUTPATIENT)
Dept: OCCUPATIONAL THERAPY | Facility: HOSPITAL | Age: 65
End: 2025-07-03
Payer: COMMERCIAL

## 2025-07-03 ENCOUNTER — DOCUMENTATION (OUTPATIENT)
Dept: OCCUPATIONAL THERAPY | Facility: HOSPITAL | Age: 65
End: 2025-07-03
Payer: COMMERCIAL

## 2025-07-03 NOTE — PROGRESS NOTES
"Occupational Therapy                 Therapy Communication Note    Patient Name: Melvi Sanchez \"Gretchen\"  MRN: [id number]  Today's Date: 7/3/2025     Discipline: Occupational Therapy    Missed Visit Reason:  Appointment cancel due to concern for new onset of cancer.  Pt to check with her doctor regarding therapy.    Missed Time: Cancel    "

## 2025-07-10 ENCOUNTER — APPOINTMENT (OUTPATIENT)
Dept: RADIOLOGY | Facility: CLINIC | Age: 65
End: 2025-07-10
Payer: COMMERCIAL

## 2025-07-10 DIAGNOSIS — C77.0 METASTASIS TO HEAD AND NECK LYMPH NODE (MULTI): ICD-10-CM

## 2025-07-10 DIAGNOSIS — R41.3 MEMORY LOSS: ICD-10-CM

## 2025-07-10 DIAGNOSIS — R51.9 CHRONIC DAILY HEADACHE: ICD-10-CM

## 2025-07-10 PROCEDURE — A9575 INJ GADOTERATE MEGLUMI 0.1ML: HCPCS | Mod: JZ | Performed by: PSYCHIATRY & NEUROLOGY

## 2025-07-10 PROCEDURE — 2550000001 HC RX 255 CONTRASTS: Mod: JZ | Performed by: PSYCHIATRY & NEUROLOGY

## 2025-07-10 PROCEDURE — 70553 MRI BRAIN STEM W/O & W/DYE: CPT

## 2025-07-10 RX ORDER — GADOTERATE MEGLUMINE 376.9 MG/ML
15 INJECTION INTRAVENOUS
Status: COMPLETED | OUTPATIENT
Start: 2025-07-10 | End: 2025-07-10

## 2025-07-10 RX ADMIN — GADOTERATE MEGLUMINE 15 ML: 376.9 INJECTION INTRAVENOUS at 13:46

## 2025-07-11 ENCOUNTER — APPOINTMENT (OUTPATIENT)
Dept: OTOLARYNGOLOGY | Facility: CLINIC | Age: 65
End: 2025-07-11
Payer: COMMERCIAL

## 2025-07-11 ENCOUNTER — CLINICAL SUPPORT (OUTPATIENT)
Dept: PREADMISSION TESTING | Facility: HOSPITAL | Age: 65
End: 2025-07-11
Payer: COMMERCIAL

## 2025-07-11 VITALS — TEMPERATURE: 98.6 F

## 2025-07-11 DIAGNOSIS — T66.XXXS ADVERSE EFFECT OF RADIATION THERAPY, SEQUELA: ICD-10-CM

## 2025-07-11 DIAGNOSIS — J04.0 ACUTE LARYNGITIS: Primary | ICD-10-CM

## 2025-07-11 DIAGNOSIS — K12.1 ORAL ULCERATION: ICD-10-CM

## 2025-07-11 DIAGNOSIS — C09.9 TONSIL CANCER (MULTI): ICD-10-CM

## 2025-07-11 PROCEDURE — 31575 DIAGNOSTIC LARYNGOSCOPY: CPT | Performed by: OTOLARYNGOLOGY

## 2025-07-11 PROCEDURE — 1036F TOBACCO NON-USER: CPT | Performed by: OTOLARYNGOLOGY

## 2025-07-11 PROCEDURE — 99214 OFFICE O/P EST MOD 30 MIN: CPT | Performed by: OTOLARYNGOLOGY

## 2025-07-11 RX ORDER — METHYLPREDNISOLONE 4 MG/1
TABLET ORAL
Qty: 1 EACH | Refills: 0 | Status: SHIPPED | OUTPATIENT
Start: 2025-07-11

## 2025-07-11 RX ORDER — TRIAMCINOLONE ACETONIDE 1 MG/G
PASTE DENTAL 2 TIMES DAILY
Qty: 5 G | Refills: 1 | Status: SHIPPED | OUTPATIENT
Start: 2025-07-11 | End: 2025-07-25

## 2025-07-11 RX ORDER — AMOXICILLIN AND CLAVULANATE POTASSIUM 875; 125 MG/1; MG/1
1 TABLET, FILM COATED ORAL 2 TIMES DAILY
Qty: 14 TABLET | Refills: 0 | Status: SHIPPED | OUTPATIENT
Start: 2025-07-11 | End: 2025-07-18

## 2025-07-11 NOTE — PROGRESS NOTES
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 Reyes in my absence with acute onset of left sided pain    Also some difficulty moving her tongue        Physical Exam:  CONSTITUTIONAL:  No acute distress  VOICE:  No hoarseness or other abnormality  RESPIRATION:  Breathing comfortably, no stridor  CV:  No clubbing/cyanosis/edema in hands  EYES:  EOM intact, sclera normal  NEURO:  Alert and oriented times 3, Cranial nerves II-XII grossly intact and symmetric bilaterally  HEAD AND FACE:  Symmetric facial features, no masses or lesions, sinuses non-tender to palpation  SALIVARY GLANDS:  Parotid and submandibular glands normal bilaterally  EARS:  Normal external ears, external auditory canals, and TMs to otoscopy, normal hearing to whispered voice.  NOSE:  External nose midline, anterior rhinoscopy is normal with limited visualization to the anterior aspect of the interior turbinates, no bleeding or drainage, no lesions  ORAL CAVITY/OROPHARYNX/LIPS:  Normal mucous membranes, normal floor of mouth/tongue/OP, denture removed and patient with very large 1 x 2 cm ulceration in the posterior lateral floor mouth and tongue where the denture meets the tissues,  PHARYNGEAL WALLS:  No masses or lesions  NECK/LYMPH:  No LAD, no thyroid masses, trachea midline  SKIN: Extensive radiotherapy and surgical changes  PSYCH:  Alert and oriented with appropriate mood and affect         Flexible fiberoptic laryngopharyngoscopy was performed via the nares. After topical anesthesia and decongestant was instilled:    Nasopharynx:Eustachian tube orifice normal, fossa of Rosenmueller clear, mucosa clean, no masses appreciated  Palate: Nasopharyngeal surface of palate  clear  Tongue base vallecula clear, lingual surface of epiglottis normal, oropharynx clear  Larynx laryngeal surface of epiglottis, clear, area epiglottic folds, diffuse edema and radiotherapy changes, left BoT is enlarged compared to the right, Radiation changes greater to the left supraglottis and left TVC is hypomobile. Moderate thick secretions throughout.  Scope was removed, patient tolerated the procedure well    PET scan has been reviewed showing activity at the level of the larynx and in the proximal esophagus without obvious mass noted on CT scan    Imp: Oral ulceration, acute laryngitis and left base of tongue asymmetry today    NPL scope exam today with left BoT asymmetry which does appear improved from prior picture when she was seen by Dr. Gandara. Left vocal cord is hypomobile.    Recommend Augmentin course, Medrol Dosepack, Kenalog paste for oral cavity ulceration.     Recommend she have her dentist adjust lower denture due to pressure ulcer. Avoid denture use until then.     Will check a TSH.    MRI brain, CT chest and CT neck reviewed with the patient    Currently scheduled for Direct laryngoscopy with biopsies. Will add esophagoscopy with possible dilation at that time as well.     George Webster DO  Otolaryngology Resident (PGY-4)      The above resident note has been reviewed and confirmed.  Patient was seen and examined with the resident today.  Documentation has been reviewed.

## 2025-07-11 NOTE — CPM/PAT NURSE NOTE
"CPM/PAT Nurse Note      Name: Melvi Sanchez (Melvi Sanchez \"Gretchen\")  /Age: 1960/65 y.o.       Medical History[1]    Surgical History[2]    Patient Sexual activity questions deferred to the physician.    Family History[3]    Allergies[4]    Prior to Admission medications    Medication Sig Start Date End Date Taking? Authorizing Provider   albuterol 90 mcg/actuation inhaler Inhale 2 puffs every 4 hours if needed for shortness of breath. 9/10/24   Antoinette Matthews APRN-CNP   aspirin 81 mg EC tablet Take 1 tablet (81 mg) by mouth once daily. 11/15/24 11/15/25  Kevin Cosby MD   buPROPion XL (Wellbutrin XL) 300 mg 24 hr tablet Take 1 tablet (300 mg) by mouth once daily in the morning. Do not crush, chew, or split. 4/15/25 10/12/25  Alton King DO   fluticasone (Flonase) 50 mcg/actuation nasal spray Administer 2 sprays into each nostril once daily. Shake gently. Before first use, prime pump. After use, clean tip and replace cap. 3/25/25 6/23/25  Robin Brownlee MD   gabapentin (Neurontin) 300 mg capsule Take 1 capsule (300 mg) by mouth 3 times a day. 25  PIYUSH Son-CNP   guaiFENesin (Mucinex) 600 mg 12 hr tablet Take 2 tablets (1,200 mg) by mouth 2 times a day. Do not crush, chew, or split. 10/24/24 10/24/25  Chetna Lowry MD   levothyroxine (Synthroid, Levoxyl) 100 mcg tablet TAKE 1 TABLET BY MOUTH EARLY IN THE MORNING. TAKE ON AN EMPTY STOMACH AT THE SAME TIME EACH DAY, EITHER 30 TO 60 MINUTES PRIOR TO BREAKFAST. 25   Alton King DO   melatonin 10 mg tablet Take 1 tablet (10 mg) by mouth once daily at bedtime. 25   PIYUSH Son-CNP   morphine CR (MS Contin) 15 mg 12 hr tablet Take 1 tablet (15 mg) by mouth 2 times a day. Do not crush, chew, or split. Do not fill before 2025. 25  Antoinette Soliman, APRN-CNP   multivitamin tablet Take 1 tablet by mouth once daily.    Historical Provider, MD   naloxone (Narcan) 4 mg/0.1 " mL nasal spray Administer 1 spray (4 mg) into affected nostril(s) if needed for opioid reversal or respiratory depression. May repeat every 2-3 minutes if needed, alternating nostrils, until medical assistance becomes available. 8/30/24   Tanmay Sanders MD   oxyCODONE (Roxicodone) 5 mg immediate release tablet Take 1 tablet (5 mg) by mouth every 6 hours if needed for moderate pain (4 - 6). Do not fill before June 21, 2025. 6/21/25 7/21/25  SHARIFA Son   rOPINIRole (Requip) 1 mg tablet TAKE 2 TABLETS(2 MG) BY MOUTH DAILY AT BEDTIME 5/12/25   Alton King DO   rosuvastatin (Crestor) 40 mg tablet Take 1 tablet (40 mg) by mouth once daily. 11/18/24 11/18/25  Kevin Cosby MD   sertraline (Zoloft) 50 mg tablet Take 1 tablet (50 mg) by mouth once daily. 6/4/25 12/1/25  Alton King DO   traZODone (Desyrel) 50 mg tablet Take 1 tablet (50 mg) by mouth as needed at bedtime for sleep.  Patient not taking: Reported on 6/30/2025 5/21/25 5/21/26  SHARIFA Son   umeclidinium-vilanteroL (Anoro Ellipta) 62.5-25 mcg/actuation blister with inhaler device Inhale 1 puff once daily. 4/8/25   SHARIFA Arias   varenicline tartrate (Chantix) 1 mg tablet Take 1 tablet (1 mg) by mouth 2 times a day. Take with full glass of water. 2/11/25 8/10/25  Alton King DO   walker (Ultra-Light Rollator) misc 1 each if needed (as needed for gait and balance). 2/12/25   DO AMI Martell     DASI Risk Score      Flowsheet Row Pre-Admission Testing from 8/9/2024 in [facility]   Can you take care of yourself (eat, dress, bathe, or use toilet)?  2.75 filed at 08/09/2024 1018   Can you walk a block or two on level ground?  2.75 filed at 08/09/2024 1018   Can you climb a flight of stairs or walk up a hill? 5.5 filed at 08/09/2024 1018   Can you run a short distance? 0 filed at 08/09/2024 1018   Can you do light work around the house like dusting or washing dishes?  2.7 filed at 08/09/2024 1018   Can you do moderate work around the house like vacuuming, sweeping floors or carrying groceries? 3.5 filed at 08/09/2024 1018   Can you do heavy work around the house like scrubbing floors or lifting and moving heavy furniture?  0 filed at 08/09/2024 1018   Can you do yard work like raking leaves, weeding or pushing a mower? 4.5 filed at 08/09/2024 1018   Can you have sexual relations? 5.25 filed at 08/09/2024 1018   Can you participate in moderate recreational activities like golf, bowling, dancing, doubles tennis or throwing a baseball or football? 6 filed at 08/09/2024 1018   Can you participate in strenous sports like swimming, singles tennis, football, basketball, or skiing? 0 filed at 08/09/2024 1018          Caprini DVT Assessment      Flowsheet Row ED to Hosp-Admission (Discharged) from 1/14/2025 in [facility] with Elvin Calle MD PhD and Chetna Gorman MD Admission (Discharged) from 8/26/2024 in [facility] with Robin Brownlee MD   DVT Score (IF A SCORE IS NOT CALCULATING, MUST SELECT A BMI TO COMPLETE) 3 filed at 01/14/2025 1519 6 filed at 08/26/2024 1006   BMI (BMI MUST BE CHOSEN) 30 or less filed at 01/14/2025 1519 30 or less filed at 08/26/2024 1006   RETIRED: Current Status -- Major surgery planned, lasting 2-3 hours filed at 08/26/2024 1006   RETIRED: Age -- 60-75 years filed at 08/26/2024 1006          Modified Frailty Index    No data to display       [id number] Stroke Risk Points  Current as of just now        N/A 0 to 9 Points:      Last Change: N/A          The [id number] risk score (Lip ROBERT, et al. 2009. © 2010 [employer]) quantifies the risk of stroke for a patient with atrial fibrillation. For patients without atrial fibrillation or under the age of 18 this score appears as N/A. Higher score values generally indicate higher risk of stroke.        This score is not applicable to this  patient. Components are not calculated.          Revised Cardiac Risk Index    No data to display       Apfel Simplified Score    No data to display       Risk Analysis Index Results This Encounter    No data found in the last 10 encounters.       Stop Bang Score      Flowsheet Row Pre-Admission Testing from 8/9/2024 in [facility]   Do you snore loudly? 0 filed at 08/09/2024 1018   Do you often feel tired or fatigued after your sleep? 0 filed at 08/09/2024 1018   Has anyone ever observed you stop breathing in your sleep? 0 filed at 08/09/2024 1018   Do you have or are you being treated for high blood pressure? 0 filed at 08/09/2024 1018   Recent BMI (Calculated) 27.5 filed at 08/09/2024 1018   Is BMI greater than 35 kg/m2? 0=No filed at 08/09/2024 1018   Age older than 50 years old? 1=Yes filed at 08/09/2024 1018   Is your neck circumference greater than 17 inches (Male) or 16 inches (Female)? 1 filed at 08/09/2024 1018   Gender - Male 0=No filed at 08/09/2024 1018   STOP-BANG Total Score 2 filed at 08/09/2024 1018          Prodigy: High Risk  Total Score: 16              Prodigy Age Score      Prodigy Previous Opioid Use Score      Prodigy SDB Score          ARISCAT Score for Postoperative Pulmonary Complications    No data to display       Amin Perioperative Risk for Myocardial Infarction or Cardiac Arrest (MASOOD)    No data to display           Nurse Plan of Action: RN screening call complete.  Reviewed allergies, medications and pharmacy, medical, surgical and social history with patient.  Chart updated.  Instructed patient to stop vitamins prior to surgery.                [1]   Past Medical History:  Diagnosis Date    Acute hypoxemic respiratory failure 09/20/2023    Breast calcification, left     COPD (chronic obstructive pulmonary disease) (Multi)     Dysphagia     Essential (primary) hypertension     Fibroadenoma of left breast     GERD (gastroesophageal reflux disease)     H/O  echocardiogram 2024    CONCLUSIONS:  1. The left ventricular systolic function is normal, with a visually estimated ejection fraction of 60-65%.  2. There is normal right ventricular global systolic function.  3. Moderately elevated right ventricular systolic pressure.    H/O malignant neoplasm of tonsil     s/p XRT and chemotherapy completed .    Hypothyroidism     OAB (overactive bladder)     Overweight     Pharyngitis 2023    Pneumonia 2023    Restless legs syndrome     Sinusitis 2023    Suspected sleep apnea     Tobacco use disorder 2023    Tongue mass    [2]   Past Surgical History:  Procedure Laterality Date    BREAST BIOPSY Left     benign FA     SECTION, LOW TRANSVERSE      CHOLECYSTECTOMY      COLONOSCOPY      HYSTERECTOMY      MOUTH SURGERY      Oral Surgery Tooth Extraction    NECK SURGERY  2024    Creation Free Flap Head/Neck, Dissection Neck   [3]   Family History  Problem Relation Name Age of Onset    Emphysema Mother      COPD Mother      Hyperlipidemia Sister      Breast cancer Paternal Grandmother     [4]   Allergies  Allergen Reactions    Duloxetine Hallucinations     Pt states she doesn't think she has an allergy just a bad reaction when mixed with morphine .

## 2025-07-14 ENCOUNTER — LAB (OUTPATIENT)
Dept: LAB | Facility: HOSPITAL | Age: 65
End: 2025-07-14
Payer: COMMERCIAL

## 2025-07-14 ENCOUNTER — PRE-ADMISSION TESTING (OUTPATIENT)
Dept: PREADMISSION TESTING | Facility: HOSPITAL | Age: 65
End: 2025-07-14
Payer: COMMERCIAL

## 2025-07-14 VITALS
RESPIRATION RATE: 16 BRPM | TEMPERATURE: 97.8 F | WEIGHT: 152.12 LBS | HEART RATE: 76 BPM | SYSTOLIC BLOOD PRESSURE: 127 MMHG | HEIGHT: 63 IN | DIASTOLIC BLOOD PRESSURE: 66 MMHG | OXYGEN SATURATION: 96 % | BODY MASS INDEX: 26.95 KG/M2

## 2025-07-14 DIAGNOSIS — E03.9 HYPOTHYROIDISM, UNSPECIFIED: Primary | ICD-10-CM

## 2025-07-14 DIAGNOSIS — Z01.818 PREOP TESTING: ICD-10-CM

## 2025-07-14 DIAGNOSIS — E03.9 HYPOTHYROIDISM, UNSPECIFIED TYPE: Primary | ICD-10-CM

## 2025-07-14 DIAGNOSIS — Z01.818 ENCOUNTER FOR OTHER PREPROCEDURAL EXAMINATION: ICD-10-CM

## 2025-07-14 LAB
ABO GROUP (TYPE) IN BLOOD: NORMAL
ANION GAP SERPL CALC-SCNC: 12 MMOL/L (ref 10–20)
ANTIBODY SCREEN: NORMAL
BASOPHILS # BLD AUTO: 0.03 X10*3/UL (ref 0–0.1)
BASOPHILS NFR BLD AUTO: 0.5 %
BUN SERPL-MCNC: 10 MG/DL (ref 6–23)
CALCIUM SERPL-MCNC: 9.3 MG/DL (ref 8.6–10.3)
CHLORIDE SERPL-SCNC: 100 MMOL/L (ref 98–107)
CO2 SERPL-SCNC: 30 MMOL/L (ref 21–32)
CREAT SERPL-MCNC: 0.77 MG/DL (ref 0.5–1.05)
EGFRCR SERPLBLD CKD-EPI 2021: 87 ML/MIN/1.73M*2
EOSINOPHIL # BLD AUTO: 0.01 X10*3/UL (ref 0–0.7)
EOSINOPHIL NFR BLD AUTO: 0.2 %
ERYTHROCYTE [DISTWIDTH] IN BLOOD BY AUTOMATED COUNT: 14.3 % (ref 11.5–14.5)
GLUCOSE SERPL-MCNC: 107 MG/DL (ref 74–99)
HCT VFR BLD AUTO: 38.8 % (ref 36–46)
HGB BLD-MCNC: 11.7 G/DL (ref 12–16)
IMM GRANULOCYTES # BLD AUTO: 0.02 X10*3/UL (ref 0–0.7)
IMM GRANULOCYTES NFR BLD AUTO: 0.4 % (ref 0–0.9)
LYMPHOCYTES # BLD AUTO: 0.32 X10*3/UL (ref 1.2–4.8)
LYMPHOCYTES NFR BLD AUTO: 5.8 %
MCH RBC QN AUTO: 27 PG (ref 26–34)
MCHC RBC AUTO-ENTMCNC: 30.2 G/DL (ref 32–36)
MCV RBC AUTO: 90 FL (ref 80–100)
MONOCYTES # BLD AUTO: 0.37 X10*3/UL (ref 0.1–1)
MONOCYTES NFR BLD AUTO: 6.7 %
NEUTROPHILS # BLD AUTO: 4.74 X10*3/UL (ref 1.2–7.7)
NEUTROPHILS NFR BLD AUTO: 86.4 %
NRBC BLD-RTO: 0 /100 WBCS (ref 0–0)
PLATELET # BLD AUTO: 210 X10*3/UL (ref 150–450)
POTASSIUM SERPL-SCNC: 3.9 MMOL/L (ref 3.5–5.3)
RBC # BLD AUTO: 4.33 X10*6/UL (ref 4–5.2)
RH FACTOR (ANTIGEN D): NORMAL
SODIUM SERPL-SCNC: 138 MMOL/L (ref 136–145)
TSH SERPL-ACNC: 0.75 MIU/L (ref 0.44–3.98)
WBC # BLD AUTO: 5.5 X10*3/UL (ref 4.4–11.3)

## 2025-07-14 PROCEDURE — 85025 COMPLETE CBC W/AUTO DIFF WBC: CPT

## 2025-07-14 PROCEDURE — 86850 RBC ANTIBODY SCREEN: CPT

## 2025-07-14 PROCEDURE — 86901 BLOOD TYPING SEROLOGIC RH(D): CPT

## 2025-07-14 PROCEDURE — 84443 ASSAY THYROID STIM HORMONE: CPT

## 2025-07-14 PROCEDURE — 36415 COLL VENOUS BLD VENIPUNCTURE: CPT

## 2025-07-14 PROCEDURE — 80048 BASIC METABOLIC PNL TOTAL CA: CPT

## 2025-07-14 PROCEDURE — 86900 BLOOD TYPING SEROLOGIC ABO: CPT

## 2025-07-14 ASSESSMENT — ENCOUNTER SYMPTOMS
NECK SWELLING: 1
NEUROLOGICAL NEGATIVE: 1
TROUBLE SWALLOWING: 1
MUSCULOSKELETAL NEGATIVE: 1
CARDIOVASCULAR NEGATIVE: 1
SHORTNESS OF BREATH: 1
GASTROINTESTINAL NEGATIVE: 1
CONSTITUTIONAL NEGATIVE: 1

## 2025-07-14 NOTE — PREPROCEDURE INSTRUCTIONS
Medication List            Accurate as of July 14, 2025  1:18 PM. Always use your most recent med list.                albuterol 90 mcg/actuation inhaler  Inhale 2 puffs every 4 hours if needed for shortness of breath.  Medication Adjustments for Surgery: Take/Use as prescribed     amoxicillin-clavulanate 875-125 mg tablet  Commonly known as: Augmentin  Take 1 tablet by mouth 2 times a day for 7 days.  Medication Adjustments for Surgery: Take/Use as prescribed     Anoro Ellipta 62.5-25 mcg/actuation blister with inhaler device  Generic drug: umeclidinium-vilanteroL  Inhale 1 puff once daily.  Medication Adjustments for Surgery: Take/Use as prescribed     aspirin 81 mg EC tablet  Take 1 tablet (81 mg) by mouth once daily.  Medication Adjustments for Surgery: Take/Use as prescribed     buPROPion  mg 24 hr tablet  Commonly known as: Wellbutrin XL  Take 1 tablet (300 mg) by mouth once daily in the morning. Do not crush, chew, or split.  Medication Adjustments for Surgery: Take/Use as prescribed     fluticasone 50 mcg/actuation nasal spray  Commonly known as: Flonase  Administer 2 sprays into each nostril once daily. Shake gently. Before first use, prime pump. After use, clean tip and replace cap.  Medication Adjustments for Surgery: Take/Use as prescribed     gabapentin 300 mg capsule  Commonly known as: Neurontin  Take 1 capsule (300 mg) by mouth 3 times a day.  Medication Adjustments for Surgery: Take/Use as prescribed     guaiFENesin 600 mg 12 hr tablet  Commonly known as: Mucinex  Take 2 tablets (1,200 mg) by mouth 2 times a day. Do not crush, chew, or split.  Medication Adjustments for Surgery: Do Not take on the morning of surgery     levothyroxine 100 mcg tablet  Commonly known as: Synthroid, Levoxyl  TAKE 1 TABLET BY MOUTH EARLY IN THE MORNING. TAKE ON AN EMPTY STOMACH AT THE SAME TIME EACH DAY, EITHER 30 TO 60 MINUTES PRIOR TO BREAKFAST.  Medication Adjustments for Surgery: Take/Use as prescribed      melatonin 10 mg tablet  Take 1 tablet (10 mg) by mouth once daily at bedtime.  Medication Adjustments for Surgery: Take/Use as prescribed     methylPREDNISolone 4 mg tablets  Commonly known as: Medrol (Miguel)  Follow schedule on package instructions  Medication Adjustments for Surgery: Take/Use as prescribed     morphine CR 15 mg 12 hr tablet  Commonly known as: MS Contin  Take 1 tablet (15 mg) by mouth 2 times a day. Do not crush, chew, or split. Do not fill before June 25, 2025.  Medication Adjustments for Surgery: Take/Use as prescribed     multivitamin tablet  Additional Medication Adjustments for Surgery: Other (Comment)  Notes to patient: Stop today 7/14/25 if not already stopped     naloxone 4 mg/0.1 mL nasal spray  Commonly known as: Narcan  Administer 1 spray (4 mg) into affected nostril(s) if needed for opioid reversal or respiratory depression. May repeat every 2-3 minutes if needed, alternating nostrils, until medical assistance becomes available.  Medication Adjustments for Surgery: Take/Use as prescribed     oxyCODONE 5 mg immediate release tablet  Commonly known as: Roxicodone  Take 1 tablet (5 mg) by mouth every 6 hours if needed for moderate pain (4 - 6). Do not fill before June 21, 2025.  Medication Adjustments for Surgery: Take/Use as prescribed     rOPINIRole 1 mg tablet  Commonly known as: Requip  TAKE 2 TABLETS(2 MG) BY MOUTH DAILY AT BEDTIME  Medication Adjustments for Surgery: Take/Use as prescribed     rosuvastatin 40 mg tablet  Commonly known as: Crestor  Take 1 tablet (40 mg) by mouth once daily.  Medication Adjustments for Surgery: Take/Use as prescribed     sertraline 50 mg tablet  Commonly known as: Zoloft  Take 1 tablet (50 mg) by mouth once daily.  Medication Adjustments for Surgery: Take/Use as prescribed     traZODone 50 mg tablet  Commonly known as: Desyrel  Take 1 tablet (50 mg) by mouth as needed at bedtime for sleep.  Medication Adjustments for Surgery: Take/Use as prescribed      triamcinolone 0.1 % oral paste  Commonly known as: Kenalog  Use in the mouth or throat 2 times a day for 14 days. Apply to left oral cavity sore.  Medication Adjustments for Surgery: Do Not take on the morning of surgery     Ultra-Light Rollator misc  Generic drug: walker  1 each if needed (as needed for gait and balance).  Medication Adjustments for Surgery: Take/Use as prescribed     varenicline tartrate 1 mg tablet  Commonly known as: Chantix  Take 1 tablet (1 mg) by mouth 2 times a day. Take with full glass of water.  Medication Adjustments for Surgery: Do Not take on the morning of surgery                Preoperative Deep Breathing Exercises  Why it is important to do deep breathing exercises before my surgery?  Deep breathing exercises strengthen your breathing muscles.  This helps you to recover after your surgery and decreases the chance of breathing complications.  How are the deep breathing exercises done?  Sit straight with your back supported.  Breathe in deeply and slowly through your nose. Your lower rib cage should expand and your abdomen may move forward.  Hold that breath for 3 to 5 seconds.  Breathe out through pursed lips, slowly and completely.  Rest and repeat 10 times every hour while awake.  Rest longer if you become dizzy or lightheaded.        CONTACT SURGEON'S OFFICE IF YOU DEVELOP:  * Fever = 100.4 F   * New respiratory symptoms (e.g. cough, shortness of breath, respiratory distress, sore throat)  * Recent loss of taste or smell  *Flu like symptoms such as headache, fatigue or gastrointestinal symptoms  * You develop any open sores, shingles, burning or painful urination   AND/OR:  * You no longer wish to have the surgery.  * Any other personal circumstances change that may lead to the need to cancel or defer this surgery.  *You were admitted to any hospital within one week of your planned procedure.    SMOKING:  *Quitting smoking can make a huge difference to your health and recovery  from surgery.    *If you need help with quitting, call 8-757-QUIT-NOW.    THE DAY OF SURGERY:  *Do not eat any food after midnight the night before your surgery.   *YOU MUST drink 14 OUNCES of clear liquids TWO hours before your instructed ARRIVAL TIME to the hospital. This includes water, black tea/coffee (no milk or cream), apple juice, clear broth and electrolyte drinks (Gatorade).  Please avoid clear liquids that are red in color.   *You may chew gum/mints up to TWO hours before your surgery/procedure.    SURGICAL TIME:  *You will be contacted between 2 p.m. and 6 p.m. the business day before your surgery with your arrival time.  *If you haven't received a call by 6pm, call [phone].  *Scheduled surgery times may change and you will be notified if this occurs-check your personal voicemail for any updates.    ON THE MORNING OF SURGERY:  *Wear comfortable, loose fitting clothing.   *Do not use moisturizers, creams, lotions or perfume.  *All jewelry and valuables should be left at home.  *Prosthetic devices such as contact lenses, hearing aids, dentures, eyelash extensions, hairpins and body piercing must be removed before surgery.    BRING WITH YOU:  *Photo ID and insurance card  *Current list of medications and allergies  *Pacemaker/Defibrillator/Heart stent cards  *CPAP machine and mask  *Slings/splints/crutches  *Copy of your complete Advanced Directive/DHPOA-if applicable  *Neurostimulator implant remote    PARKING AND ARRIVAL:  *Check in at the Main Entrance desk and let them know you are here for surgery.  *You will be directed to the 2nd floor surgical waiting area.    IF YOU ARE HAVING OUTPATIENT/SAME DAY SURGERY:  *A responsible adult MUST accompany you at the time of discharge and stay with you for 24 hours after your surgery.  *You may NOT drive yourself home after surgery.  *You may use a taxi or ride sharing service (Lyft, Uber) to return home ONLY if you are accompanied by a friend or family  member.  *Instructions for resuming your medications will be provided by your surgeon.

## 2025-07-14 NOTE — CPM/PAT NURSE NOTE
"CPM/PAT Nurse Note      Name: Melvi Sanchez (Melvi Sanchez \"Gretchen\")  /Age: 1960/65 y.o.       Medical History[1]    Surgical History[2]    Patient Sexual activity questions deferred to the physician.    Family History[3]    Allergies[4]    Prior to Admission medications    Medication Sig Start Date End Date Taking? Authorizing Provider   albuterol 90 mcg/actuation inhaler Inhale 2 puffs every 4 hours if needed for shortness of breath. 9/10/24  Yes Antoinette Matthews APRN-CNP   amoxicillin-clavulanate (Augmentin) 875-125 mg tablet Take 1 tablet by mouth 2 times a day for 7 days. 25 Yes George Webster, DO   aspirin 81 mg EC tablet Take 1 tablet (81 mg) by mouth once daily. 11/15/24 11/15/25 Yes Kevin Cosby MD   buPROPion XL (Wellbutrin XL) 300 mg 24 hr tablet Take 1 tablet (300 mg) by mouth once daily in the morning. Do not crush, chew, or split. 4/15/25 10/12/25 Yes Alton King, DO   fluticasone (Flonase) 50 mcg/actuation nasal spray Administer 2 sprays into each nostril once daily. Shake gently. Before first use, prime pump. After use, clean tip and replace cap. 3/25/25 7/14/25 Yes Robin Brownlee MD   gabapentin (Neurontin) 300 mg capsule Take 1 capsule (300 mg) by mouth 3 times a day. 25 Yes Antoinette Soliman APRN-CNP   guaiFENesin (Mucinex) 600 mg 12 hr tablet Take 2 tablets (1,200 mg) by mouth 2 times a day. Do not crush, chew, or split. 10/24/24 10/24/25 Yes Chetna Lowry MD   levothyroxine (Synthroid, Levoxyl) 100 mcg tablet TAKE 1 TABLET BY MOUTH EARLY IN THE MORNING. TAKE ON AN EMPTY STOMACH AT THE SAME TIME EACH DAY, EITHER 30 TO 60 MINUTES PRIOR TO BREAKFAST. 25  Yes Alton King DO   melatonin 10 mg tablet Take 1 tablet (10 mg) by mouth once daily at bedtime. 25  Yes Antoinette Soliman, APRN-CNP   methylPREDNISolone (Medrol, Miguel,) 4 mg tablets Follow schedule on package instructions 25  Yes George Webster,    morphine MIKE (MS " Contin) 15 mg 12 hr tablet Take 1 tablet (15 mg) by mouth 2 times a day. Do not crush, chew, or split. Do not fill before June 25, 2025. 6/25/25 7/25/25 Yes SHARIFA Son   multivitamin tablet Take 1 tablet by mouth once daily.   Yes Historical Provider, MD   oxyCODONE (Roxicodone) 5 mg immediate release tablet Take 1 tablet (5 mg) by mouth every 6 hours if needed for moderate pain (4 - 6). Do not fill before June 21, 2025. 6/21/25 7/21/25 Yes SHARIFA Son   rOPINIRole (Requip) 1 mg tablet TAKE 2 TABLETS(2 MG) BY MOUTH DAILY AT BEDTIME 5/12/25  Yes Alton King DO   rosuvastatin (Crestor) 40 mg tablet Take 1 tablet (40 mg) by mouth once daily. 11/18/24 11/18/25 Yes Kevin Cosby MD   sertraline (Zoloft) 50 mg tablet Take 1 tablet (50 mg) by mouth once daily. 6/4/25 12/1/25 Yes Alton King, DO   umeclidinium-vilanteroL (Anoro Ellipta) 62.5-25 mcg/actuation blister with inhaler device Inhale 1 puff once daily. 4/8/25  Yes SHARIFA Arias   varenicline tartrate (Chantix) 1 mg tablet Take 1 tablet (1 mg) by mouth 2 times a day. Take with full glass of water. 2/11/25 8/10/25 Yes Alton King DO   naloxone (Narcan) 4 mg/0.1 mL nasal spray Administer 1 spray (4 mg) into affected nostril(s) if needed for opioid reversal or respiratory depression. May repeat every 2-3 minutes if needed, alternating nostrils, until medical assistance becomes available.  Patient not taking: Reported on 7/14/2025 8/30/24   Tanmay Sanders MD   traZODone (Desyrel) 50 mg tablet Take 1 tablet (50 mg) by mouth as needed at bedtime for sleep.  Patient not taking: Reported on 7/14/2025 5/21/25 5/21/26  Antoinette M Soliman, APRN-CNP   triamcinolone (Kenalog) 0.1 % oral paste Use in the mouth or throat 2 times a day for 14 days. Apply to left oral cavity sore.  Patient not taking: Reported on 7/14/2025 7/11/25 7/25/25  DO afia Guzman (Ultra-Light Rollator) misc 1 each if needed (as  needed for gait and balance). 2/12/25   Alton King, DO AMI FLOYD     DASI Risk Score      Flowsheet Row Pre-Admission Testing from 8/9/2024 in [facility]   Can you take care of yourself (eat, dress, bathe, or use toilet)?  2.75 filed at 08/09/2024 1018   Can you walk a block or two on level ground?  2.75 filed at 08/09/2024 1018   Can you climb a flight of stairs or walk up a hill? 5.5 filed at 08/09/2024 1018   Can you run a short distance? 0 filed at 08/09/2024 1018   Can you do light work around the house like dusting or washing dishes? 2.7 filed at 08/09/2024 1018   Can you do moderate work around the house like vacuuming, sweeping floors or carrying groceries? 3.5 filed at 08/09/2024 1018   Can you do heavy work around the house like scrubbing floors or lifting and moving heavy furniture?  0 filed at 08/09/2024 1018   Can you do yard work like raking leaves, weeding or pushing a mower? 4.5 filed at 08/09/2024 1018   Can you have sexual relations? 5.25 filed at 08/09/2024 1018   Can you participate in moderate recreational activities like golf, bowling, dancing, doubles tennis or throwing a baseball or football? 6 filed at 08/09/2024 1018   Can you participate in strenous sports like swimming, singles tennis, football, basketball, or skiing? 0 filed at 08/09/2024 1018          Caprini DVT Assessment      Flowsheet Row ED to Hosp-Admission (Discharged) from 1/14/2025 in [facility] with Elvin Calle MD PhD and Chetna Gorman MD Admission (Discharged) from 8/26/2024 in [facility] with Robin Brownlee MD   DVT Score (IF A SCORE IS NOT CALCULATING, MUST SELECT A BMI TO COMPLETE) 3 filed at 01/14/2025 1519 6 filed at 08/26/2024 1006   BMI (BMI MUST BE CHOSEN) 30 or less filed at 01/14/2025 1519 30 or less filed at 08/26/2024 1006   RETIRED: Current Status -- Major surgery planned, lasting 2-3 hours filed at 08/26/2024 1006   RETIRED: Age -- 60-75  years filed at 08/26/2024 1006          Modified Frailty Index    No data to display       [id number] Stroke Risk Points  Current as of just now        N/A 0 to 9 Points:      Last Change: N/A          The [id number] risk score (Lip GH, et al. 2009. © 2010 [employer]) quantifies the risk of stroke for a patient with atrial fibrillation. For patients without atrial fibrillation or under the age of 18 this score appears as N/A. Higher score values generally indicate higher risk of stroke.        This score is not applicable to this patient. Components are not calculated.          Revised Cardiac Risk Index    No data to display       Apfel Simplified Score    No data to display       Risk Analysis Index Results This Encounter    No data found in the last 10 encounters.       Stop Bang Score      Flowsheet Row Pre-Admission Testing from 8/9/2024 in [facility]   Do you snore loudly? 0 filed at 08/09/2024 1018   Do you often feel tired or fatigued after your sleep? 0 filed at 08/09/2024 1018   Has anyone ever observed you stop breathing in your sleep? 0 filed at 08/09/2024 1018   Do you have or are you being treated for high blood pressure? 0 filed at 08/09/2024 1018   Recent BMI (Calculated) 27.5 filed at 08/09/2024 1018   Is BMI greater than 35 kg/m2? 0=No filed at 08/09/2024 1018   Age older than 50 years old? 1=Yes filed at 08/09/2024 1018   Is your neck circumference greater than 17 inches (Male) or 16 inches (Female)? 1 filed at 08/09/2024 1018   Gender - Male 0=No filed at 08/09/2024 1018   STOP-BANG Total Score 2 filed at 08/09/2024 1018          Prodigy: High Risk  Total Score: 16              Prodigy Age Score      Prodigy Previous Opioid Use Score      Prodigy SDB Score          ARISCAT Score for Postoperative Pulmonary Complications    No data to display       Amin Perioperative Risk for Myocardial Infarction or Cardiac Arrest (MASOOD)    No data to display          Nurse Plan of Action:       After Visit Summary (AVS) reviewed and patient verbalized good understanding of medications and NPO instructions.            [1]   Past Medical History:  Diagnosis Date    Acute hypoxemic respiratory failure 2023    Breast calcification, left     COPD (chronic obstructive pulmonary disease) (Multi)     Dysphagia     Essential (primary) hypertension     Fibroadenoma of left breast     GERD (gastroesophageal reflux disease)     H/O echocardiogram 2024    CONCLUSIONS:  1. The left ventricular systolic function is normal, with a visually estimated ejection fraction of 60-65%.  2. There is normal right ventricular global systolic function.  3. Moderately elevated right ventricular systolic pressure.    H/O malignant neoplasm of tonsil     s/p XRT and chemotherapy completed .    Hypothyroidism     OAB (overactive bladder)     Overweight     Pharyngitis 2023    Pneumonia 2023    Restless legs syndrome     Sinusitis 2023    Suspected sleep apnea     Tobacco use disorder 2023    Tongue mass    [2]   Past Surgical History:  Procedure Laterality Date    BREAST BIOPSY Left     benign FA     SECTION, LOW TRANSVERSE      CHOLECYSTECTOMY      COLONOSCOPY      HYSTERECTOMY      MOUTH SURGERY      Oral Surgery Tooth Extraction    NECK SURGERY  2024    Creation Free Flap Head/Neck, Dissection Neck   [3]   Family History  Problem Relation Name Age of Onset    Emphysema Mother      COPD Mother      Hyperlipidemia Sister      Breast cancer Paternal Grandmother     [4]   Allergies  Allergen Reactions    Duloxetine Hallucinations     Pt states she doesn't think she has an allergy just a bad reaction when mixed with morphine .

## 2025-07-14 NOTE — CPM/PAT H&P
"[facility] Evaluation       Name: Melvi Sanchez (Melvi Sanchez \"Gretchen\")  /Age: 1960/65 y.o.     In-Person         Date of Consult: 25    Referring Provider: Dr. Brownlee     Date, Surgery, and Length:  25, direct laryngoscopy, esophagoscopy, flexible bronchoscopy, base of tongue biopsy, 110 minutes      Patient presents to [facility] for perioperative risk assessment prior to scheduled surgery. 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 Patient presented recently with acute onset of left sided neck pain and difficulty moving her tongue. She has a tongue ulceration and left base tongue asymmetry.       This note was created in part upon personal review of patient's medical records.      Pt denies any past history of anesthetic complications such as PONV, awareness, prolonged sedation, dental damage, aspiration, cardiac arrest, difficult intubation, difficult I.V. access or unexpected hospital admissions. No history of malignant hyperthermia and or pseudocholinesterase deficiency.    No history of blood transfusions.    The patient IS NOT a Uatsdin and will accept blood and blood products if medically indicated.     Type and screen WAS sent.      Past Medical History:   Diagnosis Date    Acute hypoxemic respiratory failure 2023    Breast calcification, left     COPD (chronic obstructive pulmonary disease) (Multi)     Dysphagia     Essential (primary) hypertension     Fibroadenoma of left breast     GERD (gastroesophageal reflux disease)     H/O echocardiogram 2024    CONCLUSIONS:  1. The left ventricular systolic function is normal, with a visually estimated ejection fraction of 60-65%.  2. There is normal right ventricular global systolic function.  3. Moderately elevated right ventricular systolic pressure.    H/O malignant neoplasm of tonsil     s/p XRT and chemotherapy " completed .    Hypothyroidism     OAB (overactive bladder)     Overweight     Pharyngitis 2023    Pneumonia 2023    Restless legs syndrome     Sinusitis 2023    Suspected sleep apnea     Tobacco use disorder 2023    Tongue mass        Past Surgical History:   Procedure Laterality Date    BREAST BIOPSY Left     benign FA     SECTION, LOW TRANSVERSE      CHOLECYSTECTOMY      COLONOSCOPY      HYSTERECTOMY      MOUTH SURGERY      Oral Surgery Tooth Extraction    NECK SURGERY  2024    Creation Free Flap Head/Neck, Dissection Neck       Family History   Problem Relation Name Age of Onset    Emphysema Mother      COPD Mother      Hyperlipidemia Sister      Breast cancer Paternal Grandmother         Allergies   Allergen Reactions    Duloxetine Hallucinations     Pt states she doesn't think she has an allergy just a bad reaction when mixed with morphine .     Social History     Tobacco Use   Smoking Status Former    Current packs/day: 0.00    Average packs/day: 1 pack/day for 43.8 years (43.8 ttl pk-yrs)    Types: Cigarettes    Start date:     Quit date: 2024    Years since quittin.6   Smokeless Tobacco Never     Social History     Substance and Sexual Activity   Alcohol Use Not Currently     Social History     Substance and Sexual Activity   Drug Use Not Currently    Types: Marijuana       Current Outpatient Medications   Medication Instructions    albuterol 90 mcg/actuation inhaler 2 puffs, inhalation, Every 4 hours PRN    amoxicillin-clavulanate (Augmentin) 875-125 mg tablet 1 tablet, oral, 2 times daily    aspirin 81 mg, oral, Daily    buPROPion XL (WELLBUTRIN XL) 300 mg, oral, Every morning, Do not crush, chew, or split.    fluticasone (Flonase) 50 mcg/actuation nasal spray 2 sprays, Each Nostril, Daily, Shake gently. Before first use, prime pump. After use, clean tip and replace cap.    gabapentin (NEURONTIN) 300 mg, oral, 3 times daily    guaiFENesin (MUCINEX)  1,200 mg, oral, 2 times daily, Do not crush, chew, or split.    levothyroxine (Synthroid, Levoxyl) 100 mcg tablet TAKE 1 TABLET BY MOUTH EARLY IN THE MORNING. TAKE ON AN EMPTY STOMACH AT THE SAME TIME EACH DAY, EITHER 30 TO 60 MINUTES PRIOR TO BREAKFAST.    melatonin 10 mg, oral, Nightly    methylPREDNISolone (Medrol, Miguel,) 4 mg tablets Follow schedule on package instructions    morphine CR (MS CONTIN) 15 mg, oral, 2 times daily, Do not crush, chew, or split.    multivitamin tablet 1 tablet, Daily    naloxone (NARCAN) 4 mg, nasal, As needed, May repeat every 2-3 minutes if needed, alternating nostrils, until medical assistance becomes available.    oxyCODONE (ROXICODONE) 5 mg, oral, Every 6 hours PRN    rOPINIRole (Requip) 1 mg tablet TAKE 2 TABLETS(2 MG) BY MOUTH DAILY AT BEDTIME    rosuvastatin (CRESTOR) 40 mg, oral, Daily    sertraline (ZOLOFT) 50 mg, oral, Daily    traZODone (DESYREL) 50 mg, oral, Nightly PRN    triamcinolone (Kenalog) 0.1 % oral paste Mouth/Throat, 2 times daily, Apply to left oral cavity sore.    umeclidinium-vilanteroL (Anoro Ellipta) 62.5-25 mcg/actuation blister with inhaler device 1 puff, inhalation, Daily    varenicline tartrate (CHANTIX) 1 mg, oral, 2 times daily, Take with full glass of water.    walker (Ultra-Light Rollator) misc 1 each, miscellaneous, As needed       PAT ROS:   Constitutional:   neg    Neuro/Psych:   neg    Eyes:    use of corrective lenses  Ears:    tinnitus (left side)  Nose:   neg    Mouth:    Upper and lower denture  Throat:    throat pain   dysphagia  Neck:    neck swelling (s/p left neck flap dissection)  Cardio:   neg    Respiratory:    shortness of breath (baseline)  Endocrine:   GI:   neg    :   neg    Musculoskeletal:   neg    Hematologic:   neg    Skin:  neg        Physical Exam  Vitals reviewed. Physical exam within normal limits.          PAT AIRWAY:   Airway:     Mallampati::  II    Neck ROM::  Full   upper dentures and lower dentures      Visit  "Vitals  /66   Pulse 76   Temp 36.6 °C (97.8 °F)   Resp 16   Ht 1.6 m (5' 2.99\")   Wt 69 kg (152 lb 1.9 oz)   SpO2 96%   BMI 26.95 kg/m²   OB Status Hysterectomy   Smoking Status Former   BSA 1.75 m²       Assessment and Plan:     Patient is a 65 year old female scheduled for direct laryngoscopy, esophagoscopy, flexible bronchoscopy, base of tongue biopsy  with Dr. Brownlee on 7/17/25.      Plan      Neuro:    Depression/anxiety- continue current medication regimen    Chronic pain- continue current medication regimen    Cardiovascular:    RCRI: 0 Risk of Mace: 0.4%    Caprini: 5 VTE risk: 1.9%    Patient denies any chest pain, tightness, heaviness, pressure, radiating pain, palpitations, irregular heartbeats, lightheadedness, cough, congestion, shortness of breath, WARREN, PND, near syncope, weight loss or gain.    Good functional capacity  Functional 4 Mets. Patient denies SOB walking up 2 flights of stairs    Echo 12/4/24:  CONCLUSIONS:   1. The left ventricular systolic function is normal, with a visually estimated ejection fraction of 60-65%.   2. There is normal right ventricular global systolic function.   3. Moderately elevated right ventricular systolic pressure.   4. Mild aortic valve regurgitation.     EKG in PAT not obtained. Reviewed last EKG    Encounter Date: 05/09/25   ECG 12 lead   Result Value    Ventricular Rate 75    Atrial Rate 75    PA Interval 193    QRS Duration 92    QT Interval 367    QTC Calculation(Bazett) 410    P Axis 55    R Axis -9    T Axis 27    QRS Count 12    Q Onset 249    T Offset 433    QTC Fredericia 395    Narrative    Sinus rhythm  Probable left atrial enlargement  Low voltage, precordial leads  Probable anteroseptal infarct, old    See ED provider note for full interpretation and clinical correlation  Confirmed by Brionna Goetz ([id number]) on 5/13/2025 11:42:10 AM     HLD- continue statin    Pulmonary:  No pulmonary diagnosis, however patient is at increased risk of perioperative " complications secondary to  age > 60, COPD, Tobacco abuse, obesity  Stop Bang score is 3 placing patient at low risk for BAUTISTA  ARISCAT: 26-44 points, 13.3% risk of in-hospital postoperative pulmonary complication  PRODIGY: Moderate risk for opioid induced respiratory depression  Smoking cessation discussed with patient, patient also provided cessation education/hotline handout, [facility], patient also provided deep breathing exercises and incentive spirometry educational handout    Patient has history of nicotine dependency. Smoking cessation education was provided to the patient.Cessation encouraged. - Physiological and physical aspects of tobacco addiction as well as strategies for quitting were discussed. - Counseling was given focusing on the harmful effects of this addiction especially given the patient's medical condition(s) which will be worsened because of the chemicals in tobacco      Renal/endo:  Recommendations to avoid nephrotoxic drugs and carefully monitor fluid status to maintain euvolemia. Use dose adjusted medications as needed for the underlying level of renal function.    Hypothyroidism- continue Levothyroxine      Heme:  Patient instructed to ambulate as soon as possible postoperatively to decrease thromboembolic risk.    Initiate mechanical DVT prophylaxis as soon as possible and initiate chemical prophylaxis when deemed safe from a bleeding standpoint post surgery.      Risk assessment complete.  This patient is INTERMEDIATE risk candidate undergoing MODERATE risk procedure, patient is medically optimized for surgery.      Labs/testing obtained in [facility] on 7/14/25: TSH, T&S, CBC, BMP    Lab Results   Component Value Date    GLUCOSE 107 (H) 07/14/2025    CALCIUM 9.3 07/14/2025     07/14/2025    K 3.9 07/14/2025    CO2 30 07/14/2025     07/14/2025    BUN 10 07/14/2025    CREATININE 0.77 07/14/2025     Lab Results   Component Value Date    WBC 5.5 07/14/2025     HGB 11.7 (L) 07/14/2025    HCT 38.8 07/14/2025    MCV 90 07/14/2025     07/14/2025     Lab Results   Component Value Date    TSH 0.75 07/14/2025     Component 1 d ago   ABO TYPE A   Rh TYPE POS   ANTIBODY SCREEN NEG   Resulting Agency [facility]             Specimen Collected: 07/14/25 13:35 Last Resulted: 07/14/25 16:13               Follow up/communication: none      Preoperative medication instructions were provided and reviewed with the patient.  Any additional testing or evaluation was explained to the patient.  Nothing by mouth instructions were discussed and patient's questions were answered prior to conclusion to this encounter.  Patient verbalized understanding of preoperative instructions given in preadmission testing; discharge instructions available in EMR.    This note was dictated with speech recognition.  Minor errors may have been detected during use of speech recognition.      Charge not submitted as CPM/PAT visit within 72 hours of scheduled surgery.

## 2025-07-16 ENCOUNTER — ANESTHESIA EVENT (OUTPATIENT)
Dept: OPERATING ROOM | Facility: HOSPITAL | Age: 65
End: 2025-07-16
Payer: COMMERCIAL

## 2025-07-16 ENCOUNTER — TELEPHONE (OUTPATIENT)
Dept: HEMATOLOGY/ONCOLOGY | Facility: HOSPITAL | Age: 65
End: 2025-07-16
Payer: COMMERCIAL

## 2025-07-16 DIAGNOSIS — G89.3 CANCER ASSOCIATED PAIN: ICD-10-CM

## 2025-07-16 RX ORDER — OXYCODONE HYDROCHLORIDE 5 MG/1
5 TABLET ORAL EVERY 6 HOURS PRN
Qty: 120 TABLET | Refills: 0 | Status: SHIPPED | OUTPATIENT
Start: 2025-07-16 | End: 2025-07-18 | Stop reason: DRUGHIGH

## 2025-07-16 NOTE — ANESTHESIA PREPROCEDURE EVALUATION
"Patient: Melvi Sanchez \"Gretchen\"    Procedure Information       Date/Time: 07/17/25 0715    Procedures:       Direct Laryngoscopy (Throat)      Esophagoscopy (Throat)      Flexible Bronchoscopy (Throat)      Mouth- Base of Tongue Biopsy (Mouth)    Location: [facility] A OR 07 / Virtual [facility] A OR    Surgeons: Robin Brownlee MD            Relevant Problems   Pulmonary   (+) COPD (chronic obstructive pulmonary disease) (Multi)      Neuro   (+) Anxiety   (+) Cervical radiculopathy   (+) Chronic daily headache   (+) Current moderate episode of major depressive disorder without prior episode (Multi)   (+) Inflammation of nerve of lower limb      GI   (+) Dysphagia   (+) GERD (gastroesophageal reflux disease)      Liver   (+) Tonsil cancer (Multi)      Endocrine   (+) Hypothyroidism      Hematology   (+) Coagulation defect, unspecified      HEENT   (+) Hearing loss       Clinical information reviewed:                    Medical History[1]   Surgical History[2]  Social History[3]   Current Outpatient Medications   Medication Instructions    albuterol 90 mcg/actuation inhaler 2 puffs, inhalation, Every 4 hours PRN    amoxicillin-clavulanate (Augmentin) 875-125 mg tablet 1 tablet, oral, 2 times daily    aspirin 81 mg, oral, Daily    buPROPion XL (WELLBUTRIN XL) 300 mg, oral, Every morning, Do not crush, chew, or split.    fluticasone (Flonase) 50 mcg/actuation nasal spray 2 sprays, Each Nostril, Daily, Shake gently. Before first use, prime pump. After use, clean tip and replace cap.    gabapentin (NEURONTIN) 300 mg, oral, 3 times daily    guaiFENesin (MUCINEX) 1,200 mg, oral, 2 times daily, Do not crush, chew, or split.    levothyroxine (Synthroid, Levoxyl) 100 mcg tablet TAKE 1 TABLET BY MOUTH EARLY IN THE MORNING. TAKE ON AN EMPTY STOMACH AT THE SAME TIME EACH DAY, EITHER 30 TO 60 MINUTES PRIOR TO BREAKFAST.    melatonin 10 mg, oral, Nightly    methylPREDNISolone (Medrol, Miguel,) 4 mg tablets Follow schedule on package " "instructions    morphine CR (MS CONTIN) 15 mg, oral, 2 times daily, Do not crush, chew, or split.    multivitamin tablet 1 tablet, Daily    naloxone (NARCAN) 4 mg, nasal, As needed, May repeat every 2-3 minutes if needed, alternating nostrils, until medical assistance becomes available.    oxyCODONE (ROXICODONE) 5 mg, oral, Every 6 hours PRN    rOPINIRole (Requip) 1 mg tablet TAKE 2 TABLETS(2 MG) BY MOUTH DAILY AT BEDTIME    rosuvastatin (CRESTOR) 40 mg, oral, Daily    sertraline (ZOLOFT) 50 mg, oral, Daily    traZODone (DESYREL) 50 mg, oral, Nightly PRN    triamcinolone (Kenalog) 0.1 % oral paste Mouth/Throat, 2 times daily, Apply to left oral cavity sore.    umeclidinium-vilanteroL (Anoro Ellipta) 62.5-25 mcg/actuation blister with inhaler device 1 puff, inhalation, Daily    varenicline tartrate (CHANTIX) 1 mg, oral, 2 times daily, Take with full glass of water.    walker (Ultra-Light Rollator) misc 1 each, miscellaneous, As needed      RX Allergies[4]     Chemistry    Lab Results   Component Value Date/Time     07/14/2025 1335    K 3.9 07/14/2025 1335     07/14/2025 1335    CO2 30 07/14/2025 1335    BUN 10 07/14/2025 1335    CREATININE 0.77 07/14/2025 1335    Lab Results   Component Value Date/Time    CALCIUM 9.3 07/14/2025 1335    ALKPHOS 58 06/12/2025 0956    AST 13 06/12/2025 0956    ALT 11 06/12/2025 0956    BILITOT 0.5 06/12/2025 0956          No results found for: \"HGBA1C\"  Lab Results   Component Value Date/Time    WBC 5.5 07/14/2025 1335    HGB 11.7 (L) 07/14/2025 1335    HCT 38.8 07/14/2025 1335     07/14/2025 1335    ABO A 07/14/2025 1335    LABRH POS 07/14/2025 1335    ABSCRN NEG 07/14/2025 1335     Lab Results   Component Value Date/Time    PROTIME 13.2 (H) 09/17/2023 0946    INR 1.2 (H) 09/17/2023 0946     Encounter Date: 05/09/25   ECG 12 lead   Result Value    Ventricular Rate 75    Atrial Rate 75    CO Interval 193    QRS Duration 92    QT Interval 367    QTC Calculation(Bazett) " 410    P Axis 55    R Axis -9    T Axis 27    QRS Count 12    Q Onset 249    T Offset 433    QTC Fredericia 395    Narrative    Sinus rhythm  Probable left atrial enlargement  Low voltage, precordial leads  Probable anteroseptal infarct, old    See ED provider note for full interpretation and clinical correlation  Confirmed by Brionna Goetz ([id number]) on 5/13/2025 11:42:10 AM        Transthoracic Echo (TTE) Complete 12/04/2024    Results for orders placed during the hospital encounter of 12/04/24    Transthoracic Echo (TTE) Complete    [place] [address]  Phone [phone] Fax [phone]    TRANSTHORACIC ECHOCARDIOGRAM REPORT    Patient Name:       SERGIO Zaldivar Physician:    [id number] Neil Bethea MD  Study Date:         12/4/2024           Ordering Provider:    [id number] JOHNY REED  MRN/PID:            [id number]            Fellow:  Accession#:         [id number]        Nurse:  Date of Birth/Age:  1960 / 64 years Sonographer:          Georgette Puckett  [facility]  Gender Assigned at  F                   Additional Staff:     Hui Posada  Birth:                                                        Student  Height:             160.02 cm           Admit Date:           12/4/2024  Weight:             73.03 kg            Admission Status:     Outpatient  BSA / BMI:          1.76 m2 / 28.52     Department Location:  [facility]  kg/m2                                     Lab  Blood Pressure: 131 /59 mmHg    Study Type:    TRANSTHORACIC ECHO (TTE) COMPLETE  Diagnosis/ICD: Atherosclerotic heart disease of native coronary artery without  angina pectoris-I25.10; Other forms of dyspnea-R06.09; Cardiac  murmur, unspecified-R01.1  Indication:    WARREN, murmur, CAC  CPT Codes:     Echo Complete w Full Doppler-[id number]  Study Detail: The following Echo studies were performed: 2D, M-Mode, Doppler and  color  flow.      PHYSICIAN INTERPRETATION:  Left Ventricle: The left ventricular systolic function is normal, with a visually estimated ejection fraction of 60-65%. There are no regional wall motion abnormalities. The left ventricular cavity size is normal. There is normal septal and normal posterior left ventricular wall thickness. Spectral Doppler shows a normal pattern of left ventricular diastolic filling.  Left Atrium: The left atrium is normal in size.  Right Ventricle: The right ventricle is normal in size. There is normal right ventricular global systolic function.  Right Atrium: The right atrium is normal in size.  Aortic Valve: The aortic valve is trileaflet. There is mild aortic valve thickening. The aortic valve dimensionless index is 0.83. There is mild aortic valve regurgitation. The peak instantaneous gradient of the aortic valve is 13 mmHg. The mean gradient of the aortic valve is 8 mmHg.  Mitral Valve: The mitral valve is normal in structure. There is trace mitral valve regurgitation.  Tricuspid Valve: The tricuspid valve is structurally normal. There is mild tricuspid regurgitation. The Doppler estimated RVSP is moderately elevated right ventricular systolic pressure at 56.1 mmHg.  Pulmonic Valve: The pulmonic valve is structurally normal. There is physiologic pulmonic valve regurgitation.  Pericardium: No pericardial effusion noted.  Aorta: The aortic root is normal.  Systemic Veins: The inferior vena cava appears normal in size.      CONCLUSIONS:  1. The left ventricular systolic function is normal, with a visually estimated ejection fraction of 60-65%.  2. There is normal right ventricular global systolic function.  3. Moderately elevated right ventricular systolic pressure.  4. Mild aortic valve regurgitation.    QUANTITATIVE DATA SUMMARY:    2D MEASUREMENTS:           Normal Ranges:  IVSd:            0.89 cm   (0.6-1.1cm)  LVPWd:           0.62 cm   (0.6-1.1cm)  LVIDd:           3.69 cm    (3.9-5.9cm)  LVIDs:           3.14 cm  LV Mass Index:   43.3 g/m2  LV % FS          14.9 %      LA VOLUME:                    Normal Ranges:  LA Vol A4C:        37.2 ml    (22+/-6mL/m2)  LA Vol A2C:        41.4 ml  LA Vol BP:         41.4 ml  LA Vol Index A4C:  21.1ml/m2  LA Vol Index A2C:  23.5 ml/m2  LA Vol Index BP:   23.5 ml/m2  LA Area A4C:       14.8 cm2  LA Area A2C:       14.8 cm2  LA Major Axis A4C: 5.0 cm  LA Major Axis A2C: 4.5 cm  LA Volume Index:   23.5 ml/m2  LA Vol A4C:        33.9 ml  LA Vol A2C:        35.5 ml  LA Vol Index BSA:  19.7 ml/m2      RA VOLUME BY A/L METHOD:          Normal Ranges:  RA Area A4C:             13.3 cm2      AORTA MEASUREMENTS:         Normal Ranges:  Ao Sinus, d:        3.10 cm (2.1-3.5cm)  Asc Ao, d:          3.50 cm (2.1-3.4cm)      LV SYSTOLIC FUNCTION BY 2D PLANIMETRY (MOD):  Normal Ranges:  EF-A4C View:    69 % (>=55%)  EF-A2C View:    74 %  EF-Biplane:     72 %  EF-Visual:      63 %  LV EF Reported: 63 %      LV DIASTOLIC FUNCTION:           Normal Ranges:  MV Peak E:             0.98 m/s  (0.7-1.2 m/s)  MV Peak A:             1.40 m/s  (0.42-0.7 m/s)  E/A Ratio:             0.70      (1.0-2.2)  MV e'                  0.092 m/s (>8.0)  MV lateral e'          0.11 m/s  MV medial e'           0.07 m/s  E/e' Ratio:            10.68     (<8.0)      MITRAL VALVE:          Normal Ranges:  MV DT:        210 msec (150-240msec)      AORTIC VALVE:                      Normal Ranges:  AoV Vmax:                1.83 m/s  (<=1.7m/s)  AoV Peak P.4 mmHg (<20mmHg)  AoV Mean P.1 mmHg  (1.7-11.5mmHg)  LVOT Max Antolin:            1.51 m/s  (<=1.1m/s)  AoV VTI:                 40.83 cm  (18-25cm)  LVOT VTI:                33.89 cm  LVOT Diameter:           1.86 cm   (1.8-2.4cm)  AoV Area, VTI:           2.26 cm2  (2.5-5.5cm2)  AoV Area,Vmax:           2.25 cm2  (2.5-4.5cm2)  AoV Dimensionless Index: 0.83      AORTIC INSUFFICIENCY:  AI Vmax:       3.89 m/s  AI  Half-time:  876 msec  AI Decel Time: 3021 msec  AI Decel Rate: 139.59 cm/s2      RIGHT VENTRICLE:  RV Basal 2.98 cm  RV Mid   2.20 cm  RV Major 7.9 cm  TAPSE:   27.6 mm  RV s'    0.17 m/s      TRICUSPID VALVE/RVSP:          Normal Ranges:  Peak TR Velocity:     3.64 m/s  RV Syst Pressure:     56 mmHg  (< 30mmHg)  IVC Diam:             1.35 cm  TV e'                 0.1 m/s      AORTA:  Asc Ao Diam 3.49 cm      [id number] Neil Bethea MD  Electronically signed on 2024 at 5:41:25 PM        ** Final **      Visit Vitals  OB Status Hysterectomy   Smoking Status Former     No data recorded    Physical Exam    Airway  Mallampati: II  TM distance: >3 FB  Neck ROM: limited  Mouth openin finger widths     Cardiovascular   Rhythm: regular     Dental     (+) upper dentures, lower dentures     Pulmonary Breath sounds clear to auscultation     Abdominal            Anesthesia Plan    History of general anesthesia?: yes  History of complications of general anesthesia?: no    ASA 3     general     The patient is not a current smoker.    intravenous induction   Postoperative pain plan includes opioids.  Trial extubation is planned.  Anesthetic plan and risks discussed with patient.    Plan discussed with CRNA and CAA.              [1]   Past Medical History:  Diagnosis Date    Acute hypoxemic respiratory failure 2023    Breast calcification, left     COPD (chronic obstructive pulmonary disease) (Multi)     Dysphagia     Essential (primary) hypertension     Fibroadenoma of left breast     GERD (gastroesophageal reflux disease)     H/O echocardiogram 2024    CONCLUSIONS:  1. The left ventricular systolic function is normal, with a visually estimated ejection fraction of 60-65%.  2. There is normal right ventricular global systolic function.  3. Moderately elevated right ventricular systolic pressure.    H/O malignant neoplasm of tonsil     s/p XRT and chemotherapy completed .    Hypothyroidism     OAB  (overactive bladder)     Overweight     Pharyngitis 2023    Pneumonia 2023    Restless legs syndrome     Sinusitis 2023    Suspected sleep apnea     Tobacco use disorder 2023    Tongue mass    [2]   Past Surgical History:  Procedure Laterality Date    BREAST BIOPSY Left     benign FA     SECTION, LOW TRANSVERSE      CHOLECYSTECTOMY      COLONOSCOPY      HYSTERECTOMY      MOUTH SURGERY      Oral Surgery Tooth Extraction    NECK SURGERY  2024    Creation Free Flap Head/Neck, Dissection Neck   [3]   Social History  Tobacco Use    Smoking status: Former     Current packs/day: 0.00     Average packs/day: 1 pack/day for 43.8 years (43.8 ttl pk-yrs)     Types: Cigarettes     Start date:      Quit date: 2024     Years since quittin.7    Smokeless tobacco: Never   Vaping Use    Vaping status: Every Day    Substances: Nicotine   Substance Use Topics    Alcohol use: Not Currently    Drug use: Not Currently     Types: Marijuana   [4]   Allergies  Allergen Reactions    Duloxetine Hallucinations     Pt states she doesn't think she has an allergy just a bad reaction when mixed with morphine .

## 2025-07-16 NOTE — TELEPHONE ENCOUNTER
Gretchen called in to report that she is getting close to being out of her oxycodone. She is asking for a refill at the 10mg dosing to please be sent to [facility] in [place]. She stated an auth will need to be obtained through her insurance.    Last fill: 6/18/25  Last appt: 5/21/25  Next appt: 8/20/2025  Message routed to team.

## 2025-07-16 NOTE — TELEPHONE ENCOUNTER
Refill pended to provider for oxycodone 5 mg every 6 hours 120/30.  OARRS reviewed.  Due for refill on 7/20.  Patient to follow up with Antoinette Soliman on 8/20.

## 2025-07-17 ENCOUNTER — ANESTHESIA (OUTPATIENT)
Dept: OPERATING ROOM | Facility: HOSPITAL | Age: 65
End: 2025-07-17
Payer: COMMERCIAL

## 2025-07-17 ENCOUNTER — HOSPITAL ENCOUNTER (OUTPATIENT)
Facility: HOSPITAL | Age: 65
Setting detail: OUTPATIENT SURGERY
Discharge: HOME | End: 2025-07-17
Attending: OTOLARYNGOLOGY | Admitting: OTOLARYNGOLOGY
Payer: COMMERCIAL

## 2025-07-17 VITALS
BODY MASS INDEX: 27.11 KG/M2 | WEIGHT: 153 LBS | RESPIRATION RATE: 17 BRPM | SYSTOLIC BLOOD PRESSURE: 128 MMHG | HEIGHT: 63 IN | DIASTOLIC BLOOD PRESSURE: 70 MMHG | OXYGEN SATURATION: 98 % | HEART RATE: 62 BPM | TEMPERATURE: 98.2 F

## 2025-07-17 DIAGNOSIS — K14.8 TONGUE MASS: ICD-10-CM

## 2025-07-17 LAB
ABO GROUP (TYPE) IN BLOOD: NORMAL
RH FACTOR (ANTIGEN D): NORMAL

## 2025-07-17 PROCEDURE — 41105 BIOPSY OF TONGUE: CPT | Performed by: OTOLARYNGOLOGY

## 2025-07-17 PROCEDURE — 2500000001 HC RX 250 WO HCPCS SELF ADMINISTERED DRUGS (ALT 637 FOR MEDICARE OP): Performed by: ANESTHESIOLOGY

## 2025-07-17 PROCEDURE — 36415 COLL VENOUS BLD VENIPUNCTURE: CPT | Performed by: OTOLARYNGOLOGY

## 2025-07-17 PROCEDURE — 87077 CULTURE AEROBIC IDENTIFY: CPT | Mod: AHULAB | Performed by: OTOLARYNGOLOGY

## 2025-07-17 PROCEDURE — 88305 TISSUE EXAM BY PATHOLOGIST: CPT | Performed by: PATHOLOGY

## 2025-07-17 PROCEDURE — 43200 ESOPHAGOSCOPY FLEXIBLE BRUSH: CPT | Performed by: OTOLARYNGOLOGY

## 2025-07-17 PROCEDURE — 2500000005 HC RX 250 GENERAL PHARMACY W/O HCPCS: Performed by: ANESTHESIOLOGY

## 2025-07-17 PROCEDURE — 3600000008 HC OR TIME - EACH INCREMENTAL 1 MINUTE - PROCEDURE LEVEL THREE: Performed by: OTOLARYNGOLOGY

## 2025-07-17 PROCEDURE — 7100000001 HC RECOVERY ROOM TIME - INITIAL BASE CHARGE: Performed by: OTOLARYNGOLOGY

## 2025-07-17 PROCEDURE — 88305 TISSUE EXAM BY PATHOLOGIST: CPT | Mod: TC,AHULAB | Performed by: OTOLARYNGOLOGY

## 2025-07-17 PROCEDURE — 3600000003 HC OR TIME - INITIAL BASE CHARGE - PROCEDURE LEVEL THREE: Performed by: OTOLARYNGOLOGY

## 2025-07-17 PROCEDURE — 31622 DX BRONCHOSCOPE/WASH: CPT | Performed by: OTOLARYNGOLOGY

## 2025-07-17 PROCEDURE — 2720000007 HC OR 272 NO HCPCS: Performed by: OTOLARYNGOLOGY

## 2025-07-17 PROCEDURE — 7100000002 HC RECOVERY ROOM TIME - EACH INCREMENTAL 1 MINUTE: Performed by: OTOLARYNGOLOGY

## 2025-07-17 PROCEDURE — 7100000009 HC PHASE TWO TIME - INITIAL BASE CHARGE: Performed by: OTOLARYNGOLOGY

## 2025-07-17 PROCEDURE — 3700000001 HC GENERAL ANESTHESIA TIME - INITIAL BASE CHARGE: Performed by: OTOLARYNGOLOGY

## 2025-07-17 PROCEDURE — 2500000005 HC RX 250 GENERAL PHARMACY W/O HCPCS: Performed by: NURSE ANESTHETIST, CERTIFIED REGISTERED

## 2025-07-17 PROCEDURE — 7100000010 HC PHASE TWO TIME - EACH INCREMENTAL 1 MINUTE: Performed by: OTOLARYNGOLOGY

## 2025-07-17 PROCEDURE — 2500000004 HC RX 250 GENERAL PHARMACY W/ HCPCS (ALT 636 FOR OP/ED): Mod: JZ | Performed by: NURSE ANESTHETIST, CERTIFIED REGISTERED

## 2025-07-17 PROCEDURE — 3700000002 HC GENERAL ANESTHESIA TIME - EACH INCREMENTAL 1 MINUTE: Performed by: OTOLARYNGOLOGY

## 2025-07-17 RX ORDER — LIDOCAINE HYDROCHLORIDE 20 MG/ML
INJECTION, SOLUTION EPIDURAL; INFILTRATION; INTRACAUDAL; PERINEURAL AS NEEDED
Status: DISCONTINUED | OUTPATIENT
Start: 2025-07-17 | End: 2025-07-17

## 2025-07-17 RX ORDER — ROCURONIUM BROMIDE 10 MG/ML
INJECTION, SOLUTION INTRAVENOUS AS NEEDED
Status: DISCONTINUED | OUTPATIENT
Start: 2025-07-17 | End: 2025-07-17

## 2025-07-17 RX ORDER — LIDOCAINE HYDROCHLORIDE 10 MG/ML
0.1 INJECTION, SOLUTION EPIDURAL; INFILTRATION; INTRACAUDAL; PERINEURAL ONCE
Status: DISCONTINUED | OUTPATIENT
Start: 2025-07-17 | End: 2025-07-17 | Stop reason: HOSPADM

## 2025-07-17 RX ORDER — OXYCODONE HYDROCHLORIDE 5 MG/1
5 TABLET ORAL EVERY 4 HOURS PRN
Status: DISCONTINUED | OUTPATIENT
Start: 2025-07-17 | End: 2025-07-17 | Stop reason: HOSPADM

## 2025-07-17 RX ORDER — PROPOFOL 10 MG/ML
INJECTION, EMULSION INTRAVENOUS AS NEEDED
Status: DISCONTINUED | OUTPATIENT
Start: 2025-07-17 | End: 2025-07-17

## 2025-07-17 RX ORDER — DROPERIDOL 2.5 MG/ML
0.62 INJECTION, SOLUTION INTRAMUSCULAR; INTRAVENOUS ONCE AS NEEDED
Status: DISCONTINUED | OUTPATIENT
Start: 2025-07-17 | End: 2025-07-17 | Stop reason: HOSPADM

## 2025-07-17 RX ORDER — ONDANSETRON HYDROCHLORIDE 2 MG/ML
INJECTION, SOLUTION INTRAVENOUS AS NEEDED
Status: DISCONTINUED | OUTPATIENT
Start: 2025-07-17 | End: 2025-07-17

## 2025-07-17 RX ORDER — SODIUM CHLORIDE, SODIUM LACTATE, POTASSIUM CHLORIDE, CALCIUM CHLORIDE 600; 310; 30; 20 MG/100ML; MG/100ML; MG/100ML; MG/100ML
INJECTION, SOLUTION INTRAVENOUS CONTINUOUS PRN
Status: DISCONTINUED | OUTPATIENT
Start: 2025-07-17 | End: 2025-07-17

## 2025-07-17 RX ORDER — LIDOCAINE HYDROCHLORIDE 40 MG/ML
SOLUTION TOPICAL AS NEEDED
Status: DISCONTINUED | OUTPATIENT
Start: 2025-07-17 | End: 2025-07-17

## 2025-07-17 RX ORDER — FENTANYL CITRATE 50 UG/ML
INJECTION, SOLUTION INTRAMUSCULAR; INTRAVENOUS AS NEEDED
Status: DISCONTINUED | OUTPATIENT
Start: 2025-07-17 | End: 2025-07-17

## 2025-07-17 RX ORDER — MIDAZOLAM HYDROCHLORIDE 2 MG/2ML
INJECTION, SOLUTION INTRAMUSCULAR; INTRAVENOUS AS NEEDED
Status: DISCONTINUED | OUTPATIENT
Start: 2025-07-17 | End: 2025-07-17

## 2025-07-17 RX ORDER — ONDANSETRON HYDROCHLORIDE 2 MG/ML
4 INJECTION, SOLUTION INTRAVENOUS ONCE AS NEEDED
Status: DISCONTINUED | OUTPATIENT
Start: 2025-07-17 | End: 2025-07-17 | Stop reason: HOSPADM

## 2025-07-17 RX ADMIN — CARBOXYMETHYLCELLULOSE SODIUM 2 DROP: 5 SOLUTION/ DROPS OPHTHALMIC at 07:59

## 2025-07-17 RX ADMIN — LIDOCAINE HYDROCHLORIDE 100 MG: 20 INJECTION, SOLUTION EPIDURAL; INFILTRATION; INTRACAUDAL; PERINEURAL at 07:57

## 2025-07-17 RX ADMIN — ONDANSETRON 4 MG: 2 INJECTION INTRAMUSCULAR; INTRAVENOUS at 08:09

## 2025-07-17 RX ADMIN — ROCURONIUM BROMIDE 30 MG: 10 INJECTION, SOLUTION INTRAVENOUS at 07:57

## 2025-07-17 RX ADMIN — SODIUM CHLORIDE, POTASSIUM CHLORIDE, SODIUM LACTATE AND CALCIUM CHLORIDE: 600; 310; 30; 20 INJECTION, SOLUTION INTRAVENOUS at 07:36

## 2025-07-17 RX ADMIN — FENTANYL CITRATE 50 MCG: 50 INJECTION, SOLUTION INTRAMUSCULAR; INTRAVENOUS at 07:57

## 2025-07-17 RX ADMIN — OXYCODONE HYDROCHLORIDE 5 MG: 5 TABLET ORAL at 09:03

## 2025-07-17 RX ADMIN — MIDAZOLAM HYDROCHLORIDE 2 MG: 1 INJECTION, SOLUTION INTRAMUSCULAR; INTRAVENOUS at 07:44

## 2025-07-17 RX ADMIN — LIDOCAINE HYDROCHLORIDE 4 ML: 40 SOLUTION TOPICAL at 07:57

## 2025-07-17 RX ADMIN — SUGAMMADEX 200 MG: 100 INJECTION, SOLUTION INTRAVENOUS at 08:24

## 2025-07-17 RX ADMIN — DEXAMETHASONE SODIUM PHOSPHATE 12 MG: 4 INJECTION, SOLUTION INTRAMUSCULAR; INTRAVENOUS at 08:03

## 2025-07-17 RX ADMIN — Medication 5 L/MIN: at 08:34

## 2025-07-17 RX ADMIN — PROPOFOL 200 MG: 10 INJECTION, EMULSION INTRAVENOUS at 07:57

## 2025-07-17 SDOH — HEALTH STABILITY: MENTAL HEALTH: CURRENT SMOKER: 0

## 2025-07-17 ASSESSMENT — PAIN SCALES - GENERAL
PAINLEVEL_OUTOF10: 5 - MODERATE PAIN
PAINLEVEL_OUTOF10: 3
PAINLEVEL_OUTOF10: 4
PAINLEVEL_OUTOF10: 5 - MODERATE PAIN
PAINLEVEL_OUTOF10: 4
PAINLEVEL_OUTOF10: 5 - MODERATE PAIN
PAIN_LEVEL: 5

## 2025-07-17 ASSESSMENT — PAIN - FUNCTIONAL ASSESSMENT
PAIN_FUNCTIONAL_ASSESSMENT: 0-10

## 2025-07-17 ASSESSMENT — PAIN DESCRIPTION - DESCRIPTORS
DESCRIPTORS: SORE

## 2025-07-17 NOTE — DISCHARGE INSTRUCTIONS
Surgery Discharge Instructions    Patient Name: Melvi Sanchez  YOB: 1960  Surgeon: Robin Brownlee MD    Below is information about what procedure(s) you had performed today and what to expect afterward, plus information about prescriptions that have been provided. Please read in its entirety and call your surgeon if you have any questions. There are multiple phone numbers at the bottom of these instructions that you can use to contact our offices.       Surgical procedure(s) performed:   Triple endoscopy with biopsy    Wound care:   Internal Wound Care: You have no external incisions to maintain, as everything was done internally  You may experience some numbness of the lips or tongue as part of the procedure. This is almost always temporary and is related to the scope used to visualize your throat.   You may notice some blood in your saliva over the next day or so. This is likely from the biopsy site and should be reported if if does not subside with time.    Pain control:     Acetaminophen (Tylenol) and/or Ibuprofen (Advil, Motrin) can be prescribed but are most commonly purchased over the counter. Pay attention to dosing of individual tablets. Do not exceed 4000 mg of acetaminophen daily or 3000 mg of ibuprofen daily.     Activity after discharge:   When you go home, you can usually return to your regular activities.    Diet:   You may resume your normal preoperative diet. Carefully advance as tolerated, as the effects of anesthesia can cause nausea, vomiting, etc. Once you are tolerating liquids, you may advance to more solid foods as tolerated.     Surgical pathology:  Your surgical pathology will result in the next 2-4 weeks. Your surgeon will either call you with the results or discuss with you in the next follow-up visit. You may see the results in MyChart before you hear from your surgeon.    Expected Post-Anesthesia symptoms:  Do not make any important decisions in the next 24 hours.    It is advised that you have someone with you for the next 24 hours while you recover from the effects of anesthesia.   You may have neck pain and throat pain. These symptoms are often temporary and may be due to irritation from the breathing tube that's inserted during surgery.  You may have some swallowing difficulties due to pain for several days.  You may also have some changes in your voice    If you have the following symptoms, reach out to your doctor:  Significant difficulty with breathing  Bleeding from the mouth  Severe nausea or vomiting  Inability to take any liquids by mouth  High fevers (>100.8)  Any other questions or concerns    Follow-up:  You have a follow up appointment scheduled for 7/25/25.    Important Phone Numbers  Dr. Robin Brownlee: Office number during business hours: [phone]  Evenings/Weekends Emergency: call [phone] and ask for the ENT resident on call

## 2025-07-17 NOTE — ANESTHESIA PROCEDURE NOTES
Airway  Date/Time: 7/17/2025 7:58 AM  Reason: elective    Airway not difficult    Staffing  Performed: CRNA   Authorized by: Mariela Silva MD    Performed by: PIYUSH Saucedo-MAX  Patient location during procedure: OR    Patient Condition  Indications for airway management: anesthesia and airway protection  Patient position: sniffing  MILS maintained throughout  Sedation level: deep     Final Airway Details   Preoxygenated: yes  Final airway type: endotracheal airway  Successful airway: ETT  Cuffed: yes   Successful intubation technique: video laryngoscopy  Adjuncts used in placement: anterior pressure/BURP  Endotracheal tube insertion site: oral  Blade size: #3  ETT size (mm): 6.0  Cormack-Lehane Classification: grade I - full view of glottis  Placement verified by: chest auscultation and capnometry   Measured from: lips  Number of attempts at approach: 1  Number of other approaches attempted: 0

## 2025-07-17 NOTE — OP NOTE
"Direct Laryngoscopy, Esophagoscopy, Flexible Bronchoscopy, Mouth- Base of Tongue Biopsy Operative Note     Date: 2025  OR Location: [facility] A OR    Name: Melvi Sanchez \"Gretchen\", : 1960, Age: 65 y.o., MRN: [id number], Sex: female    Diagnosis  Pre-op Diagnosis      * Tongue mass [K14.8] Post-op Diagnosis     * Tongue mass [K14.8]     Procedures  Direct Laryngoscopy  91278 - CA LARYNGOSCOPY W/WO TRACHEOSCOPY DX EXCEPT     Esophagoscopy  97467 - CA ESOPHAGOSCOPY FLEXIBLE TRANSORAL DIAGNOSTIC    Flexible Bronchoscopy  07945 - CA BRNCHSC INCL FLUOR GDNCE DX W/CELL WASHG SPX    Mouth- Base of Tongue Biopsy  73507 - CA BIOPSY TONGUE POSTERIOR ONE-THIRD      Surgeons      * Robin Brownlee - Primary    Resident/Fellow/Other Assistant:  Ann Landeros    Staff:   Circulator: Che Solisub Person: Soo Solisub Person: Loretta Solisub Person: Hui    Anesthesia Staff: Anesthesiologist: Mariela Silva MD  CRNA: PIYUSH Saucedo-CRNA    Procedure Summary  Anesthesia: General  ASA: III  Estimated Blood Loss: 2 mL  Intra-op Medications:   Administrations occurring from 0715 to 0905 on 25:   Medication Name Total Dose   oxygen (O2) therapy 155 L   dexAMETHasone (Decadron) 4 mg/mL IV Syringe 2 mL 12 mg   fentaNYL (Sublimaze) injection 50 mcg/mL 50 mcg   lactated Ringer's infusion Cannot be calculated   lidocaine (LTA Kit) for intubation 4 mL   lidocaine PF (Xylocaine-MPF) local injection 2 % 100 mg   lubricating eye drops ophthalmic solution 2 drop   midazolam PF (Versed) injection 1 mg/mL 2 mg   ondansetron (Zofran) 2 mg/mL injection 4 mg   propofol (Diprivan) injection 10 mg/mL 200 mg   rocuronium (ZeMuron) 50 mg/5 mL injection 30 mg   sugammadex (Bridion) 200 mg/2 mL injection 200 mg              Anesthesia Record               Intraprocedure I/O Totals          Intake    lactated Ringer's 600.00 mL    Total Intake 600 mL       Output    Est. Blood Loss 2 mL    Total Output 2 mL       Net    Net Volume " "598 mL          Specimen:   ID Type Source Tests Collected by Time   1 : LEFT TONGUE BASE Tissue TONGUE VENTROLATERAL LEFT BIOPSY SURGICAL PATHOLOGY EXAM Robin Brownlee MD 7/17/2025 0809   A : LEFT BRONCHIAL ASPIRATE Tissue ABSCESS TISSUE/WOUND CULTURE/SMEAR Robin Brownlee MD 7/17/2025 0816                 Drains and/or Catheters: * None in log *    Tourniquet Times:         Implants:     Findings:   Redundant tissue left base of tongue, without mucosal irregularity, soft. Biopsies taken.   Granulation tissue left glossoalveolar sulcus at prior site of ulceration.  Bronchoscopy with thick secretions throughout. Culture taken.  Esophagoscopy without abnormalities.    Indications: Melvi Sanchez \"Allie" is an 65 y.o. female who is having surgery for Tongue mass [K14.8].     The patient was seen in the preoperative area. The risks, benefits, complications, treatment options, non-operative alternatives, expected recovery and outcomes were discussed with the patient. The possibilities of reaction to medication, pulmonary aspiration, injury to surrounding structures, bleeding, recurrent infection, the need for additional procedures, failure to diagnose a condition, and creating a complication requiring transfusion or operation were discussed with the patient. The patient concurred with the proposed plan, giving informed consent.  The site of surgery was properly noted/marked if necessary per policy. The patient has been actively warmed in preoperative area. Preoperative antibiotics are not indicated. Venous thrombosis prophylaxis have been ordered including bilateral sequential compression devices    Procedure Details:   Patient was seen and evaluated in the pre-operative area. Informed consent was obtained as described above. Patient was then taken to the operative room by the anesthesia team. General anesthesia was induced, and patient was orotracheally intubated without issue. Patient was   then turned 90 degrees " towards the ENT team. Time out was performed by Dr. Brownlee.    First, direct laryngoscopy was performed using the Dedo laryngoscope. All sites of the upper aerodigestive tract were visualized including the uvula, soft palate, tonsils, vallecula, epiglottis, base of tongue, arytenoids, false and true vocal cords, post-cricoid region and pyriform sinuses. Findings as noted above. Multiple biopsies were obtained and submitted for permanent pathology. Next, the lewy arm was attached to the Dedo and this was suspended on the Zenia stand to perform flexible bronchoscopy. The bronchoscope was inserted and placed around the ETT to evaluate the right and left mainstem bronchi as well as the subsegmental bronchi bilaterally. Findings as noted above. The Dedo was taken out of suspension and removed. Flexible esophagoscopy was performed with the scope inserted to the level of the pylorus and retroflexed. On careful removal of the scope all sites of the gastric and esophageal mucosa were evaluated and there were not any concerning lesions noted. At this point the procedure was terminated. Patient was turned back over to the anesthesia team and was awakened, extubated and transported to the PACU in stable condition.    Dr. Brownlee was present for the critical portions of the procedure.      Evidence of Infection: No   Complications:  None; patient tolerated the procedure well.    Disposition: PACU - hemodynamically stable.  Condition: stable       Attending Attestation:     Robin Brownlee  Phone Number: [phone]        The above resident note has been reviewed and confirmed.  Patient was seen and examined with the resident today.  Documentation has been reviewed.

## 2025-07-17 NOTE — ANESTHESIA POSTPROCEDURE EVALUATION
"Patient: Melvi Sanchez \"Gretchen\"    Procedure Summary       Date: 07/17/25 Room / Location: [facility] / [facility]    Anesthesia Start: 0736 Anesthesia Stop: 0839    Procedures:       Direct Laryngoscopy (Throat)      Esophagoscopy (Throat)      Flexible Bronchoscopy (Throat)      Mouth- Base of Tongue Biopsy (Mouth) Diagnosis:       Tongue mass      (Tongue mass [K14.8])    Surgeons: Robin Brownlee MD Responsible Provider: Mariela Silva MD    Anesthesia Type: general ASA Status: 3            Anesthesia Type: general    Vitals Value Taken Time   /69 07/17/25 09:33   Temp 36.8 °C (98.2 °F) 07/17/25 09:33   Pulse 59 07/17/25 09:32   Resp 13 07/17/25 09:33   SpO2 99 % 07/17/25 09:32   Vitals shown include unfiled device data.    Anesthesia Post Evaluation    Patient location during evaluation: PACU  Patient participation: complete - patient participated  Level of consciousness: awake and alert  Pain score: 5  Pain management: adequate  Multimodal analgesia pain management approach  Airway patency: patent  Cardiovascular status: hemodynamically stable  Respiratory status: acceptable  Hydration status: acceptable  Postoperative Nausea and Vomiting: none        No notable events documented.    "

## 2025-07-18 ENCOUNTER — TELEPHONE (OUTPATIENT)
Dept: HEMATOLOGY/ONCOLOGY | Facility: CLINIC | Age: 65
End: 2025-07-18
Payer: COMMERCIAL

## 2025-07-18 ENCOUNTER — APPOINTMENT (OUTPATIENT)
Dept: NUTRITION | Facility: HOSPITAL | Age: 65
End: 2025-07-18
Payer: COMMERCIAL

## 2025-07-18 DIAGNOSIS — G89.3 CANCER RELATED PAIN: Primary | ICD-10-CM

## 2025-07-18 RX ORDER — OXYCODONE HYDROCHLORIDE 10 MG/1
10 TABLET ORAL EVERY 4 HOURS PRN
Qty: 180 TABLET | Refills: 0 | Status: SHIPPED | OUTPATIENT
Start: 2025-07-18 | End: 2025-08-17

## 2025-07-18 RX ORDER — OXYCODONE HYDROCHLORIDE 10 MG/1
10 TABLET ORAL EVERY 4 HOURS PRN
COMMUNITY
End: 2025-07-18 | Stop reason: SDUPTHER

## 2025-07-18 ASSESSMENT — PAIN SCALES - GENERAL: PAINLEVEL_OUTOF10: 3

## 2025-07-18 NOTE — TELEPHONE ENCOUNTER
Patient called in requesting refill for oxycodone IR 10 mg every 4 hours.  Patient was increased from 5 to 10 mg per Antoinette Soliman.  New script pended to provider for dosage increase.  Will call pharmacy to check if PA is required.

## 2025-07-18 NOTE — TELEPHONE ENCOUNTER
Patient called back and informed no PA was needed on oxycodone.  [facility] pharmacy filling medication today and will be ready for .

## 2025-07-20 LAB
BACTERIA SPEC CULT: ABNORMAL
BACTERIA SPEC CULT: ABNORMAL
GRAM STN SPEC: ABNORMAL

## 2025-07-21 ENCOUNTER — RESULTS FOLLOW-UP (OUTPATIENT)
Dept: OTOLARYNGOLOGY | Facility: CLINIC | Age: 65
End: 2025-07-21
Payer: COMMERCIAL

## 2025-07-21 ENCOUNTER — TELEPHONE (OUTPATIENT)
Dept: ADMISSION | Facility: HOSPITAL | Age: 65
End: 2025-07-21
Payer: COMMERCIAL

## 2025-07-21 DIAGNOSIS — G89.3 CANCER ASSOCIATED PAIN: ICD-10-CM

## 2025-07-21 DIAGNOSIS — B37.9 CANDIDA ALBICANS INFECTION: ICD-10-CM

## 2025-07-21 DIAGNOSIS — K12.1 ORAL ULCERATION: ICD-10-CM

## 2025-07-21 RX ORDER — MORPHINE SULFATE 15 MG/1
15 TABLET, FILM COATED, EXTENDED RELEASE ORAL 2 TIMES DAILY
Qty: 60 TABLET | Refills: 0 | Status: SHIPPED | OUTPATIENT
Start: 2025-07-24 | End: 2025-08-23

## 2025-07-21 NOTE — TELEPHONE ENCOUNTER
Patient last seen by PIYUSH Soliman on 5/21 with plan to continue MSER 15mg BID. Follow up visit is scheduled for 8/20. OARRS reviewed and no aberrancy noted. Patient last filled MSER 15mg #60/30 days on 6/25. Prescription pended to provider to approve.

## 2025-07-21 NOTE — TELEPHONE ENCOUNTER
Refill Request  Morphine CR (MS Contin) 15mg every 12 hrs    Preferred Pharmacy  [facility] #[id number]

## 2025-07-22 LAB
LABORATORY COMMENT REPORT: NORMAL
PATH REPORT.FINAL DX SPEC: NORMAL
PATH REPORT.GROSS SPEC: NORMAL
PATH REPORT.RELEVANT HX SPEC: NORMAL
PATH REPORT.TOTAL CANCER: NORMAL

## 2025-07-22 RX ORDER — LEVOFLOXACIN 500 MG/1
500 TABLET, FILM COATED ORAL EVERY 24 HOURS
Qty: 10 TABLET | Refills: 0 | Status: SHIPPED | OUTPATIENT
Start: 2025-07-22 | End: 2025-08-01

## 2025-07-22 RX ORDER — FLUCONAZOLE 100 MG/1
100 TABLET ORAL DAILY
Qty: 14 TABLET | Refills: 0 | Status: SHIPPED | OUTPATIENT
Start: 2025-07-22 | End: 2025-08-05

## 2025-07-22 NOTE — TELEPHONE ENCOUNTER
Order only      Spoke with her informed of the negative biopsy    She just picked up her medicine    Mary we can move her appointment or cancel it for this Friday as she apparently also has 1 August 8.

## 2025-07-24 ENCOUNTER — HOSPITAL ENCOUNTER (OUTPATIENT)
Dept: RADIOLOGY | Facility: HOSPITAL | Age: 65
Discharge: HOME | End: 2025-07-24
Payer: COMMERCIAL

## 2025-07-24 VITALS — BODY MASS INDEX: 28.16 KG/M2 | HEIGHT: 62 IN | WEIGHT: 153 LBS

## 2025-07-24 DIAGNOSIS — D24.2 FIBROADENOMA OF LEFT BREAST: ICD-10-CM

## 2025-07-24 DIAGNOSIS — R92.1 BREAST CALCIFICATION, LEFT: ICD-10-CM

## 2025-07-24 DIAGNOSIS — Z12.31 ENCOUNTER FOR SCREENING MAMMOGRAM FOR BREAST CANCER: ICD-10-CM

## 2025-07-24 PROCEDURE — 77062 BREAST TOMOSYNTHESIS BI: CPT

## 2025-07-24 PROCEDURE — 77066 DX MAMMO INCL CAD BI: CPT | Performed by: RADIOLOGY

## 2025-07-24 PROCEDURE — 77062 BREAST TOMOSYNTHESIS BI: CPT | Performed by: RADIOLOGY

## 2025-07-25 ENCOUNTER — APPOINTMENT (OUTPATIENT)
Dept: OTOLARYNGOLOGY | Facility: CLINIC | Age: 65
End: 2025-07-25
Payer: COMMERCIAL

## 2025-07-30 ENCOUNTER — APPOINTMENT (OUTPATIENT)
Dept: SURGERY | Facility: CLINIC | Age: 65
End: 2025-07-30
Payer: COMMERCIAL

## 2025-08-08 ENCOUNTER — APPOINTMENT (OUTPATIENT)
Dept: OTOLARYNGOLOGY | Facility: CLINIC | Age: 65
End: 2025-08-08
Payer: COMMERCIAL

## 2025-08-08 ENCOUNTER — CLINICAL SUPPORT (OUTPATIENT)
Dept: AUDIOLOGY | Facility: CLINIC | Age: 65
End: 2025-08-08
Payer: COMMERCIAL

## 2025-08-08 VITALS — WEIGHT: 148.2 LBS | BODY MASS INDEX: 26.26 KG/M2 | HEIGHT: 63 IN

## 2025-08-08 DIAGNOSIS — C77.0 METASTASIS TO HEAD AND NECK LYMPH NODE (MULTI): ICD-10-CM

## 2025-08-08 DIAGNOSIS — K13.79 MOUTH SORE: ICD-10-CM

## 2025-08-08 DIAGNOSIS — H93.13 TINNITUS, BILATERAL: ICD-10-CM

## 2025-08-08 DIAGNOSIS — J04.0 ACUTE LARYNGITIS: ICD-10-CM

## 2025-08-08 DIAGNOSIS — R42 DIZZINESS: ICD-10-CM

## 2025-08-08 DIAGNOSIS — H93.8X2 FULLNESS IN EAR, LEFT: ICD-10-CM

## 2025-08-08 DIAGNOSIS — J38.00 VOCAL CORD PARALYSIS: ICD-10-CM

## 2025-08-08 DIAGNOSIS — H90.3 BILATERAL SENSORINEURAL HEARING LOSS: Primary | ICD-10-CM

## 2025-08-08 DIAGNOSIS — R13.10 DYSPHAGIA, UNSPECIFIED TYPE: ICD-10-CM

## 2025-08-08 PROCEDURE — 92557 COMPREHENSIVE HEARING TEST: CPT

## 2025-08-08 PROCEDURE — 92550 TYMPANOMETRY & REFLEX THRESH: CPT

## 2025-08-08 PROCEDURE — 99214 OFFICE O/P EST MOD 30 MIN: CPT | Performed by: OTOLARYNGOLOGY

## 2025-08-08 PROCEDURE — 3008F BODY MASS INDEX DOCD: CPT | Performed by: OTOLARYNGOLOGY

## 2025-08-08 RX ORDER — TRIAMCINOLONE ACETONIDE 1 MG/G
PASTE DENTAL 2 TIMES DAILY
Qty: 10 G | Refills: 3 | Status: SHIPPED | OUTPATIENT
Start: 2025-08-08 | End: 2025-08-22

## 2025-08-08 RX ORDER — METHYLPREDNISOLONE 4 MG/1
TABLET ORAL
Qty: 1 EACH | Refills: 0 | Status: SHIPPED | OUTPATIENT
Start: 2025-08-08

## 2025-08-08 ASSESSMENT — PAIN SCALES - GENERAL: PAINLEVEL_OUTOF10: 0 - NO PAIN

## 2025-08-08 ASSESSMENT — PAIN - FUNCTIONAL ASSESSMENT: PAIN_FUNCTIONAL_ASSESSMENT: 0-10

## 2025-08-08 NOTE — PROGRESS NOTES
AUDIOLOGIC EVALUATION      Name:  Gretchen Sanchez  :  1960  Age:  65 y.o.  Date of Evaluation:  2025    Time: 7181-3826    HISTORY:  Melvi Sanchez, 65 y.o., was seen for an audiologic assessment in conjunction with ENT evaluation. Recall, she has a history of chemotherapy and radiation following tonsillar cancer. She reported left aural fullness that pops intermittently but is otherwise constant. She also has bilateral ringing tinnitus that is constant and fluctuates in severity. She has consistent balance issues. All of her otologic symptoms began in  following her chemotherapy and radiation. She feels that her right ear is her better hearing ear. She denied otalgia and otorrhea.       RESULTS:  Otoscopic Evaluation:  Right Ear: Unremarkable  Left Ear: Unremarkable    Immittance Measures:  Right Ear: Type A -- indicating normal volume, pressure, and static compliance  Left Ear: Type A -- indicating normal volume, pressure, and static compliance    Acoustic Reflexes:  Right Ear: (Ipsilateral) Responses present at 500-4000 Hz, and absent at 4000 Hz.  Left Ear: (Ipsilateral) Responses present at 500-4000 Hz, and absent at 4000 Hz.    Pure Tone Audiometry:    Right Ear: Hearing within normal limits sloping to a moderate sensorineural hearing loss  Left Ear: Hearing within normal limits sloping to a moderate sensorineural hearing loss     Asymmetry: No    In comparison to previous audio completed on 25, her hearing has remained stable in both ears.     Reliability:   Good    Speech Audiometry:   Right: Speech Reception Threshold (SRT) was obtained at 25 dBHL.   SRT/PTA in Good agreement with pure tone average.    Left: Speech Reception Threshold (SRT) was obtained at 25 dBHL.   SRT/PTA in Good agreement with pure tone average.    Word Recognition Scores (WRS):  Right Ear: excellent (100%) in quiet when words were presented at 65 dBHL.   Left Ear: excellent (100%) in quiet when words were  presented at 65 dBHL.     Asymmetry: No      IMPRESSIONS:  Today's testing revealed normal ear canal volume, middle ear pressure, and tympanic membrane compliance in both ears. She has hearing within normal limits sloping to a moderate sensorineural hearing loss in both ears. She has excellent word recognition in both ears. The results were discussed with the patient. She should follow-up with ENT as scheduled.       RECOMMENDATIONS:  1.) Continue medical follow up with Ears Nose and Throat (ENT) provider as recommended.  2.) Return for audiologic evaluation in conjunction with medical management or annually (whichever is sooner) to monitor hearing sensitivity and assess middle ear status or sooner should concerns arise. The audiology department can be reached at [phone] to schedule an appointment.       Luis Eduardo Callahan, CCC-A  Clinical Audiologist      KEY  SNHL Sensorineural Hearing Loss   CHL Conductive Hearing Loss   MHL Mixed Hearing Loss   SSNHL Sudden Sensorineural Hearing Loss   WNL Within Normal Limits   PTA Pure Tone Average   TM Tympanic Membrane   ECV Ear Canal Volume   SRT Speech Reception Threshold   WRS Word Recognition Score

## 2025-08-18 ENCOUNTER — TELEPHONE (OUTPATIENT)
Dept: ADMISSION | Facility: HOSPITAL | Age: 65
End: 2025-08-18
Payer: COMMERCIAL

## 2025-08-18 DIAGNOSIS — G89.3 CANCER RELATED PAIN: ICD-10-CM

## 2025-08-18 RX ORDER — OXYCODONE HYDROCHLORIDE 10 MG/1
10 TABLET ORAL EVERY 4 HOURS PRN
Qty: 180 TABLET | Refills: 0 | Status: SHIPPED | OUTPATIENT
Start: 2025-08-18 | End: 2025-09-17

## 2025-08-19 PROBLEM — J38.00 VOCAL CORD PARALYSIS: Status: ACTIVE | Noted: 2025-06-30

## 2025-08-19 PROBLEM — K13.79 MOUTH SORE: Status: ACTIVE | Noted: 2025-07-11

## 2025-08-20 ENCOUNTER — APPOINTMENT (OUTPATIENT)
Dept: PALLIATIVE MEDICINE | Facility: CLINIC | Age: 65
End: 2025-08-20
Payer: COMMERCIAL

## 2025-08-21 ENCOUNTER — HOSPITAL ENCOUNTER (OUTPATIENT)
Dept: RADIOLOGY | Facility: HOSPITAL | Age: 65
Discharge: HOME | End: 2025-08-21
Payer: COMMERCIAL

## 2025-08-21 DIAGNOSIS — R13.14 PHARYNGOESOPHAGEAL DYSPHAGIA: Primary | ICD-10-CM

## 2025-08-21 DIAGNOSIS — R13.10 DYSPHAGIA, UNSPECIFIED TYPE: ICD-10-CM

## 2025-08-21 PROCEDURE — 74230 X-RAY XM SWLNG FUNCJ C+: CPT

## 2025-08-21 PROCEDURE — 92611 MOTION FLUOROSCOPY/SWALLOW: CPT | Mod: GN | Performed by: SPEECH-LANGUAGE PATHOLOGIST

## 2025-08-21 PROCEDURE — 2500000005 HC RX 250 GENERAL PHARMACY W/O HCPCS: Performed by: OTOLARYNGOLOGY

## 2025-08-21 PROCEDURE — 92526 ORAL FUNCTION THERAPY: CPT | Mod: GN | Performed by: SPEECH-LANGUAGE PATHOLOGIST

## 2025-08-21 RX ADMIN — BARIUM SULFATE 700 MG: 700 TABLET ORAL at 11:22

## 2025-08-21 RX ADMIN — BARIUM SULFATE 110 ML: 0.81 POWDER, FOR SUSPENSION ORAL at 11:23

## 2025-08-21 RX ADMIN — BARIUM SULFATE 10 ML: 400 SUSPENSION ORAL at 11:23

## 2025-08-21 RX ADMIN — BARIUM SULFATE 45 ML: 400 SUSPENSION ORAL at 11:22

## 2025-08-21 RX ADMIN — BARIUM SULFATE 15 ML: 400 PASTE ORAL at 11:22

## 2025-08-22 ENCOUNTER — APPOINTMENT (OUTPATIENT)
Dept: NUTRITION | Facility: HOSPITAL | Age: 65
End: 2025-08-22
Payer: COMMERCIAL

## 2025-08-25 ENCOUNTER — TELEPHONE (OUTPATIENT)
Dept: ADMISSION | Facility: HOSPITAL | Age: 65
End: 2025-08-25
Payer: COMMERCIAL

## 2025-08-25 DIAGNOSIS — G89.3 CANCER ASSOCIATED PAIN: ICD-10-CM

## 2025-08-25 RX ORDER — MORPHINE SULFATE 15 MG/1
15 TABLET, FILM COATED, EXTENDED RELEASE ORAL 2 TIMES DAILY
Qty: 60 TABLET | Refills: 0 | Status: SHIPPED | OUTPATIENT
Start: 2025-08-26 | End: 2025-09-25

## 2025-09-05 ENCOUNTER — OFFICE VISIT (OUTPATIENT)
Dept: PRIMARY CARE | Facility: CLINIC | Age: 65
End: 2025-09-05
Payer: COMMERCIAL

## 2025-09-05 VITALS
OXYGEN SATURATION: 97 % | DIASTOLIC BLOOD PRESSURE: 72 MMHG | WEIGHT: 148 LBS | HEART RATE: 82 BPM | SYSTOLIC BLOOD PRESSURE: 128 MMHG | BODY MASS INDEX: 26.22 KG/M2 | HEIGHT: 63 IN

## 2025-09-05 DIAGNOSIS — J18.9 PNEUMONIA OF LEFT LOWER LOBE DUE TO INFECTIOUS ORGANISM: Primary | ICD-10-CM

## 2025-09-05 PROCEDURE — 3008F BODY MASS INDEX DOCD: CPT | Performed by: FAMILY MEDICINE

## 2025-09-05 PROCEDURE — 99213 OFFICE O/P EST LOW 20 MIN: CPT | Performed by: FAMILY MEDICINE

## 2025-09-05 PROCEDURE — 1036F TOBACCO NON-USER: CPT | Performed by: FAMILY MEDICINE

## 2025-09-05 RX ORDER — METHYLPREDNISOLONE 4 MG/1
TABLET ORAL
Qty: 1 EACH | Refills: 0 | Status: SHIPPED | OUTPATIENT
Start: 2025-09-05

## 2025-09-05 RX ORDER — AMOXICILLIN AND CLAVULANATE POTASSIUM 875; 125 MG/1; MG/1
875 TABLET, FILM COATED ORAL 2 TIMES DAILY
Qty: 20 TABLET | Refills: 0 | Status: SHIPPED | OUTPATIENT
Start: 2025-09-05 | End: 2025-09-15

## 2025-09-05 ASSESSMENT — ENCOUNTER SYMPTOMS
LOSS OF SENSATION IN FEET: 0
OCCASIONAL FEELINGS OF UNSTEADINESS: 1
DEPRESSION: 0

## 2025-09-10 ENCOUNTER — APPOINTMENT (OUTPATIENT)
Dept: PALLIATIVE MEDICINE | Facility: CLINIC | Age: 65
End: 2025-09-10
Payer: COMMERCIAL

## 2025-10-22 ENCOUNTER — APPOINTMENT (OUTPATIENT)
Dept: OTOLARYNGOLOGY | Facility: HOSPITAL | Age: 65
End: 2025-10-22
Payer: COMMERCIAL

## 2025-11-07 ENCOUNTER — APPOINTMENT (OUTPATIENT)
Dept: OTOLARYNGOLOGY | Facility: CLINIC | Age: 65
End: 2025-11-07
Payer: COMMERCIAL

## (undated) DEVICE — SUTURE, VICRYL, 3-0, 27IN, RB-1

## (undated) DEVICE — TUBING, SUCTION, NON-CONDUCTIVE, W/CONNECT,.25 IN X 12 FT, STERILE, LF

## (undated) DEVICE — MANIFOLD, 4 PORT NEPTUNE STANDARD

## (undated) DEVICE — BLADE, DERMATOME, 1.25 X 4.25 IN, STERILE

## (undated) DEVICE — DRESSING, ADHESIVE, ISLAND, TELFA, 4 X 14 IN

## (undated) DEVICE — GLOVE, SURGICAL, PROTEXIS PI W/NEU-THERA, 7.5, PF, LF

## (undated) DEVICE — REST, HEAD, BAGEL, 9 IN

## (undated) DEVICE — GLOVE, SURGICAL, PROTEXIS PI BLUE W/NEUTHERA, 7.5, PF, LF

## (undated) DEVICE — BAG, DECANTER

## (undated) DEVICE — PADDING, WEBRIL, UNDERCAST, STERILE, 4 IN

## (undated) DEVICE — Device

## (undated) DEVICE — COUNTER, NEEDLE, FOAM BLOCK, W/MAGNET, W/BLADE GUARD, 10 COUNT, RED, LF

## (undated) DEVICE — BACKGROUND MATERIAL, MERCIAN, YELLOW, 1MM GRID, LF

## (undated) DEVICE — CONTAINER, SPECIMEN, 120 ML, STERILE

## (undated) DEVICE — DRAPE, INSTRUMENT, W/POUCH, STERI DRAPE, 7 X 11 IN, DISPOSABLE, STERILE

## (undated) DEVICE — CLIP, LIGATING, W/ADHESIVE, WIDE SLOT, SMALL, TITANIUM

## (undated) DEVICE — MICROCLIPS, GEM HEMOSTATIC TITANIUM

## (undated) DEVICE — SPONGE, GAUZE, XRAY DECT, 16 PLY, 4 X 4, W/MASTER DMT,STERILE

## (undated) DEVICE — TOWEL, SURGICAL, NEURO, O/R, 16 X 26, BLUE, STERILE

## (undated) DEVICE — HOLSTER, JET SAFETY

## (undated) DEVICE — SYRINGE, 6 CC, REG LUER TIP

## (undated) DEVICE — SYRINGE, HYPODERMIC, LUER LOCK, 6 CC

## (undated) DEVICE — ELECTRODE, ELECTROSURGICAL, GUARDED TIP

## (undated) DEVICE — COVER, CART, 45 X 27 X 48 IN, CLEAR

## (undated) DEVICE — EVACUATOR, WOUND, SUCTION, CLOSED, JACKSON-PRATT, 100 CC, SILICONE

## (undated) DEVICE — DRAPE, SHEET, THREE QUARTER, FAN FOLD, 57 X 77 IN

## (undated) DEVICE — CLIP, LIGATING, W/ADHESIVE PAD, MEDIUM, TITANIUM

## (undated) DEVICE — SUTURE, PERMA HAND 2-0, TAPER POINT, SH BLACK 8-18 INCH

## (undated) DEVICE — GLOVE, SURGICAL, PROTEXIS PI MICRO, 7.5, PF, LF

## (undated) DEVICE — COVER PROBE, SOFT FLEX W/ GEL, 5 X 48 IN (13X122CM)

## (undated) DEVICE — ELECTRODE, ELECTROSURGICAL, NEEDLE, 1.1 IN, LF

## (undated) DEVICE — SUTURE, NYLON, 9-0, 10C33

## (undated) DEVICE — BOWL, BASIN, 32 OZ, STERILE

## (undated) DEVICE — SPONGE, HEMOSTATIC, GELATIN, SURGIFOAM, 8 X 12.5 CM X 10 MM

## (undated) DEVICE — PAD, GROUNDING, ELECTROSURGICAL, W/9 FT CABLE, POLYHESIVE II, ADULT, LF

## (undated) DEVICE — SUTURE, VICRYL, 3-0,18 IN, SH, UNDYED

## (undated) DEVICE — DRESSING, GAUZE, 16 PLY, 4 X 4 IN, STERILE

## (undated) DEVICE — CLEANER, WIPE, INSTRUMENT, 3.25 X 3.25 IN

## (undated) DEVICE — SHEARS, CURVED 9CM HARMONIC FOCUS

## (undated) DEVICE — NEEDLE, HYPODERMIC, MONOJECT, 27 G X 1.5 IN

## (undated) DEVICE — COVER, BACK TABLE, 65 X 90, HVY REINFORCED

## (undated) DEVICE — SPEAR, EYE, SURGICAL, WECK-CEL, CELLULOSE

## (undated) DEVICE — DRESSING, TRANSPARENT, TEGADERM, 8 X 12 IN

## (undated) DEVICE — STOCKINETTE, DOUBLE PLY, 4 X 48 IN, STERILE

## (undated) DEVICE — SPONGE, LAP, XRAY DECT, SC+RFID, 12X12, STERILE

## (undated) DEVICE — DRAIN, WOUND, FLAT, HUBLESS, FULL LENGTH PERFORATION, 10 MM X 20 CM, SILICONE

## (undated) DEVICE — TRAY, SURESTEP, URINE METER, 14FR, SILICONE

## (undated) DEVICE — BOWL, UTILITY, 32 OZ, PLASTIC, STERILE

## (undated) DEVICE — DRESSING, ABDOMINAL, TENDERSORB, 8 X 10 IN, STERILE